# Patient Record
Sex: FEMALE | Race: WHITE | NOT HISPANIC OR LATINO | Employment: UNEMPLOYED | URBAN - METROPOLITAN AREA
[De-identification: names, ages, dates, MRNs, and addresses within clinical notes are randomized per-mention and may not be internally consistent; named-entity substitution may affect disease eponyms.]

---

## 2017-03-10 ENCOUNTER — OFFICE VISIT (OUTPATIENT)
Dept: FAMILY MEDICINE | Facility: CLINIC | Age: 62
End: 2017-03-10
Payer: COMMERCIAL

## 2017-03-10 VITALS
HEART RATE: 100 BPM | BODY MASS INDEX: 20.01 KG/M2 | SYSTOLIC BLOOD PRESSURE: 146 MMHG | HEIGHT: 61 IN | DIASTOLIC BLOOD PRESSURE: 98 MMHG | TEMPERATURE: 98.7 F | WEIGHT: 106 LBS | OXYGEN SATURATION: 97 %

## 2017-03-10 DIAGNOSIS — J01.00 ACUTE NON-RECURRENT MAXILLARY SINUSITIS: Primary | ICD-10-CM

## 2017-03-10 DIAGNOSIS — R06.2 WHEEZING: ICD-10-CM

## 2017-03-10 PROCEDURE — 99213 OFFICE O/P EST LOW 20 MIN: CPT | Performed by: PHYSICIAN ASSISTANT

## 2017-03-10 RX ORDER — ALBUTEROL SULFATE 90 UG/1
2 AEROSOL, METERED RESPIRATORY (INHALATION) EVERY 4 HOURS PRN
Qty: 1 INHALER | Refills: 0 | Status: SHIPPED | OUTPATIENT
Start: 2017-03-10 | End: 2017-05-17

## 2017-03-10 RX ORDER — CEFDINIR 300 MG/1
300 CAPSULE ORAL 2 TIMES DAILY
Qty: 20 CAPSULE | Refills: 0 | Status: SHIPPED | OUTPATIENT
Start: 2017-03-10 | End: 2017-05-17

## 2017-03-10 NOTE — MR AVS SNAPSHOT
After Visit Summary   3/10/2017    Mimi Rivera    MRN: [id number]           Patient Information     Date Of Birth          1955        Visit Information        Provider Department      3/10/2017 2:20 PM Alanis Mederos PA-C [facility]        Today's Diagnoses     Acute non-recurrent maxillary sinusitis    -  1    Wheezing          Care Instructions    Take the antibiotic as prescribed for the sinus infection    Please take the albuterol inhaler every 4 hours as needed for any wheezing or shortness of breath.  If you are needing the rescue inhaler more often or having increase wheezing or shortness of breath please be seen immediately.      Followup if symptoms are not improved as expected with treatment.    Sinusitis (Antibiotic Treatment)    The sinuses are air-filled spaces within the bones of the face. They connect to the inside of the nose. Sinusitis is an inflammation of the tissue lining the sinus cavity. Sinus inflammation can occur during a cold. It can also be due to allergies to pollens and other particles in the air. Sinusitis can cause symptoms of sinus congestion and fullness. A sinus infection causes fever, headache and facial pain. There is often green or yellow drainage from the nose or into the back of the throat (post-nasal drip). You have been given antibiotics to treat this condition.  Home care:    Take the full course of antibiotics as instructed. Do not stop taking them, even if you feel better.    Drink plenty of water, hot tea, and other liquids. This may help thin mucus. It also may promote sinus drainage.    Heat may help soothe painful areas of the face. Use a towel soaked in hot water. Or,  the shower and direct the hot spray onto your face. Using a vaporizer along with a menthol rub at night may also help.     An expectorant containing guaifenesin may help thin the mucus and promote drainage from the  sinuses.    Over-the-counter decongestants may be used unless a similar medicine was prescribed. Nasal sprays work the fastest. Use one that contains phenylephrine or oxymetazoline. First blow the nose gently. Then use the spray. Do not use these medicines more often than directed on the label or symptoms may get worse. You may also use tablets containing pseudoephedrine. Avoid products that combine ingredients, because side effects may be increased. Read labels. You can also ask the pharmacist for help. (NOTE: Persons with high blood pressure should not use decongestants. They can raise blood pressure.)    Over-the-counter antihistamines may help if allergies contributed to your sinusitis.      Do not use nasal rinses or irrigation during an acute sinus infection, unless told to by your health care provider. Rinsing may spread the infection to other sinuses.    Use acetaminophen or ibuprofen to control pain, unless another pain medicine was prescribed. (If you have chronic liver or kidney disease or ever had a stomach ulcer, talk with your doctor before using these medicines. Aspirin should never be used in anyone under 18 years of age who is ill with a fever. It may cause severe liver damage.)    Don't smoke. This can worsen symptoms.  Follow-up care  Follow up with your healthcare provider or our staff if you are not improving within the next week.  When to seek medical advice  Call your healthcare provider if any of these occur:    Facial pain or headache becoming more severe    Stiff neck    Unusual drowsiness or confusion    Swelling of the forehead or eyelids    Vision problems, including blurred or double vision    Fever of 100.4 F (38 C) or higher, or as directed by your healthcare provider    Seizure    Breathing problems    Symptoms not resolving within 10 days    4590-3668 [employer]. [address] [place] [address], [place] [address]. All rights reserved. This information is not intended as a  "substitute for professional medical care. Always follow your healthcare professional's instructions.              Follow-ups after your visit        Who to contact     If you have questions or need follow up information about today's clinic visit or your schedule please contact [facility] directly at [phone].  Normal or non-critical lab and imaging results will be communicated to you by MyChart, letter or phone within 4 business days after the clinic has received the results. If you do not hear from us within 7 days, please contact the clinic through [employer] or phone. If you have a critical or abnormal lab result, we will notify you by phone as soon as possible.  Submit refill requests through [employer] or call your pharmacy and they will forward the refill request to us. Please allow 3 business days for your refill to be completed.          Additional Information About Your Visit        MyChart Information     [employer] gives you secure access to your electronic health record. If you see a primary care provider, you can also send messages to your care team and make appointments. If you have questions, please call your primary care clinic.  If you do not have a primary care provider, please call [phone] and they will assist you.        Care EveryWhere ID     This is your Care EveryWhere ID. This could be used by other organizations to access your [facility] medical records  [id number]        Your Vitals Were     Pulse Temperature Height Last Period Pulse Oximetry Breastfeeding?    100 98.7  F (37.1  C) (Oral) 5' 1\" (1.549 m) 09/22/2010 97% No    BMI (Body Mass Index)                   20.03 kg/m2            Blood Pressure from Last 3 Encounters:   03/10/17 (!) 146/98   10/29/16 132/84   10/24/16 122/82    Weight from Last 3 Encounters:   03/10/17 106 lb (48.1 kg)   10/24/16 103 lb (46.7 kg)   09/16/16 100 lb (45.4 kg)              Today, you had the following     No orders found for display    "      Today's Medication Changes          These changes are accurate as of: 3/10/17  3:18 PM.  If you have any questions, ask your nurse or doctor.               Start taking these medicines.        Dose/Directions    cefdinir 300 MG capsule   Commonly known as:  OMNICEF   Used for:  Acute non-recurrent maxillary sinusitis   Started by:  Alanis Mederos PA-C        Dose:  300 mg   Take 1 capsule (300 mg) by mouth 2 times daily   Quantity:  20 capsule   Refills:  0         These medicines have changed or have updated prescriptions.        Dose/Directions    albuterol 108 (90 BASE) MCG/ACT Inhaler   Commonly known as:  PROAIR HFA/PROVENTIL HFA/VENTOLIN HFA   This may have changed:    - when to take this  - reasons to take this   Used for:  Wheezing   Changed by:  Alanis Mederos PA-C        Dose:  2 puff   Inhale 2 puffs into the lungs every 4 hours as needed for shortness of breath / dyspnea or wheezing   Quantity:  1 Inhaler   Refills:  0            Where to get your medicines      These medications were sent to [id number] - [place], [place] - [address] [address]  [id number] [place] [address]     Phone:  [phone]     albuterol 108 (90 BASE) MCG/ACT Inhaler    cefdinir 300 MG capsule                Primary Care Provider Office Phone # Fax #    Leno Harrison [phone]       [facility] [address]  [facility] [address]        Thank you!     Thank you for choosing [facility]  for your care. Our goal is always to provide you with excellent care. Hearing back from our patients is one way we can continue to improve our services. Please take a few minutes to complete the written survey that you may receive in the mail after your visit with us. Thank you!             Your Updated Medication List - Protect others around you: Learn how to safely use, store and throw away your medicines at www.disposemymeds.org.          This list is  accurate as of: 3/10/17  3:18 PM.  Always use your most recent med list.                   Brand Name Dispense Instructions for use    albuterol 108 (90 BASE) MCG/ACT Inhaler    PROAIR HFA/PROVENTIL HFA/VENTOLIN HFA    1 Inhaler    Inhale 2 puffs into the lungs every 4 hours as needed for shortness of breath / dyspnea or wheezing       amLODIPine 5 MG tablet    NORVASC    90 tablet    Take 1 tablet (5 mg) by mouth daily       cefdinir 300 MG capsule    OMNICEF    20 capsule    Take 1 capsule (300 mg) by mouth 2 times daily       DEVILBISS TRAVELER NEBULIZER Misc     1 Device    1 Device 4 times daily as needed       fluticasone 220 MCG/ACT Inhaler    FLOVENT HFA    1 Inhaler    Inhale 2 puffs into the lungs 2 times daily       ipratropium - albuterol 0.5 mg/2.5 mg/3 mL 0.5-2.5 (3) MG/3ML neb solution    DUONEB    360 mL    Take 1 vial (3 mLs) by nebulization every 4 hours as needed for shortness of breath / dyspnea or wheezing       tiotropium 2.5 MCG/ACT inhalation aerosol    SPIRIVA RESPIMAT    4 g    Inhale 2 puffs into the lungs daily

## 2017-03-10 NOTE — PROGRESS NOTES
"  SUBJECTIVE:                                                    Mimi Rivera is a 61 year old female who presents to clinic today for the following health issues:      Acute Illness   Acute illness concerns: Fever, cough, sinus problem  Onset: x10 days    Fever: YES- felt warm - curr 98.7    Chills/Sweats: YES- sweats at night    Headache (location?): YES- foredhead    Sinus Pressure:YES    Conjunctivitis:  YES- dry    Ear Pain: YES- popping, pressure    Rhinorrhea: YES- green    Congestion: YES- chest, sinus, head    Sore Throat: YES- mild     Cough: YES-productive of green sputum    Wheeze: YES- last night    Decreased Appetite: YES- cannot taste anything    Nausea: no    Vomiting: no    Diarrhea:  no    Dysuria/Freq.: no    Fatigue/Achiness: YES- more tired    Sick/Strep Exposure: YES- daughter - sinus infection, others - same symptoms     Therapies Tried and outcome: advil - minor relief, advil cold and sinus - no relief      Patient reports that symptoms started last week towards the end of the week.  She states that she has sinus congestion and pain in her face and forehead.  She has also had headaches.  She states that the congestion is dripping down the back of her throat, but she is not able to get it to drain out her nose.      Patient reports that she knows this is a sinus infection.      Problem list and histories reviewed & adjusted, as indicated.  Additional history: as documented      ROS:  Constitutional, HEENT, cardiovascular, pulmonary, GI, , musculoskeletal, neuro, skin, endocrine and psych systems are negative, except as otherwise noted.    OBJECTIVE:                                                    BP (!) 146/98 (BP Location: Left arm, Patient Position: Chair, Cuff Size: Adult Regular)  Pulse 100  Temp 98.7  F (37.1  C) (Oral)  Ht 5' 1\" (1.549 m)  Wt 106 lb (48.1 kg)  LMP 09/22/2010  SpO2 97%  Breastfeeding? No  BMI 20.03 kg/m2  Body mass index is 20.03 kg/(m^2).  GENERAL: healthy, alert " and no distress  EYES: Eyes grossly normal to inspection, PERRL and conjunctivae and sclerae normal  HENT: ear canals and TM's normal, nose and mouth without ulcers or lesions  NECK: no adenopathy, no asymmetry, masses, or scars and thyroid normal to palpation  RESP: lungs clear to auscultation - no rales, rhonchi or wheezes  CV: regular rate and rhythm, normal S1 S2, no S3 or S4, no murmur, click or rub, no peripheral edema and peripheral pulses strong  ABDOMEN: soft, nontender, no hepatosplenomegaly, no masses and bowel sounds normal  MS: no gross musculoskeletal defects noted, no edema  NEURO: Normal strength and tone, mentation intact and speech normal  PSYCH: mentation appears normal, affect normal/bright    Diagnostic Test Results:  none      ASSESSMENT/PLAN:                                                      Mimi was seen today for fever, cough and sinus problem.    Diagnoses and all orders for this visit:    Acute non-recurrent maxillary sinusitis  -     cefdinir (OMNICEF) 300 MG capsule; Take 1 capsule (300 mg) by mouth 2 times daily    Wheezing  -     albuterol (PROAIR HFA/PROVENTIL HFA/VENTOLIN HFA) 108 (90 BASE) MCG/ACT Inhaler; Inhale 2 puffs into the lungs every 4 hours as needed for shortness of breath / dyspnea or wheezing    - Reassuring lung sounds.  No wheezing, rales, rhonchi or coarse sounds heard.    - Cefdinir prescribed as patient has tolerated it well in the past with treatment of sinusitis.      See Patient Instructions:  Take the antibiotic as prescribed for the sinus infection    Please take the albuterol inhaler every 4 hours as needed for any wheezing or shortness of breath.  If you are needing the rescue inhaler more often or having increase wheezing or shortness of breath please be seen immediately.      Followup if symptoms are not improved as expected with treatment.          Alanis Mederos PA-C    [facility]

## 2017-03-10 NOTE — NURSING NOTE
"Chief Complaint   Patient presents with     Fever     Cough     Sinus Problem       Initial BP (!) 146/98 (BP Location: Left arm, Patient Position: Chair, Cuff Size: Adult Regular)  Pulse 100  Temp 98.7  F (37.1  C) (Oral)  Ht 5' 1\" (1.549 m)  Wt 106 lb (48.1 kg)  LMP 09/22/2010  SpO2 97%  Breastfeeding? No  BMI 20.03 kg/m2 Estimated body mass index is 20.03 kg/(m^2) as calculated from the following:    Height as of this encounter: 5' 1\" (1.549 m).    Weight as of this encounter: 106 lb (48.1 kg).  Medication Reconciliation: complete   Csaba Mlnarik CMA    "

## 2017-03-10 NOTE — PATIENT INSTRUCTIONS
Take the antibiotic as prescribed for the sinus infection    Please take the albuterol inhaler every 4 hours as needed for any wheezing or shortness of breath.  If you are needing the rescue inhaler more often or having increase wheezing or shortness of breath please be seen immediately.      Followup if symptoms are not improved as expected with treatment.    Sinusitis (Antibiotic Treatment)    The sinuses are air-filled spaces within the bones of the face. They connect to the inside of the nose. Sinusitis is an inflammation of the tissue lining the sinus cavity. Sinus inflammation can occur during a cold. It can also be due to allergies to pollens and other particles in the air. Sinusitis can cause symptoms of sinus congestion and fullness. A sinus infection causes fever, headache and facial pain. There is often green or yellow drainage from the nose or into the back of the throat (post-nasal drip). You have been given antibiotics to treat this condition.  Home care:    Take the full course of antibiotics as instructed. Do not stop taking them, even if you feel better.    Drink plenty of water, hot tea, and other liquids. This may help thin mucus. It also may promote sinus drainage.    Heat may help soothe painful areas of the face. Use a towel soaked in hot water. Or,  the shower and direct the hot spray onto your face. Using a vaporizer along with a menthol rub at night may also help.     An expectorant containing guaifenesin may help thin the mucus and promote drainage from the sinuses.    Over-the-counter decongestants may be used unless a similar medicine was prescribed. Nasal sprays work the fastest. Use one that contains phenylephrine or oxymetazoline. First blow the nose gently. Then use the spray. Do not use these medicines more often than directed on the label or symptoms may get worse. You may also use tablets containing pseudoephedrine. Avoid products that combine ingredients, because side  effects may be increased. Read labels. You can also ask the pharmacist for help. (NOTE: Persons with high blood pressure should not use decongestants. They can raise blood pressure.)    Over-the-counter antihistamines may help if allergies contributed to your sinusitis.      Do not use nasal rinses or irrigation during an acute sinus infection, unless told to by your health care provider. Rinsing may spread the infection to other sinuses.    Use acetaminophen or ibuprofen to control pain, unless another pain medicine was prescribed. (If you have chronic liver or kidney disease or ever had a stomach ulcer, talk with your doctor before using these medicines. Aspirin should never be used in anyone under 18 years of age who is ill with a fever. It may cause severe liver damage.)    Don't smoke. This can worsen symptoms.  Follow-up care  Follow up with your healthcare provider or our staff if you are not improving within the next week.  When to seek medical advice  Call your healthcare provider if any of these occur:    Facial pain or headache becoming more severe    Stiff neck    Unusual drowsiness or confusion    Swelling of the forehead or eyelids    Vision problems, including blurred or double vision    Fever of 100.4 F (38 C) or higher, or as directed by your healthcare provider    Seizure    Breathing problems    Symptoms not resolving within 10 days    4488-1202 [employer]. [address] [place] [address], [place] [address]. All rights reserved. This information is not intended as a substitute for professional medical care. Always follow your healthcare professional's instructions.

## 2017-03-17 ENCOUNTER — OFFICE VISIT (OUTPATIENT)
Dept: FAMILY MEDICINE | Facility: CLINIC | Age: 62
End: 2017-03-17
Payer: COMMERCIAL

## 2017-03-17 VITALS
WEIGHT: 105 LBS | TEMPERATURE: 98.2 F | BODY MASS INDEX: 19.83 KG/M2 | DIASTOLIC BLOOD PRESSURE: 87 MMHG | SYSTOLIC BLOOD PRESSURE: 120 MMHG | OXYGEN SATURATION: 93 % | HEIGHT: 61 IN | HEART RATE: 90 BPM

## 2017-03-17 DIAGNOSIS — R05.9 COUGH: ICD-10-CM

## 2017-03-17 DIAGNOSIS — J44.9 CHRONIC OBSTRUCTIVE PULMONARY DISEASE, UNSPECIFIED COPD TYPE (H): ICD-10-CM

## 2017-03-17 DIAGNOSIS — J01.01 ACUTE RECURRENT MAXILLARY SINUSITIS: Primary | ICD-10-CM

## 2017-03-17 DIAGNOSIS — Z12.31 VISIT FOR SCREENING MAMMOGRAM: ICD-10-CM

## 2017-03-17 DIAGNOSIS — Z12.11 SPECIAL SCREENING FOR MALIGNANT NEOPLASMS, COLON: ICD-10-CM

## 2017-03-17 LAB
BASOPHILS # BLD AUTO: 0 10E9/L (ref 0–0.2)
BASOPHILS NFR BLD AUTO: 0.3 %
DIFFERENTIAL METHOD BLD: ABNORMAL
EOSINOPHIL # BLD AUTO: 0 10E9/L (ref 0–0.7)
EOSINOPHIL NFR BLD AUTO: 0.3 %
ERYTHROCYTE [DISTWIDTH] IN BLOOD BY AUTOMATED COUNT: 11.9 % (ref 10–15)
HCT VFR BLD AUTO: 45 % (ref 35–47)
HGB BLD-MCNC: 15.9 G/DL (ref 11.7–15.7)
LYMPHOCYTES # BLD AUTO: 2.1 10E9/L (ref 0.8–5.3)
LYMPHOCYTES NFR BLD AUTO: 28.7 %
MCH RBC QN AUTO: 32.9 PG (ref 26.5–33)
MCHC RBC AUTO-ENTMCNC: 35.3 G/DL (ref 31.5–36.5)
MCV RBC AUTO: 93 FL (ref 78–100)
MONOCYTES # BLD AUTO: 0.6 10E9/L (ref 0–1.3)
MONOCYTES NFR BLD AUTO: 8.3 %
NEUTROPHILS # BLD AUTO: 4.6 10E9/L (ref 1.6–8.3)
NEUTROPHILS NFR BLD AUTO: 62.4 %
PLATELET # BLD AUTO: 245 10E9/L (ref 150–450)
RBC # BLD AUTO: 4.83 10E12/L (ref 3.8–5.2)
WBC # BLD AUTO: 7.4 10E9/L (ref 4–11)

## 2017-03-17 PROCEDURE — 99213 OFFICE O/P EST LOW 20 MIN: CPT | Performed by: PHYSICIAN ASSISTANT

## 2017-03-17 PROCEDURE — 85025 COMPLETE CBC W/AUTO DIFF WBC: CPT | Performed by: PHYSICIAN ASSISTANT

## 2017-03-17 PROCEDURE — 36415 COLL VENOUS BLD VENIPUNCTURE: CPT | Performed by: PHYSICIAN ASSISTANT

## 2017-03-17 RX ORDER — AZITHROMYCIN 250 MG/1
TABLET, FILM COATED ORAL
Qty: 6 TABLET | Refills: 0 | Status: SHIPPED | OUTPATIENT
Start: 2017-03-17 | End: 2017-05-17

## 2017-03-17 NOTE — LETTER
My Depression Action Plan  Name: Mimi Rivera   Date of Birth 1955  Date: 3/17/2017    My doctor: Leno Harrison   My clinic: [address] [id number] [phone]          GREEN    ZONE   Good Control    What it looks like:     Things are going generally well. You have normal up s and down s. You may even feel depressed from time to time, but bad moods usually last less than a day.   What you need to do:  1. Continue to care for yourself (see self care plan)  2. Check your depression survival kit and update it as needed  3. Follow your physician s recommendations including any medication.  4. Do not stop taking medication unless you consult with your physician first.           YELLOW         ZONE Getting Worse    What it looks like:     Depression is starting to interfere with your life.     It may be hard to get out of bed; you may be starting to isolate yourself from others.    Symptoms of depression are starting to last most all day and this has happened for several days.     You may have suicidal thoughts but they are not constant.   What you need to do:     1. Call your care team, your response to treatment will improve if you keep your care team informed of your progress. Yellow periods are signs an adjustment may need to be made.     2. Continue your self-care, even if you have to fake it!    3. Talk to someone in your support network    4. Open up your depression survival kit           RED    ZONE Medical Alert - Get Help    What it looks like:     Depression is seriously interfering with your life.     You may experience these or other symptoms: You can t get out of bed most days, can t work or engage in other necessary activities, you have trouble taking care of basic hygiene, or basic responsibilities, thoughts of suicide or death that will not go away, self-injurious behavior.     What you need to do:  1. Call  your care team and request a same-day appointment. If they are not available (weekends or after hours) call your local crisis line, emergency room or 911.      Electronically signed by: Imelda Flores, March 17, 2017    Depression Self Care Plan / Survival Kit    Self-Care for Depression  Here s the deal. Your body and mind are really not as separate as most people think.  What you do and think affects how you feel and how you feel influences what you do and think. This means if you do things that people who feel good do, it will help you feel better.  Sometimes this is all it takes.  There is also a place for medication and therapy depending on how severe your depression is, so be sure to consult with your medical provider and/ or Behavioral Health Consultant if your symptoms are worsening or not improving.     In order to better manage my stress, I will:    Exercise  Get some form of exercise, every day. This will help reduce pain and release endorphins, the  feel good  chemicals in your brain. This is almost as good as taking antidepressants!  This is not the same as joining a gym and then never going! (they count on that by the way ) It can be as simple as just going for a walk or doing some gardening, anything that will get you moving.      Hygiene   Maintain good hygiene (Get out of bed in the morning, Make your bed, Brush your teeth, Take a shower, and Get dressed like you were going to work, even if you are unemployed).  If your clothes don't fit try to get ones that do.    Diet  I will strive to eat foods that are good for me, drink plenty of water, and avoid excessive sugar, caffeine, alcohol, and other mood-altering substances.  Some foods that are helpful in depression are: complex carbohydrates, B vitamins, flaxseed, fish or fish oil, fresh fruits and vegetables.    Psychotherapy  I agree to participate in Individual Therapy (if recommended).    Medication  If prescribed medications, I agree to take  them.  Missing doses can result in serious side effects.  I understand that drinking alcohol, or other illicit drug use, may cause potential side effects.  I will not stop my medication abruptly without first discussing it with my provider.    Staying Connected With Others  I will stay in touch with my friends, family members, and my primary care provider/team.    Use your imagination  Be creative.  We all have a creative side; it doesn t matter if it s oil painting, sand castles, or mud pies! This will also kick up the endorphins.    Witness Beauty  (AKA stop and smell the roses) Take a look outside, even in mid-winter. Notice colors, textures. Watch the squirrels and birds.     Service to others  Be of service to others.  There is always someone else in need.  By helping others we can  get out of ourselves  and remember the really important things.  This also provides opportunities for practicing all the other parts of the program.    Humor  Laugh and be silly!  Adjust your TV habits for less news and crime-drama and more comedy.    Control your stress  Try breathing deep, massage therapy, biofeedback, and meditation. Find time to relax each day.     My support system    Clinic Contact:  Phone number:    Contact 1:  Phone number:    Contact 2:  Phone number:    [employer]:  Phone number:    Therapist:  Phone number:    Local crisis center:    Phone number:    Other community support:  Phone number:

## 2017-03-17 NOTE — LETTER
My COPD Action Plan   Name: Mimi Rivera    YOB: 1955   Date: 3/17/2017    My doctor: Alanis Mederos PA-C   My clinic: [address] [id number] [phone]  My Controller Medicine: Tiotropium (Spiriva)    Dose:      My Rescue Medicine: Albuterol (Proair/Ventolin/Proventil) inhaler   Dose:      My Flare Up Medicine: none   Dose:   My COPD Severity: None noted     Use of Oxygen: Oxygen Not Prescribed         GREEN ZONE       Doing well today      Usual level of activity and exercise    Usual amount of cough and mucus    No shortness of breath    Usual level of health (thinking clearly, sleeping well, feel like eating) Actions:      Take daily medicines    Use oxygen as prescribed    Follow regular exercise and diet plan    Avoid cigarette smoke and other irritants that harm the lungs           YELLOW ZONE          Having a bad day or flare up      Short of breath more than usual    A lot more sputum (mucus) than usual    Sputum looks yellow, green, tan, brown or bloody    More coughing or wheezing    Fever or chills    Less energy; trouble completing activities    Trouble thinking or focusing    Using quick relief inhaler or nebulizer more often    Poor sleep; symptoms wake me up    Do not feel like eating Actions:      Get plenty of rest    Take daily medicines    Use quick relief inhaler every 4-6 hours    If you use oxygen, call you doctor to see if you should adjust your oxygen    Do breathing exercises or other things to help you relax    Let a loved one, friend or neighbor know you are feeling worse    Call your care team if you have 2 or more symptoms.  Start taking steroids or antibiotics if directed by your care team           RED ZONE       Need medical care now      Severe shortness of breath (feel you can't breathe)    Fever, chills    Not enough breath to do any activity    Trouble coughing up mucus, walking or  talking    Blood in mucus    Frequent coughing   Rescue medicines are not working    Not able to sleep because of breathing    Feel confused or drowsy    Chest pain    Actions:      Call your health care team.  If you cannot reach your care team, call 911 or go to the emergency room.        Electronically signed by: Imelda Flores, March 17, 2017  Annual Reminders:  Meet with Care Team, Flu Shot every Fall and Pneumonia Shot at least once  Pharmacy: [id number] - [facility] [address] [address]

## 2017-03-17 NOTE — PATIENT INSTRUCTIONS
Followup with ENT and with your pulmonary provider as sinusitis has been more recent in the last year.    Sinusitis (Antibiotic Treatment)    The sinuses are air-filled spaces within the bones of the face. They connect to the inside of the nose. Sinusitis is an inflammation of the tissue lining the sinus cavity. Sinus inflammation can occur during a cold. It can also be due to allergies to pollens and other particles in the air. Sinusitis can cause symptoms of sinus congestion and fullness. A sinus infection causes fever, headache and facial pain. There is often green or yellow drainage from the nose or into the back of the throat (post-nasal drip). You have been given antibiotics to treat this condition.  Home care:    Take the full course of antibiotics as instructed. Do not stop taking them, even if you feel better.    Drink plenty of water, hot tea, and other liquids. This may help thin mucus. It also may promote sinus drainage.    Heat may help soothe painful areas of the face. Use a towel soaked in hot water. Or,  the shower and direct the hot spray onto your face. Using a vaporizer along with a menthol rub at night may also help.     An expectorant containing guaifenesin may help thin the mucus and promote drainage from the sinuses.    Over-the-counter decongestants may be used unless a similar medicine was prescribed. Nasal sprays work the fastest. Use one that contains phenylephrine or oxymetazoline. First blow the nose gently. Then use the spray. Do not use these medicines more often than directed on the label or symptoms may get worse. You may also use tablets containing pseudoephedrine. Avoid products that combine ingredients, because side effects may be increased. Read labels. You can also ask the pharmacist for help. (NOTE: Persons with high blood pressure should not use decongestants. They can raise blood pressure.)    Over-the-counter antihistamines may help if allergies contributed to your  sinusitis.      Do not use nasal rinses or irrigation during an acute sinus infection, unless told to by your health care provider. Rinsing may spread the infection to other sinuses.    Use acetaminophen or ibuprofen to control pain, unless another pain medicine was prescribed. (If you have chronic liver or kidney disease or ever had a stomach ulcer, talk with your doctor before using these medicines. Aspirin should never be used in anyone under 18 years of age who is ill with a fever. It may cause severe liver damage.)    Don't smoke. This can worsen symptoms.  Follow-up care  Follow up with your healthcare provider or our staff if you are not improving within the next week.  When to seek medical advice  Call your healthcare provider if any of these occur:    Facial pain or headache becoming more severe    Stiff neck    Unusual drowsiness or confusion    Swelling of the forehead or eyelids    Vision problems, including blurred or double vision    Fever of 100.4 F (38 C) or higher, or as directed by your healthcare provider    Seizure    Breathing problems    Symptoms not resolving within 10 days    2624-6823 [employer]. [address] [place] [address], [place] [address]. All rights reserved. This information is not intended as a substitute for professional medical care. Always follow your healthcare professional's instructions.

## 2017-03-17 NOTE — PROGRESS NOTES
"  SUBJECTIVE:                                                    Mimi Rivera is a 61 year old female who presents to clinic today for the following health issues:      Recheck cough - LOV 03/10/2107. 3 days left of Omnicef -- feels a little better.   Productive cough - yellow green sputum.   Headache - forehead  Cannot smell anything  Still feeling dizzy - off and on.  Sweating at night -- feels funning low grade fever - curr 98.2-O.  Decreased appetite - drinking fluids.     Advil 2 tablets daily - minor relief  Patient took the ibuprofen at 11am today.      Woke up sweaty last night.  She feels like she is runny a low grade fever.       Patient reports that she has had some improvement from the Cefdinir, but reports that it is very minimal.  She reports that she is still having sinus pain and pressure in her face and head.  She states that she still has yellow nasal drainage and a cough that is occasionally productive.    She reports that this sort of course is common for her with sinusitis.  She states that when this happens she is put on a second (different) antibiotic and symptoms improve.      Problem list and histories reviewed & adjusted, as indicated.  Additional history: as documented      ROS:  Constitutional, HEENT, cardiovascular, pulmonary, GI, , musculoskeletal, neuro, skin, endocrine and psych systems are negative, except as otherwise noted.    OBJECTIVE:                                                    /87 (BP Location: Left arm, Patient Position: Chair, Cuff Size: Adult Regular)  Pulse 90  Temp 98.2  F (36.8  C) (Oral)  Ht 5' 1\" (1.549 m)  Wt 105 lb (47.6 kg)  LMP 09/22/2010  SpO2 93%  Breastfeeding? No  BMI 19.84 kg/m2  Body mass index is 19.84 kg/(m^2).  GENERAL: healthy, alert and no distress  EYES: Eyes grossly normal to inspection, PERRL and conjunctivae and sclerae normal  HENT: ear canals and TM's normal, nose and mouth without ulcers or lesions  NECK: no adenopathy, no " asymmetry, masses, or scars and thyroid normal to palpation  RESP: lungs clear to auscultation - no rales, rhonchi or wheezes  CV: regular rate and rhythm, normal S1 S2, no S3 or S4, no murmur, click or rub, no peripheral edema and peripheral pulses strong  ABDOMEN: soft, nontender, no hepatosplenomegaly, no masses and bowel sounds normal  MS: no gross musculoskeletal defects noted, no edema  NEURO: Normal strength and tone, mentation intact and speech normal  PSYCH: mentation appears normal, affect normal/bright    Diagnostic Test Results:  CBC - Within normal limits.       ASSESSMENT/PLAN:                                                      Mimi was seen today for recheck.    Diagnoses and all orders for this visit:    Acute recurrent maxillary sinusitis  -     OTOLARYNGOLOGY REFERRAL  -     azithromycin (ZITHROMAX) 250 MG tablet; Two tablets first day, then one tablet daily for four days.    Cough  -     CBC with platelets and differential    COPD (chronic obstructive pulmonary disease) (H)  -     COPD ACTION PLAN    Visit for screening mammogram  -     MA SCREENING DIGITAL BILAT - Future  (s+30); Future    Special screening for malignant neoplasms, colon  -     Fecal colorectal cancer screen FIT - Future (S+30); Future    Other orders  -     DEPRESSION ACTION PLAN (DAP) Order [[id number]]      - Lung sounds are reassuring without rales, rhonchi, or coarse breath sounds.   - In patients recent past (last one year) it appears that she has been on 10 rounds of antibiotics and most have been for sinusitis.  Patient also appears to have had a recent sinus CT scan that was within normal limits.  I have strongly encouraged that patient be seen by ENT for further evaluation to help minimize need for frequent antibiotic use.    - Azithromycin ordered today for continued symptoms.   - Patient advised and encouraged to use the albuterol inhaler as needed every 4-6 hours for symptoms.     - Patient was advised to be seen  immediately if symptoms change or worsen in any way.      See Patient Instructions        Alanis Mederos PA-C    [facility] [place]

## 2017-03-17 NOTE — NURSING NOTE
"Chief Complaint   Patient presents with     RECHECK     Cough       Initial /87 (BP Location: Left arm, Patient Position: Chair, Cuff Size: Adult Regular)  Pulse 90  Temp 98.2  F (36.8  C) (Oral)  Ht 5' 1\" (1.549 m)  Wt 105 lb (47.6 kg)  LMP 09/22/2010  SpO2 93%  Breastfeeding? No  BMI 19.84 kg/m2 Estimated body mass index is 19.84 kg/(m^2) as calculated from the following:    Height as of this encounter: 5' 1\" (1.549 m).    Weight as of this encounter: 105 lb (47.6 kg).  Medication Reconciliation: complete   Csaba Mlnarik CMA    "

## 2017-03-17 NOTE — MR AVS SNAPSHOT
After Visit Summary   3/17/2017    Mimi Rivera    MRN: [id number]           Patient Information     Date Of Birth          1955        Visit Information        Provider Department      3/17/2017 3:20 PM Alanis Mederos PA-C [facility]        Today's Diagnoses     Moderate major depression (H)    -  1    Visit for screening mammogram        Screening for malignant neoplasm of cervix        COPD (chronic obstructive pulmonary disease) (H)        Cough        Special screening for malignant neoplasms, colon        Acute recurrent maxillary sinusitis          Care Instructions    Followup with ENT and with your pulmonary provider as sinusitis has been more recent in the last year.    Sinusitis (Antibiotic Treatment)    The sinuses are air-filled spaces within the bones of the face. They connect to the inside of the nose. Sinusitis is an inflammation of the tissue lining the sinus cavity. Sinus inflammation can occur during a cold. It can also be due to allergies to pollens and other particles in the air. Sinusitis can cause symptoms of sinus congestion and fullness. A sinus infection causes fever, headache and facial pain. There is often green or yellow drainage from the nose or into the back of the throat (post-nasal drip). You have been given antibiotics to treat this condition.  Home care:    Take the full course of antibiotics as instructed. Do not stop taking them, even if you feel better.    Drink plenty of water, hot tea, and other liquids. This may help thin mucus. It also may promote sinus drainage.    Heat may help soothe painful areas of the face. Use a towel soaked in hot water. Or,  the shower and direct the hot spray onto your face. Using a vaporizer along with a menthol rub at night may also help.     An expectorant containing guaifenesin may help thin the mucus and promote drainage from the sinuses.    Over-the-counter decongestants may be used unless a  similar medicine was prescribed. Nasal sprays work the fastest. Use one that contains phenylephrine or oxymetazoline. First blow the nose gently. Then use the spray. Do not use these medicines more often than directed on the label or symptoms may get worse. You may also use tablets containing pseudoephedrine. Avoid products that combine ingredients, because side effects may be increased. Read labels. You can also ask the pharmacist for help. (NOTE: Persons with high blood pressure should not use decongestants. They can raise blood pressure.)    Over-the-counter antihistamines may help if allergies contributed to your sinusitis.      Do not use nasal rinses or irrigation during an acute sinus infection, unless told to by your health care provider. Rinsing may spread the infection to other sinuses.    Use acetaminophen or ibuprofen to control pain, unless another pain medicine was prescribed. (If you have chronic liver or kidney disease or ever had a stomach ulcer, talk with your doctor before using these medicines. Aspirin should never be used in anyone under 18 years of age who is ill with a fever. It may cause severe liver damage.)    Don't smoke. This can worsen symptoms.  Follow-up care  Follow up with your healthcare provider or our staff if you are not improving within the next week.  When to seek medical advice  Call your healthcare provider if any of these occur:    Facial pain or headache becoming more severe    Stiff neck    Unusual drowsiness or confusion    Swelling of the forehead or eyelids    Vision problems, including blurred or double vision    Fever of 100.4 F (38 C) or higher, or as directed by your healthcare provider    Seizure    Breathing problems    Symptoms not resolving within 10 days    2785-8860 [employer]. [address] [place] [address], [place] [address]. All rights reserved. This information is not intended as a substitute for professional medical care. Always follow your  healthcare professional's instructions.              Follow-ups after your visit        Additional Services     OTOLARYNGOLOGY REFERRAL       Your provider has referred you to: [facility]: [facility] [phone]   [email or web address]    Please be aware that coverage of these services is subject to the terms and limitations of your health insurance plan.  Call member services at your health plan with any benefit or coverage questions.      Please bring the following with you to your appointment:    (1) Any X-Rays, CTs or MRIs which have been performed.  Contact the facility where they were done to arrange for  prior to your scheduled appointment.   (2) List of current medications  (3) This referral request   (4) Any documents/labs given to you for this referral                  Your next 10 appointments already scheduled     Apr 03, 2017  1:45 PM CDT   MA SCREENING DIGITAL BILATERAL with SVMA[facility]   [facility]    [address] [address] [facility] [id number] [phone]           Do not use any powder, lotion or deodorant under your arms or on your breast. If you do, we will ask you to remove it before your exam.  Wear comfortable, two-piece clothing.  If you have any allergies, tell your care team.  Bring any previous mammograms from other facilities or have them mailed to the breast center.            Apr 27, 2017 10:40 AM CDT   PHYSICAL with Karen Weiler, MD   [facility]    [address] [facility] [id number] [phone]              Future tests that were ordered for you today     Open Future Orders        Priority Expected Expires Ordered    MA SCREENING DIGITAL BILAT - Future  (s+30) Routine  3/17/2018 3/17/2017    Fecal colorectal cancer screen FIT - Future (S+30) Routine 4/7/2017 4/16/2017 3/17/2017            Who to contact     If you have questions  "or need follow up information about today's clinic visit or your schedule please contact [facility] directly at [phone].  Normal or non-critical lab and imaging results will be communicated to you by [employer], letter or phone within 4 business days after the clinic has received the results. If you do not hear from us within 7 days, please contact the clinic through [employer] or phone. If you have a critical or abnormal lab result, we will notify you by phone as soon as possible.  Submit refill requests through [email or web address] or call your pharmacy and they will forward the refill request to us. Please allow 3 business days for your refill to be completed.          Additional Information About Your Visit        [employer] Information     [email or web address] gives you secure access to your electronic health record. If you see a primary care provider, you can also send messages to your care team and make appointments. If you have questions, please call your primary care clinic.  If you do not have a primary care provider, please call [phone] and they will assist you.        Care EveryWhere ID     This is your Care EveryWhere ID. This could be used by other organizations to access your [facility] medical records  [id number]        Your Vitals Were     Pulse Temperature Height Last Period Pulse Oximetry Breastfeeding?    90 98.2  F (36.8  C) (Oral) 5' 1\" (1.549 m) 09/22/2010 93% No    BMI (Body Mass Index)                   19.84 kg/m2            Blood Pressure from Last 3 Encounters:   03/17/17 120/87   03/10/17 (!) 146/98   10/29/16 132/84    Weight from Last 3 Encounters:   03/17/17 105 lb (47.6 kg)   03/10/17 106 lb (48.1 kg)   10/24/16 103 lb (46.7 kg)              We Performed the Following     CBC with platelets and differential     COPD ACTION PLAN     DEPRESSION ACTION PLAN (DAP) Order [[id number]]     OTOLARYNGOLOGY REFERRAL          Today's Medication Changes          These changes are accurate as of: 3/17/17  " 4:24 PM.  If you have any questions, ask your nurse or doctor.               Start taking these medicines.        Dose/Directions    azithromycin 250 MG tablet   Commonly known as:  ZITHROMAX   Used for:  Acute recurrent maxillary sinusitis   Started by:  Alanis Mederos PA-C        Two tablets first day, then one tablet daily for four days.   Quantity:  6 tablet   Refills:  0            Where to get your medicines      These medications were sent to [id number] - [place], [place] - [address] [address]  [id number] [place] [address]     Phone:  [phone]     azithromycin 250 MG tablet                Primary Care Provider Office Phone # Fax #    Leno Mert Harrison [phone]       [facility] [address] [facility] [address]  [facility] [address]        Thank you!     Thank you for choosing [facility]  for your care. Our goal is always to provide you with excellent care. Hearing back from our patients is one way we can continue to improve our services. Please take a few minutes to complete the written survey that you may receive in the mail after your visit with us. Thank you!             Your Updated Medication List - Protect others around you: Learn how to safely use, store and throw away your medicines at www.disposemymeds.org.          This list is accurate as of: 3/17/17  4:24 PM.  Always use your most recent med list.                   Brand Name Dispense Instructions for use    albuterol 108 (90 BASE) MCG/ACT Inhaler    PROAIR HFA/PROVENTIL HFA/VENTOLIN HFA    1 Inhaler    Inhale 2 puffs into the lungs every 4 hours as needed for shortness of breath / dyspnea or wheezing       amLODIPine 5 MG tablet    NORVASC    90 tablet    Take 1 tablet (5 mg) by mouth daily       azithromycin 250 MG tablet    ZITHROMAX    6 tablet    Two tablets first day, then one tablet daily for four days.       cefdinir 300 MG capsule    OMNICEF    20 capsule    Take 1  capsule (300 mg) by mouth 2 times daily       [facility] NEBUL[facility] Misc     1 Device    1 Device 4 times daily as needed       fluticasone 220 MCG/ACT Inhaler    FLOVENT HFA    1 Inhaler    Inhale 2 puffs into the lungs 2 times daily       tiotropium 2.5 MCG/ACT inhalation aerosol    SPIRIVA RESPIMAT    4 g    Inhale 2 puffs into the lungs daily

## 2017-03-17 NOTE — LETTER
My Depression Action Plan  Name: Mimi Rivera   Date of Birth 1955  Date: 3/17/2017    My doctor: Leno Harrison   My clinic: [address] [id number] [phone]          GREEN    ZONE   Good Control    What it looks like:     Things are going generally well. You have normal up s and down s. You may even feel depressed from time to time, but bad moods usually last less than a day.   What you need to do:  1. Continue to care for yourself (see self care plan)  2. Check your depression survival kit and update it as needed  3. Follow your physician s recommendations including any medication.  4. Do not stop taking medication unless you consult with your physician first.           YELLOW         ZONE Getting Worse    What it looks like:     Depression is starting to interfere with your life.     It may be hard to get out of bed; you may be starting to isolate yourself from others.    Symptoms of depression are starting to last most all day and this has happened for several days.     You may have suicidal thoughts but they are not constant.   What you need to do:     1. Call your care team, your response to treatment will improve if you keep your care team informed of your progress. Yellow periods are signs an adjustment may need to be made.     2. Continue your self-care, even if you have to fake it!    3. Talk to someone in your support network    4. Open up your depression survival kit           RED    ZONE Medical Alert - Get Help    What it looks like:     Depression is seriously interfering with your life.     You may experience these or other symptoms: You can t get out of bed most days, can t work or engage in other necessary activities, you have trouble taking care of basic hygiene, or basic responsibilities, thoughts of suicide or death that will not go away, self-injurious behavior.     What you need to do:  1. Call  your care team and request a same-day appointment. If they are not available (weekends or after hours) call your local crisis line, emergency room or 911.      Electronically signed by: Imelda Flores, March 17, 2017    Depression Self Care Plan / Survival Kit    Self-Care for Depression  Here s the deal. Your body and mind are really not as separate as most people think.  What you do and think affects how you feel and how you feel influences what you do and think. This means if you do things that people who feel good do, it will help you feel better.  Sometimes this is all it takes.  There is also a place for medication and therapy depending on how severe your depression is, so be sure to consult with your medical provider and/ or Behavioral Health Consultant if your symptoms are worsening or not improving.     In order to better manage my stress, I will:    Exercise  Get some form of exercise, every day. This will help reduce pain and release endorphins, the  feel good  chemicals in your brain. This is almost as good as taking antidepressants!  This is not the same as joining a gym and then never going! (they count on that by the way ) It can be as simple as just going for a walk or doing some gardening, anything that will get you moving.      Hygiene   Maintain good hygiene (Get out of bed in the morning, Make your bed, Brush your teeth, Take a shower, and Get dressed like you were going to work, even if you are unemployed).  If your clothes don't fit try to get ones that do.    Diet  I will strive to eat foods that are good for me, drink plenty of water, and avoid excessive sugar, caffeine, alcohol, and other mood-altering substances.  Some foods that are helpful in depression are: complex carbohydrates, B vitamins, flaxseed, fish or fish oil, fresh fruits and vegetables.    Psychotherapy  I agree to participate in Individual Therapy (if recommended).    Medication  If prescribed medications, I agree to take  them.  Missing doses can result in serious side effects.  I understand that drinking alcohol, or other illicit drug use, may cause potential side effects.  I will not stop my medication abruptly without first discussing it with my provider.    Staying Connected With Others  I will stay in touch with my friends, family members, and my primary care provider/team.    Use your imagination  Be creative.  We all have a creative side; it doesn t matter if it s oil painting, sand castles, or mud pies! This will also kick up the endorphins.    Witness Beauty  (AKA stop and smell the roses) Take a look outside, even in mid-winter. Notice colors, textures. Watch the squirrels and birds.     Service to others  Be of service to others.  There is always someone else in need.  By helping others we can  get out of ourselves  and remember the really important things.  This also provides opportunities for practicing all the other parts of the program.    Humor  Laugh and be silly!  Adjust your TV habits for less news and crime-drama and more comedy.    Control your stress  Try breathing deep, massage therapy, biofeedback, and meditation. Find time to relax each day.     My support system    Clinic Contact:  Phone number:    Contact 1:  Phone number:    Contact 2:  Phone number:    Buddhism/:  Phone number:    Therapist:  Phone number:    Local crisis center:    Phone number:    Other community support:  Phone number:

## 2017-04-20 ENCOUNTER — RADIANT APPOINTMENT (OUTPATIENT)
Dept: MAMMOGRAPHY | Facility: CLINIC | Age: 62
End: 2017-04-20
Payer: COMMERCIAL

## 2017-04-20 DIAGNOSIS — Z12.31 VISIT FOR SCREENING MAMMOGRAM: ICD-10-CM

## 2017-04-20 PROCEDURE — G0202 SCR MAMMO BI INCL CAD: HCPCS | Mod: TC

## 2017-04-24 ENCOUNTER — TRANSFERRED RECORDS (OUTPATIENT)
Dept: HEALTH INFORMATION MANAGEMENT | Facility: CLINIC | Age: 62
End: 2017-04-24

## 2017-05-17 ENCOUNTER — OFFICE VISIT (OUTPATIENT)
Dept: FAMILY MEDICINE | Facility: CLINIC | Age: 62
End: 2017-05-17
Payer: COMMERCIAL

## 2017-05-17 ENCOUNTER — RADIANT APPOINTMENT (OUTPATIENT)
Dept: GENERAL RADIOLOGY | Facility: CLINIC | Age: 62
End: 2017-05-17
Attending: FAMILY MEDICINE
Payer: COMMERCIAL

## 2017-05-17 VITALS
WEIGHT: 105 LBS | DIASTOLIC BLOOD PRESSURE: 86 MMHG | SYSTOLIC BLOOD PRESSURE: 144 MMHG | OXYGEN SATURATION: 97 % | HEIGHT: 61 IN | BODY MASS INDEX: 19.83 KG/M2 | TEMPERATURE: 98.6 F | HEART RATE: 110 BPM

## 2017-05-17 DIAGNOSIS — I10 HYPERTENSION GOAL BP (BLOOD PRESSURE) < 140/90: ICD-10-CM

## 2017-05-17 DIAGNOSIS — R06.2 WHEEZING: ICD-10-CM

## 2017-05-17 DIAGNOSIS — R05.9 COUGH: ICD-10-CM

## 2017-05-17 DIAGNOSIS — F33.1 MAJOR DEPRESSIVE DISORDER, RECURRENT EPISODE, MODERATE (H): ICD-10-CM

## 2017-05-17 DIAGNOSIS — J42 CHRONIC BRONCHITIS, UNSPECIFIED CHRONIC BRONCHITIS TYPE (H): ICD-10-CM

## 2017-05-17 DIAGNOSIS — R05.9 COUGH: Primary | ICD-10-CM

## 2017-05-17 PROCEDURE — 71020 XR CHEST 2 VW: CPT

## 2017-05-17 PROCEDURE — 99213 OFFICE O/P EST LOW 20 MIN: CPT | Performed by: FAMILY MEDICINE

## 2017-05-17 RX ORDER — LEVOFLOXACIN 500 MG/1
500 TABLET, FILM COATED ORAL DAILY
Qty: 7 TABLET | Refills: 0 | Status: SHIPPED | OUTPATIENT
Start: 2017-05-17 | End: 2017-05-22

## 2017-05-17 RX ORDER — AMLODIPINE BESYLATE 5 MG/1
5 TABLET ORAL DAILY
Qty: 90 TABLET | Refills: 3 | Status: SHIPPED | OUTPATIENT
Start: 2017-05-17 | End: 2018-09-04

## 2017-05-17 RX ORDER — AZITHROMYCIN 250 MG/1
TABLET, FILM COATED ORAL
Qty: 6 TABLET | Refills: 0 | Status: SHIPPED | OUTPATIENT
Start: 2017-05-17 | End: 2017-05-22

## 2017-05-17 RX ORDER — ALBUTEROL SULFATE 90 UG/1
2 AEROSOL, METERED RESPIRATORY (INHALATION) EVERY 4 HOURS PRN
Qty: 1 INHALER | Refills: 0 | Status: SHIPPED | OUTPATIENT
Start: 2017-05-17 | End: 2019-03-04 | Stop reason: ALTCHOICE

## 2017-05-17 RX ORDER — PREDNISONE 20 MG/1
20 TABLET ORAL DAILY
Qty: 5 TABLET | Refills: 0 | Status: SHIPPED | OUTPATIENT
Start: 2017-05-17 | End: 2017-05-22

## 2017-05-17 NOTE — MR AVS SNAPSHOT
After Visit Summary   5/17/2017    Miim Rivera    MRN: [id number]           Patient Information     Date Of Birth          1955        Visit Information        Provider Department      5/17/2017 1:40 PM Weiler, Karen, MD [facility]        Today's Diagnoses     Cough    -  1    Hypertension goal BP (blood pressure) < 140/90        Major depressive disorder, recurrent episode, moderate (H)        Wheezing        Chronic bronchitis, unspecified chronic bronchitis type (H)           Follow-ups after your visit        Who to contact     If you have questions or need follow up information about today's clinic visit or your schedule please contact [facility] directly at [phone].  Normal or non-critical lab and imaging results will be communicated to you by MyChart, letter or phone within 4 business days after the clinic has received the results. If you do not hear from us within 7 days, please contact the clinic through [employer] Signal[employer] or phone. If you have a critical or abnormal lab result, we will notify you by phone as soon as possible.  Submit refill requests through [employer]ang or call your pharmacy and they will forward the refill request to us. Please allow 3 business days for your refill to be completed.          Additional Information About Your Visit        MyChart Information     [employer] gives you secure access to your electronic health record. If you see a primary care provider, you can also send messages to your care team and make appointments. If you have questions, please call your primary care clinic.  If you do not have a primary care provider, please call [phone] and they will assist you.        Care EveryWhere ID     This is your Care EveryWhere ID. This could be used by other organizations to access your [place] medical records  [id number]        Your Vitals Were     Pulse Temperature Height Last Period Pulse Oximetry BMI (Body Mass Index)    110 98.6  F  "(37  C) (Oral) 5' 1\" (1.549 m) 09/22/2010 97% 19.84 kg/m2       Blood Pressure from Last 3 Encounters:   05/22/17 142/90   05/17/17 144/86   03/17/17 120/87    Weight from Last 3 Encounters:   05/22/17 105 lb (47.6 kg)   05/17/17 105 lb (47.6 kg)   03/17/17 105 lb (47.6 kg)                 Today's Medication Changes          These changes are accurate as of: 5/17/17 11:59 PM.  If you have any questions, ask your nurse or doctor.               Start taking these medicines.        Dose/Directions    azithromycin 250 MG tablet   Commonly known as:  ZITHROMAX   Used for:  Chronic bronchitis, unspecified chronic bronchitis type (H)   Started by:  Weiler, Karen, MD        Two tablets first day, then one tablet daily for four days.   Quantity:  6 tablet   Refills:  0       levofloxacin 500 MG tablet   Commonly known as:  LEVAQUIN   Used for:  Chronic bronchitis, unspecified chronic bronchitis type (H)   Started by:  Weiler, Karen, MD        Dose:  500 mg   Take 1 tablet (500 mg) by mouth daily   Quantity:  7 tablet   Refills:  0       predniSONE 20 MG tablet   Commonly known as:  DELTASONE   Used for:  Chronic bronchitis, unspecified chronic bronchitis type (H)   Started by:  Weiler, Karen, MD        Dose:  20 mg   Take 1 tablet (20 mg) by mouth daily   Quantity:  5 tablet   Refills:  0            Where to get your medicines      These medications were sent to [id number] - [facility] [address] [address] [address]     Phone:  [phone]     albuterol 108 (90 BASE) MCG/ACT Inhaler    amLODIPine 5 MG tablet    azithromycin 250 MG tablet    levofloxacin 500 MG tablet    predniSONE 20 MG tablet                Primary Care Provider Office Phone # Fax #    Leno Harrison [phone]       [facility] [address]  [facility] [address]        Thank you!     Thank you for choosing [facility]  for your care. Our goal is always to " provide you with excellent care. Hearing back from our patients is one way we can continue to improve our services. Please take a few minutes to complete the written survey that you may receive in the mail after your visit with us. Thank you!             Your Updated Medication List - Protect others around you: Learn how to safely use, store and throw away your medicines at www.disposemymeds.org.          This list is accurate as of: 5/17/17 11:59 PM.  Always use your most recent med list.                   Brand Name Dispense Instructions for use    albuterol 108 (90 BASE) MCG/ACT Inhaler    PROAIR HFA/PROVENTIL HFA/VENTOLIN HFA    1 Inhaler    Inhale 2 puffs into the lungs every 4 hours as needed for shortness of breath / dyspnea or wheezing       amLODIPine 5 MG tablet    NORVASC    90 tablet    Take 1 tablet (5 mg) by mouth daily       azithromycin 250 MG tablet    ZITHROMAX    6 tablet    Two tablets first day, then one tablet daily for four days.       DEVILBISS TRAVELER NEBULIZER Misc     1 Device    1 Device 4 times daily as needed       fluticasone 220 MCG/ACT Inhaler    FLOVENT HFA    1 Inhaler    Inhale 2 puffs into the lungs 2 times daily       levofloxacin 500 MG tablet    LEVAQUIN    7 tablet    Take 1 tablet (500 mg) by mouth daily       predniSONE 20 MG tablet    DELTASONE    5 tablet    Take 1 tablet (20 mg) by mouth daily       tiotropium 2.5 MCG/ACT inhalation aerosol    SPIRIVA RESPIMAT    4 g    Inhale 2 puffs into the lungs daily

## 2017-05-17 NOTE — NURSING NOTE
"Chief Complaint   Patient presents with     Cough       Initial /86  Pulse 110  Temp 98.6  F (37  C) (Oral)  Ht 5' 1\" (1.549 m)  Wt 105 lb (47.6 kg)  LMP 09/22/2010  SpO2 97%  BMI 19.84 kg/m2 Estimated body mass index is 19.84 kg/(m^2) as calculated from the following:    Height as of this encounter: 5' 1\" (1.549 m).    Weight as of this encounter: 105 lb (47.6 kg).  Medication Reconciliation: complete   Marie Lees Certified Medical Assistant      "

## 2017-05-17 NOTE — PROGRESS NOTES
"  SUBJECTIVE:                                                    Mimi Rivera is a 61 year old female who presents to clinic today for the following health issues:      Acute Illness   Acute illness concerns: Coughing   Onset: x3-4 days     Fever: YES- 100.7 last night     Chills/Sweats: YES- mostly at night     Headache (location?): YES    Sinus Pressure:YES    Conjunctivitis:  YES- red, watery     Ear Pain: YES: right mostly     Rhinorrhea: YES    Congestion: YES    Sore Throat: YES- slight      Cough: YES-productive of green sputum    Wheeze: YES    Decreased Appetite: YES    Nausea: no    Vomiting: no    Diarrhea:  YES- today     Dysuria/Freq.: no    Fatigue/Achiness: YES    Sick/Strep Exposure: YES- Granddaughter - sinus infection and cold      Therapies Tried and outcome: Advil cold and sinus       Problem list and histories reviewed & adjusted, as indicated.  Additional history: as documented    Reviewed and updated as needed this visit by clinical staff       Reviewed and updated as needed this visit by Provider         ROS:  Constitutional, HEENT, cardiovascular, pulmonary, gi and gu systems are negative, except as otherwise noted.    OBJECTIVE:                                                    /86  Pulse 110  Temp 98.6  F (37  C) (Oral)  Ht 5' 1\" (1.549 m)  Wt 105 lb (47.6 kg)  LMP 09/22/2010  SpO2 97%  BMI 19.84 kg/m2  Body mass index is 19.84 kg/(m^2).  GENERAL: healthy, alert and no distress  EYES: Eyes grossly normal to inspection, PERRL and conjunctivae and sclerae normal  HENT: ear canals and TM's normal, nose and mouth without ulcers or lesions  NECK: no adenopathy, no asymmetry, masses, or scars and thyroid normal to palpation  RESP: Diffuse expiratory wheezes. Some crackles heard in bases bilaterally  CV: regular rate and rhythm, normal S1 S2, no S3 or S4, no murmur, click or rub, no peripheral edema and peripheral pulses strong  ABDOMEN: soft, nontender, no hepatosplenomegaly, no " masses and bowel sounds normal  MS: no gross musculoskeletal defects noted, no edema    CXR-negative for infiltrate     ASSESSMENT/PLAN:                                                        ICD-10-CM    1. Cough R05 XR Chest 2 Views   2. Hypertension goal BP (blood pressure) < 140/90 I10 amLODIPine (NORVASC) 5 MG tablet   3. Major depressive disorder, recurrent episode, moderate (H) F33.1    4. Wheezing R06.2 albuterol (PROAIR HFA/PROVENTIL HFA/VENTOLIN HFA) 108 (90 BASE) MCG/ACT Inhaler   5. Chronic bronchitis, unspecified chronic bronchitis type (H) J42      predniSONE (DELTASONE) 20 MG tablet      levofloxacin (LEVAQUIN) 500 MG tablet         Follow up if not improving.     Karen Weiler, MD  [facility]

## 2017-05-18 ASSESSMENT — ANXIETY QUESTIONNAIRES
2. NOT BEING ABLE TO STOP OR CONTROL WORRYING: NOT AT ALL
IF YOU CHECKED OFF ANY PROBLEMS ON THIS QUESTIONNAIRE, HOW DIFFICULT HAVE THESE PROBLEMS MADE IT FOR YOU TO DO YOUR WORK, TAKE CARE OF THINGS AT HOME, OR GET ALONG WITH OTHER PEOPLE: NOT DIFFICULT AT ALL
1. FEELING NERVOUS, ANXIOUS, OR ON EDGE: SEVERAL DAYS
6. BECOMING EASILY ANNOYED OR IRRITABLE: NOT AT ALL
GAD7 TOTAL SCORE: 2
5. BEING SO RESTLESS THAT IT IS HARD TO SIT STILL: NOT AT ALL
7. FEELING AFRAID AS IF SOMETHING AWFUL MIGHT HAPPEN: NOT AT ALL
3. WORRYING TOO MUCH ABOUT DIFFERENT THINGS: NOT AT ALL

## 2017-05-18 ASSESSMENT — PATIENT HEALTH QUESTIONNAIRE - PHQ9: 5. POOR APPETITE OR OVEREATING: SEVERAL DAYS

## 2017-05-19 ASSESSMENT — ANXIETY QUESTIONNAIRES: GAD7 TOTAL SCORE: 2

## 2017-05-19 ASSESSMENT — PATIENT HEALTH QUESTIONNAIRE - PHQ9: SUM OF ALL RESPONSES TO PHQ QUESTIONS 1-9: 0

## 2017-05-22 ENCOUNTER — OFFICE VISIT (OUTPATIENT)
Dept: FAMILY MEDICINE | Facility: CLINIC | Age: 62
End: 2017-05-22
Payer: COMMERCIAL

## 2017-05-22 DIAGNOSIS — J01.01 ACUTE RECURRENT MAXILLARY SINUSITIS: Primary | ICD-10-CM

## 2017-05-22 DIAGNOSIS — J44.1 CHRONIC OBSTRUCTIVE PULMONARY DISEASE WITH ACUTE EXACERBATION (H): ICD-10-CM

## 2017-05-22 PROCEDURE — 99213 OFFICE O/P EST LOW 20 MIN: CPT | Performed by: FAMILY MEDICINE

## 2017-05-22 RX ORDER — AZITHROMYCIN 250 MG/1
TABLET, FILM COATED ORAL
Qty: 6 TABLET | Refills: 0 | Status: SHIPPED | OUTPATIENT
Start: 2017-05-22 | End: 2017-11-14

## 2017-05-22 RX ORDER — CEFDINIR 300 MG/1
600 CAPSULE ORAL DAILY
Qty: 28 CAPSULE | Refills: 0 | Status: SHIPPED | OUTPATIENT
Start: 2017-05-22 | End: 2017-06-01

## 2017-05-22 NOTE — MR AVS SNAPSHOT
"              After Visit Summary   5/22/2017    Mimi Rivera    MRN: [id number]           Patient Information     Date Of Birth          1955        Visit Information        Provider Department      5/22/2017 11:40 AM Weiler, Karen, MD [facility]        Today's Diagnoses     Acute recurrent maxillary sinusitis    -  1    Chronic obstructive pulmonary disease with acute exacerbation (H)           Follow-ups after your visit        Who to contact     If you have questions or need follow up information about today's clinic visit or your schedule please contact [facility] directly at [phone].  Normal or non-critical lab and imaging results will be communicated to you by TinyOwl Technologyhart, letter or phone within 4 business days after the clinic has received the results. If you do not hear from us within 7 days, please contact the clinic through [employer] or phone. If you have a critical or abnormal lab result, we will notify you by phone as soon as possible.  Submit refill requests through BlueStacks or call your pharmacy and they will forward the refill request to us. Please allow 3 business days for your refill to be completed.          Additional Information About Your Visit        MyChart Information     BlueStacks gives you secure access to your electronic health record. If you see a primary care provider, you can also send messages to your care team and make appointments. If you have questions, please call your primary care clinic.  If you do not have a primary care provider, please call [phone] and they will assist you.        Care EveryWhere ID     This is your Care EveryWhere ID. This could be used by other organizations to access your [place] medical records  [id number]        Your Vitals Were     Pulse Temperature Height Last Period Pulse Oximetry BMI (Body Mass Index)    117 97.8  F (36.6  C) (Oral) 5' 1\" (1.549 m) 09/22/2010 93% 19.84 kg/m2       Blood Pressure from Last 3 " Encounters:   05/22/17 138/72   05/17/17 144/86   03/17/17 120/87    Weight from Last 3 Encounters:   05/22/17 105 lb (47.6 kg)   05/17/17 105 lb (47.6 kg)   03/17/17 105 lb (47.6 kg)              Today, you had the following     No orders found for display         Today's Medication Changes          These changes are accurate as of: 5/22/17 11:59 PM.  If you have any questions, ask your nurse or doctor.               Start taking these medicines.        Dose/Directions    cefdinir 300 MG capsule   Commonly known as:  OMNICEF   Used for:  Acute recurrent maxillary sinusitis   Started by:  Weiler, Karen, MD        Dose:  600 mg   Take 2 capsules (600 mg) by mouth daily   Quantity:  28 capsule   Refills:  0       ipratropium 0.02 % neb solution   Commonly known as:  ATROVENT   Used for:  Chronic obstructive pulmonary disease with acute exacerbation (H)   Started by:  Weiler, Karen, MD        Dose:  0.5 mg   Take 2.5 mLs (0.5 mg) by nebulization 4 times daily   Quantity:  225 mL   Refills:  0         Stop taking these medicines if you haven't already. Please contact your care team if you have questions.     levofloxacin 500 MG tablet   Commonly known as:  LEVAQUIN   Stopped by:  Weiler, Karen, MD                Where to get your medicines      These medications were sent to [id number] - [place], [place] - [address] [address]  [address] [address] [address]     Phone:  [phone]     azithromycin 250 MG tablet    cefdinir 300 MG capsule    ipratropium 0.02 % neb solution                Primary Care Provider Office Phone # Fax #    Leno Harrison [phone]       [facility] [address] [facility] [address]  [facility] [address]        Thank you!     Thank you for choosing [facility]  for your care. Our goal is always to provide you with excellent care. Hearing back from our patients is one way we can continue to improve our services. Please take a few minutes to  complete the written survey that you may receive in the mail after your visit with us. Thank you!             Your Updated Medication List - Protect others around you: Learn how to safely use, store and throw away your medicines at www.disposemymeds.org.          This list is accurate as of: 5/22/17 11:59 PM.  Always use your most recent med list.                   Brand Name Dispense Instructions for use    albuterol 108 (90 BASE) MCG/ACT Inhaler    PROAIR HFA/PROVENTIL HFA/VENTOLIN HFA    1 Inhaler    Inhale 2 puffs into the lungs every 4 hours as needed for shortness of breath / dyspnea or wheezing       amLODIPine 5 MG tablet    NORVASC    90 tablet    Take 1 tablet (5 mg) by mouth daily       azithromycin 250 MG tablet    ZITHROMAX    6 tablet    Two tablets first day, then one tablet daily for four days.       cefdinir 300 MG capsule    OMNICEF    28 capsule    Take 2 capsules (600 mg) by mouth daily       DEVILBISS TRAVELER NEBULIZER Misc     1 Device    1 Device 4 times daily as needed       fluticasone 220 MCG/ACT Inhaler    FLOVENT HFA    1 Inhaler    Inhale 2 puffs into the lungs 2 times daily       ipratropium 0.02 % neb solution    ATROVENT    225 mL    Take 2.5 mLs (0.5 mg) by nebulization 4 times daily       tiotropium 2.5 MCG/ACT inhalation aerosol    SPIRIVA RESPIMAT    4 g    Inhale 2 puffs into the lungs daily

## 2017-05-22 NOTE — NURSING NOTE
"Chief Complaint   Patient presents with     Cough       Initial /90  Pulse 117  Temp 97.8  F (36.6  C) (Oral)  Ht 5' 1\" (1.549 m)  Wt 105 lb (47.6 kg)  LMP 09/22/2010  SpO2 93%  BMI 19.84 kg/m2 Estimated body mass index is 19.84 kg/(m^2) as calculated from the following:    Height as of this encounter: 5' 1\" (1.549 m).    Weight as of this encounter: 105 lb (47.6 kg).  Medication Reconciliation: complete   Marie Lees Certified Medical Assistant      "

## 2017-05-22 NOTE — PROGRESS NOTES
"  SUBJECTIVE:                                                    Mimi Rivera is a 61 year old female who presents to clinic today for the following health issues:      Patient was seen in clinic 05/17/17   Taking medications prescribed , states she did not receive her zpak on 05/17/17   Symptoms not going away, feels like she is not getting better   Coughing and congested   Fever 102 and cannot sleep at night.  Still wheezing.        Problem list and histories reviewed & adjusted, as indicated.  Additional history: as documented    Reviewed and updated as needed this visit by clinical staff       Reviewed and updated as needed this visit by Provider         ROS:  Constitutional, HEENT, cardiovascular, pulmonary, gi and gu systems are negative, except as otherwise noted.    OBJECTIVE:                                                    /72  Pulse 117  Temp 97.8  F (36.6  C) (Oral)  Ht 5' 1\" (1.549 m)  Wt 105 lb (47.6 kg)  LMP 09/22/2010  SpO2 93%  BMI 19.84 kg/m2  Body mass index is 19.84 kg/(m^2).  GENERAL: healthy, alert and no distress  EYES: Eyes grossly normal to inspection, PERRL and conjunctivae and sclerae normal  HENT: ear canals and TM's normal, nose and mouth without ulcers or lesions  NECK: no adenopathy, no asymmetry, masses, or scars and thyroid normal to palpation  RESP: lungs clear to auscultation - no rales, rhonchi or wheezes  CV: regular rate and rhythm, normal S1 S2, no S3 or S4, no murmur, click or rub, no peripheral edema and peripheral pulses strong  ABDOMEN: soft, nontender, no hepatosplenomegaly, no masses and bowel sounds normal  MS: no gross musculoskeletal defects noted, no edema    Diagnostic Test Results:  none      ASSESSMENT/PLAN:                                                          ICD-10-CM    1. Acute recurrent maxillary sinusitis J01.01 cefdinir (OMNICEF) 300 MG capsule   2. Chronic obstructive pulmonary disease with acute exacerbation (H) J44.1 azithromycin " (ZITHROMAX) 250 MG tablet     ipratropium (ATROVENT) 0.02 % neb solution   Patient is feeling a bit jittery with the Albuterol. Will try Atrovent. Will also switch antibiotics. Patient to follow up if not improving.       Karen Weiler, MD  [facility]

## 2017-05-29 VITALS
WEIGHT: 105 LBS | SYSTOLIC BLOOD PRESSURE: 138 MMHG | HEIGHT: 61 IN | OXYGEN SATURATION: 93 % | DIASTOLIC BLOOD PRESSURE: 72 MMHG | HEART RATE: 117 BPM | BODY MASS INDEX: 19.83 KG/M2 | TEMPERATURE: 97.8 F

## 2017-05-30 ENCOUNTER — TELEPHONE (OUTPATIENT)
Dept: FAMILY MEDICINE | Facility: CLINIC | Age: 62
End: 2017-05-30

## 2017-05-30 NOTE — TELEPHONE ENCOUNTER
Called # below     Left a non detailed VM     Rylee Puente RN, BSN  [facility]

## 2017-05-30 NOTE — TELEPHONE ENCOUNTER
-Reason for call:  Patient reporting a symptom    Symptom or request: cough, not feeling well     Duration (how long have symptoms been present): 1 week     Have you been treated for this before?  Yes      Additional comments:  Cough is not any better and would like to be seen     Phone Number patient can be reached at:  [phone]    Best Time:  Anytime      Can we leave a detailed message on this number:  Yes     Call taken on 5/30/2017 at 1:17 PM by Malika Moe

## 2017-06-01 ENCOUNTER — OFFICE VISIT (OUTPATIENT)
Dept: FAMILY MEDICINE | Facility: CLINIC | Age: 62
End: 2017-06-01
Payer: COMMERCIAL

## 2017-06-01 ENCOUNTER — RADIANT APPOINTMENT (OUTPATIENT)
Dept: GENERAL RADIOLOGY | Facility: CLINIC | Age: 62
End: 2017-06-01
Attending: PHYSICIAN ASSISTANT
Payer: COMMERCIAL

## 2017-06-01 VITALS
TEMPERATURE: 98.4 F | WEIGHT: 102.7 LBS | DIASTOLIC BLOOD PRESSURE: 90 MMHG | OXYGEN SATURATION: 93 % | HEART RATE: 99 BPM | HEIGHT: 61 IN | BODY MASS INDEX: 19.39 KG/M2 | SYSTOLIC BLOOD PRESSURE: 140 MMHG

## 2017-06-01 DIAGNOSIS — R05.9 COUGH: Primary | ICD-10-CM

## 2017-06-01 DIAGNOSIS — J44.1 CHRONIC OBSTRUCTIVE PULMONARY DISEASE WITH ACUTE EXACERBATION (H): ICD-10-CM

## 2017-06-01 DIAGNOSIS — R05.9 COUGH: ICD-10-CM

## 2017-06-01 DIAGNOSIS — R91.8 PULMONARY NODULES: ICD-10-CM

## 2017-06-01 PROBLEM — Z71.89 ADVANCED DIRECTIVES, COUNSELING/DISCUSSION: Status: ACTIVE | Noted: 2017-06-01

## 2017-06-01 LAB
BASOPHILS # BLD AUTO: 0 10E9/L (ref 0–0.2)
BASOPHILS NFR BLD AUTO: 0.1 %
DIFFERENTIAL METHOD BLD: ABNORMAL
EOSINOPHIL # BLD AUTO: 0 10E9/L (ref 0–0.7)
EOSINOPHIL NFR BLD AUTO: 0.1 %
ERYTHROCYTE [DISTWIDTH] IN BLOOD BY AUTOMATED COUNT: 13.3 % (ref 10–15)
HCT VFR BLD AUTO: 49.5 % (ref 35–47)
HGB BLD-MCNC: 17.1 G/DL (ref 11.7–15.7)
LYMPHOCYTES # BLD AUTO: 1.6 10E9/L (ref 0.8–5.3)
LYMPHOCYTES NFR BLD AUTO: 16.7 %
MCH RBC QN AUTO: 32.8 PG (ref 26.5–33)
MCHC RBC AUTO-ENTMCNC: 34.5 G/DL (ref 31.5–36.5)
MCV RBC AUTO: 95 FL (ref 78–100)
MONOCYTES # BLD AUTO: 0.6 10E9/L (ref 0–1.3)
MONOCYTES NFR BLD AUTO: 6.6 %
NEUTROPHILS # BLD AUTO: 7.3 10E9/L (ref 1.6–8.3)
NEUTROPHILS NFR BLD AUTO: 76.5 %
PLATELET # BLD AUTO: 273 10E9/L (ref 150–450)
RBC # BLD AUTO: 5.21 10E12/L (ref 3.8–5.2)
WBC # BLD AUTO: 9.6 10E9/L (ref 4–11)

## 2017-06-01 PROCEDURE — 99214 OFFICE O/P EST MOD 30 MIN: CPT | Performed by: PHYSICIAN ASSISTANT

## 2017-06-01 PROCEDURE — 85025 COMPLETE CBC W/AUTO DIFF WBC: CPT | Performed by: PHYSICIAN ASSISTANT

## 2017-06-01 PROCEDURE — 36415 COLL VENOUS BLD VENIPUNCTURE: CPT | Performed by: PHYSICIAN ASSISTANT

## 2017-06-01 PROCEDURE — 71020 XR CHEST 2 VW: CPT

## 2017-06-01 ASSESSMENT — PAIN SCALES - GENERAL: PAINLEVEL: NO PAIN (0)

## 2017-06-01 NOTE — TELEPHONE ENCOUNTER
Called # below     Pt made an OV for today     Rylee Puente RN, BSN  [facility]

## 2017-06-01 NOTE — PROGRESS NOTES
"  SUBJECTIVE:                                                    Mimi Rivera is a 62 year old female who presents to clinic today for the following health issues:    Acute Illness   Acute illness concerns: Cough   Onset: x 3 weeks     Fever: YES-  Mild     Chills/Sweats: YES    Headache (location?): YES    Sinus Pressure:YES    Conjunctivitis:  YES: bilateral    Ear Pain: YES: right    Rhinorrhea: no    Congestion: YES    Sore Throat: YES-  Mild      Cough: YES    Wheeze: YES    Decreased Appetite: YES    Nausea: no    Vomiting: no    Diarrhea:  no    Dysuria/Freq.: no    Fatigue/Achiness: YES    Sick/Strep Exposure: no      Therapies Tried and outcome:  None     Patient was evaluated on 5/17/17 and 5/22/17. Has been treated with prednisone, azithromycin, levaquin, inhalers, and most recently Cefdinir. She is still taking the Cefdinir.    Patient is concerned because she has an ongoing cough. States the cough has been present for about 3 weeks at this point.  Does report that she is feeling less short of breath and her wheezing has greatly improved (Though she does still hear some wheezing).    Woke up sweating last night; hasn't checked temp recently.    Cough is productive    In addition to above medications, has also been taking Advil cold and sinus.    Has been doing nebs twice daily  Has been using her spiriva as prescribed.    States that she does not use Flovent consistently. Uses it \"as needed.\"    States she is exhausted but she can't sleep. States that cough is waking her up.    Has ongoing nasal symptoms; can feel the drainage in her throat    Of note, patient had a chest CT in April 2016, with nodular opacities in R lung. 12 month follow-up CT was recommended due to smoking history.    Problem list and histories reviewed & adjusted, as indicated.  Additional history: as documented    Patient Active Problem List   Diagnosis     CARDIOVASCULAR SCREENING; LDL GOAL LESS THAN 160     Hypertension goal BP " (blood pressure) < 140/90     COPD (chronic obstructive pulmonary disease) (H)     Moderate major depression (H)     Former smoker     Advanced directives, counseling/discussion     Past Surgical History:   Procedure Laterality Date      SECTION      x 3       Social History   Substance Use Topics     Smoking status: Former Smoker     Packs/day: 10.00     Types: Cigarettes     Quit date: 3/19/2015     Smokeless tobacco: Never Used     Alcohol use 1.2 - 1.8 oz/week     2 - 3 Standard drinks or equivalent per week      Comment: 2 drinks per wk avg     Family History   Problem Relation Age of Onset     Hypertension Mother      CANCER Mother      liver     C.A.D. Father          Current Outpatient Prescriptions   Medication Sig Dispense Refill     azithromycin (ZITHROMAX) 250 MG tablet Two tablets first day, then one tablet daily for four days. 6 tablet 0     amLODIPine (NORVASC) 5 MG tablet Take 1 tablet (5 mg) by mouth daily 90 tablet 3     tiotropium (SPIRIVA RESPIMAT) 2.5 MCG/ACT inhalation aerosol Inhale 2 puffs into the lungs daily 4 g 8     ipratropium (ATROVENT) 0.02 % neb solution Take 2.5 mLs (0.5 mg) by nebulization 4 times daily 225 mL 0     albuterol (PROAIR HFA/PROVENTIL HFA/VENTOLIN HFA) 108 (90 BASE) MCG/ACT Inhaler Inhale 2 puffs into the lungs every 4 hours as needed for shortness of breath / dyspnea or wheezing 1 Inhaler 0     fluticasone (FLOVENT HFA) 220 MCG/ACT inhaler Inhale 2 puffs into the lungs 2 times daily 1 Inhaler 12     Nebulizers ([email or web address] TRAVELER NEBULIZER) MISC 1 Device 4 times daily as needed 1 Device 0     Allergies   Allergen Reactions     Penicillins Rash       ROS:  Constitutional, HEENT, cardiovascular, pulmonary, gi and gu systems are negative, except as otherwise noted.    OBJECTIVE:                                                    /90 (BP Location: Right arm, Patient Position: Chair, Cuff Size: Adult Regular)  Pulse 99  Temp 98.4  F (36.9  C) (Oral)  Ht 5'  "1\" (1.549 m)  Wt 102 lb 11.2 oz (46.6 kg)  LMP 09/22/2010  SpO2 93%  Breastfeeding? No  BMI 19.4 kg/m2  Body mass index is 19.4 kg/(m^2).  GENERAL: healthy, alert and no distress  EYES: Eyes grossly normal to inspection, PERRL and conjunctivae and sclerae normal  HENT: ear canals and TM's normal, nose and mouth without ulcers or lesions  NECK: no adenopathy, no asymmetry, masses, or scars and thyroid normal to palpation  RESP: Decreased breath sounds throughout. Scattered wheezes. No rales or rhonchi  CV: regular rate and rhythm, normal S1 S2, no S3 or S4, no murmur, click or rub, no peripheral edema and peripheral pulses strong  MS: no gross musculoskeletal defects noted, no edema    Diagnostic Test Results:  CXR - negative and unchanged from previous, per my interpretation     Component      Latest Ref Rng & Units 6/1/2017   WBC      4.0 - 11.0 10e9/L 9.6   RBC Count      3.8 - 5.2 10e12/L 5.21 (H)   Hemoglobin      11.7 - 15.7 g/dL 17.1 (H)   Hematocrit      35.0 - 47.0 % 49.5 (H)   MCV      78 - 100 fl 95   MCH      26.5 - 33.0 pg 32.8   MCHC      31.5 - 36.5 g/dL 34.5   RDW      10.0 - 15.0 % 13.3   Platelet Count      150 - 450 10e9/L 273   Diff Method       Automated Method   % Neutrophils      % 76.5   % Lymphocytes      % 16.7   % Monocytes      % 6.6   % Eosinophils      % 0.1   % Basophils      % 0.1   Absolute Neutrophil      1.6 - 8.3 10e9/L 7.3   Absolute Lymphocytes      0.8 - 5.3 10e9/L 1.6   Absolute Monocytes      0.0 - 1.3 10e9/L 0.6   Absolute Eosinophils      0.0 - 0.7 10e9/L 0.0   Absolute Basophils      0.0 - 0.2 10e9/L 0.0       ASSESSMENT/PLAN:                                                      1. Cough  Labs and CXR reassuring. Ongoing cough, but has noticed improvement in respiratory symptoms. Suspect needs time for resolution in symptoms. Hold on additional antibiotics at this point. Continue with inhalers, including scheduled use of inhaled corticosteroid (BID). Discussed that " this should help with inflammation in airways. Also recommend Mucinex. Follow-up if symptoms worsening, or if no improvement over the next 10-14 days. Discussed potential for prolonged cough with respiratory illnesses.  - XR Chest 2 Views; Future  - CBC with platelets and differential    2. Chronic obstructive pulmonary disease with acute exacerbation (H)  See above. Respiratory symptoms have improved, but cough persists.     3. Pulmonary nodules  Offered CT at this time for further evaluation of symptoms and for follow-up on nodules, but patient declines due to cost. She is aware of the importance of following up on nodules, due to smoking history and malignant potential. She plans to complete her CT scan in the near future.    See Patient Instructions    Chelsey Woodrfuf PA-C  [facility]

## 2017-06-01 NOTE — MR AVS SNAPSHOT
After Visit Summary   6/1/2017    Mimi Rivera    MRN: [id number]           Patient Information     Date Of Birth          1955        Visit Information        Provider Department      6/1/2017 1:40 PM Chelsey Woodruff PA-C [facility]        Today's Diagnoses     Cough    -  1    Screen for colon cancer        Screening for malignant neoplasm of cervix        Need for hepatitis C screening test        Advanced directives, counseling/discussion          Care Instructions    -Complete course of antibiotic  -Restart Flovent twice daily  -Continue with ipratropium and albuterol as needed for symptoms  -Stay hydrated  -Try Mucinex to help thin secretions          Follow-ups after your visit        Who to contact     If you have questions or need follow up information about today's clinic visit or your schedule please contact [facility] directly at [phone].  Normal or non-critical lab and imaging results will be communicated to you by [employer], letter or phone within 4 business days after the clinic has received the results. If you do not hear from us within 7 days, please contact the clinic through [employer] or phone. If you have a critical or abnormal lab result, we will notify you by phone as soon as possible.  Submit refill requests through [employer] or call your pharmacy and they will forward the refill request to us. Please allow 3 business days for your refill to be completed.          Additional Information About Your Visit        [employer] Top-Level Domain Information     [employer] gives you secure access to your electronic health record. If you see a primary care provider, you can also send messages to your care team and make appointments. If you have questions, please call your primary care clinic.  If you do not have a primary care provider, please call [phone] and they will assist you.        Care EveryWhere ID     This is your Care EveryWhere ID. This could be used by other  "organizations to access your [facility] medical records  [id number]        Your Vitals Were     Pulse Temperature Height Last Period Pulse Oximetry Breastfeeding?    99 98.4  F (36.9  C) (Oral) 5' 1\" (1.549 m) 09/22/2010 93% No    BMI (Body Mass Index)                   19.4 kg/m2            Blood Pressure from Last 3 Encounters:   06/01/17 140/90   05/22/17 138/72   05/17/17 144/86    Weight from Last 3 Encounters:   06/01/17 102 lb 11.2 oz (46.6 kg)   05/22/17 105 lb (47.6 kg)   05/17/17 105 lb (47.6 kg)              We Performed the Following     CBC with platelets and differential        Primary Care Provider Office Phone # Fax #    Leno Mert Harrison [phone]       [facility] [address] [facility] [address]  [facility] [place] [address]        Thank you!     Thank you for choosing [facility]  for your care. Our goal is always to provide you with excellent care. Hearing back from our patients is one way we can continue to improve our services. Please take a few minutes to complete the written survey that you may receive in the mail after your visit with us. Thank you!             Your Updated Medication List - Protect others around you: Learn how to safely use, store and throw away your medicines at www.disposemymeds.org.          This list is accurate as of: 6/1/17  3:00 PM.  Always use your most recent med list.                   Brand Name Dispense Instructions for use    albuterol 108 (90 BASE) MCG/ACT Inhaler    PROAIR HFA/PROVENTIL HFA/VENTOLIN HFA    1 Inhaler    Inhale 2 puffs into the lungs every 4 hours as needed for shortness of breath / dyspnea or wheezing       amLODIPine 5 MG tablet    NORVASC    90 tablet    Take 1 tablet (5 mg) by mouth daily       azithromycin 250 MG tablet    ZITHROMAX    6 tablet    Two tablets first day, then one tablet daily for four days.       [employer] TRAVELER NEBULIZER Misc     1 Device    1 Device 4 times daily as needed       " fluticasone 220 MCG/ACT Inhaler    FLOVENT HFA    1 Inhaler    Inhale 2 puffs into the lungs 2 times daily       ipratropium 0.02 % neb solution    ATROVENT    225 mL    Take 2.5 mLs (0.5 mg) by nebulization 4 times daily       tiotropium 2.5 MCG/ACT inhalation aerosol    SPIRIVA RESPIMAT    4 g    Inhale 2 puffs into the lungs daily

## 2017-06-01 NOTE — NURSING NOTE
"Chief Complaint   Patient presents with     Cough       Initial /90 (BP Location: Right arm, Patient Position: Chair, Cuff Size: Adult Regular)  Pulse 99  Temp 98.4  F (36.9  C) (Oral)  Ht 5' 1\" (1.549 m)  Wt 102 lb 11.2 oz (46.6 kg)  LMP 09/22/2010  SpO2 93%  Breastfeeding? No  BMI 19.4 kg/m2 Estimated body mass index is 19.4 kg/(m^2) as calculated from the following:    Height as of this encounter: 5' 1\" (1.549 m).    Weight as of this encounter: 102 lb 11.2 oz (46.6 kg).  Medication Reconciliation: complete     Michelle Swanson MA     "

## 2017-06-01 NOTE — PROGRESS NOTES
Dear Mimi,    The radiologist has reviewed today's chest x-ray, and saw no abnormalities.    Please contact the clinic if you have additional questions.  Thank you.    Sincerely,    Chelsey Woodruff PA-C

## 2017-06-01 NOTE — PATIENT INSTRUCTIONS
-Complete course of antibiotic  -Restart Flovent twice daily  -Continue with ipratropium and albuterol as needed for symptoms  -Stay hydrated  -Try Mucinex to help thin secretions

## 2017-06-01 NOTE — PROGRESS NOTES
Dear Mimi,    Here is your blood panel from today; your white blood cell count was normal.    Please contact the clinic if you have additional questions.  Thank you.    Sincerely,    Chelsey Woodruff PA-C

## 2017-06-03 ENCOUNTER — HEALTH MAINTENANCE LETTER (OUTPATIENT)
Age: 62
End: 2017-06-03

## 2017-06-08 ENCOUNTER — TELEPHONE (OUTPATIENT)
Dept: FAMILY MEDICINE | Facility: CLINIC | Age: 62
End: 2017-06-08

## 2017-06-08 ENCOUNTER — HOSPITAL ENCOUNTER (OUTPATIENT)
Dept: CT IMAGING | Facility: CLINIC | Age: 62
Discharge: HOME | End: 2017-06-08
Attending: PHYSICIAN ASSISTANT | Admitting: PHYSICIAN ASSISTANT
Payer: COMMERCIAL

## 2017-06-08 DIAGNOSIS — R91.8 PULMONARY NODULES: ICD-10-CM

## 2017-06-08 PROCEDURE — 71250 CT THORAX DX C-: CPT

## 2017-06-08 NOTE — LETTER
[address] [address]                                                                                                       [phone]    June 9, 2017    Mimi Rivera  [address] [place] [place] [address]      attn: [facility] Jury Summons    Please excuse the above patient (juror #[id number])  from jury duty due to her chronic health conditions she is unable to perform the duties of being on jury duty. Please contact me with questions or concerns.      Sincerely,        Dr. Karen Weiler

## 2017-06-08 NOTE — TELEPHONE ENCOUNTER
Reason for Call:  Other letter    Detailed comments: Pt calling as she has been really sick per her report and was summoned for jury duty.  She is asking that we fax a letter asking them to excuse her from jury duty to fax [phone] attn: [facility] Jury Summons.  Needs juror number on the letter as well which is [phone].  Please call pt with any questions.      Phone Number Patient can be reached at: Cell number on file:    Telephone Information:   Mobile [phone]       Best Time:     Can we leave a detailed message on this number? YES    Call taken on 6/8/2017 at 10:31 AM by Nava Marin

## 2017-06-08 NOTE — TELEPHONE ENCOUNTER
LOV 6/1/2017    See message below     Okay for letter to be excused from jury duty? Please advise     Thank you     Rylee Puente RN, BSN  [place] [facility]

## 2017-06-16 ENCOUNTER — TELEPHONE (OUTPATIENT)
Dept: FAMILY MEDICINE | Facility: CLINIC | Age: 62
End: 2017-06-16

## 2017-06-16 NOTE — TELEPHONE ENCOUNTER
Mimi got a notice in [employer] that a new result was available for her recent CT but she can not view it. I informed her it needs to first be reviewed by the provider who ordered it. I will send Chelsey Woodruff PA-C this note and once it is reviewed she will be notified! Mimi is just fine with this plan. Phoebe Ernst R.N.

## 2017-06-16 NOTE — TELEPHONE ENCOUNTER
Please notify Mimi that her CT looks good. Some of the nodules they saw before are gone, and the other ones are stable. None of the nodules appear concerning for cancer    Chelsey Woodruff PA-C

## 2017-06-16 NOTE — TELEPHONE ENCOUNTER
Called patient and advised of below. She has no further questions or concerns. Phoebe Ernst R.N.

## 2017-11-14 ENCOUNTER — OFFICE VISIT (OUTPATIENT)
Dept: FAMILY MEDICINE | Facility: CLINIC | Age: 62
End: 2017-11-14
Payer: COMMERCIAL

## 2017-11-14 VITALS
TEMPERATURE: 98.4 F | WEIGHT: 107 LBS | SYSTOLIC BLOOD PRESSURE: 170 MMHG | HEART RATE: 106 BPM | OXYGEN SATURATION: 94 % | BODY MASS INDEX: 20.2 KG/M2 | HEIGHT: 61 IN | DIASTOLIC BLOOD PRESSURE: 104 MMHG

## 2017-11-14 DIAGNOSIS — Z13.220 SCREENING FOR HYPERLIPIDEMIA: Primary | ICD-10-CM

## 2017-11-14 DIAGNOSIS — J01.01 ACUTE RECURRENT MAXILLARY SINUSITIS: ICD-10-CM

## 2017-11-14 DIAGNOSIS — Z23 NEED FOR PROPHYLACTIC VACCINATION AND INOCULATION AGAINST INFLUENZA: ICD-10-CM

## 2017-11-14 DIAGNOSIS — Z13.1 SCREENING FOR DIABETES MELLITUS: ICD-10-CM

## 2017-11-14 DIAGNOSIS — Z11.59 NEED FOR HEPATITIS C SCREENING TEST: ICD-10-CM

## 2017-11-14 LAB
BASOPHILS # BLD AUTO: 0 10E9/L (ref 0–0.2)
BASOPHILS NFR BLD AUTO: 0.1 %
DIFFERENTIAL METHOD BLD: ABNORMAL
EOSINOPHIL # BLD AUTO: 0 10E9/L (ref 0–0.7)
EOSINOPHIL NFR BLD AUTO: 0.1 %
ERYTHROCYTE [DISTWIDTH] IN BLOOD BY AUTOMATED COUNT: 12.3 % (ref 10–15)
HBA1C MFR BLD: 5.2 % (ref 4.3–6)
HCT VFR BLD AUTO: 45.5 % (ref 35–47)
HGB BLD-MCNC: 15.9 G/DL (ref 11.7–15.7)
LYMPHOCYTES # BLD AUTO: 1.8 10E9/L (ref 0.8–5.3)
LYMPHOCYTES NFR BLD AUTO: 20 %
MCH RBC QN AUTO: 32.6 PG (ref 26.5–33)
MCHC RBC AUTO-ENTMCNC: 34.9 G/DL (ref 31.5–36.5)
MCV RBC AUTO: 93 FL (ref 78–100)
MONOCYTES # BLD AUTO: 0.6 10E9/L (ref 0–1.3)
MONOCYTES NFR BLD AUTO: 7 %
NEUTROPHILS # BLD AUTO: 6.4 10E9/L (ref 1.6–8.3)
NEUTROPHILS NFR BLD AUTO: 72.8 %
PLATELET # BLD AUTO: 236 10E9/L (ref 150–450)
RBC # BLD AUTO: 4.87 10E12/L (ref 3.8–5.2)
WBC # BLD AUTO: 8.8 10E9/L (ref 4–11)

## 2017-11-14 PROCEDURE — 90471 IMMUNIZATION ADMIN: CPT | Performed by: FAMILY MEDICINE

## 2017-11-14 PROCEDURE — 36415 COLL VENOUS BLD VENIPUNCTURE: CPT | Performed by: FAMILY MEDICINE

## 2017-11-14 PROCEDURE — 83036 HEMOGLOBIN GLYCOSYLATED A1C: CPT | Performed by: FAMILY MEDICINE

## 2017-11-14 PROCEDURE — 85025 COMPLETE CBC W/AUTO DIFF WBC: CPT | Performed by: FAMILY MEDICINE

## 2017-11-14 PROCEDURE — 90686 IIV4 VACC NO PRSV 0.5 ML IM: CPT | Performed by: FAMILY MEDICINE

## 2017-11-14 PROCEDURE — 99213 OFFICE O/P EST LOW 20 MIN: CPT | Mod: 25 | Performed by: FAMILY MEDICINE

## 2017-11-14 PROCEDURE — 86803 HEPATITIS C AB TEST: CPT | Performed by: FAMILY MEDICINE

## 2017-11-14 PROCEDURE — 80061 LIPID PANEL: CPT | Performed by: FAMILY MEDICINE

## 2017-11-14 RX ORDER — CEFDINIR 300 MG/1
600 CAPSULE ORAL DAILY
Qty: 20 CAPSULE | Refills: 0 | Status: SHIPPED | OUTPATIENT
Start: 2017-11-14 | End: 2019-03-04

## 2017-11-14 NOTE — NURSING NOTE
Mimi Rivera      1.  Has the patient received the information for the influenza vaccine? YES    2.  Does the patient have any of the following contraindications?     Allergy to eggs? No     Allergic reaction to previous influenza vaccines? No     Any other problems to previous influenza vaccines? No     Paralyzed by Guillain-[place] syndrome? No     Currently pregnant? NO     Current moderate or severe illness? No     Allergy to contact lens solution? No    3.  The vaccine has been administered in the usual fashion and the patient was instructed to wait 20 minutes before leaving the building in the event of an allergic reaction: YES    Vaccination given by Kaylen Nathan MA  .  Recorded by Lia Nathan

## 2017-11-14 NOTE — PROGRESS NOTES
SUBJECTIVE:                                                    Mimi Rivera is a 62 year old female who presents to clinic today for the following health issues:        Acute Illness   Acute illness concerns: sinus pressure   Onset: 5 days     Fever: YES -- 99    Chills/Sweats: YES    Headache (location?): YES    Sinus Pressure:YES    Conjunctivitis:  no    Ear Pain: YES: left    Rhinorrhea: No    Congestion: YES- lost sense of smell     Sore Throat: YES - lots of post nasal drip     Cough: YES-productive of green sputum    Wheeze: YES    Decreased Appetite: no    Nausea: no    Vomiting: no    Diarrhea:  no    Dysuria/Freq.: no    Fatigue/Achiness: YES- fatigue    Sick/Strep Exposure: no     Therapies Tried and outcome: otc decongestant -- sudafed somewhat helpful.  neti pot, nebs, inhalers -- no improvement.    She is a previous smoker but quit 2 years ago.      Problem list and histories reviewed & adjusted, as indicated.  Additional history: as documented    Patient Active Problem List   Diagnosis     CARDIOVASCULAR SCREENING; LDL GOAL LESS THAN 160     Hypertension goal BP (blood pressure) < 140/90     COPD (chronic obstructive pulmonary disease) (H)     Moderate major depression (H)     Former smoker     Advanced directives, counseling/discussion     Past Surgical History:   Procedure Laterality Date      SECTION      x 3       Social History   Substance Use Topics     Smoking status: Former Smoker     Packs/day: 10.00     Types: Cigarettes     Quit date: 3/19/2015     Smokeless tobacco: Never Used     Alcohol use 1.2 - 1.8 oz/week     2 - 3 Standard drinks or equivalent per week      Comment: 2 drinks per wk avg     Family History   Problem Relation Age of Onset     Hypertension Mother      CANCER Mother      liver     C.A.D. Father          Current Outpatient Prescriptions   Medication Sig Dispense Refill     cefdinir (OMNICEF) 300 MG capsule Take 2 capsules (600 mg) by mouth daily for 10 days 20  "capsule 0     ipratropium (ATROVENT) 0.02 % neb solution Take 2.5 mLs (0.5 mg) by nebulization 4 times daily 225 mL 0     amLODIPine (NORVASC) 5 MG tablet Take 1 tablet (5 mg) by mouth daily 90 tablet 3     albuterol (PROAIR HFA/PROVENTIL HFA/VENTOLIN HFA) 108 (90 BASE) MCG/ACT Inhaler Inhale 2 puffs into the lungs every 4 hours as needed for shortness of breath / dyspnea or wheezing 1 Inhaler 0     tiotropium (SPIRIVA RESPIMAT) 2.5 MCG/ACT inhalation aerosol Inhale 2 puffs into the lungs daily 4 g 8     fluticasone (FLOVENT HFA) 220 MCG/ACT inhaler Inhale 2 puffs into the lungs 2 times daily 1 Inhaler 12     Nebulizers ([employer] TRAVELER NEBULIZER) MISC 1 Device 4 times daily as needed 1 Device 0       ROS:  Constitutional, HEENT, cardiovascular, pulmonary, gi and gu systems are negative, except as otherwise noted.      OBJECTIVE:   BP (!) 170/104  Pulse 106  Temp 98.4  F (36.9  C) (Oral)  Ht 5' 1\" (1.549 m)  Wt 107 lb (48.5 kg)  LMP 09/22/2010  SpO2 94%  BMI 20.22 kg/m2  Body mass index is 20.22 kg/(m^2).  GENERAL: healthy, alert and no distress  EYES: Eyes grossly normal to inspection, PERRL and EOMI  HENT: normal cephalic/atraumatic, ear canals and TM's normal, nose and mouth without ulcers or lesions, oropharynx clear, oral mucous membranes moist and sinuses: maxillary tenderness on bilateral  NECK: no adenopathy and no asymmetry, masses, or scars  RESP: lungs clear to auscultation - no rales, rhonchi or wheezes  CV: regular rate and rhythm, normal S1 S2, no S3 or S4, no murmur, click or rub, no peripheral edema and peripheral pulses strong  ABDOMEN: soft, nontender, no hepatosplenomegaly, no masses and bowel sounds normal  MS: no gross musculoskeletal defects noted, no edema    Diagnostic Test Results:  none     ASSESSMENT/PLAN:   1. Acute recurrent maxillary sinusitis: Patient has history of sinusitis. Seems to get symptoms twice per year. Previous smoker but history of COPD. Will treat for infection " with Cefdinir.  - cefdinir (OMNICEF) 300 MG capsule; Take 2 capsules (600 mg) by mouth daily for 10 days  Dispense: 20 capsule; Refill: 0  - CBC with platelets differential    2. Need for hepatitis C screening test: Discussed CDC recommendation for one time hepatitis C screening for people born between 1464-9662.  Discussed risk factors for hepatitis C including blood transfusion and IV drug use.  - Hepatitis C Screen Reflex to HCV RNA Quant and Genotype    3. Need for prophylactic vaccination and inoculation against influenza: flu shot given  - HC FLU VAC PRESRV FREE QUAD SPLIT VIR 3+YRS IM  [72744]    4. Screening for hyperlipidemia  - Lipid panel reflex to direct LDL Non-fasting    5. Screening for diabetes mellitus  - Hemoglobin A1c      Xu Simon,   [facility]IR[facility]

## 2017-11-14 NOTE — NURSING NOTE
"Chief Complaint   Patient presents with     Sinus Problem     Initial BP (!) 170/104  Pulse 106  Temp 98.4  F (36.9  C) (Oral)  Ht 5' 1\" (1.549 m)  Wt 107 lb (48.5 kg)  LMP 09/22/2010  SpO2 94%  BMI 20.22 kg/m2 Estimated body mass index is 20.22 kg/(m^2) as calculated from the following:    Height as of this encounter: 5' 1\" (1.549 m).    Weight as of this encounter: 107 lb (48.5 kg).  BP completed using cuff size regular right Arm  Kaylen [facility] CMA    "

## 2017-11-14 NOTE — MR AVS SNAPSHOT
After Visit Summary   11/14/2017    Mimi Rivera    MRN: [id number]           Patient Information     Date Of Birth          1955        Visit Information        Provider Department      11/14/2017 1:20 PM Xu Simon,  [facility]        Today's Diagnoses     Screening for hyperlipidemia    -  1    Need for hepatitis C screening test        Need for prophylactic vaccination and inoculation against influenza        Acute recurrent maxillary sinusitis        Screening for diabetes mellitus           Follow-ups after your visit        Follow-up notes from your care team     Return in about 2 weeks (around 11/28/2017), or if symptoms worsen or fail to improve.      Who to contact     If you have questions or need follow up information about today's clinic visit or your schedule please contact [facility] directly at [phone].  Normal or non-critical lab and imaging results will be communicated to you by MyChart, letter or phone within 4 business days after the clinic has received the results. If you do not hear from us within 7 days, please contact the clinic through GroupVoxhart or phone. If you have a critical or abnormal lab result, we will notify you by phone as soon as possible.  Submit refill requests through Hello Music or call your pharmacy and they will forward the refill request to us. Please allow 3 business days for your refill to be completed.          Additional Information About Your Visit        MyChart Information     Hello Music gives you secure access to your electronic health record. If you see a primary care provider, you can also send messages to your care team and make appointments. If you have questions, please call your primary care clinic.  If you do not have a primary care provider, please call [phone] and they will assist you.        Care EveryWhere ID     This is your Care EveryWhere ID. This could be used by other organizations to access your  "[place] medical records  [id number]        Your Vitals Were     Pulse Temperature Height Last Period Pulse Oximetry BMI (Body Mass Index)    106 98.4  F (36.9  C) (Oral) 5' 1\" (1.549 m) 09/22/2010 94% 20.22 kg/m2       Blood Pressure from Last 3 Encounters:   11/14/17 (!) 170/104   06/01/17 140/90   05/22/17 138/72    Weight from Last 3 Encounters:   11/14/17 107 lb (48.5 kg)   06/01/17 102 lb 11.2 oz (46.6 kg)   05/22/17 105 lb (47.6 kg)              We Performed the Following     CBC with platelets differential     HC FLU VAC PRESRV FREE QUAD SPLIT VIR 3+YRS IM  [[id number]]     Hemoglobin A1c     Hepatitis C Screen Reflex to HCV RNA Quant and Genotype     Lipid panel reflex to direct LDL Non-fasting          Today's Medication Changes          These changes are accurate as of: 11/14/17  1:53 PM.  If you have any questions, ask your nurse or doctor.               Start taking these medicines.        Dose/Directions    cefdinir 300 MG capsule   Commonly known as:  OMNICEF   Used for:  Acute recurrent maxillary sinusitis   Started by:  Xu Simon DO        Dose:  600 mg   Take 2 capsules (600 mg) by mouth daily for 10 days   Quantity:  20 capsule   Refills:  0            Where to get your medicines      These medications were sent to [id number] [address] [address]     Phone:  [phone]     cefdinir 300 MG capsule                Primary Care Provider Office Phone # Fax #    Leno Harrison [phone]       [address]  [facility] [address]        Equal Access to Services     [facility] BARBARA AH: Hadii kamala Tamayo, nemseio luflorence, shilpi kaalmada will, betty penny. So [facility] [phone].    ATENCIÓN: Si habla español, tiene a high disposición servicios gratuitos de asistencia lingüística. Llame al [phone].    We comply with applicable federal civil rights laws and " [place] laws. We do not discriminate on the basis of race, color, national origin, age, disability, sex, sexual orientation, or gender identity.            Thank you!     Thank you for choosing [facility]  for your care. Our goal is always to provide you with excellent care. Hearing back from our patients is one way we can continue to improve our services. Please take a few minutes to complete the written survey that you may receive in the mail after your visit with us. Thank you!             Your Updated Medication List - Protect others around you: Learn how to safely use, store and throw away your medicines at www.disposemymeds.org.          This list is accurate as of: 11/14/17  1:53 PM.  Always use your most recent med list.                   Brand Name Dispense Instructions for use Diagnosis    albuterol 108 (90 BASE) MCG/ACT Inhaler    PROAIR HFA/PROVENTIL HFA/VENTOLIN HFA    1 Inhaler    Inhale 2 puffs into the lungs every 4 hours as needed for shortness of breath / dyspnea or wheezing    Wheezing       amLODIPine 5 MG tablet    NORVASC    90 tablet    Take 1 tablet (5 mg) by mouth daily    Hypertension goal BP (blood pressure) < 140/90       cefdinir 300 MG capsule    OMNICEF    20 capsule    Take 2 capsules (600 mg) by mouth daily for 10 days    Acute recurrent maxillary sinusitis       DEVILBISS TRAVELER NEBULIZER Misc     1 Device    1 Device 4 times daily as needed    COPD (chronic obstructive pulmonary disease) (H), Influenza with respiratory manifestation other than pneumonia       fluticasone 220 MCG/ACT Inhaler    FLOVENT HFA    1 Inhaler    Inhale 2 puffs into the lungs 2 times daily    Chronic bronchitis, unspecified chronic bronchitis type (H), Panlobular emphysema (H)       ipratropium 0.02 % neb solution    ATROVENT    225 mL    Take 2.5 mLs (0.5 mg) by nebulization 4 times daily    Chronic obstructive pulmonary disease with acute exacerbation (H)       tiotropium 2.5 MCG/ACT  inhalation aerosol    SPIRIVA RESPIMAT    4 g    Inhale 2 puffs into the lungs daily    COPD (chronic obstructive pulmonary disease) (H)

## 2017-11-15 LAB
CHOLEST SERPL-MCNC: 172 MG/DL
HCV AB SERPL QL IA: NONREACTIVE
HDLC SERPL-MCNC: 78 MG/DL
LDLC SERPL CALC-MCNC: 86 MG/DL
NONHDLC SERPL-MCNC: 94 MG/DL
TRIGL SERPL-MCNC: 41 MG/DL

## 2017-12-11 ENCOUNTER — OFFICE VISIT (OUTPATIENT)
Dept: FAMILY MEDICINE | Facility: CLINIC | Age: 62
End: 2017-12-11
Payer: COMMERCIAL

## 2017-12-11 ENCOUNTER — TELEPHONE (OUTPATIENT)
Dept: FAMILY MEDICINE | Facility: CLINIC | Age: 62
End: 2017-12-11

## 2017-12-11 VITALS
WEIGHT: 108 LBS | DIASTOLIC BLOOD PRESSURE: 98 MMHG | HEIGHT: 61 IN | BODY MASS INDEX: 20.39 KG/M2 | OXYGEN SATURATION: 96 % | SYSTOLIC BLOOD PRESSURE: 152 MMHG | HEART RATE: 85 BPM | TEMPERATURE: 97.6 F

## 2017-12-11 DIAGNOSIS — J03.90 TONSILLITIS: Primary | ICD-10-CM

## 2017-12-11 DIAGNOSIS — J02.9 SORE THROAT: ICD-10-CM

## 2017-12-11 DIAGNOSIS — J03.90 ACUTE SUPPURATIVE TONSILLITIS: Primary | ICD-10-CM

## 2017-12-11 LAB
DEPRECATED S PYO AG THROAT QL EIA: NORMAL
SPECIMEN SOURCE: NORMAL

## 2017-12-11 PROCEDURE — 87880 STREP A ASSAY W/OPTIC: CPT | Performed by: PHYSICIAN ASSISTANT

## 2017-12-11 PROCEDURE — 99213 OFFICE O/P EST LOW 20 MIN: CPT | Performed by: PHYSICIAN ASSISTANT

## 2017-12-11 RX ORDER — ERYTHROMYCIN 333 MG/1
333 TABLET, DELAYED RELEASE ORAL 3 TIMES DAILY
Qty: 30 TABLET | Refills: 0 | Status: SHIPPED | OUTPATIENT
Start: 2017-12-11 | End: 2017-12-11

## 2017-12-11 RX ORDER — CLARITHROMYCIN 250 MG/1
250 TABLET, FILM COATED ORAL 2 TIMES DAILY
Qty: 20 TABLET | Refills: 0 | COMMUNITY
Start: 2017-12-11 | End: 2018-01-26

## 2017-12-11 ASSESSMENT — PATIENT HEALTH QUESTIONNAIRE - PHQ9: SUM OF ALL RESPONSES TO PHQ QUESTIONS 1-9: 0

## 2017-12-11 NOTE — NURSING NOTE
"Chief Complaint   Patient presents with     Pharyngitis     Initial BP (!) 152/98 (BP Location: Right arm, Patient Position: Chair, Cuff Size: Adult Regular)  Pulse 85  Temp 97.6  F (36.4  C) (Oral)  Ht 5' 1\" (1.549 m)  Wt 108 lb (49 kg)  LMP 09/22/2010  SpO2 96%  BMI 20.41 kg/m2 Estimated body mass index is 20.41 kg/(m^2) as calculated from the following:    Height as of this encounter: 5' 1\" (1.549 m).    Weight as of this encounter: 108 lb (49 kg).  BP completed using cuff size regular right Arm  Kaylen TaylaBristol [facility] Hospital CMA    "

## 2017-12-11 NOTE — TELEPHONE ENCOUNTER
[facility] pharmacy calling to report they do not have the erythromycin (STEFANIE-TAB) 333 MG EC tablet in stock.  They would have to order it in.    Pharmacist said patient was expecting Clarithromycin.  They do have the Clarithromycin 500 mg tablets in stock.  They are asking if it is okay to use the Clarithromycin?    [facility] can be reached at [phone].    Routing to Dr. Simon to review as prescribing provider is out of office.    Michelle Rutledge, FITZ, RN, N  [facility]) [phone]

## 2017-12-11 NOTE — PROGRESS NOTES
"  SUBJECTIVE:                                                    Mimi Rivera is a 62 year old female who presents to clinic today for the following health issues:        Acute Illness   Acute illness concerns: sore throat  Onset: 5 days     Fever: YES- feels feverish but has not taken temp    Chills/Sweats: YES    Headache (location?): YES    Sinus Pressure:YES    Conjunctivitis:  no    Ear Pain: YES- slight     Rhinorrhea: no     Congestion: YES and post nasal drip    Sore Throat: YES- throat very swollen      Cough: no    Wheeze: no    Decreased Appetite: YES - can't smell anything    Nausea: no    Vomiting: no    Diarrhea:  no    Dysuria/Freq.: no    Fatigue/Achiness: YES    Sick/Strep Exposure: no      Therapies Tried and outcome:  Advil., sinus flush, with no improvement        Problem list and histories reviewed & adjusted, as indicated.  Additional history: Patient notes she still gets tonsillitis but not annually.  She is uninsured and notes that her symptoms frequently escalate and she has to return for treatment.      ROS:  Constitutional, HEENT, cardiovascular, pulmonary, GI, , musculoskeletal, neuro, skin, endocrine and psych systems are negative, except as otherwise noted.      OBJECTIVE:   BP (!) 152/98 (BP Location: Right arm, Patient Position: Chair, Cuff Size: Adult Regular)  Pulse 85  Temp 97.6  F (36.4  C) (Oral)  Ht 5' 1\" (1.549 m)  Wt 108 lb (49 kg)  LMP 09/22/2010  SpO2 96%  BMI 20.41 kg/m2  Body mass index is 20.41 kg/(m^2).  GEN: Well developed, well nourished in NAD.  HEENT: Normocephalic. Eyes: + conjunctival flushing noted. EARS: TMs WNL, Canals clear.  Nose: Edematous mucosa without lesion. No rhinorrhea. Mouth/Pharynx: Tonsils + 3 with suppuration and cavitations noted.  Pharyngeal erythema, cobblestoning and telangiectasia. Percussed  Maxillary Sinus tenderness.  NECK: Supple with + anterior cervical lymphadenopathy.  No Thyromegaly  SKIN: No Exanthem noted.      Diagnostic " Test Results:  Strep screen - Negative    ASSESSMENT/PLAN:   1. Acute suppurative tonsillitis  - erythromycin (STEFANIE-TAB) 333 MG EC tablet; Take 1 tablet (333 mg) by mouth 3 times daily  Dispense: 30 tablet; Refill: 0    2. Sore throat  - Strep, Rapid Screen    Use medication as directed.  Continue supportive OTC measures.  Follow up if symptoms should persist, change or worsen.   Patient amenable to this follow up plan.       Sachin Lincoln PA-C  [facility]

## 2017-12-11 NOTE — MR AVS SNAPSHOT
"              After Visit Summary   12/11/2017    Mimi Rivera    MRN: [id number]           Patient Information     Date Of Birth          1955        Visit Information        Provider Department      12/11/2017 4:20 PM Sachin Lincoln PA-C [facility]        Today's Diagnoses     Acute suppurative tonsillitis    -  1    Sore throat           Follow-ups after your visit        Who to contact     If you have questions or need follow up information about today's clinic visit or your schedule please contact [facility] directly at [phone].  Normal or non-critical lab and imaging results will be communicated to you by TraceWorkshart, letter or phone within 4 business days after the clinic has received the results. If you do not hear from us within 7 days, please contact the clinic through TraceWorkshart or phone. If you have a critical or abnormal lab result, we will notify you by phone as soon as possible.  Submit refill requests through Laudville or call your pharmacy and they will forward the refill request to us. Please allow 3 business days for your refill to be completed.          Additional Information About Your Visit        MyChart Information     [employer] gives you secure access to your electronic health record. If you see a primary care provider, you can also send messages to your care team and make appointments. If you have questions, please call your primary care clinic.  If you do not have a primary care provider, please call [phone] and they will assist you.        Care EveryWhere ID     This is your Care EveryWhere ID. This could be used by other organizations to access your [place] medical records  [id number]        Your Vitals Were     Pulse Temperature Height Last Period Pulse Oximetry BMI (Body Mass Index)    85 97.6  F (36.4  C) (Oral) 5' 1\" (1.549 m) 09/22/2010 96% 20.41 kg/m2       Blood Pressure from Last 3 Encounters:   12/11/17 (!) 152/98   11/14/17 (!) 170/104 "   06/01/17 140/90    Weight from Last 3 Encounters:   12/11/17 108 lb (49 kg)   11/14/17 107 lb (48.5 kg)   06/01/17 102 lb 11.2 oz (46.6 kg)              We Performed the Following     Strep, Rapid Screen          Today's Medication Changes          These changes are accurate as of: 12/11/17  4:57 PM.  If you have any questions, ask your nurse or doctor.               Start taking these medicines.        Dose/Directions    erythromycin 333 MG EC tablet   Commonly known as:  STEFANIE-TAB   Used for:  Acute suppurative tonsillitis   Started by:  Sachin Lincoln PA-C        Dose:  333 mg   Take 1 tablet (333 mg) by mouth 3 times daily   Quantity:  30 tablet   Refills:  0            Where to get your medicines      These medications were sent to [facility][id number] - [place], [place] - [address]  [address] [address] [address]     Phone:  [phone]     erythromycin 333 MG EC tablet                Primary Care Provider Office Phone # Fax #    Leno Mert Harrison [phone]       [address] [facility] [address]  [facility] [address]        Equal Access to Services     [facility] AH: Hadii aad ku hadasho Soomaali, waaxda luqadaha, qaybta kaalmada adeegyada, betty fulton haynoamn cy yoon ah. So [facility] [phone].    ATENCIÓN: Si habla español, tiene a high disposición servicios gratuitos de asistencia lingüística. Llame al [phone].    We comply with applicable federal civil rights laws and [place] laws. We do not discriminate on the basis of race, color, national origin, age, disability, sex, sexual orientation, or gender identity.            Thank you!     Thank you for choosing [facility]  for your care. Our goal is always to provide you with excellent care. Hearing back from our patients is one way we can continue to improve our services. Please take a few minutes to complete the written survey that you may receive in the mail after your visit with us. Thank  you!             Your Updated Medication List - Protect others around you: Learn how to safely use, store and throw away your medicines at www.disposemymeds.org.          This list is accurate as of: 12/11/17  4:57 PM.  Always use your most recent med list.                   Brand Name Dispense Instructions for use Diagnosis    albuterol 108 (90 BASE) MCG/ACT Inhaler    PROAIR HFA/PROVENTIL HFA/VENTOLIN HFA    1 Inhaler    Inhale 2 puffs into the lungs every 4 hours as needed for shortness of breath / dyspnea or wheezing    Wheezing       amLODIPine 5 MG tablet    NORVASC    90 tablet    Take 1 tablet (5 mg) by mouth daily    Hypertension goal BP (blood pressure) < 140/90       DEVILBISS TRAVELER NEBULIZER Misc     1 Device    1 Device 4 times daily as needed    COPD (chronic obstructive pulmonary disease) (H), Influenza with respiratory manifestation other than pneumonia       erythromycin 333 MG EC tablet    STEFANIE-TAB    30 tablet    Take 1 tablet (333 mg) by mouth 3 times daily    Acute suppurative tonsillitis       fluticasone 220 MCG/ACT Inhaler    FLOVENT HFA    1 Inhaler    Inhale 2 puffs into the lungs 2 times daily    Chronic bronchitis, unspecified chronic bronchitis type (H), Panlobular emphysema (H)       ipratropium 0.02 % neb solution    ATROVENT    225 mL    Take 2.5 mLs (0.5 mg) by nebulization 4 times daily    Chronic obstructive pulmonary disease with acute exacerbation (H)       tiotropium 2.5 MCG/ACT inhalation aerosol    SPIRIVA RESPIMAT    4 g    Inhale 2 puffs into the lungs daily    COPD (chronic obstructive pulmonary disease) (H)

## 2017-12-12 NOTE — TELEPHONE ENCOUNTER
Called [facility] pharmacy and talked with pharmacist. Okay to use clarithromycin instead of erythromycin for tonsillitis. Will be 500 mg tablets - She can take 1/2 tablet (250 mg) PO BID x 10 days.

## 2017-12-15 ENCOUNTER — OFFICE VISIT (OUTPATIENT)
Dept: FAMILY MEDICINE | Facility: CLINIC | Age: 62
End: 2017-12-15
Payer: COMMERCIAL

## 2017-12-15 VITALS
SYSTOLIC BLOOD PRESSURE: 142 MMHG | BODY MASS INDEX: 20.2 KG/M2 | TEMPERATURE: 97.5 F | HEART RATE: 104 BPM | WEIGHT: 107 LBS | HEIGHT: 61 IN | DIASTOLIC BLOOD PRESSURE: 88 MMHG | OXYGEN SATURATION: 98 %

## 2017-12-15 DIAGNOSIS — I10 HYPERTENSION GOAL BP (BLOOD PRESSURE) < 140/90: ICD-10-CM

## 2017-12-15 DIAGNOSIS — J02.9 ACUTE PHARYNGITIS, UNSPECIFIED ETIOLOGY: ICD-10-CM

## 2017-12-15 DIAGNOSIS — J03.90 TONSILLITIS: Primary | ICD-10-CM

## 2017-12-15 LAB
BASOPHILS # BLD AUTO: 0 10E9/L (ref 0–0.2)
BASOPHILS NFR BLD AUTO: 0.1 %
DIFFERENTIAL METHOD BLD: ABNORMAL
EOSINOPHIL # BLD AUTO: 0 10E9/L (ref 0–0.7)
EOSINOPHIL NFR BLD AUTO: 0.2 %
ERYTHROCYTE [DISTWIDTH] IN BLOOD BY AUTOMATED COUNT: 12.4 % (ref 10–15)
FLUAV+FLUBV AG SPEC QL: NEGATIVE
FLUAV+FLUBV AG SPEC QL: NEGATIVE
HCT VFR BLD AUTO: 45.6 % (ref 35–47)
HETEROPH AB SER QL: NEGATIVE
HGB BLD-MCNC: 16.1 G/DL (ref 11.7–15.7)
LYMPHOCYTES # BLD AUTO: 2.1 10E9/L (ref 0.8–5.3)
LYMPHOCYTES NFR BLD AUTO: 24.5 %
MCH RBC QN AUTO: 32.6 PG (ref 26.5–33)
MCHC RBC AUTO-ENTMCNC: 35.3 G/DL (ref 31.5–36.5)
MCV RBC AUTO: 92 FL (ref 78–100)
MONOCYTES # BLD AUTO: 0.6 10E9/L (ref 0–1.3)
MONOCYTES NFR BLD AUTO: 6.9 %
NEUTROPHILS # BLD AUTO: 5.8 10E9/L (ref 1.6–8.3)
NEUTROPHILS NFR BLD AUTO: 68.3 %
PLATELET # BLD AUTO: 265 10E9/L (ref 150–450)
RBC # BLD AUTO: 4.94 10E12/L (ref 3.8–5.2)
SPECIMEN SOURCE: NORMAL
WBC # BLD AUTO: 8.5 10E9/L (ref 4–11)

## 2017-12-15 PROCEDURE — 99214 OFFICE O/P EST MOD 30 MIN: CPT | Performed by: PHYSICIAN ASSISTANT

## 2017-12-15 PROCEDURE — 36415 COLL VENOUS BLD VENIPUNCTURE: CPT | Performed by: PHYSICIAN ASSISTANT

## 2017-12-15 PROCEDURE — 85025 COMPLETE CBC W/AUTO DIFF WBC: CPT | Performed by: PHYSICIAN ASSISTANT

## 2017-12-15 PROCEDURE — 86308 HETEROPHILE ANTIBODY SCREEN: CPT | Performed by: PHYSICIAN ASSISTANT

## 2017-12-15 PROCEDURE — 87081 CULTURE SCREEN ONLY: CPT | Performed by: PHYSICIAN ASSISTANT

## 2017-12-15 PROCEDURE — 87804 INFLUENZA ASSAY W/OPTIC: CPT | Performed by: PHYSICIAN ASSISTANT

## 2017-12-15 RX ORDER — CEFDINIR 300 MG/1
300 CAPSULE ORAL 2 TIMES DAILY
Qty: 20 CAPSULE | Refills: 0 | Status: SHIPPED | OUTPATIENT
Start: 2017-12-15 | End: 2018-01-26

## 2017-12-15 NOTE — MR AVS SNAPSHOT
After Visit Summary   12/15/2017    Mimi Rivera    MRN: [id number]           Patient Information     Date Of Birth          1955        Visit Information        Provider Department      12/15/2017 1:20 PM Jodee [facility]oredo [facility]        Today's Diagnoses     Acute pharyngitis, unspecified etiology    -  1    Tonsillitis          Care Instructions    ? Bacterial versus viral.  Reassuringly normal WBC today and normal monospot.  Given strep culture was not completed last time did add this today as well.  Ok to switch abx since going into the weekend and no significant improvement after 4 days.  Start omnicef.  Re-check if not improving as expected.  To ED over the weekend with any symptoms of peritonsillar abscess as reviewed.    Electronically Signed By: Jodee Loredo PA-C            Follow-ups after your visit        Who to contact     If you have questions or need follow up information about today's clinic visit or your schedule please contact [facility] directly at [phone].  Normal or non-critical lab and imaging results will be communicated to you by SurfAirhart, letter or phone within 4 business days after the clinic has received the results. If you do not hear from us within 7 days, please contact the clinic through SurfAirhart or phone. If you have a critical or abnormal lab result, we will notify you by phone as soon as possible.  Submit refill requests through Organic Motion or call your pharmacy and they will forward the refill request to us. Please allow 3 business days for your refill to be completed.          Additional Information About Your Visit        SurfAirharsMedio Information     Organic Motion gives you secure access to your electronic health record. If you see a primary care provider, you can also send messages to your care team and make appointments. If you have questions, please call your primary care clinic.  If you do not have a primary care  "provider, please call [phone] and they will assist you.        Care EveryWhere ID     This is your Care EveryWhere ID. This could be used by other organizations to access your [place] medical records  [id number]        Your Vitals Were     Pulse Temperature Height Last Period Pulse Oximetry BMI (Body Mass Index)    104 97.5  F (36.4  C) (Oral) 5' 1\" (1.549 m) 09/22/2010 98% 20.22 kg/m2       Blood Pressure from Last 3 Encounters:   12/15/17 142/88   12/11/17 (!) 152/98   11/14/17 (!) 170/104    Weight from Last 3 Encounters:   12/15/17 107 lb (48.5 kg)   12/11/17 108 lb (49 kg)   11/14/17 107 lb (48.5 kg)              We Performed the Following     Beta strep group A culture     CBC with platelets and differential     Influenza A/B antigen     Mononucleosis screen          Today's Medication Changes          These changes are accurate as of: 12/15/17  2:37 PM.  If you have any questions, ask your nurse or doctor.               Start taking these medicines.        Dose/Directions    cefdinir 300 MG capsule   Commonly known as:  OMNICEF   Used for:  Tonsillitis   Started by:  Jodee Loredo PA-C        Dose:  300 mg   Take 1 capsule (300 mg) by mouth 2 times daily   Quantity:  20 capsule   Refills:  0            Where to get your medicines      These medications were sent to [id number] [address] [address] [address]     Phone:  [phone]     cefdinir 300 MG capsule                Primary Care Provider Office Phone # Fax #    Leno Harrison [phone]       [address] [facility] [address]  [facility] [address]        Equal Access to Services     [facility]IM[facility] AH: Lisandro Tamayo, nemesio hein, shilpi kaalmada will, betty penny. So [facility] [phone].    ATENCIÓN: Si habla español, tiene a high disposición servicios gratuitos de asistencia lingüística. Llame al " [phone].    We comply with applicable federal civil rights laws and [place] laws. We do not discriminate on the basis of race, color, national origin, age, disability, sex, sexual orientation, or gender identity.            Thank you!     Thank you for choosing [facility]  for your care. Our goal is always to provide you with excellent care. Hearing back from our patients is one way we can continue to improve our services. Please take a few minutes to complete the written survey that you may receive in the mail after your visit with us. Thank you!             Your Updated Medication List - Protect others around you: Learn how to safely use, store and throw away your medicines at www.disposemymeds.org.          This list is accurate as of: 12/15/17  2:37 PM.  Always use your most recent med list.                   Brand Name Dispense Instructions for use Diagnosis    albuterol 108 (90 BASE) MCG/ACT Inhaler    PROAIR HFA/PROVENTIL HFA/VENTOLIN HFA    1 Inhaler    Inhale 2 puffs into the lungs every 4 hours as needed for shortness of breath / dyspnea or wheezing    Wheezing       amLODIPine 5 MG tablet    NORVASC    90 tablet    Take 1 tablet (5 mg) by mouth daily    Hypertension goal BP (blood pressure) < 140/90       cefdinir 300 MG capsule    OMNICEF    20 capsule    Take 1 capsule (300 mg) by mouth 2 times daily    Tonsillitis       clarithromycin 250 MG tablet    BIAXIN    20 tablet    Take 1 tablet (250 mg) by mouth 2 times daily    Tonsillitis       DEVILBISS TRAVELER NEBULIZER Misc     1 Device    1 Device 4 times daily as needed    COPD (chronic obstructive pulmonary disease) (H), Influenza with respiratory manifestation other than pneumonia       fluticasone 220 MCG/ACT Inhaler    FLOVENT HFA    1 Inhaler    Inhale 2 puffs into the lungs 2 times daily    Chronic bronchitis, unspecified chronic bronchitis type (H), Panlobular emphysema (H)       ipratropium 0.02 % neb solution    ATROVENT     225 mL    Take 2.5 mLs (0.5 mg) by nebulization 4 times daily    Chronic obstructive pulmonary disease with acute exacerbation (H)       tiotropium 2.5 MCG/ACT inhalation aerosol    SPIRIVA RESPIMAT    4 g    Inhale 2 puffs into the lungs daily    COPD (chronic obstructive pulmonary disease) (H)

## 2017-12-15 NOTE — PROGRESS NOTES
SUBJECTIVE:   Mimi Rivera is a 62 year old female who presents to clinic today for the following health issues:      Acute Illness   Acute illness concerns: fever, sore throat  Evaluated on  with neg strep.  Started on clarithromycin for bacterial tonsillitis.  Pt has taken for past 4 days, but doesn't feel much better.  Still hurts to swallow.  Is able to tolerate liquids and solids. Reports appetite has been decreased though.  EPIC review shows no strep culture was completed for some reason and pt amenable to doing this today.   Then did develop a cough a couple days ago.  Does feel that appearance of throat maybe looks a bit better, but doesn't feel better.  No hx of mono.   No hot potato voice or difficulty breathing.  Feels she would like to consider a change in her abx.    Onset: started two weeks ago    Fever: YES, last night it was 100.5    Chills/Sweats: YES    Headache (location?): no    Sinus Pressure:no    Conjunctivitis:  no    Ear Pain: no    Rhinorrhea: no    Congestion: no    Sore Throat: YES     Cough: YES    Wheeze: no    Decreased Appetite: no    Nausea: no    Vomiting: no    Diarrhea:  no    Dysuria/Freq.: no    Fatigue/Achiness: YES    Sick/Strep Exposure:      Therapies Tried and outcome: she is on Clarithomycin by Sachin Lincoln and is just not feeling better.  Advil taken last at 10:30am this morning.       Problem list and histories reviewed & adjusted, as indicated.  Additional history: as documented    Patient Active Problem List   Diagnosis     CARDIOVASCULAR SCREENING; LDL GOAL LESS THAN 160     Hypertension goal BP (blood pressure) < 140/90     COPD (chronic obstructive pulmonary disease) (H)     Moderate major depression (H)     Former smoker     Advanced directives, counseling/discussion     Past Surgical History:   Procedure Laterality Date      SECTION      x 3       Social History   Substance Use Topics     Smoking status: Former Smoker     Packs/day: 10.00      "Types: Cigarettes     Quit date: 3/19/2015     Smokeless tobacco: Never Used     Alcohol use 1.2 - 1.8 oz/week     2 - 3 Standard drinks or equivalent per week      Comment: 2 drinks per wk avg     Family History   Problem Relation Age of Onset     Hypertension Mother      CANCER Mother      liver     C.A.D. Father          Current Outpatient Prescriptions   Medication Sig Dispense Refill     cefdinir (OMNICEF) 300 MG capsule Take 1 capsule (300 mg) by mouth 2 times daily 20 capsule 0     clarithromycin (BIAXIN) 250 MG tablet Take 1 tablet (250 mg) by mouth 2 times daily 20 tablet 0     ipratropium (ATROVENT) 0.02 % neb solution Take 2.5 mLs (0.5 mg) by nebulization 4 times daily 225 mL 0     amLODIPine (NORVASC) 5 MG tablet Take 1 tablet (5 mg) by mouth daily 90 tablet 3     albuterol (PROAIR HFA/PROVENTIL HFA/VENTOLIN HFA) 108 (90 BASE) MCG/ACT Inhaler Inhale 2 puffs into the lungs every 4 hours as needed for shortness of breath / dyspnea or wheezing 1 Inhaler 0     tiotropium (SPIRIVA RESPIMAT) 2.5 MCG/ACT inhalation aerosol Inhale 2 puffs into the lungs daily 4 g 8     fluticasone (FLOVENT HFA) 220 MCG/ACT inhaler Inhale 2 puffs into the lungs 2 times daily 1 Inhaler 12     Nebulizers (DEVIL[employer] TRAVELER NEBULIZER) MISC 1 Device 4 times daily as needed 1 Device 0     Allergies   Allergen Reactions     Penicillins Rash     Reviewed and updated as needed this visit by clinical staffTobacco  Allergies  Meds  Med Hx  Surg Hx  Fam Hx  Soc Hx      Reviewed and updated as needed this visit by Provider  Tobacco  Allergies  Meds  Med Hx  Surg Hx  Fam Hx  Soc Hx        ROS:  Constitutional, HEENT, cardiovascular, pulmonary, gi and gu systems are negative, except as otherwise noted.      OBJECTIVE:   /88  Pulse 104  Temp 97.5  F (36.4  C) (Oral)  Ht 5' 1\" (1.549 m)  Wt 107 lb (48.5 kg)  LMP 09/22/2010  SpO2 98%  BMI 20.22 kg/m2  Body mass index is 20.22 kg/(m^2).  GENERAL: healthy, alert and no " distress  EYES: Eyes grossly normal to inspection, PERRL and conjunctivae and sclerae normal  HENT: ear canals and TM's normal, nose and mouth without ulcers or lesions. Pharynx with erythema and bilateral tonsillar exudates. No asymmetry. No difficulty breathing or abnormal voice quality.  NECK: ? Mild posterior cervical shotty nodes, no asymmetry, masses.  RESP: lungs clear to auscultation - no rales, rhonchi or wheezes  CV: regular rate and rhythm, normal S1 S2, no S3 or S4, no murmur, click or rub, no peripheral edema and peripheral pulses strong     Diagnostic Test Results:  Results for orders placed or performed in visit on 12/15/17   CBC with platelets and differential   Result Value Ref Range    WBC 8.5 4.0 - 11.0 10e9/L    RBC Count 4.94 3.8 - 5.2 10e12/L    Hemoglobin 16.1 (H) 11.7 - 15.7 g/dL    Hematocrit 45.6 35.0 - 47.0 %    MCV 92 78 - 100 fl    MCH 32.6 26.5 - 33.0 pg    MCHC 35.3 31.5 - 36.5 g/dL    RDW 12.4 10.0 - 15.0 %    Platelet Count 265 150 - 450 10e9/L    Diff Method Automated Method     % Neutrophils 68.3 %    % Lymphocytes 24.5 %    % Monocytes 6.9 %    % Eosinophils 0.2 %    % Basophils 0.1 %    Absolute Neutrophil 5.8 1.6 - 8.3 10e9/L    Absolute Lymphocytes 2.1 0.8 - 5.3 10e9/L    Absolute Monocytes 0.6 0.0 - 1.3 10e9/L    Absolute Eosinophils 0.0 0.0 - 0.7 10e9/L    Absolute Basophils 0.0 0.0 - 0.2 10e9/L   Mononucleosis screen   Result Value Ref Range    Mononucleosis Screen Negative NEG^Negative   Beta strep group A culture   Result Value Ref Range    Specimen Description Throat     Culture Micro No beta hemolytic Streptococcus Group A isolated    Influenza A/B antigen   Result Value Ref Range    Influenza A/B Agn Specimen Nasopharyngeal     Influenza A Negative NEG^Negative    Influenza B Negative NEG^Negative     ASSESSMENT/PLAN:       ICD-10-CM    1. Tonsillitis J03.90 CBC with platelets and differential     Beta strep group A culture     Mononucleosis screen     Influenza A/B  antigen     cefdinir (OMNICEF) 300 MG capsule   2. Acute pharyngitis, unspecified etiology J02.9 CBC with platelets and differential     Beta strep group A culture     Mononucleosis screen     Influenza A/B antigen   3. Hypertension goal BP (blood pressure) < 140/90 I10    See Patient Instructions.  Pt in agreement with plan.  Encouraged to return to have BP re-checked when feeling better as well.    Patient Instructions   ? Bacterial versus viral.  Reassuringly normal WBC today and normal monospot.  Given strep culture was not completed last time did add this today as well.  Ok to switch abx since going into the weekend and no significant improvement after 4 days.  Start omnicef.  Re-check if not improving as expected.  To ED over the weekend with any symptoms of peritonsillar abscess as reviewed.    Electronically Signed By: Jodee Loredo PA-C

## 2017-12-15 NOTE — PATIENT INSTRUCTIONS
? Bacterial versus viral.  Reassuringly normal WBC today and normal monospot.  Given strep culture was not completed last time did add this today as well.  Ok to switch abx since going into the weekend and no significant improvement after 4 days.  Start omnicef.  Re-check if not improving as expected.  To ED over the weekend with any symptoms of peritonsillar abscess as reviewed.    Electronically Signed By: Jodee Loredo PA-C

## 2017-12-15 NOTE — NURSING NOTE
"Chief Complaint   Patient presents with     Pharyngitis     Fever       Initial /88  Pulse 104  Temp 97.5  F (36.4  C) (Oral)  Ht 5' 1\" (1.549 m)  Wt 107 lb (48.5 kg)  LMP 09/22/2010  SpO2 98%  BMI 20.22 kg/m2 Estimated body mass index is 20.22 kg/(m^2) as calculated from the following:    Height as of this encounter: 5' 1\" (1.549 m).    Weight as of this encounter: 107 lb (48.5 kg).  Medication Reconciliation: complete    "

## 2017-12-16 LAB
BACTERIA SPEC CULT: NORMAL
SPECIMEN SOURCE: NORMAL

## 2018-01-18 DIAGNOSIS — J44.9 COPD (CHRONIC OBSTRUCTIVE PULMONARY DISEASE) (H): ICD-10-CM

## 2018-01-19 DIAGNOSIS — J43.1 PANLOBULAR EMPHYSEMA (H): ICD-10-CM

## 2018-01-19 DIAGNOSIS — J42 CHRONIC BRONCHITIS, UNSPECIFIED CHRONIC BRONCHITIS TYPE (H): ICD-10-CM

## 2018-01-19 RX ORDER — FLUTICASONE PROPIONATE 220 UG/1
2 AEROSOL, METERED RESPIRATORY (INHALATION) 2 TIMES DAILY
Qty: 1 INHALER | Refills: 1 | Status: SHIPPED | OUTPATIENT
Start: 2018-01-19 | End: 2018-11-09

## 2018-01-22 ENCOUNTER — TELEPHONE (OUTPATIENT)
Dept: FAMILY MEDICINE | Facility: CLINIC | Age: 63
End: 2018-01-22

## 2018-01-22 NOTE — TELEPHONE ENCOUNTER
Influenza-Like Illness (MARSHA) Protocol    Mimi Rivera      Age: 62 year old     YOB: 1955    Are you currently sick or have you had close contact with someone who is currently sick?   Yes, this patient is currently sick.     Adult Clinical Evaluation    Is this patient experiencing ANY of the following?  Unconsciousness or unresponsiveness No       Difficulty breathing No   Blue or dusky lips, skin, or nail beds No   Chest pain No   Severe confusion or delirium No   Seizure activity: ongoing or stopped No   Severe dehydration or signs of shock No   Patient sounds very sick on the phone No     Is this patient experiencing ANY of the following?  Fever > 104 or shaking chills No   Wheezing with minimal response to usual wheezing medications or new wheezing No   Repeated vomiting or diarrhea with signs of dehydration (no urination within last 12 hours) No   Flu-like symptoms that initially improved but returned with fever and a worse cough No   Stiff or painful neck No   Severe headache No     Does the patient have any of the following?  Measured fever > 100 degrees Yes 100   Chills or feels very warm to the touch Yes, chills and wake up sweating   Cough Yes   Sore throat Yes   Muscle/ body aches No   Headaches No   Fatigue (tiredness) No     Nursing Plan  Scheduled appointment    Provided home care instructions    General home care instruction:      Avoid contact with people in your household who are at increased risk for more severe complications of influenza (such as pregnant women or people who have a chronic health condition, for example diabetes, heart disease, asthma, or emphysema).    Stay home from work, school, childcare or other public places until your fever (37.8 degrees Celsius [100 degrees Fahrenheit]) has been gone for at least 24 hours, except to seek medical care. (Fever should be gone without the use of fever-reducing medications.) Use a surgical mask if available, or cover your mouth  and nose with a tissue if possible if you need to seek medical care. Contact your work place, school, or  as they may have longer exclusion times.    You may continue to shed virus after your fever is gone. Limit your contact with high-risk individuals for 10 days after your symptoms started and be especially careful to cover your coughs/sneezes and wash your hands.    Cover your cough and wash your hands often, and especially after coughing, sneezing, blowing your nose.    Drink plenty of fluids (such as water, broth, sports drinks, electrolyte beverages for children) to prevent dehydration.    Avoid tobacco and second hand smoke.    Get plenty of rest.    Use over-the-counter pain relievers as needed per  instructions.    Do not give aspirin (acetylsalicylic acid) or products that contain aspirin (e.g. bismuth subsalicylate - Pepto Bismol) to children or teenagers 18 years or younger.    A small number of people with influenza do not have fever. If you have respiratory symptoms and are at increased risk for complications of influenza, contact your health care provider to discuss these symptoms.    For parents of infants:    If possible, only family members who are not sick should care for infants.    Wash your hands with soap and water, or an alcohol-based hand rub (if your hands are not visibly soiled) before caring for your infant.    Cover your mouth and nose with a tissue when coughing or sneezing, and clean your hands.    Contact a health care provider to discuss your illness within 1-2 days if you are    Pregnant    Immunocompromised    Call 911 if you experience:    Difficulty breathing or shortness of breath    Pain or pressure in the chest    Confusion or less responsive than normal    Seizure activity: ongoing or stopped    Severe dehydration or signs of shock     Blue or dusky lips, skin, or nail beds    If further questions/concerns or if new symptoms develop, call your PCP or  [facility] as soon as possible.    When to seek medical attention    Contact your health care provider right away if you experience:    A painful sore throat accompanied by fever persists for more than 48 hours    Ear pain, sinus pain, persistent vomiting and/or diarrhea    Oral temperature greater than 104  Fahrenheit (40  Celsius)    Dehydration (e.g., mouth feeling dry, dizzy when sitting/standing, decreased urine output)    Severe or persistent vomiting; unable to keep fluids down    Improvement in flu-like symptoms (fever and cough or sore throat) but then return of fever and worse cough or sore throat    Not drinking enough fluid    Any other concerns not stated above        Additional educational resources include:    http://www.[employer].com    http://www.cdc.gov/flu/  Phoebe Ernst    Patient has put on tonsils coughing, throat is red. Has been going on for over a month. Was on antibiotics and got better at that point, when went off antibiotics they got worse. Fever low grade, sore throat continues. Slight cough. Appointment scheduled with Dr. Simon for later today. Phoebe Ernst R.N.

## 2018-01-26 ENCOUNTER — OFFICE VISIT (OUTPATIENT)
Dept: FAMILY MEDICINE | Facility: CLINIC | Age: 63
End: 2018-01-26
Payer: COMMERCIAL

## 2018-01-26 VITALS
TEMPERATURE: 98.3 F | BODY MASS INDEX: 19.83 KG/M2 | HEART RATE: 107 BPM | DIASTOLIC BLOOD PRESSURE: 94 MMHG | SYSTOLIC BLOOD PRESSURE: 162 MMHG | WEIGHT: 105 LBS | OXYGEN SATURATION: 94 % | HEIGHT: 61 IN

## 2018-01-26 DIAGNOSIS — J03.90 TONSILLITIS: ICD-10-CM

## 2018-01-26 DIAGNOSIS — I10 HYPERTENSION GOAL BP (BLOOD PRESSURE) < 140/90: ICD-10-CM

## 2018-01-26 DIAGNOSIS — J01.90 ACUTE SINUSITIS TREATED WITH ANTIBIOTICS IN THE PAST 60 DAYS: Primary | ICD-10-CM

## 2018-01-26 PROCEDURE — 99213 OFFICE O/P EST LOW 20 MIN: CPT | Performed by: FAMILY MEDICINE

## 2018-01-26 RX ORDER — CEFDINIR 300 MG/1
600 CAPSULE ORAL DAILY
Qty: 28 CAPSULE | Refills: 0 | Status: SHIPPED | OUTPATIENT
Start: 2018-01-26 | End: 2018-03-15

## 2018-01-26 NOTE — NURSING NOTE
"Chief Complaint   Patient presents with     Cough       Initial BP (!) 162/94  Pulse 107  Temp 98.3  F (36.8  C) (Oral)  Ht 5' 1\" (1.549 m)  Wt 105 lb (47.6 kg)  LMP 09/22/2010  SpO2 94%  BMI 19.84 kg/m2 Estimated body mass index is 19.84 kg/(m^2) as calculated from the following:    Height as of this encounter: 5' 1\" (1.549 m).    Weight as of this encounter: 105 lb (47.6 kg).  Medication Reconciliation: complete   Marie Lees Certified Medical Assistant    "

## 2018-01-26 NOTE — MR AVS SNAPSHOT
After Visit Summary   1/26/2018    Mimi Rivera    MRN: [id number]           Patient Information     Date Of Birth          1955        Visit Information        Provider Department      1/26/2018 11:20 AM Xu Simon,  [facility]        Today's Diagnoses     Acute sinusitis treated with antibiotics in the past 60 days    -  1    Tonsillitis          Care Instructions    If symptoms not improving over the course of the next two weeks, would recommend follow-up with ENT. If symptoms worsening, would also recommend returning to ENT.           Follow-ups after your visit        Follow-up notes from your care team     Return if symptoms worsen or fail to improve.      Your next 10 appointments already scheduled     Mar 15, 2018 11:00 AM CDT   PFT VISIT with  PFL B   [facility] Pulmonary Function Testing ([facility])    [address] [place] [address]  3rd Floor  [facility] [id number] [phone]            Mar 15, 2018 11:30 AM CDT   (Arrive by 11:15 AM)   Return Visit with Ishmael Bridges MD   [facility] for [facility] and [facility] ([facility])    [address] [place] [address]  [address] [place] [id number] [phone]              Who to contact     If you have questions or need follow up information about today's clinic visit or your schedule please contact [facility] directly at [phone].  Normal or non-critical lab and imaging results will be communicated to you by MyChart, letter or phone within 4 business days after the clinic has received the results. If you do not hear from us within 7 days, please contact the clinic through MyChart or phone. If you have a critical or abnormal lab result, we will notify you by phone as soon as possible.  Submit refill requests through [employer] or call your pharmacy and they will forward the refill request to us. Please allow 3 business days for your refill to be completed.  "         Additional Information About Your Visit        MyChart Information     [employer] gives you secure access to your electronic health record. If you see a primary care provider, you can also send messages to your care team and make appointments. If you have questions, please call your primary care clinic.  If you do not have a primary care provider, please call [phone] and they will assist you.        Care EveryWhere ID     This is your Care EveryWhere ID. This could be used by other organizations to access your [place] medical records  [id number]        Your Vitals Were     Pulse Temperature Height Last Period Pulse Oximetry BMI (Body Mass Index)    107 98.3  F (36.8  C) (Oral) 5' 1\" (1.549 m) 09/22/2010 94% 19.84 kg/m2       Blood Pressure from Last 3 Encounters:   01/26/18 (!) 162/94   12/15/17 142/88   12/11/17 (!) 152/98    Weight from Last 3 Encounters:   01/26/18 105 lb (47.6 kg)   12/15/17 107 lb (48.5 kg)   12/11/17 108 lb (49 kg)              Today, you had the following     No orders found for display         Today's Medication Changes          These changes are accurate as of 1/26/18 12:08 PM.  If you have any questions, ask your nurse or doctor.               These medicines have changed or have updated prescriptions.        Dose/Directions    cefdinir 300 MG capsule   Commonly known as:  OMNICEF   This may have changed:    - how much to take  - when to take this   Used for:  Tonsillitis, Acute sinusitis treated with antibiotics in the past 60 days   Changed by:  Xu Simon,         Dose:  600 mg   Take 2 capsules (600 mg) by mouth daily   Quantity:  28 capsule   Refills:  0            Where to get your medicines      These medications were sent to [id number] [address] [address]  [id number] [place] [id number]     Phone:  [phone]     cefdinir 300 MG capsule                Primary Care Provider Office Phone # Fax #    Leno Harrison MD " [phone]       [address]  [facility] [address]        Equal Access to Services     JENNY JIMENEZ : Lisandro kamala burks nadeem Tamayo, wacheyda luqbridgette, consuelota karosemarieda will, betty penny. So [facility] [phone].    ATENCIÓN: Si habla español, tiene a high disposición servicios gratuitos de asistencia lingüística. Llame al [phone].    We comply with applicable federal civil rights laws and [place] laws. We do not discriminate on the basis of race, color, national origin, age, disability, sex, sexual orientation, or gender identity.            Thank you!     [facility] you [facility]  [facility]. Our goal is always to provide you with excellent care. Hearing back from our patients is one way we can continue to improve our services. Please take a few minutes to complete the written survey that you may receive in the mail after your visit with us. Thank you!             Your Updated Medication List - Protect others around you: Learn how to safely use, store and throw away your medicines at www.disposemymeds.org.          This list is accurate as of 1/26/18 12:08 PM.  Always use your most recent med list.                   Brand Name Dispense Instructions for use Diagnosis    albuterol 108 (90 BASE) MCG/ACT Inhaler    PROAIR HFA/PROVENTIL HFA/VENTOLIN HFA    1 Inhaler    Inhale 2 puffs into the lungs every 4 hours as needed for shortness of breath / dyspnea or wheezing    Wheezing       amLODIPine 5 MG tablet    NORVASC    90 tablet    Take 1 tablet (5 mg) by mouth daily    Hypertension goal BP (blood pressure) < 140/90       cefdinir 300 MG capsule    OMNICEF    28 capsule    Take 2 capsules (600 mg) by mouth daily    Tonsillitis, Acute sinusitis treated with antibiotics in the past 60 days       [employer] TRAV[employer] NEBULIZER Misc     1 Device    1 Device 4 times daily as needed    COPD (chronic obstructive pulmonary disease) (H),  Influenza with respiratory manifestation other than pneumonia       fluticasone 220 MCG/ACT Inhaler    FLOVENT HFA    1 Inhaler    Inhale 2 puffs into the lungs 2 times daily    Chronic bronchitis, unspecified chronic bronchitis type (H), Panlobular emphysema (H)       ipratropium 0.02 % neb solution    ATROVENT    225 mL    Take 2.5 mLs (0.5 mg) by nebulization 4 times daily    Chronic obstructive pulmonary disease with acute exacerbation (H)       tiotropium 2.5 MCG/ACT inhalation aerosol    SPIRIVA RESPIMAT    4 g    Inhale 2 puffs into the lungs daily    COPD (chronic obstructive pulmonary disease) (H)

## 2018-01-26 NOTE — PROGRESS NOTES
SUBJECTIVE:   Mimi Rivera is a 62 year old female who presents to clinic today for the following health issues:      Acute Illness   Acute illness concerns: Sore Throat   Onset: X2 Weeks     Fever: YES- 100    Chills/Sweats: YES    Headache (location?): YES    Sinus Pressure:YES, frontal pressure    Conjunctivitis:  YES    Ear Pain: YES: right    Rhinorrhea: YES    Congestion: YES    Sore Throat: YES  Increased heart rate , lungs hurt    Cough: YES-productive of yellow sputum    Wheeze: YES    Decreased Appetite: YES    Nausea: no    Vomiting: no    Diarrhea:  no    Dysuria/Freq.: no    Fatigue/Achiness: YES    Sick/Strep Exposure: YES- strep , grand daughter -- prior to getting sick.      Therapies Tried and outcome: aspirin, warm water and salt, decongestant - not helping       Since November, has been on several antibiotics for sinusitis and tonsillitis.Took cefdinir, clarithromycin, cefdinir a second time. She was then seen by ENT on 1718 and prescribed levofloxacin and medrol dosepak. She states symptoms still did not improve. She didn't take the steroids because she felt the antibiotics would be sufficient.    For COPD, taking Flovent, Spiriva, Atrovent neb, albuterol neb      Hypertension -- elevated today,currently taking norvasc 5 mg. Denies any chest pain, shortness of breath, dyspnea.      Problem list and histories reviewed & adjusted, as indicated.  Additional history: as documented    Patient Active Problem List   Diagnosis     CARDIOVASCULAR SCREENING; LDL GOAL LESS THAN 160     Hypertension goal BP (blood pressure) < 140/90     COPD (chronic obstructive pulmonary disease) (H)     Moderate major depression (H)     Former smoker     Advanced directives, counseling/discussion     Past Surgical History:   Procedure Laterality Date      SECTION      x 3       Social History   Substance Use Topics     Smoking status: Former Smoker     Packs/day: 10.00     Types: Cigarettes     Quit date:  "3/19/2015     Smokeless tobacco: Never Used     Alcohol use 1.2 - 1.8 oz/week     2 - 3 Standard drinks or equivalent per week      Comment: 2 drinks per wk avg     Family History   Problem Relation Age of Onset     Hypertension Mother      CANCER Mother      liver     C.A.D. Father            Reviewed and updated as needed this visit by clinical staff  Tobacco  Allergies  Meds  Problems  Med Hx  Surg Hx  Fam Hx  Soc Hx        Reviewed and updated as needed this visit by Provider  Allergies  Meds  Problems         ROS:  Constitutional, HEENT, cardiovascular, pulmonary, gi and gu systems are negative, except as otherwise noted.    OBJECTIVE:     BP (!) 162/94  Pulse 107  Temp 98.3  F (36.8  C) (Oral)  Ht 5' 1\" (1.549 m)  Wt 105 lb (47.6 kg)  LMP 09/22/2010  SpO2 94%  BMI 19.84 kg/m2  Body mass index is 19.84 kg/(m^2).  GENERAL: healthy, alert and no distress  EYES: Eyes grossly normal to inspection, PERRL and conjunctivae and sclerae normal  HENT: normal cephalic/atraumatic, ear canals and TM's normal, nose and mouth without ulcers or lesions, oropharynx clear, oral mucous membranes moist and tonsillar exudate present bilaterally  NECK: no adenopathy, no asymmetry, masses, or scars and thyroid normal to palpation  RESP: lungs clear to auscultation - no rales, rhonchi or wheezes  CV: regular rate and rhythm, normal S1 S2, no S3 or S4, no murmur, click or rub, no peripheral edema and peripheral pulses strong  MS: no gross musculoskeletal defects noted, no edema    Diagnostic Test Results:  none     ASSESSMENT/PLAN:   Will treat for sinusitis/tonsillitis with cefdinir. Encouraged her to take medrol dosepak. She states she quit smoking years ago but smells strongly of smoke on clothes and breath. Would encourage limiting smoke exposure as able. If symptoms not improving over the next couple weeks, would recommend follow up with ENT, possible CT of sinuses.    1. Acute sinusitis treated with antibiotics " in the past 60 days  - cefdinir (OMNICEF) 300 MG capsule; Take 2 capsules (600 mg) by mouth daily  Dispense: 28 capsule; Refill: 0    2. Tonsillitis  - cefdinir (OMNICEF) 300 MG capsule; Take 2 capsules (600 mg) by mouth daily  Dispense: 28 capsule; Refill: 0    3. Hypertension goal BP (blood pressure) < 140/90: BP elevated today. Recommended following up with PCP within the next few weeks once acute illness resolved.      Xu Simon DO  [facility]

## 2018-01-26 NOTE — PATIENT INSTRUCTIONS
If symptoms not improving over the course of the next two weeks, would recommend follow-up with ENT. If symptoms worsening, would also recommend returning to ENT.

## 2018-03-15 ENCOUNTER — OFFICE VISIT (OUTPATIENT)
Dept: PULMONOLOGY | Facility: CLINIC | Age: 63
End: 2018-03-15
Attending: INTERNAL MEDICINE
Payer: COMMERCIAL

## 2018-03-15 VITALS
BODY MASS INDEX: 20.03 KG/M2 | SYSTOLIC BLOOD PRESSURE: 199 MMHG | HEART RATE: 90 BPM | RESPIRATION RATE: 16 BRPM | OXYGEN SATURATION: 95 % | DIASTOLIC BLOOD PRESSURE: 80 MMHG | WEIGHT: 106 LBS

## 2018-03-15 DIAGNOSIS — J44.1 COPD EXACERBATION (H): ICD-10-CM

## 2018-03-15 DIAGNOSIS — J42 CHRONIC BRONCHITIS, UNSPECIFIED CHRONIC BRONCHITIS TYPE (H): Primary | ICD-10-CM

## 2018-03-15 DIAGNOSIS — J44.1 CHRONIC OBSTRUCTIVE PULMONARY DISEASE WITH ACUTE EXACERBATION (H): Primary | ICD-10-CM

## 2018-03-15 PROCEDURE — G0463 HOSPITAL OUTPT CLINIC VISIT: HCPCS | Mod: ZF

## 2018-03-15 RX ORDER — AZITHROMYCIN 250 MG/1
TABLET, FILM COATED ORAL
Qty: 15 TABLET | Refills: 3 | Status: SHIPPED | OUTPATIENT
Start: 2018-03-15 | End: 2018-09-04

## 2018-03-15 ASSESSMENT — PAIN SCALES - GENERAL: PAINLEVEL: NO PAIN (0)

## 2018-03-15 NOTE — PROGRESS NOTES
"Reason for Visit  Mimi Rivera is a 62 year old year old female who is being seen for COPD and anxiety  Pulmonary HPI    The patient was seen and examined by Ishmael Bridges   I had the pleasure of seeing Ms. Mimi Rivera today in [facility] [place], operated by [facility] in followup for her COPD with bronchitis, wheezing, sinus infections and anxiety.  I last saw her on 05/26/2016.  When I saw her she was extremely anxious, feeling \"terrible.\"  She had had sinus infections and COPD exacerbations.  She also was complaining of a lot of epigastric pain and tenderness that she says was making her cough worse.  Her functional capacity walking was actually quite good.  At that time she was still smoking but had stopped taking Wellbutrin.  Given her extent of emotional distress, it was difficult to evaluate her breathing.  She did have a chest CT scan the prior month that did not show abnormalities and I have recommended she have an abdominal CT scan.  Although I offered to schedule it for her, she elected to work through her primary care doctor.  I recommended primary care followup for this.  I continue to recommend she take the Spiriva Respimat as prescribed.      Returning today just shy of 2 years later, she has quit smoking (to my surprise).  She has had recent trouble with tonsillitis and sinusitis, seeing an ENT doctor, being diagnosed with strep B and getting treatment for this and using a Ashley pot.  She tells me she is drenched with sweat at night and needing to change her clothes but denies any fevers or chills and she is significantly past menopause.  She does wake up frequently at night and is unclear how much this is with shortness of breath.  However, on good days she can walk more than 3 miles on flat ground and can climb multiple flights of stairs without difficulty.  She has a chronic cough that is productive of yellow greenish sputum but never with any blood and she " produces sputum 1-2 times per day.  She did have a chest CT scan in followup for her emphysema in 2017 and comparison was done to 2016 study without major findings of significance.  She did have a flu shot this fall.  Current respiratory medications include Spiriva used daily and Flovent which makes her jittery and is only used 2-3 times per week.  She has an albuterol rescue inhaler that she uses less than once a month.  Overall, she does complain about the expense of her medications including the 2 inhalers combined.  Otherwise, detailed pulmonary review of systems is negative.  There is no other change in her medical conditions that she reports.         Current Outpatient Prescriptions   Medication     Fluticasone-Umeclidin-Vilant 100-62.5-25 MCG/INH AEPB     azithromycin (ZITHROMAX) 250 MG tablet     fluticasone (FLOVENT HFA) 220 MCG/ACT Inhaler     tiotropium (SPIRIVA RESPIMAT) 2.5 MCG/ACT inhalation aerosol     amLODIPine (NORVASC) 5 MG tablet     albuterol (PROAIR HFA/PROVENTIL HFA/VENTOLIN HFA) 108 (90 BASE) MCG/ACT Inhaler     Nebulizers (H[employer] TRAVELER NEBULIZER) MISC     ipratropium (ATROVENT) 0.02 % neb solution     No current facility-administered medications for this visit.      Allergies   Allergen Reactions     Penicillins Rash     Past Medical History:   Diagnosis Date     ASCUS on Pap smear     unable to run HPV typing, f/u paps NIL     Chronic rhinosinusitis      COPD (chronic obstructive pulmonary disease) (H)      Depression, anxiety      Herpes     occasional outbreak     Hypertension        Past Surgical History:   Procedure Laterality Date      SECTION      x 3       Social History     Social History     Marital status:      Spouse name: N/A     Number of children: N/A     Years of education: N/A     Occupational History     Not on file.     Social History Main Topics     Smoking status: Former Smoker     Packs/day: 10.00     Types: Cigarettes     Quit date:  3/19/2015     Smokeless tobacco: Never Used     Alcohol use 1.2 - 1.8 oz/week     2 - 3 Standard drinks or equivalent per week      Comment: 2 drinks per wk avg     Drug use: No     Sexual activity: Yes     Partners: Male     Birth control/ protection: Surgical      Comment: tubal     Other Topics Concern     Parent/Sibling W/ Cabg, Mi Or Angioplasty Before 65f 55m? No     Social History Narrative       ROS Pulmonary  A complete ROS was otherwise negative except as noted in the HPI.  /80 (BP Location: Right arm, Patient Position: Chair, Cuff Size: Adult Regular)  Pulse 90  Resp 16  Wt 48.1 kg (106 lb)  LMP 09/22/2010  SpO2 95%  BMI 20.03 kg/m2  Exam:   GENERAL APPEARANCE: Well developed, well nourished, alert, and in no apparent distress.  More calm and less anxious than on previous visits, but still looks close to weeping.  EYES: PERRL, EOMI  HENT: N/C  MOUTH: Oral mucosa is moist, without any lesions, no tonsillar enlargement, no oropharyngeal exudate.  NECK: supple, no masses, no thyromegaly.  LYMPHATICS: No significant axillary, cervical, or supraclavicular nodes.  RESP: normal inspection, palpation & percussion, good air flow throughout.  No crackles. No rhonchi. No wheezes.  CV: RRR Normal S1, S2, regular rhythm, normal rate. No murmur.  No rub. No gallop. No LE edema.   ABDOMEN:  Deferred  MS: extremities normal. No clubbing. No cyanosis.  SKIN: no rash on limited exam  NEURO: Mentation intact, speech normal, normal strength and tone, normal gait and stance  PSYCH: mentation appears normal. and affect normal/bright  Results:  Recent Results (from the past 168 hour(s))   General PFT Lab (Please always keep checked)    Collection Time: 03/15/18 11:04 AM   Result Value Ref Range    FVC-Pred 2.82 L    FVC-Pre 2.13 L    FVC-%Pred-Pre 75 %    FEV1-Pre 1.27 L    FEV1-%Pred-Pre 56 %    FEV1FVC-Pred 78 %    FEV1FVC-Pre 59 %    FEFMax-Pred 5.80 L/sec    FEFMax-Pre 3.56 L/sec    FEFMax-%Pred-Pre 61 %     FEF2575-Pred 2.09 L/sec    FEF2575-Pre 0.54 L/sec    EJZ7385-%Pred-Pre 25 %    FEF2575-Post 0.59 L/sec    CLG1596-%Pred-Post 28 %    ExpTime-Pre 10.36 sec    FIFMax-Pre 2.66 L/sec    FEV1FEV6-Pred 81 %    FEV1FEV6-Pre 62 %       Assessment and plan:   PFTs were done today in clinic and show FEV1/FVC excess FVC 1.3/2.1 (56/75% predicted, respectively) for a ratio of 59%.  After nebulized albuterol, there is no significant change.  The inspiratory limb of the flow volume curve is normal.  Compared to prior PFTs of 05/06/2016, FEV1 has decreased from 1.5 to 1.27 and the FVC is slightly decreased from 2.4 to 2.1.      ASSESSMENT/RECOMMENDATIONS:  Moderately severe COPD:  The good news is that the patient has quit smoking.  I strongly encouraged her to not go back as she has relapsed in the past.  She seems to be in moderate COPD control with occasional flare/exacerbations.  Given the drop in her FEV1, I would like to be sure that she is getting LABA therapy in addition to LAMA (at least).  To try to save her money I gave her a prescription for Trelegy with a first month free coupon.  We will try to determine whether this is super expensive for her after the first month.  I did give her the reduced-rate coupon, but I am not sure if her [employer] does or does not qualify her for this.  Phil Brock did meet with her and give her, her card for contact for followup in terms of medication therapy.  I am not sure what to make of her sweats at night.  There is little to suggest chronic pulmonary infection, but she certainly could have MELONY or other chronic infections.  She also could have chronic sinusitis as the cause of this.  At present I will not pursue imaging or culture of sputum, but if this worsens then we will pursue both categories.  I will plan to see her back in 6 months' time and she was asked to call Phil if any issues arise in the interim.  She asked if it was safe for her to take vacations and I told  her that I strongly supported doing that.  I did give her a prescription for 14 days of azithromycin to have on hand in case she gets a COPD exacerbation while traveling.

## 2018-03-15 NOTE — MR AVS SNAPSHOT
After Visit Summary   3/15/2018    Mimi Rivera    MRN: [id number]           Patient Information     Date Of Birth          1955        Visit Information        Provider Department      3/15/2018 11:30 AM Ishmael Bridges MD [facility]        Today's Diagnoses     Chronic bronchitis, unspecified chronic bronchitis type (H)    -  1    COPD exacerbation (H)           Follow-ups after your visit        Follow-up notes from your care team     Return in about 6 months (around 9/15/2018).      Your next 10 appointments already scheduled     Sep 20, 2018 12:00 PM CDT   (Arrive by 11:45 AM)   Return Visit with Ishmael Bridges MD   [facility] ([facility] [place] [facility])    [address] [place] [address]  [address] [place] [id number] [phone]              Who to contact     If you have questions or need follow up information about today's clinic visit or your schedule please contact [facility] directly at [phone].  Normal or non-critical lab and imaging results will be communicated to you by Manpackshart, letter or phone within 4 business days after the clinic has received the results. If you do not hear from us within 7 days, please contact the clinic through Manpackshart or phone. If you have a critical or abnormal lab result, we will notify you by phone as soon as possible.  Submit refill requests through Stylect or call your pharmacy and they will forward the refill request to us. Please allow 3 business days for your refill to be completed.          Additional Information About Your Visit        MyChart Information     Stylect gives you secure access to your electronic health record. If you see a primary care provider, you can also send messages to your care team and make appointments. If you have questions, please call your primary care clinic.  If you do not have a primary care provider,  please call [phone] and they will assist you.        Care EveryWhere ID     This is your Care EveryWhere ID. This could be used by other organizations to access your [facility] medical records  [id number]        Your Vitals Were     Pulse Respirations Last Period Pulse Oximetry BMI (Body Mass Index)       90 16 09/22/2010 95% 20.03 kg/m2        Blood Pressure from Last 3 Encounters:   03/15/18 199/80   01/26/18 (!) 162/94   12/15/17 142/88    Weight from Last 3 Encounters:   03/15/18 48.1 kg (106 lb)   01/26/18 47.6 kg (105 lb)   12/15/17 48.5 kg (107 lb)              Today, you had the following     No orders found for display         Today's Medication Changes          These changes are accurate as of 3/15/18 11:59 PM.  If you have any questions, ask your nurse or doctor.               Start taking these medicines.        Dose/Directions    azithromycin 250 MG tablet   Commonly known as:  ZITHROMAX   Used for:  Chronic bronchitis, unspecified chronic bronchitis type (H), COPD exacerbation (H)   Started by:  Ishmael Bridges MD        Two tablets first day, then one tablet daily.   Quantity:  15 tablet   Refills:  3       Fluticasone-Umeclidin-Vilant 100-62.5-25 MCG/INH Aepb   Used for:  Chronic bronchitis, unspecified chronic bronchitis type (H)   Started by:  Ishmael Bridges MD        Dose:  1 Inhaler   Inhale 1 Inhaler into the lungs daily   Quantity:  1 each   Refills:  11            Where to get your medicines      These medications were sent to [facility] [address] [place], [place] [id number] [address] [address]     Phone:  [phone]     azithromycin 250 MG tablet    Fluticasone-Umeclidin-Vilant 100-62.5-25 MCG/INH Aepb                Primary Care Provider Office Phone # Fax #    Leno Harrison [phone] [address] [facility] [address]  [facility] [address]        Equal Access to Services     JENNY JIMENEZ AH: nemesio Garces  melvina mathieubenton cruzbetty armendariz ah. So [facility] [phone].    ATENCIÓN: Si julieta rashid, tiene a high disposición servicios gratuitos de asistencia lingüística. Ryley al [phone].    We comply with applicable federal civil rights laws and [place] laws. We do not discriminate on the basis of race, color, national origin, age, disability, sex, sexual orientation, or gender identity.            Thank you!     Thank you for choosing [facility]  for your care. Our goal is always to provide you with excellent care. Hearing back from our patients is one way we can continue to improve our services. Please take a few minutes to complete the written survey that you may receive in the mail after your visit with us. Thank you!             Your Updated Medication List - Protect others around you: Learn how to safely use, store and throw away your medicines at www.disposemymeds.org.          This list is accurate as of 3/15/18 11:59 PM.  Always use your most recent med list.                   Brand Name Dispense Instructions for use Diagnosis    albuterol 108 (90 BASE) MCG/ACT Inhaler    PROAIR HFA/PROVENTIL HFA/VENTOLIN HFA    1 Inhaler    Inhale 2 puffs into the lungs every 4 hours as needed for shortness of breath / dyspnea or wheezing    Wheezing       amLODIPine 5 MG tablet    NORVASC    90 tablet    Take 1 tablet (5 mg) by mouth daily    Hypertension goal BP (blood pressure) < 140/90       azithromycin 250 MG tablet    ZITHROMAX    15 tablet    Two tablets first day, then one tablet daily.    Chronic bronchitis, unspecified chronic bronchitis type (H), COPD exacerbation (H)       DEVIL[employer]ISS TRAVELER NEBULIZER Misc     1 Device    1 Device 4 times daily as needed    COPD (chronic obstructive pulmonary disease) (H), Influenza with respiratory manifestation other than pneumonia       fluticasone 220 MCG/ACT Inhaler    FLOVENT HFA    1 Inhaler     Inhale 2 puffs into the lungs 2 times daily    Chronic bronchitis, unspecified chronic bronchitis type (H), Panlobular emphysema (H)       Fluticasone-Umeclidin-Vilant 100-62.5-25 MCG/INH Aepb     1 each    Inhale 1 Inhaler into the lungs daily    Chronic bronchitis, unspecified chronic bronchitis type (H)       ipratropium 0.02 % neb solution    ATROVENT    225 mL    Take 2.5 mLs (0.5 mg) by nebulization 4 times daily    Chronic obstructive pulmonary disease with acute exacerbation (H)       tiotropium 2.5 MCG/ACT inhalation aerosol    SPIRIVA RESPIMAT    4 g    Inhale 2 puffs into the lungs daily    COPD (chronic obstructive pulmonary disease) (H)

## 2018-03-15 NOTE — LETTER
"3/15/2018       RE: Mimi Rivera  [address] [place] [address]     Dear Colleague,    Thank you for referring your patient, Mimi Rivera, to the [facility] at [facility]. Please see a copy of my visit note below.    Reason for Visit  Mimi Rivera is a 62 year old year old female who is being seen for COPD and anxiety  Pulmonary HPI    The patient was seen and examined by Ishmael Bridges   I had the pleasure of seeing Ms. Mimi Rivera today in [facility] in followup for her COPD with bronchitis, wheezing, sinus infections and anxiety.  I last saw her on 05/26/2016.  When I saw her she was extremely anxious, feeling \"terrible.\"  She had had sinus infections and COPD exacerbations.  She also was complaining of a lot of epigastric pain and tenderness that she says was making her cough worse.  Her functional capacity walking was actually quite good.  At that time she was still smoking but had stopped taking Wellbutrin.  Given her extent of emotional distress, it was difficult to evaluate her breathing.  She did have a chest CT scan the prior month that did not show abnormalities and I have recommended she have an abdominal CT scan.  Although I offered to schedule it for her, she elected to work through her primary care doctor.  I recommended primary care followup for this.  I continue to recommend she take the Spiriva Respimat as prescribed.      Returning today just shy of 2 years later, she has quit smoking (to my surprise).  She has had recent trouble with tonsillitis and sinusitis, seeing an ENT doctor, being diagnosed with strep B and getting treatment for this and using a [place] pot.  She tells me she is drenched with sweat at night and needing to change her clothes but denies any fevers or chills and she is significantly past menopause.  She does wake up frequently at " night and is unclear how much this is with shortness of breath.  However, on good days she can walk more than 3 miles on flat ground and can climb multiple flights of stairs without difficulty.  She has a chronic cough that is productive of yellow greenish sputum but never with any blood and she produces sputum 1-2 times per day.  She did have a chest CT scan in followup for her emphysema in 2017 and comparison was done to 2016 study without major findings of significance.  She did have a flu shot this fall.  Current respiratory medications include Spiriva used daily and Flovent which makes her jittery and is only used 2-3 times per week.  She has an albuterol rescue inhaler that she uses less than once a month.  Overall, she does complain about the expense of her medications including the 2 inhalers combined.  Otherwise, detailed pulmonary review of systems is negative.  There is no other change in her medical conditions that she reports.         Current Outpatient Prescriptions   Medication     Fluticasone-Umeclidin-Vilant 100-62.5-25 MCG/INH AEPB     azithromycin (ZITHROMAX) 250 MG tablet     fluticasone (FLOVENT HFA) 220 MCG/ACT Inhaler     tiotropium (SPIRIVA RESPIMAT) 2.5 MCG/ACT inhalation aerosol     amLODIPine (NORVASC) 5 MG tablet     albuterol (PROAIR HFA/PROVENTIL HFA/VENTOLIN HFA) 108 (90 BASE) MCG/ACT Inhaler     Nebulizers (DEVILBISS TRAVELER NEBULIZER) MISC     ipratropium (ATROVENT) 0.02 % neb solution     No current facility-administered medications for this visit.      Allergies   Allergen Reactions     Penicillins Rash     Past Medical History:   Diagnosis Date     ASCUS on Pap smear     unable to run HPV typing, f/u paps NIL     Chronic rhinosinusitis      COPD (chronic obstructive pulmonary disease) (H)      Depression, anxiety      Herpes     occasional outbreak     Hypertension        Past Surgical History:   Procedure Laterality Date      SECTION      x 3       Social  History     Social History     Marital status:      Spouse name: N/A     Number of children: N/A     Years of education: N/A     Occupational History     Not on file.     Social History Main Topics     Smoking status: Former Smoker     Packs/day: 10.00     Types: Cigarettes     Quit date: 3/19/2015     Smokeless tobacco: Never Used     Alcohol use 1.2 - 1.8 oz/week     2 - 3 Standard drinks or equivalent per week      Comment: 2 drinks per wk avg     Drug use: No     Sexual activity: Yes     Partners: Male     Birth control/ protection: Surgical      Comment: tubal     Other Topics Concern     Parent/Sibling W/ Cabg, Mi Or Angioplasty Before 65f 55m? No     Social History Narrative       ROS Pulmonary  A complete ROS was otherwise negative except as noted in the HPI.  /80 (BP Location: Right arm, Patient Position: Chair, Cuff Size: Adult Regular)  Pulse 90  Resp 16  Wt 48.1 kg (106 lb)  LMP 09/22/2010  SpO2 95%  BMI 20.03 kg/m2  Exam:   GENERAL APPEARANCE: Well developed, well nourished, alert, and in no apparent distress.  More calm and less anxious than on previous visits, but still looks close to weeping.  EYES: PERRL, EOMI  HENT: N/C  MOUTH: Oral mucosa is moist, without any lesions, no tonsillar enlargement, no oropharyngeal exudate.  NECK: supple, no masses, no thyromegaly.  LYMPHATICS: No significant axillary, cervical, or supraclavicular nodes.  RESP: normal inspection, palpation & percussion, good air flow throughout.  No crackles. No rhonchi. No wheezes.  CV: RRR Normal S1, S2, regular rhythm, normal rate. No murmur.  No rub. No gallop. No LE edema.   ABDOMEN:  Deferred  MS: extremities normal. No clubbing. No cyanosis.  SKIN: no rash on limited exam  NEURO: Mentation intact, speech normal, normal strength and tone, normal gait and stance  PSYCH: mentation appears normal. and affect normal/bright  Results:  Recent Results (from the past 168 hour(s))   General PFT Lab (Please always keep  checked)    Collection Time: 03/15/18 11:04 AM   Result Value Ref Range    FVC-Pred 2.82 L    FVC-Pre 2.13 L    FVC-%Pred-Pre 75 %    FEV1-Pre 1.27 L    FEV1-%Pred-Pre 56 %    FEV1FVC-Pred 78 %    FEV1FVC-Pre 59 %    FEFMax-Pred 5.80 L/sec    FEFMax-Pre 3.56 L/sec    FEFMax-%Pred-Pre 61 %    FEF2575-Pred 2.09 L/sec    FEF2575-Pre 0.54 L/sec    SRF3672-%Pred-Pre 25 %    FEF2575-Post 0.59 L/sec    KNB3180-%Pred-Post 28 %    ExpTime-Pre 10.36 sec    FIFMax-Pre 2.66 L/sec    FEV1FEV6-Pred 81 %    FEV1FEV6-Pre 62 %       Assessment and plan:   PFTs were done today in clinic and show FEV1/FVC excess FVC 1.3/2.1 (56/75% predicted, respectively) for a ratio of 59%.  After nebulized albuterol, there is no significant change.  The inspiratory limb of the flow volume curve is normal.  Compared to prior PFTs of 05/06/2016, FEV1 has decreased from 1.5 to 1.27 and the FVC is slightly decreased from 2.4 to 2.1.      ASSESSMENT/RECOMMENDATIONS:  Moderately severe COPD:  The good news is that the patient has quit smoking.  I strongly encouraged her to not go back as she has relapsed in the past.  She seems to be in moderate COPD control with occasional flare/exacerbations.  Given the drop in her FEV1, I would like to be sure that she is getting LABA therapy in addition to LAMA (at least).  To try to save her money I gave her a prescription for Trelegy with a first month free coupon.  We will try to determine whether this is super expensive for her after the first month.  I did give her the reduced-rate coupon, but I am not sure if her [employer] does or does not qualify her for this.  Phil Brock did meet with her and give her, her card for contact for followup in terms of medication therapy.  I am not sure what to make of her sweats at night.  There is little to suggest chronic pulmonary infection, but she certainly could have MELONY or other chronic infections.  She also could have chronic sinusitis as the cause of this.  At  present I will not pursue imaging or culture of sputum, but if this worsens then we will pursue both categories.  I will plan to see her back in 6 months' time and she was asked to call Phil if any issues arise in the interim.  She asked if it was safe for her to take vacations and I told her that I strongly supported doing that.  I did give her a prescription for 14 days of azithromycin to have on hand in case she gets a COPD exacerbation while traveling.     Again, thank you for allowing me to participate in the care of your patient.      Sincerely,    Ishmael Bridges MD

## 2018-03-15 NOTE — NURSING NOTE
Chief Complaint   Patient presents with     Breathing Problem     Patient is being seen for follow up of breathing issues      Charisse Rouse CMA at 11:44 AM on 3/15/2018

## 2018-03-21 LAB
EXPTIME-PRE: 10.36 SEC
FEF2575-%PRED-POST: 28 %
FEF2575-%PRED-PRE: 25 %
FEF2575-POST: 0.59 L/SEC
FEF2575-PRE: 0.54 L/SEC
FEF2575-PRED: 2.09 L/SEC
FEFMAX-%PRED-PRE: 61 %
FEFMAX-PRE: 3.56 L/SEC
FEFMAX-PRED: 5.8 L/SEC
FEV1-%PRED-PRE: 56 %
FEV1-PRE: 1.27 L
FEV1FEV6-PRE: 62 %
FEV1FEV6-PRED: 81 %
FEV1FVC-PRE: 59 %
FEV1FVC-PRED: 78 %
FIFMAX-PRE: 2.66 L/SEC
FVC-%PRED-PRE: 75 %
FVC-PRE: 2.13 L
FVC-PRED: 2.82 L

## 2018-04-20 ENCOUNTER — TELEPHONE (OUTPATIENT)
Dept: PULMONOLOGY | Facility: CLINIC | Age: 63
End: 2018-04-20

## 2018-04-20 NOTE — TELEPHONE ENCOUNTER
Left voicemail with patient to inquire how Trelegy inhaler is working and if clinic should begin PA to have longterm.    Mimi returned call and asked that clinic try to PA Trelegy inhaler. Called [facility] and she has not met deductible so the inhaler co pay is $530.47 per one inhaler. There are no options to obtain inhaler through [facility] for less cost. Called [facility] and they do not have that inhaler available. Left message with patient that maybe clinic can obtain assistance with a  of an inhaler due to low income. Requested return call to see if she would be interested in income qualifying drug assistance if she does indeed qualify.

## 2018-04-25 DIAGNOSIS — J42 CHRONIC BRONCHITIS, UNSPECIFIED CHRONIC BRONCHITIS TYPE (H): ICD-10-CM

## 2018-08-10 ENCOUNTER — CARE COORDINATION (OUTPATIENT)
Dept: PULMONOLOGY | Facility: CLINIC | Age: 63
End: 2018-08-10

## 2018-08-10 NOTE — PROGRESS NOTES
Left message for pt to return call. Want to discuss if she was able to get any assistance for medication  (trelegy Ellipta) as she had not met deductible and no other options except contacting drug company. Requested pt call clinic back.   Alanis Chilel RN BSN

## 2018-09-04 ENCOUNTER — OFFICE VISIT (OUTPATIENT)
Dept: FAMILY MEDICINE | Facility: CLINIC | Age: 63
End: 2018-09-04
Payer: COMMERCIAL

## 2018-09-04 VITALS
BODY MASS INDEX: 20.2 KG/M2 | HEIGHT: 61 IN | TEMPERATURE: 98.3 F | OXYGEN SATURATION: 95 % | SYSTOLIC BLOOD PRESSURE: 144 MMHG | WEIGHT: 107 LBS | HEART RATE: 92 BPM | DIASTOLIC BLOOD PRESSURE: 94 MMHG

## 2018-09-04 DIAGNOSIS — J06.9 VIRAL URI: Primary | ICD-10-CM

## 2018-09-04 DIAGNOSIS — J02.9 SORE THROAT: ICD-10-CM

## 2018-09-04 DIAGNOSIS — I10 HYPERTENSION GOAL BP (BLOOD PRESSURE) < 140/90: ICD-10-CM

## 2018-09-04 LAB
DEPRECATED S PYO AG THROAT QL EIA: NORMAL
SPECIMEN SOURCE: NORMAL

## 2018-09-04 PROCEDURE — 99214 OFFICE O/P EST MOD 30 MIN: CPT | Performed by: PHYSICIAN ASSISTANT

## 2018-09-04 PROCEDURE — 87880 STREP A ASSAY W/OPTIC: CPT | Performed by: PHYSICIAN ASSISTANT

## 2018-09-04 PROCEDURE — 87081 CULTURE SCREEN ONLY: CPT | Performed by: PHYSICIAN ASSISTANT

## 2018-09-04 RX ORDER — AMLODIPINE BESYLATE 5 MG/1
5 TABLET ORAL DAILY
Qty: 90 TABLET | Refills: 0 | Status: SHIPPED | OUTPATIENT
Start: 2018-09-04 | End: 2018-12-07

## 2018-09-04 ASSESSMENT — ENCOUNTER SYMPTOMS
SHORTNESS OF BREATH: 0
SORE THROAT: 1
NAUSEA: 0
VOMITING: 0
ABDOMINAL PAIN: 0
DIARRHEA: 0
COUGH: 1
FOCAL WEAKNESS: 0
FEVER: 1
HEADACHES: 1
CHILLS: 0

## 2018-09-04 NOTE — MR AVS SNAPSHOT
"              After Visit Summary   9/4/2018    Mimi Rivera    MRN: [id number]           Patient Information     Date Of Birth          1955        Visit Information        Provider Department      9/4/2018 3:40 PM Elva Dumont PA-C [facility]        Today's Diagnoses     Viral URI    -  1    Sore throat        Hypertension goal BP (blood pressure) < 140/90           Follow-ups after your visit        Follow-up notes from your care team     Return if symptoms worsen or fail to improve.      Who to contact     If you have questions or need follow up information about today's clinic visit or your schedule please contact [facility] at [phone].  Normal or non-critical lab and imaging results will be communicated to you by [employer], letter or phone within 4 business days after the clinic has received the results. If you do not hear from us within 7 days, please contact the clinic through [employer] or phone. If you have a critical or abnormal lab result, we will notify you by phone as soon as possible.  Submit refill requests through [employer] or call your pharmacy and they will forward the refill request to us. Please allow 3 business days for your refill to be completed.          Additional Information About Your Visit        MyChart Information     [employer] gives you secure access to your electronic health record. If you see a primary care provider, you can also send messages to your care team and make appointments. If you have questions, please call your primary care clinic.  If you do not have a primary care provider, please call [phone] and they will assist you.        Care EveryWhere ID     This is your Care EveryWhere ID. This could be used by other organizations to access your [place] medical records  [id number]        Your Vitals Were     Pulse Temperature Height Last Period Pulse Oximetry BMI (Body Mass Index)    92 98.3  F (36.8  C) (Oral) 5' 1\" " (1.549 m) 09/22/2010 95% 20.22 kg/m2       Blood Pressure from Last 3 Encounters:   09/04/18 (!) 144/94   03/15/18 199/80   01/26/18 (!) 162/94    Weight from Last 3 Encounters:   09/04/18 107 lb (48.5 kg)   03/15/18 106 lb (48.1 kg)   01/26/18 105 lb (47.6 kg)              We Performed the Following     Beta strep group A culture     Strep, Rapid Screen          Today's Medication Changes          These changes are accurate as of 9/4/18  7:47 PM.  If you have any questions, ask your nurse or doctor.               Stop taking these medicines if you haven't already. Please contact your care team if you have questions.     azithromycin 250 MG tablet   Commonly known as:  ZITHROMAX                Where to get your medicines      These medications were sent to [id number] - [facility] [address]  [id number] [place] [id number]     Phone:  [phone]     amLODIPine 5 MG tablet                Primary Care Provider Office Phone # Fax #    Leno Mert [place] [phone]       [address] [facility] [address]  [facility] [place] [facility] [address]        Equal Access to Services     JENNY JIMENEZ : Hadii aad ku hadasho Soshavonneali, waaxda luqadaha, qaybta kaalmada adeegyada, betty penny. So [facility] [phone].    ATENCIÓN: Si habla español, tiene a high disposición servicios gratuitos de asistencia lingüística. Llame al [phone].    We comply with applicable federal civil rights laws and [place] laws. We do not discriminate on the basis of race, color, national origin, age, disability, sex, sexual orientation, or gender identity.            Thank you!     Thank you for choosing [facility]  for your care. Our goal is always to provide you with excellent care. Hearing back from our patients is one way we can continue to improve our services. Please take a few minutes to complete the written survey that you may receive in the mail after your visit with us.  Thank you!             Your Updated Medication List - Protect others around you: Learn how to safely use, store and throw away your medicines at www.disposemymeds.org.          This list is accurate as of 9/4/18  7:47 PM.  Always use your most recent med list.                   Brand Name Dispense Instructions for use Diagnosis    albuterol 108 (90 Base) MCG/ACT inhaler    PROAIR HFA/PROVENTIL HFA/VENTOLIN HFA    1 Inhaler    Inhale 2 puffs into the lungs every 4 hours as needed for shortness of breath / dyspnea or wheezing    Wheezing       amLODIPine 5 MG tablet    NORVASC    90 tablet    Take 1 tablet (5 mg) by mouth daily    Hypertension goal BP (blood pressure) < 140/90       DEVILBISS TRAVELER NEBULIZER Misc     1 Device    1 Device 4 times daily as needed    COPD (chronic obstructive pulmonary disease) (H), Influenza with respiratory manifestation other than pneumonia       fluticasone 220 MCG/ACT Inhaler    FLOVENT HFA    1 Inhaler    Inhale 2 puffs into the lungs 2 times daily    Chronic bronchitis, unspecified chronic bronchitis type (H), Panlobular emphysema (H)       Fluticasone-Umeclidin-Vilanterol 100-62.5-25 MCG/INH oral inhaler    TRELEGY ELLIPTA    1 each    Inhale 1 Inhaler into the lungs daily    Chronic bronchitis, unspecified chronic bronchitis type (H)       ipratropium 0.02 % neb solution    ATROVENT    225 mL    Take 2.5 mLs (0.5 mg) by nebulization 4 times daily    Chronic obstructive pulmonary disease with acute exacerbation (H)       tiotropium 2.5 MCG/ACT inhalation aerosol    SPIRIVA RESPIMAT    4 g    Inhale 2 puffs into the lungs daily    COPD (chronic obstructive pulmonary disease) (H)

## 2018-09-04 NOTE — PROGRESS NOTES
SUBJECTIVE:                                                    Mimi Rivera is a 63 year old female who presents to clinic today for the following health issues:        Acute Illness   Acute illness concerns: sore throat  Onset: started friday    Fever: YES - felt feverish in the middle of the night     Chills/Sweats: no    Headache (location?): YES    Sinus Pressure:YES    Conjunctivitis:  no    Ear Pain: no    Rhinorrhea: no    Congestion: no    Sore Throat: YES     Cough: YES    Wheeze: no    Decreased Appetite: YES    Nausea: no    Vomiting: no    Diarrhea:  no    Dysuria/Freq.: no    Fatigue/Achiness: YES    Sick/Strep Exposure: no     Therapies Tried and outcome:           Problem list and histories reviewed & adjusted, as indicated.  Additional history: as documented    {HIST REVIEW/ LINKS 2:527792}    {PROVIDER CHARTING PREFERENCE:757886}  -+

## 2018-09-04 NOTE — PROGRESS NOTES
HPI    SUBJECTIVE:                                                    Mimi Rivera is a 63 year old female who presents to clinic today for the following health issues:      Acute Illness   Acute illness concerns: sore throat  Onset: 4 days ago    Fever: YES - subjective ( unknown Tmax)    Chills/Sweats: no    Headache (location?): YES    Sinus Pressure:YES    Conjunctivitis:  no    Ear Pain: no    Rhinorrhea: no    Congestion: no    Sore Throat: YES     Cough: YES    Wheeze: no    Decreased Appetite: YES    Nausea: no    Vomiting: no    Diarrhea:  no    Dysuria/Freq.: no    Fatigue/Achiness: YES    Sick/Strep Exposure: no     Therapies Tried and outcome: Mucinex- no relief    Additional complaints:  Requests refill of amlodipine.      Chart Review:  PHQ-9 SCORE 2017   Total Score - - -   Total Score 2 0 0     DAMIEN-7 SCORE 3/31/2016 2017   Total Score 1 2       Patient Active Problem List   Diagnosis     CARDIOVASCULAR SCREENING; LDL GOAL LESS THAN 160     Hypertension goal BP (blood pressure) < 140/90     COPD (chronic obstructive pulmonary disease) (H)     Moderate major depression (H)     Former smoker     Advanced directives, counseling/discussion     Past Surgical History:   Procedure Laterality Date      SECTION      x 3     Family History   Problem Relation Age of Onset     Hypertension Mother      Cancer Mother      liver     C.A.D. Father       Social History   Substance Use Topics     Smoking status: Former Smoker     Packs/day: 10.00     Types: Cigarettes     Quit date: 3/19/2015     Smokeless tobacco: Never Used     Alcohol use 1.2 - 1.8 oz/week     2 - 3 Standard drinks or equivalent per week      Comment: 2 drinks per wk avg        Problem list, Medication list, Allergies, Medical/Social/Surg hx reviewed in [facility], updated as appropriate.      Review of Systems   Constitutional: Positive for fever. Negative for chills.   HENT: Positive for sore throat.   "  Respiratory: Positive for cough. Negative for shortness of breath.    Cardiovascular: Negative for chest pain.   Gastrointestinal: Negative for abdominal pain, diarrhea, nausea and vomiting.   Skin: Negative for rash.   Neurological: Positive for headaches. Negative for focal weakness.   All other systems reviewed and are negative.        Physical Exam   Constitutional: She is oriented to person, place, and time and well-developed, well-nourished, and in no distress.   HENT:   Head: Normocephalic and atraumatic.   Right Ear: Tympanic membrane, external ear and ear canal normal.   Left Ear: Tympanic membrane, external ear and ear canal normal.   Nose: Nose normal.   Mouth/Throat: Uvula is midline and mucous membranes are normal. Posterior oropharyngeal erythema present. No oropharyngeal exudate, posterior oropharyngeal edema or tonsillar abscesses.   Cardiovascular: Normal rate, regular rhythm and normal heart sounds.    Pulmonary/Chest: Effort normal and breath sounds normal.   Musculoskeletal: Normal range of motion.   Neurological: She is alert and oriented to person, place, and time. Gait normal.   Skin: Skin is warm and dry.   Nursing note and vitals reviewed.    Vital Signs  BP (!) 144/94 (BP Location: Right arm, Cuff Size: Adult Regular)  Pulse 92  Temp 98.3  F (36.8  C) (Oral)  Ht 5' 1\" (1.549 m)  Wt 107 lb (48.5 kg)  LMP 09/22/2010  SpO2 95%  BMI 20.22 kg/m2   Body mass index is 20.22 kg/(m^2).    Diagnostic Test Results:  Results for orders placed or performed in visit on 09/04/18 (from the past 24 hour(s))   Strep, Rapid Screen   Result Value Ref Range    Specimen Description Throat     Rapid Strep A Screen       NEGATIVE: No Group A streptococcal antigen detected by immunoassay, await culture report.       ASSESSMENT/PLAN:                                                        ICD-10-CM    1. Viral URI J06.9     B97.89    2. Sore throat J02.9 Strep, Rapid Screen     Beta strep group A culture "   3. Hypertension goal BP (blood pressure) < 140/90 I10 amLODIPine (NORVASC) 5 MG tablet   Strep negative. Suspect viral etiology, supportive treatments discussed.    Amlodipine refill given. F/u with PCP.    I have discussed any lab or imaging results, the patient's diagnosis, and my plan of treatment with the patient and/or family. Patient is aware to come back in if with worsening symptoms or if no relief despite treatment plan.  Patient voiced understanding and had no further questions.       Follow Up: Return if symptoms worsen or fail to improve.    BENTON Burkett, PA-C  [facility]3]

## 2018-09-05 LAB
BACTERIA SPEC CULT: NORMAL
SPECIMEN SOURCE: NORMAL

## 2018-09-24 ENCOUNTER — TELEPHONE (OUTPATIENT)
Dept: FAMILY MEDICINE | Facility: CLINIC | Age: 63
End: 2018-09-24

## 2018-09-24 NOTE — TELEPHONE ENCOUNTER
Needs of attention regarding:  -Colon Cancer Screening  -Cervical Cancer Screening    Health Maintenance Topics with due status: Overdue       Topic Date Due    FIT Q1 YR 05/29/1967    HIV SCREEN (SYSTEM ASSIGNED) 05/29/1975    COLONOSCOPY Q10 YR 05/29/1977    PAP Q3 YR 02/25/2016    COPD ACTION PLAN Q1 YR 03/17/2018    DEPRESSION ACTION PLAN Q1 YR 03/17/2018    PHQ-9 Q6 MONTHS 06/11/2018    INFLUENZA VACCINE 09/01/2018       Communication:  See Letter

## 2018-09-24 NOTE — LETTER
[facility]          [address] [place] [address]                                                                                                       [phone]  September 24, 2018    Mimi Rivera  [address] [place] [address]      Dear Mimi,    A review of your record shows that you are due for the following health maintenance exam(s):    -Colon Cancer Screening- Recommended every 5-10 years, depending on your history, in order to prevent and detect colon cancer at its earliest stages.  Colon cancer is now the second leading cause of death in the [place] for both men and women and there are over 130,000 new cases and 50,000 deaths per year from colon cancer.  Colonoscopies can prevent 90-95% of these deaths.  Problem lesions can be removed before they ever become cancer.  This test is not only looking for cancer, but also getting rid of precancerious lesions.  You are usually given some sedation which makes the test very comfortable for most people.      If you do not wish to do a colonoscopy or cannot afford to do one, at this time, there is another option. It is called a FIT test or Fecal Immunochemical Occult Blood Test (take home stool sample kit).  It does not replace the colonoscopy for colorectal cancer screening, but it can detect hidden bleeding in the lower colon.  It does need to be repeated every year and if a positive result is obtained, you would be referred for a colonoscopy. The FIT test is really easy to do and does not require any  diet or medication restrictions and involves only one collection sample.      If you have completed either one of these tests or had a flexible sigmoidoscopy in the past five years at another facility, please have the records sent to our clinic so that we can best coordinate your care.  Please call us ([phone]) if you have questions or would like arrange either to do a colonoscopy or obtain the  necessary test kit for the Fecal Occult Test.                  -Pap Smear- a screening test done during a pelvic exam to check for abnormal changes in the cells of the cervix. The cervix is the lower part of the uterus that opens into the vagina. Abnormal cells can develop into cancer if not detected and treated. There are no signs or symptoms related to early cervical cancer so a pelvic exam of the female sex organs and a Pap test are needed. Cervical cancer is preventable and curable if abnormal cells are detected and treated early. Pap tests have reduced deaths from cancer of the cervix in the [place] by 70% over the past 50 years.  Please call to arrange this for you or you can also schedule this through [employer] (online medical record access).  Please disregard this reminder if you have already scheduled or have had this exam elsewhere within the last year.  It would be helpful for us to have a copy of your pap smear report in our file so that we can best coordinate your care.        We now have OB/GYN providers on site! You may schedule with either Augustina HARMON CNM or Dr. Radha Davidson at [phone].                                                                                   In addition, if we see you for your annual health care maintenance exam (complete physical), and it has been over a year since your last exam, then please schedule that as well.    Thank you very much for choosing [facility].   We appreciate the opportunity to serve you and look forward to supporting your healthcare needs in the future.

## 2018-11-07 ENCOUNTER — PATIENT OUTREACH (OUTPATIENT)
Dept: CARE COORDINATION | Facility: CLINIC | Age: 63
End: 2018-11-07

## 2018-11-09 ENCOUNTER — TELEPHONE (OUTPATIENT)
Dept: PULMONOLOGY | Facility: CLINIC | Age: 63
End: 2018-11-09

## 2018-11-09 ENCOUNTER — OFFICE VISIT (OUTPATIENT)
Dept: PULMONOLOGY | Facility: CLINIC | Age: 63
End: 2018-11-09
Attending: INTERNAL MEDICINE
Payer: COMMERCIAL

## 2018-11-09 VITALS
OXYGEN SATURATION: 98 % | BODY MASS INDEX: 19.83 KG/M2 | WEIGHT: 105 LBS | DIASTOLIC BLOOD PRESSURE: 107 MMHG | HEIGHT: 61 IN | RESPIRATION RATE: 17 BRPM | SYSTOLIC BLOOD PRESSURE: 171 MMHG | HEART RATE: 81 BPM

## 2018-11-09 DIAGNOSIS — F17.200 NICOTINE DEPENDENCE, UNCOMPLICATED, UNSPECIFIED NICOTINE PRODUCT TYPE: ICD-10-CM

## 2018-11-09 DIAGNOSIS — J42 ACUTE EXACERBATION OF CHRONIC BRONCHITIS (H): Primary | ICD-10-CM

## 2018-11-09 DIAGNOSIS — J20.9 ACUTE EXACERBATION OF CHRONIC BRONCHITIS (H): Primary | ICD-10-CM

## 2018-11-09 DIAGNOSIS — J44.9 CHRONIC OBSTRUCTIVE PULMONARY DISEASE, UNSPECIFIED COPD TYPE (H): Primary | ICD-10-CM

## 2018-11-09 DIAGNOSIS — J42 CHRONIC BRONCHITIS, UNSPECIFIED CHRONIC BRONCHITIS TYPE (H): ICD-10-CM

## 2018-11-09 PROCEDURE — G0008 ADMIN INFLUENZA VIRUS VAC: HCPCS | Mod: ZF

## 2018-11-09 PROCEDURE — 25000128 H RX IP 250 OP 636: Mod: ZF | Performed by: INTERNAL MEDICINE

## 2018-11-09 PROCEDURE — 90732 PPSV23 VACC 2 YRS+ SUBQ/IM: CPT | Mod: ZF | Performed by: INTERNAL MEDICINE

## 2018-11-09 PROCEDURE — 90686 IIV4 VACC NO PRSV 0.5 ML IM: CPT | Mod: ZF | Performed by: INTERNAL MEDICINE

## 2018-11-09 PROCEDURE — G0009 ADMIN PNEUMOCOCCAL VACCINE: HCPCS | Mod: ZF

## 2018-11-09 PROCEDURE — G0463 HOSPITAL OUTPT CLINIC VISIT: HCPCS | Mod: 25

## 2018-11-09 RX ORDER — METHYLPREDNISOLONE 4 MG
TABLET, DOSE PACK ORAL
Qty: 21 TABLET | Refills: 3 | Status: SHIPPED | OUTPATIENT
Start: 2018-11-09 | End: 2019-03-04

## 2018-11-09 RX ORDER — BUPROPION HYDROCHLORIDE 150 MG/1
150 TABLET, FILM COATED, EXTENDED RELEASE ORAL 2 TIMES DAILY
Qty: 60 TABLET | Refills: 11 | Status: SHIPPED | OUTPATIENT
Start: 2018-11-09 | End: 2020-09-28

## 2018-11-09 RX ORDER — AZITHROMYCIN 500 MG/1
500 TABLET, FILM COATED ORAL DAILY
Qty: 14 TABLET | Refills: 3 | Status: SHIPPED | OUTPATIENT
Start: 2018-11-09 | End: 2019-03-04

## 2018-11-09 RX ORDER — LEVALBUTEROL INHALATION SOLUTION 1.25 MG/3ML
1 SOLUTION RESPIRATORY (INHALATION) EVERY 4 HOURS PRN
Qty: 270 ML | Refills: 11 | Status: SHIPPED | OUTPATIENT
Start: 2018-11-09 | End: 2020-01-13

## 2018-11-09 RX ORDER — LEVALBUTEROL TARTRATE 45 UG/1
2 AEROSOL, METERED ORAL EVERY 4 HOURS PRN
Qty: 1 INHALER | Refills: 11 | Status: SHIPPED | OUTPATIENT
Start: 2018-11-09 | End: 2020-01-13

## 2018-11-09 RX ADMIN — PNEUMOCOCCAL VACCINE POLYVALENT 0.5 ML
25; 25; 25; 25; 25; 25; 25; 25; 25; 25; 25; 25; 25; 25; 25; 25; 25; 25; 25; 25; 25; 25; 25 INJECTION, SOLUTION INTRAMUSCULAR; SUBCUTANEOUS at 12:44

## 2018-11-09 RX ADMIN — INFLUENZA A VIRUS A/MICHIGAN/45/2015 X-275 (H1N1) ANTIGEN (FORMALDEHYDE INACTIVATED), INFLUENZA A VIRUS A/SINGAPORE/INFIMH-16-0019/2016 IVR-186 (H3N2) ANTIGEN (FORMALDEHYDE INACTIVATED), INFLUENZA B VIRUS B/PHUKET/3073/2013 ANTIGEN (FORMALDEHYDE INACTIVATED), AND INFLUENZA B VIRUS B/MARYLAND/15/2016 BX-69A ANTIGEN (FORMALDEHYDE INACTIVATED) 0.5 ML: 15; 15; 15; 15 INJECTION, SUSPENSION INTRAMUSCULAR at 12:43

## 2018-11-09 ASSESSMENT — PAIN SCALES - GENERAL: PAINLEVEL: MILD PAIN (3)

## 2018-11-09 NOTE — LETTER
11/9/2018       RE: Mimi Rivera  [address] [place] [address]     Dear Colleague,    Thank you for referring your patient, Mimi Rivera, to the [facility] at [facility]. Please see a copy of my visit note below.    Reason for Visit  Mimi Rivera is a 63 year old year old female who is being seen for RECHECK (COPD f/u)    Pulmonary HPI    The patient was seen and examined by Ishmael Bridges     I had the pleasure of seeing Ms. Mimi Rivera today in the [facility] in followup for her moderately severe COPD.  I last saw her on 03/15/2018.  At that time she had surprised me by stopping smoking.  She was overall doing somewhat better in terms of her exercise capacity, although she did have some chronic cough.  At that time she was on Spiriva and Flovent but complained the Flovent made her jittery.  She had an albuterol rescue inhaler that she used only rarely.  At that visit, we tried to switch her to a triple inhaler therapy with Trilogy.  The good news is that this seems to have worked.  I was somewhat concerned that she could have some component of chronic pulmonary infection, such as MELONY, but we have no evidence for that at this point in time.  I did not pursue imaging or sputum culture at the last visit.  I did give her a prescription for 14 days of azithromycin since she would be traveling to [place] and has had frequent exacerbations in the past.      Ms. Rivera returns to clinic today.  Generally, she has been doing well.  She was able to get the Trilogy at reasonable cost and likes using this.  She has Proventil inhaler but does not use it very often and that does make her jittery.  Unfortunately, approximately a month ago she began to have a subacute respiratory flare with increasing cough and productive sputum, particularly in the morning.  She was seen  initially by a generalist who told her she probably had viral infections and did nothing.  She subsequently went to Urgent Care and was treated with a Z-EBONIE but did not have a significant improvement.  She is feeling very stressed by this and consequently just over this last week She has smoked approximately 10 cigarettes, the first time she has done this since quitting.  She previously had used Wellbutrin to help her quit with some benefit.  She tells me that she is waking up at night with shortness of breath and cough.  She is having bilateral sinus issues and drainage for which she is taking an over-the-counter sinus medication of uncertain specificity.      Otherwise, her detailed pulmonary review of systems and general health are not changed.         Current Outpatient Prescriptions   Medication     albuterol (PROAIR HFA/PROVENTIL HFA/VENTOLIN HFA) 108 (90 BASE) MCG/ACT Inhaler     amLODIPine (NORVASC) 5 MG tablet     azithromycin (ZITHROMAX) 500 MG tablet     buPROPion (ZYBAN) 150 MG 12 hr tablet     Fluticasone-Umeclidin-Vilant 100-62.5-25 MCG/INH AEPB     levalbuterol (XOPENEX HFA) 45 MCG/ACT Inhaler     levalbuterol (XOPENEX) 1.25 MG/3ML neb solution     methylPREDNISolone (MEDROL DOSEPAK) 4 MG tablet     tiotropium (SPIRIVA RESPIMAT) 2.5 MCG/ACT inhalation aerosol     Current Facility-Administered Medications   Medication     [START ON 11/10/2018] influenza quadrivalent (PF) vacc (FLUZONE or Flulaval or FLUARIX) injection 0.5 mL     pneumococcal vaccine (PNEUMOVAX 23-matheus) injection 0.5 mL     Allergies   Allergen Reactions     Penicillins Rash     Past Medical History:   Diagnosis Date     ASCUS on Pap smear     unable to run HPV typing, f/u paps NIL     Chronic rhinosinusitis      COPD (chronic obstructive pulmonary disease) (H)      Depression, anxiety      Herpes     occasional outbreak     Hypertension        Past Surgical History:   Procedure Laterality Date      SECTION      x 3  "      Social History     Social History     Marital status:      Spouse name: N/A     Number of children: N/A     Years of education: N/A     Occupational History     Not on file.     Social History Main Topics     Smoking status: Former Smoker     Packs/day: 10.00     Types: Cigarettes     Quit date: 3/19/2015     Smokeless tobacco: Never Used     Alcohol use 1.2 - 1.8 oz/week     2 - 3 Standard drinks or equivalent per week      Comment: 2 drinks per wk avg     Drug use: No     Sexual activity: Yes     Partners: Male     Birth control/ protection: Surgical      Comment: tubal     Other Topics Concern     Parent/Sibling W/ Cabg, Mi Or Angioplasty Before 65f 55m? No     Social History Narrative       ROS Pulmonary  A complete ROS was otherwise negative except as noted in the HPI.  BP (!) 171/107  Pulse 81  Resp 17  Ht 1.549 m (5' 1\")  Wt 47.6 kg (105 lb)  LMP 09/22/2010  SpO2 98%  BMI 19.84 kg/m2  Exam:   GENERAL APPEARANCE: Well developed, well nourished, alert, and in no apparent distress.  EYES: PERRL, EOMI  HENT: Normocephalic. No significant sinus tenderness  MOUTH: Oral mucosa is moist, without any lesions, no tonsillar enlargement, no oropharyngeal exudate.  NECK: supple, no masses, no thyromegaly.  LYMPHATICS: No significant axillary, cervical, or supraclavicular nodes.  RESP: normal inspection, palpation & percussion, good air flow throughout.  No crackles. No rhonchi. No wheezes or rub  CV: Normal S1, S2, regular rhythm, normal rate. No murmur.  No rub. No gallop. No LE edema.   ABDOMEN:  Deferred  MS: extremities normal. No clubbing. No cyanosis.  SKIN: no rash on limited exam  NEURO: Mentation intact, speech normal, normal strength and tone, normal gait and stance (less emotional than at many other past visits)  PSYCH: mentation appears normal. and affect normal/bright  Results:  Recent Results (from the past 168 hour(s))   General PFT Lab (Please always keep checked)    Collection Time: " 11/09/18 10:53 AM   Result Value Ref Range    FVC-Pred 2.80 L    FVC-Pre 2.24 L    FVC-%Pred-Pre 80 %    FEV1-Pre 1.32 L    FEV1-%Pred-Pre 59 %    FEV1FVC-Pred 78 %    FEV1FVC-Pre 59 %    FEFMax-Pred 5.76 L/sec    FEFMax-Pre 3.81 L/sec    FEFMax-%Pred-Pre 66 %    FEF2575-Pred 2.07 L/sec    FEF2575-Pre 0.56 L/sec    PGJ4535-%Pred-Pre 26 %    ExpTime-Pre 9.56 sec    FIFMax-Pre 2.89 L/sec    FEV1FEV6-Pred 81 %    FEV1FEV6-Pre 62 %       Assessment and plan:   PFTs done in clinic today were reviewed by me.  They show FEV1/FVC of 1.3/2.2 (59/80% predicted, respectively) for a ratio of 59% with scooping of the expiratory flow volume limb.  The inspiratory limb is normal.  Compared to prior PFTs of 03/15/2018, there is no significant change.  There is a very mild decrement compared to PFTs of 9077-4588.      ASSESSMENT:  COPD with intermittent acute exacerbations:  Ms. Rivera's PFTs are stable compared to her last visit but she feels that she is in a subacute/chronic flare and is requesting antibiotics.  She is requesting initiation of antibiotic therapy at this time, but is somewhat reluctant to start steroids given their impact on her behavior and psyche.  I have given her an azithromycin course of 14 days with 3 refills and also Medrol Dosepak.  I instructed her to start a Medrol Dosepak if she is not improved within 5 days' time.  Given the nocturnal symptoms I also recommended she use a direct antihistamine such as Claritin or Zyrtec and with or without the D congestant variety.  I also prescribed for her Xopenex nebulizer solutions since she gets very jittery with albuterol/Proventil.  I also gave her a Xopenex MDI for routine use, although she rarely uses her hand-held MDIs.  I will plan to see her back in 6 months' time, assuming she is not having difficulty.  She has Phil Brock RN, contact information and knows to contact us if she is having frequent problems.  She also has not had a flu shot and there is  no record of her having received the Pneumovax PPSV23, so she will get both of these today in clinic.  She has had the Prevnar 13.  Given her return to smoking as a response to stress, I did prescribe Wellbutrin for her 150 b.i.d., but starting with 1 pill per day for the first 3 days.  I also strongly encouraged her to just stop smoking completely and suggested she get a punching bag or find an alternate way of stress reduction.         Again, thank you for allowing me to participate in the care of your patient.      Sincerely,    Ishmael Bridges MD

## 2018-11-09 NOTE — TELEPHONE ENCOUNTER
Prior Authorization Retail Medication Request    Medication/Dose: levabuterol hcl 1.25 mg/3 ml  ICD code (if different than what is on RX):    Previously Tried and Failed:    Rationale:     Insurance Name:    Insurance ID:        Pharmacy Information (if different than what is on RX)  Name:    Phone:

## 2018-11-09 NOTE — PROGRESS NOTES
Reason for Visit  Mimi Rivera is a 63 year old year old female who is being seen for RECHECK (COPD f/u)    Pulmonary HPI    The patient was seen and examined by Ishmael Bridges     I had the pleasure of seeing Ms. Mimi Rivera today in the [facility]monary Clinic in followup for her moderately severe COPD.  I last saw her on 03/15/2018.  At that time she had surprised me by stopping smoking.  She was overall doing somewhat better in terms of her exercise capacity, although she did have some chronic cough.  At that time she was on Spiriva and Flovent but complained the Flovent made her jittery.  She had an albuterol rescue inhaler that she used only rarely.  At that visit, we tried to switch her to a triple inhaler therapy with Trilogy.  The good news is that this seems to have worked.  I was somewhat concerned that she could have some component of chronic pulmonary infection, such as MELONY, but we have no evidence for that at this point in time.  I did not pursue imaging or sputum culture at the last visit.  I did give her a prescription for 14 days of azithromycin since she would be traveling to [place] and has had frequent exacerbations in the past.      Ms. Rivera returns to clinic today.  Generally, she has been doing well.  She was able to get the Trilogy at reasonable cost and likes using this.  She has Proventil inhaler but does not use it very often and that does make her jittery.  Unfortunately, approximately a month ago she began to have a subacute respiratory flare with increasing cough and productive sputum, particularly in the morning.  She was seen initially by a generalist who told her she probably had viral infections and did nothing.  She subsequently went to Urgent Care and was treated with a Z-EBONIE but did not have a significant improvement.  She is feeling very stressed by this and consequently just over this last week She has smoked  approximately 10 cigarettes, the first time she has done this since quitting.  She previously had used Wellbutrin to help her quit with some benefit.  She tells me that she is waking up at night with shortness of breath and cough.  She is having bilateral sinus issues and drainage for which she is taking an over-the-counter sinus medication of uncertain specificity.      Otherwise, her detailed pulmonary review of systems and general health are not changed.         Current Outpatient Prescriptions   Medication     albuterol (PROAIR HFA/PROVENTIL HFA/VENTOLIN HFA) 108 (90 BASE) MCG/ACT Inhaler     amLODIPine (NORVASC) 5 MG tablet     azithromycin (ZITHROMAX) 500 MG tablet     buPROPion (ZYBAN) 150 MG 12 hr tablet     Fluticasone-Umeclidin-Vilant 100-62.5-25 MCG/INH AEPB     levalbuterol (XOPENEX HFA) 45 MCG/ACT Inhaler     levalbuterol (XOPENEX) 1.25 MG/3ML neb solution     methylPREDNISolone (MEDROL DOSEPAK) 4 MG tablet     tiotropium (SPIRIVA RESPIMAT) 2.5 MCG/ACT inhalation aerosol     Current Facility-Administered Medications   Medication     [START ON 11/10/2018] influenza quadrivalent (PF) vacc (FLUZONE or Flulaval or FLUARIX) injection 0.5 mL     pneumococcal vaccine (PNEUMOVAX 23-matheus) injection 0.5 mL     Allergies   Allergen Reactions     Penicillins Rash     Past Medical History:   Diagnosis Date     ASCUS on Pap smear     unable to run HPV typing, f/u paps NIL     Chronic rhinosinusitis      COPD (chronic obstructive pulmonary disease) (H)      Depression, anxiety      Herpes     occasional outbreak     Hypertension        Past Surgical History:   Procedure Laterality Date      SECTION      x 3       Social History     Social History     Marital status:      Spouse name: N/A     Number of children: N/A     Years of education: N/A     Occupational History     Not on file.     Social History Main Topics     Smoking status: Former Smoker     Packs/day: 10.00     Types: Cigarettes     Quit  "date: 3/19/2015     Smokeless tobacco: Never Used     Alcohol use 1.2 - 1.8 oz/week     2 - 3 Standard drinks or equivalent per week      Comment: 2 drinks per wk avg     Drug use: No     Sexual activity: Yes     Partners: Male     Birth control/ protection: Surgical      Comment: tubal     Other Topics Concern     Parent/Sibling W/ Cabg, Mi Or Angioplasty Before 65f 55m? No     Social History Narrative       ROS Pulmonary  A complete ROS was otherwise negative except as noted in the HPI.  BP (!) 171/107  Pulse 81  Resp 17  Ht 1.549 m (5' 1\")  Wt 47.6 kg (105 lb)  LMP 09/22/2010  SpO2 98%  BMI 19.84 kg/m2  Exam:   GENERAL APPEARANCE: Well developed, well nourished, alert, and in no apparent distress.  EYES: PERRL, EOMI  HENT: Normocephalic. No significant sinus tenderness  MOUTH: Oral mucosa is moist, without any lesions, no tonsillar enlargement, no oropharyngeal exudate.  NECK: supple, no masses, no thyromegaly.  LYMPHATICS: No significant axillary, cervical, or supraclavicular nodes.  RESP: normal inspection, palpation & percussion, good air flow throughout.  No crackles. No rhonchi. No wheezes or rub  CV: Normal S1, S2, regular rhythm, normal rate. No murmur.  No rub. No gallop. No LE edema.   ABDOMEN:  Deferred  MS: extremities normal. No clubbing. No cyanosis.  SKIN: no rash on limited exam  NEURO: Mentation intact, speech normal, normal strength and tone, normal gait and stance (less emotional than at many other past visits)  PSYCH: mentation appears normal. and affect normal/bright  Results:  Recent Results (from the past 168 hour(s))   General PFT Lab (Please always keep checked)    Collection Time: 11/09/18 10:53 AM   Result Value Ref Range    FVC-Pred 2.80 L    FVC-Pre 2.24 L    FVC-%Pred-Pre 80 %    FEV1-Pre 1.32 L    FEV1-%Pred-Pre 59 %    FEV1FVC-Pred 78 %    FEV1FVC-Pre 59 %    FEFMax-Pred 5.76 L/sec    FEFMax-Pre 3.81 L/sec    FEFMax-%Pred-Pre 66 %    FEF2575-Pred 2.07 L/sec    FEF2575-Pre " 0.56 L/sec    [id number]-%Pred-Pre 26 %    ExpTime-Pre 9.56 sec    FIFMax-Pre 2.89 L/sec    FEV1FEV6-Pred 81 %    FEV1FEV6-Pre 62 %       Assessment and plan:   PFTs done in clinic today were reviewed by me.  They show FEV1/FVC of 1.3/2.2 (59/80% predicted, respectively) for a ratio of 59% with scooping of the expiratory flow volume limb.  The inspiratory limb is normal.  Compared to prior PFTs of 03/15/2018, there is no significant change.  There is a very mild decrement compared to PFTs of 4546-0341.      ASSESSMENT:  COPD with intermittent acute exacerbations:  Ms. Rivera's PFTs are stable compared to her last visit but she feels that she is in a subacute/chronic flare and is requesting antibiotics.  She is requesting initiation of antibiotic therapy at this time, but is somewhat reluctant to start steroids given their impact on her behavior and psyche.  I have given her an azithromycin course of 14 days with 3 refills and also Medrol Dosepak.  I instructed her to start a Medrol Dosepak if she is not improved within 5 days' time.  Given the nocturnal symptoms I also recommended she use a direct antihistamine such as Claritin or Zyrtec and with or without the D congestant variety.  I also prescribed for her Xopenex nebulizer solutions since she gets very jittery with albuterol/Proventil.  I also gave her a Xopenex MDI for routine use, although she rarely uses her hand-held MDIs.  I will plan to see her back in 6 months' time, assuming she is not having difficulty.  She has Phil Brock RN, contact information and knows to contact us if she is having frequent problems.  She also has not had a flu shot and there is no record of her having received the Pneumovax PPSV23, so she will get both of these today in clinic.  She has had the Prevnar 13.  Given her return to smoking as a response to stress, I did prescribe Wellbutrin for her 150 b.i.d., but starting with 1 pill per day for the first 3 days.  I also strongly  encouraged her to just stop smoking completely and suggested she get a punching bag or find an alternate way of stress reduction.

## 2018-11-09 NOTE — PATIENT INSTRUCTIONS
Use azithromycin for current flareup for 7 days  If not better by Tuesday, then add Medrol dose edwige    Use xopenex nebulizer treatment 1 hour before going to sleep to help with nighttime shortness of breath.    Prescribed xopenex handheld inhaler to use as rescue inhaler in place of albuterol or proventil - should have less jitteriness associated.    Prescribed welbutrin to stop smoking - take 1 pill each day for first 3 days, then increase to twice daily.    Call Phil Brock RN if you are not improving or you have other problems.

## 2018-11-09 NOTE — MR AVS SNAPSHOT
After Visit Summary   11/9/2018    Mimi Rivera    MRN: [id number]           Patient Information     Date Of Birth          1955        Visit Information        Provider Department      11/9/2018 11:30 AM Ishmael Bridges MD [facility]        Today's Diagnoses     Acute exacerbation of chronic bronchitis (H)    -  1    Nicotine dependence, uncomplicated, unspecified nicotine product type        Chronic bronchitis, unspecified chronic bronchitis type (H)          Care Instructions    Use azithromycin for current flareup for 7 days  If not better by Tuesday, then add Medrol dose edwige    Use xopenex nebulizer treatment 1 hour before going to sleep to help with nighttime shortness of breath.    Prescribed xopenex handheld inhaler to use as rescue inhaler in place of albuterol or proventil - should have less jitteriness associated.    Prescribed welbutrin to stop smoking - take 1 pill each day for first 3 days, then increase to twice daily.    Call Phil Brock RN if you are not improving or you have other problems.          Follow-ups after your visit        Follow-up notes from your care team     Return in about 6 months (around 5/9/2019).      Who to contact     If you have questions or need follow up information about today's clinic visit or your schedule please contact [facility] directly at [phone].  Normal or non-critical lab and imaging results will be communicated to you by MyChart, letter or phone within 4 business days after the clinic has received the results. If you do not hear from us within 7 days, please contact the clinic through MyChart or phone. If you have a critical or abnormal lab result, we will notify you by phone as soon as possible.  Submit refill requests through [employer] or call your pharmacy and they will forward the refill request to us. Please allow 3 business days for your refill to be completed.     "      Additional Information About Your Visit        C[employer] Information     Infer gives you secure access to your electronic health record. If you see a primary care provider, you can also send messages to your care team and make appointments. If you have questions, please call your primary care clinic.  If you do not have a primary care provider, please call [phone] and they will assist you.        Care EveryWhere ID     This is your Care EveryWhere ID. This could be used by other organizations to access your [facility] medical records  [id number]        Your Vitals Were     Pulse Respirations Height Last Period Pulse Oximetry BMI (Body Mass Index)    81 17 1.549 m (5' 1\") 09/22/2010 98% 19.84 kg/m2       Blood Pressure from Last 3 Encounters:   11/09/18 (!) 171/107   09/04/18 (!) 144/94   03/15/18 199/80    Weight from Last 3 Encounters:   11/09/18 47.6 kg (105 lb)   09/04/18 48.5 kg (107 lb)   03/15/18 48.1 kg (106 lb)              We Performed the Following     FLU Vaccine, 3 YRS +, Quadrivalent          Today's Medication Changes          These changes are accurate as of 11/9/18 11:55 AM.  If you have any questions, ask your nurse or doctor.               Start taking these medicines.        Dose/Directions    azithromycin 500 MG tablet   Commonly known as:  ZITHROMAX   Used for:  Acute exacerbation of chronic bronchitis (H)   Started by:  Ishmael Bridges MD        Dose:  500 mg   Take 1 tablet (500 mg) by mouth daily for 14 days   Quantity:  14 tablet   Refills:  3       buPROPion 150 MG 12 hr tablet   Commonly known as:  ZYBAN   Used for:  Nicotine dependence, uncomplicated, unspecified nicotine product type   Started by:  Ishmael Bridges MD        Dose:  150 mg   Take 1 tablet (150 mg) by mouth 2 times daily   Quantity:  60 tablet   Refills:  11       levalbuterol 1.25 MG/3ML neb solution   Commonly known as:  XOPENEX   Used for:  Acute exacerbation of chronic bronchitis (H)   Started by:  Cyrus" Ishmael RAO MD        Dose:  1 ampule   Take 3 mLs (1.25 mg) by nebulization every 4 hours as needed for shortness of breath / dyspnea or wheezing   Quantity:  270 mL   Refills:  11       levalbuterol 45 MCG/ACT Inhaler   Commonly known as:  XOPENEX HFA   Used for:  Chronic bronchitis, unspecified chronic bronchitis type (H)   Started by:  Ishmael Bridges MD        Dose:  2 puff   Inhale 2 puffs into the lungs every 4 hours as needed for shortness of breath / dyspnea or wheezing   Quantity:  1 Inhaler   Refills:  11       methylPREDNISolone 4 MG tablet   Commonly known as:  MEDROL DOSEPAK   Used for:  Acute exacerbation of chronic bronchitis (H)   Started by:  Ishmael Bridges MD        Follow package instructions   Quantity:  21 tablet   Refills:  3         Stop taking these medicines if you haven't already. Please contact your care team if you have questions.     [employer] TRAVELER NEBULIZER Misc   Stopped by:  Ishmael Bridges MD           fluticasone 220 MCG/ACT Inhaler   Commonly known as:  FLOVENT HFA   Stopped by:  Ishmael Bridges MD           ipratropium 0.02 % neb solution   Commonly known as:  ATROVENT   Stopped by:  Ishmael Bridges MD                Where to get your medicines      These medications were sent to [id number] [address] [address] [address]     Phone:  [phone]     azithromycin 500 MG tablet    buPROPion 150 MG 12 hr tablet    levalbuterol 1.25 MG/3ML neb solution    levalbuterol 45 MCG/ACT Inhaler    methylPREDNISolone 4 MG tablet                Primary Care Provider Office Phone # Fax #    Leno Harrison [phone]       [address]  [facility] [address]        Equal Access to Services     [facility]: Lisandro Tamayo, nemesio hein, betty mendez [facility] [phone].    ATENCIÓN: Si habla español, tiene a high  disposición servicios gratuitos de asistencia lingüística. Ryley garcia [phone].    We comply with applicable federal civil rights laws and [place] laws. We do not discriminate on the basis of race, color, national origin, age, disability, sex, sexual orientation, or gender identity.            Thank you!     Thank you for choosing [facility]  for your care. Our goal is always to provide you with excellent care. Hearing back from our patients is one way we can continue to improve our services. Please take a few minutes to complete the written survey that you may receive in the mail after your visit with us. Thank you!             Your Updated Medication List - Protect others around you: Learn how to safely use, store and throw away your medicines at www.disposemymeds.org.          This list is accurate as of 11/9/18 11:55 AM.  Always use your most recent med list.                   Brand Name Dispense Instructions for use Diagnosis    albuterol 108 (90 Base) MCG/ACT inhaler    PROAIR HFA/PROVENTIL HFA/VENTOLIN HFA    1 Inhaler    Inhale 2 puffs into the lungs every 4 hours as needed for shortness of breath / dyspnea or wheezing    Wheezing       amLODIPine 5 MG tablet    NORVASC    90 tablet    Take 1 tablet (5 mg) by mouth daily    Hypertension goal BP (blood pressure) < 140/90       azithromycin 500 MG tablet    ZITHROMAX    14 tablet    Take 1 tablet (500 mg) by mouth daily for 14 days    Acute exacerbation of chronic bronchitis (H)       buPROPion 150 MG 12 hr tablet    ZYBAN    60 tablet    Take 1 tablet (150 mg) by mouth 2 times daily    Nicotine dependence, uncomplicated, unspecified nicotine product type       Fluticasone-Umeclidin-Vilanterol 100-62.5-25 MCG/INH oral inhaler    TRELEGY ELLIPTA    1 each    Inhale 1 Inhaler into the lungs daily    Chronic bronchitis, unspecified chronic bronchitis type (H)       levalbuterol 1.25 MG/3ML neb solution    XOPENEX    270 mL     Take 3 mLs (1.25 mg) by nebulization every 4 hours as needed for shortness of breath / dyspnea or wheezing    Acute exacerbation of chronic bronchitis (H)       levalbuterol 45 MCG/ACT Inhaler    XOPENEX HFA    1 Inhaler    Inhale 2 puffs into the lungs every 4 hours as needed for shortness of breath / dyspnea or wheezing    Chronic bronchitis, unspecified chronic bronchitis type (H)       methylPREDNISolone 4 MG tablet    MEDROL DOSEPAK    21 tablet    Follow package instructions    Acute exacerbation of chronic bronchitis (H)       tiotropium 2.5 MCG/ACT inhalation aerosol    SPIRIVA RESPIMAT    4 g    Inhale 2 puffs into the lungs daily    COPD (chronic obstructive pulmonary disease) (H)

## 2018-11-09 NOTE — TELEPHONE ENCOUNTER
Central Prior Authorization Team   [phone]    PA Initiation    Medication: levabuterol hcl 1.25mg/3 ml neb  Insurance Company: [employer] - Phone [phone] Fax [phone]  Pharmacy Filling the Rx: [id number] - [place], [place] - [address] [address]  Filling Pharmacy Phone: [phone]  Filling Pharmacy Fax:    Start Date: 11/9/2018

## 2018-11-09 NOTE — TELEPHONE ENCOUNTER
Prior Authorization Retail Medication Request    Medication/Dose: levalbuterol tartrate 45mcg act  ICD code (if different than what is on RX):     Previously Tried and Failed:     Rationale:      Insurance Name:    Insurance ID:         Pharmacy Information (if different than what is on RX)  Name:     Phone:

## 2018-11-11 LAB
EXPTIME-PRE: 9.56 SEC
FEF2575-%PRED-PRE: 26 %
FEF2575-PRE: 0.56 L/SEC
FEF2575-PRED: 2.07 L/SEC
FEFMAX-%PRED-PRE: 66 %
FEFMAX-PRE: 3.81 L/SEC
FEFMAX-PRED: 5.76 L/SEC
FEV1-%PRED-PRE: 59 %
FEV1-PRE: 1.32 L
FEV1FEV6-PRE: 62 %
FEV1FEV6-PRED: 81 %
FEV1FVC-PRE: 59 %
FEV1FVC-PRED: 78 %
FIFMAX-PRE: 2.89 L/SEC
FVC-%PRED-PRE: 80 %
FVC-PRE: 2.24 L
FVC-PRED: 2.8 L

## 2018-11-12 NOTE — TELEPHONE ENCOUNTER
Central Prior Authorization Team   Phone: [phone]      PA Initiation    Medication: levallbuterol tartrate 45mcg/act  Insurance Company: [employer] - Phone [phone] Fax [phone]  Pharmacy Filling the Rx: [id number] [place] - [address] [address]  Filling Pharmacy Phone:    Filling Pharmacy Fax:    Start Date: 11/12/2018

## 2018-11-13 NOTE — TELEPHONE ENCOUNTER
Prior Authorization Approval    Authorization Effective Date: 10/10/2018  Authorization Expiration Date: 11/10/2019  Medication: levabuterol hcl 1.25mg/3 ml neb-APPROVED   Approved Dose/Quantity:  Reference #: CASE ID # [id number]   Insurance Company: [employer] - Phone [phone] Fax [phone]  Expected CoPay:       CoPay Card Available:      Foundation Assistance Needed:    Which Pharmacy is filling the prescription (Not needed for infusion/clinic administered): [id number] - [place], [place] - [address] [address]  Pharmacy Notified: Yes  Patient Notified: Yes

## 2018-11-21 NOTE — TELEPHONE ENCOUNTER
Prior Authorization Approval    Authorization Effective Date: 10/13/2018  Authorization Expiration Date: 11/12/2019  Medication: levallbuterol tartrate 45mcg/act - approved   Approved Dose/Quantity:   Reference #: [id number]   Insurance Company: [employer] - Phone [phone] Fax [phone]  Expected CoPay:       CoPay Card Available:      Foundation Assistance Needed:    Which Pharmacy is filling the prescription (Not needed for infusion/clinic administered): [id number] - [place], [place] - [address] [address]  Pharmacy Notified: No  Patient Notified: No

## 2018-11-26 ENCOUNTER — TELEPHONE (OUTPATIENT)
Dept: FAMILY MEDICINE | Facility: CLINIC | Age: 63
End: 2018-11-26

## 2018-11-26 NOTE — TELEPHONE ENCOUNTER
Needs of attention regarding:  -Depression  -High Blood Pressure  -Colon Cancer Screening  -Cervical Cancer Screening    Health Maintenance Topics with due status: Overdue       Topic Date Due    FIT Q1 YR 05/29/1967    HIV SCREEN (SYSTEM ASSIGNED) 05/29/1975    COLONOSCOPY Q10 YR 05/29/1977    PAP Q3 YR 02/25/2016    COPD ACTION PLAN Q1 YR 03/17/2018    DEPRESSION ACTION PLAN Q1 YR 03/17/2018    PHQ-9 Q6 MONTHS 06/11/2018       Communication:  See Letter

## 2018-11-26 NOTE — LETTER
[facility]  [address]  [facility] [id number] [phone]  November 26, 2018    Mimi Rivera  [address]  [facility] [place] [address]    Dear Mimi,    I care about your health and have reviewed your health plan. I have reviewed your medical conditions, medication list, and lab results and am making recommendations based on this review, to better manage your health.    You are in particular need of attention regarding:  -High Blood Pressure    I am recommending that you:  -schedule an OFFICE APPOINTMENT .         Here is a list of Health Maintenance topics that are due now or due soon:  Health Maintenance Due   Topic Date Due     FIT Q1 YR  05/29/1967     HIV SCREEN (SYSTEM ASSIGNED)  05/29/1975     COLONOSCOPY Q10 YR  05/29/1977     PAP Q3 YR  02/25/2016     COPD ACTION PLAN Q1 YR  03/17/2018     DEPRESSION ACTION PLAN Q1 YR 03/17/2018     PHQ-9 Q6 MONTHS  06/11/2018       Please call us at [phone] (or use [employer]) to address the above recommendations.     Thank you for trusting [facility] and we appreciate the opportunity to serve you.  We look forward to supporting your healthcare needs in the future.    Healthy Regards,  Xu Simon, DO

## 2018-12-07 DIAGNOSIS — I10 HYPERTENSION GOAL BP (BLOOD PRESSURE) < 140/90: ICD-10-CM

## 2018-12-07 NOTE — TELEPHONE ENCOUNTER
"Requested Prescriptions   Pending Prescriptions Disp Refills     amLODIPine (NORVASC) 5 MG tablet  Last Written Prescription Date:  9/4/2018  Last Fill Quantity: 90 tablet,  # refills: 0   Last office visit: 1/26/2018 with prescribing provider:  Alfred   Future Office Visit:       90 tablet 0     Sig: Take 1 tablet (5 mg) by mouth daily    Calcium Channel Blockers Protocol  Failed    12/7/2018  8:25 AM       Failed - Blood pressure under 140/90 in past 12 months    BP Readings from Last 3 Encounters:   11/09/18 (!) 171/107   09/04/18 (!) 144/94   03/15/18 199/80            Failed - Normal serum creatinine on file in past 12 months    Recent Labs   Lab Test  03/23/15   1645   CR  0.51*            Passed - Recent (12 mo) or future (30 days) visit within the authorizing provider's specialty    Patient had office visit in the last 12 months or has a visit in the next 30 days with authorizing provider or within the authorizing provider's specialty.  See \"Patient Info\" tab in inbasket, or \"Choose Columns\" in Meds & Orders section of the refill encounter.             Passed - Patient is age 18 or older       Passed - No active pregnancy on record       Passed - No positive pregnancy test in past 12 months          "

## 2018-12-10 NOTE — TELEPHONE ENCOUNTER
Routing refill request to provider for review/approval because:  Labs out of range:  Blood pressure and Creatinine   Labs not current:  Creatinine     Irma Pollock RN, BSN  [facility]

## 2018-12-11 RX ORDER — AMLODIPINE BESYLATE 5 MG/1
5 TABLET ORAL DAILY
Qty: 30 TABLET | Refills: 0 | Status: SHIPPED | OUTPATIENT
Start: 2018-12-11 | End: 2019-04-05

## 2018-12-12 NOTE — TELEPHONE ENCOUNTER
30 day refill signed. Blood pressure has consistently been above goal range. Recommend follow up in the next month to recheck blood pressure. She also has multiple health maintenance items to address. Please assist her in scheduling appointment.    Xu Simon,   12/11/2018 11:28 PM

## 2018-12-17 NOTE — TELEPHONE ENCOUNTER
Called pt at [phone] and spoke with pt.  She will give us a call back to make an appt.  Nava Marin

## 2019-03-04 ENCOUNTER — OFFICE VISIT (OUTPATIENT)
Dept: FAMILY MEDICINE | Facility: CLINIC | Age: 64
End: 2019-03-04
Payer: COMMERCIAL

## 2019-03-04 ENCOUNTER — ANCILLARY PROCEDURE (OUTPATIENT)
Dept: GENERAL RADIOLOGY | Facility: CLINIC | Age: 64
End: 2019-03-04
Attending: PHYSICIAN ASSISTANT
Payer: COMMERCIAL

## 2019-03-04 VITALS
BODY MASS INDEX: 20.96 KG/M2 | SYSTOLIC BLOOD PRESSURE: 120 MMHG | WEIGHT: 111 LBS | HEIGHT: 61 IN | DIASTOLIC BLOOD PRESSURE: 64 MMHG | OXYGEN SATURATION: 92 % | HEART RATE: 114 BPM | TEMPERATURE: 99.5 F

## 2019-03-04 DIAGNOSIS — J44.1 COPD EXACERBATION (H): Primary | ICD-10-CM

## 2019-03-04 DIAGNOSIS — R05.9 COUGH: ICD-10-CM

## 2019-03-04 PROCEDURE — 99214 OFFICE O/P EST MOD 30 MIN: CPT | Mod: 25 | Performed by: PHYSICIAN ASSISTANT

## 2019-03-04 PROCEDURE — 71046 X-RAY EXAM CHEST 2 VIEWS: CPT | Mod: FY

## 2019-03-04 PROCEDURE — 94640 AIRWAY INHALATION TREATMENT: CPT | Performed by: PHYSICIAN ASSISTANT

## 2019-03-04 RX ORDER — METHYLPREDNISOLONE 4 MG
TABLET, DOSE PACK ORAL
Qty: 21 TABLET | Refills: 0 | Status: SHIPPED | OUTPATIENT
Start: 2019-03-04 | End: 2019-04-05

## 2019-03-04 RX ORDER — CEFDINIR 300 MG/1
600 CAPSULE ORAL DAILY
Qty: 20 CAPSULE | Refills: 0 | Status: SHIPPED | OUTPATIENT
Start: 2019-03-04 | End: 2019-04-05

## 2019-03-04 ASSESSMENT — MIFFLIN-ST. JEOR: SCORE: 1005.87

## 2019-03-04 NOTE — PROGRESS NOTES
"  SUBJECTIVE:   Mimi Rivera is a 63 year old female who presents to clinic today for the following health issues:      Acute Illness   Acute illness concerns: cough  Onset: started last Monday, then coughing on Tues. Believes had 101 fever at onset.  Believes last fever was over the weekend and stopped checking since \"I can just tell I have a fever.\"  PMHX significant for former smoker - quit 2017  Has known COPD- on triple bronchodilator therapy with Trelegy and reports good compliance.  Alanna any baseline chronic cough from her COPD - only if she gets sick. Coughing up a lot of phlegm and more dyspnea. No hemoptysis.  Sees pulmonology every 6 months - last appt was Nov   Used xopenex last night.  Supposed to be leaving to travel out of town next week so wants to be sure she gets better.  Reports azithromycin \"does not work for me\" and is adamant she receive a different antibiotic.        Fever: YES    Chills/Sweats: YES    Headache (location?): YES    Sinus Pressure:YES    Conjunctivitis:  no    Ear Pain: no    Rhinorrhea: YES    Congestion: YES    Sore Throat: no     Cough: YES, productive cough    Wheeze: YES    Decreased Appetite: YES    Nausea: YES    Vomiting: no    Diarrhea:  no    Dysuria/Freq.: no    Fatigue/Achiness: YES    Sick/Strep Exposure: no     Therapies Tried and outcome: advil and it doesn't seem to be helping      Problem list and histories reviewed & adjusted, as indicated.  Additional history: as documented    Patient Active Problem List   Diagnosis     CARDIOVASCULAR SCREENING; LDL GOAL LESS THAN 160     Hypertension goal BP (blood pressure) < 140/90     COPD (chronic obstructive pulmonary disease) (H)     Moderate major depression (H)     Former smoker     Advanced directives, counseling/discussion     Past Surgical History:   Procedure Laterality Date      SECTION      x 3       Social History     Tobacco Use     Smoking status: Former Smoker     Packs/day: 10.00     Types: " "Cigarettes     Last attempt to quit: 3/19/2015     Years since quitting: 3.9     Smokeless tobacco: Never Used   Substance Use Topics     Alcohol use: Yes     Alcohol/week: 1.2 - 1.8 oz     Types: 2 - 3 Standard drinks or equivalent per week     Comment: 2 drinks per wk avg     Family History   Problem Relation Age of Onset     Hypertension Mother      Cancer Mother         liver     C.A.D. Father          Current Outpatient Medications   Medication Sig Dispense Refill     amLODIPine (NORVASC) 5 MG tablet Take 1 tablet (5 mg) by mouth daily 30 tablet 0     buPROPion (ZYBAN) 150 MG 12 hr tablet Take 1 tablet (150 mg) by mouth 2 times daily 60 tablet 11     Fluticasone-Umeclidin-Vilant 100-62.5-25 MCG/INH AEPB Inhale 1 Inhaler into the lungs daily 1 each 11     levalbuterol (XOPENEX HFA) 45 MCG/ACT Inhaler Inhale 2 puffs into the lungs every 4 hours as needed for shortness of breath / dyspnea or wheezing 1 Inhaler 11     levalbuterol (XOPENEX) 1.25 MG/3ML neb solution Take 3 mLs (1.25 mg) by nebulization every 4 hours as needed for shortness of breath / dyspnea or wheezing 270 mL 11     methylPREDNISolone (MEDROL DOSEPAK) 4 MG tablet Follow package instructions 21 tablet 3     Allergies   Allergen Reactions     Penicillins Rash       Reviewed and updated as needed this visit by clinical staff  Tobacco  Allergies  Meds  Med Hx  Surg Hx  Fam Hx  Soc Hx      Reviewed and updated as needed this visit by Provider  Tobacco  Allergies  Meds  Med Hx  Surg Hx  Fam Hx  Soc Hx        ROS:  Constitutional, HEENT, cardiovascular, pulmonary, gi and gu systems are negative, except as otherwise noted.    OBJECTIVE:     /64 (BP Location: Right arm, Cuff Size: Adult Regular)   Pulse 114   Temp 99.5  F (37.5  C) (Tympanic)   Ht 1.549 m (5' 1\")   Wt 50.3 kg (111 lb)   LMP 09/22/2010   SpO2 92%   BMI 20.97 kg/m    Body mass index is 20.97 kg/m .  GENERAL: Pleasant, frail appearing female. Mild distress.  Tried to " re-assess O2, but suspect this is may be lower than true level since pt has gel nails on which can affect our ability to get a good reading.   EYES: Eyes grossly normal to inspection, PERRL and conjunctivae and sclerae normal  HENT: normal cephalic/atraumatic, ear canals and TM's normal, nose and mouth without ulcers or lesions, oropharynx clear and oral mucous membranes moist  NECK: no adenopathy and no asymmetry, masses, or scars  RESP: pt appears dyspneic and has audible wheezing. Auscultation reveals diffuse wheezing throughout, but no rales or rhonchi.  Given albuterol neb in clinic - unfortunately, pt reports not much improvement with this and continues to have diffuse expiratory wheezing. Still no rales are heard though.  CV: regular rates and rhythm and no murmur, click or rub    Diagnostic Test Results:  CXR personal reading shows no acute infiltrate, pleural effusion or obvious cardiomegaly. Will await final reading by radiology.      ASSESSMENT/PLAN:       ICD-10-CM    1. COPD exacerbation (H) J44.1 cefdinir (OMNICEF) 300 MG capsule     methylPREDNISolone (MEDROL DOSEPAK) 4 MG tablet therapy pack   2. Cough R05 XR Chest 2 Views     INHALATION/NEBULIZER TREATMENT, INITIAL   Pt originally had requested levaquin, but reviewed with her that given risks for tendonitis/tendon rupture that we try to avoid this unless indicated. Showed her COPD treatment algorithm on up-to-date and we reviewed that recommendation was to try cephalosporin, doxycycline or bactrim as alternatives to azithromycin as she is adamant this does not work for her.  She is amenable to pursuing treatment with omnicef and medrol dose-edwige. Encouraged use of nebulizer scheduled at home as well.   If worsening, advised to be seen in ER and pt in agreement with plan.   If improving, but not yet back to her baseline I also advised she follow-up with pulmonology prior to routine visit in May  See Patient Instructions  Pt in agreement.     Patient  Instructions   Per COPD exacerbation treatment guidelines will proceed with omnicef for treatment.  Extensive wheezing so also advised to start medrol dose-edwige.  Continue pulmonary medications as planned.  To ER if persistent fever or worsening.   Will notify of final xray reading by radiology.     Jodee Paz PA-C  [facility]

## 2019-03-08 ENCOUNTER — TELEPHONE (OUTPATIENT)
Dept: PULMONOLOGY | Facility: CLINIC | Age: 64
End: 2019-03-08

## 2019-03-08 NOTE — TELEPHONE ENCOUNTER
"Contacted by patient to state that she has been sick for 2 weeks with cough and face and forehead pressure with green/yellow sputum and nasal drainage. Has fevers of 99.5-100.5. Has noted night sweats, SOB and chilling. She took on hand Azithromycin and Medrol dose edwige with out improvement. Patient to PA at local [facility] and on 3/4/19 and had negative imaging for pneumonia. Was given Cefdinir and Medrol edwige. Patient has not taken treatment as yet because she states she is \"afraid it will not work\". Symptoms continue. Instructed patient to take the prescribed medication. Requested that she take food before and drink plenty of fluids. Repeated the instruction 3 times. Patient stated she would begin treatment. Also told her to go to ED if not responding to treatment. Explained that she needed to take all the medication as prescribed and return call to clinic early next week. Patient confirmed understanding.  "

## 2019-03-08 NOTE — RESULT ENCOUNTER NOTE
Dear Mimi,      Your recent test results are noted below:    -Final xray reading by radiology was consistent with our reading in clinic. No pneumonia, fluid in your lungs or other acute abnormality. I hope you're feeling better.    For additional lab test information, labtestsonline.org is an excellent reference. Please contact the clinic at [phone] with any further questions or concerns.    Sincerely,      Jodee Paz PA-C  [facility]

## 2019-03-18 ENCOUNTER — HOSPITAL ENCOUNTER (EMERGENCY)
Facility: CLINIC | Age: 64
Discharge: HOME | End: 2019-03-18
Attending: PHYSICIAN ASSISTANT | Admitting: PHYSICIAN ASSISTANT
Payer: COMMERCIAL

## 2019-03-18 ENCOUNTER — APPOINTMENT (OUTPATIENT)
Dept: CT IMAGING | Facility: CLINIC | Age: 64
End: 2019-03-18
Attending: PHYSICIAN ASSISTANT
Payer: COMMERCIAL

## 2019-03-18 ENCOUNTER — TELEPHONE (OUTPATIENT)
Dept: PULMONOLOGY | Facility: CLINIC | Age: 64
End: 2019-03-18

## 2019-03-18 VITALS
WEIGHT: 105 LBS | SYSTOLIC BLOOD PRESSURE: 151 MMHG | HEART RATE: 115 BPM | OXYGEN SATURATION: 91 % | BODY MASS INDEX: 19.84 KG/M2 | TEMPERATURE: 98.8 F | DIASTOLIC BLOOD PRESSURE: 94 MMHG | RESPIRATION RATE: 16 BRPM

## 2019-03-18 DIAGNOSIS — J44.1 COPD EXACERBATION (H): ICD-10-CM

## 2019-03-18 LAB
ANION GAP SERPL CALCULATED.3IONS-SCNC: 7 MMOL/L (ref 3–14)
BASOPHILS # BLD AUTO: 0 10E9/L (ref 0–0.2)
BASOPHILS NFR BLD AUTO: 0.2 %
BUN SERPL-MCNC: 9 MG/DL (ref 7–30)
CALCIUM SERPL-MCNC: 9.8 MG/DL (ref 8.5–10.1)
CHLORIDE SERPL-SCNC: 102 MMOL/L (ref 94–109)
CO2 SERPL-SCNC: 27 MMOL/L (ref 20–32)
CREAT SERPL-MCNC: 0.61 MG/DL (ref 0.52–1.04)
DIFFERENTIAL METHOD BLD: ABNORMAL
EOSINOPHIL # BLD AUTO: 0 10E9/L (ref 0–0.7)
EOSINOPHIL NFR BLD AUTO: 0 %
ERYTHROCYTE [DISTWIDTH] IN BLOOD BY AUTOMATED COUNT: 13.1 % (ref 10–15)
GFR SERPL CREATININE-BSD FRML MDRD: >90 ML/MIN/{1.73_M2}
GLUCOSE SERPL-MCNC: 94 MG/DL (ref 70–99)
HCT VFR BLD AUTO: 46.9 % (ref 35–47)
HGB BLD-MCNC: 15.8 G/DL (ref 11.7–15.7)
IMM GRANULOCYTES # BLD: 0.1 10E9/L (ref 0–0.4)
IMM GRANULOCYTES NFR BLD: 0.5 %
LYMPHOCYTES # BLD AUTO: 1.5 10E9/L (ref 0.8–5.3)
LYMPHOCYTES NFR BLD AUTO: 12.8 %
MCH RBC QN AUTO: 32.5 PG (ref 26.5–33)
MCHC RBC AUTO-ENTMCNC: 33.7 G/DL (ref 31.5–36.5)
MCV RBC AUTO: 97 FL (ref 78–100)
MONOCYTES # BLD AUTO: 0.8 10E9/L (ref 0–1.3)
MONOCYTES NFR BLD AUTO: 7 %
NEUTROPHILS # BLD AUTO: 9.4 10E9/L (ref 1.6–8.3)
NEUTROPHILS NFR BLD AUTO: 79.5 %
NRBC # BLD AUTO: 0 10*3/UL
NRBC BLD AUTO-RTO: 0 /100
NT-PROBNP SERPL-MCNC: 183 PG/ML (ref 0–900)
PLATELET # BLD AUTO: 304 10E9/L (ref 150–450)
POTASSIUM SERPL-SCNC: 4.2 MMOL/L (ref 3.4–5.3)
RBC # BLD AUTO: 4.86 10E12/L (ref 3.8–5.2)
SODIUM SERPL-SCNC: 136 MMOL/L (ref 133–144)
TROPONIN I SERPL-MCNC: <0.015 UG/L (ref 0–0.04)
WBC # BLD AUTO: 11.8 10E9/L (ref 4–11)

## 2019-03-18 PROCEDURE — 25000128 H RX IP 250 OP 636: Performed by: PHYSICIAN ASSISTANT

## 2019-03-18 PROCEDURE — 83880 ASSAY OF NATRIURETIC PEPTIDE: CPT | Performed by: PHYSICIAN ASSISTANT

## 2019-03-18 PROCEDURE — 36415 COLL VENOUS BLD VENIPUNCTURE: CPT | Performed by: PHYSICIAN ASSISTANT

## 2019-03-18 PROCEDURE — 25000125 ZZHC RX 250: Performed by: PHYSICIAN ASSISTANT

## 2019-03-18 PROCEDURE — 99285 EMERGENCY DEPT VISIT HI MDM: CPT | Mod: 25

## 2019-03-18 PROCEDURE — 80048 BASIC METABOLIC PNL TOTAL CA: CPT | Performed by: PHYSICIAN ASSISTANT

## 2019-03-18 PROCEDURE — 84484 ASSAY OF TROPONIN QUANT: CPT | Performed by: PHYSICIAN ASSISTANT

## 2019-03-18 PROCEDURE — 93005 ELECTROCARDIOGRAM TRACING: CPT

## 2019-03-18 PROCEDURE — 71260 CT THORAX DX C+: CPT

## 2019-03-18 PROCEDURE — 85025 COMPLETE CBC W/AUTO DIFF WBC: CPT | Performed by: PHYSICIAN ASSISTANT

## 2019-03-18 PROCEDURE — 94640 AIRWAY INHALATION TREATMENT: CPT

## 2019-03-18 RX ORDER — LEVOFLOXACIN 750 MG/1
750 TABLET, FILM COATED ORAL DAILY
Qty: 5 TABLET | Refills: 0 | Status: SHIPPED | OUTPATIENT
Start: 2019-03-18 | End: 2019-04-05

## 2019-03-18 RX ORDER — PREDNISONE 20 MG/1
TABLET ORAL
Qty: 10 TABLET | Refills: 0 | Status: SHIPPED | OUTPATIENT
Start: 2019-03-18 | End: 2019-04-05

## 2019-03-18 RX ORDER — IPRATROPIUM BROMIDE AND ALBUTEROL SULFATE 2.5; .5 MG/3ML; MG/3ML
3 SOLUTION RESPIRATORY (INHALATION) ONCE
Status: COMPLETED | OUTPATIENT
Start: 2019-03-18 | End: 2019-03-18

## 2019-03-18 RX ORDER — IOPAMIDOL 755 MG/ML
80 INJECTION, SOLUTION INTRAVASCULAR ONCE
Status: COMPLETED | OUTPATIENT
Start: 2019-03-18 | End: 2019-03-18

## 2019-03-18 RX ADMIN — IOPAMIDOL 56 ML: 755 INJECTION, SOLUTION INTRAVENOUS at 16:32

## 2019-03-18 RX ADMIN — IPRATROPIUM BROMIDE AND ALBUTEROL SULFATE 3 ML: .5; 3 SOLUTION RESPIRATORY (INHALATION) at 15:37

## 2019-03-18 RX ADMIN — SODIUM CHLORIDE 80 ML: 9 INJECTION, SOLUTION INTRAVENOUS at 16:32

## 2019-03-18 ASSESSMENT — ENCOUNTER SYMPTOMS
NAUSEA: 0
RHINORRHEA: 0
DIARRHEA: 1
SORE THROAT: 0
SHORTNESS OF BREATH: 1
ABDOMINAL PAIN: 0
COUGH: 1
FEVER: 0
VOMITING: 0

## 2019-03-18 NOTE — ED PROVIDER NOTES
History     Chief Complaint:  Cough and Shortness of Breath     HPI   Mimi Rivera is a 63 year old female with a history of COPD and hypertension who presents for evaluation of a cough and shortness of breath. Approximately three weeks ago, the patient started to develop a somewhat productive cough with green sputum and associated shortness of breath. This cough is generally worse in the morning and evening, and her shortness of breath is worse with exertion. She has been seen by her pulmonologist regarding this and has completed a course of Azithromycin, steroids, and a course of Ceftin without resolution of her symptoms. She had a chest X-ray on 3/10 that was unremarkable. She has been using her home inhalers and nebulizers without significant improvement of her symptoms. Due to her persistent symptoms today, the patient came into the ED for reevaluation. Otherwise the patient reports that she has had some diarrhea recently, but she denies any fever, congestion, rhinorrhea, sore throat, chest pain, abdominal pain, nausea, or vomiting.     Allergies:  Penicillins      Medications:    amLODIPine (NORVASC) 5 MG tablet  buPROPion (ZYBAN) 150 MG 12 hr tablet  cefdinir (OMNICEF) 300 MG capsule  Fluticasone-Umeclidin-Vilant 100-62.5-25 MCG/INH AEPB  levalbuterol (XOPENEX HFA) 45 MCG/ACT Inhaler  levalbuterol (XOPENEX) 1.25 MG/3ML neb solution  methylPREDNISolone (MEDROL DOSEPAK) 4 MG tablet therapy pack     Past Medical History:    COPD   Chronic rhinosinusitis   Depression  Anxiety  Herpes  Hypertension     Past Surgical History:     section     Family History:    Hypertension - Mother  Cancer, liver - Mother  CAD - Father     Social History:  Tobacco use:    Former smoker, quit    Alcohol use:    Positive   Marital status:       Accompanied to ED by:  Alone      Review of Systems   Constitutional: Negative for fever.   HENT: Negative for congestion, rhinorrhea and sore throat.    Respiratory:  Positive for cough and shortness of breath.    Cardiovascular: Negative for chest pain.   Gastrointestinal: Positive for diarrhea. Negative for abdominal pain, nausea and vomiting.   All other systems reviewed and are negative.       Physical Exam   First Vitals:  BP: (!) 151/94  Pulse: 115  Heart Rate: 115  Temp: 98.8  F (37.1  C)  Resp: 20  Weight: 47.6 kg (105 lb)  SpO2: 96 %      Physical Exam  Constitutional: non-toxic appearing, no acute distress.   Head: No external signs of trauma noted to head or face.   Eyes: Pupils are equal, round, and reactive to light. Conjunctiva normal. No scleral icterus.   ENT: MMM. Oropharynx Clear and moist. Normal voice.   Neck: Normal ROM.   Cardiovascular: Normal rate, regular rhythm, and intact distal pulses.    Respiratory: Effort normal. No respiratory distress. Diminished breath sounds throughout, but no wheezing.    GI: Soft. There is no tenderness   Musculoskeletal: No deformities appreciated. Normal ROM. No edema noted.  Neurological: Alert and Oriented x 3. Speech normal. Moves all extremities equally.  Psychiatric: Appropriate mood, affect, and behavior.   Skin: Skin is warm and dry.       Emergency Department Course   ECG (15:12:51):  Indication: Screening for cardiovascular disease.   Rate 97 bpm. TN interval 132 ms. QRS duration 74 ms. QT/QTc 356/452 ms. P-R-T axes 74 64 44.   Interpretation: Normal sinus rhythm, Possible left atrial enlargement, Nonspecific ST abnormality, Abnormal ECG   Agree with computer interpretation. Yes   Interpreted at 1516 by Dr. Pillai.      Imaging:  Radiographic findings were communicated with the patient who voiced understanding of the findings.    CT Chest Pulmonary Embolism w Contrast:  IMPRESSION: No acute consolidation or definite PE identified.  Preliminary report per radiology.     Laboratory:  CBC: WBC 11.8 high, HGB 15.8 high, o/w WNL ()   BMP: WNL (Creatinine 0.61)  Nt probnp inpatient: 183   Troponin I 1542: <0.015      Interventions:  1537 Duoneb 3 mL Nebulization     Emergency Department Course:  Nursing notes and vitals reviewed.  1505: I performed an exam of the patient as documented above.     1726: I updated and reassessed the patient.     I personally reviewed the laboratory results with the patient and answered all related questions prior to discharge.     Findings and plan explained to the Patient. Patient discharged home with instructions regarding supportive care, medications, and reasons to return. The importance of close follow-up was reviewed. The patient was prescribed Levaquin and Prednisone.      Impression & Plan      Medical Decision Makin year old female with history of COPD presenting with cough and SOB. Upon arrival, she is not hypoxic and does not have any significant wheezing. She is afebrile as well. EKG is non-ischemic and troponin is negative. I have low suspicion that her symptoms are cardiac in etiology. The remainder of her labs are unrevealing aside from a mild leukocytosis of 11.8. CT Chest with contrast showed no evidence of PE or infiltrate/consolidation. I suspect her symptoms are related to COPD at this time. Fortunately, she has not been hypoxic and therefore I think she is appropriate for outpatient management. Will discharge with course of Levaquin and prednisone. She was instructed to follow-up with her PCP in 2-3 days. Instructed to return to the ED for any new or worsening symptoms, including worsening cough or SOB, fever, or other concerning symptoms.     Diagnosis:    ICD-10-CM   1. COPD exacerbation (H) J44.1       Disposition:  Discharged to home with Levaquin and Prednisone.     Discharge Medications:  levofloxacin 750 MG tablet  Commonly known as:  LEVAQUIN  750 mg, Oral, DAILY     predniSONE 20 MG tablet  Commonly known as:  DELTASONE  Take two tablets (= 40mg) each day for 5 (five) days            Srinivasa CAREY, am serving as a scribe at 3:05 PM on 3/18/2019 to  document services personally performed by Aletha Felton PA-C, based on my observations and the provider's statements to me.    [facility] EMERGENCY DEPARTMENT       Aletha Felton PA-C  03/18/19 [id number]

## 2019-03-18 NOTE — TELEPHONE ENCOUNTER
Contacted by patient to state she remains sick after 2 rounds of antibiotic and Prednisone. Patient not able to speak well due to illness. Explained because she has had 2 courses of antibiotics and Pred that she needs to go to ED as instructed last week. Asked patient to be taken to ED today. Inquired if she has ride there and she explained that she did. She will go today.

## 2019-03-18 NOTE — ED TRIAGE NOTES
Patient reports she has been sick for 3 weeks. She has had a cough for 3 weeks and coughing up green sputum. She started a z-pack that her pulmonologist had given her. She went in afterward and had a chest x-ray, then started ceftin and took all of that and a steroid. Still not better.

## 2019-03-18 NOTE — ED AVS SNAPSHOT
[facility] Emergency Department  [address]  [facility] [address]  Phone:  [phone]  Fax:  [phone]                                    Mimi Rivera   MRN: [id number]    Department:  [facility] Emergency Department   Date of Visit:  3/18/2019           After Visit Summary Signature Page    I have received my discharge instructions, and my questions have been answered. I have discussed any challenges I see with this plan with the nurse or doctor.    ..........................................................................................................................................  Patient/Patient Representative Signature      ..........................................................................................................................................  Patient Representative Print Name and Relationship to Patient    ..................................................               ................................................  Date                                   Time    ..........................................................................................................................................  Reviewed by Signature/Title    ...................................................              ..............................................  Date                                               Time          22EPIC Rev 08/18       
No

## 2019-03-19 DIAGNOSIS — B37.0 ORAL THRUSH: Primary | ICD-10-CM

## 2019-03-19 DIAGNOSIS — B37.0 THRUSH, ORAL: Primary | ICD-10-CM

## 2019-03-19 LAB — INTERPRETATION ECG - MUSE: NORMAL

## 2019-03-19 RX ORDER — CLOTRIMAZOLE 10 MG/1
10 LOZENGE ORAL
Qty: 70 TROCHE | Refills: 1 | Status: SHIPPED | OUTPATIENT
Start: 2019-03-19 | End: 2019-04-05

## 2019-03-19 RX ORDER — NYSTATIN 100000/ML
500000 SUSPENSION, ORAL (FINAL DOSE FORM) ORAL 4 TIMES DAILY
Qty: 280 ML | Refills: 1 | Status: SHIPPED | OUTPATIENT
Start: 2019-03-19 | End: 2019-04-05

## 2019-03-27 ENCOUNTER — TELEPHONE (OUTPATIENT)
Dept: PULMONOLOGY | Facility: CLINIC | Age: 64
End: 2019-03-27

## 2019-03-27 NOTE — TELEPHONE ENCOUNTER
"Mimi called very distressed, states her \"whole insides feel like they're burning\".  She notes sore throat, chest pain, stomach pain and white coating totally covering mouth and tongue.  Has received multiple abx to treat COPD flare over last month.  Has been using Mycelex Troches since 3/19, however, pt states they have not helped and symptoms continue to worsen.  Voice is very hoarse and weak.  Advised pt to make urgent appointment with PCP/urgent care for evaluation.   Pt agrees with plan and has no further questions at this time.  "

## 2019-03-28 NOTE — TELEPHONE ENCOUNTER
Late entry for 3/27.  Contacted pt to follow up on urgent care visit.  Pt states she was unable to be seen by PCP.  I urged her to go to Urgent Care for eval., she agreed she would go today.

## 2019-04-05 ENCOUNTER — OFFICE VISIT (OUTPATIENT)
Dept: FAMILY MEDICINE | Facility: CLINIC | Age: 64
End: 2019-04-05
Payer: COMMERCIAL

## 2019-04-05 VITALS
DIASTOLIC BLOOD PRESSURE: 82 MMHG | OXYGEN SATURATION: 97 % | HEART RATE: 100 BPM | SYSTOLIC BLOOD PRESSURE: 144 MMHG | BODY MASS INDEX: 20.41 KG/M2 | WEIGHT: 108 LBS | TEMPERATURE: 97.5 F

## 2019-04-05 DIAGNOSIS — I10 HYPERTENSION GOAL BP (BLOOD PRESSURE) < 140/90: ICD-10-CM

## 2019-04-05 DIAGNOSIS — J22 ACUTE RESPIRATORY INFECTION: Primary | ICD-10-CM

## 2019-04-05 DIAGNOSIS — B37.0 THRUSH: ICD-10-CM

## 2019-04-05 DIAGNOSIS — D72.829 LEUKOCYTOSIS, UNSPECIFIED TYPE: ICD-10-CM

## 2019-04-05 LAB
BASOPHILS # BLD AUTO: 0 10E9/L (ref 0–0.2)
BASOPHILS NFR BLD AUTO: 0.1 %
DIFFERENTIAL METHOD BLD: NORMAL
EOSINOPHIL # BLD AUTO: 0 10E9/L (ref 0–0.7)
EOSINOPHIL NFR BLD AUTO: 0.3 %
ERYTHROCYTE [DISTWIDTH] IN BLOOD BY AUTOMATED COUNT: 13.4 % (ref 10–15)
HCT VFR BLD AUTO: 46.5 % (ref 35–47)
HGB BLD-MCNC: 15.5 G/DL (ref 11.7–15.7)
LYMPHOCYTES # BLD AUTO: 1.6 10E9/L (ref 0.8–5.3)
LYMPHOCYTES NFR BLD AUTO: 20.2 %
MCH RBC QN AUTO: 32.3 PG (ref 26.5–33)
MCHC RBC AUTO-ENTMCNC: 33.3 G/DL (ref 31.5–36.5)
MCV RBC AUTO: 97 FL (ref 78–100)
MONOCYTES # BLD AUTO: 0.6 10E9/L (ref 0–1.3)
MONOCYTES NFR BLD AUTO: 7.5 %
NEUTROPHILS # BLD AUTO: 5.5 10E9/L (ref 1.6–8.3)
NEUTROPHILS NFR BLD AUTO: 71.9 %
PLATELET # BLD AUTO: 236 10E9/L (ref 150–450)
RBC # BLD AUTO: 4.8 10E12/L (ref 3.8–5.2)
WBC # BLD AUTO: 7.7 10E9/L (ref 4–11)

## 2019-04-05 PROCEDURE — 85025 COMPLETE CBC W/AUTO DIFF WBC: CPT | Performed by: FAMILY MEDICINE

## 2019-04-05 PROCEDURE — 99214 OFFICE O/P EST MOD 30 MIN: CPT | Performed by: FAMILY MEDICINE

## 2019-04-05 PROCEDURE — 36415 COLL VENOUS BLD VENIPUNCTURE: CPT | Performed by: FAMILY MEDICINE

## 2019-04-05 RX ORDER — LEVOFLOXACIN 500 MG/1
500 TABLET, FILM COATED ORAL DAILY
Qty: 7 TABLET | Refills: 0 | Status: SHIPPED | OUTPATIENT
Start: 2019-04-05 | End: 2019-04-12

## 2019-04-05 RX ORDER — AMLODIPINE BESYLATE 5 MG/1
5 TABLET ORAL DAILY
Qty: 90 TABLET | Refills: 1 | Status: SHIPPED | OUTPATIENT
Start: 2019-04-05 | End: 2020-03-12

## 2019-04-05 RX ORDER — CLOTRIMAZOLE 10 MG/1
10 LOZENGE ORAL
Qty: 50 TROCHE | Refills: 0 | Status: SHIPPED | OUTPATIENT
Start: 2019-04-05 | End: 2019-04-15

## 2019-04-05 NOTE — PROGRESS NOTES
SUBJECTIVE:                                                    Mimi Rivera is a 63 year old female who presents to clinic today for the following health issues:        Acute Illness   Acute illness concerns: fever  Onset: 1 month    Fever: YES    Chills/Sweats: YES    Headache (location?): YES    Sinus Pressure:YES    Conjunctivitis:  no    Ear Pain: no    Rhinorrhea: no    Congestion: YES, has tried using Afrin. Also has Flonase.     Sore Throat: YES     Cough: YES-productive of yellow sputum    Wheeze: YES    Decreased Appetite: YES    Nausea: YES    Vomiting: no    Diarrhea:  no    Dysuria/Freq.: no    Fatigue/Achiness: YES- fatigue    Sick/Strep Exposure: no     Therapies Tried and outcome: prednisone, omnicef, nebulizers    Using Trelegy Ellipta, Levalbuterol inh/neb.  When seen in the ED, was prescribed prednisone which she took. Also given Levaquin but was concerned about 750 mg dosing so never started it.    Problem list and histories reviewed & adjusted, as indicated.  Additional history: as documented    Patient Active Problem List   Diagnosis     CARDIOVASCULAR SCREENING; LDL GOAL LESS THAN 160     Hypertension goal BP (blood pressure) < 140/90     COPD (chronic obstructive pulmonary disease) (H)     Moderate major depression (H)     Former smoker     Advanced directives, counseling/discussion     Past Surgical History:   Procedure Laterality Date      SECTION      x 3       Social History     Tobacco Use     Smoking status: Former Smoker     Packs/day: 10.00     Types: Cigarettes     Last attempt to quit: 3/19/2015     Years since quittin.0     Smokeless tobacco: Never Used   Substance Use Topics     Alcohol use: Yes     Alcohol/week: 1.2 - 1.8 oz     Types: 2 - 3 Standard drinks or equivalent per week     Comment: 2 drinks per wk avg     Family History   Problem Relation Age of Onset     Hypertension Mother      Cancer Mother         liver     C.A.D. Father             ROS:  Constitutional, HEENT, cardiovascular, pulmonary, gi and gu systems are negative, except as otherwise noted.    OBJECTIVE:     /82   Pulse 100   Temp 97.5  F (36.4  C) (Tympanic)   Wt 49 kg (108 lb)   LMP 09/22/2010   SpO2 97%   BMI 20.41 kg/m    Body mass index is 20.41 kg/m .  GENERAL: healthy, alert and no distress  EYES: Eyes grossly normal to inspection, PERRL and conjunctivae and sclerae normal  HENT: ear canals and TM's normal, nose and mouth with erythematous tongue with white plaque  NECK: no adenopathy and no asymmetry, masses, or scars  RESP: lungs clear to auscultation - no rales, rhonchi or wheezes  CV: regular rate and rhythm, normal S1 S2, no S3 or S4, no murmur, click or rub, no peripheral edema and peripheral pulses strong  MS: no gross musculoskeletal defects noted, no edema  SKIN: no suspicious lesions or rashes  PSYCH: mentation appears normal, affect normal/bright    Diagnostic Test Results:  none     ASSESSMENT/PLAN:   1. Acute respiratory infection: symptoms still not improving from 3/18 ED visit. Never started Levaquin dose. Advised to start Levaquin. Continue inhalers/nebs. Follow up in one week if still not improving.  - levofloxacin (LEVAQUIN) 500 MG tablet; Take 1 tablet (500 mg) by mouth daily for 7 days  Dispense: 7 tablet; Refill: 0    2. Hypertension goal BP (blood pressure) < 140/90; BP slightly above goal. Discussed monitoring salt in diet, increase exercise once respiratory symptoms improve. Refilled Norvasc today.    - amLODIPine (NORVASC) 5 MG tablet; Take 1 tablet (5 mg) by mouth daily  Dispense: 90 tablet; Refill: 1    3. Leukocytosis, unspecified type: previously elevated WBC count has normalized.  - CBC with platelets and differential    4. Thrush: exam consistent with thrush. Treat with clotrimazole. Follow up if not improving.  - clotrimazole 10 MG nora; Take 1 Nora (10 mg) by mouth 5 times daily for 10 days  Dispense: 50 Nora; Refill:  0    Xu Simon, DO  [facility]

## 2019-05-03 ENCOUNTER — OFFICE VISIT (OUTPATIENT)
Dept: PULMONOLOGY | Facility: CLINIC | Age: 64
End: 2019-05-03
Attending: INTERNAL MEDICINE
Payer: COMMERCIAL

## 2019-05-03 VITALS
HEIGHT: 61 IN | SYSTOLIC BLOOD PRESSURE: 167 MMHG | DIASTOLIC BLOOD PRESSURE: 93 MMHG | OXYGEN SATURATION: 97 % | RESPIRATION RATE: 17 BRPM | HEART RATE: 82 BPM | BODY MASS INDEX: 19.63 KG/M2 | WEIGHT: 104 LBS

## 2019-05-03 DIAGNOSIS — J43.1 PANLOBULAR EMPHYSEMA (H): ICD-10-CM

## 2019-05-03 DIAGNOSIS — J44.1 COPD EXACERBATION (H): ICD-10-CM

## 2019-05-03 DIAGNOSIS — J42 CHRONIC BRONCHITIS, UNSPECIFIED CHRONIC BRONCHITIS TYPE (H): ICD-10-CM

## 2019-05-03 DIAGNOSIS — J44.9 COPD (CHRONIC OBSTRUCTIVE PULMONARY DISEASE) (H): Primary | ICD-10-CM

## 2019-05-03 DIAGNOSIS — J44.9 CHRONIC OBSTRUCTIVE PULMONARY DISEASE, UNSPECIFIED COPD TYPE (H): ICD-10-CM

## 2019-05-03 DIAGNOSIS — J42 CHRONIC BRONCHITIS, UNSPECIFIED CHRONIC BRONCHITIS TYPE (H): Primary | ICD-10-CM

## 2019-05-03 LAB
EXPTIME-PRE: 7.46 SEC
FEF2575-%PRED-PRE: 26 %
FEF2575-PRE: 0.55 L/SEC
FEF2575-PRED: 2.08 L/SEC
FEFMAX-%PRED-PRE: 61 %
FEFMAX-PRE: 3.56 L/SEC
FEFMAX-PRED: 5.79 L/SEC
FEV1-%PRED-PRE: 53 %
FEV1-PRE: 1.2 L
FEV1FEV6-PRE: 60 %
FEV1FEV6-PRED: 81 %
FEV1FVC-PRE: 59 %
FEV1FVC-PRED: 80 %
FIFMAX-PRE: 3.48 L/SEC
FVC-%PRED-PRE: 72 %
FVC-PRE: 2.05 L
FVC-PRED: 2.81 L

## 2019-05-03 PROCEDURE — G0463 HOSPITAL OUTPT CLINIC VISIT: HCPCS | Mod: ZF

## 2019-05-03 RX ORDER — MOXIFLOXACIN HYDROCHLORIDE 400 MG/1
400 TABLET ORAL DAILY
Qty: 10 TABLET | Refills: 3 | Status: SHIPPED | OUTPATIENT
Start: 2019-05-03 | End: 2019-05-03

## 2019-05-03 RX ORDER — LEVOFLOXACIN 500 MG/1
500 TABLET, FILM COATED ORAL DAILY
Qty: 14 TABLET | Refills: 3 | Status: SHIPPED | OUTPATIENT
Start: 2019-05-03 | End: 2020-04-03

## 2019-05-03 ASSESSMENT — PAIN SCALES - GENERAL: PAINLEVEL: NO PAIN (0)

## 2019-05-03 ASSESSMENT — MIFFLIN-ST. JEOR: SCORE: 974.12

## 2019-05-03 NOTE — PROGRESS NOTES
"Reason for Visit  Mimi Rivera is a 63 year old year old female who is being seen for RECHECK (COPD 6 month follow up )    Pulmonary HPI    The patient was seen and examined by Ishmael Bridges     I had the pleasure of seeing Ms. Mimi Rivera today in the [facility] in followup for her moderately severe COPD.  I last saw her on 11/09/2018.  At that time, she had generally been doing well.  She had stopped smoking (much to her credit and surprisingly!) and was having some difficulty with jitteriness with her a Proventil rescue inhaler and nebulizer treatments.  She had intermittent acute exacerbations, but overall, based on PFTs and examination, her pulmonary function was on a plateau.  She was having a flare and I gave her a course of azithromycin and told her to start a Medrol Dosepak if not improved in 5 days.  I gave her a Xopenex nebulizer handheld inhaler to place the Proventil.  She also got the flu vaccine and a PPSV23 at the time of that visit.  I renewed her Wellbutrin because she returned to smoking with stress.      She returns to clinic today and she has quit smoking.  She said she was doing much better on Trelegy but did not tolerate the Xopenex handheld inhaler and nebulizer as it made her feel \"weird.\"  She has continued to have night sweats with possible fevers.  She got a flare of her COPD in early April that partially improved with a course of steroids and antibiotics, but she feels she is not yet back to baseline.  She was coughing up thick yellow and white phlegm at that time and had a CT scan done with PE protocol that did not show pulmonary emboli or other new lung abnormalities.  She is better than at that time significantly, but continues to feel like she is not as good as she was and is frustrated by this.  She continues to have sinus drainage and some sinus tenderness and plans to see her ENT doctor.  She was upset that when " seen at [facility] March, she says that no sputum culture was done in spite of her asking for one.  Otherwise, her general health is unchanged.  I did review her 2019 chest CT PA gram.           Current Outpatient Medications   Medication     amLODIPine (NORVASC) 5 MG tablet     buPROPion (ZYBAN) 150 MG 12 hr tablet     Fluticasone-Umeclidin-Vilant 100-62.5-25 MCG/INH AEPB     levalbuterol (XOPENEX HFA) 45 MCG/ACT Inhaler     levalbuterol (XOPENEX) 1.25 MG/3ML neb solution     No current facility-administered medications for this visit.      Allergies   Allergen Reactions     Penicillins Rash     Past Medical History:   Diagnosis Date     ASCUS on Pap smear     unable to run HPV typing, f/u paps NIL     Chronic rhinosinusitis      COPD (chronic obstructive pulmonary disease) (H)      Depression, anxiety      Herpes     occasional outbreak     Hypertension        Past Surgical History:   Procedure Laterality Date      SECTION      x 3       Social History     Socioeconomic History     Marital status:      Spouse name: Not on file     Number of children: Not on file     Years of education: Not on file     Highest education level: Not on file   Occupational History     Not on file   Social Needs     Financial resource strain: Not on file     Food insecurity:     Worry: Not on file     Inability: Not on file     Transportation needs:     Medical: Not on file     Non-medical: Not on file   Tobacco Use     Smoking status: Former Smoker     Packs/day: 10.00     Types: Cigarettes     Last attempt to quit: 3/19/2015     Years since quittin.1     Smokeless tobacco: Never Used   Substance and Sexual Activity     Alcohol use: Yes     Alcohol/week: 1.2 - 1.8 oz     Types: 2 - 3 Standard drinks or equivalent per week     Comment: 2 drinks per wk avg     Drug use: No     Sexual activity: Yes     Partners: Male     Birth control/protection: Surgical     Comment: tubal   Lifestyle  "    Physical activity:     Days per week: Not on file     Minutes per session: Not on file     Stress: Not on file   Relationships     Social connections:     Talks on phone: Not on file     Gets together: Not on file     Attends Taoist service: Not on file     Active member of club or organization: Not on file     Attends meetings of clubs or organizations: Not on file     Relationship status: Not on file     Intimate partner violence:     Fear of current or ex partner: Not on file     Emotionally abused: Not on file     Physically abused: Not on file     Forced sexual activity: Not on file   Other Topics Concern     Parent/sibling w/ CABG, MI or angioplasty before 65F 55M? No   Social History Narrative     Not on file       ROS Pulmonary  A complete ROS was otherwise negative except as noted in the HPI.  BP (!) 167/93   Pulse 82   Resp 17   Ht 1.549 m (5' 1\")   Wt 47.2 kg (104 lb)   LMP 09/22/2010   SpO2 97%   BMI 19.65 kg/m    Exam:   GENERAL APPEARANCE: Well developed, well nourished, alert, and in no apparent distress. No tobacco smell. No cough, tachypnea  EYES: PERRL, EOMI  HENT: Nasal mucosa with no edema and no hyperemia. No nasal polyps. Mildly tender over frontal and maxillary sinuses bilaterally  MOUTH: Oral mucosa is moist, without any lesions, no tonsillar enlargement, no oropharyngeal exudate.  NECK: supple, no masses, no thyromegaly.  LYMPHATICS: No significant axillary, cervical, or supraclavicular nodes.  RESP: normal inspection, palpation & percussion, good air flow throughout.  No crackles. No rhonchi. No wheezes.  CV: RRR Normal S1, S2, regular rhythm, normal rate. No murmur.  No rub. No gallop. No LE edema.   ABDOMEN:  Deferred  MS: extremities normal. No clubbing. No cyanosis.  SKIN: no rash on limited exam  NEURO: Mentation intact, speech normal, normal strength and tone, normal gait and stance  PSYCH: mentation appears normal. and affect normal/bright  Results:  Recent Results " (from the past 168 hour(s))   General PFT Lab (Please always keep checked)    Collection Time: 05/03/19 11:07 AM   Result Value Ref Range    FVC-Pred 2.81 L    FVC-Pre 2.05 L    FVC-%Pred-Pre 72 %    FEV1-Pre 1.20 L    FEV1-%Pred-Pre 53 %    FEV1FVC-Pred 80 %    FEV1FVC-Pre 59 %    FEFMax-Pred 5.79 L/sec    FEFMax-Pre 3.56 L/sec    FEFMax-%Pred-Pre 61 %    FEF2575-Pred 2.08 L/sec    FEF2575-Pre 0.55 L/sec    EOY1148-%Pred-Pre 26 %    ExpTime-Pre 7.46 sec    FIFMax-Pre 3.48 L/sec    FEV1FEV6-Pred 81 %    FEV1FEV6-Pre 60 %       Assessment and plan:   PFTs done today in clinic were reviewed by me.  They show moderately severe airway obstruction with FEV1/FVC=1.2/2.1 (153/72% predicted, respectively) for a ratio of 59% with scooping of the expiratory limb.  Compared to most recent prior PFTs of 11/09/2018 there are slight and probably not statistically significant decreases in FEV1 and FVC.  These are similar to her values of 03/15/2018.      ASSESSMENT:   1.  Tobacco dependence:  She has quit smoking and I strongly encouraged to continue this.    2.  Moderately severe COPD:  Given the persistence of her not being back at baseline, I discouraged her from discontinuing her Trelegy.  I will give her a course of antibiotics and Avelox was prescribed with the ability to use Levaquin as a substitute if the copay was too high.  Given the difficulties with tolerating either Proventil or Xopenex, I gave her Atrovent handheld inhaler and nebulizer solution for rescue to try.   3.  Chronic rhinosinusitis:  I asked her to re-see her ENT doctor for her persistent symptoms and potential role of postnasal drip and keeping her COPD/asthma exacerbated.   4.  Sweats/fever/question chronic infection:  The patient was given sputum cups to take home and asked to bring specimens of sputum to [facility] for bacterial gram stain and culture, fungal culture and also atypical mycobacterial culture.  She was asked to call Phil Brock  RN, in 10-14 days with an update and I will schedule her back for 6 months, but if she is not improving I will see her sooner.

## 2019-05-03 NOTE — NURSING NOTE
Chief Complaint   Patient presents with     RECHECK     COPD 6 month follow up     Medications reviewed and vital signs taken.   Rao Lamb CMA

## 2019-05-03 NOTE — LETTER
"5/3/2019       RE: Mimi Rivera  [address] [place] [address]     Dear Colleague,    Thank you for referring your patient, Mimi Rivera, to the [facility] at [facility]. Please see a copy of my visit note below.    Reason for Visit  Mimi Rivera is a 63 year old year old female who is being seen for RECHECK (COPD 6 month follow up )    Pulmonary HPI    The patient was seen and examined by Ishmael Bridges     I had the pleasure of seeing Ms. Mimi Rivera today in the [facility] in followup for her moderately severe COPD.  I last saw her on 11/09/2018.  At that time, she had generally been doing well.  She had stopped smoking (much to her credit and surprisingly!) and was having some difficulty with jitteriness with her a Proventil rescue inhaler and nebulizer treatments.  She had intermittent acute exacerbations, but overall, based on PFTs and examination, her pulmonary function was on a plateau.  She was having a flare and I gave her a course of azithromycin and told her to start a Medrol Dosepak if not improved in 5 days.  I gave her a Xopenex nebulizer handheld inhaler to place the Proventil.  She also got the flu vaccine and a PPSV23 at the time of that visit.  I renewed her Wellbutrin because she returned to smoking with stress.      She returns to clinic today and she has quit smoking.  She said she was doing much better on Trelegy but did not tolerate the Xopenex handheld inhaler and nebulizer as it made her feel \"weird.\"  She has continued to have night sweats with possible fevers.  She got a flare of her COPD in early April that partially improved with a course of steroids and antibiotics, but she feels she is not yet back to baseline.  She was coughing up thick yellow and white phlegm at that time and had a CT scan done with PE protocol that did not show " pulmonary emboli or other new lung abnormalities.  She is better than at that time significantly, but continues to feel like she is not as good as she was and is frustrated by this.  She continues to have sinus drainage and some sinus tenderness and plans to see her ENT doctor.  She was upset that when seen at [facility] March, she says that no sputum culture was done in spite of her asking for one.  Otherwise, her general health is unchanged.  I did review her 2019 chest CT PA gram.           Current Outpatient Medications   Medication     amLODIPine (NORVASC) 5 MG tablet     buPROPion (ZYBAN) 150 MG 12 hr tablet     Fluticasone-Umeclidin-Vilant 100-62.5-25 MCG/INH AEPB     levalbuterol (XOPENEX HFA) 45 MCG/ACT Inhaler     levalbuterol (XOPENEX) 1.25 MG/3ML neb solution     No current facility-administered medications for this visit.      Allergies   Allergen Reactions     Penicillins Rash     Past Medical History:   Diagnosis Date     ASCUS on Pap smear     unable to run HPV typing, f/u paps NIL     Chronic rhinosinusitis      COPD (chronic obstructive pulmonary disease) (H)      Depression, anxiety      Herpes     occasional outbreak     Hypertension        Past Surgical History:   Procedure Laterality Date      SECTION      x 3       Social History     Socioeconomic History     Marital status:      Spouse name: Not on file     Number of children: Not on file     Years of education: Not on file     Highest education level: Not on file   Occupational History     Not on file   Social Needs     Financial resource strain: Not on file     Food insecurity:     Worry: Not on file     Inability: Not on file     Transportation needs:     Medical: Not on file     Non-medical: Not on file   Tobacco Use     Smoking status: Former Smoker     Packs/day: 10.00     Types: Cigarettes     Last attempt to quit: 3/19/2015     Years since quittin.1     Smokeless tobacco: Never Used  "  Substance and Sexual Activity     Alcohol use: Yes     Alcohol/week: 1.2 - 1.8 oz     Types: 2 - 3 Standard drinks or equivalent per week     Comment: 2 drinks per wk avg     Drug use: No     Sexual activity: Yes     Partners: Male     Birth control/protection: Surgical     Comment: tubal   Lifestyle     Physical activity:     Days per week: Not on file     Minutes per session: Not on file     Stress: Not on file   Relationships     Social connections:     Talks on phone: Not on file     Gets together: Not on file     Attends Tenriism service: Not on file     Active member of club or organization: Not on file     Attends meetings of clubs or organizations: Not on file     Relationship status: Not on file     Intimate partner violence:     Fear of current or ex partner: Not on file     Emotionally abused: Not on file     Physically abused: Not on file     Forced sexual activity: Not on file   Other Topics Concern     Parent/sibling w/ CABG, MI or angioplasty before 65F 55M? No   Social History Narrative     Not on file       ROS Pulmonary  A complete ROS was otherwise negative except as noted in the HPI.  BP (!) 167/93   Pulse 82   Resp 17   Ht 1.549 m (5' 1\")   Wt 47.2 kg (104 lb)   LMP 09/22/2010   SpO2 97%   BMI 19.65 kg/m     Exam:   GENERAL APPEARANCE: Well developed, well nourished, alert, and in no apparent distress. No tobacco smell. No cough, tachypnea  EYES: PERRL, EOMI  HENT: Nasal mucosa with no edema and no hyperemia. No nasal polyps. Mildly tender over frontal and maxillary sinuses bilaterally  MOUTH: Oral mucosa is moist, without any lesions, no tonsillar enlargement, no oropharyngeal exudate.  NECK: supple, no masses, no thyromegaly.  LYMPHATICS: No significant axillary, cervical, or supraclavicular nodes.  RESP: normal inspection, palpation & percussion, good air flow throughout.  No crackles. No rhonchi. No wheezes.  CV: RRR Normal S1, S2, regular rhythm, normal rate. No murmur.  No rub. " No gallop. No LE edema.   ABDOMEN:  Deferred  MS: extremities normal. No clubbing. No cyanosis.  SKIN: no rash on limited exam  NEURO: Mentation intact, speech normal, normal strength and tone, normal gait and stance  PSYCH: mentation appears normal. and affect normal/bright  Results:  Recent Results (from the past 168 hour(s))   General PFT Lab (Please always keep checked)    Collection Time: 05/03/19 11:07 AM   Result Value Ref Range    FVC-Pred 2.81 L    FVC-Pre 2.05 L    FVC-%Pred-Pre 72 %    FEV1-Pre 1.20 L    FEV1-%Pred-Pre 53 %    FEV1FVC-Pred 80 %    FEV1FVC-Pre 59 %    FEFMax-Pred 5.79 L/sec    FEFMax-Pre 3.56 L/sec    FEFMax-%Pred-Pre 61 %    FEF2575-Pred 2.08 L/sec    FEF2575-Pre 0.55 L/sec    ZPC6397-%Pred-Pre 26 %    ExpTime-Pre 7.46 sec    FIFMax-Pre 3.48 L/sec    FEV1FEV6-Pred 81 %    FEV1FEV6-Pre 60 %       Assessment and plan:   PFTs done today in clinic were reviewed by me.  They show moderately severe airway obstruction with FEV1/FVC=1.2/2.1 (153/72% predicted, respectively) for a ratio of 59% with scooping of the expiratory limb.  Compared to most recent prior PFTs of 11/09/2018 there are slight and probably not statistically significant decreases in FEV1 and FVC.  These are similar to her values of 03/15/2018.      ASSESSMENT:   1.  Tobacco dependence:  She has quit smoking and I strongly encouraged to continue this.    2.  Moderately severe COPD:  Given the persistence of her not being back at baseline, I discouraged her from discontinuing her Trelegy.  I will give her a course of antibiotics and Avelox was prescribed with the ability to use Levaquin as a substitute if the copay was too high.  Given the difficulties with tolerating either Proventil or Xopenex, I gave her Atrovent handheld inhaler and nebulizer solution for rescue to try.   3.  Chronic rhinosinusitis:  I asked her to re-see her ENT doctor for her persistent symptoms and potential role of postnasal drip and keeping her  COPD/asthma exacerbated.   4.  Sweats/fever/question chronic infection:  The patient was given sputum cups to take home and asked to bring specimens of sputum to [facility] for bacterial gram stain and culture, fungal culture and also atypical mycobacterial culture.  She was asked to call Phil Brock RN, in 10-14 days with an update and I will schedule her back for 6 months, but if she is not improving I will see her sooner.    Again, thank you for allowing me to participate in the care of your patient.      Sincerely,    Ishmael Bridges MD

## 2019-05-03 NOTE — PATIENT INSTRUCTIONS
Congratulations on quitting smoking!!!    Course of antibiotics - avelox (or levaquin)  Continue Trelegy  See ENT MD for persistent sinus symptoms    Try using Atrovent handheld inhaler and nebulizer for 'rescue' instead of Xopenex (given symptoms with xopenex)    Sputum cups - bring specimen for stains and culture for bacteria, fungus and mycobacteria    Call Phil in 10-14 days with update

## 2019-05-14 DIAGNOSIS — J42 CHRONIC BRONCHITIS, UNSPECIFIED CHRONIC BRONCHITIS TYPE (H): ICD-10-CM

## 2019-06-17 DIAGNOSIS — J42 CHRONIC BRONCHITIS, UNSPECIFIED CHRONIC BRONCHITIS TYPE (H): ICD-10-CM

## 2019-07-29 ENCOUNTER — TELEPHONE (OUTPATIENT)
Dept: FAMILY MEDICINE | Facility: CLINIC | Age: 64
End: 2019-07-29

## 2019-07-29 NOTE — TELEPHONE ENCOUNTER
Needs of attention regarding:  -Breast Cancer Screening  -Cervical Cancer Screening  -Wellness (Physical) Visit     Health Maintenance Topics with due status: Overdue       Topic Date Due    FIT 05/29/1967    HIV SCREENING 05/29/1972    ZOSTER IMMUNIZATION 05/29/2007    PREVENTIVE CARE VISIT 02/25/2014    PAP 02/25/2016    PHQ-9 06/11/2018    MAMMO SCREENING 04/20/2019       Communication:  See Letter

## 2019-07-29 NOTE — LETTER
[facility]  [address]  [place] [address] [phone]  July 29, 2019    Mimi Rivera  [address] [facility] [address]    Dear Mimi,    I care about your health and have reviewed your health plan. I have reviewed your medical conditions, medication list, and lab results and am making recommendations based on this review, to better manage your health.    You are in particular need of attention regarding:  -Breast Cancer Screening  -Cervical Cancer Screening  -Wellness (Physical) Visit     I am recommending that you:  -schedule a WELLNESS (Physical) APPOINTMENT with me.   I will check fasting labs the same day - nothing to eat except water and meds for 8-10 hours prior.    -schedule a MAMMOGRAM which is due.    1 in 8 women will develop invasive breast cancer during her lifetime and it is the most common non-skin cancer in American women.  EARLY detection, new treatments, and a better understanding of the disease have increased survival rates - the 5 year survival rate in the 1960s was 63% and today it is close to 90%.    If you are under/uninsured, we recommend you contact the [facility]. They offer mammograms at no charge or on a sliding fee charge. You can schedule with them at [phone]. Please have them send us the results.      Please disregard this reminder if you have had this exam elsewhere within the last year.  It would be helpful for us to have a copy of your mammogram report in your file so that we can best coordinate your care - please contact us with when your test was done so we can update your record.             -schedule a PAP SMEAR EXAM which is due.  Please disregard this reminder if you have had this exam elsewhere within the last year.  It would be helpful for us to have a copy of your recent pap smear report in our file so that we can best coordinate your care.    If you are under/uninsured, we recommend you contact the [facility].  They offer pap smears at no charge or on a sliding fee charge. You can schedule with them at [phone]. Please have them send us the results.      Here is a list of Health Maintenance topics that are due now or due soon:  Health Maintenance Due   Topic Date Due     FIT  05/29/1967     HIV SCREENING  05/29/1972     ZOSTER IMMUNIZATION (1 of 2) 05/29/2007     PREVENTIVE CARE VISIT  02/25/2014     PAP  02/25/2016     PHQ-9  06/11/2018     MAMMO SCREENING  04/20/2019       Please call us at [phone] (or use [employer]) to address the above recommendations.     Thank you for trusting [facility] and we appreciate the opportunity to serve you.  We look forward to supporting your healthcare needs in the future.    Healthy Regards,    Jodee Paz PA-C

## 2019-09-29 ENCOUNTER — HEALTH MAINTENANCE LETTER (OUTPATIENT)
Age: 64
End: 2019-09-29

## 2019-10-28 ENCOUNTER — HEALTH MAINTENANCE LETTER (OUTPATIENT)
Age: 64
End: 2019-10-28

## 2019-11-29 ENCOUNTER — TELEPHONE (OUTPATIENT)
Dept: PULMONOLOGY | Facility: CLINIC | Age: 64
End: 2019-11-29

## 2019-11-29 DIAGNOSIS — J44.9 COPD (CHRONIC OBSTRUCTIVE PULMONARY DISEASE) (H): ICD-10-CM

## 2019-11-29 DIAGNOSIS — J44.1 OBSTRUCTIVE CHRONIC BRONCHITIS WITH EXACERBATION (H): Primary | ICD-10-CM

## 2019-11-29 RX ORDER — AZITHROMYCIN 500 MG/1
500 TABLET, FILM COATED ORAL DAILY
Qty: 14 TABLET | Refills: 0 | Status: SHIPPED | OUTPATIENT
Start: 2019-11-29 | End: 2020-04-03

## 2019-11-29 NOTE — TELEPHONE ENCOUNTER
Pharmacy requesting to refill Azithromycin 500MG.  This medication is not on the patient's list.    Pharmacy: [facility]  Phone: [phone]  Fax: [phone]

## 2019-11-29 NOTE — TELEPHONE ENCOUNTER
Patient returned call to clinic regarding request for azithromycin. Pt reports she has had a productive cough with thick green sputum for about a week. Endorses low grade fevers. Denies chest pain, chills, or night sweats.     Mimi reports she had an old prescription of azithromycin with two tablets remaining at home. She started taking the medication two days ago and when trying to fill today at her pharmacy she was told the prescription  earlier this month. Will issue a 12 day course of azithromycin to patient so she can complete her 14 day course. Asked Mimi to follow up with our office late next week and let us know how she is doing.

## 2019-11-29 NOTE — TELEPHONE ENCOUNTER
Voicemail left for patient. Requested pt to return call to discuss need for azithromycin. Per chart review pt should have available refills of levaquin at her pharmacy.

## 2020-01-06 DIAGNOSIS — J42 ACUTE EXACERBATION OF CHRONIC BRONCHITIS (H): ICD-10-CM

## 2020-01-06 DIAGNOSIS — J20.9 ACUTE EXACERBATION OF CHRONIC BRONCHITIS (H): ICD-10-CM

## 2020-01-06 DIAGNOSIS — J42 CHRONIC BRONCHITIS, UNSPECIFIED CHRONIC BRONCHITIS TYPE (H): ICD-10-CM

## 2020-01-06 NOTE — TELEPHONE ENCOUNTER
Voicemail left for patient, need clarification on refill request. Per chart review, at last office visit in May pt was not tolerating levalbuterol so atrovent (hfa and nebs) were prescribed as an alternative. Asked pt to return call to clinic to discuss.

## 2020-01-13 ENCOUNTER — TELEPHONE (OUTPATIENT)
Dept: PULMONOLOGY | Facility: CLINIC | Age: 65
End: 2020-01-13

## 2020-01-13 DIAGNOSIS — J44.1 COPD EXACERBATION (H): ICD-10-CM

## 2020-01-13 DIAGNOSIS — J44.9 CHRONIC OBSTRUCTIVE PULMONARY DISEASE, UNSPECIFIED COPD TYPE (H): ICD-10-CM

## 2020-01-13 DIAGNOSIS — J43.1 PANLOBULAR EMPHYSEMA (H): ICD-10-CM

## 2020-01-13 DIAGNOSIS — J42 CHRONIC BRONCHITIS, UNSPECIFIED CHRONIC BRONCHITIS TYPE (H): ICD-10-CM

## 2020-01-13 RX ORDER — LEVALBUTEROL INHALATION SOLUTION 1.25 MG/3ML
1 SOLUTION RESPIRATORY (INHALATION) EVERY 4 HOURS PRN
Qty: 270 ML | Refills: 4 | Status: SHIPPED | OUTPATIENT
Start: 2020-01-13 | End: 2021-02-19

## 2020-01-13 RX ORDER — LEVALBUTEROL TARTRATE 45 UG/1
2 AEROSOL, METERED ORAL EVERY 4 HOURS PRN
Qty: 1 INHALER | Refills: 4 | Status: SHIPPED | OUTPATIENT
Start: 2020-01-13 | End: 2020-09-28

## 2020-01-13 NOTE — TELEPHONE ENCOUNTER
Patient requesting Xopenex instead of Atrovent. Switched her back to Xopenex inhaler and Xopenex nebs and canceled Atrovent inhaler and Atrovent neb per patient request.

## 2020-01-15 ENCOUNTER — TELEPHONE (OUTPATIENT)
Dept: PULMONOLOGY | Facility: CLINIC | Age: 65
End: 2020-01-15

## 2020-01-15 NOTE — TELEPHONE ENCOUNTER
Prior Authorization Retail Medication Request    Medication/Dose: levalbuterol (XOPENEX HFA) 45 MCG/ACT inhaler  ICD code (if different than what is on RX):  Previously Tried and Failed: Albuterol and Atrovent  Rationale: pt states Xopenex is better than both Atrovent and Albuterol    Insurance Name:  Insurance ID:      Pharmacy Information (if different than what is on RX)  Name:   Phone:

## 2020-01-16 NOTE — TELEPHONE ENCOUNTER
Central Prior Authorization Team   [phone]    PA Initiation    Medication: levalbuterol (XOPENEX HFA) 45 MCG/ACT inhaler  Insurance Company: [employer] - Phone [phone] Fax [phone]  Pharmacy Filling the Rx: [id number] - [place], [place] - [address] [address]  Filling Pharmacy Phone: [phone]  Filling Pharmacy Fax: [phone]  Start Date: 1/16/2020

## 2020-01-23 NOTE — TELEPHONE ENCOUNTER
Prior Authorization Approval    Authorization Effective Date: 12/17/2019  Authorization Expiration Date: 1/15/2021  Medication: levalbuterol (XOPENEX HFA) 45 MCG/ACT inhaler-PA APPROVED   Approved Dose/Quantity:   Reference #: CASE ID # [id number]   Insurance Company: [employer] - Phone [phone] Fax [phone]  Expected CoPay:       CoPay Card Available:      Foundation Assistance Needed:    Which Pharmacy is filling the prescription (Not needed for infusion/clinic administered): [id number] - [place], [place] - [address] [address]  Pharmacy Notified: Yes- **Instructed pharmacy to notify patient when script is ready to /ship.**   Patient Notified: Yes

## 2020-03-10 DIAGNOSIS — I10 HYPERTENSION GOAL BP (BLOOD PRESSURE) < 140/90: ICD-10-CM

## 2020-03-11 NOTE — TELEPHONE ENCOUNTER
"Requested Prescriptions   Pending Prescriptions Disp Refills     amLODIPine (NORVASC) 5 MG tablet [Pharmacy Med Name: amLODIPine Besylate Oral Tablet 5 MG]    Last Written Prescription Date:  4/5/19  Last Fill Quantity: 90 tablet,  # refills: 1   Last office visit: 4/5/2019 with prescribing provider:  Alfred     Future Office Visit:     90 tablet 0     Sig: Take 1 tablet (5 mg) by mouth daily       Calcium Channel Blockers Protocol  Failed - 3/10/2020  2:51 PM        Failed - Blood pressure under 140/90 in past 12 months     BP Readings from Last 3 Encounters:   05/03/19 (!) 167/93   04/05/19 144/82   03/18/19 (!) 151/94                 Passed - Recent (12 mo) or future (30 days) visit within the authorizing provider's specialty     Patient has had an office visit with the authorizing provider or a provider within the authorizing providers department within the previous 12 mos or has a future within next 30 days. See \"Patient Info\" tab in inbasket, or \"Choose Columns\" in Meds & Orders section of the refill encounter.              Passed - Medication is active on med list        Passed - Patient is age 18 or older        Passed - No active pregnancy on record        Passed - Normal serum creatinine on file in past 12 months     Recent Labs   Lab Test 03/18/19  1542   CR 0.61             Passed - No positive pregnancy test in past 12 months           "

## 2020-03-12 RX ORDER — AMLODIPINE BESYLATE 5 MG/1
5 TABLET ORAL DAILY
Qty: 30 TABLET | Refills: 0 | Status: SHIPPED | OUTPATIENT
Start: 2020-03-12 | End: 2020-04-03

## 2020-03-13 NOTE — TELEPHONE ENCOUNTER
30 day refill signed. Patient due for OV. Please help schedule appointment.    Xu Simon DO  3/12/2020 10:06 PM

## 2020-04-03 ENCOUNTER — TELEPHONE (OUTPATIENT)
Dept: FAMILY MEDICINE | Facility: CLINIC | Age: 65
End: 2020-04-03

## 2020-04-03 ENCOUNTER — OFFICE VISIT (OUTPATIENT)
Dept: FAMILY MEDICINE | Facility: CLINIC | Age: 65
End: 2020-04-03
Payer: COMMERCIAL

## 2020-04-03 VITALS
DIASTOLIC BLOOD PRESSURE: 86 MMHG | TEMPERATURE: 98 F | SYSTOLIC BLOOD PRESSURE: 158 MMHG | WEIGHT: 112 LBS | OXYGEN SATURATION: 96 % | HEIGHT: 61 IN | BODY MASS INDEX: 21.14 KG/M2 | HEART RATE: 94 BPM

## 2020-04-03 DIAGNOSIS — R12 HEART BURN: ICD-10-CM

## 2020-04-03 DIAGNOSIS — I10 HYPERTENSION GOAL BP (BLOOD PRESSURE) < 140/90: Primary | ICD-10-CM

## 2020-04-03 PROCEDURE — 99214 OFFICE O/P EST MOD 30 MIN: CPT | Performed by: FAMILY MEDICINE

## 2020-04-03 RX ORDER — AMLODIPINE BESYLATE 10 MG/1
10 TABLET ORAL DAILY
Qty: 90 TABLET | Refills: 0 | Status: SHIPPED | OUTPATIENT
Start: 2020-04-03 | End: 2020-07-29

## 2020-04-03 ASSESSMENT — MIFFLIN-ST. JEOR: SCORE: 1005.41

## 2020-04-03 NOTE — TELEPHONE ENCOUNTER
Reason for Call:  Other appointment    Detailed comments: Patient calling, states she has an appointment today but is worried about coming in to the clinic. Patient would like to know if she needs to come in for the BP check or what her other options are.    Phone Number Patient can be reached at: Cell number on file:    Telephone Information:   Mobile [phone]       Best Time: anytime    Can we leave a detailed message on this number? NO    Call taken on 4/3/2020 at 11:56 AM by Jorge ANAYA

## 2020-04-03 NOTE — PROGRESS NOTES
Subjective     Mimi Rivera is a 64 year old female who presents to clinic today for the following health issues:    HPI   Hypertension Follow-up      Do you check your blood pressure regularly outside of the clinic? No     Are you following a low salt diet? Yes    Are your blood pressures ever more than 140 on the top number (systolic) OR more   than 90 on the bottom number (diastolic), for example 140/90? No - does not check      How many servings of fruits and vegetables do you eat daily?  2-3    On average, how many sweetened beverages do you drink each day (Examples: soda, juice, sweet tea, etc.  Do NOT count diet or artificially sweetened beverages)?   1    How many days per week do you exercise enough to make your heart beat faster? 4    How many minutes a day do you exercise enough to make your heart beat faster? 20 - 29    How many days per week do you miss taking your medication? 0    Denies any headaches, lightheadedness, dizziness, vision changes, chest pain, palpitations, shortness of breath, dyspnea, numbness/tingling.      Heart burn: She said that lately she has been waking up with heartburn.  Having more issues with burning sensation in her stomach and reflux.  This is new issue for her.  No nausea or vomiting.  Denies any black or bloody stools.  She has not noticed any association with certain foods or time of day.      Patient Active Problem List   Diagnosis     CARDIOVASCULAR SCREENING; LDL GOAL LESS THAN 160     Hypertension goal BP (blood pressure) < 140/90     COPD (chronic obstructive pulmonary disease) (H)     Moderate major depression (H)     Former smoker     Advanced directives, counseling/discussion     Past Surgical History:   Procedure Laterality Date      SECTION      x 3       Social History     Tobacco Use     Smoking status: Former Smoker     Packs/day: 10.00     Types: Cigarettes     Last attempt to quit: 3/19/2015     Years since quittin.0     Smokeless tobacco:  "Never Used   Substance Use Topics     Alcohol use: Yes     Alcohol/week: 2.0 - 3.0 standard drinks     Types: 2 - 3 Standard drinks or equivalent per week     Comment: 2 drinks per wk avg     Family History   Problem Relation Age of Onset     Hypertension Mother      Cancer Mother         liver     C.A.D. Father            Reviewed and updated as needed this visit by Provider         Review of Systems   ROS COMP: Constitutional, HEENT, cardiovascular, pulmonary, gi and gu systems are negative, except as otherwise noted.      Objective    BP (!) 158/86   Pulse 94   Temp 98  F (36.7  C) (Oral)   Ht 1.549 m (5' 1\")   Wt 50.8 kg (112 lb)   LMP 09/22/2010   SpO2 96%   BMI 21.16 kg/m    Body mass index is 21.16 kg/m .  Physical Exam   GENERAL: healthy, alert and no distress  RESP: lungs clear to auscultation - no rales, rhonchi or wheezes  CV: regular rate and rhythm, normal S1 S2, no S3 or S4, no murmur, click or rub, no peripheral edema and peripheral pulses strong  MS: no gross musculoskeletal defects noted, no edema  PSYCH: mentation appears normal, affect normal/bright    Diagnostic Test Results:  Labs reviewed in Epic        Assessment & Plan     1. Hypertension goal BP (blood pressure) < 140/90: BP not at goal.  She is asymptomatic.  Advised increasing amlodipine to 10 mg daily.  Try getting blood pressure cuff to check blood pressure at home.  If blood pressure still above goal in 2 to 3 weeks, will need to add second medication.  - amLODIPine (NORVASC) 10 MG tablet; Take 1 tablet (10 mg) by mouth daily  Dispense: 90 tablet; Refill: 0    2. Heart burn: Her symptoms do sound consistent with GERD.  Will start omeprazole daily for the next 4 to 6 weeks.  If symptoms not improving, could consider increasing dose or referral for endoscopy.  Discussed potential triggers including caffeine, spicy foods, citrus.  Can try elevating head of bed at night.  Follow-up in 1 to 2 months.  She will also be due for multiple " preventative cares at that time.  - omeprazole (PRILOSEC) 20 MG DR capsule; Take 1 capsule (20 mg) by mouth daily  Dispense: 60 capsule; Refill: 0       Xu Simon DO  [facility]

## 2020-04-29 DIAGNOSIS — J42 CHRONIC BRONCHITIS, UNSPECIFIED CHRONIC BRONCHITIS TYPE (H): ICD-10-CM

## 2020-07-04 DIAGNOSIS — J42 CHRONIC BRONCHITIS, UNSPECIFIED CHRONIC BRONCHITIS TYPE (H): ICD-10-CM

## 2020-07-17 ENCOUNTER — VIRTUAL VISIT (OUTPATIENT)
Dept: PULMONOLOGY | Facility: CLINIC | Age: 65
End: 2020-07-17
Attending: INTERNAL MEDICINE
Payer: COMMERCIAL

## 2020-07-17 DIAGNOSIS — J44.9 CHRONIC OBSTRUCTIVE PULMONARY DISEASE, UNSPECIFIED COPD TYPE (H): ICD-10-CM

## 2020-07-17 DIAGNOSIS — J43.1 PANLOBULAR EMPHYSEMA (H): ICD-10-CM

## 2020-07-17 DIAGNOSIS — J42 ACUTE EXACERBATION OF CHRONIC BRONCHITIS (H): ICD-10-CM

## 2020-07-17 DIAGNOSIS — J44.1 OBSTRUCTIVE CHRONIC BRONCHITIS WITH EXACERBATION (H): Primary | ICD-10-CM

## 2020-07-17 DIAGNOSIS — J20.9 ACUTE EXACERBATION OF CHRONIC BRONCHITIS (H): ICD-10-CM

## 2020-07-17 RX ORDER — LEVOFLOXACIN 500 MG/1
500 TABLET, FILM COATED ORAL DAILY
Qty: 10 TABLET | Refills: 3 | Status: SHIPPED | OUTPATIENT
Start: 2020-07-17 | End: 2020-09-28

## 2020-07-17 RX ORDER — METHYLPREDNISOLONE 4 MG
TABLET, DOSE PACK ORAL
Qty: 21 TABLET | Refills: 3 | Status: SHIPPED | OUTPATIENT
Start: 2020-07-17 | End: 2020-09-28

## 2020-07-17 NOTE — PATIENT INSTRUCTIONS
Moderately - Severe COPD with Recent Flare-Up    The trelegy seems to control your COPD pretty well with rescue albuterol inhaler available.    For the recent flare-up, medications prescribed:  Medrol DosePak and 10 days of Levofloxacin    Refills available.    Get a flu shot this fall.  Continue COVID-19 precautions (mask / physical distancing)    Return to clinic in ~ 1 year.  For problems in the interim, please call our clinic staff (Phil Brock RN or colleagues [phone]).

## 2020-07-17 NOTE — PROGRESS NOTES
"Mimi Rivera is a 65 year old female who is being evaluated via a billable telephone visit.      The patient has been notified of following:     \"This telephone visit will be conducted via a call between you and your physician/provider. We have found that certain health care needs can be provided without the need for a physical exam.  This service lets us provide the care you need with a short phone conversation.  If a prescription is necessary we can send it directly to your pharmacy.  If lab work is needed we can place an order for that and you can then stop by our lab to have the test done at a later time.    Telephone visits are billed at different rates depending on your insurance coverage. During this emergency period, for some insurers they may be billed the same as an in-person visit.  Please reach out to your insurance provider with any questions.    If during the course of the call the physician/provider feels a telephone visit is not appropriate, you will not be charged for this service.\"    Patient has given verbal consent for Telephone visit?  Yes    What phone number would you like to be contacted at? [phone]    How would you like to obtain your AVS? Mail a copy     Reason for Visit  Mimi Rivera is a 65 year old year old female who is being seen for moderately severe COPD    Pulmonary HPI    The patient was seen and examined by Ishmael Bridges MD     Last seen 5/3/2019 for her COPD doing well on trelegy.  Xopenex made her feel weird so that was stopped..  Had quit smoking (and this has continued).      Saw ENT in interim and dx'd with chronic sinusitus and was given a long course of Abx and steroids with clearing up.    She is staying busy gardening and taking care of her grandchildren.  Her breathing   It's been a bit worse in past 1-2 weeks with more cough and getting up more 'thicker stuff'.  Using albuterol only the last few days at least twice a day.    Current meds are: Trelegy, " Amlodopine, and albuterol    Allergic to penicillins.  Has taken cephalosporin without difficulty.    Current Outpatient Medications   Medication     amLODIPine (NORVASC) 10 MG tablet     buPROPion (ZYBAN) 150 MG 12 hr tablet     levalbuterol (XOPENEX HFA) 45 MCG/ACT inhaler     levalbuterol (XOPENEX) 1.25 MG/3ML neb solution     omeprazole (PRILOSEC) 20 MG DR capsule     TRELEGY ELLIPTA 100-62.5-25 MCG/INH oral inhaler     No current facility-administered medications for this visit.      Allergies   Allergen Reactions     Penicillins Rash     Past Medical History:   Diagnosis Date     ASCUS on Pap smear     unable to run HPV typing, f/u paps NIL     Chronic rhinosinusitis      COPD (chronic obstructive pulmonary disease) (H)      Depression, anxiety      Herpes     occasional outbreak     Hypertension        Past Surgical History:   Procedure Laterality Date      SECTION      x 3       Social History     Socioeconomic History     Marital status:      Spouse name: Not on file     Number of children: Not on file     Years of education: Not on file     Highest education level: Not on file   Occupational History     Not on file   Social Needs     Financial resource strain: Not on file     Food insecurity     Worry: Not on file     Inability: Not on file     Transportation needs     Medical: Not on file     Non-medical: Not on file   Tobacco Use     Smoking status: Former Smoker     Packs/day: 10.00     Types: Cigarettes     Last attempt to quit: 3/19/2015     Years since quittin.3     Smokeless tobacco: Never Used   Substance and Sexual Activity     Alcohol use: Yes     Alcohol/week: 2.0 - 3.0 standard drinks     Types: 2 - 3 Standard drinks or equivalent per week     Comment: 2 drinks per wk avg     Drug use: No     Sexual activity: Yes     Partners: Male     Birth control/protection: Surgical     Comment: tubal   Lifestyle     Physical activity     Days per week: Not on file     Minutes per  session: Not on file     Stress: Not on file   Relationships     Social connections     Talks on phone: Not on file     Gets together: Not on file     Attends Pentecostal service: Not on file     Active member of club or organization: Not on file     Attends meetings of clubs or organizations: Not on file     Relationship status: Not on file     Intimate partner violence     Fear of current or ex partner: Not on file     Emotionally abused: Not on file     Physically abused: Not on file     Forced sexual activity: Not on file   Other Topics Concern     Parent/sibling w/ CABG, MI or angioplasty before 65F 55M? No   Social History Narrative     Not on file       ROS Pulmonary  A complete ROS was otherwise negative except as noted in the HPI.  LMP 09/22/2010   Exam:   GENERAL APPEARANCE: Well developed, well nourished, alert, and in no apparent distress.  NEURO: Mentation intact, speech normal, normal strength and tone, normal gait and stance  PSYCH: mentation appears normal. and affect normal/bright  Results:  No results found for this or any previous visit (from the past 168 hour(s)).    Assessment and plan:   COPD with recent moderate exacerbation    Her baseline Moderately Severe COPD is reasonably controlled with Trelegy.    Will refill meds for the exacerbation - Medrol DosePak and Levoflox 500 mgs/day x 10 days    Flu shot this fall    Continue not to smoke.    RTC 1 year and call sooner if problems in interim.    Phone call duration: 28 minutes    Ishmael Bridges MD

## 2020-07-17 NOTE — LETTER
7/17/2020     RE: Mimi Rivera  [address] [place] [address]    Dear Colleague,    Thank you for referring your patient, Mimi Rivera, to the [facility]. Please see a copy of my visit note below.    Mimi Rivera is a 65 year old female who is being evaluated via a billable telephone visit.      Reason for Visit  Mimi Rivera is a 65 year old year old female who is being seen for moderately severe COPD    Pulmonary HPI    The patient was seen and examined by Ishmael Bridges MD     Last seen 5/3/2019 for her COPD doing well on trelegy.  Xopenex made her feel weird so that was stopped..  Had quit smoking (and this has continued).      Saw ENT in interim and dx'd with chronic sinusitus and was given a long course of Abx and steroids with clearing up.    She is staying busy gardening and taking care of her grandchildren.  Her breathing   It's been a bit worse in past 1-2 weeks with more cough and getting up more 'thicker stuff'.  Using albuterol only the last few days at least twice a day.    Current meds are: Trelegy, Amlodopine, and albuterol    Allergic to penicillins.  Has taken cephalosporin without difficulty.    Current Outpatient Medications   Medication     amLODIPine (NORVASC) 10 MG tablet     buPROPion (ZYBAN) 150 MG 12 hr tablet     levalbuterol (XOPENEX HFA) 45 MCG/ACT inhaler     levalbuterol (XOPENEX) 1.25 MG/3ML neb solution     omeprazole (PRILOSEC) 20 MG DR capsule     TRELEGY ELLIPTA 100-62.5-25 MCG/INH oral inhaler     No current facility-administered medications for this visit.      Allergies   Allergen Reactions     Penicillins Rash     Past Medical History:   Diagnosis Date     ASCUS on Pap smear 2006    unable to run HPV typing, f/u paps NIL     Chronic rhinosinusitis      COPD (chronic obstructive pulmonary disease) (H)      Depression, anxiety      Herpes     occasional outbreak     Hypertension        Past Surgical History:   Procedure Laterality  Date      SECTION      x 3       Social History     Socioeconomic History     Marital status:      Spouse name: Not on file     Number of children: Not on file     Years of education: Not on file     Highest education level: Not on file   Occupational History     Not on file   Social Needs     Financial resource strain: Not on file     Food insecurity     Worry: Not on file     Inability: Not on file     Transportation needs     Medical: Not on file     Non-medical: Not on file   Tobacco Use     Smoking status: Former Smoker     Packs/day: 10.00     Types: Cigarettes     Last attempt to quit: 3/19/2015     Years since quittin.3     Smokeless tobacco: Never Used   Substance and Sexual Activity     Alcohol use: Yes     Alcohol/week: 2.0 - 3.0 standard drinks     Types: 2 - 3 Standard drinks or equivalent per week     Comment: 2 drinks per wk avg     Drug use: No     Sexual activity: Yes     Partners: Male     Birth control/protection: Surgical     Comment: tubal   Lifestyle     Physical activity     Days per week: Not on file     Minutes per session: Not on file     Stress: Not on file   Relationships     Social connections     Talks on phone: Not on file     Gets together: Not on file     Attends Gnosticism service: Not on file     Active member of club or organization: Not on file     Attends meetings of clubs or organizations: Not on file     Relationship status: Not on file     Intimate partner violence     Fear of current or ex partner: Not on file     Emotionally abused: Not on file     Physically abused: Not on file     Forced sexual activity: Not on file   Other Topics Concern     Parent/sibling w/ CABG, MI or angioplasty before 65F 55M? No   Social History Narrative     Not on file       ROS Pulmonary  A complete ROS was otherwise negative except as noted in the HPI.  LMP 2010   Exam:   GENERAL APPEARANCE: Well developed, well nourished, alert, and in no apparent distress.  NEURO:  Mentation intact, speech normal, normal strength and tone, normal gait and stance  PSYCH: mentation appears normal. and affect normal/bright  Results:  No results found for this or any previous visit (from the past 168 hour(s)).    Assessment and plan:   COPD with recent moderate exacerbation    Her baseline Moderately Severe COPD is reasonably controlled with Trelegy.    Will refill meds for the exacerbation - Medrol DosePak and Levoflox 500 mgs/day x 10 days    Flu shot this fall    Continue not to smoke.    RTC 1 year and call sooner if problems in interim.    Phone call duration: 28 minutes    Ishmael Bridges MD

## 2020-07-28 DIAGNOSIS — I10 HYPERTENSION GOAL BP (BLOOD PRESSURE) < 140/90: ICD-10-CM

## 2020-07-28 NOTE — LETTER
[facility]  [address]  [facility] [address] [phone]  July 29, 2020    Mimi Rivera  [address]  [facility] [place] [address]    Dear Mimi,    We received a refill request from your pharmacy for your  medication.  At this time the nurses were able to give you a cayden refill, but you are due to be seen for an office visit before the next refill.  This appointment can be scheduled by calling [phone] or can be scheduled via [employer] as well.  Here is a list of Health Maintenance topics that are due now or due soon:  Health Maintenance Due   Topic Date Due     ANNUAL REVIEW OF HM ORDERS  1955     COLORECTAL CANCER SCREENING  05/29/1967     HIV SCREENING  05/29/1972     ZOSTER IMMUNIZATION (1 of 2) 05/29/2007     PREVENTIVE CARE VISIT  02/25/2014     PAP  02/25/2016     PHQ-9  06/11/2018     MAMMO SCREENING  04/20/2019     DTAP/TDAP/TD IMMUNIZATION (2 - Td) 09/03/2019       Please call us at [phone] (or use [employer]) to address the above recommendations.     Thank you for trusting [facility] and we appreciate the opportunity to serve you.  We look forward to supporting your healthcare needs in the future.    Healthy Regards,    Xu Simon, DO

## 2020-07-29 RX ORDER — AMLODIPINE BESYLATE 10 MG/1
10 TABLET ORAL DAILY
Qty: 90 TABLET | Refills: 0 | Status: SHIPPED | OUTPATIENT
Start: 2020-07-29 | End: 2020-11-06

## 2020-07-29 NOTE — TELEPHONE ENCOUNTER
90 day refill signed. Patient is due for preventive visit and fasting labs. Please schedule OV.    Xu Simon DO  7/29/2020 9:01 AM

## 2020-07-29 NOTE — TELEPHONE ENCOUNTER
Routing refill request to provider for review/approval because:  Labs out of range:  BP  Labs not current: Cr    Calcium Channel Blockers Protocol  Failed        Blood pressure under 140/90 in past 12 months         BP Readings from Last 3 Encounters:   04/03/20 (!) 158/86   05/03/19 (!) 167/93   04/05/19 144/82             Normal serum creatinine on file in past 12 months         Recent Labs   Lab Test 03/18/19  1542   CR 0.61          Phoebe Ernst R.N.

## 2020-09-01 ENCOUNTER — TELEPHONE (OUTPATIENT)
Dept: FAMILY MEDICINE | Facility: CLINIC | Age: 65
End: 2020-09-01

## 2020-09-01 NOTE — TELEPHONE ENCOUNTER
Needs of attention regarding:  -Depression  -Breast Cancer Screening  -Cervical Cancer Screening  -Wellness (Physical) Visit     Health Maintenance Topics with due status: Overdue       Topic Date Due    ANNUAL REVIEW OF HM ORDERS 1955    COLORECTAL CANCER SCREENING 05/29/1967    HIV SCREENING 05/29/1972    ZOSTER IMMUNIZATION 05/29/2007    PREVENTIVE CARE VISIT 02/25/2014    PAP 02/25/2016    PHQ-9 06/11/2018    MAMMO SCREENING 04/20/2019    DTAP/TDAP/TD IMMUNIZATION 09/03/2019    INFLUENZA VACCINE 09/01/2020       Communication:  See Letter

## 2020-09-01 NOTE — LETTER
[facility]  [address]  [place] [address] [phone]  September 1, 2020    Mimi Rivera  [address] [place] [address]    Dear Mimi,    I care about your health and have reviewed your health plan. I have reviewed your medical conditions, medication list, and lab results and am making recommendations based on this review, to better manage your health.    You are in particular need of attention regarding:  -Breast Cancer Screening  -Colon Cancer Screening  -Cervical Cancer Screening  -Wellness (Physical) Visit     I am recommending that you:  -schedule a WELLNESS (Physical) APPOINTMENT with me.   I will check fasting labs the same day - nothing to eat except water and meds for 8-10 hours prior.    -schedule a MAMMOGRAM which is due.    1 in 8 women will develop invasive breast cancer during her lifetime and it is the most common non-skin cancer in [place] women.  EARLY detection, new treatments, and a better understanding of the disease have increased survival rates - the 5 year survival rate in the 1960s was 63% and today it is close to 90%.    If you are under/uninsured, we recommend you contact the [facility]. They offer mammograms at no charge or on a sliding fee charge. You can schedule with them at [phone]. Please have them send us the results.      Please disregard this reminder if you have had this exam elsewhere within the last year.  It would be helpful for us to have a copy of your mammogram report in your file so that we can best coordinate your care - please contact us with when your test was done so we can update your record.             -schedule a COLONOSCOPY to look for colon cancer (due every 10 years or 5 years in higher risk situations.)        Colon cancer is now the second leading cause of cancer-related deaths in the [place] for both men and women and there are over 130,000 new cases and 50,000 deaths per year from colon  cancer.  Colonoscopies can prevent 90-95% of these deaths.  Problem lesions can be removed before they ever become cancer.  This test is not only looking for cancer, but also getting rid of precancerious lesions.    If you are under/uninsured, we recommend you contact the [facility] program. [facility] is a free colorectal cancer screening program that provides colonoscopies for eligible under/uninsured [place] men and women. If you are interested in receiving a free colonoscopy, please call [facility] at [phone] (mention code [employer]) to see if you re eligible.      If you do not wish to do a colonoscopy or cannot afford to do one, at this time, there is another option. It is called a FIT test or Fecal Immunochemical Occult Blood Test (take home stool sample kit).  It does not replace the colonoscopy for colorectal cancer screening, but it can detect hidden bleeding in the lower colon.  It does need to be repeated every year and if a positive result is obtained, you would be referred for a colonoscopy.          If you have completed either one of these tests at another facility, please call with the details of when and where the tests were done and if they were normal or not. Or have the records sent to our clinic so that we can best coordinate your care.    -schedule a PAP SMEAR EXAM which is due.  Please disregard this reminder if you have had this exam elsewhere within the last year.  It would be helpful for us to have a copy of your recent pap smear report in our file so that we can best coordinate your care.    If you are under/uninsured, we recommend you contact the [facility]. They offer pap smears at no charge or on a sliding fee charge. You can schedule with them at [phone]. Please have them send us the results.      Here is a list of Health Maintenance topics that are due now or due soon:  Health Maintenance Due   Topic Date Due     ANNUAL REVIEW OF HM ORDERS  1955      COLORECTAL CANCER SCREENING  05/29/1967     HIV SCREENING  05/29/1972     ZOSTER IMMUNIZATION (1 of 2) 05/29/2007     PREVENTIVE CARE VISIT  02/25/2014     PAP  02/25/2016     PHQ-9  06/11/2018     MAMMO SCREENING  04/20/2019     DTAP/TDAP/TD IMMUNIZATION (2 - Td) 09/03/2019     INFLUENZA VACCINE (1) 09/01/2020       Please call us at [phone] (or use [employer]) to address the above recommendations.     Thank you for trusting [facility] and we appreciate the opportunity to serve you.  We look forward to supporting your healthcare needs in the future.    Faiza Regards,    [facility] Care Team

## 2020-09-28 ENCOUNTER — OFFICE VISIT (OUTPATIENT)
Dept: FAMILY MEDICINE | Facility: CLINIC | Age: 65
End: 2020-09-28
Payer: COMMERCIAL

## 2020-09-28 VITALS
WEIGHT: 108.6 LBS | TEMPERATURE: 98.5 F | RESPIRATION RATE: 16 BRPM | HEART RATE: 100 BPM | BODY MASS INDEX: 20.52 KG/M2 | DIASTOLIC BLOOD PRESSURE: 75 MMHG | OXYGEN SATURATION: 100 % | SYSTOLIC BLOOD PRESSURE: 140 MMHG

## 2020-09-28 DIAGNOSIS — F41.9 ANXIETY: ICD-10-CM

## 2020-09-28 DIAGNOSIS — Z13.9 SCREENING FOR CONDITION: ICD-10-CM

## 2020-09-28 DIAGNOSIS — K21.9 GASTROESOPHAGEAL REFLUX DISEASE WITHOUT ESOPHAGITIS: Primary | ICD-10-CM

## 2020-09-28 DIAGNOSIS — J44.9 CHRONIC OBSTRUCTIVE PULMONARY DISEASE, UNSPECIFIED COPD TYPE (H): ICD-10-CM

## 2020-09-28 DIAGNOSIS — R12 HEART BURN: ICD-10-CM

## 2020-09-28 DIAGNOSIS — Z12.11 SCREEN FOR COLON CANCER: ICD-10-CM

## 2020-09-28 DIAGNOSIS — I10 HYPERTENSION, UNSPECIFIED TYPE: ICD-10-CM

## 2020-09-28 PROCEDURE — 99214 OFFICE O/P EST MOD 30 MIN: CPT | Performed by: FAMILY MEDICINE

## 2020-09-28 RX ORDER — BUPROPION HYDROCHLORIDE 150 MG/1
150 TABLET, FILM COATED, EXTENDED RELEASE ORAL 2 TIMES DAILY
Qty: 180 TABLET | Refills: 1 | Status: SHIPPED | OUTPATIENT
Start: 2020-09-28 | End: 2020-09-28

## 2020-09-28 RX ORDER — BUPROPION HYDROCHLORIDE 150 MG/1
150 TABLET, FILM COATED, EXTENDED RELEASE ORAL 2 TIMES DAILY
Qty: 180 TABLET | Refills: 1 | Status: SHIPPED | OUTPATIENT
Start: 2020-09-28 | End: 2021-08-30

## 2020-09-28 RX ORDER — LEVALBUTEROL TARTRATE 45 UG/1
2 AEROSOL, METERED ORAL EVERY 4 HOURS PRN
Qty: 1 INHALER | Refills: 0
Start: 2020-09-28 | End: 2020-11-06

## 2020-09-28 ASSESSMENT — PATIENT HEALTH QUESTIONNAIRE - PHQ9
10. IF YOU CHECKED OFF ANY PROBLEMS, HOW DIFFICULT HAVE THESE PROBLEMS MADE IT FOR YOU TO DO YOUR WORK, TAKE CARE OF THINGS AT HOME, OR GET ALONG WITH OTHER PEOPLE: SOMEWHAT DIFFICULT
SUM OF ALL RESPONSES TO PHQ QUESTIONS 1-9: 5
SUM OF ALL RESPONSES TO PHQ QUESTIONS 1-9: 5

## 2020-09-28 ASSESSMENT — ENCOUNTER SYMPTOMS
EYE DISCHARGE: 0
APPETITE CHANGE: 0
NERVOUS/ANXIOUS: 1
DYSURIA: 0
ABDOMINAL PAIN: 0
ABDOMINAL DISTENTION: 0
CONFUSION: 0
HEADACHES: 0
LIGHT-HEADEDNESS: 0
ACTIVITY CHANGE: 0
COUGH: 0

## 2020-09-28 ASSESSMENT — ANXIETY QUESTIONNAIRES
2. NOT BEING ABLE TO STOP OR CONTROL WORRYING: SEVERAL DAYS
5. BEING SO RESTLESS THAT IT IS HARD TO SIT STILL: SEVERAL DAYS
1. FEELING NERVOUS, ANXIOUS, OR ON EDGE: SEVERAL DAYS
7. FEELING AFRAID AS IF SOMETHING AWFUL MIGHT HAPPEN: SEVERAL DAYS
GAD7 TOTAL SCORE: 7
3. WORRYING TOO MUCH ABOUT DIFFERENT THINGS: SEVERAL DAYS
6. BECOMING EASILY ANNOYED OR IRRITABLE: SEVERAL DAYS
4. TROUBLE RELAXING: SEVERAL DAYS
GAD7 TOTAL SCORE: 7
GAD7 TOTAL SCORE: 7
7. FEELING AFRAID AS IF SOMETHING AWFUL MIGHT HAPPEN: SEVERAL DAYS

## 2020-09-28 NOTE — PROGRESS NOTES
Subjective     Mimi Rivera is a 65 year old female who presents to clinic today for the following health issues:     Reflux issues  insomnia    HPI       Hypertension Follow-up      Do you check your blood pressure regularly outside of the clinic? Yes     Are you following a low salt diet? Yes    Are your blood pressures ever more than 140 on the top number (systolic) OR more   than 90 on the bottom number (diastolic), for example 140/90? No      BP Readings from Last 6 Encounters:   09/28/20 (!) 140/75   04/03/20 (!) 158/86   05/03/19 (!) 167/93   04/05/19 144/82   03/18/19 (!) 151/94   03/04/19 120/64     - blood pressure much better controlled since medication dose is increased .  - denies any side effect with amlodipine .    Insomnia    Onset: approx a few week(s) ago     Description:  How long does it take to fall asleep (sleep latency): falls asleep fine, wakes up multiple times during the nigh  Early morning/middle of night awakening: YES                 Excessive snoring/apnea: no  Daytime sleepiness/napping: no    Precipitating  factors:    Any new stressful situation: no  Chronic pain: no  Frequent urination: no  Caffeine intake: no  OTC decongestants: no  Any new medications: no        Leg discomfort: no        Changes in weight: no    History:    History of Anxiety  Alcohol use:YES                 Self medicating with Alcohol/drugs:  no    Therapies tried and outcome:  none     Complaining of gastritis and GERD .    Anxiety - symptoms uncontrolled .  Would like to start bupropion again .  Has noticed sleep disturbed due to anxiety .          Review of Systems   Constitutional: Negative for activity change and appetite change.   HENT: Negative for congestion.    Eyes: Negative for discharge.   Respiratory: Negative for cough.    Cardiovascular: Negative for chest pain and peripheral edema.   Gastrointestinal: Negative for abdominal distention and abdominal pain.   Endocrine: Negative for cold  intolerance and heat intolerance.   Genitourinary: Negative for dysuria.   Neurological: Negative for light-headedness and headaches.   Psychiatric/Behavioral: Positive for mood changes. Negative for confusion. The patient is nervous/anxious.          Objective    BP (!) 140/75 (BP Location: Right arm, Patient Position: Sitting, Cuff Size: Adult Regular)   Pulse 100   Temp 98.5  F (36.9  C) (Oral)   Resp 16   Wt 49.3 kg (108 lb 9.6 oz)   LMP 09/22/2010   SpO2 100%   BMI 20.52 kg/m    Body mass index is 20.52 kg/m .  Physical Exam  Vitals signs and nursing note reviewed.   Constitutional:       Appearance: Normal appearance.   Neck:      Musculoskeletal: Normal range of motion.   Cardiovascular:      Rate and Rhythm: Normal rate and regular rhythm.      Pulses: Normal pulses.   Pulmonary:      Effort: Pulmonary effort is normal.      Breath sounds: Normal breath sounds.   Abdominal:      General: Abdomen is flat.   Musculoskeletal: Normal range of motion.   Skin:     General: Skin is warm.   Neurological:      General: No focal deficit present.      Mental Status: She is alert.   Psychiatric:         Mood and Affect: Mood normal.             Assessment & Plan     Hypertension, unspecified type  - blood pressure mildly elevated .  - Patient want to continue with amlodipine and continue with low salt diet   - recommend to continue to monitor.  - Basic metabolic panel  (Ca, Cl, CO2, Creat, Gluc, K, Na, BUN); Future  - Lipid panel reflex to direct LDL Fasting; Future    Chronic obstructive pulmonary disease, unspecified COPD type (H)  - new nebulizer machine prescription provided .  - levalbuterol (XOPENEX HFA) 45 MCG/ACT inhaler; Inhale 2 puffs into the lungs every 4 hours as needed for shortness of breath / dyspnea or wheezing  - Nebulizer and Supplies Order for DME - ONLY FOR DME    Gastroesophageal reflux disease without esophagitis  - recommend to start omeprazole .  - Discussed dietary modification ,  -  recommend to contact if symptoms fail to improve .  Heart burn  - omeprazole (PRILOSEC) 20 MG DR capsule; Take 1 capsule (20 mg) by mouth daily    Anxiety  - symptoms uncontrolled   - buPROPion (ZYBAN) 150 MG 12 hr tablet; Take 1 tablet (150 mg) by mouth 2 times daily Start with 1 tab daily in am , increase the dose to 1 tablet bid after 1 week .    Screen for colon cancer  - Fecal colorectal cancer screen FIT; Future    Screening for condition  - *MA Screening Digital Bilateral; Future       Return in about 3 months (around 12/28/2020) for with me.    Aye Morejon MD  [facility] [place]    Answers for HPI/ROS submitted by the patient on 9/28/2020   Chronic problems general questions HPI Form  If you checked off any problems, how difficult have these problems made it for you to do your work, take care of things at home, or get along with other people?: Somewhat difficult  PHQ9 TOTAL SCORE: 5  DAMIEN 7 TOTAL SCORE: 7

## 2020-09-29 ASSESSMENT — PATIENT HEALTH QUESTIONNAIRE - PHQ9: SUM OF ALL RESPONSES TO PHQ QUESTIONS 1-9: 5

## 2020-09-29 ASSESSMENT — ANXIETY QUESTIONNAIRES: GAD7 TOTAL SCORE: 7

## 2020-10-05 DIAGNOSIS — J42 CHRONIC BRONCHITIS, UNSPECIFIED CHRONIC BRONCHITIS TYPE (H): ICD-10-CM

## 2020-11-06 ENCOUNTER — OFFICE VISIT (OUTPATIENT)
Dept: FAMILY MEDICINE | Facility: CLINIC | Age: 65
End: 2020-11-06
Payer: COMMERCIAL

## 2020-11-06 VITALS
SYSTOLIC BLOOD PRESSURE: 126 MMHG | HEIGHT: 61 IN | HEART RATE: 91 BPM | OXYGEN SATURATION: 93 % | BODY MASS INDEX: 20.39 KG/M2 | DIASTOLIC BLOOD PRESSURE: 84 MMHG | WEIGHT: 108 LBS | TEMPERATURE: 98.5 F

## 2020-11-06 DIAGNOSIS — J44.9 CHRONIC OBSTRUCTIVE PULMONARY DISEASE, UNSPECIFIED COPD TYPE (H): ICD-10-CM

## 2020-11-06 DIAGNOSIS — R05.9 COUGH: Primary | ICD-10-CM

## 2020-11-06 DIAGNOSIS — I10 HYPERTENSION GOAL BP (BLOOD PRESSURE) < 140/90: ICD-10-CM

## 2020-11-06 PROCEDURE — U0003 INFECTIOUS AGENT DETECTION BY NUCLEIC ACID (DNA OR RNA); SEVERE ACUTE RESPIRATORY SYNDROME CORONAVIRUS 2 (SARS-COV-2) (CORONAVIRUS DISEASE [COVID-19]), AMPLIFIED PROBE TECHNIQUE, MAKING USE OF HIGH THROUGHPUT TECHNOLOGIES AS DESCRIBED BY CMS-2020-01-R: HCPCS | Performed by: FAMILY MEDICINE

## 2020-11-06 PROCEDURE — 99214 OFFICE O/P EST MOD 30 MIN: CPT | Performed by: FAMILY MEDICINE

## 2020-11-06 RX ORDER — LEVALBUTEROL TARTRATE 45 UG/1
2 AEROSOL, METERED ORAL EVERY 4 HOURS PRN
Qty: 1 INHALER | Refills: 1 | Status: SHIPPED | OUTPATIENT
Start: 2020-11-06

## 2020-11-06 RX ORDER — AZITHROMYCIN 250 MG/1
TABLET, FILM COATED ORAL
Qty: 6 TABLET | Refills: 0 | Status: SHIPPED | OUTPATIENT
Start: 2020-11-06 | End: 2020-11-11

## 2020-11-06 RX ORDER — AMLODIPINE BESYLATE 10 MG/1
10 TABLET ORAL DAILY
Qty: 90 TABLET | Refills: 1 | Status: SHIPPED | OUTPATIENT
Start: 2020-11-06 | End: 2021-08-30

## 2020-11-06 RX ORDER — PREDNISONE 20 MG/1
40 TABLET ORAL DAILY
Qty: 10 TABLET | Refills: 0 | Status: SHIPPED | OUTPATIENT
Start: 2020-11-06 | End: 2020-11-11

## 2020-11-06 ASSESSMENT — ENCOUNTER SYMPTOMS
SHORTNESS OF BREATH: 1
WHEEZING: 1
STRIDOR: 0
DYSURIA: 0
FEVER: 0
HEADACHES: 1
FATIGUE: 0
COUGH: 1

## 2020-11-06 ASSESSMENT — MIFFLIN-ST. JEOR: SCORE: 982.26

## 2020-11-06 NOTE — LETTER
November 9, 2020      Mimi Padmini Rivera  [address] [place] [address]        Dear ,    We are writing to inform you of your test results.    covid test is negative     Resulted Orders   Symptomatic COVID-19 Virus (Coronavirus) by PCR   Result Value Ref Range    COVID-19 Virus PCR to [facility] - Source Nasopharyngeal     COVID-19 Virus PCR to [facility] - Result Not Detected       Comment:      Collection of multiple specimens from the same patient may be necessary to   detect the virus. The possibility of a false negative should be considered if   the patient's recent exposure or clinical presentation suggests 2019 nCOV   infection and diagnostic tests for other causes of illness are negative.   Repeat testing may be considered in this setting.  Patient sample was heat inactivated and amplified using the HDPCR SARS-CoV-2   assay ([employer] The HDPCRTM SARS-CoV-2 assay is a reverse   transcription real-time polymerase chain reaction (qRT-PCR) test intended for   the qualitative detection of nucleic acid  from SARS-CoV-2 in human nasopharyngeal swabs, oropharyngeal swabs, anterior   nasal swabs, mid-turbinate nasal swabs as well as nasal aspirate, nasal wash,   and bronchoalveolar lavage (BAL) specimens from individuals who are suspected   of COVID-19 by their healthcare provider.  A negative result does not rule out the presence of real-time PCR inhibitors   in the sp ecimen or COVID-19 RNA in concentrations below the limit of detection   of the assay. The possibility of a false negative should be considered if the   patients recent exposure or clinical presentation suggests COVID-19.   Additional testing or repeat testing requires consultation with the   laboratory.  Nasopharyngeal specimen is the preferred choice for swab-based SARS CoV2   testing. When collection of a nasopharyngeal swab is not possible the   following are acceptable alternatives:  an oropharyngeal (OP) specimen collected by  a healthcare professional, or a   nasal mid-turbinate (NMT) swab collected by a healthcare professional or by   onsite self-collection (using a flocked tapered swab), or an anterior nares   specimen collected by a healthcare professional or by onsite self-collection   (using a round foam swab). (Centers for Disease Control)  Testing performed by [facility] ([facility]) [address] [facility] [address]  The test performance characteristics were determined by [facility]. It has not been   cleared or approved by the FDA.  The laboratory is regulated under the Clinical Laboratory Improvement   Amendments of 1988 (CLIA-88) as qualified to perform high-complexity testing.   This test is used for clinical purposes. It should not be regarded as   investigational or for research.         If you have any questions or concerns, please call the clinic at the number listed above.       Sincerely,        Aye Morejon MD

## 2020-11-06 NOTE — PROGRESS NOTES
Subjective     Mimi Rivera is a 65 year old female who presents to clinic today for the following health issues:    History of Present Illness       COPD:  She presents for follow up of COPD.  Overall, COPD symptoms are slightly worse since last visit. She has same as usual fatigue or shortness of breath with exertion and more than usual shortness of breath at rest.  She sometimes coughs and does have change in sputum. No recent fever. She can walk 1-2 miles without stopping to rest. She can walk 3 or more flights of stairs without resting.The patient has had no ED, urgent care, or hospital admissions because of COPD since the last visit. She consumes 1 sweetened beverage(s) daily.She exercises with enough effort to increase her heart rate 20 to 29 minutes per day.  She exercises with enough effort to increase her heart rate 4 days per week.   She is taking medications regularly.     Patient describes she is coughing and is also experiencing sinus pressure ,  She is also complaining of headache denies any fever .    Acute Illness  Acute illness concerns: Sinus   Onset/Duration: x 1 week ago  Symptoms:  Fever: no  Chills/Sweats: YES- sweats at night   Headache (location?): YES  Sinus Pressure: YES  Conjunctivitis:  YES  Ear Pain: YES: right  Rhinorrhea: no  Congestion: YES  Sore Throat: no  Cough: YES  Wheeze: YES  Decreased Appetite: YES  Nausea: no  Vomiting: no  Diarrhea: no  Dysuria/Freq.: no  Dysuria or Hematuria: no  Fatigue/Achiness: YES  Sick/Strep Exposure: no  Therapies tried and outcome: Sudafed      Review of Systems   Constitutional: Negative for fatigue and fever.   HENT: Negative for congestion.    Respiratory: Positive for cough, shortness of breath and wheezing. Negative for stridor.    Genitourinary: Negative for dysuria.   Neurological: Positive for headaches.   Psychiatric/Behavioral: Negative for behavioral problems.            Objective    /84   Pulse 91   Temp 98.5  F (36.9  C)    "Ht 1.549 m (5' 1\")   Wt 49 kg (108 lb)   LMP 09/22/2010   SpO2 93%   BMI 20.41 kg/m    Body mass index is 20.41 kg/m .  Physical Exam  Vitals signs and nursing note reviewed.   HENT:      Head: Normocephalic.      Right Ear: Tympanic membrane normal.      Left Ear: Tympanic membrane normal.      Nose: Nose normal.      Comments: Purulent nasal discharge , post nasal drip .     Mouth/Throat:      Mouth: Mucous membranes are moist.   Cardiovascular:      Rate and Rhythm: Normal rate and regular rhythm.   Pulmonary:      Effort: Pulmonary effort is normal.      Breath sounds: Wheezing present.   Abdominal:      General: Abdomen is flat.      Palpations: Abdomen is soft.   Skin:     General: Skin is warm.   Neurological:      General: No focal deficit present.      Mental Status: She is alert.   Psychiatric:         Mood and Affect: Mood normal.         Behavior: Behavior normal.                Assessment & Plan     Hypertension goal BP (blood pressure) < 140/90  - blood pressure at goal .  - recommend to continue   - amLODIPine (NORVASC) 10 MG tablet; Take 1 tablet (10 mg) by mouth daily    Chronic obstructive pulmonary disease, unspecified COPD type (H)  -   Some wheezing on exam , feeling tightness on chest .    - predniSONE (DELTASONE) 20 MG tablet; Take 2 tablets (40 mg) by mouth daily for 5 days  - azithromycin (ZITHROMAX) 250 MG tablet; Take 2 tablets (500 mg) by mouth daily for 1 day, THEN 1 tablet (250 mg) daily for 4 days.  - levalbuterol (XOPENEX HFA) 45 MCG/ACT inhaler; Inhale 2 puffs into the lungs every 4 hours as needed for shortness of breath / dyspnea or wheezing    Cough    - with symptoms of cough  , headache will proceed with testing .  - Symptomatic COVID-19 Virus (Coronavirus) by PCR    - recommend to self isolate .        No follow-ups on file.    Aye Morejon MD  [facility]    "

## 2020-11-07 LAB
SARS-COV-2 RNA SPEC QL NAA+PROBE: NOT DETECTED
SPECIMEN SOURCE: NORMAL

## 2020-11-13 ENCOUNTER — OFFICE VISIT (OUTPATIENT)
Dept: FAMILY MEDICINE | Facility: CLINIC | Age: 65
End: 2020-11-13
Payer: COMMERCIAL

## 2020-11-13 ENCOUNTER — ANCILLARY PROCEDURE (OUTPATIENT)
Dept: GENERAL RADIOLOGY | Facility: CLINIC | Age: 65
End: 2020-11-13
Attending: FAMILY MEDICINE
Payer: COMMERCIAL

## 2020-11-13 VITALS
HEART RATE: 94 BPM | BODY MASS INDEX: 20.78 KG/M2 | SYSTOLIC BLOOD PRESSURE: 130 MMHG | OXYGEN SATURATION: 94 % | DIASTOLIC BLOOD PRESSURE: 82 MMHG | TEMPERATURE: 98.2 F | WEIGHT: 110 LBS

## 2020-11-13 DIAGNOSIS — J01.90 ACUTE SINUSITIS, RECURRENCE NOT SPECIFIED, UNSPECIFIED LOCATION: ICD-10-CM

## 2020-11-13 DIAGNOSIS — J40 BRONCHITIS: ICD-10-CM

## 2020-11-13 DIAGNOSIS — R05.9 COUGH: Primary | ICD-10-CM

## 2020-11-13 PROCEDURE — 99213 OFFICE O/P EST LOW 20 MIN: CPT | Performed by: FAMILY MEDICINE

## 2020-11-13 PROCEDURE — 71046 X-RAY EXAM CHEST 2 VIEWS: CPT | Mod: FY | Performed by: RADIOLOGY

## 2020-11-13 NOTE — PROGRESS NOTES
Subjective     Mimi Rivera is a 65 year old female who presents to clinic today for the following health issues:    HPI         Cough f/u - coughing up green/yellow sputum. No new symptoms    Patient has a history of COPD currently using inhaler .  Continue to have cough with sputum .  Z edwige makes it better but fails to resolve completely   Cough and more sputum at night .  Congestion , post nasal drip .      Review of Systems   Constitutional: Negative for chills and fever.   HENT: Positive for congestion, postnasal drip and sinus pressure.    Eyes: Negative for visual disturbance.   Respiratory: Positive for cough. Negative for shortness of breath and wheezing.    Endocrine: Negative for cold intolerance and heat intolerance.   Genitourinary: Negative for dysuria.   Neurological: Negative for headaches.            Objective    /82   Pulse 94   Temp 98.2  F (36.8  C) (Tympanic)   Wt 49.9 kg (110 lb)   LMP 09/22/2010   SpO2 94%   BMI 20.78 kg/m    Body mass index is 20.78 kg/m .  Physical Exam  Vitals signs and nursing note reviewed.   HENT:      Right Ear: Tympanic membrane normal.      Left Ear: Tympanic membrane normal.      Nose: Congestion and rhinorrhea present.      Comments: Post nasal drip   Neck:      Musculoskeletal: Normal range of motion.   Cardiovascular:      Rate and Rhythm: Normal rate and regular rhythm.   Pulmonary:      Effort: Pulmonary effort is normal.   Abdominal:      General: Abdomen is flat.   Musculoskeletal: Normal range of motion.   Skin:     General: Skin is warm.   Neurological:      General: No focal deficit present.            Assessment & Plan     Cough  Acute sinusitis, recurrence not specified, unspecified location  - discussed nasal rinses ,    - amoxicillin-clavulanate (AUGMENTIN) 875-125 MG tablet; Take 1 tablet by mouth 2 times daily    Bronchitis  - recommend to continue to use inhaler .  -recommend to contact if symptoms fail to improve .              No  follow-ups on file.    Aye Morejon MD  [facility]

## 2020-11-15 ASSESSMENT — ENCOUNTER SYMPTOMS
FEVER: 0
CHILLS: 0
HEADACHES: 0
DYSURIA: 0
COUGH: 1
WHEEZING: 0
SINUS PRESSURE: 1
SHORTNESS OF BREATH: 0

## 2020-11-16 ENCOUNTER — TELEPHONE (OUTPATIENT)
Dept: FAMILY MEDICINE | Facility: CLINIC | Age: 65
End: 2020-11-16

## 2020-11-16 DIAGNOSIS — J44.1 COPD EXACERBATION (H): ICD-10-CM

## 2020-11-16 DIAGNOSIS — J44.1 COPD EXACERBATION (H): Primary | ICD-10-CM

## 2020-11-16 RX ORDER — PREDNISONE 20 MG/1
TABLET ORAL
Qty: 11 TABLET | Refills: 0 | Status: SHIPPED | OUTPATIENT
Start: 2020-11-16 | End: 2020-12-28

## 2020-11-16 RX ORDER — PREDNISONE 20 MG/1
TABLET ORAL
Qty: 15 TABLET | Refills: 0 | Status: SHIPPED | OUTPATIENT
Start: 2020-11-16 | End: 2020-11-16

## 2020-11-16 NOTE — TELEPHONE ENCOUNTER
Lizzy calling from [facility] for clarification on the prednisone that was ordered.  Per pharmacy the quanity dispensed does not match the sig line.  Per the sig line the total number of tablets taken is 10.5 tablets but 15 tablets are being dispensed.    Routing to provider for review and advise as appropriate.  Please send new Rx to [facility].      FITZ TsangN, RN  [employer] [facility]

## 2020-11-16 NOTE — PROGRESS NOTES
Spoke to patient , experiencing symptoms of cough and increased sputum production at night .  History of copd , will prescribe steroid .  Recommend to contact if symptoms fail to improve .    Aye Morejon

## 2020-12-02 ENCOUNTER — ANCILLARY PROCEDURE (OUTPATIENT)
Dept: MAMMOGRAPHY | Facility: CLINIC | Age: 65
End: 2020-12-02
Attending: FAMILY MEDICINE
Payer: COMMERCIAL

## 2020-12-02 PROCEDURE — 77067 SCR MAMMO BI INCL CAD: CPT | Performed by: RADIOLOGY

## 2020-12-10 ENCOUNTER — TELEPHONE (OUTPATIENT)
Dept: FAMILY MEDICINE | Facility: CLINIC | Age: 65
End: 2020-12-10

## 2020-12-10 ENCOUNTER — VIRTUAL VISIT (OUTPATIENT)
Dept: FAMILY MEDICINE | Facility: CLINIC | Age: 65
End: 2020-12-10
Payer: COMMERCIAL

## 2020-12-10 DIAGNOSIS — J01.01 ACUTE RECURRENT MAXILLARY SINUSITIS: Primary | ICD-10-CM

## 2020-12-10 PROCEDURE — 99213 OFFICE O/P EST LOW 20 MIN: CPT | Mod: 95 | Performed by: FAMILY MEDICINE

## 2020-12-10 ASSESSMENT — ENCOUNTER SYMPTOMS
FEVER: 0
SORE THROAT: 0
SINUS PRESSURE: 1
RHINORRHEA: 1

## 2020-12-10 NOTE — TELEPHONE ENCOUNTER
[facility] calling with med question.  States she was given RX for Augmentin.  Also got same RX 11/13/20.  Amoxicillin is listed as allergy as rash.  They are wondering if OK to fill?  Is Amoxicillin really a allergy?  Please clarify.  Call [facility] back at [phone].  Zenaida Torrez RN

## 2020-12-10 NOTE — PROGRESS NOTES
"Mimi Rivera is a 65 year old female who is being evaluated via a billable telephone visit.      The patient has been notified of following:     \"This telephone visit will be conducted via a call between you and your physician/provider. We have found that certain health care needs can be provided without the need for a physical exam.  This service lets us provide the care you need with a short phone conversation.  If a prescription is necessary we can send it directly to your pharmacy.  If lab work is needed we can place an order for that and you can then stop by our lab to have the test done at a later time.    Telephone visits are billed at different rates depending on your insurance coverage. During this emergency period, for some insurers they may be billed the same as an in-person visit.  Please reach out to your insurance provider with any questions.    If during the course of the call the physician/provider feels a telephone visit is not appropriate, you will not be charged for this service.\"    Patient has given verbal consent for Telephone visit?  Yes    What phone number would you like to be contacted at? [phone]    How would you like to obtain your AVS? Leatha Cristina     Mimi Rivera is a 65 year old female who presents via phone visit today for the following health issues:    HPI      Patient describes she was on antibiotics and her symptoms of sinusitis was improving .  As she finished antibiotics her symptoms of post nasal drip , increased sputum production return back .    Acute Illness  Acute illness concerns: couple weeks  Onset/Duration:   Symptoms:  Fever: YES low grade  Chills/Sweats: YES  Headache (location?): little  Sinus Pressure: YES  Conjunctivitis:  YES  Ear Pain: YES: right  Rhinorrhea: no  Congestion: YES  Sore Throat: no  Cough: YES-productive of yellow sputum  Wheeze: YES at night  Decreased Appetite: YES  Nausea: no  Vomiting: no  Diarrhea: no  Dysuria/Freq.: " no  Dysuria or Hematuria: no  Fatigue/Achiness: YES tired  Sick/Strep Exposure: no  Therapies tried and outcome: has done prednisone, and amoxicillan      Patient was tested negative for COVID .  Denies any  chills .  History of recurrent sinusitis .      Review of Systems   Constitutional: Negative for fever.   HENT: Positive for congestion, postnasal drip, rhinorrhea and sinus pressure. Negative for sore throat.            Objective      Future Appointments   Date Time Provider Department Center   12/28/2020  3:00 PM [facility]     Appointment Notes for this encounter:   [phone] After appt made pt informed me of fever, switched to phone- f/u- pt stated that she has a cough but wanted to be seen in person    Health Maintenance Due   Topic Date Due     ANNUAL REVIEW OF HM ORDERS  1955     COLORECTAL CANCER SCREENING  05/29/1967     HIV SCREENING  05/29/1972     ZOSTER IMMUNIZATION (1 of 2) 05/29/2007     PREVENTIVE CARE VISIT  02/25/2014     PAP  02/25/2016     DTAP/TDAP/TD IMMUNIZATION (2 - Td) 09/03/2019     INFLUENZA VACCINE (1) 09/01/2020     Health Maintenance addressed:  Flu Vaccine      MyChart Status:  Active and UsingVitals:  No vitals were obtained today due to virtual visit.    healthy, alert and no distress  PSYCH: Alert and oriented times 3; coherent speech, normal   rate and volume, able to articulate logical thoughts, able   to abstract reason, no tangential thoughts, no hallucinations   or delusions  Her affect is normal  RESP: No cough, no audible wheezing, able to talk in full sentences  Remainder of exam unable to be completed due to telephone visits      Assessment/Plan:    Assessment & Plan     Acute recurrent maxillary sinusitis    - amoxicillin-clavulanate (AUGMENTIN) 875-125 MG tablet; Take 1 tablet by mouth 2 times daily for 7 days      - Explain patient if symptoms fail to improve I would recommend ct sinus and ENT consultation .    - She will contact if she  develops any new symptoms   - we discussed about covid testing with any new symptoms of fever , chills or rigor .    No follow-ups on file.    Aye Morejon MD  [facility]    Phone call duration:  15 minutes

## 2020-12-24 NOTE — PROGRESS NOTES
"Pre-Visit Planning   Next 5 appointments (look out 90 days)    Dec 28, 2020  Arrive by 2:40 PM  Office Visit with Aye Morejon MD  [facility] ([facility] [address] [address] [phone]        Appointment Notes for this encounter:   follow up     Questionnaires Reviewed/Assigned  No additional questionnaires are needed        Patient preferred phone number: [phone]      Spoke to patient via phone. Patient does not have additional questions or concerns.        Visit is not preventive.    Patient is established.    Patient reminded of date and time.  Chief complaint confirmed.    Health Maintenance Due   Topic Date Due     ANNUAL REVIEW OF HM ORDERS  1955     COLORECTAL CANCER SCREENING  05/29/1967     HIV SCREENING  05/29/1972     ZOSTER IMMUNIZATION (1 of 2) 05/29/2007     PREVENTIVE CARE VISIT  02/25/2014     PAP  02/25/2016     DTAP/TDAP/TD IMMUNIZATION (2 - Td) 09/03/2019     INFLUENZA VACCINE (1) 09/01/2020     [employer]  Patient is active on [employer].    Questionnaire Review   Offered information on completing questionnaires via [employer].    Call Summary  \"Thank you for your time today.  If anything comes up before your appointment, please feel free to contact us at [phone].\"    "

## 2020-12-28 ENCOUNTER — OFFICE VISIT (OUTPATIENT)
Dept: FAMILY MEDICINE | Facility: CLINIC | Age: 65
End: 2020-12-28
Payer: COMMERCIAL

## 2020-12-28 VITALS
HEIGHT: 61 IN | DIASTOLIC BLOOD PRESSURE: 80 MMHG | HEART RATE: 90 BPM | RESPIRATION RATE: 16 BRPM | WEIGHT: 106 LBS | BODY MASS INDEX: 20.01 KG/M2 | OXYGEN SATURATION: 96 % | TEMPERATURE: 98.2 F | SYSTOLIC BLOOD PRESSURE: 126 MMHG

## 2020-12-28 DIAGNOSIS — Z13.9 SCREENING FOR CONDITION: ICD-10-CM

## 2020-12-28 DIAGNOSIS — Z12.4 SCREENING FOR MALIGNANT NEOPLASM OF CERVIX: ICD-10-CM

## 2020-12-28 DIAGNOSIS — J32.1 CHRONIC FRONTAL SINUSITIS: ICD-10-CM

## 2020-12-28 DIAGNOSIS — J43.9 PULMONARY EMPHYSEMA, UNSPECIFIED EMPHYSEMA TYPE (H): ICD-10-CM

## 2020-12-28 DIAGNOSIS — Z11.4 SCREENING FOR HIV (HUMAN IMMUNODEFICIENCY VIRUS): ICD-10-CM

## 2020-12-28 DIAGNOSIS — Z23 NEED FOR PROPHYLACTIC VACCINATION AND INOCULATION AGAINST INFLUENZA: Primary | ICD-10-CM

## 2020-12-28 PROCEDURE — G0145 SCR C/V CYTO,THINLAYER,RESCR: HCPCS | Performed by: FAMILY MEDICINE

## 2020-12-28 PROCEDURE — 90471 IMMUNIZATION ADMIN: CPT | Performed by: FAMILY MEDICINE

## 2020-12-28 PROCEDURE — 90682 RIV4 VACC RECOMBINANT DNA IM: CPT | Performed by: FAMILY MEDICINE

## 2020-12-28 PROCEDURE — 99214 OFFICE O/P EST MOD 30 MIN: CPT | Mod: 25 | Performed by: FAMILY MEDICINE

## 2020-12-28 PROCEDURE — 90472 IMMUNIZATION ADMIN EACH ADD: CPT | Performed by: FAMILY MEDICINE

## 2020-12-28 PROCEDURE — 90715 TDAP VACCINE 7 YRS/> IM: CPT | Performed by: FAMILY MEDICINE

## 2020-12-28 PROCEDURE — 87624 HPV HI-RISK TYP POOLED RSLT: CPT | Performed by: FAMILY MEDICINE

## 2020-12-28 RX ORDER — CEFDINIR 300 MG/1
300 CAPSULE ORAL 2 TIMES DAILY
Qty: 14 CAPSULE | Refills: 0 | Status: SHIPPED | OUTPATIENT
Start: 2020-12-28 | End: 2021-01-27

## 2020-12-28 ASSESSMENT — MIFFLIN-ST. JEOR: SCORE: 973.19

## 2020-12-28 NOTE — PROGRESS NOTES
Subjective       Future Appointments   Date Time Provider Department Center   12/28/2020  3:00 PM Aye Morejon MD [facility]      Appointment Notes for this encounter:   follow up   Patient is in the clinic for follow-up, patient describes  she continues to have sinus congestion, postnasal drip.    Patient was recently seen in urgent care diagnosed with sinusitis, patient white cell count mildly elevated.  Patient denies any symptoms of fever chills or rigors.  Patient describes she is experiencing recurrent episode of sinusitis she has made follow-up appointment with ENT physician.     Patient describes she is currently stable with her tr elegy , describes in need of a new nebulizer machine.    Blood pressure at goal .    Complaining of increased sinus congestion .  Post nasal drip and increased mucus production .  Health Maintenance Due   Topic Date Due     COLORECTAL CANCER SCREENING  05/29/1967     HIV SCREENING  05/29/1972     ZOSTER IMMUNIZATION (1 of 2) 05/29/2007     PREVENTIVE CARE VISIT  02/25/2014     Health Maintenance addressed:  NONE     Pap Smear Possibly complete today - per provider review    MyChart Status:  Active and Using  Mimi Padmini Rivera is a 65 year old female who presents to clinic today for the following health issues:    History of Present Illness       She eats 2-3 servings of fruits and vegetables daily.She consumes 1 sweetened beverage(s) daily.She exercises with enough effort to increase her heart rate 30 to 60 minutes per day.  She exercises with enough effort to increase her heart rate 3 or less days per week.   She is taking medications regularly.         F/u visit        Review of Systems   Constitutional: Negative for fatigue and fever.   HENT: Positive for congestion, postnasal drip, rhinorrhea, sinus pressure and sinus pain.    Respiratory: Negative for apnea.    Cardiovascular: Negative for chest pain.   Musculoskeletal: Negative for back pain.   Neurological: Negative for  "headaches.            Objective    /80 (BP Location: Right arm, Patient Position: Chair, Cuff Size: Adult Regular)   Pulse 90   Temp 98.2  F (36.8  C) (Oral)   Resp 16   Ht 1.549 m (5' 1\")   Wt 48.1 kg (106 lb)   LMP 09/22/2010   SpO2 96%   BMI 20.03 kg/m    Body mass index is 20.03 kg/m .  Physical Exam  Vitals signs and nursing note reviewed.   Constitutional:       Appearance: Normal appearance.   HENT:      Head: Normocephalic.      Right Ear: Tympanic membrane normal.      Nose: Congestion and rhinorrhea present.      Comments: Post nasal drip .     Mouth/Throat:      Mouth: Mucous membranes are moist.   Eyes:      Pupils: Pupils are equal, round, and reactive to light.   Neck:      Musculoskeletal: Normal range of motion.   Cardiovascular:      Rate and Rhythm: Normal rate and regular rhythm.      Pulses: Normal pulses.      Heart sounds: Normal heart sounds.   Pulmonary:      Effort: Pulmonary effort is normal.      Breath sounds: Normal breath sounds.   Abdominal:      General: Abdomen is flat.      Palpations: Abdomen is soft.   Musculoskeletal: Normal range of motion.   Skin:     General: Skin is warm and dry.   Neurological:      General: No focal deficit present.      Mental Status: She is alert.   Psychiatric:         Mood and Affect: Mood normal.         Behavior: Behavior normal.          Assessment & Plan     Chronic frontal sinusitis  - I did recommend following with ENT provider .  - we did discuss CT scan , patient want to wait till she follow with specialist .  - mildly increase white cell count , will recommend rechecking in 6-8 weeks .  - cefdinir (OMNICEF) 300 MG capsule; Take 1 capsule (300 mg) by mouth 2 times daily    Pulmonary emphysema, unspecified emphysema type (H)  - currently on trelegy ellipta   - last ct chest 2019 bilateral emphysematous changes small right base nodule .  - Nebulizer and Supplies Order for DME - ONLY FOR DME      - recurrent sinus infection with " increase in white cell count .  We did discuss follow up CT scan to rule out bronchitis and follow up CT for lung nodule .  She will follow with her pulmonologist .      Screening for malignant neoplasm of cervix  - pap smear obtained and sent .  - Pap imaged thin layer screen with HPV - recommended age 30 - 65 years (select HPV order below)  - HPV High Risk Types DNA Cervical    Need for prophylactic vaccination and inoculation against influenza- ADMIN 1st VACCINE  - INFLUENZA QUAD, RECOMBINANT, P-FREE (RIV4) (FLUBLOCK) [35510]  - ADMIN 1st VACCINE    Screening for condition    - Lipid panel reflex to direct LDL Fasting; Future  - Glucose; Future  - Fecal colorectal cancer screen (FIT); Future            Return in about 3 months (around 3/28/2021) for Follow up.    Aye Morejon MD  [facility]

## 2020-12-28 NOTE — PROGRESS NOTES
Prior to immunization administration, verified patients identity using patient s name and date of birth. Please see Immunization Activity for additional information.     Screening Questionnaire for Adult Immunization    Are you sick today?   No   Do you have allergies to medications, food, a vaccine component or latex?   No   Have you ever had a serious reaction after receiving a vaccination?   No   Do you have a long-term health problem with heart, lung, kidney, or metabolic disease (e.g., diabetes), asthma, a blood disorder, no spleen, complement component deficiency, a cochlear implant, or a spinal fluid leak?  Are you on long-term aspirin therapy?   Yes   Do you have cancer, leukemia, HIV/AIDS, or any other immune system problem?   No   Do you have a parent, brother, or sister with an immune system problem?   No   In the past 3 months, have you taken medications that affect  your immune system, such as prednisone, other steroids, or anticancer drugs; drugs for the treatment of rheumatoid arthritis, Crohn s disease, or psoriasis; or have you had radiation treatments?   Yes   Have you had a seizure, or a brain or other nervous system problem?   No   During the past year, have you received a transfusion of blood or blood    products, or been given immune (gamma) globulin or antiviral drug?   No   For women: Are you pregnant or is there a chance you could become       pregnant during the next month?   No   Have you received any vaccinations in the past 4 weeks?   No     Immunization questionnaire was positive for at least one answer.  Notified Dr garcia.        Per orders of Dr. Garcia, injection of Flu-Tdap given by Christin Mckeon CMA. Patient instructed to remain in clinic for 15 minutes afterwards, and to report any adverse reaction to me immediately.       Screening performed by Christin Mckeon CMA on 12/28/2020 at 4:05 PM.

## 2020-12-29 ASSESSMENT — ENCOUNTER SYMPTOMS
SINUS PAIN: 1
SINUS PRESSURE: 1
HEADACHES: 0
FEVER: 0
APNEA: 0
FATIGUE: 0
BACK PAIN: 0
RHINORRHEA: 1

## 2020-12-31 LAB
COPATH REPORT: NORMAL
PAP: NORMAL

## 2021-01-04 LAB
FINAL DIAGNOSIS: NORMAL
HPV HR 12 DNA CVX QL NAA+PROBE: NEGATIVE
HPV16 DNA SPEC QL NAA+PROBE: NEGATIVE
HPV18 DNA SPEC QL NAA+PROBE: NEGATIVE
SPECIMEN DESCRIPTION: NORMAL
SPECIMEN SOURCE CVX/VAG CYTO: NORMAL

## 2021-01-06 DIAGNOSIS — I10 HYPERTENSION, UNSPECIFIED TYPE: ICD-10-CM

## 2021-01-06 DIAGNOSIS — Z13.9 SCREENING FOR CONDITION: ICD-10-CM

## 2021-01-06 PROCEDURE — 36415 COLL VENOUS BLD VENIPUNCTURE: CPT | Performed by: FAMILY MEDICINE

## 2021-01-06 PROCEDURE — 82947 ASSAY GLUCOSE BLOOD QUANT: CPT | Performed by: FAMILY MEDICINE

## 2021-01-06 PROCEDURE — 80048 BASIC METABOLIC PNL TOTAL CA: CPT | Performed by: FAMILY MEDICINE

## 2021-01-06 PROCEDURE — 80061 LIPID PANEL: CPT | Performed by: FAMILY MEDICINE

## 2021-01-07 LAB
ANION GAP SERPL CALCULATED.3IONS-SCNC: 2 MMOL/L (ref 3–14)
BUN SERPL-MCNC: 8 MG/DL (ref 7–30)
CALCIUM SERPL-MCNC: 9.7 MG/DL (ref 8.5–10.1)
CHLORIDE SERPL-SCNC: 106 MMOL/L (ref 94–109)
CHOLEST SERPL-MCNC: 210 MG/DL
CO2 SERPL-SCNC: 28 MMOL/L (ref 20–32)
CREAT SERPL-MCNC: 0.64 MG/DL (ref 0.52–1.04)
GFR SERPL CREATININE-BSD FRML MDRD: >90 ML/MIN/{1.73_M2}
GLUCOSE SERPL-MCNC: 116 MG/DL (ref 70–99)
GLUCOSE SERPL-MCNC: 116 MG/DL (ref 70–99)
HDLC SERPL-MCNC: 55 MG/DL
LDLC SERPL CALC-MCNC: 145 MG/DL
NONHDLC SERPL-MCNC: 155 MG/DL
POTASSIUM SERPL-SCNC: 4.9 MMOL/L (ref 3.4–5.3)
SODIUM SERPL-SCNC: 136 MMOL/L (ref 133–144)
TRIGL SERPL-MCNC: 50 MG/DL

## 2021-01-14 ENCOUNTER — HEALTH MAINTENANCE LETTER (OUTPATIENT)
Age: 66
End: 2021-01-14

## 2021-01-25 ENCOUNTER — TELEPHONE (OUTPATIENT)
Dept: PULMONOLOGY | Facility: CLINIC | Age: 66
End: 2021-01-25

## 2021-01-25 NOTE — TELEPHONE ENCOUNTER
"Spoke with patient regarding her upcoming appt with Dr. Bridges on 2/5. Discussed that due to changes in clinic protocol secondary to COVID 19, virtual appts are primarily being conducted. Patient was hesitant about this as she states she has been having an ongoing cough for the last month and has been evaluated on multiple occasions, but not through pulmonary. She states she was on a short course of antibiotics a few days ago, but still doesn't feel well. She states she also has been having a productive cough that is \"pus-colored\" and with a bad taste. She notes associated shortness of breath, worse at night, that has made it difficult to sleep. Mimi reports having a refill left on her Levaquin, but is hesitant on taking it as she does not like the side effects. I informed that I would pass the info to the nurses, but for the time being, due to protocol changes, Dr. Bridges's appt would need to be virtual (video preferred) and she was agreeable.   "

## 2021-01-25 NOTE — TELEPHONE ENCOUNTER
"Called patient and left message to call back. Patient c/o productive cough with \"pus colored sputum and a rancid taste\" in her mouth, SOB with exertion which is worse at night and trouble sleeping due to symptoms. Patient had a short course of Omnicef and has a refill of Levaquin, however, she prefers not to take because of side effects. Called patient to gather more information, will await return call.  "

## 2021-01-26 NOTE — TELEPHONE ENCOUNTER
Patient called back and gave information of her symptoms. Patient has had a consistent low grade temperature with the highest reading of 100.3. Her sputum is yellow and thick. She c/o a deep cough with both inspiratory and expiratory wheezing. Patient has been taking prescribed respirator medications, such as Trelagy and inhalers, as prescribed. She has been to her PCP and urgent care multiple times with her last visit prescribing Omincef for 7 days. She completed that course of antibiotics and is still experiencing symptoms. Patient has a refill of Levaquin, however she has declined to use the medication due to side effects. RN will pass information on to provider.

## 2021-01-26 NOTE — TELEPHONE ENCOUNTER
Per Dr. Bridges, patient should be seen by her PCP with a CXR, current WBC count and a COVID test. RN called patient to inform with no answer. Left VM and instruction for patient to call back if any further questions.

## 2021-01-27 ENCOUNTER — OFFICE VISIT (OUTPATIENT)
Dept: FAMILY MEDICINE | Facility: CLINIC | Age: 66
End: 2021-01-27
Payer: COMMERCIAL

## 2021-01-27 ENCOUNTER — ANCILLARY PROCEDURE (OUTPATIENT)
Dept: GENERAL RADIOLOGY | Facility: CLINIC | Age: 66
End: 2021-01-27
Attending: FAMILY MEDICINE
Payer: COMMERCIAL

## 2021-01-27 VITALS
HEART RATE: 91 BPM | OXYGEN SATURATION: 95 % | SYSTOLIC BLOOD PRESSURE: 118 MMHG | DIASTOLIC BLOOD PRESSURE: 72 MMHG | TEMPERATURE: 97.9 F

## 2021-01-27 DIAGNOSIS — R05.9 COUGH: Primary | ICD-10-CM

## 2021-01-27 DIAGNOSIS — R05.9 COUGH: ICD-10-CM

## 2021-01-27 LAB
BASOPHILS # BLD AUTO: 0 10E9/L (ref 0–0.2)
BASOPHILS NFR BLD AUTO: 0.1 %
DIFFERENTIAL METHOD BLD: ABNORMAL
EOSINOPHIL # BLD AUTO: 0 10E9/L (ref 0–0.7)
EOSINOPHIL NFR BLD AUTO: 0.1 %
ERYTHROCYTE [DISTWIDTH] IN BLOOD BY AUTOMATED COUNT: 12.6 % (ref 10–15)
HCT VFR BLD AUTO: 47.1 % (ref 35–47)
HGB BLD-MCNC: 15.9 G/DL (ref 11.7–15.7)
LABORATORY COMMENT REPORT: NORMAL
LYMPHOCYTES # BLD AUTO: 1.9 10E9/L (ref 0.8–5.3)
LYMPHOCYTES NFR BLD AUTO: 21 %
MCH RBC QN AUTO: 31.2 PG (ref 26.5–33)
MCHC RBC AUTO-ENTMCNC: 33.8 G/DL (ref 31.5–36.5)
MCV RBC AUTO: 93 FL (ref 78–100)
MONOCYTES # BLD AUTO: 0.4 10E9/L (ref 0–1.3)
MONOCYTES NFR BLD AUTO: 4.1 %
NEUTROPHILS # BLD AUTO: 6.7 10E9/L (ref 1.6–8.3)
NEUTROPHILS NFR BLD AUTO: 74.7 %
PLATELET # BLD AUTO: 252 10E9/L (ref 150–450)
RBC # BLD AUTO: 5.09 10E12/L (ref 3.8–5.2)
SARS-COV-2 RNA RESP QL NAA+PROBE: NEGATIVE
SARS-COV-2 RNA RESP QL NAA+PROBE: NORMAL
SPECIMEN SOURCE: NORMAL
SPECIMEN SOURCE: NORMAL
WBC # BLD AUTO: 9 10E9/L (ref 4–11)

## 2021-01-27 PROCEDURE — 36415 COLL VENOUS BLD VENIPUNCTURE: CPT | Performed by: FAMILY MEDICINE

## 2021-01-27 PROCEDURE — 85025 COMPLETE CBC W/AUTO DIFF WBC: CPT | Performed by: FAMILY MEDICINE

## 2021-01-27 PROCEDURE — 87635 SARS-COV-2 COVID-19 AMP PRB: CPT | Performed by: FAMILY MEDICINE

## 2021-01-27 PROCEDURE — 99213 OFFICE O/P EST LOW 20 MIN: CPT | Performed by: FAMILY MEDICINE

## 2021-01-27 PROCEDURE — 71046 X-RAY EXAM CHEST 2 VIEWS: CPT | Mod: FY | Performed by: RADIOLOGY

## 2021-01-27 RX ORDER — AZITHROMYCIN 250 MG/1
TABLET, FILM COATED ORAL
Qty: 6 TABLET | Refills: 0 | Status: SHIPPED | OUTPATIENT
Start: 2021-01-27 | End: 2021-02-19

## 2021-01-27 RX ORDER — PREDNISONE 20 MG/1
40 TABLET ORAL DAILY
Qty: 10 TABLET | Status: CANCELLED | OUTPATIENT
Start: 2021-01-27 | End: 2021-02-01

## 2021-01-27 NOTE — PROGRESS NOTES
Assessment & Plan     Cough: start treatment for CAP with Augmentin and Azithromycin. Follow up in one week if symptoms not improving.  - XR Chest 2 Views  - Symptomatic COVID-19 Virus (Coronavirus) by PCR  - CBC with platelets differential  - azithromycin (ZITHROMAX) 250 MG tablet  Dispense: 6 tablet; Refill: 0  - amoxicillin-clavulanate (AUGMENTIN) 875-125 MG tablet  Dispense: 20 tablet; Refill: 0  - Symptomatic COVID-19 Virus (Coronavirus) by PCR  - SARS-CoV-2 COVID-19 Virus (Coronavirus) by PCR       No follow-ups on file.    Xu Simon [facility] PRIOR COURTNEY Le is a 65 year old who presents to clinic today for the following health issues     HPI         Acute Illness  Acute illness concerns: cough, waking up at night.  Onset/Duration: 1 month  Symptoms:  Fever: YES- highest reading 100.3.  Chills/Sweats: YES  Headache (location?): YES  Sinus Pressure: YES, frontal sinus pressure  Conjunctivitis:  no  Ear Pain: no  Rhinorrhea: post nasal drip is present  Congestion: YES  Sore Throat: no  Cough: YES-productive of yellow sputum  Wheeze: sometimes at night  Decreased Appetite: YES  Nausea: YES  Vomiting: no  Diarrhea: no  Dysuria/Freq.: no  Dysuria or Hematuria: no  Fatigue/Achiness: not achy but feels tired - sleep has been poor  Sick/Strep Exposure:  was sick about 4 weeks ago -- ?pneumonia  Therapies tried and outcome: doxycycline, cefdinir. Using Trelegy Ellipta daily, levalbuterol (but not much help) for COPD.     Review of Systems   Constitutional, HEENT, cardiovascular, pulmonary, gi and gu systems are negative, except as otherwise noted.      Objective    LMP 09/22/2010   There is no height or weight on file to calculate BMI.  Physical Exam   GENERAL: healthy, alert and no distress  HENT: ear canals and TM's normal, nose and mouth without ulcers or lesions  NECK: no adenopathy, no asymmetry, masses, or scars and thyroid normal to palpation  RESP: lungs  clear to auscultation - no rales, rhonchi or wheezes  CV: regular rate and rhythm, normal S1 S2, no S3 or S4, no murmur, click or rub, no peripheral edema and peripheral pulses strong  MS: no gross musculoskeletal defects noted, no edema    Results for orders placed or performed in visit on 01/27/21   XR Chest 2 Views     Status: None    Narrative    XR CHEST 2 VW 1/27/2021 11:29 AM    HISTORY: productive cough, history of COPD, eval for pneumonia; Cough    COMPARISON: 11/13/2020      Impression    IMPRESSION: New patchy opacities in the right upper lobe suspicious  for developing pneumonia. Recommend a follow-up radiograph or CT after  treatment to ensure resolution. No pleural effusion or pneumothorax.  Emphysema. The cardiac and mediastinal silhouettes are normal.  Atherosclerotic aortic calcifications.    MARLENE BOURGEOIS MD   Results for orders placed or performed in visit on 01/27/21   CBC with platelets differential     Status: Abnormal   Result Value Ref Range    WBC 9.0 4.0 - 11.0 10e9/L    RBC Count 5.09 3.8 - 5.2 10e12/L    Hemoglobin 15.9 (H) 11.7 - 15.7 g/dL    Hematocrit 47.1 (H) 35.0 - 47.0 %    MCV 93 78 - 100 fl    MCH 31.2 26.5 - 33.0 pg    MCHC 33.8 31.5 - 36.5 g/dL    RDW 12.6 10.0 - 15.0 %    Platelet Count 252 150 - 450 10e9/L    % Neutrophils 74.7 %    % Lymphocytes 21.0 %    % Monocytes 4.1 %    % Eosinophils 0.1 %    % Basophils 0.1 %    Absolute Neutrophil 6.7 1.6 - 8.3 10e9/L    Absolute Lymphocytes 1.9 0.8 - 5.3 10e9/L    Absolute Monocytes 0.4 0.0 - 1.3 10e9/L    Absolute Eosinophils 0.0 0.0 - 0.7 10e9/L    Absolute Basophils 0.0 0.0 - 0.2 10e9/L    Diff Method Automated Method    Symptomatic COVID-19 Virus (Coronavirus) by PCR     Status: None    Specimen: Nasopharyngeal   Result Value Ref Range    COVID-19 Virus PCR to [facility] - Source Nasopharyngeal     COVID-19 Virus PCR to [facility] - Result       Test received-See reflex to [facility] test SARS CoV2 (COVID-19) Virus RT-PCR   SARS-CoV-2  COVID-19 Virus (Coronavirus) by PCR     Status: None    Specimen: Nasopharyngeal   Result Value Ref Range    SARS-CoV-2 Virus Specimen Source Nasopharyngeal     SARS-CoV-2 PCR Result NEGATIVE     SARS-CoV-2 PCR Comment (Note)

## 2021-01-28 ENCOUNTER — MYC MEDICAL ADVICE (OUTPATIENT)
Dept: FAMILY MEDICINE | Facility: CLINIC | Age: 66
End: 2021-01-28

## 2021-01-29 NOTE — TELEPHONE ENCOUNTER
Called # below     Advised pt on the information on X-ray     Patient stated an understanding and agreed with plan.    Rylee Puente RN, BSN  [facility]

## 2021-02-05 ENCOUNTER — VIRTUAL VISIT (OUTPATIENT)
Dept: PULMONOLOGY | Facility: CLINIC | Age: 66
End: 2021-02-05
Attending: INTERNAL MEDICINE
Payer: COMMERCIAL

## 2021-02-05 ENCOUNTER — TELEPHONE (OUTPATIENT)
Dept: PULMONOLOGY | Facility: CLINIC | Age: 66
End: 2021-02-05

## 2021-02-05 DIAGNOSIS — J43.1 PANLOBULAR EMPHYSEMA (H): ICD-10-CM

## 2021-02-05 DIAGNOSIS — J20.9 ACUTE EXACERBATION OF CHRONIC BRONCHITIS (H): ICD-10-CM

## 2021-02-05 DIAGNOSIS — J42 CHRONIC BRONCHITIS, UNSPECIFIED CHRONIC BRONCHITIS TYPE (H): Primary | ICD-10-CM

## 2021-02-05 DIAGNOSIS — J44.1 COPD EXACERBATION (H): ICD-10-CM

## 2021-02-05 DIAGNOSIS — J42 ACUTE EXACERBATION OF CHRONIC BRONCHITIS (H): ICD-10-CM

## 2021-02-05 PROCEDURE — 99214 OFFICE O/P EST MOD 30 MIN: CPT | Performed by: INTERNAL MEDICINE

## 2021-02-05 RX ORDER — METHYLPREDNISOLONE 4 MG
TABLET, DOSE PACK ORAL
Qty: 21 TABLET | Refills: 3 | Status: SHIPPED | OUTPATIENT
Start: 2021-02-05 | End: 2021-02-19

## 2021-02-05 RX ORDER — LEVALBUTEROL INHALATION SOLUTION 1.25 MG/3ML
1 SOLUTION RESPIRATORY (INHALATION) EVERY 4 HOURS PRN
Qty: 390 ML | Refills: 11 | Status: SHIPPED | OUTPATIENT
Start: 2021-02-05 | End: 2022-04-15

## 2021-02-05 NOTE — LETTER
2/5/2021         RE: Mimi Rivera  [address] [place] [address]        Dear Colleague,    Thank you for referring your patient, Mimi Rivera, to the [facility] [place]. Please see a copy of my visit note below.    Reason for Visit  Mimi Rivera is a 65 year old year old female who is being seen for No chief complaint on file.    Pulmonary HPI    The patient was seen and examined by Ishmael Bridges MD     Last visit was by telephone on 7/17/2020 for Severe COPD with recent flares ups.  She had successfully quit smoking  At that time she was on Trelegy and rescue albuterol - which she still is on now.    Developed fever and cough with productive sputum plus night sweats. Was seen by primary care and had CXR on 1/27/2021 that showed new patchy densiities R upper lung zone.  She now is at day 8 of Augmentin.  She feels a little bit better but 'hasn't turned the corner' but it hasn't gone.  She feels that the rescue levalbuterol isn't very helpful    This is the only flare up since 7/2020 phone visit.    Is able to climb full flight of stairs without stopping.  Has old nebulizer machine but not functional.    Current Outpatient Medications   Medication     amLODIPine (NORVASC) 10 MG tablet     amoxicillin-clavulanate (AUGMENTIN) 875-125 MG tablet     azithromycin (ZITHROMAX) 250 MG tablet     buPROPion (ZYBAN) 150 MG 12 hr tablet     levalbuterol (XOPENEX HFA) 45 MCG/ACT inhaler     levalbuterol (XOPENEX) 1.25 MG/3ML neb solution     omeprazole (PRILOSEC) 20 MG DR capsule     TRELEGY ELLIPTA 100-62.5-25 MCG/INH oral inhaler     No current facility-administered medications for this visit.      No Known Allergies  Past Medical History:   Diagnosis Date     ASCUS on Pap smear 2006    unable to run HPV typing, f/u paps NIL     Chronic rhinosinusitis      COPD (chronic obstructive pulmonary disease) (H)      Depression, anxiety      Herpes      occasional outbreak     Hypertension        Past Surgical History:   Procedure Laterality Date      SECTION      x 3       Social History     Socioeconomic History     Marital status:      Spouse name: Not on file     Number of children: Not on file     Years of education: Not on file     Highest education level: Not on file   Occupational History     Not on file   Social Needs     Financial resource strain: Not on file     Food insecurity     Worry: Not on file     Inability: Not on file     Transportation needs     Medical: Not on file     Non-medical: Not on file   Tobacco Use     Smoking status: Former Smoker     Packs/day: 10.00     Types: Cigarettes     Quit date: 3/19/2015     Years since quittin.8     Smokeless tobacco: Former User     Quit date: 2017   Substance and Sexual Activity     Alcohol use: Yes     Alcohol/week: 2.0 - 3.0 standard drinks     Types: 2 - 3 Standard drinks or equivalent per week     Comment: 2 drinks per wk avg     Drug use: No     Sexual activity: Yes     Partners: Male     Birth control/protection: Surgical     Comment: tubal   Lifestyle     Physical activity     Days per week: Not on file     Minutes per session: Not on file     Stress: Not on file   Relationships     Social connections     Talks on phone: Not on file     Gets together: Not on file     Attends Evangelical service: Not on file     Active member of club or organization: Not on file     Attends meetings of clubs or organizations: Not on file     Relationship status: Not on file     Intimate partner violence     Fear of current or ex partner: Not on file     Emotionally abused: Not on file     Physically abused: Not on file     Forced sexual activity: Not on file   Other Topics Concern     Parent/sibling w/ CABG, MI or angioplasty before 65F 55M? No   Social History Narrative     Not on file       ROS Pulmonary  A complete ROS was otherwise negative except as noted in the HPI.  LMP 2010    Exam:   GENERAL APPEARANCE: Well developed, well nourished, alert, and in no apparent distress.  No tachypnea, cough, wheeze or stridor  NEURO: Mentation intact, speech normal,   PSYCH: mentation appears normal. and affect normal/bright  Results:  No results found for this or any previous visit (from the past 168 hour(s)).    I personally reviewed the 1/27 CXR report and images (and past PFTs)    Assessment and plan:   COPD / Emphysema    She is worried, but doesn't seem tachypneic or in resp distress and no coughing during our phone conversation    A:  COPD Exacerbation - not yet responding to Augmentin    Continue Trelegy  I prescribed a new nebulizer and fresh levalbuterol nebulizer solution  Use the levalbuterol up to every 4 hours as needed for increased shortness of breath  Finish out current antibiotic  Start the Medrol dosepak  IF not improved with completing the current antibiotic and with the Medrol started, then please Phil Brock RN or colleagues [phone] to switch antibiotics   Also contact Phil if things worsen.  RTC 6 months  Encourage COVID vaccination    30 minutes time devoted to her care    Ishmael Bridges MD

## 2021-02-05 NOTE — PROGRESS NOTES
Reason for Visit  Mimi Rivera is a 65 year old year old female who is being seen for No chief complaint on file.    Pulmonary HPI    The patient was seen and examined by Ishmael Bridges MD     Last visit was by telephone on 2020 for Severe COPD with recent flares ups.  She had successfully quit smoking  At that time she was on Trelegy and rescue albuterol - which she still is on now.    Developed fever and cough with productive sputum plus night sweats. Was seen by primary care and had CXR on 2021 that showed new patchy densiities R upper lung zone.  She now is at day 8 of Augmentin.  She feels a little bit better but 'hasn't turned the corner' but it hasn't gone.  She feels that the rescue levalbuterol isn't very helpful    This is the only flare up since 2020 phone visit.    Is able to climb full flight of stairs without stopping.  Has old nebulizer machine but not functional.    Current Outpatient Medications   Medication     amLODIPine (NORVASC) 10 MG tablet     amoxicillin-clavulanate (AUGMENTIN) 875-125 MG tablet     azithromycin (ZITHROMAX) 250 MG tablet     buPROPion (ZYBAN) 150 MG 12 hr tablet     levalbuterol (XOPENEX HFA) 45 MCG/ACT inhaler     levalbuterol (XOPENEX) 1.25 MG/3ML neb solution     omeprazole (PRILOSEC) 20 MG DR capsule     TRELEGY ELLIPTA 100-62.5-25 MCG/INH oral inhaler     No current facility-administered medications for this visit.      No Known Allergies  Past Medical History:   Diagnosis Date     ASCUS on Pap smear     unable to run HPV typing, f/u paps NIL     Chronic rhinosinusitis      COPD (chronic obstructive pulmonary disease) (H)      Depression, anxiety      Herpes     occasional outbreak     Hypertension        Past Surgical History:   Procedure Laterality Date      SECTION      x 3       Social History     Socioeconomic History     Marital status:      Spouse name: Not on file     Number of children: Not on file     Years of education:  Not on file     Highest education level: Not on file   Occupational History     Not on file   Social Needs     Financial resource strain: Not on file     Food insecurity     Worry: Not on file     Inability: Not on file     Transportation needs     Medical: Not on file     Non-medical: Not on file   Tobacco Use     Smoking status: Former Smoker     Packs/day: 10.00     Types: Cigarettes     Quit date: 3/19/2015     Years since quittin.8     Smokeless tobacco: Former User     Quit date: 2017   Substance and Sexual Activity     Alcohol use: Yes     Alcohol/week: 2.0 - 3.0 standard drinks     Types: 2 - 3 Standard drinks or equivalent per week     Comment: 2 drinks per wk avg     Drug use: No     Sexual activity: Yes     Partners: Male     Birth control/protection: Surgical     Comment: tubal   Lifestyle     Physical activity     Days per week: Not on file     Minutes per session: Not on file     Stress: Not on file   Relationships     Social connections     Talks on phone: Not on file     Gets together: Not on file     Attends Adventist service: Not on file     Active member of club or organization: Not on file     Attends meetings of clubs or organizations: Not on file     Relationship status: Not on file     Intimate partner violence     Fear of current or ex partner: Not on file     Emotionally abused: Not on file     Physically abused: Not on file     Forced sexual activity: Not on file   Other Topics Concern     Parent/sibling w/ CABG, MI or angioplasty before 65F 55M? No   Social History Narrative     Not on file       ROS Pulmonary  A complete ROS was otherwise negative except as noted in the HPI.  LMP 2010   Exam:   GENERAL APPEARANCE: Well developed, well nourished, alert, and in no apparent distress.  No tachypnea, cough, wheeze or stridor  NEURO: Mentation intact, speech normal,   PSYCH: mentation appears normal. and affect normal/bright  Results:  No results found for this or any previous  visit (from the past 168 hour(s)).    I personally reviewed the 1/27 CXR report and images (and past PFTs)    Assessment and plan:   COPD / Emphysema    She is worried, but doesn't seem tachypneic or in resp distress and no coughing during our phone conversation    A:  COPD Exacerbation - not yet responding to Augmentin    Continue Trelegy  I prescribed a new nebulizer and fresh levalbuterol nebulizer solution  Use the levalbuterol up to every 4 hours as needed for increased shortness of breath  Finish out current antibiotic  Start the Medrol dosepak  IF not improved with completing the current antibiotic and with the Medrol started, then please Phil Brock RN or colleagues [phone] to switch antibiotics   Also contact Phil if things worsen.  RTC 6 months  Encourage COVID vaccination    30 minutes time devoted to her care    Ishmael Bridges MD

## 2021-02-05 NOTE — PATIENT INSTRUCTIONS
COPD / Emphysema    Continue Trelegy  I prescribed a new nebulizer and fresh levalbuterol nebulizer solution  Use the levalbuterol up to every 4 hours as needed for increased shortness of breath  Finish out your current antibiotic  Start the Medrol dosepak  IF you are not improved with completing the current antibiotic and with the Medrol started, then please Phil Brock RN or colleagues [phone] to switch antibiotics   Also contact Phil if things worsen.  Recommend COVID vaccination as soon as possible

## 2021-02-05 NOTE — TELEPHONE ENCOUNTER
Spoke to [facility] at  [facility] and she reviewed nebulizer order and provider notes and stated they would get nebulizer out to patient.

## 2021-02-19 ENCOUNTER — OFFICE VISIT (OUTPATIENT)
Dept: FAMILY MEDICINE | Facility: CLINIC | Age: 66
End: 2021-02-19
Payer: COMMERCIAL

## 2021-02-19 ENCOUNTER — ANCILLARY PROCEDURE (OUTPATIENT)
Dept: GENERAL RADIOLOGY | Facility: CLINIC | Age: 66
End: 2021-02-19
Attending: FAMILY MEDICINE
Payer: COMMERCIAL

## 2021-02-19 DIAGNOSIS — J18.9 PNEUMONIA OF RIGHT LUNG DUE TO INFECTIOUS ORGANISM, UNSPECIFIED PART OF LUNG: Primary | ICD-10-CM

## 2021-02-19 DIAGNOSIS — J18.9 PNEUMONIA OF RIGHT LUNG DUE TO INFECTIOUS ORGANISM, UNSPECIFIED PART OF LUNG: ICD-10-CM

## 2021-02-19 PROCEDURE — 99213 OFFICE O/P EST LOW 20 MIN: CPT | Performed by: FAMILY MEDICINE

## 2021-02-19 PROCEDURE — 71046 X-RAY EXAM CHEST 2 VIEWS: CPT | Mod: FY | Performed by: RADIOLOGY

## 2021-02-19 ASSESSMENT — MIFFLIN-ST. JEOR: SCORE: 1029.88

## 2021-02-19 NOTE — PROGRESS NOTES
"    Assessment & Plan     Pneumonia of right lung due to infectious organism, unspecified part of lung: CXR does look improved from previous. That along with improvement in symptoms will continue monitoring for ongoing resolution. Continue inhalers as prescribed by pulmonology. Will follow up on final read from radiology. Could always consider chest CT in 3-4 weeks to follow for resolution.  - XR Chest 2 Views; Future      No follow-ups on file.    Xu Simon DO  [facility] PRIOR COURTNEY Le is a 65 year old who presents for the following health issues     HPI       Concern - Follow up pneumonia  Onset: 01/27/2021  Description: Pneumonia  Intensity: moderate  Progression of Symptoms:  Improving,   Accompanying Signs & Symptoms: Cough - productive of yellow phlegm.   Previous history of similar problem: none  Precipitating factors:        Worsened by: Tired  Alleviating factors:        Improved by: Abx  Therapies tried and outcome: Prednisone , Inhaler ANTIBIOTICS.     Talked with pulmonologist on 2/5 and was prescribed methylprednisolone which was somewhat helpful. She is still waking up at night short of breath and coughing. She has also been noticing wheezing at night. She is using Trelegy daily and levalbuterol 3-4x per day but she doesn't like because it makes her jittery.       Review of Systems   Constitutional, HEENT, cardiovascular, pulmonary, gi and gu systems are negative, except as otherwise noted.      Objective    Pulse 92   Temp 98  F (36.7  C) (Tympanic)   Ht 1.626 m (5' 4\")   Wt 49 kg (108 lb)   LMP 09/22/2010   SpO2 95%   Breastfeeding No   BMI 18.54 kg/m    Body mass index is 18.54 kg/m .  Physical Exam   GENERAL: healthy, alert and no distress  RESP: expiratory wheezes on the right. Otherwise good airflow and clear  CV: regular rate and rhythm, normal S1 S2, no S3 or S4, no murmur, click or rub, no peripheral edema and peripheral pulses strong    CXR - " Reviewed and interpreted by me resolving right lobe pneumonia. Final radiologist read pending.

## 2021-02-22 VITALS
WEIGHT: 108 LBS | HEIGHT: 64 IN | HEART RATE: 92 BPM | OXYGEN SATURATION: 95 % | TEMPERATURE: 98 F | DIASTOLIC BLOOD PRESSURE: 82 MMHG | BODY MASS INDEX: 18.44 KG/M2 | SYSTOLIC BLOOD PRESSURE: 140 MMHG

## 2021-02-24 DIAGNOSIS — J44.9 CHRONIC OBSTRUCTIVE PULMONARY DISEASE, UNSPECIFIED COPD TYPE (H): ICD-10-CM

## 2021-02-24 DIAGNOSIS — J18.9 PNEUMONIA OF RIGHT LUNG DUE TO INFECTIOUS ORGANISM, UNSPECIFIED PART OF LUNG: Primary | ICD-10-CM

## 2021-02-25 ENCOUNTER — TELEPHONE (OUTPATIENT)
Dept: PULMONOLOGY | Facility: CLINIC | Age: 66
End: 2021-02-25

## 2021-02-25 NOTE — TELEPHONE ENCOUNTER
"Patient left message that she is \"not breathing right\". Returned call to her and had to leave message to call clinic.  "

## 2021-02-26 ENCOUNTER — TELEPHONE (OUTPATIENT)
Dept: PULMONOLOGY | Facility: CLINIC | Age: 66
End: 2021-02-26

## 2021-02-26 DIAGNOSIS — Z20.822 SUSPECTED COVID-19 VIRUS INFECTION: Primary | ICD-10-CM

## 2021-02-26 NOTE — TELEPHONE ENCOUNTER
Spoke with patient as she had left message yesterday. Called patient yesterday and left message. Returned call now to patient and she is reporting symptoms of fever 100 with night sweats, has cough with yellow thick sputum, and no smell of taste for 2 weeks. She had been placed on Augmentin by PCP for pneumonia and finished 1 1/2 weeks ago and then was instructed by Dr Bridges to take on hand steroid as well on 2/5/21. Now calling as feeling worse again. Spoke with Dr Bridges and he is ordering COVID test. 1/27/21 COVID test was negative. Placed COVID test and called patient and she is on phone with COVID . Ended call so she could finish scheduling.    Spoke with patient and she is scheduled for COVID test tomorrow at 4 pm. Explained to isolate and wear mask until results of test determined. She will go to ED if symptoms worsen. Will call patient Mon.

## 2021-02-27 DIAGNOSIS — Z20.822 SUSPECTED COVID-19 VIRUS INFECTION: ICD-10-CM

## 2021-02-27 PROCEDURE — U0005 INFEC AGEN DETEC AMPLI PROBE: HCPCS | Performed by: INTERNAL MEDICINE

## 2021-02-27 PROCEDURE — U0003 INFECTIOUS AGENT DETECTION BY NUCLEIC ACID (DNA OR RNA); SEVERE ACUTE RESPIRATORY SYNDROME CORONAVIRUS 2 (SARS-COV-2) (CORONAVIRUS DISEASE [COVID-19]), AMPLIFIED PROBE TECHNIQUE, MAKING USE OF HIGH THROUGHPUT TECHNOLOGIES AS DESCRIBED BY CMS-2020-01-R: HCPCS | Performed by: INTERNAL MEDICINE

## 2021-02-28 LAB
LABORATORY COMMENT REPORT: NORMAL
SARS-COV-2 RNA RESP QL NAA+PROBE: NEGATIVE
SARS-COV-2 RNA RESP QL NAA+PROBE: NORMAL
SPECIMEN SOURCE: NORMAL
SPECIMEN SOURCE: NORMAL

## 2021-03-04 DIAGNOSIS — J32.9 SINUS INFECTION: Primary | ICD-10-CM

## 2021-03-04 RX ORDER — LEVOFLOXACIN 500 MG/1
500 TABLET, FILM COATED ORAL DAILY
Qty: 14 TABLET | Refills: 0 | Status: SHIPPED | OUTPATIENT
Start: 2021-03-04 | End: 2021-04-14

## 2021-03-04 NOTE — PROGRESS NOTES
Spoke with patient and she is having extreme face and forehead discomfort due to sinus infection. 99.9 fever. Having large amounts of nasal drainage(yellow) and expectorating yellow thick sputum as well. No appetite and not able to smell. COVID test from 2/27/21 negative. Dr Bridges authorized Levaquin 500mg daily for 14 days. Placed order to pharmacy. Called patient and gave her treatment plan. She has heart burn as well and instructed her to obtain OTC reflux medication(Pepcid). She will begin treatment plan tonight.

## 2021-03-12 ENCOUNTER — HOSPITAL ENCOUNTER (OUTPATIENT)
Dept: CT IMAGING | Facility: CLINIC | Age: 66
Discharge: HOME | End: 2021-03-12
Attending: FAMILY MEDICINE | Admitting: FAMILY MEDICINE
Payer: COMMERCIAL

## 2021-03-12 ENCOUNTER — HOSPITAL ENCOUNTER (OUTPATIENT)
Dept: GENERAL RADIOLOGY | Facility: CLINIC | Age: 66
Discharge: HOME | End: 2021-03-12
Attending: FAMILY MEDICINE | Admitting: FAMILY MEDICINE
Payer: COMMERCIAL

## 2021-03-12 DIAGNOSIS — J18.9 PNEUMONIA OF RIGHT LUNG DUE TO INFECTIOUS ORGANISM, UNSPECIFIED PART OF LUNG: ICD-10-CM

## 2021-03-12 DIAGNOSIS — J44.9 CHRONIC OBSTRUCTIVE PULMONARY DISEASE, UNSPECIFIED COPD TYPE (H): ICD-10-CM

## 2021-03-12 PROCEDURE — 71250 CT THORAX DX C-: CPT

## 2021-03-12 PROCEDURE — 71046 X-RAY EXAM CHEST 2 VIEWS: CPT

## 2021-03-23 ENCOUNTER — IMMUNIZATION (OUTPATIENT)
Dept: NURSING | Facility: CLINIC | Age: 66
End: 2021-03-23
Payer: COMMERCIAL

## 2021-03-23 PROCEDURE — 0001A PR COVID VAC PFIZER DIL RECON 30 MCG/0.3 ML IM: CPT

## 2021-03-23 PROCEDURE — 91300 PR COVID VAC PFIZER DIL RECON 30 MCG/0.3 ML IM: CPT

## 2021-04-01 DIAGNOSIS — J42 CHRONIC BRONCHITIS, UNSPECIFIED CHRONIC BRONCHITIS TYPE (H): ICD-10-CM

## 2021-04-13 ENCOUNTER — IMMUNIZATION (OUTPATIENT)
Dept: NURSING | Facility: CLINIC | Age: 66
End: 2021-04-13
Attending: INTERNAL MEDICINE
Payer: COMMERCIAL

## 2021-04-13 PROCEDURE — 0002A PR COVID VAC PFIZER DIL RECON 30 MCG/0.3 ML IM: CPT

## 2021-04-13 PROCEDURE — 91300 PR COVID VAC PFIZER DIL RECON 30 MCG/0.3 ML IM: CPT

## 2021-04-14 ENCOUNTER — OFFICE VISIT (OUTPATIENT)
Dept: FAMILY MEDICINE | Facility: CLINIC | Age: 66
End: 2021-04-14
Payer: COMMERCIAL

## 2021-04-14 ENCOUNTER — ANCILLARY PROCEDURE (OUTPATIENT)
Dept: GENERAL RADIOLOGY | Facility: CLINIC | Age: 66
End: 2021-04-14
Attending: NURSE PRACTITIONER
Payer: COMMERCIAL

## 2021-04-14 VITALS
WEIGHT: 109 LBS | HEART RATE: 94 BPM | BODY MASS INDEX: 20.58 KG/M2 | OXYGEN SATURATION: 95 % | HEIGHT: 61 IN | SYSTOLIC BLOOD PRESSURE: 114 MMHG | TEMPERATURE: 99.6 F | DIASTOLIC BLOOD PRESSURE: 72 MMHG

## 2021-04-14 DIAGNOSIS — M54.41 ACUTE RIGHT-SIDED LOW BACK PAIN WITH RIGHT-SIDED SCIATICA: Primary | ICD-10-CM

## 2021-04-14 DIAGNOSIS — I70.0 AORTIC CALCIFICATION (H): ICD-10-CM

## 2021-04-14 DIAGNOSIS — Z87.891 FORMER SMOKER: ICD-10-CM

## 2021-04-14 DIAGNOSIS — M54.6 ACUTE RIGHT-SIDED THORACIC BACK PAIN: ICD-10-CM

## 2021-04-14 DIAGNOSIS — R53.81 MALAISE: ICD-10-CM

## 2021-04-14 DIAGNOSIS — R93.7 ABNORMAL X-RAY OF LUMBAR SPINE: ICD-10-CM

## 2021-04-14 LAB
ALBUMIN UR-MCNC: NEGATIVE MG/DL
APPEARANCE UR: CLEAR
BACTERIA #/AREA URNS HPF: ABNORMAL /HPF
BILIRUB UR QL STRIP: NEGATIVE
COLOR UR AUTO: YELLOW
ERYTHROCYTE [DISTWIDTH] IN BLOOD BY AUTOMATED COUNT: 12.2 % (ref 10–15)
ERYTHROCYTE [SEDIMENTATION RATE] IN BLOOD BY WESTERGREN METHOD: 4 MM/H (ref 0–30)
GLUCOSE UR STRIP-MCNC: NEGATIVE MG/DL
HCT VFR BLD AUTO: 44.7 % (ref 35–47)
HGB BLD-MCNC: 15.5 G/DL (ref 11.7–15.7)
HGB UR QL STRIP: ABNORMAL
KETONES UR STRIP-MCNC: NEGATIVE MG/DL
LEUKOCYTE ESTERASE UR QL STRIP: NEGATIVE
MCH RBC QN AUTO: 32.2 PG (ref 26.5–33)
MCHC RBC AUTO-ENTMCNC: 34.7 G/DL (ref 31.5–36.5)
MCV RBC AUTO: 93 FL (ref 78–100)
NITRATE UR QL: NEGATIVE
NON-SQ EPI CELLS #/AREA URNS LPF: ABNORMAL /LPF
PH UR STRIP: 6 PH (ref 5–7)
PLATELET # BLD AUTO: 249 10E9/L (ref 150–450)
RBC # BLD AUTO: 4.81 10E12/L (ref 3.8–5.2)
RBC #/AREA URNS AUTO: ABNORMAL /HPF
SOURCE: ABNORMAL
SP GR UR STRIP: >1.03 (ref 1–1.03)
UROBILINOGEN UR STRIP-ACNC: 0.2 EU/DL (ref 0.2–1)
WBC # BLD AUTO: 7.9 10E9/L (ref 4–11)
WBC #/AREA URNS AUTO: ABNORMAL /HPF

## 2021-04-14 PROCEDURE — 72080 X-RAY EXAM THORACOLMB 2/> VW: CPT | Mod: FY | Performed by: RADIOLOGY

## 2021-04-14 PROCEDURE — 81001 URINALYSIS AUTO W/SCOPE: CPT | Performed by: NURSE PRACTITIONER

## 2021-04-14 PROCEDURE — 80053 COMPREHEN METABOLIC PANEL: CPT | Performed by: NURSE PRACTITIONER

## 2021-04-14 PROCEDURE — 36415 COLL VENOUS BLD VENIPUNCTURE: CPT | Performed by: NURSE PRACTITIONER

## 2021-04-14 PROCEDURE — 85652 RBC SED RATE AUTOMATED: CPT | Performed by: NURSE PRACTITIONER

## 2021-04-14 PROCEDURE — 85027 COMPLETE CBC AUTOMATED: CPT | Performed by: NURSE PRACTITIONER

## 2021-04-14 PROCEDURE — 86140 C-REACTIVE PROTEIN: CPT | Performed by: NURSE PRACTITIONER

## 2021-04-14 PROCEDURE — 99214 OFFICE O/P EST MOD 30 MIN: CPT | Performed by: NURSE PRACTITIONER

## 2021-04-14 RX ORDER — NAPROXEN 500 MG/1
500 TABLET ORAL 2 TIMES DAILY WITH MEALS
Qty: 14 TABLET | Refills: 0 | Status: SHIPPED | OUTPATIENT
Start: 2021-04-14 | End: 2021-04-21

## 2021-04-14 RX ORDER — METHOCARBAMOL 500 MG/1
500 TABLET, FILM COATED ORAL 2 TIMES DAILY PRN
Qty: 10 TABLET | Refills: 0 | Status: SHIPPED | OUTPATIENT
Start: 2021-04-14 | End: 2021-04-20 | Stop reason: ALTCHOICE

## 2021-04-14 ASSESSMENT — MIFFLIN-ST. JEOR: SCORE: 986.8

## 2021-04-14 NOTE — PATIENT INSTRUCTIONS
Patient Education     Possible Causes of Low Back or Leg Pain    BIG: The symptoms in your back or leg may be due to pressure on a nerve. This pressure may be caused by a damaged disk or by abnormal bone growth. Either way, you may feel pain, burning, tingling, or numbness. If you have pressure on a nerve that connects to the sciatic nerve, pain may shoot down your leg.    Pressure from the disk  Constant wear and tear can weaken a disk over time and cause back pain. The disk can then be damaged by a sudden movement or injury. If its soft center starts to bulge, the disk may press on a nerve. Or the outside of the disk may tear, and the soft center may squeeze through and pinch a nerve.    Pressure from bone  As a disk wears out, the vertebrae right above and below the disk start to touch. This can put pressure on a nerve. Often, abnormal bone (called bone spurs) grows where the vertebrae rub against each other. This can cause the foramen or the spinal canal to narrow (called stenosis) and press against a nerve.  [employer] last reviewed this educational content on 3/1/2018    2674-9441 [employer]. All rights reserved. This information is not intended as a substitute for professional medical care. Always follow your healthcare professional's instructions.           Patient Education     Relieving Back Pain  Back pain is a common problem. You can strain back muscles by lifting too much weight or just by moving the wrong way. Back strain can be uncomfortable, even painful. And it can take weeks or months to improve. To help yourself feel better and prevent future back strains, try these tips.  Important: Don't give aspirin to children or teens without first discussing it with your child's healthcare provider.  Ice    Ice reduces muscle pain and swelling. It helps most during the first 24 to 48 hours after an injury.    Wrap an ice pack or a bag of frozen peas in a thin towel. Never put ice directly on  your skin.    Place the ice where your back hurts the most.    Don t ice for more than 20 minutes at a time.    You can use ice several times a day.  Medicines  Over-the-counter pain relievers include acetaminophen and anti-inflammatory medicines, which includes aspirin, naproxen, or ibuprofen. They can help ease discomfort. Some also reduce swelling.    Tell your healthcare provider about any medicines you are already taking.    Take medicines only as directed.  Manipulation and massage  Having manipulation by an osteopathic doctor or chiropractor may be helpful. Getting a massage also may help.   Heat  After the first 48 hours, heat can relax sore muscles and improve blood flow.    Try a warm bath or shower. Or use a heating pad set on low. To prevent a burn, keep a cloth between you and the heating pad.    Don t use a heating pad for more than 15 minutes at a time. Never sleep on a heating pad.  Jesenia last reviewed this educational content on 6/1/2018 2000-2021 [employer]. All rights reserved. This information is not intended as a substitute for professional medical care. Always follow your healthcare professional's instructions.

## 2021-04-14 NOTE — PROGRESS NOTES
Assessment & Plan     Acute right-sided low back pain with right-sided sciatica  Acute right-sided thoracic back pain  Reassuring exam today with exception of curvature of spine noted; no red flag symptoms no higher level of care needed.   Labs and imaging today.   Discussed steroids and muscle relaxers or NSAIDS. She would like to start with NSAIDS and muscle relaxer declines steroids.   Will make plan of care based on labs and imaging results.   Red flag symptoms discussed and if these occur present to the emergency room or call 911.  Mimi verbalizes understanding of plan of care and is in agreement.      - *UA reflex to Microscopic and Culture ([place] [facility] (except [place] and [place])  - CBC with platelets  - CRP, inflammation  - ESR: Erythrocyte sedimentation rate  - Comprehensive metabolic panel (BMP + Alb, Alk Phos, ALT, AST, Total. Bili, TP)  - XR Thoracic Lumbar Spine 2 Views  - Urine Microscopic  - naproxen (NAPROSYN) 500 MG tablet  Dispense: 14 tablet; Refill: 0  Robaxin 500mg BID.  - Orthopedic & Spine  Referral      Malaise    Former smoker  Aortic calcification (H)    - US Abdominal Aorta Imaging    Abnormal x-ray of lumbar spine    - Orthopedic & Spine  Referral  - US Abdominal Aorta Imaging      Return in about 2 weeks (around 4/28/2021) for Recheck.    Anushka Woodruff, ROBBY-[facility]   Mimi is a 65 year old who presents for the following health issues     HPI     Back Pain  Onset/Duration: worse last 2 weeks  Description:   Location of pain: middle of back right  Character of pain: pain - constant  Pain radiation: down back to leg  New numbness or weakness in legs, not attributed to pain: no   Intensity: Currently 7/10, At its worst 9/10-last night  Progression of Symptoms: worsening and constant  History:   Specific cause: none  Pain interferes with job: not applicable  History of back problems: no prior back  "problems  Any previous MRI or X-rays: None  Sees a specialist for back pain: No  Alleviating factors:   Improved by: ibuprofen - 400mg BID - temporary relief    Precipitating factors:  Worsened by: Standing and Sitting  Therapies tried and outcome: icy hot - no relief    Accompanying Signs & Symptoms:  Risk of Fracture: None  Risk of Cauda Equina: None  Risk of Infection: None  Risk of Cancer: None  Risk of Ankylosing Spondylitis: Onset at age <35, male, AND morning back stiffness  no     Feels some malaise. Afebrile. NO bowel or bladder concerns.     Review of Systems   Constitutional, HEENT, cardiovascular, pulmonary, GI, , musculoskeletal, neuro, skin, endocrine and psych systems are negative, except as otherwise noted in the HPI.      Objective    /72 (BP Location: Left arm, Patient Position: Chair, Cuff Size: Adult Regular)   Pulse 94   Temp 99.6  F (37.6  C) (Tympanic)   Ht 1.549 m (5' 1\")   Wt 49.4 kg (109 lb)   LMP 09/22/2010   SpO2 95%   Breastfeeding No   BMI 20.60 kg/m    Body mass index is 20.6 kg/m .  Physical Exam   GENERAL: healthy, alert and no distress  RESP: lungs clear to auscultation - no rales, rhonchi or wheezes  CV: regular rate and rhythm, normal S1 S2, no S3 or S4, no murmur, click or rub, no peripheral edema and peripheral pulses strong  ABDOMEN: soft, nontender, no hepatosplenomegaly, no masses and bowel sounds normal  MS: no gross musculoskeletal defects noted, no edema; Normal ROM of spine,non tender to palpation,  notable scoliosis mid spine noted.   SKIN: no suspicious lesions or rashes  NEURO: Normal strength and tone, mentation intact and speech normal  PSYCH: mentation appears normal, affect normal/bright    Results for orders placed or performed in visit on 04/14/21   XR Thoracic Lumbar Spine 2 Views     Status: None    Narrative    THORACIC LUMBAR SPINE TWO VIEW  4/14/2021 3:06 PM     HISTORY: Acute right-sided low back pain with right-sided sciatica;  Acute " right-sided thoracic back pain.    COMPARISON: None.      Impression    IMPRESSION: There appear to be 5 nonrib-bearing lumbar vertebral  bodies. Moderate dextroconvex curvature centered at L3. Mild stepwise  left lateral listhesis of L1 on L2 and L2 on L3, and mild right  lateral listhesis of L3 on L4 and mild to moderate right lateral  listhesis of L4 on L5 measuring up to approximately 9-10 mm. This  multilevel listhesis in the coronal plane appears to be on the  degenerative basis. Straightening of the normal lumbar lordosis in the  sagittal plane. No gross vertebral body height loss identified.  Moderate/moderate to severe multilevel degenerative disc space  narrowing with marginal endplate osteophytes essentially involving all  levels from L1-L2 through L5-S1. Multilevel moderate to advanced  degenerative facet arthropathy. Scattered atherosclerotic  calcifications of the aorta.    BRIJESH BURCIAGA MD   *UA reflex to Microscopic and Culture ([place] [facility] (except [place] and [place])     Status: Abnormal    Specimen: Midstream Urine   Result Value Ref Range    Color Urine Yellow     Appearance Urine Clear     Glucose Urine Negative NEG^Negative mg/dL    Bilirubin Urine Negative NEG^Negative    Ketones Urine Negative NEG^Negative mg/dL    Specific Gravity Urine >1.030 1.003 - 1.035    Blood Urine Trace (A) NEG^Negative    pH Urine 6.0 5.0 - 7.0 pH    Protein Albumin Urine Negative NEG^Negative mg/dL    Urobilinogen Urine 0.2 0.2 - 1.0 EU/dL    Nitrite Urine Negative NEG^Negative    Leukocyte Esterase Urine Negative NEG^Negative    Source Midstream Urine    CBC with platelets     Status: None   Result Value Ref Range    WBC 7.9 4.0 - 11.0 10e9/L    RBC Count 4.81 3.8 - 5.2 10e12/L    Hemoglobin 15.5 11.7 - 15.7 g/dL    Hematocrit 44.7 35.0 - 47.0 %    MCV 93 78 - 100 fl    MCH 32.2 26.5 - 33.0 pg    MCHC 34.7 31.5 - 36.5 g/dL    RDW 12.2 10.0 - 15.0 %    Platelet Count 249 150 - 450 10e9/L   CRP,  inflammation     Status: None   Result Value Ref Range    CRP Inflammation 5.6 0.0 - 8.0 mg/L   ESR: Erythrocyte sedimentation rate     Status: None   Result Value Ref Range    Sed Rate 4 0 - 30 mm/h   Comprehensive metabolic panel (BMP + Alb, Alk Phos, ALT, AST, Total. Bili, TP)     Status: None   Result Value Ref Range    Sodium 135 133 - 144 mmol/L    Potassium 4.0 3.4 - 5.3 mmol/L    Chloride 102 94 - 109 mmol/L    Carbon Dioxide 30 20 - 32 mmol/L    Anion Gap 3 3 - 14 mmol/L    Glucose 79 70 - 99 mg/dL    Urea Nitrogen 7 7 - 30 mg/dL    Creatinine 0.60 0.52 - 1.04 mg/dL    GFR Estimate >90 >60 mL/min/[1.73_m2]    GFR Estimate If Black >90 >60 mL/min/[1.73_m2]    Calcium 9.4 8.5 - 10.1 mg/dL    Bilirubin Total 0.5 0.2 - 1.3 mg/dL    Albumin 4.1 3.4 - 5.0 g/dL    Protein Total 7.3 6.8 - 8.8 g/dL    Alkaline Phosphatase 83 40 - 150 U/L    ALT 21 0 - 50 U/L    AST 19 0 - 45 U/L   Urine Microscopic     Status: Abnormal   Result Value Ref Range    WBC Urine 0 - 5 OTO5^0 - 5 /HPF    RBC Urine O - 2 OTO2^O - 2 /HPF    Squamous Epithelial /LPF Urine Few FEW^Few /LPF    Bacteria Urine Few (A) NEG^Negative /HPF

## 2021-04-15 ENCOUNTER — TELEPHONE (OUTPATIENT)
Dept: FAMILY MEDICINE | Facility: CLINIC | Age: 66
End: 2021-04-15

## 2021-04-15 LAB
ALBUMIN SERPL-MCNC: 4.1 G/DL (ref 3.4–5)
ALP SERPL-CCNC: 83 U/L (ref 40–150)
ALT SERPL W P-5'-P-CCNC: 21 U/L (ref 0–50)
ANION GAP SERPL CALCULATED.3IONS-SCNC: 3 MMOL/L (ref 3–14)
AST SERPL W P-5'-P-CCNC: 19 U/L (ref 0–45)
BILIRUB SERPL-MCNC: 0.5 MG/DL (ref 0.2–1.3)
BUN SERPL-MCNC: 7 MG/DL (ref 7–30)
CALCIUM SERPL-MCNC: 9.4 MG/DL (ref 8.5–10.1)
CHLORIDE SERPL-SCNC: 102 MMOL/L (ref 94–109)
CO2 SERPL-SCNC: 30 MMOL/L (ref 20–32)
CREAT SERPL-MCNC: 0.6 MG/DL (ref 0.52–1.04)
CRP SERPL-MCNC: 5.6 MG/L (ref 0–8)
GFR SERPL CREATININE-BSD FRML MDRD: >90 ML/MIN/{1.73_M2}
GLUCOSE SERPL-MCNC: 79 MG/DL (ref 70–99)
POTASSIUM SERPL-SCNC: 4 MMOL/L (ref 3.4–5.3)
PROT SERPL-MCNC: 7.3 G/DL (ref 6.8–8.8)
SODIUM SERPL-SCNC: 135 MMOL/L (ref 133–144)

## 2021-04-15 NOTE — TELEPHONE ENCOUNTER
Reason for Call:  Request for results:    Name of test or procedure: X Ray  THORACIC LUMBAR SPINE TWO VIEW    Date of test of procedure:  4/14      Location of the test or procedure: [place]    OK to leave the result message on voice mail or with a family member? NO    Phone number Patient can be reached at:  Cell number on file:    Telephone Information:   Mobile [phone]       Additional comments: pt would like the results    Call taken on 4/15/2021 at 10:24 AM by Carolina Sheppard

## 2021-04-15 NOTE — RESULT ENCOUNTER NOTE
Note to Staff: please call the patient to explain results and to check on current symptoms.     All of her labs look good and are normal.     Her back has multiple degenerative changes. Lets have her see the spine specialist as soon as possible. I have sent this referral they should be calling her. They can proceed with MRI or other imaging they feel necessary.    Incidental finding of aorta calcifications. I have ordered a Aorta US to assess this further. They will call her to set this up.      Anushka Woodruff, ROBBY-[facility]     For additional lab test information, labtestsonline.org is an excellent reference.

## 2021-04-16 NOTE — TELEPHONE ENCOUNTER
Patient given results of Xray of Thoracic/Lumbar Spine:    Note to Staff: please call the patient to explain results and to check on current symptoms.      All of her labs look good and are normal.      Her back has multiple degenerative changes. Lets have her see the spine specialist as soon as possible. I have sent this referral they should be calling her. They can proceed with MRI or other imaging they feel necessary.     Incidental finding of aorta calcifications. I have ordered a Aorta US to assess this further. They will call her to set this up.    Aortic US already scheduled.    Floresita Paredes RN  [facility]

## 2021-04-20 ENCOUNTER — OFFICE VISIT (OUTPATIENT)
Dept: PALLIATIVE MEDICINE | Facility: CLINIC | Age: 66
End: 2021-04-20
Attending: NURSE PRACTITIONER
Payer: COMMERCIAL

## 2021-04-20 VITALS — HEART RATE: 96 BPM | DIASTOLIC BLOOD PRESSURE: 88 MMHG | SYSTOLIC BLOOD PRESSURE: 143 MMHG | OXYGEN SATURATION: 96 %

## 2021-04-20 DIAGNOSIS — M79.18 MYOFASCIAL PAIN: ICD-10-CM

## 2021-04-20 DIAGNOSIS — M54.6 ACUTE RIGHT-SIDED THORACIC BACK PAIN: ICD-10-CM

## 2021-04-20 DIAGNOSIS — M41.9 SCOLIOSIS OF LUMBAR SPINE, UNSPECIFIED SCOLIOSIS TYPE: Primary | ICD-10-CM

## 2021-04-20 DIAGNOSIS — M54.41 ACUTE RIGHT-SIDED LOW BACK PAIN WITH RIGHT-SIDED SCIATICA: ICD-10-CM

## 2021-04-20 PROCEDURE — 99203 OFFICE O/P NEW LOW 30 MIN: CPT | Performed by: PHYSICAL MEDICINE & REHABILITATION

## 2021-04-20 RX ORDER — CYCLOBENZAPRINE HCL 10 MG
5-10 TABLET ORAL 2 TIMES DAILY PRN
Qty: 30 TABLET | Refills: 1 | Status: SHIPPED | OUTPATIENT
Start: 2021-04-20 | End: 2021-08-30

## 2021-04-20 ASSESSMENT — PAIN SCALES - GENERAL: PAINLEVEL: MODERATE PAIN (5)

## 2021-04-20 NOTE — PROGRESS NOTES
[facility] Medical Spine Consultation    Date of visit: 4/20/2021    Assessment:  Mimi Rivera is a 65 year old female with a past medical history significant for COPD, depression, HTN who presents with complaints of:    1. Back pain: x-ray shows multi-level degenerative changes and moderate curvature centered around L3. Etiology of pain likely secondary to lumbar spondylosis with overlying myofascial pain.       Plan:  The following recommendations were given to the patient. Diagnosis, treatment options, risks, benefits, and alternatives were discussed, and all questions were answered. The patient expressed understanding of the plan for management.     1. Therapies:  Order placed to start PT  2. Self Care Recommendations: discussed purchasing a TENS unit to see if it provides benefit.   3. Diagnostic Studies: None  4. Medication Management:    1. Start cyclobenzaprine 5-10 mg BID prn. Stop methocarbamol.   2. Continue utilizing topical medications    5. Procedures recommended: None  6. Follow up: 6 weeks or sooner if needed    Reason for consultation:    Primary Care Provider is [facility], [facility].    Mimi Rivera is a 65 year old female with a history of COPD, depression, HTN  who I was asked to see in consultation by Anushka Woodruff  for evaluation of back pain    Consultation and Evaluation for: back pain    Review of Electronic Chart: Today I have also reviewed available medical information in the patient's medical record at [facility] ([facility]), including relevant provider notes, laboratory work, and imaging.     Chief Complaint:    Chief Complaint   Patient presents with     Pain     History:  Mimi Rivera is a 65 year old female who presents for initial evaluation of chief pain complaint of back pain.     Pain started a couple weeks ago. Gradual increase in pain. Insidious in onset. Pain is located in the right low back. Sometimes feels like it radiates up to the neck and at  "other times feels like it radiates down the posterior aspect of the leg to the mid thigh. Since onset pain has been about the same. Pain is constant. She describes it as sharp, tingling, numb. Leg pain is sharp and when it radiates up to the neck it is dull in quality. Back feels stiff.   Severity/Intensity: 8/10 at worst, 2/10 at best, 2/10 on average  Aggravating factors include: prolonged standing  Relieving factors include: walking  Red Flags: The patient denies bowel or bladder incontinence, parasthesias, weakness, saddle anesthesia, unintentional weight loss, or fever/chills/sweats. She endorses having some night sweats. No hx of cancer.     Medications:       Current pain medications:  Methocarbamol 500 mg BID - [facility], SE \"doesn't like the way it makes her feel\"  Naproxen 500 mg BID - using once daily - [facility]  Icy/hot - [facility]  Salon pas - [facility]             Previous pain medications:  None    Past Pain Treatments:  PT: None  TENs Unit: None  Injections: None  Self-care:   Yes - Ice and heat - [facility]  Surgeries related to pain: None  Alternative Therapies:    Chiropractic: None   Acupuncture: None  Other: None    Diagnostic tests:  XR Thoracic Lumbar Spine completed on 4/14/2021 and showed:  IMPRESSION: There appear to be 5 nonrib-bearing lumbar vertebral  bodies. Moderate dextroconvex curvature centered at L3. Mild stepwise  left lateral listhesis of L1 on L2 and L2 on L3, and mild right  lateral listhesis of L3 on L4 and mild to moderate right lateral  listhesis of L4 on L5 measuring up to approximately 9-10 mm. This  multilevel listhesis in the coronal plane appears to be on the  degenerative basis. Straightening of the normal lumbar lordosis in the  sagittal plane. No gross vertebral body height loss identified.  Moderate/moderate to severe multilevel degenerative disc space  narrowing with marginal endplate osteophytes essentially involving all  levels from L1-L2 through L5-S1. Multilevel moderate to " advanced  degenerative facet arthropathy. Scattered atherosclerotic  calcifications of the aorta.    EMG/Testing:  NA    Labs:   Creatinine 0.6    Past Medical History:  Past Medical History:   Diagnosis Date     ASCUS on Pap smear 2006    unable to run HPV typing, f/u paps NIL     Chronic rhinosinusitis      COPD (chronic obstructive pulmonary disease) (H)      Depression, anxiety      Herpes     occasional outbreak     Hypertension        Past Surgical History:  Past Surgical History:   Procedure Laterality Date      SECTION      x 3       Medications:  Current Outpatient Medications   Medication Sig Dispense Refill     amLODIPine (NORVASC) 10 MG tablet Take 1 tablet (10 mg) by mouth daily 90 tablet 1     buPROPion (ZYBAN) 150 MG 12 hr tablet Take 1 tablet (150 mg) by mouth 2 times daily Start with 1 tab daily in am , increase the dose to 1 tablet bid after 1 week . 180 tablet 1     levalbuterol (XOPENEX HFA) 45 MCG/ACT inhaler Inhale 2 puffs into the lungs every 4 hours as needed for shortness of breath / dyspnea or wheezing 1 Inhaler 1     levalbuterol (XOPENEX) 1.25 MG/3ML neb solution Take 3 mLs (1.25 mg) by nebulization every 4 hours as needed for shortness of breath / dyspnea or wheezing 390 mL 11     naproxen (NAPROSYN) 500 MG tablet Take 1 tablet (500 mg) by mouth 2 times daily (with meals) for 7 days 14 tablet 0     TRELEGY ELLIPTA 100-62.5-25 MCG/INH oral inhaler Inhale 1 puff into the lungs, daily. 60 each 10       Allergies:   No Known Allergies    Family history:  Family History   Problem Relation Age of Onset     Hypertension Mother      Cancer Mother         liver     C.A.D. Father        Social History:  Home situation: Lives in [place], [place] with her .  Occupation/Schooling: Retired  Tobacco use: Former use  Drug use: None  Alcohol use: 2 drinks per week.     Physical Exam:  Vitals:    21 1430   BP: (!) 143/88   Pulse: 96   SpO2: 96%     Exam:  Constitutional: Well developed,  well nourished, appears stated age.  HEENT: Head atraumatic, normocephalic. Eyes without conjunctival injection or jaundice. Neck supple. No obvious neck masses.  Respiratory:breathing unlabored  Psychiatric/mental status: Alert, without lethargy or stupor. Speech fluent. Appropriate affect. Mood normal. Able to follow commands without difficulty.     Musculoskeletal exam:  Gait/Station/Posture:   Normal stance, arm swing, and stride  Normal bulk and tone. Unremarkable spinal curvature.      Lumbar spine:  Range of motion within normal limits    Rotation/ext to right: no change   Rotation/ext to left: no change  Myofascial tenderness:  Tender to palpation along paraspinals    Hip exam:   normal internal and external range of motion bilaterally.    Neurologic exam:  CN:  Cranial nerves 2-12 are grossly intact  Motor Strength:  5/5 symmetric LE strength    Reflexes:     Patella L4:  R:  2/4 L: 2/4   Achilles S1:  R:  2/4 L: 2/4    Sensory: (lower extremities):   Light touch: normal    Allodynia: absent    Hyperalgesia: absent     Lizabeth Barker MD  [facility]       BILLING TIME DOCUMENTATION:   The total TIME spent on this patient on the date of the encounter/appointment was 40 minutes.      TOTAL TIME includes:   Time spent preparing to see the patient (reviewing records and tests)  Time spent face to face (or over the phone) with the patient   Time spent ordering tests, medications, procedures and referrals   Time spent documenting clinical information in Epic

## 2021-04-20 NOTE — PATIENT INSTRUCTIONS
1. Order placed for PT.   2. Start flexeril 5-10 mg twice daily as needed. Stop methocarbamol. Can be sedating.  Do not drive until you know how the medication affects you.   3. You can try purchasing a TENS unit online.   4. Continue utilizing topical medications.   5. Follow up with me in 6 weeks or sooner if symptoms are worsening.     Lizabeth Barker MD  [facility]     ----------------------------------------------------------------  Clinic Number:  [phone]     Call with any questions about your care and for scheduling assistance.     Calls are returned Monday through Friday between 8 AM and 4:30 PM. We usually get back to you within 2 business days depending on the issue/request.    If we are prescribing your medications:    For opioid medication refills, call the clinic or send a [employer] message 7 days in advance.  Please include:    Name of requested medication    Name of the pharmacy.    For non-opioid medications, call your pharmacy directly to request a refill. Please allow 3-4 days to be processed.     Per [place] State Law:    All controlled substance prescriptions must be filled within 30 days of being written.      For those controlled substances allowing refills, pickup must occur within 30 days of last fill.      We believe regular attendance is key to your success in our program!      Any time you are unable to keep your appointment we ask that you call us at least 24 hours in advance to cancel.This will allow us to offer the appointment time to another patient.     Multiple missed appointments may lead to dismissal from the clinic.

## 2021-04-22 ENCOUNTER — MYC MEDICAL ADVICE (OUTPATIENT)
Dept: FAMILY MEDICINE | Facility: CLINIC | Age: 66
End: 2021-04-22

## 2021-04-22 ASSESSMENT — ANXIETY QUESTIONNAIRES
6. BECOMING EASILY ANNOYED OR IRRITABLE: NOT AT ALL
7. FEELING AFRAID AS IF SOMETHING AWFUL MIGHT HAPPEN: NOT AT ALL
5. BEING SO RESTLESS THAT IT IS HARD TO SIT STILL: NOT AT ALL
1. FEELING NERVOUS, ANXIOUS, OR ON EDGE: SEVERAL DAYS
GAD7 TOTAL SCORE: 4
GAD7 TOTAL SCORE: 4
3. WORRYING TOO MUCH ABOUT DIFFERENT THINGS: SEVERAL DAYS
7. FEELING AFRAID AS IF SOMETHING AWFUL MIGHT HAPPEN: NOT AT ALL
GAD7 TOTAL SCORE: 4
4. TROUBLE RELAXING: SEVERAL DAYS
2. NOT BEING ABLE TO STOP OR CONTROL WORRYING: SEVERAL DAYS

## 2021-04-22 ASSESSMENT — PATIENT HEALTH QUESTIONNAIRE - PHQ9
SUM OF ALL RESPONSES TO PHQ QUESTIONS 1-9: 2
10. IF YOU CHECKED OFF ANY PROBLEMS, HOW DIFFICULT HAVE THESE PROBLEMS MADE IT FOR YOU TO DO YOUR WORK, TAKE CARE OF THINGS AT HOME, OR GET ALONG WITH OTHER PEOPLE: NOT DIFFICULT AT ALL
SUM OF ALL RESPONSES TO PHQ QUESTIONS 1-9: 2

## 2021-04-22 NOTE — PROGRESS NOTES
Pre-Visit Planning   Next 5 appointments (look out 90 days)    Apr 23, 2021  3:05 PM  (Arrive by 2:45 PM)  Pre-Operative Physical with Aye Morejon MD  [facility] ([facility] ) [address] [facility] [address] [phone]        Appointment Notes for this encounter:   DOS 04/29 ballon sinuplasty [facility]    Questionnaires Reviewed/Assigned  Additional questionnaires assigned     [employer] message sent to assist in completion of PVP.

## 2021-04-23 ENCOUNTER — OFFICE VISIT (OUTPATIENT)
Dept: FAMILY MEDICINE | Facility: CLINIC | Age: 66
End: 2021-04-23
Payer: COMMERCIAL

## 2021-04-23 VITALS
SYSTOLIC BLOOD PRESSURE: 120 MMHG | HEIGHT: 61 IN | RESPIRATION RATE: 14 BRPM | TEMPERATURE: 97.7 F | WEIGHT: 104 LBS | DIASTOLIC BLOOD PRESSURE: 70 MMHG | HEART RATE: 80 BPM | OXYGEN SATURATION: 96 % | BODY MASS INDEX: 19.63 KG/M2

## 2021-04-23 DIAGNOSIS — Z01.818 PREOP GENERAL PHYSICAL EXAM: ICD-10-CM

## 2021-04-23 DIAGNOSIS — M43.9 SPINE CURVATURE, ACQUIRED: ICD-10-CM

## 2021-04-23 DIAGNOSIS — Z78.0 POST-MENOPAUSAL: Primary | ICD-10-CM

## 2021-04-23 PROCEDURE — U0005 INFEC AGEN DETEC AMPLI PROBE: HCPCS | Performed by: FAMILY MEDICINE

## 2021-04-23 PROCEDURE — 99214 OFFICE O/P EST MOD 30 MIN: CPT | Performed by: FAMILY MEDICINE

## 2021-04-23 PROCEDURE — 93000 ELECTROCARDIOGRAM COMPLETE: CPT | Performed by: FAMILY MEDICINE

## 2021-04-23 PROCEDURE — U0003 INFECTIOUS AGENT DETECTION BY NUCLEIC ACID (DNA OR RNA); SEVERE ACUTE RESPIRATORY SYNDROME CORONAVIRUS 2 (SARS-COV-2) (CORONAVIRUS DISEASE [COVID-19]), AMPLIFIED PROBE TECHNIQUE, MAKING USE OF HIGH THROUGHPUT TECHNOLOGIES AS DESCRIBED BY CMS-2020-01-R: HCPCS | Performed by: FAMILY MEDICINE

## 2021-04-23 ASSESSMENT — PATIENT HEALTH QUESTIONNAIRE - PHQ9: SUM OF ALL RESPONSES TO PHQ QUESTIONS 1-9: 2

## 2021-04-23 ASSESSMENT — MIFFLIN-ST. JEOR: SCORE: 964.12

## 2021-04-23 ASSESSMENT — ANXIETY QUESTIONNAIRES: GAD7 TOTAL SCORE: 4

## 2021-04-23 NOTE — PROGRESS NOTES
[facility]  [address] [facility] [place] [address]  Phone: [phone]  Primary Provider: [facility]        PREOPERATIVE EVALUATION:  Today's date: 4/23/2021    Mimi Rivera is a 65 year old female who presents for a preoperative evaluation.    Surgical Information:  Surgery/Procedure: sinusplasty  Surgery Location: [facility] [place]  Surgeon: Dr Alonso  Surgery Date: 4/29/21  Time of Surgery:   Where patient plans to recover: At home with family  Fax number for surgical facility: Fax: [phone]    Type of Anesthesia Anticipated: Local with MAC    Patient is optimized for sinus plasty procedure .    Patient will hold aspirin Multivitamin and ibuprofen 7 days prior to procedure .    Copd well controlled lung exam normal .    Subjective     HPI related to upcoming procedure:   Patient is undergoing low risk sinus plasty procedure   .Physical activity greater than 4 mets .  Low perioperative cardiac risk .  Denies any chest pain or shortness of breath .  EKG no acute changes .      Preop Questions 4/23/2021   1. Have you ever had a heart attack or stroke? No   2. Have you ever had surgery on your heart or blood vessels, such as a stent placement, a coronary artery bypass, or surgery on an artery in your head, neck, heart, or legs? No   3. Do you have chest pain with activity? No   4. Do you have a history of  heart failure? No   5. Do you currently have a cold, bronchitis or symptoms of other infection? No   6. Do you have a cough, shortness of breath, or wheezing? No   7. Do you or anyone in your family have previous history of blood clots? No   8. Do you or does anyone in your family have a serious bleeding problem such as prolonged bleeding following surgeries or cuts? No   9. Have you ever had problems with anemia or been told to take iron pills? No   10. Have you had any abnormal blood loss such as black, tarry or bloody stools, or abnormal vaginal bleeding?  No   11. Have you ever had a blood transfusion? No   12. Are you willing to have a blood transfusion if it is medically needed before, during, or after your surgery? Yes   13. Have you or any of your relatives ever had problems with anesthesia? No   14. Do you have sleep apnea, excessive snoring or daytime drowsiness? No   15. Do you have any artifical heart valves or other implanted medical devices like a pacemaker, defibrillator, or continuous glucose monitor? No   16. Do you have artificial joints? No   17. Are you allergic to latex? No       Health Care Directive:  Patient does not have a Health Care Directive or Living Will:         Status of Chronic Conditions:  COPD - Patient has a longstanding history of moderate-severe COPD . Patient has been doing well overall noting NO SYMPTOMS and continues on medication regimen consisting of Tr elegy Ellipta  without adverse reactions or side effects.      Review of Systems  CONSTITUTIONAL: NEGATIVE for fever, chills, change in weight  INTEGUMENTARY/SKIN: NEGATIVE for worrisome rashes, moles or lesions  EYES: NEGATIVE for vision changes or irritation  ENT/MOUTH: NEGATIVE for ear, mouth and throat problems  RESP: NEGATIVE for significant cough or SOB  BREAST: NEGATIVE for masses, tenderness or discharge  CV: NEGATIVE for chest pain, palpitations or peripheral edema  GI: NEGATIVE for nausea, abdominal pain, heartburn, or change in bowel habits  : NEGATIVE for frequency, dysuria, or hematuria  MUSCULOSKELETAL: NEGATIVE for significant arthralgias , concerned for lumbar back pain .    NEURO: NEGATIVE for weakness, dizziness or paresthesias  ENDOCRINE: NEGATIVE for temperature intolerance, skin/hair changes  HEME: NEGATIVE for bleeding problems  PSYCHIATRIC: NEGATIVE for changes in mood or affect    Patient Active Problem List    Diagnosis Date Noted     Advanced directives, counseling/discussion 06/01/2017     Priority: Medium     Advance Care Planning 6/1/2017: ACP  Review of Chart / Resources Provided:  Reviewed chart for advance care plan.  Mimi Rivera has an advance care plan which needs to be updated. Patient states presence of new/updated ACP document. Copy requested  Added by Michelle Swanson             Former smoker 10/24/2016     Priority: Medium     Moderate major depression (H) 2014     Priority: Medium     COPD (chronic obstructive pulmonary disease) (H) 2014     Priority: Medium     Hypertension goal BP (blood pressure) < 140/90 2012     Priority: Medium     CARDIOVASCULAR SCREENING; LDL GOAL LESS THAN 160 10/31/2010     Priority: Medium      Past Medical History:   Diagnosis Date     ASCUS on Pap smear     unable to run HPV typing, f/u paps NIL     Chronic rhinosinusitis      COPD (chronic obstructive pulmonary disease) (H)      Depression, anxiety      Herpes     occasional outbreak     Hypertension      Past Surgical History:   Procedure Laterality Date      SECTION      x 3     Current Outpatient Medications   Medication Sig Dispense Refill     amLODIPine (NORVASC) 10 MG tablet Take 1 tablet (10 mg) by mouth daily 90 tablet 1     buPROPion (ZYBAN) 150 MG 12 hr tablet Take 1 tablet (150 mg) by mouth 2 times daily Start with 1 tab daily in am , increase the dose to 1 tablet bid after 1 week . 180 tablet 1     cyclobenzaprine (FLEXERIL) 10 MG tablet Take 0.5-1 tablets (5-10 mg) by mouth 2 times daily as needed for muscle spasms 30 tablet 1     levalbuterol (XOPENEX HFA) 45 MCG/ACT inhaler Inhale 2 puffs into the lungs every 4 hours as needed for shortness of breath / dyspnea or wheezing 1 Inhaler 1     levalbuterol (XOPENEX) 1.25 MG/3ML neb solution Take 3 mLs (1.25 mg) by nebulization every 4 hours as needed for shortness of breath / dyspnea or wheezing 390 mL 11     TRELEGY ELLIPTA 100-62.5-25 MCG/INH oral inhaler Inhale 1 puff into the lungs, daily. 60 each 10       No Known Allergies     Social History     Tobacco Use     Smoking  "status: Former Smoker     Packs/day: 10.00     Types: Cigarettes     Quit date: 3/19/2015     Years since quittin.1     Smokeless tobacco: Former User     Quit date: 2017   Substance Use Topics     Alcohol use: Yes     Alcohol/week: 2.0 - 3.0 standard drinks     Types: 2 - 3 Standard drinks or equivalent per week     Comment: 2 drinks per wk avg     Family History   Problem Relation Age of Onset     Hypertension Mother      Cancer Mother         liver     C.A.D. Father      History   Drug Use No         Objective     /70 (Cuff Size: Adult Regular)   Pulse 80   Temp 97.7  F (36.5  C) (Oral)   Resp 14   Ht 1.549 m (5' 1\")   Wt 47.2 kg (104 lb)   LMP 2010   SpO2 96%   BMI 19.65 kg/m      Physical Exam    GENERAL APPEARANCE: healthy, alert and no distress     EYES: EOMI, PERRL     HENT: ear canals and TM's normal and nose and mouth without ulcers or lesions     NECK: no adenopathy, no asymmetry, masses, or scars and thyroid normal to palpation     RESP: lungs clear to auscultation - no rales, rhonchi or wheezes     CV: regular rates and rhythm, normal S1 S2, no S3 or S4 and no murmur, click or rub     ABDOMEN:  soft, nontender, no HSM or masses and bowel sounds normal     MS: extremities normal- no gross deformities noted, no evidence of inflammation in joints, FROM in all extremities.     SKIN: no suspicious lesions or rashes     NEURO: Normal strength and tone, sensory exam grossly normal, mentation intact and speech normal     PSYCH: mentation appears normal. and affect normal/bright     LYMPHATICS: No cervical adenopathy    Recent Labs   Lab Test 21  1501 21  1042 21  1107   HGB 15.5 15.9*  --     252  --      --  136   POTASSIUM 4.0  --  4.9   CR 0.60  --  0.64        Diagnostics:  Recent Results (from the past 720 hour(s))   *UA reflex to Microscopic and Culture ([place] and [facility] (except [place] and [place])    Collection Time: 21  " 2:30 PM    Specimen: Midstream Urine   Result Value Ref Range    Color Urine Yellow     Appearance Urine Clear     Glucose Urine Negative NEG^Negative mg/dL    Bilirubin Urine Negative NEG^Negative    Ketones Urine Negative NEG^Negative mg/dL    Specific Gravity Urine >1.030 1.003 - 1.035    Blood Urine Trace (A) NEG^Negative    pH Urine 6.0 5.0 - 7.0 pH    Protein Albumin Urine Negative NEG^Negative mg/dL    Urobilinogen Urine 0.2 0.2 - 1.0 EU/dL    Nitrite Urine Negative NEG^Negative    Leukocyte Esterase Urine Negative NEG^Negative    Source Midstream Urine    Urine Microscopic    Collection Time: 04/14/21  2:30 PM   Result Value Ref Range    WBC Urine 0 - 5 OTO5^0 - 5 /HPF    RBC Urine O - 2 OTO2^O - 2 /HPF    Squamous Epithelial /LPF Urine Few FEW^Few /LPF    Bacteria Urine Few (A) NEG^Negative /HPF   CBC with platelets    Collection Time: 04/14/21  3:01 PM   Result Value Ref Range    WBC 7.9 4.0 - 11.0 10e9/L    RBC Count 4.81 3.8 - 5.2 10e12/L    Hemoglobin 15.5 11.7 - 15.7 g/dL    Hematocrit 44.7 35.0 - 47.0 %    MCV 93 78 - 100 fl    MCH 32.2 26.5 - 33.0 pg    MCHC 34.7 31.5 - 36.5 g/dL    RDW 12.2 10.0 - 15.0 %    Platelet Count 249 150 - 450 10e9/L   CRP, inflammation    Collection Time: 04/14/21  3:01 PM   Result Value Ref Range    CRP Inflammation 5.6 0.0 - 8.0 mg/L   ESR: Erythrocyte sedimentation rate    Collection Time: 04/14/21  3:01 PM   Result Value Ref Range    Sed Rate 4 0 - 30 mm/h   Comprehensive metabolic panel (BMP + Alb, Alk Phos, ALT, AST, Total. Bili, TP)    Collection Time: 04/14/21  3:01 PM   Result Value Ref Range    Sodium 135 133 - 144 mmol/L    Potassium 4.0 3.4 - 5.3 mmol/L    Chloride 102 94 - 109 mmol/L    Carbon Dioxide 30 20 - 32 mmol/L    Anion Gap 3 3 - 14 mmol/L    Glucose 79 70 - 99 mg/dL    Urea Nitrogen 7 7 - 30 mg/dL    Creatinine 0.60 0.52 - 1.04 mg/dL    GFR Estimate >90 >60 mL/min/[1.73_m2]    GFR Estimate If Black >90 >60 mL/min/[1.73_m2]    Calcium 9.4 8.5 - 10.1  mg/dL    Bilirubin Total 0.5 0.2 - 1.3 mg/dL    Albumin 4.1 3.4 - 5.0 g/dL    Protein Total 7.3 6.8 - 8.8 g/dL    Alkaline Phosphatase 83 40 - 150 U/L    ALT 21 0 - 50 U/L    AST 19 0 - 45 U/L   Asymptomatic COVID-19 Virus (Coronavirus) by PCR    Collection Time: 04/23/21  3:20 PM    Specimen: Nasopharyngeal   Result Value Ref Range    COVID-19 Virus PCR to [facility] - Source Nasopharyngeal     COVID-19 Virus PCR to [facility] - Result       Test received-See reflex to IDDL test SARS CoV2 (COVID-19) Virus RT-PCR   SARS-CoV-2 COVID-19 Virus (Coronavirus) by PCR    Collection Time: 04/23/21  3:20 PM    Specimen: Nasopharyngeal   Result Value Ref Range    SARS-CoV-2 Virus Specimen Source Nasopharyngeal     SARS-CoV-2 PCR Result NEGATIVE     SARS-CoV-2 PCR Comment       Testing was performed using the [employer]plex[employer] COVID-19 Direct Assay on the Spreedly MDX   instrument. Additional information about this Emergency Use Authorization (EUA) assay can   be found via the Lab Guide.        EKG - No acute changes , compared with previous ekg ..    Revised Cardiac Risk Index (RCRI):  The patient has the following serious cardiovascular risks for perioperative complications:   - No serious cardiac risks = 0 points     RCRI Interpretation: 0 points: Class I (very low risk - 0.4% complication rate)          Spine curvature acquired   - patient is quite concerned about her acquired spinal curvature   - recommend patient to continue to follow with spinal sugeon   - recommend to start PT as recommended by spinal surgeon .    - will consider bone density scan for post menopausal status .      Signed Electronically by: Aye Morejon MD  Copy of this evaluation report is provided to requesting physician.

## 2021-04-23 NOTE — PATIENT INSTRUCTIONS

## 2021-04-24 LAB
SARS-COV-2 RNA RESP QL NAA+PROBE: NORMAL
SPECIMEN SOURCE: NORMAL

## 2021-04-25 LAB
LABORATORY COMMENT REPORT: NORMAL
SARS-COV-2 RNA RESP QL NAA+PROBE: NEGATIVE
SPECIMEN SOURCE: NORMAL

## 2021-04-28 ENCOUNTER — ANCILLARY PROCEDURE (OUTPATIENT)
Dept: BONE DENSITY | Facility: CLINIC | Age: 66
End: 2021-04-28
Attending: FAMILY MEDICINE
Payer: COMMERCIAL

## 2021-04-28 DIAGNOSIS — Z78.0 POST-MENOPAUSAL: ICD-10-CM

## 2021-04-28 DIAGNOSIS — M43.9 SPINE CURVATURE, ACQUIRED: ICD-10-CM

## 2021-04-28 PROCEDURE — 77080 DXA BONE DENSITY AXIAL: CPT | Performed by: INTERNAL MEDICINE

## 2021-07-01 ENCOUNTER — NURSE TRIAGE (OUTPATIENT)
Dept: FAMILY MEDICINE | Facility: CLINIC | Age: 66
End: 2021-07-01

## 2021-07-01 DIAGNOSIS — J44.1 COPD EXACERBATION (H): Primary | ICD-10-CM

## 2021-07-01 RX ORDER — AZITHROMYCIN 250 MG/1
TABLET, FILM COATED ORAL
Qty: 6 TABLET | Refills: 0 | Status: SHIPPED | OUTPATIENT
Start: 2021-07-01 | End: 2021-07-06

## 2021-07-01 RX ORDER — PREDNISONE 20 MG/1
20 TABLET ORAL DAILY
Qty: 5 TABLET | Refills: 0 | Status: SHIPPED | OUTPATIENT
Start: 2021-07-01 | End: 2021-07-06

## 2021-07-01 NOTE — TELEPHONE ENCOUNTER
"Spoke with Mimi and informed of below treatment information and have given her holiday and weekend urgent care hours. Discussed also with Dr. Morejon about eye, will have patient monitor. She does report it is red and \"goupy\".  She will  try some OTC drops first to see if it clears and advised to call back or be seen in urgent care if eye drainage does not clear. Mimi agree's with plan.     SB3 RN PAL welcome also discussed and letter and my contact information given.     Phoebe Ernst R.N.     "

## 2021-07-01 NOTE — TELEPHONE ENCOUNTER
Mimi is calling as she has developed a cough. She does not feel it is covid or any concerns of exposure and she is immunized. Cough past 2 days, colored phlegm, low grade fever, possible pink eye also. See below for further details. Wants to see Dr. Morejon but no available appointment.     Patient does have COPD and said she let her Rx's from pulmonologist .     Should be seen in next day or two per triage. No appointment for any provider in , a few providers do have virtual but with the issues she is having including low grade fever will route to Dr. Morejon to determine if patient should be seen in urgent care or virtual appointment with colleague  provider.     Dr. Morejon please advise.     Phoebe Ernst R.N.      Reason for Disposition    Patient wants to be seen    Known COPD or other severe lung disease (i.e., bronchiectasis, cystic fibrosis, lung surgery) and worsening symptoms (i.e., increased sputum purulence or amount, increased breathing difficulty)    Additional Information    Negative: Bluish (or gray) lips or face    Negative: Severe difficulty breathing (e.g., struggling for each breath, speaks in single words)    Negative: Rapid onset of cough and has hives    Negative: Coughing started suddenly after medicine, an allergic food or bee sting    Negative: Difficulty breathing after exposure to flames, smoke, or fumes    Negative: Sounds like a life-threatening emergency to the triager    Negative: Chest pain present when not coughing    Negative: Difficulty breathing    Negative: Passed out (i.e., fainted, collapsed and was not responding)    Negative: Patient sounds very sick or weak to the triager    Negative: Coughed up > 1 tablespoon (15 ml) blood (Exception: blood-tinged sputum)    Negative: Fever > 103 F (39.4 C)    Negative: Fever > 101 F (38.3 C) and over 60 years of age    Negative: Fever > 100.0 F (37.8 C) and has diabetes mellitus or a weak immune system (e.g., HIV positive, cancer  "chemotherapy, organ transplant, splenectomy, chronic steroids)    Negative: Fever > 100.0 F (37.8 C) and bedridden (e.g., nursing home patient, stroke, chronic illness, recovering from surgery)    Negative: Increasing ankle swelling    Negative: Wheezing is present    Answer Assessment - Initial Assessment Questions  1. ONSET: \"When did the cough begin?\"       2 days ago  2. SEVERITY: \"How bad is the cough today?\"       Mostly at night, coughing up colored sputum now.   3. RESPIRATORY DISTRESS: \"Describe your breathing.\"       Feels tighter than normal.   4. FEVER: \"Do you have a fever?\" If so, ask: \"What is your temperature, how was it measured, and when did it start?\"      Low grade  5. HEMOPTYSIS: \"Are you coughing up any blood?\" If so ask: \"How much?\" (flecks, streaks, tablespoons, etc.)      No  6. TREATMENT: \"What have you done so far to treat the cough?\" (e.g., meds, fluids, humidifier)      Humidifier, advil  7. CARDIAC HISTORY: \"Do you have any history of heart disease?\" (e.g., heart attack, congestive heart failure)       No  8. LUNG HISTORY: \"Do you have any history of lung disease?\"  (e.g., pulmonary embolus, asthma, emphysema)      COPD  9. PE RISK FACTORS: \"Do you have a history of blood clots?\" (or: recent major surgery, recent prolonged travel, bedridden)      No  10. OTHER SYMPTOMS: \"Do you have any other symptoms? (e.g., runny nose, wheezing, chest pain)        No--maybe has pink eye  11. PREGNANCY: \"Is there any chance you are pregnant?\" \"When was your last menstrual period?\"        NA  12. TRAVEL: \"Have you traveled out of the country in the last month?\" (e.g., travel history, exposures)        No    Protocols used: COUGH-A-OH      "

## 2021-07-01 NOTE — TELEPHONE ENCOUNTER
Patient also advised to use good handwashing and can try warm compress on affected eye. Informed if pink eye can be very contagious. Patient had good understanding. Phoebe Ernst R.N.

## 2021-07-01 NOTE — TELEPHONE ENCOUNTER
Send prescription of prednisone 20 mg for 5 days .  Z EBONIE  FOR 5 DAYS .    Recommend need urgent care evaluation if symptoms get worse over the weekend.  Recommend next week clinic visit if symptoms still persistent.    Thanks   Aye Morejon M.D

## 2021-07-23 ENCOUNTER — OFFICE VISIT (OUTPATIENT)
Dept: PULMONOLOGY | Facility: CLINIC | Age: 66
End: 2021-07-23
Attending: INTERNAL MEDICINE
Payer: COMMERCIAL

## 2021-07-23 VITALS
HEART RATE: 88 BPM | RESPIRATION RATE: 16 BRPM | WEIGHT: 105 LBS | SYSTOLIC BLOOD PRESSURE: 155 MMHG | DIASTOLIC BLOOD PRESSURE: 94 MMHG | OXYGEN SATURATION: 96 % | BODY MASS INDEX: 19.83 KG/M2 | HEIGHT: 61 IN

## 2021-07-23 DIAGNOSIS — J20.9 ACUTE EXACERBATION OF CHRONIC BRONCHITIS (H): ICD-10-CM

## 2021-07-23 DIAGNOSIS — J43.1 PANLOBULAR EMPHYSEMA (H): ICD-10-CM

## 2021-07-23 DIAGNOSIS — Z87.01 H/O: PNEUMONIA: ICD-10-CM

## 2021-07-23 DIAGNOSIS — F32.1 MODERATE MAJOR DEPRESSION (H): ICD-10-CM

## 2021-07-23 DIAGNOSIS — J42 CHRONIC BRONCHITIS, UNSPECIFIED CHRONIC BRONCHITIS TYPE (H): Primary | ICD-10-CM

## 2021-07-23 DIAGNOSIS — J42 ACUTE EXACERBATION OF CHRONIC BRONCHITIS (H): ICD-10-CM

## 2021-07-23 PROCEDURE — 99215 OFFICE O/P EST HI 40 MIN: CPT | Mod: 25 | Performed by: INTERNAL MEDICINE

## 2021-07-23 PROCEDURE — G0463 HOSPITAL OUTPT CLINIC VISIT: HCPCS

## 2021-07-23 RX ORDER — METHYLPREDNISOLONE 4 MG
TABLET, DOSE PACK ORAL
Qty: 21 TABLET | Refills: 0 | Status: SHIPPED | OUTPATIENT
Start: 2021-07-23 | End: 2021-08-30

## 2021-07-23 RX ORDER — AZITHROMYCIN 250 MG/1
250 TABLET, FILM COATED ORAL DAILY
Qty: 30 TABLET | Refills: 11 | Status: SHIPPED | OUTPATIENT
Start: 2021-07-23 | End: 2022-08-17

## 2021-07-23 ASSESSMENT — MIFFLIN-ST. JEOR: SCORE: 963.66

## 2021-07-23 ASSESSMENT — PAIN SCALES - GENERAL: PAINLEVEL: NO PAIN (0)

## 2021-07-23 NOTE — PROGRESS NOTES
"Reason for Visit  Mimi Rivera is a 66 year old year old female who is being seen for RECHECK (Follow up on Mimi)    Pulmonary HPI    The patient was seen and examined by Ishmael Bridges MD     HISTORY OF PRESENT ILLNESS:  I had the pleasure of seeing Ms. Mimi Rivera today at the [facility] for her severe COPD with multiple exacerbations/flareups.  I last saw her on 02/05/2021.  At that time, she had had a fever, cough, productive sputum and a new x-ray density on 01/27/2021 in the right upper lobe and was being treated with Augmentin for pneumonia.  She was beginning to feel better but did not \"turn the corner\" at the time I saw her.  That was the only flare since her 07/2020 telephone visit with me.  She told me that her old nebulizer was not functional at that point in time.  I had her continue her Trelegy and use her levalbuterol up to every 4 hours as needed for increased shortness of breath.  I also prescribed a new nebulizer with fresh levalbuterol nebulizer solution and recommended she finish out her current antibiotics and also start and complete a Medrol Dosepak.  She was asked to contact us if she did not improve.    Returning to clinic today, she tells me she got better from that episode.  She continues on Trelegy q.a.m. and Xopenex used as 1 nebulizer treatment daily and then a total of 1-2 times by nebulizer or MDI most days.  She was feeling much better but over the last week, she has developed increased cough with phlegm production and she feels like she is more short of breath.  She also complains of waking up at night.  She is not sure if this is due to her breathing or worrying about breathing.  Her primary care doctor, for reasons that are unclear to me, stopped her daily azithromycin which was intended to reduce flareups.  This decision was not in line with my treatment plan.  She tells me that she is off antidepressants, " although her chart says she is on bupropion.  She also is on Prilosec 20 mg DR and amlodipine.  She says that she has been swimming with her grandchildren at her sister's home and [place]  She believes she can walk at least a mile and can climb 2 flights of stairs without significant difficulty.  She has not had any hemoptysis or pleuritic pain.    Otherwise, detailed pulmonary ROS and general ROS are unchanged.  She did have a sinuplasty done by Dr. Alonso, [facility] [place], on 2021 with local anesthesia.            Current Outpatient Medications   Medication     amLODIPine (NORVASC) 10 MG tablet     levalbuterol (XOPENEX HFA) 45 MCG/ACT inhaler     levalbuterol (XOPENEX) 1.25 MG/3ML neb solution     TRELEGY ELLIPTA 100-62.5-25 MCG/INH oral inhaler     buPROPion (ZYBAN) 150 MG 12 hr tablet     cyclobenzaprine (FLEXERIL) 10 MG tablet     No current facility-administered medications for this visit.     No Known Allergies  Past Medical History:   Diagnosis Date     ASCUS on Pap smear     unable to run HPV typing, f/u paps NIL     Chronic rhinosinusitis      COPD (chronic obstructive pulmonary disease) (H)      Depression, anxiety      Herpes     occasional outbreak     Hypertension        Past Surgical History:   Procedure Laterality Date      SECTION      x 3       Social History     Socioeconomic History     Marital status:      Spouse name: Not on file     Number of children: Not on file     Years of education: Not on file     Highest education level: Not on file   Occupational History     Not on file   Tobacco Use     Smoking status: Former Smoker     Packs/day: 10.00     Types: Cigarettes     Quit date: 3/19/2015     Years since quittin.3     Smokeless tobacco: Former User     Quit date: 2017   Substance and Sexual Activity     Alcohol use: Yes     Alcohol/week: 2.0 - 3.0 standard drinks     Types: 2 - 3 Standard drinks or equivalent per week     Comment: 2  "drinks per wk avg     Drug use: No     Sexual activity: Yes     Partners: Male     Birth control/protection: Surgical     Comment: tubal   Other Topics Concern     Parent/sibling w/ CABG, MI or angioplasty before 65F 55M? No   Social History Narrative     Not on file     Social Determinants of Health     Financial Resource Strain:      Difficulty of Paying Living Expenses:    Food Insecurity:      Worried About Running Out of Food in the Last Year:      Ran Out of Food in the Last Year:    Transportation Needs:      Lack of Transportation (Medical):      Lack of Transportation (Non-Medical):    Physical Activity:      Days of Exercise per Week:      Minutes of Exercise per Session:    Stress:      Feeling of Stress :    Social Connections:      Frequency of Communication with Friends and Family:      Frequency of Social Gatherings with Friends and Family:      Attends Alevism Services:      Active Member of Clubs or Organizations:      Attends Club or Organization Meetings:      Marital Status:    Intimate Partner Violence:      Fear of Current or Ex-Partner:      Emotionally Abused:      Physically Abused:      Sexually Abused:        ROS Pulmonary  A complete ROS was otherwise negative except as noted in the HPI.  BP (!) 155/94   Pulse 88   Resp 16   Ht 1.549 m (5' 1\")   Wt 47.6 kg (105 lb)   LMP 09/22/2010   SpO2 96%   BMI 19.84 kg/m    Exam:   GENERAL APPEARANCE: Well developed, well nourished, alert, and in no apparent distress.  EYES: PERRL, EOMI  HENT: Nasal mucosa with no edema and no hyperemia. No nasal polyps.  MOUTH: Oral mucosa is moist, without any lesions, no tonsillar enlargement, no oropharyngeal exudate.  NECK: supple, no masses, no thyromegaly.  LYMPHATICS: No significant axillary, cervical, or supraclavicular nodes.  RESP: normal inspection, palpation & percussion, good air flow throughout.  No crackles. No rhonchi. No wheezes.  CV: RRR Normal S1, S2, regular rhythm, normal rate. No " murmur.  No rub. No gallop. No LE edema.   ABDOMEN:  Deferred  MS: extremities normal. No clubbing. No cyanosis.  SKIN: no rash on limited exam  NEURO: Mentation intact, speech normal, normal strength and tone, normal gait and stance  PSYCH: mentation appears normal. and affect normal/bright  Results:  No results found for this or any previous visit (from the past 168 hour(s)).    Assessment and plan:  Her most recent PFTs are from 05/03/2019 which showed FEV1/FVC of 1.2/2.05 (53/72% predicted, respectively) for ratio of 59%.  This represents moderately severe obstruction consistent with the severity of her COPD and her high risk for flareups.    Moderately severe COPD:    Ms. Rivera continues to have intermittent difficulty with flareups since her right upper lobe pneumonia in 01/2021.  It seems that she has a subacute flare occurring now.  Unfortunately, the azithromycin that I had prescribed to try to reduce lung inflammation and prevent flares got stopped.  I am reinstituting this today after she completes a new course of Medrol Dosepak plus antibiotic (Augmentin per her request rather than levofloxacin).  She is still able to be moderately active and I encouraged her to continue doing this.  I think she should continue on her current medication regimen, the Trelegy plus p.r.n. Xopenex.  She does have refills on her flare medications if she has another flare down the road.  Apparently, there was discussion that she has chronic sinusitis with her ENT doctor but she is reluctant to take antibiotics or other medications for prolonged periods as they advised.  I recommended she consider need for more aggressive treatment of chronic sinus infections.  I will plan to see her back in approximately 6 months' time in clinic.  Her vaccinations are up to date, except it is not clear if she has had a repeat pertussis vaccination in the last few years.  She says she did get a recent tetanus vaccination but is not clear  whether pertussis was concluded.  I asked her to discuss this with Dr. Morejon, her primary care internist.  She was given contact information for our clinic nurses if problems arise in the interim before her next appointment.    I spent total of 40 minutes dedicated to Ms. Rivera's care thus far today including review of her past PFTs, past imaging and past medical record.    Ishmael Bridges MD

## 2021-07-23 NOTE — PATIENT INSTRUCTIONS
Moderately Severe COPD with Frequent Flare-Ups    For short term improvement of your current mild-moderate flare:  Medrol dose edwige and 10 day course of Augmentin    After Augmentin:  Start azithromycin 250 mgs daily - this is to reduce lung inflammation with COPD and to reduce the likelihood of future 'flare-ups'    Get flu shot this fall. Other vaccinations are up to date except you may need Pertussis (whooping cough) if you did not get this with your recent tetanus vaccination    Consider need for more aggressive treatment when you have sinus infections    Return to clinic in 6 months  For problems or issues in the interim, please calll Phil Brock RN or colleagues ([phone])

## 2021-07-23 NOTE — LETTER
"    7/23/2021         RE: Mimi Rivera  [address] [place] [address]        Dear Colleague,    Thank you for referring your patient, Mimi Rivera, to the [facility] [place] [place] [facility]. Please see a copy of my visit note below.    Reason for Visit  Mimi Rivera is a 66 year old year old female who is being seen for RECHECK (Follow up on Mimi)    Pulmonary HPI    The patient was seen and examined by Ishmael Bridges MD     HISTORY OF PRESENT ILLNESS:  I had the pleasure of seeing Ms. Mimi Rivera today at the [facility] for her severe COPD with multiple exacerbations/flareups.  I last saw her on 02/05/2021.  At that time, she had had a fever, cough, productive sputum and a new x-ray density on 01/27/2021 in the right upper lobe and was being treated with Augmentin for pneumonia.  She was beginning to feel better but did not \"turn the corner\" at the time I saw her.  That was the only flare since her 07/2020 telephone visit with me.  She told me that her old nebulizer was not functional at that point in time.  I had her continue her Trelegy and use her levalbuterol up to every 4 hours as needed for increased shortness of breath.  I also prescribed a new nebulizer with fresh levalbuterol nebulizer solution and recommended she finish out her current antibiotics and also start and complete a Medrol Dosepak.  She was asked to contact us if she did not improve.    Returning to clinic today, she tells me she got better from that episode.  She continues on Trelegy q.a.m. and Xopenex used as 1 nebulizer treatment daily and then a total of 1-2 times by nebulizer or MDI most days.  She was feeling much better but over the last week, she has developed increased cough with phlegm production and she feels like she is more short of breath.  She also complains of waking up at night.  She is not sure if " this is due to her breathing or worrying about breathing.  Her primary care doctor, for reasons that are unclear to me, stopped her daily azithromycin which was intended to reduce flareups.  This decision was not in line with my treatment plan.  She tells me that she is off antidepressants, although her chart says she is on bupropion.  She also is on Prilosec 20 mg DR and amlodipine.  She says that she has been swimming with her grandchildren at her sister's home and [place]  She believes she can walk at least a mile and can climb 2 flights of stairs without significant difficulty.  She has not had any hemoptysis or pleuritic pain.    Otherwise, detailed pulmonary ROS and general ROS are unchanged.  She did have a sinuplasty done by Dr. Alonso, [facility] [place], on 2021 with local anesthesia.            Current Outpatient Medications   Medication     amLODIPine (NORVASC) 10 MG tablet     levalbuterol (XOPENEX HFA) 45 MCG/ACT inhaler     levalbuterol (XOPENEX) 1.25 MG/3ML neb solution     TRELEGY ELLIPTA 100-62.5-25 MCG/INH oral inhaler     buPROPion (ZYBAN) 150 MG 12 hr tablet     cyclobenzaprine (FLEXERIL) 10 MG tablet     No current facility-administered medications for this visit.     No Known Allergies  Past Medical History:   Diagnosis Date     ASCUS on Pap smear     unable to run HPV typing, f/u paps NIL     Chronic rhinosinusitis      COPD (chronic obstructive pulmonary disease) (H)      Depression, anxiety      Herpes     occasional outbreak     Hypertension        Past Surgical History:   Procedure Laterality Date      SECTION      x 3       Social History     Socioeconomic History     Marital status:      Spouse name: Not on file     Number of children: Not on file     Years of education: Not on file     Highest education level: Not on file   Occupational History     Not on file   Tobacco Use     Smoking status: Former Smoker     Packs/day: 10.00     Types:  "Cigarettes     Quit date: 3/19/2015     Years since quittin.3     Smokeless tobacco: Former User     Quit date: 2017   Substance and Sexual Activity     Alcohol use: Yes     Alcohol/week: 2.0 - 3.0 standard drinks     Types: 2 - 3 Standard drinks or equivalent per week     Comment: 2 drinks per wk avg     Drug use: No     Sexual activity: Yes     Partners: Male     Birth control/protection: Surgical     Comment: tubal   Other Topics Concern     Parent/sibling w/ CABG, MI or angioplasty before 65F 55M? No   Social History Narrative     Not on file     Social Determinants of Health     Financial Resource Strain:      Difficulty of Paying Living Expenses:    Food Insecurity:      Worried About Running Out of Food in the Last Year:      Ran Out of Food in the Last Year:    Transportation Needs:      Lack of Transportation (Medical):      Lack of Transportation (Non-Medical):    Physical Activity:      Days of Exercise per Week:      Minutes of Exercise per Session:    Stress:      Feeling of Stress :    Social Connections:      Frequency of Communication with Friends and Family:      Frequency of Social Gatherings with Friends and Family:      Attends Buddhism Services:      Active Member of Clubs or Organizations:      Attends Club or Organization Meetings:      Marital Status:    Intimate Partner Violence:      Fear of Current or Ex-Partner:      Emotionally Abused:      Physically Abused:      Sexually Abused:        ROS Pulmonary  A complete ROS was otherwise negative except as noted in the HPI.  BP (!) 155/94   Pulse 88   Resp 16   Ht 1.549 m (5' 1\")   Wt 47.6 kg (105 lb)   LMP 2010   SpO2 96%   BMI 19.84 kg/m    Exam:   GENERAL APPEARANCE: Well developed, well nourished, alert, and in no apparent distress.  EYES: PERRL, EOMI  HENT: Nasal mucosa with no edema and no hyperemia. No nasal polyps.  MOUTH: Oral mucosa is moist, without any lesions, no tonsillar enlargement, no oropharyngeal " exudate.  NECK: supple, no masses, no thyromegaly.  LYMPHATICS: No significant axillary, cervical, or supraclavicular nodes.  RESP: normal inspection, palpation & percussion, good air flow throughout.  No crackles. No rhonchi. No wheezes.  CV: RRR Normal S1, S2, regular rhythm, normal rate. No murmur.  No rub. No gallop. No LE edema.   ABDOMEN:  Deferred  MS: extremities normal. No clubbing. No cyanosis.  SKIN: no rash on limited exam  NEURO: Mentation intact, speech normal, normal strength and tone, normal gait and stance  PSYCH: mentation appears normal. and affect normal/bright  Results:  No results found for this or any previous visit (from the past 168 hour(s)).    Assessment and plan:  Her most recent PFTs are from 05/03/2019 which showed FEV1/FVC of 1.2/2.05 (53/72% predicted, respectively) for ratio of 59%.  This represents moderately severe obstruction consistent with the severity of her COPD and her high risk for flareups.    Moderately severe COPD:    Ms. Rivera continues to have intermittent difficulty with flareups since her right upper lobe pneumonia in 01/2021.  It seems that she has a subacute flare occurring now.  Unfortunately, the azithromycin that I had prescribed to try to reduce lung inflammation and prevent flares got stopped.  I am reinstituting this today after she completes a new course of Medrol Dosepak plus antibiotic (Augmentin per her request rather than levofloxacin).  She is still able to be moderately active and I encouraged her to continue doing this.  I think she should continue on her current medication regimen, the Trelegy plus p.r.n. Xopenex.  She does have refills on her flare medications if she has another flare down the road.  Apparently, there was discussion that she has chronic sinusitis with her ENT doctor but she is reluctant to take antibiotics or other medications for prolonged periods as they advised.  I recommended she consider need for more aggressive treatment of  chronic sinus infections.  I will plan to see her back in approximately 6 months' time in clinic.  Her vaccinations are up to date, except it is not clear if she has had a repeat pertussis vaccination in the last few years.  She says she did get a recent tetanus vaccination but is not clear whether pertussis was concluded.  I asked her to discuss this with Dr. Morejon, her primary care internist.  She was given contact information for our clinic nurses if problems arise in the interim before her next appointment.    I spent total of 40 minutes dedicated to Ms. Rivera's care thus far today including review of her past PFTs, past imaging and past medical record.    Ishmael Bridges MD                    Again, thank you for allowing me to participate in the care of your patient.        Sincerely,        Ishmael Bridges MD

## 2021-07-23 NOTE — NURSING NOTE
Chief Complaint   Patient presents with     RECHECK     Follow up on Mimi Quick, CMA CMA at 11:00 AM on 7/23/2021

## 2021-08-18 ENCOUNTER — MYC MEDICAL ADVICE (OUTPATIENT)
Dept: FAMILY MEDICINE | Facility: CLINIC | Age: 66
End: 2021-08-18

## 2021-08-27 NOTE — PROGRESS NOTES
Pre-Visit Planning   Next 5 appointments (look out 90 days)    Aug 30, 2021  4:20 PM  (Arrive by 4:00 PM)  Office Visit with Aye Morejon MD  [facility] ([facility] ) [address] [facility] [address] [phone]        Appointment Notes for this encounter:   F/up Blood pressure medication     Questionnaires Reviewed/Assigned  No additional questionnaires are needed      Patient preferred phone number: [phone]      Spoke to patient via phone. Are there any additional questions or concerns you'd like to review with your provider during your visit? No    Patient does not have additional questions or concerns.        Visit is not preventive.    Meds  Is there anything on your medication list that needs to be updated? No    Current Outpatient Medications   Medication     amLODIPine (NORVASC) 10 MG tablet     azithromycin (ZITHROMAX) 250 MG tablet     buPROPion (ZYBAN) 150 MG 12 hr tablet     cyclobenzaprine (FLEXERIL) 10 MG tablet     levalbuterol (XOPENEX HFA) 45 MCG/ACT inhaler     levalbuterol (XOPENEX) 1.25 MG/3ML neb solution     methylPREDNISolone (MEDROL DOSEPAK) 4 MG tablet therapy pack     TRELEGY ELLIPTA 100-62.5-25 MCG/INH oral inhaler     No current facility-administered medications for this visit.     Which pharmacy do you prefer to use for medications during this visit if needed? [id number] [address] [address]    Do you need refills on any of your medications? Yes: (Amlodipine 10mg)     Health Maintenance Due   Topic Date Due     COLORECTAL CANCER SCREENING  Never done     HIV SCREENING  Never done     ZOSTER IMMUNIZATION (1 of 2) Never done     PREVENTIVE CARE VISIT  02/25/2014     INFLUENZA VACCINE (1) 09/01/2021     Patient is due for:  None at the moment   No appointment needed.    MyChart  Patient is active on [employer].    Questionnaire Review   no questionnaire needed     Call Summary  Thank you for your time  today.  If anything comes up before your appointment, please feel free to contact   Wendi MORIN RN   [facility]  Ph# [phone]

## 2021-08-30 ENCOUNTER — OFFICE VISIT (OUTPATIENT)
Dept: FAMILY MEDICINE | Facility: CLINIC | Age: 66
End: 2021-08-30
Payer: COMMERCIAL

## 2021-08-30 VITALS
WEIGHT: 106 LBS | TEMPERATURE: 98 F | OXYGEN SATURATION: 98 % | HEART RATE: 74 BPM | HEIGHT: 61 IN | RESPIRATION RATE: 14 BRPM | SYSTOLIC BLOOD PRESSURE: 138 MMHG | DIASTOLIC BLOOD PRESSURE: 84 MMHG | BODY MASS INDEX: 20.01 KG/M2

## 2021-08-30 DIAGNOSIS — Z12.11 COLON CANCER SCREENING: ICD-10-CM

## 2021-08-30 DIAGNOSIS — J44.9 CHRONIC OBSTRUCTIVE PULMONARY DISEASE, UNSPECIFIED COPD TYPE (H): ICD-10-CM

## 2021-08-30 DIAGNOSIS — I10 HYPERTENSION GOAL BP (BLOOD PRESSURE) < 140/90: Primary | ICD-10-CM

## 2021-08-30 DIAGNOSIS — F41.9 ANXIETY: ICD-10-CM

## 2021-08-30 PROCEDURE — 99214 OFFICE O/P EST MOD 30 MIN: CPT | Performed by: FAMILY MEDICINE

## 2021-08-30 RX ORDER — ALBUTEROL SULFATE 90 UG/1
2 AEROSOL, METERED RESPIRATORY (INHALATION) EVERY 6 HOURS
Qty: 18 G | Refills: 0 | Status: SHIPPED | OUTPATIENT
Start: 2021-08-30 | End: 2022-04-15

## 2021-08-30 RX ORDER — AMLODIPINE BESYLATE 10 MG/1
10 TABLET ORAL DAILY
Qty: 90 TABLET | Refills: 1 | Status: SHIPPED | OUTPATIENT
Start: 2021-08-30 | End: 2022-06-06

## 2021-08-30 RX ORDER — BUPROPION HYDROCHLORIDE 150 MG/1
150 TABLET, FILM COATED, EXTENDED RELEASE ORAL 2 TIMES DAILY
Qty: 180 TABLET | Refills: 1 | Status: SHIPPED | OUTPATIENT
Start: 2021-08-30 | End: 2021-12-07

## 2021-08-30 ASSESSMENT — PATIENT HEALTH QUESTIONNAIRE - PHQ9
SUM OF ALL RESPONSES TO PHQ QUESTIONS 1-9: 0
10. IF YOU CHECKED OFF ANY PROBLEMS, HOW DIFFICULT HAVE THESE PROBLEMS MADE IT FOR YOU TO DO YOUR WORK, TAKE CARE OF THINGS AT HOME, OR GET ALONG WITH OTHER PEOPLE: NOT DIFFICULT AT ALL
SUM OF ALL RESPONSES TO PHQ QUESTIONS 1-9: 0

## 2021-08-30 ASSESSMENT — MIFFLIN-ST. JEOR: SCORE: 968.19

## 2021-08-30 NOTE — PROGRESS NOTES
Assessment & Plan     Hypertension goal BP (blood pressure) < 140/90  - blood pressure at goal   - Recommend to continue to monitor blood pressure .  -prescription refilled .  - amLODIPine (NORVASC) 10 MG tablet; Take 1 tablet (10 mg) by mouth daily    Anxiety    - buPROPion (ZYBAN) 150 MG 12 hr tablet; Take 1 tablet (150 mg) by mouth 2 times daily     Chronic obstructive pulmonary disease, unspecified COPD type (H)  - albuterol (PROAIR HFA/PROVENTIL HFA/VENTOLIN HFA) 108 (90 Base) MCG/ACT inhaler; Inhale 2 puffs into the lungs every 6 hours    Colon cancer screening  - COLOGUARD([employer]); Future    Chronic rhino sinusitis  - recommend to continue to follow with ENT .       Return in about 6 months (around 2/28/2022) for Routine preventive, patient will call.    Aye Morejon MD  [facility] 66 [facility] who presents for the following health issues     History of Present Illness       Hypertension: She presents for follow up of hypertension.  She does check blood pressure  regularly outside of the clinic. Outpatient blood pressures have not been over 140/90. She follows a low salt diet.     She eats 2-3 servings of fruits and vegetables daily.She consumes 1 sweetened beverage(s) daily.She exercises with enough effort to increase her heart rate 20 to 29 minutes per day.  She exercises with enough effort to increase her heart rate 4 days per week.   She is taking medications regularly.           Review of Systems   Constitutional: Negative for activity change and appetite change.   Eyes: Negative for visual disturbance.   Respiratory: Negative for cough.    Cardiovascular: Negative for chest pain.   Endocrine: Negative for cold intolerance and heat intolerance.   Genitourinary: Negative for dysuria.   Neurological: Negative for headaches.   Psychiatric/Behavioral: Negative for confusion and decreased concentration.            Objective    /84 (Cuff Size:  "Adult Regular)   Pulse 74   Temp 98  F (36.7  C) (Oral)   Resp 14   Ht 1.549 m (5' 1\")   Wt 48.1 kg (106 lb)   LMP 09/22/2010   SpO2 98%   BMI 20.03 kg/m    Body mass index is 20.03 kg/m .  Physical Exam  Vitals and nursing note reviewed.   Constitutional:       Appearance: Normal appearance.   HENT:      Head: Normocephalic.      Nose: Congestion present.      Mouth/Throat:      Mouth: Mucous membranes are moist.   Cardiovascular:      Rate and Rhythm: Normal rate.   Pulmonary:      Effort: Pulmonary effort is normal.   Abdominal:      General: Abdomen is flat.   Skin:     General: Skin is warm.   Neurological:      General: No focal deficit present.      Mental Status: She is alert.   Psychiatric:         Mood and Affect: Mood normal.                Answers for HPI/ROS submitted by the patient on 8/30/2021  If you checked off any problems, how difficult have these problems made it for you to do your work, take care of things at home, or get along with other people?: Not difficult at all  PHQ9 TOTAL SCORE: 0      "

## 2021-08-31 ASSESSMENT — ENCOUNTER SYMPTOMS
CONFUSION: 0
DYSURIA: 0
COUGH: 0
APPETITE CHANGE: 0
DECREASED CONCENTRATION: 0
HEADACHES: 0
ACTIVITY CHANGE: 0

## 2021-08-31 ASSESSMENT — PATIENT HEALTH QUESTIONNAIRE - PHQ9: SUM OF ALL RESPONSES TO PHQ QUESTIONS 1-9: 0

## 2021-09-06 NOTE — PROGRESS NOTES
This encounter was created solely for the purpose of releasing the future order that was placed for Cologuard.  This is a necessary step in order for the results to be abstracted once they are available.  Teresa Braun

## 2021-10-24 ENCOUNTER — HEALTH MAINTENANCE LETTER (OUTPATIENT)
Age: 66
End: 2021-10-24

## 2021-11-23 ENCOUNTER — NURSE TRIAGE (OUTPATIENT)
Dept: FAMILY MEDICINE | Facility: CLINIC | Age: 66
End: 2021-11-23
Payer: COMMERCIAL

## 2021-11-23 NOTE — TELEPHONE ENCOUNTER
I sent Mimi [employer] message for [employer] and she returned call to me as she is having symptoms. She has history of COPD and has not been feeling well for 2 weeks. Having respiratory symptoms with some SOB, dizziness, headache. She is able to speak to me in complete sentences. Triage protocols also used.     Says dizzy in morning and is worse upon waking. Says she may be dehydrated she is not sure but does not think she is drinking 6-8 eight oz glasses of water per day, but does say she tries to drink enough. She feels dizziness is extreme, worse when she lays down so feels she needs to sit up. Says she is okay walking and is able to ambulate safely. When she lays down she feels at times room will spin and sometimes will feel like she may need to sit or change position to relieve symptoms.     I advised Mimi she needs to be seen today for further evaluation with her multiple symptoms and since she has been feeling ill for some time. Advised her I don't feel she can wait until upcoming appointment and advised she needs to be seen today and asked to proceed asap. She says she will come right away. . She does not want to go to ED due to the many covid patients and over crowding. Per my assessment I am in agreement with this, and that it is safe for her to proceed to urgent care but that she needs evaluation today to rule out pneumonia or other lung issue, infection process, etiology of fever and dizziness. Informed her she will need clinical exam as she asked if can just get xray or lab work. Informed her practitioner needs to listen to lungs, may or may not need x-ray follow up. May need labs but provider will need to assess patient to determine what plan of action will need to be done based on that physical assessment. Mimi understands this after our discussion and as above proceeding to . I told Mimi I will call her tomorrow and we will determine if still needs to be seen next week and to see how she is doing.  "Phoebe Ernst R.N.      Reason for Disposition    Difficulty breathing    Difficulty breathing and only present when coughing    Continuous (nonstop) coughing interferes with work or school and no improvement using cough treatment per Care Advice    Additional Information    Negative: Difficult to awaken or acting confused (e.g., disoriented, slurred speech)    Negative: Pale cold skin and very weak (can't stand)    Negative: Difficulty breathing and bluish (or gray) lips or face    Negative: New onset rash with purple (or blood-colored) spots or dots    Negative: Sounds like a life-threatening emergency to the triager    Negative: Fever onset within 24 hours of receiving vaccine    Negative: Fever within 14 days of COVID-19 Exposure    Negative: Pregnant    Negative: Postpartum (from 0 to 6 weeks after delivery)    Negative: Breathing stopped and hasn't returned    Negative: Choking on something    Negative: SEVERE difficulty breathing (e.g., struggling for each breath, speaks in single words, pulse > 120)    Negative: Bluish (or gray) lips or face    Negative: Difficult to awaken or acting confused (e.g., disoriented, slurred speech)    Negative: Passed out (i.e., fainted, collapsed and was not responding)    Negative: Wheezing started suddenly after medicine, an allergic food, or bee sting    Negative: Stridor    Negative: Slow, shallow and weak breathing    Negative: Sounds like a life-threatening emergency to the triager    Answer Assessment - Initial Assessment Questions  1. TEMPERATURE: \"What is the most recent temperature?\"  \"How was it measured?\"       Feel like has fever, has not taken.   2. ONSET: \"When did the fever start?\"       2 weeks ago.   3. SYMPTOMS: \"Do you have any other symptoms besides the fever?\"  (e.g., colds, headache, sore throat, earache, cough, rash, diarrhea, vomiting, abdominal pain)      Cough, SOB, headache, nausea with no vomiting.   4. CAUSE: If there are no symptoms, ask: " "\"What do you think is causing the fever?\" Sinus surgery in April to correct deviated septum but now feels sinus are full/congested. Feels like always has a cold.       Unsure, feel like   5. CONTACTS: \"Does anyone else in the family have an infection?\"      No  6. TREATMENT: \"What have you done so far to treat this fever?\" (e.g., medications)      Tylenol, breathing treatments that normally uses. Waking her up in middle of night. S  7. IMMUNOCOMPROMISE: \"Do you have of the following: diabetes, HIV positive, splenectomy, cancer chemotherapy, chronic steroid treatment, transplant patient, etc.\"      No   8. PREGNANCY: \"Is there any chance you are pregnant?\" \"When was your last menstrual period?\"      NA  9. TRAVEL: \"Have you traveled out of the country in the last month?\" (e.g., travel history, exposures)      No    Answer Assessment - Initial Assessment Questions  1. RESPIRATORY STATUS: \"Describe your breathing?\" (e.g., wheezing, shortness of breath, unable to speak, severe coughing)       Coughing, wheezing at night, feel short of breath at times, random.   2. ONSET: \"When did this breathing problem begin?\"       2 weeks ago.   3. PATTERN \"Does the difficult breathing come and go, or has it been constant since it started?\"       Constant.   4. SEVERITY: \"How bad is your breathing?\" (e.g., mild, moderate, severe)     - MILD: No SOB at rest, mild SOB with walking, speaks normally in sentences, can lay down, no retractions, pulse < 100.     - MODERATE: SOB at rest, SOB with minimal exertion and prefers to sit, cannot lie down flat, speaks in phrases, mild retractions, audible wheezing, pulse 100-120.     - SEVERE: Very SOB at rest, speaks in single words, struggling to breathe, sitting hunched forward, retractions, pulse > 120       Moderate   5. RECURRENT SYMPTOM: \"Have you had difficulty breathing before?\" If so, ask: \"When was the last time?\" and \"What happened that time?\"       Has COPD, so this happens when gets " "sick.   6. CARDIAC HISTORY: \"Do you have any history of heart disease?\" (e.g., heart attack, angina, bypass surgery, angioplasty)       No  7. LUNG HISTORY: \"Do you have any history of lung disease?\"  (e.g., pulmonary embolus, asthma, emphysema)      COPD  8. CAUSE: \"What do you think is causing the breathing problem?\"       Unsure  9. OTHER SYMPTOMS: \"Do you have any other symptoms? (e.g., dizziness, runny nose, cough, chest pain, fever)      Dizziness, fever, no pain. Runny nose at times.   10. PREGNANCY: \"Is there any chance you are pregnant?\" \"When was your last menstrual period?\"        NA  11. TRAVEL: \"Have you traveled out of the country in the last month?\" (e.g., travel history, exposures)        No    Protocols used: FEVER-A-OH, BREATHING DIFFICULTY-A-OH, COUGH-A-OH      "

## 2021-11-24 ENCOUNTER — OFFICE VISIT (OUTPATIENT)
Dept: URGENT CARE | Facility: URGENT CARE | Age: 66
End: 2021-11-24
Payer: COMMERCIAL

## 2021-11-24 VITALS
DIASTOLIC BLOOD PRESSURE: 88 MMHG | HEART RATE: 88 BPM | WEIGHT: 105 LBS | TEMPERATURE: 97.9 F | OXYGEN SATURATION: 93 % | SYSTOLIC BLOOD PRESSURE: 156 MMHG | BODY MASS INDEX: 19.84 KG/M2 | RESPIRATION RATE: 14 BRPM

## 2021-11-24 DIAGNOSIS — J44.1 COPD EXACERBATION (H): Primary | ICD-10-CM

## 2021-11-24 PROCEDURE — 99214 OFFICE O/P EST MOD 30 MIN: CPT | Performed by: PHYSICIAN ASSISTANT

## 2021-11-24 PROCEDURE — U0005 INFEC AGEN DETEC AMPLI PROBE: HCPCS | Performed by: PHYSICIAN ASSISTANT

## 2021-11-24 PROCEDURE — U0003 INFECTIOUS AGENT DETECTION BY NUCLEIC ACID (DNA OR RNA); SEVERE ACUTE RESPIRATORY SYNDROME CORONAVIRUS 2 (SARS-COV-2) (CORONAVIRUS DISEASE [COVID-19]), AMPLIFIED PROBE TECHNIQUE, MAKING USE OF HIGH THROUGHPUT TECHNOLOGIES AS DESCRIBED BY CMS-2020-01-R: HCPCS | Performed by: PHYSICIAN ASSISTANT

## 2021-11-24 RX ORDER — PREDNISONE 20 MG/1
40 TABLET ORAL DAILY
Qty: 10 TABLET | Refills: 0 | Status: SHIPPED | OUTPATIENT
Start: 2021-11-24 | End: 2021-11-29

## 2021-11-24 NOTE — PATIENT INSTRUCTIONS
Patient Education     COPD Flare-Up    You have had a flare-up of your COPD.  COPD (chronic obstructive pulmonary disease) is a common lung disease. It causes your airways to get irritated and narrower. This makes it harder for you to breathe. Emphysema and chronic bronchitis are both types of COPD. This is a long-term (chronic) condition. This means you always have it. Sometimes it gets worse. When this happens, it's called a flare-up.   Symptoms of COPD  People with COPD may have symptoms most of the time. In a flare-up, your symptoms get worse. These symptoms may mean you are having a flare-up:     Shortness of breath, shallow or rapid breathing, or wheezing that gets worse    Lung infection    Cough that gets worse    More mucus (or sputum), thicker mucus, or mucus of a different color    Tiredness, less energy, or trouble doing your normal activities    Fever    Chest tightness    Your symptoms don t get better even when you use your normal medicines, inhalers, and nebulizer    Trouble talking    You feel confused  Causes of flare-ups  Unfortunately, a flare-up can happen even if you did everything right. And even if you followed your healthcare provider s instructions. Some causes of flare-ups are:     Cold weather    Smoking or secondhand smoke    Use of e-cigarettes or vaping products    Colds, the flu, or respiratory infections    Air pollution    Sudden change in the weather    Dust, vapors, gases, irritating chemicals, or strong fumes    Not taking your medicines as prescribed    Indoor pollution such as burning wood, smoke from home cooking, or heating fuels  Home care  Here are some things you can do at home to treat a flare-up:    Keep calm and try not to panic. This makes it harder to breathe, and keeps you from doing the right things.    Don t smoke or be around others who are smoking. If you smoke, quit. Smoking is the main cause of COPD. Quitting will help you be able to better manage your COPD.  Don't use e-cigarettes or vaping products either. Ask your healthcare provider about ways to help you quit smoking.    Try to drink more fluids than normal during a flare-up, unless your healthcare provider has told you not to because of heart and kidney problems. More fluids can help loosen the mucus.    Eat a healthy, balanced diet. This is important to staying as healthy as possible. So is trying to stay at your ideal weight. Being overweight or underweight can affect your health. Make sure you have a lot of fruits and vegetables every day. And also eat balanced portions of whole grains, lean meats and fish, and low-fat dairy products.    Use your inhalers and nebulizer, if you have one, as you have been told to. When using a metered dose inhaler or nebulizer, it's very important to use the proper techniques. If you have any questions about how to use your device, contact your healthcare provider or refer to the user manual.    If you were given antibiotics, take them until they are used up or your provider tells you to stop. It s important to finish the antibiotics, even though you feel better. This will make sure the infection has cleared.    If you were given a steroid, finish it even if you feel better.    Learn the names of your medicines, as well as how and when to use them. Talk with your provider about other conditions you have and their treatment and how it may affect your COPD.    Oxygen may be prescribed if tests show that your blood contains too little oxygen. Ask your provider about long-term oxygen therapy.    Coping tips for shortness of breath include:  ? Exercise. Try to be as active as possible. This will improve energy levels and strengthen your muscles, so you can do more.  ? Breathing methods. Ask your healthcare provider or nurse show you how to do pursed-lip breathing.  ? Balance rest and activity. Each day, try to balance rest periods with activity. For example, you might start the day  with getting dressed and eating breakfast. Then you can relax and read the paper. After that, take a brief walk. And then sit with your feet up for a while.  ? Pulmonary rehab (rehabilitation).  Community-based and home-based programs work as well as hospital-based programs as long as they are as often and as intense. Standard home-based pulmonary rehab programs help shortness of breath in people with COPD. Supervised, traditional pulmonary rehab remains the best option for people with COPD. These programs help with managing your disease, and also help with breathing methods, exercise, support, and counseling. To find one, ask your provider or call your local hospital. Also talk with your healthcare provider about which rehab or self-management program is best for you.  Preventing a flare-up  Flare-ups happen. But the best way to treat one is to prevent it before it starts. Here are some pointers:     Don t smoke or be around others who are smoking. Avoid using e-cigarettes due to their harmful side effects.    Take your medicines as discussed with your healthcare provider.    Talk with your provider about getting a flu shot every year. Also find out if you need a pneumonia shot.    If there is a weather advisory warning to stay indoors, try to stay inside when possible.    Try to eat healthy, exercise, and get plenty of sleep.    Try to stay away from things that normally set you off. These include dust, chemical fumes, hairsprays, or strong perfumes.  Follow-up care  Follow up with your healthcare provider, or as advised.   If a culture was done, you will be told if your treatment needs to be changed. You can call as directed for the results.   If X-rays were done, you will be told of any new findings that may affect your care.   During each appointment, talk with your healthcare provider about your ability to:     Devon in your normal environment    Correctly use inhaler (or your medicine delivery systems)    Devon  with other conditions you have and their treatments and how they may affect your COPD  Call 911  Call 911 if any of these occur:     Wheezing or shortness or breath does not get better with treatment    Chest pain or chest tightness    Feeling lightheaded or dizzy    You have trouble breathing    You feel confused or it s hard to wake you up    You faint or lose consciousness    You have a rapid heart rate    You have new pain in your chest, arm, shoulder, neck, or upper back  When to seek medical advice  Call your healthcare provider right away if any of these occur:    Fever of 100.4 F (38 C) or higher, or as directed by your healthcare provider    Coughing up lots of dark-colored or bloody mucus (sputum)    You don't start to get better within 24 hours    Swelling of your ankles gets worse    Weakness  Jesenia last reviewed this educational content on 4/1/2019 2000-2021 [employer]. All rights reserved. This information is not intended as a substitute for professional medical care. Always follow your healthcare professional's instructions.

## 2021-11-24 NOTE — PROGRESS NOTES
Assessment & Plan     COPD exacerbation (H)  Symptoms and exam suggestive of COPD exacerbation today.  On exam she is in no respiratory distress.  Oxygen saturation is 93% on room air.  Augmentin is prescribed.  Prednisone is also prescribed.  Continue using her inhalers as prescribed.  Covid test is pending.  Keep monitoring symptoms.  Follow-up if any worsening symptoms.  Patient agrees with the plan.  - amoxicillin-clavulanate (AUGMENTIN) 875-125 MG tablet  Dispense: 20 tablet; Refill: 0  - predniSONE (DELTASONE) 20 MG tablet  Dispense: 10 tablet; Refill: 0  - Symptomatic COVID-19 Virus (Coronavirus) by PCR Nose     30 minutes spent on the date of the encounter doing chart review, history and exam, documentation, patient education and further activities per the note        Return in about 10 days (around 12/4/2021) for Symptoms failing to improve.    Yokasta Ott PA-C  [facility] TIFFANI Le is a 66 year old female who presents to clinic today for the following health issues:  Chief Complaint   Patient presents with     URI     Congestion, cough, sweats at night, 99 oral temp taken at night, about a week.      HPI      URI Adult    Onset of symptoms was 1.5 weeks ago.  Course of illness is worsening.    Severity moderate  Current and Associated symptoms: cough - productive, low grade fever, sob with activities.  Denies CP.  No obvious exposure to anyone with Covid.  Treatment measures tried include Inhaler  Predisposing factors include HX of COPD.  She is vaccinated for Covid.      Review of Systems  Constitutional, HEENT, cardiovascular, pulmonary, GI, , musculoskeletal, neuro, skin, endocrine and psych systems are negative, except as otherwise noted.      Objective    BP (!) 156/88 (BP Location: Right arm, Patient Position: Sitting, Cuff Size: Adult Small)   Pulse 88   Temp 97.9  F (36.6  C) (Oral)   Resp 14   Wt 47.6 kg (105 lb)   LMP 09/22/2010   SpO2 93%    BMI 19.84 kg/m    Physical Exam   GENERAL: healthy, alert and no distress  HENT: ear canals and TM's normal,  mouth without ulcers or lesions  RESP: lungs with rhonchi, mild expiratory wheezing over the lung fields.  CV: regular rate and rhythm, normal S1 S2  MS: no gross musculoskeletal defects noted, no edema  SKIN: no suspicious lesions or rashes

## 2021-11-24 NOTE — TELEPHONE ENCOUNTER
Follow up call to Mimi, no answer. Appears she did not proceed to urgent care as advised unless she was seen outside system by chance. I had also reached out to her to complete pre-visit planning and asked she return call to me of return [employer] message sent. Phoebe Ernst R.N.

## 2021-11-25 LAB — SARS-COV-2 RNA RESP QL NAA+PROBE: NEGATIVE

## 2021-12-01 NOTE — PROGRESS NOTES
Pre-Visit Planning       Next 5 appointments (look out 90 days)    Nov 29, 2021  8:00 AM  (Arrive by 7:40 AM)  Office Visit with Aye Morejon MD  [facility] ([facility] ) [address] [facility] [address] [phone]          Appointment Notes for this encounter:   Low fever not sleeping well, vaccines     Questionnaires Reviewed/Assigned  No additional questionnaires are needed PHQ 9 will be due Feb--due then for preventative. May want to do this visit if not scheduled for Feb.      Patient preferred phone number: [phone]        Contacted patient via phone/[email or web address]. Are there any additional questions or concerns you'd like to review with your provider during your visit?   Had fever for a couple of weeks. Difficulty breathing waking up because she finds it hard to breathe. Feeling dizzy, was seen I in urgent care.Is still not feeling back to baseline      Visit is not preventive. Due preventative Feb 2022    Meds  Home Meds reviewed and updated Review Bupropion has not taken recently.     Entered patient-preferred pharmacy. [id number] - [facility] [place] [address] [address]       Current Outpatient Medications   Medication     albuterol (PROAIR HFA/PROVENTIL HFA/VENTOLIN HFA) 108 (90 Base) MCG/ACT inhaler     amLODIPine (NORVASC) 10 MG tablet     amoxicillin-clavulanate (AUGMENTIN) 875-125 MG tablet     azithromycin (ZITHROMAX) 250 MG tablet     buPROPion (ZYBAN) 150 MG 12 hr tablet     levalbuterol (XOPENEX HFA) 45 MCG/ACT inhaler     levalbuterol (XOPENEX) 1.25 MG/3ML neb solution     TRELEGY ELLIPTA 100-62.5-25 MCG/INH oral inhaler     No current facility-administered medications for this visit.       Health Maintenance   Health Maintenance Due   Topic Date Due     COLORECTAL CANCER SCREENING  Never done     HIV SCREENING  Never done     ZOSTER IMMUNIZATION (1 of 2) Never done     PREVENTIVE CARE VISIT  02/25/2014     INFLUENZA VACCINE (1)  09/01/2021     COVID-19 Vaccine (3 - Booster for Pfizer series) 10/13/2021     ANNUAL REVIEW OF HM ORDERS  12/28/2021       Health Maintenance reviewed and Health Maintenance orders pended    [employer]lt  Patient is active on [employer].    Call Summary  PVP completed.

## 2021-12-07 ENCOUNTER — OFFICE VISIT (OUTPATIENT)
Dept: FAMILY MEDICINE | Facility: CLINIC | Age: 66
End: 2021-12-07
Payer: COMMERCIAL

## 2021-12-07 VITALS
DIASTOLIC BLOOD PRESSURE: 82 MMHG | SYSTOLIC BLOOD PRESSURE: 134 MMHG | OXYGEN SATURATION: 96 % | HEART RATE: 80 BPM | RESPIRATION RATE: 22 BRPM | WEIGHT: 110.6 LBS | TEMPERATURE: 98.1 F | HEIGHT: 61 IN | BODY MASS INDEX: 20.88 KG/M2

## 2021-12-07 DIAGNOSIS — J44.9 CHRONIC OBSTRUCTIVE PULMONARY DISEASE, UNSPECIFIED COPD TYPE (H): Primary | ICD-10-CM

## 2021-12-07 DIAGNOSIS — F41.9 ANXIETY: ICD-10-CM

## 2021-12-07 DIAGNOSIS — J32.0 CHRONIC MAXILLARY SINUSITIS: ICD-10-CM

## 2021-12-07 PROCEDURE — 99214 OFFICE O/P EST MOD 30 MIN: CPT | Performed by: FAMILY MEDICINE

## 2021-12-07 RX ORDER — BUPROPION HYDROCHLORIDE 150 MG/1
150 TABLET, FILM COATED, EXTENDED RELEASE ORAL 2 TIMES DAILY
Qty: 180 TABLET | Refills: 1 | Status: SHIPPED | OUTPATIENT
Start: 2021-12-07 | End: 2022-11-14

## 2021-12-07 ASSESSMENT — MIFFLIN-ST. JEOR: SCORE: 989.06

## 2021-12-07 NOTE — PROGRESS NOTES
Assessment & Plan     Chronic obstructive pulmonary disease, unspecified COPD type (H)  - moderately severe copd   Reviewed PFT results   Recommend to continue with current inhaler .  We will continue to monitor.    Chronic maxillary sinusitis  -Patient is following up with ENT  Patient describes since surgery she feels well sensation of smell and taste has not been affected, does recommend follow-up with ENT for consideration of laryngoscopy.    Anxiety  - buPROPion (ZYBAN) 150 MG 12 hr tablet; Take 1 tablet (150 mg) by mouth 2 times daily Start with 1 tab daily in am , increase the dose to 1 tablet bid after 1 week .      Return in about 6 months (around 6/7/2022) for Follow up, patient will call.    Aye Morejon MD  [facility] 66 [facility] who presents for the following health issues     History of Present Illness       COPD:  She presents for follow up of COPD.  Overall, COPD symptoms are better since last visit. She has more than usual fatigue or shortness of breath with exertion and more than usual shortness of breath at rest.  She sometimes coughs and does have change in sputum. Patient has had recent fever. She can walk 2-5 blocks without stopping to rest. She can walk 1 flights of stairs without resting.The patient has had an ED, urgent care, or hospital admission because of COPD since the last visit. She states she has had 1 visit(s) to an ED, Urgent Care, or Hospital due to her COPD.    Heart Failure:  She presents for follow up of heart failure. She is experiencing shortness of breath at night or when lying flat, which is slightly worse. She is not experiencing any lower extremity edema.   She denies orthopenea and coughs at night. Patient is not checking weight daily. She has recently had a None. She has side effects from medications including dizziness. She has had no other medical visits for heart failure since the last visit.    She eats 2-3  "servings of fruits and vegetables daily.She consumes 1 sweetened beverage(s) daily.She exercises with enough effort to increase her heart rate 20 to 29 minutes per day.  She exercises with enough effort to increase her heart rate 4 days per week.   She is taking medications regularly.       ED/UC Followup:    Facility:  [facility]  Date of visit: 11/24/21  Reason for visit: COPD exacerbation  Current Status: states she is doing a bit better - still having symptoms - SOB       Medication Followup of bupropion    Taking Medication as prescribed: NO-never started    Side Effects:  n/a    Medication Helping Symptoms:  not applicable         Review of Systems   Constitutional: Negative for fever and unexpected weight change.   HENT: Positive for congestion, postnasal drip and rhinorrhea.    Gastrointestinal: Negative for abdominal pain.   Psychiatric/Behavioral: Positive for agitation and behavioral problems. Negative for decreased concentration and dysphoric mood.            Objective    /82 (BP Location: Right arm, Cuff Size: Adult Regular)   Pulse 80   Temp 98.1  F (36.7  C) (Tympanic)   Resp 22   Ht 1.549 m (5' 1\")   Wt 50.2 kg (110 lb 9.6 oz)   LMP 09/22/2010   SpO2 96%   BMI 20.90 kg/m    Body mass index is 20.9 kg/m .  Physical Exam  HENT:      Mouth/Throat:      Pharynx: Posterior oropharyngeal erythema present.   Pulmonary:      Effort: Pulmonary effort is normal.      Breath sounds: Normal breath sounds.   Abdominal:      General: Abdomen is flat.   Skin:     General: Skin is warm.   Neurological:      General: No focal deficit present.      Mental Status: She is alert.   Psychiatric:      Comments: Low mood                 "

## 2021-12-08 ASSESSMENT — ENCOUNTER SYMPTOMS
AGITATION: 1
FEVER: 0
RHINORRHEA: 1
ABDOMINAL PAIN: 0
UNEXPECTED WEIGHT CHANGE: 0
DECREASED CONCENTRATION: 0
DYSPHORIC MOOD: 0

## 2021-12-27 ENCOUNTER — MYC MEDICAL ADVICE (OUTPATIENT)
Dept: FAMILY MEDICINE | Facility: CLINIC | Age: 66
End: 2021-12-27
Payer: COMMERCIAL

## 2022-01-28 ENCOUNTER — OFFICE VISIT (OUTPATIENT)
Dept: PULMONOLOGY | Facility: CLINIC | Age: 67
End: 2022-01-28
Attending: INTERNAL MEDICINE
Payer: COMMERCIAL

## 2022-01-28 VITALS
SYSTOLIC BLOOD PRESSURE: 153 MMHG | HEIGHT: 62 IN | HEART RATE: 87 BPM | DIASTOLIC BLOOD PRESSURE: 87 MMHG | OXYGEN SATURATION: 92 % | WEIGHT: 107 LBS | BODY MASS INDEX: 19.69 KG/M2

## 2022-01-28 DIAGNOSIS — F32.1 MODERATE MAJOR DEPRESSION (H): ICD-10-CM

## 2022-01-28 DIAGNOSIS — J44.9 CHRONIC OBSTRUCTIVE PULMONARY DISEASE, UNSPECIFIED COPD TYPE (H): ICD-10-CM

## 2022-01-28 DIAGNOSIS — J01.90 ACUTE SINUSITIS WITH SYMPTOMS > 10 DAYS: Primary | ICD-10-CM

## 2022-01-28 DIAGNOSIS — I70.0 AORTIC CALCIFICATION (H): ICD-10-CM

## 2022-01-28 PROCEDURE — 99214 OFFICE O/P EST MOD 30 MIN: CPT | Mod: GC | Performed by: INTERNAL MEDICINE

## 2022-01-28 PROCEDURE — G0463 HOSPITAL OUTPT CLINIC VISIT: HCPCS

## 2022-01-28 RX ORDER — LEVOFLOXACIN 500 MG/1
500 TABLET, FILM COATED ORAL DAILY
Qty: 14 TABLET | Refills: 3 | Status: SHIPPED | OUTPATIENT
Start: 2022-01-28 | End: 2022-03-29

## 2022-01-28 ASSESSMENT — MIFFLIN-ST. JEOR: SCORE: 980.66

## 2022-01-28 NOTE — LETTER
1/28/2022     RE: Mimi Rivera  [address] [place] [address]    Dear Colleague,    Thank you for referring your patient, Mimi Rivera, to the [facility] FOR LUNG SCIENCE AND [facility]. Please see a copy of my visit note below.    Pulmonary Clinic Follow-Up Visit Note    Assessment and Plan:  Mimi Rivera is a 66 year old female with severe COPD, presenting to clinic today for follow up and persistent sinus drainage. Her COPD is overall well controlled, but she has continued sinus drainage concerning for acute sinusitis.    Acute sinusitis  Severe COPD  3-4 weeks of sinus congestion, yellow-mucoid discharge, morning cough and wheezing, recent night sweats. Duration and severity of sx are concerning for bacterial sinusitis. She is feeling more SOB with activity as her sinuses lead to significant more drainage. Completed 10 day course of augmentin and prednisone for presumed steroid flare in December 2021 with minimal/no benefit. Will prescribe 14 course of Levaquin to clear presumed bacterial sinusitis. Continue with sinus rinse and cares. Her respiratory sx are correlating with more dependent positioning and in the morning, with relief of SOB and cough/wheeze during the day. No indication for steroids at this time - this is likely upper URI leading to SOB and wheezing as sx clear away when not in dependent position. Lower suspicion for COPD flare.   - 7-10 days of Levaquin abx. If no improvement by day 10, please complete 14 day course.  - continue with BID sinus rinses  - please start zyrtec  - no changes to COPD medications, low suspicion for COPD flare  - please contact clinic with no improvement.    Pt seen, examined and discussed with Dr. Bridges.    Avinash Lanier MD  Internal Medicine PGY-3    Pulmonary Attending Attestation  I saw and examined the patient with Dr. Colvin, confirming bonilla aspects of the history and exam.  I personally reviewed the  recent Xrays and other labs.  The resident s above note reflects our detailed discussion of the findings, assessment and plan.  Ms. Rivera is having worsened symptoms - primarily sinus and upper airway, more than lung per se.  It is likely that increased post-nasal drip is worsening her airway function and some increased ANSARI in setting of underlying COPD  Our plan is to treat her for 14 days with levaquin (given very transient prior response to Azithromycin) and to have her add daily antihistamine while continuing use of twice daily sinus rinses and also returning to see her (outside) [place] [facility] specialist.  She will continue her COPD medication.  I spent 30 minutes dedicated to the care of Ms Rivera today, 1/28/2021, including review of her chart, past PFTs and chest imaging (reports & images), time spent with her, discussion of the plan with Dr. Lanier and my documentation.  Ishmael Bridges MD    CC: Aye Morejon  ------------------------------------------    HPI:     Last seen 2/5/2021, at that time no changes to COPD management. Her COPD has been well controlled on daily trelegy and azithromycin. No daily cough, wheeze or SOB up until 4 weeks ago. At that time she noted significant sinus congestion productive of yellow mucus. She contacted the clinic for medrol dose pack and augmentin. After completing course, she reports 50% improvement, only to return to worsening sx within days. Over the last 3 weeks her yellow mucoid productive cough and sinus drainage has worsened. Associated morning cough, wheeze every night and early morning until she clears the yellow drainage. More SOB as her activity triggers more drainage from her sinuses. She otherwise denies SOB, wheezing, coughing during the day without post nasal drainage. No fevers although notes that last night she has night sweats where she soaked through her bed sheets. These sinus sx have significantly impaired her quality of life, impairing functioning  and exercise capacity.     PMH:  Past Medical History:   Diagnosis Date     ASCUS on Pap smear     unable to run HPV typing, f/u paps NIL     Chronic rhinosinusitis      COPD (chronic obstructive pulmonary disease) (H)      Depression, anxiety      Herpes     occasional outbreak     Hypertension      PSH:  Past Surgical History:   Procedure Laterality Date      SECTION      x 3     Allergies:No Known Allergies  Social History:  Social History     Socioeconomic History     Marital status:      Spouse name: Not on file     Number of children: Not on file     Years of education: Not on file     Highest education level: Not on file   Occupational History     Not on file   Tobacco Use     Smoking status: Former Smoker     Packs/day: 10.00     Types: Cigarettes     Quit date: 3/19/2015     Years since quittin.8     Smokeless tobacco: Former User     Quit date: 2017   Vaping Use     Vaping Use: Never used   Substance and Sexual Activity     Alcohol use: Yes     Alcohol/week: 2.0 - 3.0 standard drinks     Types: 2 - 3 Standard drinks or equivalent per week     Comment: 2 drinks per wk avg     Drug use: No     Sexual activity: Yes     Partners: Male     Birth control/protection: Surgical     Comment: tubal   Other Topics Concern     Parent/sibling w/ CABG, MI or angioplasty before 65F 55M? No   Social History Narrative     Not on file     Social Determinants of Health     Financial Resource Strain: Not on file   Food Insecurity: Not on file   Transportation Needs: Not on file   Physical Activity: Not on file   Stress: Not on file   Social Connections: Not on file   Intimate Partner Violence: Not on file   Housing Stability: Not on file     Medications:  Current Outpatient Medications   Medication Sig Dispense Refill     albuterol (PROAIR HFA/PROVENTIL HFA/VENTOLIN HFA) 108 (90 Base) MCG/ACT inhaler Inhale 2 puffs into the lungs every 6 hours 18 g 0     amLODIPine (NORVASC) 10 MG tablet Take 1  "tablet (10 mg) by mouth daily 90 tablet 1     azithromycin (ZITHROMAX) 250 MG tablet Take 1 tablet (250 mg) by mouth daily Used to reduce lung inflammation and flare-ups 30 tablet 11     buPROPion (ZYBAN) 150 MG 12 hr tablet Take 1 tablet (150 mg) by mouth 2 times daily Start with 1 tab daily in am , increase the dose to 1 tablet bid after 1 week . 180 tablet 1     levalbuterol (XOPENEX HFA) 45 MCG/ACT inhaler Inhale 2 puffs into the lungs every 4 hours as needed for shortness of breath / dyspnea or wheezing 1 Inhaler 1     levalbuterol (XOPENEX) 1.25 MG/3ML neb solution Take 3 mLs (1.25 mg) by nebulization every 4 hours as needed for shortness of breath / dyspnea or wheezing 390 mL 11     levofloxacin (LEVAQUIN) 500 MG tablet Take 1 tablet (500 mg) by mouth daily 14 tablet 3     TRELEGY ELLIPTA 100-62.5-25 MCG/INH oral inhaler Inhale 1 puff into the lungs, daily. 60 each 10     Family History:  Family History   Problem Relation Age of Onset     Hypertension Mother      Cancer Mother         liver     C.A.D. Father        Physical Exam:  BP (!) 153/87 (BP Location: Right arm, Patient Position: Chair, Cuff Size: Adult Regular)   Pulse 87   Ht 1.562 m (5' 1.5\")   Wt 48.5 kg (107 lb)   LMP 09/22/2010   SpO2 92%   BMI 19.89 kg/m      General: Sitting in the chair in NAD, fatigued but nontoxic.  HEENT: anicteric, moist mucosa, cobblestoning in posterior oropharynx, hyperemeic nasal turbinates. Normal bilateral TMs. Maxillary and frontal sinus tenderness.  Neck: palpable anterior bilateral lymphadenopathy, mildly tender, no JVD noted  Chest: CTAB, no wheezing or crackles. Mild prolonged expiratory phase.  Cardiac: RRR no murmurs  Extremities: No LE Edema  Neuro: A&Ox3, no focal defecits  Skin: no rash noted    Labs and Radiology:  None new    PFT's:  None since 5/2019.    Again, thank you for allowing me to participate in the care of your patient.      Sincerely,    Ishmael Bridges MD  "

## 2022-01-28 NOTE — NURSING NOTE
Chief Complaint   Patient presents with     Follow Up     6 month      Vitals were taken and medications were reconciled.     Sondra Manning RMA  11:25 AM

## 2022-01-29 NOTE — PROGRESS NOTES
Pulmonary Clinic Follow-Up Visit Note    Assessment and Plan:  Mimi Rivera is a 66 year old female with severe COPD, presenting to clinic today for follow up and persistent sinus drainage. Her COPD is overall well controlled, but she has continued sinus drainage concerning for acute sinusitis.    Acute sinusitis  Severe COPD  3-4 weeks of sinus congestion, yellow-mucoid discharge, morning cough and wheezing, recent night sweats. Duration and severity of sx are concerning for bacterial sinusitis. She is feeling more SOB with activity as her sinuses lead to significant more drainage. Completed 10 day course of augmentin and prednisone for presumed steroid flare in December 2021 with minimal/no benefit. Will prescribe 14 course of Levaquin to clear presumed bacterial sinusitis. Continue with sinus rinse and cares. Her respiratory sx are correlating with more dependent positioning and in the morning, with relief of SOB and cough/wheeze during the day. No indication for steroids at this time - this is likely upper URI leading to SOB and wheezing as sx clear away when not in dependent position. Lower suspicion for COPD flare.   - 7-10 days of Levaquin abx. If no improvement by day 10, please complete 14 day course.  - continue with BID sinus rinses  - please start zyrtec  - no changes to COPD medications, low suspicion for COPD flare  - please contact clinic with no improvement.    Pt seen, examined and discussed with Dr. Bridges.    Avinash Lanier MD  Internal Medicine PGY-3    Pulmonary Attending Attestation  I saw and examined the patient with Dr. Colvin, confirming bonilla aspects of the history and exam.  I personally reviewed the recent Xrays and other labs.  The resident s above note reflects our detailed discussion of the findings, assessment and plan.  Ms. Rivera is having worsened symptoms - primarily sinus and upper airway, more than lung per se.  It is likely that increased post-nasal drip is  worsening her airway function and some increased ANSARI in setting of underlying COPD  Our plan is to treat her for 14 days with levaquin (given very transient prior response to Azithromycin) and to have her add daily antihistamine while continuing use of twice daily sinus rinses and also returning to see her (outside) [place] [facility] specialist.  She will continue her COPD medication.  I spent 30 minutes dedicated to the care of Ms Rivera today, 1/28/2021, including review of her chart, past PFTs and chest imaging (reports & images), time spent with her, discussion of the plan with Dr. Lanier and my documentation.  Ishmael Bridges MD    CC: Aye Morejon  ------------------------------------------    HPI:     Last seen 2/5/2021, at that time no changes to COPD management. Her COPD has been well controlled on daily trelegy and azithromycin. No daily cough, wheeze or SOB up until 4 weeks ago. At that time she noted significant sinus congestion productive of yellow mucus. She contacted the clinic for medrol dose pack and augmentin. After completing course, she reports 50% improvement, only to return to worsening sx within days. Over the last 3 weeks her yellow mucoid productive cough and sinus drainage has worsened. Associated morning cough, wheeze every night and early morning until she clears the yellow drainage. More SOB as her activity triggers more drainage from her sinuses. She otherwise denies SOB, wheezing, coughing during the day without post nasal drainage. No fevers although notes that last night she has night sweats where she soaked through her bed sheets. These sinus sx have significantly impaired her quality of life, impairing functioning and exercise capacity.     PMH:  Past Medical History:   Diagnosis Date     ASCUS on Pap smear 2006    unable to run HPV typing, f/u paps NIL     Chronic rhinosinusitis      COPD (chronic obstructive pulmonary disease) (H)      Depression, anxiety      Herpes     occasional  outbreak     Hypertension      PSH:  Past Surgical History:   Procedure Laterality Date      SECTION      x 3     Allergies:No Known Allergies  Social History:  Social History     Socioeconomic History     Marital status:      Spouse name: Not on file     Number of children: Not on file     Years of education: Not on file     Highest education level: Not on file   Occupational History     Not on file   Tobacco Use     Smoking status: Former Smoker     Packs/day: 10.00     Types: Cigarettes     Quit date: 3/19/2015     Years since quittin.8     Smokeless tobacco: Former User     Quit date: 2017   Vaping Use     Vaping Use: Never used   Substance and Sexual Activity     Alcohol use: Yes     Alcohol/week: 2.0 - 3.0 standard drinks     Types: 2 - 3 Standard drinks or equivalent per week     Comment: 2 drinks per wk avg     Drug use: No     Sexual activity: Yes     Partners: Male     Birth control/protection: Surgical     Comment: tubal   Other Topics Concern     Parent/sibling w/ CABG, MI or angioplasty before 65F 55M? No   Social History Narrative     Not on file     Social Determinants of Health     Financial Resource Strain: Not on file   Food Insecurity: Not on file   Transportation Needs: Not on file   Physical Activity: Not on file   Stress: Not on file   Social Connections: Not on file   Intimate Partner Violence: Not on file   Housing Stability: Not on file     Medications:  Current Outpatient Medications   Medication Sig Dispense Refill     albuterol (PROAIR HFA/PROVENTIL HFA/VENTOLIN HFA) 108 (90 Base) MCG/ACT inhaler Inhale 2 puffs into the lungs every 6 hours 18 g 0     amLODIPine (NORVASC) 10 MG tablet Take 1 tablet (10 mg) by mouth daily 90 tablet 1     azithromycin (ZITHROMAX) 250 MG tablet Take 1 tablet (250 mg) by mouth daily Used to reduce lung inflammation and flare-ups 30 tablet 11     buPROPion (ZYBAN) 150 MG 12 hr tablet Take 1 tablet (150 mg) by mouth 2 times daily Start  "with 1 tab daily in am , increase the dose to 1 tablet bid after 1 week . 180 tablet 1     levalbuterol (XOPENEX HFA) 45 MCG/ACT inhaler Inhale 2 puffs into the lungs every 4 hours as needed for shortness of breath / dyspnea or wheezing 1 Inhaler 1     levalbuterol (XOPENEX) 1.25 MG/3ML neb solution Take 3 mLs (1.25 mg) by nebulization every 4 hours as needed for shortness of breath / dyspnea or wheezing 390 mL 11     levofloxacin (LEVAQUIN) 500 MG tablet Take 1 tablet (500 mg) by mouth daily 14 tablet 3     TRELEGY ELLIPTA 100-62.5-25 MCG/INH oral inhaler Inhale 1 puff into the lungs, daily. 60 each 10     Family History:  Family History   Problem Relation Age of Onset     Hypertension Mother      Cancer Mother         liver     C.A.D. Father        Physical Exam:  BP (!) 153/87 (BP Location: Right arm, Patient Position: Chair, Cuff Size: Adult Regular)   Pulse 87   Ht 1.562 m (5' 1.5\")   Wt 48.5 kg (107 lb)   LMP 09/22/2010   SpO2 92%   BMI 19.89 kg/m      General: Sitting in the chair in NAD, fatigued but nontoxic.  HEENT: anicteric, moist mucosa, cobblestoning in posterior oropharynx, hyperemeic nasal turbinates. Normal bilateral TMs. Maxillary and frontal sinus tenderness.  Neck: palpable anterior bilateral lymphadenopathy, mildly tender, no JVD noted  Chest: CTAB, no wheezing or crackles. Mild prolonged expiratory phase.  Cardiac: RRR no murmurs  Extremities: No LE Edema  Neuro: A&Ox3, no focal defecits  Skin: no rash noted    Labs and Radiology:  None new    PFT's:  None since 5/2019.    "

## 2022-01-31 NOTE — TELEPHONE ENCOUNTER
FUTURE VISIT INFORMATION      FUTURE VISIT INFORMATION:    Date: 3/18/22    Time: 1:30PM    [place] [facility] [place]FORMATION:    Referring provider:  Ishmael Bridges MD    Referring providers clinic:  [facility]     Reason for visit/diagnosis  Per Pt, dx Acute sinusitis with symptoms > 10 days [J01.90] referral from Ishmael Bridges MD    RECORDS REQUESTED FROM:       Clinic name Comments Records Status Imaging Status   [facility]  1/28/22 note and referral from Ishmael Bridges MD [facility]    Imaging 4/14/16 CT Maxillofacial  [facility] PACS   [facility] 12/10/2020 note from Aye Morejon MD [facility] 4/24/2017 note from Dr Dejuan Brunner Scanned in [facility]    [facility]  4/16/2019 CT Sinus Care everywhere  req 1/31/22 - PACS   [facility] ENT  Evangelista Murillo MD   Need LIBAN       1/31/22 9:47AM sent a fax to [facility] for images - Amay   2/15/22 11:45AM images received in PACS, called patient and lvm for LIBAN - Amay

## 2022-02-10 DIAGNOSIS — J42 CHRONIC BRONCHITIS, UNSPECIFIED CHRONIC BRONCHITIS TYPE (H): ICD-10-CM

## 2022-02-13 ENCOUNTER — HEALTH MAINTENANCE LETTER (OUTPATIENT)
Age: 67
End: 2022-02-13

## 2022-02-15 NOTE — TELEPHONE ENCOUNTER
Called and left patient a message to see if she has seen an ENT Dr recently or in the past couple of years. Her medical records indicate that she might have been seen at [facility] ENT by Dr Murillo. Left my direct number for call back [phone] (ok to [facility]) to see if she can verify seeing an ENT and how I can get a Release form to her (email vs. Mail).     [facility]  Clinic

## 2022-03-10 NOTE — TELEPHONE ENCOUNTER
Patient called back stating she does not want to let her outside ENT provider know she is getting a 2nd opinion. Notified patient that I will let team know, as she might need to sign an LIBAN in clinic if needed. Confirmed appointment with patient.     Amay

## 2022-03-18 ENCOUNTER — OFFICE VISIT (OUTPATIENT)
Dept: OTOLARYNGOLOGY | Facility: CLINIC | Age: 67
End: 2022-03-18
Attending: INTERNAL MEDICINE
Payer: COMMERCIAL

## 2022-03-18 ENCOUNTER — PRE VISIT (OUTPATIENT)
Dept: OTOLARYNGOLOGY | Facility: CLINIC | Age: 67
End: 2022-03-18

## 2022-03-18 VITALS
OXYGEN SATURATION: 94 % | WEIGHT: 105 LBS | HEIGHT: 62 IN | HEART RATE: 96 BPM | BODY MASS INDEX: 19.32 KG/M2 | SYSTOLIC BLOOD PRESSURE: 148 MMHG | TEMPERATURE: 98.6 F | DIASTOLIC BLOOD PRESSURE: 100 MMHG

## 2022-03-18 DIAGNOSIS — R09.81 NASAL CONGESTION: ICD-10-CM

## 2022-03-18 DIAGNOSIS — J01.90 ACUTE SINUSITIS WITH SYMPTOMS > 10 DAYS: Primary | ICD-10-CM

## 2022-03-18 DIAGNOSIS — R09.82 POST-NASAL DRIP: ICD-10-CM

## 2022-03-18 DIAGNOSIS — G50.1 ATYPICAL FACIAL PAIN: ICD-10-CM

## 2022-03-18 PROCEDURE — 31231 NASAL ENDOSCOPY DX: CPT | Performed by: OTOLARYNGOLOGY

## 2022-03-18 PROCEDURE — 99203 OFFICE O/P NEW LOW 30 MIN: CPT | Mod: 25 | Performed by: OTOLARYNGOLOGY

## 2022-03-18 ASSESSMENT — PAIN SCALES - GENERAL: PAINLEVEL: NO PAIN (0)

## 2022-03-18 NOTE — LETTER
3/18/2022       RE: Mimi Rivera  [address] [place] [address]     Dear Colleague,    Thank you for referring your patient, Mimi Rivera, to the [facility] EAR NO[facility] AND [facility]ROAT CLINIC [place] at [facility]. Please see a copy of my visit note below.                       [facility]                   New Patient Visit      Encounter date: 2022    Referring Provider:   Ishmael Bridges MD  [address] [facility] [address]  [place] [address]    Chief Complaint: chronic sinusitis    History of Present Illness: Mimi Rivera she is a pleasant 66-year-old woman who comes to see me for chronic sinusitis.  She complains of ongoing symptoms of frontal pressure, postnasal drainage, altered nasal resonance, and functional smell.  She underwent an office sinus surgery, septoplasty approximately 1 year ago at an outside ENT clinic.  Since then, she complains of ongoing symptoms as listed above.  Is improving persistent despite use of usual topical irrigations and nasal steroids.  An UPSIT is performed today and she scored in the normosmia range.    UPSIT - 36/40    Review of systems: A 14-point review of systems has been conducted and was negative for any notable symptoms, except as dictated in the history of present illness.     Past Medical History:   Diagnosis Date     ASCUS on Pap smear     unable to run HPV typing, f/u paps NIL     Chronic rhinosinusitis      COPD (chronic obstructive pulmonary disease) (H)      Depression, anxiety      Herpes     occasional outbreak     Hypertension         Past Surgical History:   Procedure Laterality Date      SECTION      x 3        Family History   Problem Relation Age of Onset     Hypertension Mother      Cancer Mother         liver     C.A.D. Father         Social History     Socioeconomic History     Marital status:      Spouse name: Not on file     Number of children:  "Not on file     Years of education: Not on file     Highest education level: Not on file   Occupational History     Not on file   Tobacco Use     Smoking status: Former Smoker     Packs/day: 10.00     Types: Cigarettes     Quit date: 3/19/2015     Years since quittin.0     Smokeless tobacco: Former User     Quit date: 2017   Vaping Use     Vaping Use: Never used   Substance and Sexual Activity     Alcohol use: Yes     Alcohol/week: 2.0 - 3.0 standard drinks     Types: 2 - 3 Standard drinks or equivalent per week     Comment: 2 drinks per wk avg     Drug use: No     Sexual activity: Yes     Partners: Male     Birth control/protection: Surgical     Comment: tubal   Other Topics Concern     Parent/sibling w/ CABG, MI or angioplasty before 65F 55M? No   Social History Narrative     Not on file     Social Determinants of Health     Financial Resource Strain: Not on file   Food Insecurity: Not on file   Transportation Needs: Not on file   Physical Activity: Not on file   Stress: Not on file   Social Connections: Not on file   Intimate Partner Violence: Not on file   Housing Stability: Not on file        Physical Exam:  Vital signs: BP (!) 148/100 (BP Location: Left arm, Patient Position: Sitting, Cuff Size: Adult Regular)   Pulse 96   Temp 98.6  F (37  C) (Temporal)   Ht 1.562 m (5' 1.5\")   Wt 47.6 kg (105 lb)   LMP 2010   SpO2 94%   BMI 19.52 kg/m     General Appearance: No acute distress, appropriate demeanor, conversant  Eyes: moist conjunctivae; EOMI; pupils symmetric; visual acuity grossly intact; no proptosis  Head: normocephalic; overall symmetric appearance without deformity  Face: overall symmetric without deformity; HB I/VI  Ears: Normal appearance of external ear; external meatus normal in appearance; TMs intact without perforation bilaterally;   Nose: No external deformity; septum midline; inferior turbinates without significant hypertrophy  Oral Cavity/oropharynx: Normal appearance of " mucosa; tongue midline; no mass or lesions; oropharynx without obvious mucosal abnormality  Neck: no palpable lymphadenopathy; thyroid without palpable nodules  Lungs: symmetric chest rise; no wheezing  CV: Good distal perfusion; normal heart rate  Extremities: No deformity  Neurologic Exam: Cranial nerves II-XII are grossly intact; no focal deficit      Procedure Note  Procedure performed: Rigid nasal endoscopy  Indication: To evaluate for sinonasal pathology not visualized on routine anterior rhinoscopy  Anesthesia: 4% topical lidocaine with 0.05% oxymetazoline  Description of procedure: A 30 degree, 3 mm rigid endoscope was inserted into bilateral nasal cavities and the nasal valve, nasal cavity, middle meatus, sphenoethmoid recess, and nasopharynx were thoroughly evaluated for evidence of obstruction, edema, purulence, polyps and/or mass/lesion.     Scarlett-Stu Endoscopic Scoring System  Endoscopic observation Right Left   Polyps in middle meatus (0 = absent, 1 = restricted to middle meatus, 2 = Beyond middle meatus) 0 0   Discharge (0 = absent, 1 = thin and clear, 2 = thick, purulent) 0 0   Edema (0 = absent, 1 = mild-moderate, 2 = moderate-severe) 0 0   Crusting (0 = absent, 1 = mild-moderate, 2 = moderate-severe) 0 0   Scarring (0= absent, 1 = mild-moderate, 2 = moderate-severe) 0 0   Total 0 0     Findings  RT: MM and SER clear  LT: MM and SER clear    Nasopharynx clear    The patient tolerated the procedure well without complication.     Laboratory Review:  n/a    Imaging Review:  No available outside images for review    Pathology Review:  n/a    Assessment/Medical Decision Making/Plan:  Chronic sinusitis  Atypical facial pain  Postnasal drip  Smell dysfunction    Undergo nasal endoscopy today which reveals a well-healed sinonasal cavity without evidence of ongoing inflammation.  He has some ongoing symptoms which he attributes to recent sinus surgery.  I see no evidence of surgical complication, in fact  she has healed quite well and has no evidence of any sinus disease.  I did try to provide her some reassurance regarding my findings.  She has many ongoing symptoms, including cough postnasal drip, facial pain,.  We discuss possible treatment, including different trials of topical nasal medications versus a trial of PPI.  We also discussed possible referral to facial pain specialist versus a trial of gabapentin for facial pain.  Overall, she did not desire any additional treatment or referrals, she just seem to wish to make sure that there is no evidence of surgical misadventure, which I did reassure her that there was no evidence of any of this.  Her endoscopic exam is reassuring.  Her sense of smell is also normal.    She is to reach out to me with any additional questions.  At this time, she may follow-up with me as needed.      Of note, no outside imaging or reports were available for review, as the patient did not want to sign a LIBAN.        Moncho Rodríguez MD    [facility]  [facility] for Sk[facility] Base and Pituitary Surgery  [facility]  Department of Otolaryngology - Head & Neck Surgery    The above documentation was completed with the aid of voice recognition dictation software, and as a result, unexpected dictation errors may occur. Please don't hesitate to contact me for any clarification needed regarding this note.         Again, thank you for allowing me to participate in the care of your patient.      Sincerely,    Moncho Rodríguez MD

## 2022-03-18 NOTE — NURSING NOTE
"Chief Complaint   Patient presents with     Consult     acute sinusitis with symptoms for > 10 days    Blood pressure (!) 148/100, pulse 96, temperature 98.6  F (37  C), temperature source Temporal, height 1.562 m (5' 1.5\"), weight 47.6 kg (105 lb), last menstrual period 09/22/2010, SpO2 94 %, not currently breastfeeding. Sofiya Camacho, EMT  "

## 2022-03-18 NOTE — PATIENT INSTRUCTIONS
"1. You were seen in the clinic today by Dr. Rodríguez. If you have any questions or concerns after your appointment, please call the clinic at [phone]. Press \"1\" for scheduling, press \"3\" for nurse advice.    2.   The following has been recommended for you based upon your appointment today:   -Your smell identification test score was 36/40 indicating that you have normosmia.    3.   Please contact us should you decide you would like to trial a proton pump inhibitor or a referral to out facial pain clinic.    4.   If symptoms worsen or fail to improve, please return to the ENT clinic.         Chelsey Elise RNCC  [facility]  Department of Otolaryngology  [phone]    "

## 2022-03-21 NOTE — PROGRESS NOTES
[facility]                   New Patient Visit      Encounter date: 2022    Referring Provider:   Ishmael Bridges MD  [address] [facility] [address]  [place] [address]    Chief Complaint: chronic sinusitis    History of Present Illness: Mimi Rivera she is a pleasant 66-year-old woman who comes to see me for chronic sinusitis.  She complains of ongoing symptoms of frontal pressure, postnasal drainage, altered nasal resonance, and functional smell.  She underwent an office sinus surgery, septoplasty approximately 1 year ago at an outside ENT clinic.  Since then, she complains of ongoing symptoms as listed above.  Is improving persistent despite use of usual topical irrigations and nasal steroids.  An UPSIT is performed today and she scored in the normosmia range.    UPSIT - 36/40    Review of systems: A 14-point review of systems has been conducted and was negative for any notable symptoms, except as dictated in the history of present illness.     Past Medical History:   Diagnosis Date     ASCUS on Pap smear     unable to run HPV typing, f/u paps NIL     Chronic rhinosinusitis      COPD (chronic obstructive pulmonary disease) (H)      Depression, anxiety      Herpes     occasional outbreak     Hypertension         Past Surgical History:   Procedure Laterality Date      SECTION      x 3        Family History   Problem Relation Age of Onset     Hypertension Mother      Cancer Mother         liver     C.A.D. Father         Social History     Socioeconomic History     Marital status:      Spouse name: Not on file     Number of children: Not on file     Years of education: Not on file     Highest education level: Not on file   Occupational History     Not on file   Tobacco Use     Smoking status: Former Smoker     Packs/day: 10.00     Types: Cigarettes     Quit date: 3/19/2015     Years since quittin.0     Smokeless tobacco: Former User     Quit date:  "12/11/2017   Vaping Use     Vaping Use: Never used   Substance and Sexual Activity     Alcohol use: Yes     Alcohol/week: 2.0 - 3.0 standard drinks     Types: 2 - 3 Standard drinks or equivalent per week     Comment: 2 drinks per wk avg     Drug use: No     Sexual activity: Yes     Partners: Male     Birth control/protection: Surgical     Comment: tubal   Other Topics Concern     Parent/sibling w/ CABG, MI or angioplasty before 65F 55M? No   Social History Narrative     Not on file     Social Determinants of Health     Financial Resource Strain: Not on file   Food Insecurity: Not on file   Transportation Needs: Not on file   Physical Activity: Not on file   Stress: Not on file   Social Connections: Not on file   Intimate Partner Violence: Not on file   Housing Stability: Not on file        Physical Exam:  Vital signs: BP (!) 148/100 (BP Location: Left arm, Patient Position: Sitting, Cuff Size: Adult Regular)   Pulse 96   Temp 98.6  F (37  C) (Temporal)   Ht 1.562 m (5' 1.5\")   Wt 47.6 kg (105 lb)   LMP 09/22/2010   SpO2 94%   BMI 19.52 kg/m     General Appearance: No acute distress, appropriate demeanor, conversant  Eyes: moist conjunctivae; EOMI; pupils symmetric; visual acuity grossly intact; no proptosis  Head: normocephalic; overall symmetric appearance without deformity  Face: overall symmetric without deformity; HB I/VI  Ears: Normal appearance of external ear; external meatus normal in appearance; TMs intact without perforation bilaterally;   Nose: No external deformity; septum midline; inferior turbinates without significant hypertrophy  Oral Cavity/oropharynx: Normal appearance of mucosa; tongue midline; no mass or lesions; oropharynx without obvious mucosal abnormality  Neck: no palpable lymphadenopathy; thyroid without palpable nodules  Lungs: symmetric chest rise; no wheezing  CV: Good distal perfusion; normal heart rate  Extremities: No deformity  Neurologic Exam: Cranial nerves II-XII are " grossly intact; no focal deficit      Procedure Note  Procedure performed: Rigid nasal endoscopy  Indication: To evaluate for sinonasal pathology not visualized on routine anterior rhinoscopy  Anesthesia: 4% topical lidocaine with 0.05% oxymetazoline  Description of procedure: A 30 degree, 3 mm rigid endoscope was inserted into bilateral nasal cavities and the nasal valve, nasal cavity, middle meatus, sphenoethmoid recess, and nasopharynx were thoroughly evaluated for evidence of obstruction, edema, purulence, polyps and/or mass/lesion.     Scarlett-Stu Endoscopic Scoring System  Endoscopic observation Right Left   Polyps in middle meatus (0 = absent, 1 = restricted to middle meatus, 2 = Beyond middle meatus) 0 0   Discharge (0 = absent, 1 = thin and clear, 2 = thick, purulent) 0 0   Edema (0 = absent, 1 = mild-moderate, 2 = moderate-severe) 0 0   Crusting (0 = absent, 1 = mild-moderate, 2 = moderate-severe) 0 0   Scarring (0= absent, 1 = mild-moderate, 2 = moderate-severe) 0 0   Total 0 0     Findings  RT: MM and SER clear  LT: MM and SER clear    Nasopharynx clear    The patient tolerated the procedure well without complication.     Laboratory Review:  n/a    Imaging Review:  No available outside images for review    Pathology Review:  n/a    Assessment/Medical Decision Making/Plan:  Chronic sinusitis  Atypical facial pain  Postnasal drip  Smell dysfunction    Undergo nasal endoscopy today which reveals a well-healed sinonasal cavity without evidence of ongoing inflammation.  He has some ongoing symptoms which he attributes to recent sinus surgery.  I see no evidence of surgical complication, in fact she has healed quite well and has no evidence of any sinus disease.  I did try to provide her some reassurance regarding my findings.  She has many ongoing symptoms, including cough postnasal drip, facial pain,.  We discuss possible treatment, including different trials of topical nasal medications versus a trial of  PPI.  We also discussed possible referral to facial pain specialist versus a trial of gabapentin for facial pain.  Overall, she did not desire any additional treatment or referrals, she just seem to wish to make sure that there is no evidence of surgical misadventure, which I did reassure her that there was no evidence of any of this.  Her endoscopic exam is reassuring.  Her sense of smell is also normal.    She is to reach out to me with any additional questions.  At this time, she may follow-up with me as needed.      Of note, no outside imaging or reports were available for review, as the patient did not want to sign a LIBAN.          Moncho Rodríguez MD    [facility]  Center for Skull Base and Pituitary Surgery  [facility]  Department of Otolaryngology - Head & Neck Surgery    The above documentation was completed with the aid of voice recognition dictation software, and as a result, unexpected dictation errors may occur. Please don't hesitate to contact me for any clarification needed regarding this note.

## 2022-03-29 ENCOUNTER — OFFICE VISIT (OUTPATIENT)
Dept: FAMILY MEDICINE | Facility: CLINIC | Age: 67
End: 2022-03-29
Payer: COMMERCIAL

## 2022-03-29 ENCOUNTER — ANCILLARY PROCEDURE (OUTPATIENT)
Dept: GENERAL RADIOLOGY | Facility: CLINIC | Age: 67
End: 2022-03-29
Attending: NURSE PRACTITIONER
Payer: COMMERCIAL

## 2022-03-29 VITALS
DIASTOLIC BLOOD PRESSURE: 90 MMHG | HEIGHT: 62 IN | BODY MASS INDEX: 20.24 KG/M2 | OXYGEN SATURATION: 97 % | RESPIRATION RATE: 18 BRPM | TEMPERATURE: 97.1 F | SYSTOLIC BLOOD PRESSURE: 140 MMHG | WEIGHT: 110 LBS | HEART RATE: 91 BPM

## 2022-03-29 DIAGNOSIS — R05.9 COUGH: ICD-10-CM

## 2022-03-29 DIAGNOSIS — J44.1 COPD EXACERBATION (H): Primary | ICD-10-CM

## 2022-03-29 LAB
ERYTHROCYTE [DISTWIDTH] IN BLOOD BY AUTOMATED COUNT: 11.9 % (ref 10–15)
HCT VFR BLD AUTO: 45.6 % (ref 35–47)
HGB BLD-MCNC: 15.6 G/DL (ref 11.7–15.7)
MCH RBC QN AUTO: 32 PG (ref 26.5–33)
MCHC RBC AUTO-ENTMCNC: 34.2 G/DL (ref 31.5–36.5)
MCV RBC AUTO: 94 FL (ref 78–100)
PLATELET # BLD AUTO: 267 10E3/UL (ref 150–450)
RBC # BLD AUTO: 4.87 10E6/UL (ref 3.8–5.2)
WBC # BLD AUTO: 7.1 10E3/UL (ref 4–11)

## 2022-03-29 PROCEDURE — 71046 X-RAY EXAM CHEST 2 VIEWS: CPT | Mod: FY | Performed by: RADIOLOGY

## 2022-03-29 PROCEDURE — 99214 OFFICE O/P EST MOD 30 MIN: CPT | Performed by: NURSE PRACTITIONER

## 2022-03-29 PROCEDURE — 85027 COMPLETE CBC AUTOMATED: CPT | Performed by: NURSE PRACTITIONER

## 2022-03-29 PROCEDURE — 36415 COLL VENOUS BLD VENIPUNCTURE: CPT | Performed by: NURSE PRACTITIONER

## 2022-03-29 RX ORDER — PREDNISONE 20 MG/1
40 TABLET ORAL DAILY
Qty: 10 TABLET | Refills: 0 | Status: SHIPPED | OUTPATIENT
Start: 2022-03-29 | End: 2022-04-03

## 2022-03-29 ASSESSMENT — ANXIETY QUESTIONNAIRES
5. BEING SO RESTLESS THAT IT IS HARD TO SIT STILL: SEVERAL DAYS
3. WORRYING TOO MUCH ABOUT DIFFERENT THINGS: NOT AT ALL
2. NOT BEING ABLE TO STOP OR CONTROL WORRYING: NOT AT ALL
6. BECOMING EASILY ANNOYED OR IRRITABLE: SEVERAL DAYS
7. FEELING AFRAID AS IF SOMETHING AWFUL MIGHT HAPPEN: NOT AT ALL
GAD7 TOTAL SCORE: 2
GAD7 TOTAL SCORE: 2
4. TROUBLE RELAXING: NOT AT ALL
GAD7 TOTAL SCORE: 2
1. FEELING NERVOUS, ANXIOUS, OR ON EDGE: NOT AT ALL
7. FEELING AFRAID AS IF SOMETHING AWFUL MIGHT HAPPEN: NOT AT ALL

## 2022-03-29 NOTE — PROGRESS NOTES
Assessment & Plan   Problem List Items Addressed This Visit    None     Visit Diagnoses     COPD exacerbation (H)    -  Primary    Cough        Relevant Orders    XR Chest 2 Views (Completed)    CBC with platelets (Completed)           Review of the result(s) of each unique test - lab  Independent interpretation of a test performed by another physician/other qualified health care professional (not separately reported) - xray  Ordering of each unique test      No follow-ups on file.    Anushka Lau CNP  [facility] COURTNEY Le is a 66 year old who presents for the following health issues      History of Present Illness       COPD:  She presents for follow up of COPD.  Overall, COPD symptoms are slightly worse since last visit. She has more than usual fatigue or shortness of breath with exertion and more than usual shortness of breath at rest.  She sometimes coughs and does have change in sputum. Patient has had recent fever. She can walk less than 1 block without stopping to rest. She can walk 2 flights of stairs without resting.The patient has had no ED, urgent care, or hospital admissions because of COPD since the last visit.       Today's PHQ-9         PHQ-9 Total Score: 2  PHQ-9 Q9 Thoughts of better off dead/self-harm past 2 weeks :   Not at all    How difficult have these problems made it for you to do your work, take care of things at home, or get along with other people: Not difficult at all    Today's DAMIEN-7 Score: 2    She eats 2-3 servings of fruits and vegetables daily.She consumes 1 sweetened beverage(s) daily.She exercises with enough effort to increase her heart rate 20 to 29 minutes per day.  She exercises with enough effort to increase her heart rate 3 or less days per week. She is missing 1 dose(s) of medications per week.      Acute Illness  Acute illness concerns: Cough  Onset/Duration: weeks  Symptoms:  Fever: no but has had 99 temps recently   Chills/Sweats:  YES  Headache (location?): YES  Sinus Pressure: YES  Conjunctivitis:  no  Ear Pain: no  Rhinorrhea: YES  Congestion: YES  Sore Throat: no  Cough: YES-productive of yellow sputum  Wheeze: YES- at night   Decreased Appetite: YES  Nausea: no  Vomiting: no  Diarrhea: no  Dysuria/Freq.: no  Dysuria or Hematuria: no  Fatigue/Achiness: YES  Sick/Strep Exposure: no  Therapies tried and outcome: Afrin nasal spray       Feels chilled and achy. Wheezing more at night. Fatigue is increased and sputum is increased from usual.     Hypertension Follow-up      Do you check your blood pressure regularly outside of the clinic? No     Are you following a low salt diet? Yes    Are your blood pressures ever more than 140 on the top number (systolic) OR more   than 90 on the bottom number (diastolic), for example 140/90? No   refill needed no side effects        Review of Systems   Constitutional, HEENT, cardiovascular, pulmonary, GI, , musculoskeletal, neuro, skin, endocrine and psych systems are negative, except as otherwise noted.      Objective    LMP 09/22/2010   There is no height or weight on file to calculate BMI.  Physical Exam   GENERAL: healthy, alert and no distress  NECK: bilateral anterior cervical adenopathy, no asymmetry, masses, or scars and thyroid normal to palpation  RESP: decreased breath sounds bibasilar  CV: regular rate and rhythm, normal S1 S2, no S3 or S4, no murmur, click or rub, no peripheral edema and peripheral pulses strong  ABDOMEN: soft, nontender, no hepatosplenomegaly, no masses and bowel sounds normal  MS: no gross musculoskeletal defects noted, no edema    CXR - Reviewed and interpreted by me Normal- no infiltrates, effusions, pneumothoraces, cardiomegaly or masses          BEBETO Anderson     [address] [address]  [email or web address]   Office: [phone]

## 2022-03-30 ASSESSMENT — ANXIETY QUESTIONNAIRES: GAD7 TOTAL SCORE: 2

## 2022-04-14 DIAGNOSIS — J20.9 ACUTE EXACERBATION OF CHRONIC BRONCHITIS (H): ICD-10-CM

## 2022-04-14 DIAGNOSIS — J44.1 COPD EXACERBATION (H): ICD-10-CM

## 2022-04-14 DIAGNOSIS — J42 CHRONIC BRONCHITIS, UNSPECIFIED CHRONIC BRONCHITIS TYPE (H): ICD-10-CM

## 2022-04-14 DIAGNOSIS — J42 ACUTE EXACERBATION OF CHRONIC BRONCHITIS (H): ICD-10-CM

## 2022-04-14 DIAGNOSIS — J44.9 CHRONIC OBSTRUCTIVE PULMONARY DISEASE, UNSPECIFIED COPD TYPE (H): ICD-10-CM

## 2022-04-14 DIAGNOSIS — J43.1 PANLOBULAR EMPHYSEMA (H): ICD-10-CM

## 2022-04-15 RX ORDER — LEVALBUTEROL INHALATION SOLUTION 1.25 MG/3ML
1 SOLUTION RESPIRATORY (INHALATION) EVERY 4 HOURS PRN
Qty: 390 ML | Refills: 1 | Status: SHIPPED | OUTPATIENT
Start: 2022-04-15 | End: 2023-11-15

## 2022-04-15 RX ORDER — ALBUTEROL SULFATE 90 UG/1
AEROSOL, METERED RESPIRATORY (INHALATION)
Qty: 18 G | Refills: 1 | Status: SHIPPED | OUTPATIENT
Start: 2022-04-15 | End: 2023-06-08

## 2022-06-04 DIAGNOSIS — I10 HYPERTENSION GOAL BP (BLOOD PRESSURE) < 140/90: ICD-10-CM

## 2022-06-06 RX ORDER — AMLODIPINE BESYLATE 10 MG/1
10 TABLET ORAL DAILY
Qty: 90 TABLET | Refills: 0 | Status: SHIPPED | OUTPATIENT
Start: 2022-06-06 | End: 2022-09-13

## 2022-06-06 NOTE — TELEPHONE ENCOUNTER
Routing refill request to provider for review/approval because:  Labs out of range:  BP  Labs not current:  Cr    BP Readings from Last 3 Encounters:   03/29/22 (!) 140/90   03/18/22 (!) 148/100   01/28/22 (!) 153/87     Creatinine   Date Value Ref Range Status   04/14/2021 0.60 0.52 - 1.04 mg/dL Final         Will also route to MA-TC to assist with scheduling pt lab only appt.     Eliseo WICK RN

## 2022-06-24 ENCOUNTER — OFFICE VISIT (OUTPATIENT)
Dept: PULMONOLOGY | Facility: CLINIC | Age: 67
End: 2022-06-24
Attending: INTERNAL MEDICINE
Payer: COMMERCIAL

## 2022-06-24 VITALS
BODY MASS INDEX: 20.24 KG/M2 | WEIGHT: 110 LBS | HEIGHT: 62 IN | RESPIRATION RATE: 17 BRPM | HEART RATE: 84 BPM | SYSTOLIC BLOOD PRESSURE: 152 MMHG | DIASTOLIC BLOOD PRESSURE: 89 MMHG | OXYGEN SATURATION: 96 %

## 2022-06-24 DIAGNOSIS — J20.9 ACUTE EXACERBATION OF CHRONIC BRONCHITIS (H): ICD-10-CM

## 2022-06-24 DIAGNOSIS — K21.9 GASTROESOPHAGEAL REFLUX DISEASE, UNSPECIFIED WHETHER ESOPHAGITIS PRESENT: ICD-10-CM

## 2022-06-24 DIAGNOSIS — J42 ACUTE EXACERBATION OF CHRONIC BRONCHITIS (H): ICD-10-CM

## 2022-06-24 DIAGNOSIS — J44.1 COPD EXACERBATION (H): Primary | ICD-10-CM

## 2022-06-24 DIAGNOSIS — J32.9 CHRONIC SINUSITIS, UNSPECIFIED LOCATION: ICD-10-CM

## 2022-06-24 DIAGNOSIS — J43.1 PANLOBULAR EMPHYSEMA (H): ICD-10-CM

## 2022-06-24 DIAGNOSIS — I70.0 AORTIC CALCIFICATION (H): ICD-10-CM

## 2022-06-24 DIAGNOSIS — F32.1 MODERATE MAJOR DEPRESSION (H): ICD-10-CM

## 2022-06-24 PROCEDURE — G0463 HOSPITAL OUTPT CLINIC VISIT: HCPCS

## 2022-06-24 PROCEDURE — 99215 OFFICE O/P EST HI 40 MIN: CPT | Performed by: INTERNAL MEDICINE

## 2022-06-24 RX ORDER — METHYLPREDNISOLONE 4 MG
TABLET, DOSE PACK ORAL
Qty: 21 TABLET | Refills: 1 | Status: SHIPPED | OUTPATIENT
Start: 2022-06-24 | End: 2022-11-11

## 2022-06-24 ASSESSMENT — PAIN SCALES - GENERAL: PAINLEVEL: NO PAIN (0)

## 2022-06-24 NOTE — NURSING NOTE
Chief Complaint   Patient presents with     RECHECK     Return 5 month follow up     Medications reviewed and vital signs taken.   Rao Lamb CMA

## 2022-06-24 NOTE — PROGRESS NOTES
Reason for Visit  Mimi Rivera is a 67 year old year old female who is being seen for RECHECK (Return 5 month follow up )    Pulmonary HPI    The patient was seen and examined by Ishmael Bridges MD     I had the pleasure of seeing Ms. Mimi Rivera today at the [facility] in followup for her severe COPD.    I last saw Mimi on 01/28/2022 at which time she had generally been well controlled since the 2/21 visit.  She had had 1 flare-up of her COPD during that interval, treated with antibiotics and steroids.  At that visit, she was having somewhat more shortness of breath but particularly with morning cough, wheezing at night and in the early morning and more need to clear yellow drainage in the morning.  She otherwise was generally doing well.  She was having occasional night sweats along with her sinus symptoms.  Her sinus symptoms were significantly impairing her quality of life, she felt.  Her symptoms were more from her sinuses than from her lungs and I felt that she had increased postnasal drip worsening her airway function and worsening her ANSARI.  This had been going on for approximately 3-4 weeks with increased sinus congestion, drainage, morning cough and wheezing plus some night sweats.  Her lung exam remained generally unremarkable without any wheezing or crackles but some prolonged expiratory phase.  I prescribed 14 days of Levaquin and to continue her sinus rinse and other treatments.  We also recommended starting Zyrtec.  Today, she told me that she developed some calf/Achilles symptoms and stopped the Levaquin after only several days.    She saw her primary provider, Anushka Lau CNP, 03/29/2022 at which time she said her COPD symptoms were somewhat worse with more-than-usual fatigue, some cough and some recent fever.  She was able to walk less than 1 block without stopping but was able to walk up 2 flights of stairs.  A chest x-ray was  "done that was normal and her lung exam was notable for decreased breath sounds bibasilarly but no other interventions apparently were done at that time.    On returning to clinic today, Ms. Rivera says she has continued to have sinus symptoms since she was last seen and told me that she had not completed the antibiotics.  She continues on Trelegy once daily.  She has albuterol rescue inhaler which she occasionally uses but says it does not help.  She also says that nebulized levalbuterol does not help her.  She says she is able to walk a half mile and climb 1-2 flights of stairs, less than previously.  Her main complaint is that she is waking up at night for somewhat unclear reasons but then feels a swelling sensation in her epigastrium and occasionally some tightness around laterally in the mid chest.  She gets somewhat panicked by this and is short of breath with the panic but it is not clear that she is waking up due to shortness of breath.  She also has noted an increased cough that yields thick sputum \"vanilla pudding.\" On one occasion only, there has been slight blood streak but this has not recurred.  She is tired during the day due to decreased sleeping but has had only a mild increase in her daytime dyspnea on exertion.    Her last PFTs were done in 05/2019 at which point the FEV1 and FVC = 1.2 and 2.05 (53% and 72% predicted) for a ratio of 59%.    Otherwise, her detailed pulmonary ROS and general medical conditions are essentially unchanged.      Current Outpatient Medications   Medication     amLODIPine (NORVASC) 10 MG tablet     amoxicillin-clavulanate (AUGMENTIN) 875-125 MG tablet     azithromycin (ZITHROMAX) 250 MG tablet     buPROPion (ZYBAN) 150 MG 12 hr tablet     esomeprazole (NEXIUM) 20 MG DR capsule     Fluticasone-Umeclidin-Vilanterol (TRELEGY ELLIPTA) 100-62.5-25 MCG/INH oral inhaler     Glycopyrrolate 25 MCG/ML SOLN     levalbuterol (XOPENEX HFA) 45 MCG/ACT inhaler     levalbuterol (XOPENEX) " 1.25 MG/3ML neb solution     methylPREDNISolone (MEDROL DOSEPAK) 4 MG tablet therapy pack     VENTOLIN  (90 Base) MCG/ACT inhaler     No current facility-administered medications for this visit.     No Known Allergies  Past Medical History:   Diagnosis Date     ASCUS on Pap smear     unable to run HPV typing, f/u paps NIL     Chronic rhinosinusitis      COPD (chronic obstructive pulmonary disease) (H)      Depression, anxiety      Herpes     occasional outbreak     Hypertension        Past Surgical History:   Procedure Laterality Date      SECTION      x 3       Social History     Socioeconomic History     Marital status:      Spouse name: Not on file     Number of children: Not on file     Years of education: Not on file     Highest education level: Not on file   Occupational History     Not on file   Tobacco Use     Smoking status: Former Smoker     Packs/day: 10.00     Types: Cigarettes     Quit date: 3/19/2015     Years since quittin.2     Smokeless tobacco: Former User     Quit date: 2017   Vaping Use     Vaping Use: Never used   Substance and Sexual Activity     Alcohol use: Yes     Alcohol/week: 2.0 - 3.0 standard drinks     Types: 2 - 3 Standard drinks or equivalent per week     Comment: 2 drinks per wk avg     Drug use: No     Sexual activity: Yes     Partners: Male     Birth control/protection: Surgical     Comment: tubal   Other Topics Concern     Parent/sibling w/ CABG, MI or angioplasty before 65F 55M? No   Social History Narrative     Not on file     Social Determinants of Health     Financial Resource Strain: Not on file   Food Insecurity: Not on file   Transportation Needs: Not on file   Physical Activity: Not on file   Stress: Not on file   Social Connections: Not on file   Intimate Partner Violence: Not on file   Housing Stability: Not on file       ROS Pulmonary  A complete ROS was otherwise negative except as noted in the HPI.  BP (!) 152/89   Pulse 84   Resp  "17    1.562 m (5' 1.5\")   Wt 49.9 kg (110 lb)   LMP 09/22/2010   SpO2 96%   BMI 20.45 kg/m    Exam:   GENERAL APPEARANCE: Well developed, well nourished, alert, and in no apparent distress.  EYES: PERRL, EOMI  HENT: Nasal mucosa with no edema and no hyperemia. No nasal polyps. Mild bilateral tenderness on maxillary sinus pressure and frontal sinus percussion  MOUTH: Oral mucosa is moist, without any lesions, no tonsillar enlargement, no oropharyngeal exudate.  NECK: supple, no masses, no thyromegaly.  LYMPHATICS: No significant axillary, cervical, or supraclavicular nodes.  RESP: normal inpsection, palpation, & percussion, good air flow throughout.  No crackles. No rhonchi. No wheezes.  CV: RRR Normal S1, S2, regular rhythm, normal rate. No murmur.  No rub. No gallop. No LE edema.   ABDOMEN:  deferred  MS: extremities normal. No clubbing. No cyanosis.  SKIN: no rash on limited exam  NEURO: Mentation intact, speech normal, normal strength and tone, normal gait and stance  PSYCH: mentation appears normal. and affect normal/bright  Results:  No results found for this or any previous visit (from the past 168 hour(s)).    Assessment and plan:   1.  Severe COPD.  2.  Chronic or acute recurrent sinusitis.  3.  Depression.      Ms. Rivera continues on her chronic triple inhaler therapy for COPD and says the beta agonist additional rescue therapy does not help her.  She also is having a flareup over the last several weeks that is of a subacute nature.  She is concerned about too frequent use of azithromycin.  I will treat her flareup with Augmentin for 10 days and a Medrol Dosepak.  We discussed alternating antibiotics for exacerbations.  She requested that I also give her a prescription for azithromycin should another flare up occur within the next several months and I did so.  In terms of trying to better treat her increased shortness of breath, I would like to add an anticholinergic nebulizer treatment.  The " options are relatively limited and I added glycopyrrolate (Lonhala Magnair) b.i.d., hoping that the insurance copay is not too high.      I had previously recommended she see her [place] ENT physician but she did not do this.  She also did not complete her course of antibiotics last time.  I recommended that she take 3 days of Afrin, but not more, to increase drainage and that she continue her other treatments but definitely see the ENT physician.    Depression:  This seems to be under somewhat better control on her current medications.  She has contact information for our nursing staff and I will plan to see her back in clinic in late September with PFTs at that time.      I spent a total of 50 minutes dedicated to her care today, 6/24/2022, including time reviewing her chart, reviewing her 2019 PFTs with the patient personally and in documentation.    Ishmael Bridges MD

## 2022-06-24 NOTE — LETTER
6/24/2022         RE: Mimi Rivera  [address] [place] [address]        Dear Colleague,    Thank you for referring your patient, Mimi Rivera, to the [facility] [place]. Please see a copy of my visit note below.    Reason for Visit  Mimi Rivera is a 67 year old year old female who is being seen for RECHECK (Return 5 month follow up )    Pulmonary HPI    The patient was seen and examined by Ishmael Bridges MD     I had the pleasure of seeing Ms. Mimi Rivera today at the [facility] in followup for her severe COPD.    I last saw Mimi on 01/28/2022 at which time she had generally been well controlled since the 2/21 visit.  She had had 1 flare-up of her COPD during that interval, treated with antibiotics and steroids.  At that visit, she was having somewhat more shortness of breath but particularly with morning cough, wheezing at night and in the early morning and more need to clear yellow drainage in the morning.  She otherwise was generally doing well.  She was having occasional night sweats along with her sinus symptoms.  Her sinus symptoms were significantly impairing her quality of life, she felt.  Her symptoms were more from her sinuses than from her lungs and I felt that she had increased postnasal drip worsening her airway function and worsening her ANSARI.  This had been going on for approximately 3-4 weeks with increased sinus congestion, drainage, morning cough and wheezing plus some night sweats.  Her lung exam remained generally unremarkable without any wheezing or crackles but some prolonged expiratory phase.  I prescribed 14 days of Levaquin and to continue her sinus rinse and other treatments.  We also recommended starting Zyrtec.  Today, she told me that she developed some calf/Achilles symptoms and stopped the Levaquin after only several days.    She saw  "her primary provider, Anushka Lau CNP, 03/29/2022 at which time she said her COPD symptoms were somewhat worse with more-than-usual fatigue, some cough and some recent fever.  She was able to walk less than 1 block without stopping but was able to walk up 2 flights of stairs.  A chest x-ray was done that was normal and her lung exam was notable for decreased breath sounds bibasilarly but no other interventions apparently were done at that time.    On returning to clinic today, Ms. Rivera says she has continued to have sinus symptoms since she was last seen and told me that she had not completed the antibiotics.  She continues on Trelegy once daily.  She has albuterol rescue inhaler which she occasionally uses but says it does not help.  She also says that nebulized levalbuterol does not help her.  She says she is able to walk a half mile and climb 1-2 flights of stairs, less than previously.  Her main complaint is that she is waking up at night for somewhat unclear reasons but then feels a swelling sensation in her epigastrium and occasionally some tightness around laterally in the mid chest.  She gets somewhat panicked by this and is short of breath with the panic but it is not clear that she is waking up due to shortness of breath.  She also has noted an increased cough that yields thick sputum \"vanilla pudding.\" On one occasion only, there has been slight blood streak but this has not recurred.  She is tired during the day due to decreased sleeping but has had only a mild increase in her daytime dyspnea on exertion.    Her last PFTs were done in 05/2019 at which point the FEV1 and FVC = 1.2 and 2.05 (53% and 72% predicted) for a ratio of 59%.    Otherwise, her detailed pulmonary ROS and general medical conditions are essentially unchanged.      Current Outpatient Medications   Medication     amLODIPine (NORVASC) 10 MG tablet     amoxicillin-clavulanate (AUGMENTIN) 875-125 MG tablet     azithromycin (ZITHROMAX) 250 " MG tablet     buPROPion (ZYBAN) 150 MG 12 hr tablet     esomeprazole (NEXIUM) 20 MG DR capsule     Fluticasone-Umeclidin-Vilanterol (TRELEGY ELLIPTA) 100-62.5-25 MCG/INH oral inhaler     Glycopyrrolate 25 MCG/ML SOLN     levalbuterol (XOPENEX HFA) 45 MCG/ACT inhaler     levalbuterol (XOPENEX) 1.25 MG/3ML neb solution     methylPREDNISolone (MEDROL DOSEPAK) 4 MG tablet therapy pack     VENTOLIN  (90 Base) MCG/ACT inhaler     No current facility-administered medications for this visit.     No Known Allergies  Past Medical History:   Diagnosis Date     ASCUS on Pap smear     unable to run HPV typing, f/u paps NIL     Chronic rhinosinusitis      COPD (chronic obstructive pulmonary disease) (H)      Depression, anxiety      Herpes     occasional outbreak     Hypertension        Past Surgical History:   Procedure Laterality Date      SECTION      x 3       Social History     Socioeconomic History     Marital status:      Spouse name: Not on file     Number of children: Not on file     Years of education: Not on file     Highest education level: Not on file   Occupational History     Not on file   Tobacco Use     Smoking status: Former Smoker     Packs/day: 10.00     Types: Cigarettes     Quit date: 3/19/2015     Years since quittin.2     Smokeless tobacco: Former User     Quit date: 2017   Vaping Use     Vaping Use: Never used   Substance and Sexual Activity     Alcohol use: Yes     Alcohol/week: 2.0 - 3.0 standard drinks     Types: 2 - 3 Standard drinks or equivalent per week     Comment: 2 drinks per wk avg     Drug use: No     Sexual activity: Yes     Partners: Male     Birth control/protection: Surgical     Comment: tubal   Other Topics Concern     Parent/sibling w/ CABG, MI or angioplasty before 65F 55M? No   Social History Narrative     Not on file     Social Determinants of Health     Financial Resource Strain: Not on file   Food Insecurity: Not on file   Transportation Needs:  "Not on file   Physical Activity: Not on file   Stress: Not on file   Social Connections: Not on file   Intimate Partner Violence: Not on file   Housing Stability: Not on file       ROS Pulmonary  A complete ROS was otherwise negative except as noted in the HPI.  BP (!) 152/89   Pulse 84   Resp 17   Ht 1.562 m (5' 1.5\")   Wt 49.9 kg (110 lb)   LMP 09/22/2010   SpO2 96%   BMI 20.45 kg/m    Exam:   GENERAL APPEARANCE: Well developed, well nourished, alert, and in no apparent distress.  EYES: PERRL, EOMI  HENT: Nasal mucosa with no edema and no hyperemia. No nasal polyps. Mild bilateral tenderness on maxillary sinus pressure and frontal sinus percussion  MOUTH: Oral mucosa is moist, without any lesions, no tonsillar enlargement, no oropharyngeal exudate.  NECK: supple, no masses, no thyromegaly.  LYMPHATICS: No significant axillary, cervical, or supraclavicular nodes.  RESP: normal inpsection, palpation, & percussion, good air flow throughout.  No crackles. No rhonchi. No wheezes.  CV: RRR Normal S1, S2, regular rhythm, normal rate. No murmur.  No rub. No gallop. No LE edema.   ABDOMEN:  deferred  MS: extremities normal. No clubbing. No cyanosis.  SKIN: no rash on limited exam  NEURO: Mentation intact, speech normal, normal strength and tone, normal gait and stance  PSYCH: mentation appears normal. and affect normal/bright  Results:  No results found for this or any previous visit (from the past 168 hour(s)).    Assessment and plan:   1.  Severe COPD.  2.  Chronic or acute recurrent sinusitis.  3.  Depression.      Ms. Rivera continues on her chronic triple inhaler therapy for COPD and says the beta agonist additional rescue therapy does not help her.  She also is having a flareup over the last several weeks that is of a subacute nature.  She is concerned about too frequent use of azithromycin.  I will treat her flareup with Augmentin for 10 days and a Medrol Dosepak.  We discussed alternating antibiotics for " exacerbations.  She requested that I also give her a prescription for azithromycin should another flare up occur within the next several months and I did so.  In terms of trying to better treat her increased shortness of breath, I would like to add an anticholinergic nebulizer treatment.  The options are relatively limited and I added glycopyrrolate (Lonhala Magnair) b.i.d., hoping that the insurance copay is not too high.      I had previously recommended she see her [place] [facility] physician but she did not do this.  She also did not complete her course of antibiotics last time.  I recommended that she take 3 days of Afrin, but not more, to increase drainage and that she continue her other treatments but definitely see the ENT physician.    Depression:  This seems to be under somewhat better control on her current medications.  She has contact information for our nursing staff and I will plan to see her back in clinic in late September with PFTs at that time.      I spent a total of 50 minutes dedicated to her care today, 6/24/2022, including time reviewing her chart, reviewing her 2019 PFTs with the patient personally and in documentation.    Ishmael Bridges MD

## 2022-06-24 NOTE — PATIENT INSTRUCTIONS
"Severe COPD  Chronic Sinusitis    Recent worsening of cough - tammy at night - and shortness of breath.  Not helped by rescue inhaler or nebulized levalbuterol    Plan:  1)  COPD 'Flare-Up\" -  Augmentin x 10 days  Medrol Dose Jesus    2)  Chronic Sinusitis  See ENT  3 days of Afrin (but not more) - to increase draining  Continue sinus rinsing, nasal steroid and antihistamine    Return to clinic late Sept with PFTs  "

## 2022-07-31 ENCOUNTER — HEALTH MAINTENANCE LETTER (OUTPATIENT)
Age: 67
End: 2022-07-31

## 2022-08-01 ENCOUNTER — TELEPHONE (OUTPATIENT)
Dept: FAMILY MEDICINE | Facility: CLINIC | Age: 67
End: 2022-08-01

## 2022-08-01 NOTE — TELEPHONE ENCOUNTER
Patient Quality Outreach    Patient is due for the following:   Colon Cancer Screening -  Cologuard    NEXT STEPS:   No follow up needed at this time.    Type of outreach:    Chart review performed, no outreach needed.      Questions for provider review:    None     Shefali Murray MA

## 2022-08-16 DIAGNOSIS — J42 CHRONIC BRONCHITIS, UNSPECIFIED CHRONIC BRONCHITIS TYPE (H): ICD-10-CM

## 2022-08-17 ENCOUNTER — TELEPHONE (OUTPATIENT)
Dept: PULMONOLOGY | Facility: CLINIC | Age: 67
End: 2022-08-17

## 2022-08-17 RX ORDER — AZITHROMYCIN 250 MG/1
TABLET, FILM COATED ORAL
Qty: 30 TABLET | Refills: 0 | Status: SHIPPED | OUTPATIENT
Start: 2022-08-17 | End: 2022-11-14

## 2022-08-17 NOTE — TELEPHONE ENCOUNTER
Left message with patient as she has requested Azithromycin through her pharmacy for flare. Requested that she contact clinic to discuss any symptoms.

## 2022-09-13 ENCOUNTER — OFFICE VISIT (OUTPATIENT)
Dept: FAMILY MEDICINE | Facility: CLINIC | Age: 67
End: 2022-09-13
Payer: COMMERCIAL

## 2022-09-13 VITALS
HEART RATE: 77 BPM | TEMPERATURE: 97.7 F | WEIGHT: 108 LBS | OXYGEN SATURATION: 96 % | HEIGHT: 62 IN | DIASTOLIC BLOOD PRESSURE: 84 MMHG | RESPIRATION RATE: 12 BRPM | SYSTOLIC BLOOD PRESSURE: 162 MMHG | BODY MASS INDEX: 19.88 KG/M2

## 2022-09-13 DIAGNOSIS — T63.441A BEE STING REACTION, ACCIDENTAL OR UNINTENTIONAL, INITIAL ENCOUNTER: Primary | ICD-10-CM

## 2022-09-13 DIAGNOSIS — I10 HYPERTENSION GOAL BP (BLOOD PRESSURE) < 140/90: ICD-10-CM

## 2022-09-13 LAB
BASOPHILS # BLD AUTO: 0 10E3/UL (ref 0–0.2)
BASOPHILS NFR BLD AUTO: 0 %
EOSINOPHIL # BLD AUTO: 0.3 10E3/UL (ref 0–0.7)
EOSINOPHIL NFR BLD AUTO: 3 %
ERYTHROCYTE [DISTWIDTH] IN BLOOD BY AUTOMATED COUNT: 12.5 % (ref 10–15)
HCT VFR BLD AUTO: 46.4 % (ref 35–47)
HGB BLD-MCNC: 16.5 G/DL (ref 11.7–15.7)
LYMPHOCYTES # BLD AUTO: 1.8 10E3/UL (ref 0.8–5.3)
LYMPHOCYTES NFR BLD AUTO: 19 %
MCH RBC QN AUTO: 33.1 PG (ref 26.5–33)
MCHC RBC AUTO-ENTMCNC: 35.6 G/DL (ref 31.5–36.5)
MCV RBC AUTO: 93 FL (ref 78–100)
MONOCYTES # BLD AUTO: 0.8 10E3/UL (ref 0–1.3)
MONOCYTES NFR BLD AUTO: 8 %
NEUTROPHILS # BLD AUTO: 6.7 10E3/UL (ref 1.6–8.3)
NEUTROPHILS NFR BLD AUTO: 70 %
PLATELET # BLD AUTO: 281 10E3/UL (ref 150–450)
RBC # BLD AUTO: 4.98 10E6/UL (ref 3.8–5.2)
WBC # BLD AUTO: 9.6 10E3/UL (ref 4–11)

## 2022-09-13 PROCEDURE — 36415 COLL VENOUS BLD VENIPUNCTURE: CPT | Performed by: FAMILY MEDICINE

## 2022-09-13 PROCEDURE — 99214 OFFICE O/P EST MOD 30 MIN: CPT | Mod: 25 | Performed by: FAMILY MEDICINE

## 2022-09-13 PROCEDURE — 85025 COMPLETE CBC W/AUTO DIFF WBC: CPT | Performed by: FAMILY MEDICINE

## 2022-09-13 PROCEDURE — 80048 BASIC METABOLIC PNL TOTAL CA: CPT | Performed by: FAMILY MEDICINE

## 2022-09-13 PROCEDURE — 96372 THER/PROPH/DIAG INJ SC/IM: CPT | Performed by: FAMILY MEDICINE

## 2022-09-13 RX ORDER — DOXYCYCLINE 100 MG/1
100 CAPSULE ORAL 2 TIMES DAILY
Qty: 12 CAPSULE | Refills: 0 | Status: SHIPPED | OUTPATIENT
Start: 2022-09-14 | End: 2022-09-20

## 2022-09-13 RX ORDER — EPINEPHRINE 0.3 MG/.3ML
0.3 INJECTION SUBCUTANEOUS PRN
Qty: 2 EACH | Refills: 0 | Status: SHIPPED | OUTPATIENT
Start: 2022-09-13

## 2022-09-13 RX ORDER — CEFTRIAXONE SODIUM 1 G
1 VIAL (EA) INJECTION ONCE
Status: COMPLETED | OUTPATIENT
Start: 2022-09-13 | End: 2022-09-13

## 2022-09-13 RX ORDER — AMLODIPINE BESYLATE 10 MG/1
10 TABLET ORAL DAILY
Qty: 90 TABLET | Refills: 3 | Status: SHIPPED | OUTPATIENT
Start: 2022-09-13 | End: 2023-11-17

## 2022-09-13 RX ORDER — CETIRIZINE HYDROCHLORIDE 10 MG/1
10 TABLET ORAL DAILY
Qty: 7 TABLET | Refills: 0 | Status: SHIPPED | OUTPATIENT
Start: 2022-09-13 | End: 2024-03-29

## 2022-09-13 RX ADMIN — Medication 1 G: at 15:56

## 2022-09-13 ASSESSMENT — PATIENT HEALTH QUESTIONNAIRE - PHQ9
SUM OF ALL RESPONSES TO PHQ QUESTIONS 1-9: 0
SUM OF ALL RESPONSES TO PHQ QUESTIONS 1-9: 0
10. IF YOU CHECKED OFF ANY PROBLEMS, HOW DIFFICULT HAVE THESE PROBLEMS MADE IT FOR YOU TO DO YOUR WORK, TAKE CARE OF THINGS AT HOME, OR GET ALONG WITH OTHER PEOPLE: NOT DIFFICULT AT ALL

## 2022-09-13 NOTE — PROGRESS NOTES
Assessment & Plan     Bee sting reaction, accidental or unintentional, initial encounter  Patient has quite a pronounced reaction with suspected cellulitis +/- allergic reaction, she does have a longstanding history of antibiotic use for COPD flares.  Rocephin was given in clinic, she is to start oral doxycycline tomorrow.  Also start Zyrtec as she is suspected to have a delayed reaction from her bee sting, EpiPen given for safety and advised to be given if she is noticing any lip/tongue enlargement or difficulty breathing.  She currently does not have features of angioedema or anaphylaxis.  - CBC with platelets and differential  - Basic metabolic panel  (Ca, Cl, CO2, Creat, Gluc, K, Na, BUN)  - cefTRIAXone (ROCEPHIN) injection 1 g  - doxycycline hyclate (VIBRAMYCIN) 100 MG capsule  Dispense: 12 capsule; Refill: 0  - cetirizine (ZYRTEC) 10 MG tablet  Dispense: 7 tablet; Refill: 0  - EPINEPHrine (ANY BX GENERIC EQUIV) 0.3 MG/0.3ML injection 2-pack  Dispense: 2 each; Refill: 0  - CBC with platelets and differential  - Basic metabolic panel  (Ca, Cl, CO2, Creat, Gluc, K, Na, BUN)    Hypertension goal BP (blood pressure) < 140/90  Uncontrolled, recommend follow-up with PCP.  - amLODIPine (NORVASC) 10 MG tablet  Dispense: 90 tablet; Refill: 3          Return in about 3 days (around 9/16/2022) for If symptoms do not improve or gets worse..    Félxi Cortes MD  [facility]    Jonnie Le is a 67 year old, presenting for the following health issues:  Insect Bites (Bee sting right hand. Red and warm.)      History of Present Illness       Reason for visit:  Bee sting  Symptom onset:  1-3 days ago  Symptom intensity:  Moderate  Symptom progression:  Worsening  Had these symptoms before:  Yes  Has tried/received treatment for these symptoms:  No  What makes it worse:  No  What makes it better:  Not really    She eats 2-3 servings of fruits and vegetables daily.She consumes 1 sweetened  "beverage(s) daily.She exercises with enough effort to increase her heart rate 20 to 29 minutes per day.  She exercises with enough effort to increase her heart rate 4 days per week.   She is taking medications regularly.    Today's PHQ-9         PHQ-9 Total Score: 0    PHQ-9 Q9 Thoughts of better off dead/self-harm past 2 weeks :   Not at all    How difficult have these problems made it for you to do your work, take care of things at home, or get along with other people: Not difficult at all           Review of Systems         Objective    BP (!) 162/84 (Cuff Size: Adult Regular)   Pulse 77   Temp 97.7  F (36.5  C) (Oral)   Resp 12   Ht 1.562 m (5' 1.5\")   Wt 49 kg (108 lb)   LMP 09/22/2010   SpO2 96%   BMI 20.08 kg/m    Body mass index is 20.08 kg/m .  Physical Exam  Vitals reviewed.   Constitutional:       General: She is not in acute distress.  Cardiovascular:      Rate and Rhythm: Normal rate.   Pulmonary:      Effort: No respiratory distress.   Musculoskeletal:      Comments: No reported pain with range of motion of the right wrist and hand   Skin:     Capillary Refill: Symmetric radial pulses with brisk capillary refill     Comments: Right hand and forearm, erythematous edematous, increased warmth, tender to palpation.  Blanchable erythema.                                    "

## 2022-09-13 NOTE — NURSING NOTE
Clinic Administered Medication Documentation    Administrations This Visit     cefTRIAXone (ROCEPHIN) injection 1 g     Admin Date  09/13/2022 Action  Given Dose  1 g Route  Intramuscular Site  Right Gluteus Surjit Administered By  María Connolly CMA    Ordering Provider: Félix Cortes MD    Patient Supplied?: No

## 2022-09-14 LAB
ANION GAP SERPL CALCULATED.3IONS-SCNC: 4 MMOL/L (ref 3–14)
BUN SERPL-MCNC: 12 MG/DL (ref 7–30)
CALCIUM SERPL-MCNC: 9.8 MG/DL (ref 8.5–10.1)
CHLORIDE BLD-SCNC: 108 MMOL/L (ref 94–109)
CO2 SERPL-SCNC: 26 MMOL/L (ref 20–32)
CREAT SERPL-MCNC: 0.45 MG/DL (ref 0.52–1.04)
GFR SERPL CREATININE-BSD FRML MDRD: >90 ML/MIN/1.73M2
GLUCOSE BLD-MCNC: 90 MG/DL (ref 70–99)
POTASSIUM BLD-SCNC: 4.2 MMOL/L (ref 3.4–5.3)
SODIUM SERPL-SCNC: 138 MMOL/L (ref 133–144)

## 2022-09-16 ENCOUNTER — VIRTUAL VISIT (OUTPATIENT)
Dept: PULMONOLOGY | Facility: CLINIC | Age: 67
End: 2022-09-16
Attending: INTERNAL MEDICINE
Payer: COMMERCIAL

## 2022-09-16 DIAGNOSIS — L03.818 CELLULITIS OF OTHER SPECIFIED SITE: Primary | ICD-10-CM

## 2022-09-16 PROCEDURE — 99214 OFFICE O/P EST MOD 30 MIN: CPT | Mod: 95 | Performed by: INTERNAL MEDICINE

## 2022-09-16 PROCEDURE — G0463 HOSPITAL OUTPT CLINIC VISIT: HCPCS | Mod: PN,RTG | Performed by: INTERNAL MEDICINE

## 2022-09-16 RX ORDER — CEPHALEXIN 500 MG/1
500 CAPSULE ORAL 4 TIMES DAILY
Qty: 28 CAPSULE | Refills: 0 | Status: SHIPPED | OUTPATIENT
Start: 2022-09-16 | End: 2022-09-23

## 2022-09-16 NOTE — PROGRESS NOTES
Mimi is a 67 year old who is being evaluated via a billable video visit.        How would you like to obtain your AVS? MyChart  If the video visit is dropped, the invitation should be resent by: Text to cell phone: [phone]  Will anyone else be joining your video visit? No        Video-Visit Details    Video Start Time: 11:29 AM    Type of service:  Video Visit    Video End Time:11:47 AM    Originating Location (pt. Location): Home    Distant Location (provider location):  [facility] FOR L[facility]G SCIENCE [facility]     Platform used for Video Visit: [employer]     Reason for Visit  Mimi Rivera is a 67 year old year old female who is being seen for Video Visit (Follow up )    Pulmonary HPI    The patient was seen and examined by Ishmael Bridges MD     68 yo woman with severe COPD.    She has recent cellulitis post bee sting and was given doxycycline (2nd day) - has made her feel dizzy and vomit last night.    Last seen on 6/24/222 when she had sinus symp;toms.  Using Trelegy once daily and occasional albuterol rescue inhaler.  She was having increased cough with 'vanilla pudding'-like sputum.  She was treated for AECB with Augmentin and Medrol dose edwige.  Also placed on azithromycin 250 mgs per day to reduce future AECB    Now is on Trelegy with rare need for albuterol unless she is having a flare up (such as with sinus infection). Cough got better but is still present.    She has ENT appointment on Monday 9/19/2022.      Current Outpatient Medications   Medication     amLODIPine (NORVASC) 10 MG tablet     azithromycin (ZITHROMAX) 250 MG tablet     buPROPion (ZYBAN) 150 MG 12 hr tablet     cetirizine (ZYRTEC) 10 MG tablet     doxycycline hyclate (VIBRAMYCIN) 100 MG capsule     EPINEPHrine (ANY BX GENERIC EQUIV) 0.3 MG/0.3ML injection 2-pack     esomeprazole (NEXIUM) 20 MG DR capsule     Fluticasone-Umeclidin-Vilanterol (TRELEGY ELLIPTA) 100-62.5-25 MCG/INH oral inhaler      Glycopyrrolate 25 MCG/ML SOLN     levalbuterol (XOPENEX HFA) 45 MCG/ACT inhaler     levalbuterol (XOPENEX) 1.25 MG/3ML neb solution     methylPREDNISolone (MEDROL DOSEPAK) 4 MG tablet therapy pack     VENTOLIN  (90 Base) MCG/ACT inhaler     No current facility-administered medications for this visit.     No Known Allergies  Past Medical History:   Diagnosis Date     ASCUS on Pap smear     unable to run HPV typing, f/u paps NIL     Chronic rhinosinusitis      COPD (chronic obstructive pulmonary disease) (H)      Depression, anxiety      Herpes     occasional outbreak     Hypertension        Past Surgical History:   Procedure Laterality Date      SECTION      x 3       Social History     Socioeconomic History     Marital status:      Spouse name: Not on file     Number of children: Not on file     Years of education: Not on file     Highest education level: Not on file   Occupational History     Not on file   Tobacco Use     Smoking status: Former Smoker     Packs/day: 10.00     Types: Cigarettes     Quit date: 3/19/2015     Years since quittin.5     Smokeless tobacco: Former User     Quit date: 2017   Vaping Use     Vaping Use: Never used   Substance and Sexual Activity     Alcohol use: Yes     Alcohol/week: 2.0 - 3.0 standard drinks     Types: 2 - 3 Standard drinks or equivalent per week     Comment: 2 drinks per wk avg     Drug use: No     Sexual activity: Yes     Partners: Male     Birth control/protection: Surgical     Comment: tubal   Other Topics Concern     Parent/sibling w/ CABG, MI or angioplasty before 65F 55M? No   Social History Narrative     Not on file     Social Determinants of Health     Financial Resource Strain: Not on file   Food Insecurity: Not on file   Transportation Needs: Not on file   Physical Activity: Not on file   Stress: Not on file   Social Connections: Not on file   Intimate Partner Violence: Not on file   Housing Stability: Not on file       ROS  Pulmonary    A complete ROS was otherwise negative except as noted in the HPI.  LMP 09/22/2010   Exam:   GENERAL APPEARANCE: Well developed, well nourished, alert, and in no apparent distress. No cough, tachypnea, wheeze, stridor  EYES: PERRL, EOMI  SKIN: no rash on limited exam except cellulitis R forearm & elbow area with erythem  NEURO: Mentation intact, speech normal,  PSYCH: mentation appears normal. and affect normal/bright  Results:  Recent Results (from the past 168 hour(s))   Basic metabolic panel  (Ca, Cl, CO2, Creat, Gluc, K, Na, BUN)    Collection Time: 09/13/22  3:55 PM   Result Value Ref Range    Sodium 138 133 - 144 mmol/L    Potassium 4.2 3.4 - 5.3 mmol/L    Chloride 108 94 - 109 mmol/L    Carbon Dioxide (CO2) 26 20 - 32 mmol/L    Anion Gap 4 3 - 14 mmol/L    Urea Nitrogen 12 7 - 30 mg/dL    Creatinine 0.45 (L) 0.52 - 1.04 mg/dL    Calcium 9.8 8.5 - 10.1 mg/dL    Glucose 90 70 - 99 mg/dL    GFR Estimate >90 >60 mL/min/1.73m2   CBC with platelets and differential    Collection Time: 09/13/22  3:55 PM   Result Value Ref Range    WBC Count 9.6 4.0 - 11.0 10e3/uL    RBC Count 4.98 3.80 - 5.20 10e6/uL    Hemoglobin 16.5 (H) 11.7 - 15.7 g/dL    Hematocrit 46.4 35.0 - 47.0 %    MCV 93 78 - 100 fL    MCH 33.1 (H) 26.5 - 33.0 pg    MCHC 35.6 31.5 - 36.5 g/dL    RDW 12.5 10.0 - 15.0 %    Platelet Count 281 150 - 450 10e3/uL    % Neutrophils 70 %    % Lymphocytes 19 %    % Monocytes 8 %    % Eosinophils 3 %    % Basophils 0 %    Absolute Neutrophils 6.7 1.6 - 8.3 10e3/uL    Absolute Lymphocytes 1.8 0.8 - 5.3 10e3/uL    Absolute Monocytes 0.8 0.0 - 1.3 10e3/uL    Absolute Eosinophils 0.3 0.0 - 0.7 10e3/uL    Absolute Basophils 0.0 0.0 - 0.2 10e3/uL       Assessment and plan:  Severe COPD  On Trelegy and 250 Azithro every day to decrease AECB  No major change in exercise capacity, SOB/ANSARI.  No AECB since last visit  Will continue triple inhaler Rx  Flares sometimes are triggered by rhinosinusitis - will see ENT  (finally!) next week  Cellulitis - post bee sting  Not tolerating Doxycyclline - switched to cephalexin 500 QID (prescribed by me)   Health Maintenance  Pneumococcal vaccines up to date  Flu shot and new COVID booster (with Omicron coverage) - will get both in next month  RTC 6 months - call sooner for difficulties, questions or problems.  I spent a total of 30 minutes devoted to Ms Rivera's care thus far today, 9/16/2022, including time in chart review, video visit and documentation.  Ishmael Bridges MD

## 2022-09-16 NOTE — LETTER
9/16/2022         RE: Mimi Rivera  [id number] [place] [address]        Dear Colleague,    Thank you for referring your patient, Mimi Rivera, to the [facility]. Please see a copy of my visit note below.    Mimi is a 67 year old who is being evaluated via a billable video visit.        How would you like to obtain your AVS? MyChart  If the video visit is dropped, the invitation should be resent by: Text to cell phone: [phone]  Will anyone else be joining your video visit? No        Video-Visit Details    Video Start Time: 11:29 AM    Type of service:  Video Visit    Video End Time:11:47 AM    Originating Location (pt. Location): Home    Distant Location (provider location):  [facility]     Platform used for Video Visit: [employer]     Reason for Visit  Mimi Rivera is a 67 year old year old female who is being seen for Video Visit (Follow up )    Pulmonary HPI    The patient was seen and examined by Ishmael Bridges MD     66 yo woman with severe COPD.    She has recent cellulitis post bee sting and was given doxycycline (2nd day) - has made her feel dizzy and vomit last night.    Last seen on 6/24/222 when she had sinus symp;toms.  Using Trelegy once daily and occasional albuterol rescue inhaler.  She was having increased cough with 'vanilla pudding'-like sputum.  She was treated for AECB with Augmentin and Medrol dose edwige.  Also placed on azithromycin 250 mgs per day to reduce future AECB    Now is on Trelegy with rare need for albuterol unless she is having a flare up (such as with sinus infection). Cough got better but is still present.    She has ENT appointment on Monday 9/19/2022.      Current Outpatient Medications   Medication     amLODIPine (NORVASC) 10 MG tablet     azithromycin (ZITHROMAX) 250 MG tablet     buPROPion (ZYBAN) 150 MG 12 hr tablet      cetirizine (ZYRTEC) 10 MG tablet     doxycycline hyclate (VIBRAMYCIN) 100 MG capsule     EPINEPHrine (ANY BX GENERIC EQUIV) 0.3 MG/0.3ML injection 2-pack     esomeprazole (NEXIUM) 20 MG DR capsule     Fluticasone-Umeclidin-Vilanterol (TRELEGY ELLIPTA) 100-62.5-25 MCG/INH oral inhaler     Glycopyrrolate 25 MCG/ML SOLN     levalbuterol (XOPENEX HFA) 45 MCG/ACT inhaler     levalbuterol (XOPENEX) 1.25 MG/3ML neb solution     methylPREDNISolone (MEDROL DOSEPAK) 4 MG tablet therapy pack     VENTOLIN  (90 Base) MCG/ACT inhaler     No current facility-administered medications for this visit.     No Known Allergies  Past Medical History:   Diagnosis Date     ASCUS on Pap smear     unable to run HPV typing, f/u paps NIL     Chronic rhinosinusitis      COPD (chronic obstructive pulmonary disease) (H)      Depression, anxiety      Herpes     occasional outbreak     Hypertension        Past Surgical History:   Procedure Laterality Date      SECTION      x 3       Social History     Socioeconomic History     Marital status:      Spouse name: Not on file     Number of children: Not on file     Years of education: Not on file     Highest education level: Not on file   Occupational History     Not on file   Tobacco Use     Smoking status: Former Smoker     Packs/day: 10.00     Types: Cigarettes     Quit date: 3/19/2015     Years since quittin.5     Smokeless tobacco: Former User     Quit date: 2017   Vaping Use     Vaping Use: Never used   Substance and Sexual Activity     Alcohol use: Yes     Alcohol/week: 2.0 - 3.0 standard drinks     Types: 2 - 3 Standard drinks or equivalent per week     Comment: 2 drinks per wk avg     Drug use: No     Sexual activity: Yes     Partners: Male     Birth control/protection: Surgical     Comment: tubal   Other Topics Concern     Parent/sibling w/ CABG, MI or angioplasty before 65F 55M? No   Social History Narrative     Not on file     Social Determinants of  Health     Financial Resource Strain: Not on file   Food Insecurity: Not on file   Transportation Needs: Not on file   Physical Activity: Not on file   Stress: Not on file   Social Connections: Not on file   Intimate Partner Violence: Not on file   Housing Stability: Not on file       ROS Pulmonary    A complete ROS was otherwise negative except as noted in the HPI.  LMP 09/22/2010   Exam:   GENERAL APPEARANCE: Well developed, well nourished, alert, and in no apparent distress. No cough, tachypnea, wheeze, stridor  EYES: PERRL, EOMI  SKIN: no rash on limited exam except cellulitis R forearm & elbow area with erythem  NEURO: Mentation intact, speech normal,  PSYCH: mentation appears normal. and affect normal/bright  Results:  Recent Results (from the past 168 hour(s))   Basic metabolic panel  (Ca, Cl, CO2, Creat, Gluc, K, Na, BUN)    Collection Time: 09/13/22  3:55 PM   Result Value Ref Range    Sodium 138 133 - 144 mmol/L    Potassium 4.2 3.4 - 5.3 mmol/L    Chloride 108 94 - 109 mmol/L    Carbon Dioxide (CO2) 26 20 - 32 mmol/L    Anion Gap 4 3 - 14 mmol/L    Urea Nitrogen 12 7 - 30 mg/dL    Creatinine 0.45 (L) 0.52 - 1.04 mg/dL    Calcium 9.8 8.5 - 10.1 mg/dL    Glucose 90 70 - 99 mg/dL    GFR Estimate >90 >60 mL/min/1.73m2   CBC with platelets and differential    Collection Time: 09/13/22  3:55 PM   Result Value Ref Range    WBC Count 9.6 4.0 - 11.0 10e3/uL    RBC Count 4.98 3.80 - 5.20 10e6/uL    Hemoglobin 16.5 (H) 11.7 - 15.7 g/dL    Hematocrit 46.4 35.0 - 47.0 %    MCV 93 78 - 100 fL    MCH 33.1 (H) 26.5 - 33.0 pg    MCHC 35.6 31.5 - 36.5 g/dL    RDW 12.5 10.0 - 15.0 %    Platelet Count 281 150 - 450 10e3/uL    % Neutrophils 70 %    % Lymphocytes 19 %    % Monocytes 8 %    % Eosinophils 3 %    % Basophils 0 %    Absolute Neutrophils 6.7 1.6 - 8.3 10e3/uL    Absolute Lymphocytes 1.8 0.8 - 5.3 10e3/uL    Absolute Monocytes 0.8 0.0 - 1.3 10e3/uL    Absolute Eosinophils 0.3 0.0 - 0.7 10e3/uL    Absolute Basophils  0.0 0.0 - 0.2 10e3/uL       Assessment and plan:  Severe COPD  On Trelegy and 250 Azithro every day to decrease AECB  No major change in exercise capacity, SOB/ANSARI.  No AECB since last visit  Will continue triple inhaler Rx  Flares sometimes are triggered by rhinosinusitis - will see ENT (finally!) next week  Cellulitis - post bee sting  Not tolerating Doxycyclline - switched to cephalexin 500 QID (prescribed by me)   Health Maintenance  Pneumococcal vaccines up to date  Flu shot and new COVID booster (with Omicron coverage) - will get both in next month  RTC 6 months - call sooner for difficulties, questions or problems.  I spent a total of 30 minutes devoted to Ms Rivera's care thus far today, 9/16/2022, including time in chart review, video visit and documentation.  Ishmael Bridges MD

## 2022-09-20 ENCOUNTER — TELEPHONE (OUTPATIENT)
Dept: PULMONOLOGY | Facility: CLINIC | Age: 67
End: 2022-09-20

## 2022-10-15 ENCOUNTER — HEALTH MAINTENANCE LETTER (OUTPATIENT)
Age: 67
End: 2022-10-15

## 2022-11-11 ENCOUNTER — VIRTUAL VISIT (OUTPATIENT)
Dept: FAMILY MEDICINE | Facility: CLINIC | Age: 67
End: 2022-11-11
Payer: COMMERCIAL

## 2022-11-11 DIAGNOSIS — Z12.11 SCREEN FOR COLON CANCER: ICD-10-CM

## 2022-11-11 DIAGNOSIS — J44.1 COPD WITH ACUTE EXACERBATION (H): ICD-10-CM

## 2022-11-11 DIAGNOSIS — J06.9 UPPER RESPIRATORY TRACT INFECTION, UNSPECIFIED TYPE: Primary | ICD-10-CM

## 2022-11-11 PROCEDURE — 99213 OFFICE O/P EST LOW 20 MIN: CPT | Mod: 95 | Performed by: NURSE PRACTITIONER

## 2022-11-11 RX ORDER — PREDNISONE 20 MG/1
40 TABLET ORAL DAILY
Qty: 10 TABLET | Refills: 0 | Status: SHIPPED | OUTPATIENT
Start: 2022-11-11 | End: 2022-11-16

## 2022-11-11 NOTE — PROGRESS NOTES
Mimi is a 67 year old who is being evaluated via a billable video visit.      How would you like to obtain your AVS? MyChart  If the video visit is dropped, the invitation should be resent by: Text to cell phone: [phone]  Will anyone else be joining your video visit? No        Assessment & Plan     Viral URI vs. Viral sinusitis  Mild COPD exacerbation  Offered COVID-19 PCR and Influenza testing - patient declined  - increase home albuterol use to every 4 -6 hours  - prednisone burst 40 mg PO daily for 5 days  - OTC guaifenesin during the day   -Continue ibuprofen or tylenol for discomfort or fever  Plan follow-up in clinic early next week for in person visit, further assessment of lungs/possible chest x-ray.    TO ED/urgent care with worsening shortness of breath over the weekend.      Screen for colon cancer  -referral placed      Return in about 4 days (around 11/15/2022), or if symptoms worsen or fail to improve, for Follow up.    Zak Moyer RN, FNP Student     I was present with the student who participated in the service and in the documentation of the note, which I have reviewed and verified. The history, reviews of systems, objective data, and assessment/plan were completed by myself.    Rachael Rosales, EDEN [facility]   Mimi is a 67 year old, presenting for the following health issues:  Cough    1 week hx, productive cough, + head pressure, congestion, malaise and postnasal drip. This has been accompanied by worsening SOB and wheezing. Does not use albuterol at baseline, has been using twice per day with some relief.     Fevers at home approx 100, waking at night with cough and fever.     HPI     Acute Illness  Acute illness concerns: cough and mild fever x 3 days. Home test for covid was negative.   Onset/Duration: 2 to 3 days   Symptoms:  Fever: YES- low grade 100  Chills/Sweats: YES  Headache (location?): YES  Sinus Pressure: YES  Conjunctivitis:   No  Ear Pain: no  Rhinorrhea: YES  Congestion: YES  Sore Throat: No  Cough: YES-productive of yellow sputum, with shortness of breath  Wheeze: YES  Decreased Appetite: YES  Nausea: YES  Vomiting: No  Diarrhea: YES  Dysuria/Freq.: No  Dysuria or Hematuria: No  Fatigue/Achiness: YES  Sick/Strep Exposure: No  Therapies tried and outcome: ibuprofen and otc decongestant       Review of Systems   Constitutional, HEENT, cardiovascular, pulmonary, gi and gu systems are negative, except as otherwise noted.      Objective           Vitals:  No vitals were obtained today due to virtual visit.    Physical Exam   GENERAL: Healthy, alert and no distress  RESP: No audible wheeze, cough, or visible cyanosis.  NEURO: Mentation and speech appropriate for age.  PSYCH: Mentation appears normal, affect normal/bright, judgement and insight intact, normal speech           Video-Visit Details: Pt unable to connect to video platform Conducted visit via phone, 17 min.

## 2022-11-14 ENCOUNTER — OFFICE VISIT (OUTPATIENT)
Dept: FAMILY MEDICINE | Facility: CLINIC | Age: 67
End: 2022-11-14
Payer: COMMERCIAL

## 2022-11-14 ENCOUNTER — ANCILLARY PROCEDURE (OUTPATIENT)
Dept: GENERAL RADIOLOGY | Facility: CLINIC | Age: 67
End: 2022-11-14
Attending: NURSE PRACTITIONER
Payer: COMMERCIAL

## 2022-11-14 VITALS
DIASTOLIC BLOOD PRESSURE: 82 MMHG | HEIGHT: 62 IN | SYSTOLIC BLOOD PRESSURE: 131 MMHG | TEMPERATURE: 97.4 F | OXYGEN SATURATION: 97 % | HEART RATE: 95 BPM | BODY MASS INDEX: 20.06 KG/M2 | WEIGHT: 109 LBS

## 2022-11-14 DIAGNOSIS — R91.8 LUNG FIELD ABNORMAL FINDING ON EXAMINATION: ICD-10-CM

## 2022-11-14 DIAGNOSIS — R05.9 COUGH, UNSPECIFIED TYPE: Primary | ICD-10-CM

## 2022-11-14 DIAGNOSIS — Z87.891 FORMER SMOKER: ICD-10-CM

## 2022-11-14 DIAGNOSIS — Z12.31 VISIT FOR SCREENING MAMMOGRAM: ICD-10-CM

## 2022-11-14 DIAGNOSIS — R05.9 COUGH, UNSPECIFIED TYPE: ICD-10-CM

## 2022-11-14 DIAGNOSIS — J44.1 COPD WITH ACUTE EXACERBATION (H): ICD-10-CM

## 2022-11-14 PROCEDURE — U0005 INFEC AGEN DETEC AMPLI PROBE: HCPCS | Performed by: NURSE PRACTITIONER

## 2022-11-14 PROCEDURE — U0003 INFECTIOUS AGENT DETECTION BY NUCLEIC ACID (DNA OR RNA); SEVERE ACUTE RESPIRATORY SYNDROME CORONAVIRUS 2 (SARS-COV-2) (CORONAVIRUS DISEASE [COVID-19]), AMPLIFIED PROBE TECHNIQUE, MAKING USE OF HIGH THROUGHPUT TECHNOLOGIES AS DESCRIBED BY CMS-2020-01-R: HCPCS | Performed by: NURSE PRACTITIONER

## 2022-11-14 PROCEDURE — 99214 OFFICE O/P EST MOD 30 MIN: CPT | Mod: CS | Performed by: NURSE PRACTITIONER

## 2022-11-14 PROCEDURE — 71046 X-RAY EXAM CHEST 2 VIEWS: CPT | Mod: TC | Performed by: RADIOLOGY

## 2022-11-14 RX ORDER — AZITHROMYCIN 250 MG/1
TABLET, FILM COATED ORAL
Qty: 6 TABLET | Refills: 0 | Status: SHIPPED | OUTPATIENT
Start: 2022-11-14 | End: 2022-11-19

## 2022-11-14 NOTE — PROGRESS NOTES
"  Assessment & Plan     Cough, unspecified type  At home cares.   Be seen if worsening.   - Symptomatic; Unknown COVID-19 Virus (Coronavirus) by PCR Nose  - XR Chest 2 Views    COPD with acute exacerbation (H)  Lung field abnormal finding on examination  Former smoker  Likely COPD exacerbation.   Add Z-pack antibiotics. Another antibiotic is pneumonia noted.   - azithromycin (ZITHROMAX) 250 MG tablet  Dispense: 6 tablet; Refill: 0    Visit for screening mammogram    - MA Screen Bilateral w/Axel      Return in about 2 weeks (around 11/28/2022) for follow up as needed.      Anushka Woodruff, EDEN CNP  M [facility] PRIOR COURTNEY Le is a 67 year old, presenting for the following health issues:  RECHECK (Cough)      HPI     Check cough - had Virtual visit on 11/11/2022 - went to Urgent Care on 11/12/2022, left without being seen due to 3 hour wait.  Coughing up sputum - yellow tastes bad and looks like some pus.  More tired - lungs hurt, head hurts.   Covid test Friday - Negative  Sats \"Quite low\" asked if  below 90 she states no  Oxygen at home.       Acute Illness  Acute illness concerns: Cough  Onset/Duration: x2 weeks  Symptoms:  Fever: YES- feels worse in evening  Chills/Sweats: YES- both  Headache (location?): YES  Sinus Pressure: YES  Conjunctivitis:  YES- pressure  Ear Pain: YES- right - pressure  Rhinorrhea: No  Congestion: YES- chest, head, nasal  Sore Throat: No  Cough: YES-productive of yellow sputum, worsening over time  Wheeze: YES - using neb and inhalers - moslty at night - wakes coughing  Decreased Appetite: YES  Nausea: YES- little  Vomiting: No  Diarrhea: No  Dysuria/Freq.: No  Dysuria or Hematuria: No  Fatigue/Achiness: YES- more tired  Sick/Strep Exposure: YES- grand daughter - unsure of what he had-symptoms showed the following day  Therapies tried and outcome: Ibuprofen - temporary    Review of Systems   Constitutional, HEENT, cardiovascular, pulmonary, GI, , " "musculoskeletal, neuro, skin, endocrine and psych systems are negative, except as otherwise noted in the HPI.      Objective    /82 (BP Location: Left arm, Patient Position: Chair, Cuff Size: Adult Regular)   Pulse 95   Temp 97.4  F (36.3  C) (Tympanic)   Ht 1.562 m (5' 1.5\")   Wt 49.4 kg (109 lb)   LMP 09/22/2010   SpO2 97%   BMI 20.26 kg/m    Body mass index is 20.26 kg/m .  Physical Exam   GENERAL: healthy, alert and no distress but appears ill  EYES: Eyes grossly normal to inspection, PERRL and conjunctivae and sclerae normal  HENT: ear canals and TM's normal, nose and mouth without ulcers or lesions  NECK: no adenopathy, no asymmetry, masses, or scars and thyroid normal to palpation  RESP: lungs clear to auscultation - no rales, rhonchi; expiratory wheezes throughout; decreased bases  CV: regular rate and rhythm, normal S1 S2, no S3 or S4, no murmur, click or rub, no peripheral edema and peripheral pulses strong  ABDOMEN: soft, nontender, no hepatosplenomegaly, no masses and bowel sounds normal  MS: no gross musculoskeletal defects noted, no edema  SKIN: no suspicious lesions or rashes  NEURO: Normal strength and tone, mentation intact and speech normal  PSYCH: mentation appears normal, affect normal/bright    Results for orders placed or performed in visit on 11/14/22   XR Chest 2 Views     Status: None    Narrative    XR CHEST 2 VIEWS   11/14/2022 3:58 PM     HISTORY: Cough, unspecified type    COMPARISON: 3/29/2022.      Impression    IMPRESSION: Stable cardiac silhouette at the upper limits of normal in  size. No focal airspace disease, pleural effusion or pneumothorax. No  acute bony abnormality.    TANYA CALLOWAY MD         SYSTEM ID:  [id number]   Results for orders placed or performed in visit on 11/14/22   Symptomatic; Unknown COVID-19 Virus (Coronavirus) by PCR Nose     Status: Normal    Specimen: Nose; Swab   Result Value Ref Range    SARS CoV2 PCR Negative Negative    Narrative    " Testing was performed using the Aptima SARS-CoV-2 Assay on the  [employer] Instrument System. Additional information about this  Emergency Use Authorization (EUA) assay can be found via the Lab  Guide. This test should be ordered for the detection of SARS-CoV-2 in  individuals who meet SARS-CoV-2 clinical and/or epidemiological  criteria. Test performance is unknown in asymptomatic patients. This  test is for in vitro diagnostic use under the FDA EUA for  laboratories certified under CLIA to perform high complexity testing.  This test has not been FDA cleared or approved. A negative result  does not rule out the presence of PCR inhibitors in the specimen or  target RNA in concentration below the limit of detection for the  assay. The possibility of a false negative should be considered if  the patient's recent exposure or clinical presentation suggests  COVID-19. This test was validated by the [facility]  D[facility]. This laboratory is certified under  the Clinical Laboratory Improvement Amendments of 1988 (CLIA-88) as  qualified to perform high complexity laboratory testing.

## 2022-11-14 NOTE — RESULT ENCOUNTER NOTE
Dear Mimi,    Here is a summary of your recent test results:    Great news chest xray does not show pneumonia.     For additional lab test information, labtestsonline.org is an excellent reference.    In addition, here is a list of due or overdue Health Maintenance reminders:    Colorectal Cancer Screening Never done  HIV Screening Never done  Zoster (Shingles) Vaccine(1 of 2) Never done  COVID-19 Vaccine(4 - Booster for Pfizer series) due on 03/02/2022  LUNG CANCER SCREENING due on 03/12/2022  Annual Wellness Visit due on 05/29/2022  Discuss Advance Care Planning due on 06/01/2022  Mammogram due on 12/03/2022  ANNUAL REVIEW OF HM ORDERS due on 12/07/2022    Please call us at [phone] (or use [employer]) to address the above recommendations if needed.    Thank you for choosing [facility].  It was an honor and a privilege to participate in your care.     Healthy regards,    Anushka Woodruff, ROBBY  [facility]

## 2022-11-15 LAB — SARS-COV-2 RNA RESP QL NAA+PROBE: NEGATIVE

## 2022-11-16 NOTE — RESULT ENCOUNTER NOTE
Dear Mimi,    Here is a summary of your recent test results:    Negative COVID. I hope you are feeling better.     For additional lab test information, labtestsonline.org is an excellent reference.    In addition, here is a list of due or overdue Health Maintenance reminders:    Colorectal Cancer Screening Never done  HIV Screening Never done  Zoster (Shingles) Vaccine(1 of 2) Never done  COVID-19 Vaccine(4 - Booster for Pfizer series) due on 03/02/2022  LUNG CANCER SCREENING due on 03/12/2022  Annual Wellness Visit due on 05/29/2022  Discuss Advance Care Planning due on 06/01/2022  Mammogram due on 12/03/2022  ANNUAL REVIEW OF HM ORDERS due on 12/07/2022    Please call us at [phone] (or use [employer]) to address the above recommendations if needed.    Thank you for choosing [facility].  It was an honor and a privilege to participate in your care.       Healthy regards,    Anushka Woodruff, DELTAP  [facility]

## 2022-11-23 ENCOUNTER — TELEPHONE (OUTPATIENT)
Dept: PULMONOLOGY | Facility: CLINIC | Age: 67
End: 2022-11-23

## 2022-11-23 DIAGNOSIS — J32.9 SINUSITIS, CHRONIC: Primary | ICD-10-CM

## 2022-11-23 RX ORDER — AZITHROMYCIN 500 MG/1
500 TABLET, FILM COATED ORAL DAILY
Qty: 14 TABLET | Refills: 0 | Status: SHIPPED | OUTPATIENT
Start: 2022-11-23 | End: 2023-09-18

## 2022-11-23 NOTE — TELEPHONE ENCOUNTER
Contacted by patient to state she has been ill with cough with yellow/green sputum resulting and thick green nasal drainage for 2 weeks. Has 99.9-100 fever for last 2 days. Negative COVID test on 11/21/22. Has sinus pressure and HA over forehead and pain in cheeks. She is taking Proair,   Budesonide and Trelegy daily. She attempted to obtain refill from [facility] for her Augmentin and prescription had been discontinued. There had been refill on prescription. Will message provider. She does not take the Medrol dose edwige with Augmentin but this writer wanted to check with Dr Bridges.    Dr Bridges authorized Augmentin 875 mg BID for 14 days. Did not order medrol dose edwige as patient not wheezing. Will call patient with treatment plan and advise her to seek urgent evaluation if worsening symptoms.

## 2022-11-23 NOTE — TELEPHONE ENCOUNTER
[facility] Call Center    Phone Message    May a detailed message be left on voicemail: yes     Reason for Call: Medication Question or concern regarding medication   Prescription Clarification  Name of Medication: azithromycin (ZITHROMAX) 500 MG tablet  Prescribing Provider: Ishmael Bridges MD   Pharmacy: [id number] - [place], [place] - [address]   What on the order needs clarification? They are concerned about the dosage and length of time for this medication, and they would like to confirm that this is accurate. Please call back as soon as possible to discuss.          Action Taken: Message routed to:  [facility] ([facility]): Pulm    Travel Screening: Not Applicable

## 2023-01-13 DIAGNOSIS — J42 CHRONIC BRONCHITIS, UNSPECIFIED CHRONIC BRONCHITIS TYPE (H): ICD-10-CM

## 2023-01-13 RX ORDER — FLUTICASONE FUROATE, UMECLIDINIUM BROMIDE AND VILANTEROL TRIFENATATE 100; 62.5; 25 UG/1; UG/1; UG/1
POWDER RESPIRATORY (INHALATION)
Qty: 60 EACH | Refills: 8 | Status: SHIPPED | OUTPATIENT
Start: 2023-01-13 | End: 2023-10-09

## 2023-01-27 DIAGNOSIS — J42 CHRONIC BRONCHITIS, UNSPECIFIED CHRONIC BRONCHITIS TYPE (H): ICD-10-CM

## 2023-01-27 RX ORDER — AZITHROMYCIN 250 MG/1
TABLET, FILM COATED ORAL
Qty: 30 TABLET | Refills: 0 | OUTPATIENT
Start: 2023-01-27

## 2023-01-30 DIAGNOSIS — J42 BRONCHITIS, CHRONIC (H): ICD-10-CM

## 2023-01-30 DIAGNOSIS — J44.9 COPD (CHRONIC OBSTRUCTIVE PULMONARY DISEASE) (H): Primary | ICD-10-CM

## 2023-01-30 RX ORDER — AZITHROMYCIN 250 MG/1
250 TABLET, FILM COATED ORAL DAILY
Qty: 30 TABLET | Refills: 5 | Status: SHIPPED | OUTPATIENT
Start: 2023-01-30 | End: 2023-11-15

## 2023-02-14 ENCOUNTER — APPOINTMENT (OUTPATIENT)
Dept: GENERAL RADIOLOGY | Facility: CLINIC | Age: 68
End: 2023-02-14
Attending: EMERGENCY MEDICINE
Payer: COMMERCIAL

## 2023-02-14 ENCOUNTER — HOSPITAL ENCOUNTER (EMERGENCY)
Facility: CLINIC | Age: 68
Discharge: LEFT BEFORE DISCHARGE | End: 2023-02-14
Admitting: EMERGENCY MEDICINE
Payer: COMMERCIAL

## 2023-02-14 LAB
ANION GAP SERPL CALCULATED.3IONS-SCNC: 11 MMOL/L (ref 7–15)
BASOPHILS # BLD AUTO: 0 10E3/UL (ref 0–0.2)
BASOPHILS NFR BLD AUTO: 0 %
BUN SERPL-MCNC: 9.8 MG/DL (ref 8–23)
CALCIUM SERPL-MCNC: 9.7 MG/DL (ref 8.8–10.2)
CHLORIDE SERPL-SCNC: 102 MMOL/L (ref 98–107)
CREAT SERPL-MCNC: 0.56 MG/DL (ref 0.51–0.95)
DEPRECATED HCO3 PLAS-SCNC: 27 MMOL/L (ref 22–29)
DEPRECATED S PYO AG THROAT QL EIA: NEGATIVE
EOSINOPHIL # BLD AUTO: 0 10E3/UL (ref 0–0.7)
EOSINOPHIL NFR BLD AUTO: 0 %
ERYTHROCYTE [DISTWIDTH] IN BLOOD BY AUTOMATED COUNT: 12.3 % (ref 10–15)
FLUAV RNA SPEC QL NAA+PROBE: NEGATIVE
FLUBV RNA RESP QL NAA+PROBE: NEGATIVE
GFR SERPL CREATININE-BSD FRML MDRD: >90 ML/MIN/1.73M2
GLUCOSE SERPL-MCNC: 100 MG/DL (ref 70–99)
GROUP A STREP BY PCR: ABNORMAL
HCT VFR BLD AUTO: 46.2 % (ref 35–47)
HGB BLD-MCNC: 15.7 G/DL (ref 11.7–15.7)
HOLD SPECIMEN: NORMAL
HOLD SPECIMEN: NORMAL
IMM GRANULOCYTES # BLD: 0 10E3/UL
IMM GRANULOCYTES NFR BLD: 0 %
LYMPHOCYTES # BLD AUTO: 1.7 10E3/UL (ref 0.8–5.3)
LYMPHOCYTES NFR BLD AUTO: 20 %
MCH RBC QN AUTO: 32.5 PG (ref 26.5–33)
MCHC RBC AUTO-ENTMCNC: 34 G/DL (ref 31.5–36.5)
MCV RBC AUTO: 96 FL (ref 78–100)
MONOCYTES # BLD AUTO: 0.6 10E3/UL (ref 0–1.3)
MONOCYTES NFR BLD AUTO: 7 %
NEUTROPHILS # BLD AUTO: 6.2 10E3/UL (ref 1.6–8.3)
NEUTROPHILS NFR BLD AUTO: 73 %
NRBC # BLD AUTO: 0 10E3/UL
NRBC BLD AUTO-RTO: 0 /100
PLATELET # BLD AUTO: 308 10E3/UL (ref 150–450)
POTASSIUM SERPL-SCNC: 4.4 MMOL/L (ref 3.4–5.3)
RBC # BLD AUTO: 4.83 10E6/UL (ref 3.8–5.2)
RSV RNA SPEC NAA+PROBE: NEGATIVE
SARS-COV-2 RNA RESP QL NAA+PROBE: NEGATIVE
SODIUM SERPL-SCNC: 140 MMOL/L (ref 136–145)
WBC # BLD AUTO: 8.6 10E3/UL (ref 4–11)

## 2023-02-14 PROCEDURE — C9803 HOPD COVID-19 SPEC COLLECT: HCPCS

## 2023-02-14 PROCEDURE — 85025 COMPLETE CBC W/AUTO DIFF WBC: CPT | Performed by: EMERGENCY MEDICINE

## 2023-02-14 PROCEDURE — 82310 ASSAY OF CALCIUM: CPT | Performed by: EMERGENCY MEDICINE

## 2023-02-14 PROCEDURE — 87637 SARSCOV2&INF A&B&RSV AMP PRB: CPT | Performed by: EMERGENCY MEDICINE

## 2023-02-14 PROCEDURE — 36415 COLL VENOUS BLD VENIPUNCTURE: CPT | Performed by: EMERGENCY MEDICINE

## 2023-02-14 PROCEDURE — 71046 X-RAY EXAM CHEST 2 VIEWS: CPT

## 2023-02-14 PROCEDURE — 87651 STREP A DNA AMP PROBE: CPT | Performed by: EMERGENCY MEDICINE

## 2023-02-14 PROCEDURE — 999N000104 HC STATISTIC NO CHARGE

## 2023-02-14 ASSESSMENT — ACTIVITIES OF DAILY LIVING (ADL)
ADLS_ACUITY_SCORE: 33
ADLS_ACUITY_SCORE: 33

## 2023-02-14 NOTE — ED TRIAGE NOTES
"Pt arrives ambulatory for throat pain, \"pus on my tonsils,\" cough with green phlegm production and sob. Has COPD and feels as though a sinus infection is affecting her COPD. Has increased SOB overnight and reports waking up sweating.       "

## 2023-02-15 LAB
ATRIAL RATE - MUSE: 76 BPM
DIASTOLIC BLOOD PRESSURE - MUSE: NORMAL MMHG
INTERPRETATION ECG - MUSE: NORMAL
P AXIS - MUSE: 79 DEGREES
PR INTERVAL - MUSE: 152 MS
QRS DURATION - MUSE: 76 MS
QT - MUSE: 396 MS
QTC - MUSE: 445 MS
R AXIS - MUSE: 79 DEGREES
SYSTOLIC BLOOD PRESSURE - MUSE: NORMAL MMHG
T AXIS - MUSE: 69 DEGREES
VENTRICULAR RATE- MUSE: 76 BPM

## 2023-03-09 ASSESSMENT — ENCOUNTER SYMPTOMS
SPUTUM PRODUCTION: 1
SINUS PAIN: 1
DIFFICULTY URINATING: 0
COUGH: 1
HEMOPTYSIS: 0
SORE THROAT: 1
ORTHOPNEA: 0
DECREASED APPETITE: 0
NECK MASS: 0
ALTERED TEMPERATURE REGULATION: 0
PALPITATIONS: 1
HALLUCINATIONS: 0
SNORES LOUDLY: 1
POLYDIPSIA: 0
SYNCOPE: 0
FATIGUE: 1
HEMATURIA: 1
LEG PAIN: 0
SHORTNESS OF BREATH: 1
FLANK PAIN: 1
CHILLS: 1
LIGHT-HEADEDNESS: 1
FEVER: 0
INCREASED ENERGY: 1
HOARSE VOICE: 0
TASTE DISTURBANCE: 1
EXERCISE INTOLERANCE: 1
NIGHT SWEATS: 1
WHEEZING: 1
POLYPHAGIA: 0
SMELL DISTURBANCE: 1
HYPERTENSION: 1
COUGH DISTURBING SLEEP: 1
POSTURAL DYSPNEA: 1
WEIGHT GAIN: 0
DYSURIA: 1
SLEEP DISTURBANCES DUE TO BREATHING: 1
WEIGHT LOSS: 0
DYSPNEA ON EXERTION: 1
TROUBLE SWALLOWING: 0
HYPOTENSION: 0
SINUS CONGESTION: 1

## 2023-03-10 ENCOUNTER — OFFICE VISIT (OUTPATIENT)
Dept: PULMONOLOGY | Facility: CLINIC | Age: 68
End: 2023-03-10
Attending: INTERNAL MEDICINE
Payer: COMMERCIAL

## 2023-03-10 VITALS
HEART RATE: 90 BPM | SYSTOLIC BLOOD PRESSURE: 146 MMHG | WEIGHT: 107 LBS | BODY MASS INDEX: 19.69 KG/M2 | DIASTOLIC BLOOD PRESSURE: 83 MMHG | RESPIRATION RATE: 18 BRPM | OXYGEN SATURATION: 96 % | HEIGHT: 62 IN

## 2023-03-10 DIAGNOSIS — I70.0 AORTIC CALCIFICATION (H): ICD-10-CM

## 2023-03-10 DIAGNOSIS — J42 CHRONIC BRONCHITIS, UNSPECIFIED CHRONIC BRONCHITIS TYPE (H): ICD-10-CM

## 2023-03-10 DIAGNOSIS — J43.1 PANLOBULAR EMPHYSEMA (H): Primary | ICD-10-CM

## 2023-03-10 DIAGNOSIS — J20.9 ACUTE EXACERBATION OF CHRONIC BRONCHITIS (H): ICD-10-CM

## 2023-03-10 DIAGNOSIS — J42 ACUTE EXACERBATION OF CHRONIC BRONCHITIS (H): ICD-10-CM

## 2023-03-10 PROCEDURE — G0463 HOSPITAL OUTPT CLINIC VISIT: HCPCS | Performed by: INTERNAL MEDICINE

## 2023-03-10 PROCEDURE — 99215 OFFICE O/P EST HI 40 MIN: CPT | Performed by: INTERNAL MEDICINE

## 2023-03-10 RX ORDER — METHYLPREDNISOLONE 4 MG
TABLET, DOSE PACK ORAL
Qty: 21 TABLET | Refills: 3 | Status: SHIPPED | OUTPATIENT
Start: 2023-03-10 | End: 2023-12-05

## 2023-03-10 ASSESSMENT — PAIN SCALES - GENERAL: PAINLEVEL: NO PAIN (0)

## 2023-03-10 NOTE — PATIENT INSTRUCTIONS
COPD / Emphysema  Chronic / Recurrent Sinus infections  Recent UTI    Plan:  Continue COPD Treatment: (1) Trelegy once daily (rinse mouth after use);  (2) Azithromycin 250 mgs/day to reduce   flare-ups and (3) rescue albuterol by inhaler or nebulizer - OK to use up to every 4-6 hours  2.  For persistent sinus infection, Augmentin treatment for 14 days - hold azithromycin while on Augmentin  3.  Given lung component, will also prescribe Medrol DosePak  4.  Return to clinic in 6 months with Pulmonary Function testing  5.  Discuss calcification of aorta with Primary Care MD  6.  Call our nurses sooner for issues or questions [phone] (Phil / Izzy / Herve)

## 2023-03-10 NOTE — PROGRESS NOTES
"Reason for Visit  Mimi Rivera is a 67 year old year old female who is being seen for RECHECK (Follow up visit with Mimi)    Pulmonary HPI    The patient was seen and examined by Ishmael Bridges MD   I had the pleasure of seeing Ms. Mimi Rivera in followup today for her severe COPD and emphysema at the [facility].  I last saw her by in-person visit 06/24/2022 and by virtual visit on 09/16/2022.    At the 06/20/2022 visit, she was having ongoing sinus symptoms but had not yet completed a course of antibiotics.  She was on Trelegy once daily and azithromycin low-dose (250 mg per day) to reduce exacerbations.  She also was using occasional albuterol rescue inhaler or nebulized albuterol, but neither helped her significantly.  She was able to walk a half mile and climb 1-2 flights of stairs -- diminished from in the past.  She was waking up at night for unclear reasons including a swelling in her epigastrium and some tightness in the mid chest that panic her and result in shortness of breath.  She was having augmented cough from her baseline yielding a thick \"vanilla pudding\" sputum.  On her exam she did not have any crackles, wheezing or rhonchi.  I thought she was having a flareup of a subacute nature and treated her with Augmentin for 10 days and a Medrol Dosepak.  We also discussed potentially using rotating antibiotics for flareups in the future.  I would like to add an anticholinergic nebulizer treatment trial, but the options were limited and the copay for glycopyrrolate (Lonhala Magnair) were very high.  She had not yet seen an ENT physician, although she had said she would make an appointment.    On return in 09/2022, she had had a recent cellulitis post-bee sting that was being treated with doxycycline with resulting dizziness and vomiting the prior night.  Overall, her sinus and pulmonary symptoms were somewhat better.  She was on " Trelegy and daily low-dose azithromycin with rare need for albuterol except for during flares.  Most flares were triggered by sinus infections.  She had no change in either direction of her exercise capacity or shortness of breath.  She was finally going to see ENT the next week.  She was switched from doxycycline to cephalexin by me.  I recommended that she get both a flu shot and the new COVID booster.    Subsequently, Mimi had a respiratory illness for which she had a negative COVID PCR testing and clear lungs at EDEN Hernandez CNP's exam.  She tells me that she had developed strep throat a few weeks ago and eventually saw the emergency room, where she had a negative chest x-ray apart from calcifications of the aortic arch on 02/14/2023.  She was seen by ENT on 02/18/2023 and treated with cefdinir for 8 days.  This resulted in her having a UTI and unfortunately it did not really clear her sinus symptoms.  She subsequently developed a UTI for which she is on day #3 or #4 of 5 days of treatment.    Current pulmonary symptoms include some increase in cough and sputum production since her sinus infection worsened in the last 2-3 weeks.  She continues on the Trelegy and low-dose azithromycin, plus occasional use of albuterol by either MDI or nebulizer (approximately 1 time per day on average).  She feels her sinuses still have not improved since her ENT visit on 02/18/2023.  She has had no hemoptysis and is not having chronic fevers, chills or sweats at night.  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  I did during my time with her look back at prior CT scans, and although there was no comment on the aortic arch calcification on the 2021 or 2019 chest CTs, there was mention of this on the 2017 chest CT.  So at least much of it is old.  I reviewed the images and report from her 03/12/2021 CT scan that showed central lobular  emphysema and some ground-glass opacity and architectural distortion along with an area of focal fibrosis in the posterolateral right upper lobe.  There is some scarring in the anterior medial right middle lobe and a 0.4 cm nodule in the right lower lobe without other abnormalities noted.    Otherwise, detailed pulmonary ROS and general ROS are noncontributory.            Current Outpatient Medications   Medication     amLODIPine (NORVASC) 10 MG tablet     amoxicillin-clavulanate (AUGMENTIN) 875-125 MG tablet     azithromycin (ZITHROMAX) 250 MG tablet     azithromycin (ZITHROMAX) 500 MG tablet     cetirizine (ZYRTEC) 10 MG tablet     EPINEPHrine (ANY BX GENERIC EQUIV) 0.3 MG/0.3ML injection 2-pack     levalbuterol (XOPENEX HFA) 45 MCG/ACT inhaler     levalbuterol (XOPENEX) 1.25 MG/3ML neb solution     TRELEGY ELLIPTA 100-62.5-25 MCG/ACT oral inhaler     VENTOLIN  (90 Base) MCG/ACT inhaler     No current facility-administered medications for this visit.     No Known Allergies  Past Medical History:   Diagnosis Date     ASCUS on Pap smear     unable to run HPV typing, f/u paps NIL     Chronic rhinosinusitis      COPD (chronic obstructive pulmonary disease) (H)      Depression, anxiety      Herpes     occasional outbreak     Hypertension        Past Surgical History:   Procedure Laterality Date      SECTION      x 3       Social History     Socioeconomic History     Marital status:      Spouse name: Not on file     Number of children: Not on file     Years of education: Not on file     Highest education level: Not on file   Occupational History     Not on file   Tobacco Use     Smoking status: Former     Packs/day: 10.00     Types: Cigarettes     Quit date: 3/19/2015     Years since quittin.9     Smokeless tobacco: Former     Quit date: 2017   Vaping Use     Vaping Use: Never used   Substance and Sexual Activity     Alcohol use: Yes     Alcohol/week: 2.0 - 3.0 standard drinks      "Types: 2 - 3 Standard drinks or equivalent per week     Comment: 2 drinks per wk avg     Drug use: No     Sexual activity: Yes     Partners: Male     Birth control/protection: Surgical     Comment: tubal   Other Topics Concern     Parent/sibling w/ CABG, MI or angioplasty before 65F 55M? No   Social History Narrative     Not on file     Social Determinants of Health     Financial Resource Strain: Not on file   Food Insecurity: Not on file   Transportation Needs: Not on file   Physical Activity: Not on file   Stress: Not on file   Social Connections: Not on file   Intimate Partner Violence: Not on file   Housing Stability: Not on file       ROS Pulmonary  A complete ROS was otherwise negative except as noted in the HPI.  BP (!) 146/83   Pulse 90   Resp 18   Ht 1.562 m (5' 1.5\")   Wt 48.5 kg (107 lb)   LMP 09/22/2010   SpO2 96%   BMI 19.89 kg/m    Exam:   GENERAL APPEARANCE: Well developed, well nourished, alert, and in no apparent distress. Frequent juicy/congested cough. With occ audible wheeze.  No stridor or tachypnea  EYES: PERRL, EOMI  HENT: Nasal mucosa with no edema and no hyperemia. No nasal polyps.  Mild bilateral maxillary tenderness  EARS: Canals clear, TMs normal - checked by me personally  MOUTH: Oral mucosa is moist, without any lesions, no tonsillar enlargement, no oropharyngeal exudate.  NECK: supple, no masses, no thyromegaly.  LYMPHATICS: No significant axillary, cervical, or supraclavicular nodes.  RESP: normal inspection, palpation, or percussion, good air flow throughout.  No crackles. No rhonchi. No wheezes or rub  CV: RRR Normal S1, S2, regular rhythm, normal rate. No murmur.  No rub. No gallop. No LE edema.   ABDOMEN:  deferred  MS: extremities normal. No clubbing. No cyanosis.  SKIN: no rash on limited exam  NEURO: Mentation intact, speech normal, normal strength and tone, normal gait and stance  PSYCH: mentation appears normal. and affect normal/bright  Results:  No results found for " "this or any previous visit (from the past 168 hour(s)).    Assessment and plan:   1.  Severe COPD with emphysema with major emphysema component.  2.  Chronic/recurrent sinus infections.  3.  Recent UTI.    Ms. Rivera has severe emphysema and COPD with frequent exacerbations.  She has generally done well on Trelegy and with daily low-dose azithromycin therapy (250 mg per day).  She has not had marked symptomatic relief from beta-agonist inhalers or nebulizer treatments, in general, in the past.  She is having a subacute flare of her COPD and likely triggered by ongoing/recurrent sinus infection that was partially treated.  I do not have access to the note from her ENT visit that should have occurred in late February and cannot find this in Care Everywhere.  Given her 3-4 weeks of increased symptoms including pulmonary symptoms, I will treat her with a Medrol Dosepak and also give her 14 days of Augmentin.  Hopefully, she will improve with this from both pulmonary and sinus perspectives.  She should see her ENT doctor if that component is not resolving.  It seems that the sinus infections are the primary  for a lot of her respiratory exacerbations.    She was quite concerned about the finding on chest x-ray of \"heavy calcification in the aorta.\" We discussed the finding of this on prior CT scans back to 2017.  This was somewhat reassuring.  We discussed whether or not it is appropriate for her to be on a statin drug which she was not enthusiastic about.  I recommended she discuss this directly with her primary care provider, Aye Morejon MD.  I will plan to see her back in 6 months' time with pulmonary function tests done.  She has the phone number for our nursing staff to discuss any issues that arise in the interim.    I spent a total of 50 minutes today, 03/10/2023, devoted to her care including time in chart review including review of past notes, review of her images and interpretations and prior PFTs, time " with the patient and time in documentation.    Ishmael Bridges MD      Answers for HPI/ROS submitted by the patient on 3/9/2023  General Symptoms: Yes  Skin Symptoms: No  HENT Symptoms: Yes  EYE SYMPTOMS: Yes  HEART SYMPTOMS: Yes  LUNG SYMPTOMS: Yes  INTESTINAL SYMPTOMS: No  URINARY SYMPTOMS: Yes  GYNECOLOGIC SYMPTOMS: No  BREAST SYMPTOMS: No  SKELETAL SYMPTOMS: No  BLOOD SYMPTOMS: No  NERVOUS SYSTEM SYMPTOMS: No  MENTAL HEALTH SYMPTOMS: No  Ear pain: No  Ear discharge: No  Hearing loss: No  Tinnitus: Yes  Nosebleeds: No  Congestion: Yes  Sinus pain: Yes  Trouble swallowing: No   Voice hoarseness: No  Mouth sores: No  Sore throat: Yes  Tooth pain: No  Gum tenderness: No  Bleeding gums: No  Change in taste: Yes  Change in sense of smell: Yes  Dry mouth: No  Hearing aid used: No  Neck lump: No  Fever: No  Loss of appetite: No  Weight loss: No  Weight gain: No  Fatigue: Yes  Night sweats: Yes  Chills: Yes  Increased stress: Yes  Excessive hunger: No  Excessive thirst: No  Feeling hot or cold when others believe the temperature is normal: No  Loss of height: No  Post-operative complications: No  Surgical site pain: No  Hallucinations: No  Change in or Loss of Energy: Yes  Hyperactivity: No  Chest pain or pressure: No  Fast or irregular heartbeat: Yes  Pain in legs with walking: No  Trouble breathing while lying down: No  Fingers or toes appear blue: No  High blood pressure: Yes  Low blood pressure: No  Fainting: No  Murmurs: No  Pacemaker: No  Varicose veins: Yes  Edema or swelling: No  Wake up at night with shortness of breath: Yes  Light-headedness: Yes  Exercise intolerance: Yes  Cough: Yes  Sputum or phlegm: Yes  Coughing up blood: No  Difficulty breating or shortness of breath: Yes  Snoring: Yes  Wheezing: Yes  Difficulty breathing on exertion: Yes  Nighttime Cough: Yes  Difficulty breathing when lying flat: Yes  Trouble holding urine or incontinence: Yes  Pain or burning: Yes  Trouble starting or stopping:  No  Increased frequency of urination: Yes  Blood in urine: Yes  Frequent nighttime urination: Yes  Flank pain: Yes  Difficulty emptying bladder: No

## 2023-03-10 NOTE — NURSING NOTE
Chief Complaint   Patient presents with     RECHECK     Follow up visit with Mimi Quick, CMA CMA at 11:24 AM on 3/10/2023

## 2023-03-10 NOTE — LETTER
"    3/10/2023         RE: Mimi Rivera  [address] [place] [address]        Dear Colleague,    Thank you for referring your patient, Mimi Rivera, to the [facility] [place]. Please see a copy of my visit note below.    Reason for Visit  Mimi Rivera is a 67 year old year old female who is being seen for RECHECK (Follow up visit with Mimi)    Pulmonary HPI    The patient was seen and examined by Ishmael Bridges MD   I had the pleasure of seeing Ms. Mimi Rivera in followup today for her severe COPD and emphysema at the [facility].  I last saw her by in-person visit 06/24/2022 and by virtual visit on 09/16/2022.    At the 06/20/2022 visit, she was having ongoing sinus symptoms but had not yet completed a course of antibiotics.  She was on Trelegy once daily and azithromycin low-dose (250 mg per day) to reduce exacerbations.  She also was using occasional albuterol rescue inhaler or nebulized albuterol, but neither helped her significantly.  She was able to walk a half mile and climb 1-2 flights of stairs -- diminished from in the past.  She was waking up at night for unclear reasons including a swelling in her epigastrium and some tightness in the mid chest that panic her and result in shortness of breath.  She was having augmented cough from her baseline yielding a thick \"vanilla pudding\" sputum.  On her exam she did not have any crackles, wheezing or rhonchi.  I thought she was having a flareup of a subacute nature and treated her with Augmentin for 10 days and a Medrol Dosepak.  We also discussed potentially using rotating antibiotics for flareups in the future.  I would like to add an anticholinergic nebulizer treatment trial, but the options were limited and the copay for glycopyrrolate (Lonhala Magnair) were very high.  She had not yet seen an ENT physician, although " she had said she would make an appointment.    On return in 09/2022, she had had a recent cellulitis post-bee sting that was being treated with doxycycline with resulting dizziness and vomiting the prior night.  Overall, her sinus and pulmonary symptoms were somewhat better.  She was on Trelegy and daily low-dose azithromycin with rare need for albuterol except for during flares.  Most flares were triggered by sinus infections.  She had no change in either direction of her exercise capacity or shortness of breath.  She was finally going to see ENT the next week.  She was switched from doxycycline to cephalexin by me.  I recommended that she get both a flu shot and the new COVID booster.    Subsequently, Mimi had a respiratory illness for which she had a negative COVID PCR testing and clear lungs at EDEN Hernandez CNP's exam.  She tells me that she had developed strep throat a few weeks ago and eventually saw the emergency room, where she had a negative chest x-ray apart from calcifications of the aortic arch on 02/14/2023.  She was seen by ENT on 02/18/2023 and treated with cefdinir for 8 days.  This resulted in her having a UTI and unfortunately it did not really clear her sinus symptoms.  She subsequently developed a UTI for which she is on day #3 or #4 of 5 days of treatment.    Current pulmonary symptoms include some increase in cough and sputum production since her sinus infection worsened in the last 2-3 weeks.  She continues on the Trelegy and low-dose azithromycin, plus occasional use of albuterol by either MDI or nebulizer (approximately 1 time per day on average).  She feels her sinuses still have not improved since her ENT visit on 02/18/2023.  She has had no hemoptysis and is not having chronic fevers, chills or sweats at night.  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  I did during my time with her look back at  prior CT scans, and although there was no comment on the aortic arch calcification on the  or  chest CTs, there was mention of this on the 2017 chest CT.  So at least much of it is old.  I reviewed the images and report from her 2021 CT scan that showed central lobular emphysema and some ground-glass opacity and architectural distortion along with an area of focal fibrosis in the posterolateral right upper lobe.  There is some scarring in the anterior medial right middle lobe and a 0.4 cm nodule in the right lower lobe without other abnormalities noted.    Otherwise, detailed pulmonary ROS and general ROS are noncontributory.            Current Outpatient Medications   Medication     amLODIPine (NORVASC) 10 MG tablet     amoxicillin-clavulanate (AUGMENTIN) 875-125 MG tablet     azithromycin (ZITHROMAX) 250 MG tablet     azithromycin (ZITHROMAX) 500 MG tablet     cetirizine (ZYRTEC) 10 MG tablet     EPINEPHrine (ANY BX GENERIC EQUIV) 0.3 MG/0.3ML injection 2-pack     levalbuterol (XOPENEX HFA) 45 MCG/ACT inhaler     levalbuterol (XOPENEX) 1.25 MG/3ML neb solution     TRELEGY ELLIPTA 100-62.5-25 MCG/ACT oral inhaler     VENTOLIN  (90 Base) MCG/ACT inhaler     No current facility-administered medications for this visit.     No Known Allergies  Past Medical History:   Diagnosis Date     ASCUS on Pap smear     unable to run HPV typing, f/u paps NIL     Chronic rhinosinusitis      COPD (chronic obstructive pulmonary disease) (H)      Depression, anxiety      Herpes     occasional outbreak     Hypertension        Past Surgical History:   Procedure Laterality Date      SECTION      x 3       Social History     Socioeconomic History     Marital status:      Spouse name: Not on file     Number of children: Not on file     Years of education: Not on file     Highest education level: Not on file   Occupational History     Not on file   Tobacco Use     Smoking status: Former     Packs/day:  "10.00     Types: Cigarettes     Quit date: 3/19/2015     Years since quittin.9     Smokeless tobacco: Former     Quit date: 2017   Vaping Use     Vaping Use: Never used   Substance and Sexual Activity     Alcohol use: Yes     Alcohol/week: 2.0 - 3.0 standard drinks     Types: 2 - 3 Standard drinks or equivalent per week     Comment: 2 drinks per wk avg     Drug use: No     Sexual activity: Yes     Partners: Male     Birth control/protection: Surgical     Comment: tubal   Other Topics Concern     Parent/sibling w/ CABG, MI or angioplasty before 65F 55M? No   Social History Narrative     Not on file     Social Determinants of Health     Financial Resource Strain: Not on file   Food Insecurity: Not on file   Transportation Needs: Not on file   Physical Activity: Not on file   Stress: Not on file   Social Connections: Not on file   Intimate Partner Violence: Not on file   Housing Stability: Not on file       ROS Pulmonary  A complete ROS was otherwise negative except as noted in the HPI.  BP (!) 146/83   Pulse 90   Resp 18   Ht 1.562 m (5' 1.5\")   Wt 48.5 kg (107 lb)   LMP 2010   SpO2 96%   BMI 19.89 kg/m    Exam:   GENERAL APPEARANCE: Well developed, well nourished, alert, and in no apparent distress. Frequent juicy/congested cough. With occ audible wheeze.  No stridor or tachypnea  EYES: PERRL, EOMI  HENT: Nasal mucosa with no edema and no hyperemia. No nasal polyps.  Mild bilateral maxillary tenderness  EARS: Canals clear, TMs normal - checked by me personally  MOUTH: Oral mucosa is moist, without any lesions, no tonsillar enlargement, no oropharyngeal exudate.  NECK: supple, no masses, no thyromegaly.  LYMPHATICS: No significant axillary, cervical, or supraclavicular nodes.  RESP: normal inspection, palpation, or percussion, good air flow throughout.  No crackles. No rhonchi. No wheezes or rub  CV: RRR Normal S1, S2, regular rhythm, normal rate. No murmur.  No rub. No gallop. No LE edema. " "  ABDOMEN:  deferred  MS: extremities normal. No clubbing. No cyanosis.  SKIN: no rash on limited exam  NEURO: Mentation intact, speech normal, normal strength and tone, normal gait and stance  PSYCH: mentation appears normal. and affect normal/bright  Results:  No results found for this or any previous visit (from the past 168 hour(s)).    Assessment and plan:   1.  Severe COPD with emphysema with major emphysema component.  2.  Chronic/recurrent sinus infections.  3.  Recent UTI.    Ms. Rivera has severe emphysema and COPD with frequent exacerbations.  She has generally done well on Trelegy and with daily low-dose azithromycin therapy (250 mg per day).  She has not had marked symptomatic relief from beta-agonist inhalers or nebulizer treatments, in general, in the past.  She is having a subacute flare of her COPD and likely triggered by ongoing/recurrent sinus infection that was partially treated.  I do not have access to the note from her ENT visit that should have occurred in late February and cannot find this in Care Everywhere.  Given her 3-4 weeks of increased symptoms including pulmonary symptoms, I will treat her with a Medrol Dosepak and also give her 14 days of Augmentin.  Hopefully, she will improve with this from both pulmonary and sinus perspectives.  She should see her ENT doctor if that component is not resolving.  It seems that the sinus infections are the primary  for a lot of her respiratory exacerbations.    She was quite concerned about the finding on chest x-ray of \"heavy calcification in the aorta.\" We discussed the finding of this on prior CT scans back to 2017.  This was somewhat reassuring.  We discussed whether or not it is appropriate for her to be on a statin drug which she was not enthusiastic about.  I recommended she discuss this directly with her primary care provider, Aye Morejon MD.  I will plan to see her back in 6 months' time with pulmonary function tests done.  She has " the phone number for our nursing staff to discuss any issues that arise in the interim.    I spent a total of 50 minutes today, 03/10/2023, devoted to her care including time in chart review including review of past notes, review of her images and interpretations and prior PFTs, time with the patient and time in documentation.    Ishmael Bridges MD      Answers for HPI/ROS submitted by the patient on 3/9/2023  General Symptoms: Yes  Skin Symptoms: No  HENT Symptoms: Yes  EYE SYMPTOMS: Yes  HEART SYMPTOMS: Yes  LUNG SYMPTOMS: Yes  INTESTINAL SYMPTOMS: No  URINARY SYMPTOMS: Yes  GYNECOLOGIC SYMPTOMS: No  BREAST SYMPTOMS: No  SKELETAL SYMPTOMS: No  BLOOD SYMPTOMS: No  NERVOUS SYSTEM SYMPTOMS: No  MENTAL HEALTH SYMPTOMS: No  Ear pain: No  Ear discharge: No  Hearing loss: No  Tinnitus: Yes  Nosebleeds: No  Congestion: Yes  Sinus pain: Yes  Trouble swallowing: No   Voice hoarseness: No  Mouth sores: No  Sore throat: Yes  Tooth pain: No  Gum tenderness: No  Bleeding gums: No  Change in taste: Yes  Change in sense of smell: Yes  Dry mouth: No  Hearing aid used: No  Neck lump: No  Fever: No  Loss of appetite: No  Weight loss: No  Weight gain: No  Fatigue: Yes  Night sweats: Yes  Chills: Yes  Increased stress: Yes  Excessive hunger: No  Excessive thirst: No  Feeling hot or cold when others believe the temperature is normal: No  Loss of height: No  Post-operative complications: No  Surgical site pain: No  Hallucinations: No  Change in or Loss of Energy: Yes  Hyperactivity: No  Chest pain or pressure: No  Fast or irregular heartbeat: Yes  Pain in legs with walking: No  Trouble breathing while lying down: No  Fingers or toes appear blue: No  High blood pressure: Yes  Low blood pressure: No  Fainting: No  Murmurs: No  Pacemaker: No  Varicose veins: Yes  Edema or swelling: No  Wake up at night with shortness of breath: Yes  Light-headedness: Yes  Exercise intolerance: Yes  Cough: Yes  Sputum or phlegm: Yes  Coughing up blood:  No  Difficulty breating or shortness of breath: Yes  Snoring: Yes  Wheezing: Yes  Difficulty breathing on exertion: Yes  Nighttime Cough: Yes  Difficulty breathing when lying flat: Yes  Trouble holding urine or incontinence: Yes  Pain or burning: Yes  Trouble starting or stopping: No  Increased frequency of urination: Yes  Blood in urine: Yes  Frequent nighttime urination: Yes  Flank pain: Yes  Difficulty emptying bladder: No

## 2023-03-26 ENCOUNTER — HEALTH MAINTENANCE LETTER (OUTPATIENT)
Age: 68
End: 2023-03-26

## 2023-06-08 DIAGNOSIS — J44.9 CHRONIC OBSTRUCTIVE PULMONARY DISEASE, UNSPECIFIED COPD TYPE (H): ICD-10-CM

## 2023-06-08 RX ORDER — ALBUTEROL SULFATE 90 UG/1
AEROSOL, METERED RESPIRATORY (INHALATION)
Qty: 18 G | Refills: 0 | Status: SHIPPED | OUTPATIENT
Start: 2023-06-08 | End: 2023-09-25

## 2023-08-20 ENCOUNTER — HEALTH MAINTENANCE LETTER (OUTPATIENT)
Age: 68
End: 2023-08-20

## 2023-09-18 ENCOUNTER — OFFICE VISIT (OUTPATIENT)
Dept: FAMILY MEDICINE | Facility: CLINIC | Age: 68
End: 2023-09-18
Payer: COMMERCIAL

## 2023-09-18 VITALS
DIASTOLIC BLOOD PRESSURE: 86 MMHG | HEIGHT: 62 IN | OXYGEN SATURATION: 93 % | HEART RATE: 90 BPM | SYSTOLIC BLOOD PRESSURE: 130 MMHG | RESPIRATION RATE: 20 BRPM | TEMPERATURE: 97.2 F | WEIGHT: 108 LBS | BODY MASS INDEX: 19.88 KG/M2

## 2023-09-18 DIAGNOSIS — Z12.11 SCREEN FOR COLON CANCER: ICD-10-CM

## 2023-09-18 DIAGNOSIS — N39.3 FEMALE STRESS INCONTINENCE: ICD-10-CM

## 2023-09-18 DIAGNOSIS — J44.1 COPD WITH ACUTE EXACERBATION (H): Primary | ICD-10-CM

## 2023-09-18 PROCEDURE — 99213 OFFICE O/P EST LOW 20 MIN: CPT | Performed by: FAMILY MEDICINE

## 2023-09-18 ASSESSMENT — ANXIETY QUESTIONNAIRES
4. TROUBLE RELAXING: SEVERAL DAYS
3. WORRYING TOO MUCH ABOUT DIFFERENT THINGS: SEVERAL DAYS
8. IF YOU CHECKED OFF ANY PROBLEMS, HOW DIFFICULT HAVE THESE MADE IT FOR YOU TO DO YOUR WORK, TAKE CARE OF THINGS AT HOME, OR GET ALONG WITH OTHER PEOPLE?: SOMEWHAT DIFFICULT
7. FEELING AFRAID AS IF SOMETHING AWFUL MIGHT HAPPEN: NOT AT ALL
GAD7 TOTAL SCORE: 5
IF YOU CHECKED OFF ANY PROBLEMS ON THIS QUESTIONNAIRE, HOW DIFFICULT HAVE THESE PROBLEMS MADE IT FOR YOU TO DO YOUR WORK, TAKE CARE OF THINGS AT HOME, OR GET ALONG WITH OTHER PEOPLE: SOMEWHAT DIFFICULT
GAD7 TOTAL SCORE: 5
GAD7 TOTAL SCORE: 5
1. FEELING NERVOUS, ANXIOUS, OR ON EDGE: SEVERAL DAYS
2. NOT BEING ABLE TO STOP OR CONTROL WORRYING: SEVERAL DAYS
7. FEELING AFRAID AS IF SOMETHING AWFUL MIGHT HAPPEN: NOT AT ALL
6. BECOMING EASILY ANNOYED OR IRRITABLE: SEVERAL DAYS
5. BEING SO RESTLESS THAT IT IS HARD TO SIT STILL: NOT AT ALL

## 2023-09-18 ASSESSMENT — PATIENT HEALTH QUESTIONNAIRE - PHQ9
10. IF YOU CHECKED OFF ANY PROBLEMS, HOW DIFFICULT HAVE THESE PROBLEMS MADE IT FOR YOU TO DO YOUR WORK, TAKE CARE OF THINGS AT HOME, OR GET ALONG WITH OTHER PEOPLE: SOMEWHAT DIFFICULT
SUM OF ALL RESPONSES TO PHQ QUESTIONS 1-9: 3
SUM OF ALL RESPONSES TO PHQ QUESTIONS 1-9: 3

## 2023-09-18 ASSESSMENT — ENCOUNTER SYMPTOMS: COUGH: 1

## 2023-09-18 NOTE — PROGRESS NOTES
Assessment & Plan     COPD with acute exacerbation (H)  Treat exacerbation with Augmentin. Continue inhalers. Follow up with pulmonoogy  - amoxicillin-clavulanate (AUGMENTIN) 875-125 MG tablet; Take 1 tablet by mouth 2 times daily for 10 days    Screen for colon cancer  - Colonoscopy Screening  Referral; Future    Female stress incontinence  - Physical Therapy Referral; Future        DO RIAZ Naranjo [facility] PRIOR COURTNEY Le is a 68 year old, presenting for the following health issues:  Recheck Medication and Cough        9/18/2023     4:42 PM   Additional Questions   Roomed by Kailyn GARCIA CMA       History of Present Illness       COPD:  She presents for follow up of COPD.   Overall, COPD symptoms are slightly worse since last visit. She has more than usual fatigue or shortness of breath with exertion and more than usual shortness of breath at rest.  She sometimes coughs and does have change in sputum. Patient has had recent fever. She can walk 2-5 blocks without stopping to rest. She can walk 2 flights of stairs without resting. The patient has had no ED, urgent care, or hospital admissions because of COPD since the last visit.     Hypertension: She presents for follow up of hypertension.  She does not check blood pressure  regularly outside of the clinic. Outside blood pressures have been over 140/90. She follows a low salt diet.     She eats 2-3 servings of fruits and vegetables daily.She consumes 1 sweetened beverage(s) daily.She exercises with enough effort to increase her heart rate 20 to 29 minutes per day.  She exercises with enough effort to increase her heart rate 3 or less days per week. She is missing 1 dose(s) of medications per week.  She is not taking prescribed medications regularly due to remembering to take.       Concern - Cough  Onset: 2 weeks ago  Description: comes and goes   Intensity: moderate  Progression of Symptoms:  worsening  Accompanying  "Signs & Symptoms: night sweats  Previous history of similar problem: none  Precipitating factors:        Worsened by: none  Alleviating factors:        Improved by: none  Therapies tried and outcome: None      She feels like she has a cold all the time. Feels congestion in her head, lungs, postnasal  drip. Inhalers help a little bit. Cough is productive - yellow in color. No hemoptysis. Wheezing is present.     Review of Systems   Respiratory:  Positive for cough.           Objective    /86   Pulse 90   Temp 97.2  F (36.2  C) (Tympanic)   Resp 20   Ht 1.562 m (5' 1.5\")   Wt 49 kg (108 lb)   LMP 09/22/2010   SpO2 93%   BMI 20.08 kg/m    Body mass index is 20.08 kg/m .  Physical Exam   GENERAL: healthy, alert and no distress  HENT: ear canals and TM's normal, nose and mouth without ulcers or lesions  NECK: no adenopathy and no asymmetry, masses, or scars  RESP: wheezing present throughout all lung fields  CV: regular rates and rhythm, normal S1 S2, no S3 or S4, and no murmur, click or rub  PSYCH: mentation appears normal, affect normal/bright                  "

## 2023-09-22 ENCOUNTER — OFFICE VISIT (OUTPATIENT)
Dept: PULMONOLOGY | Facility: CLINIC | Age: 68
End: 2023-09-22
Attending: INTERNAL MEDICINE
Payer: COMMERCIAL

## 2023-09-22 VITALS — OXYGEN SATURATION: 94 % | DIASTOLIC BLOOD PRESSURE: 91 MMHG | HEART RATE: 84 BPM | SYSTOLIC BLOOD PRESSURE: 160 MMHG

## 2023-09-22 DIAGNOSIS — J42 ACUTE EXACERBATION OF CHRONIC BRONCHITIS (H): ICD-10-CM

## 2023-09-22 DIAGNOSIS — K21.9 GASTROESOPHAGEAL REFLUX DISEASE WITHOUT ESOPHAGITIS: ICD-10-CM

## 2023-09-22 DIAGNOSIS — F32.1 MODERATE MAJOR DEPRESSION (H): ICD-10-CM

## 2023-09-22 DIAGNOSIS — J43.1 PANLOBULAR EMPHYSEMA (H): ICD-10-CM

## 2023-09-22 DIAGNOSIS — J20.9 ACUTE EXACERBATION OF CHRONIC BRONCHITIS (H): ICD-10-CM

## 2023-09-22 DIAGNOSIS — J32.9 CHRONIC SINUSITIS, UNSPECIFIED LOCATION: Primary | ICD-10-CM

## 2023-09-22 PROCEDURE — G0463 HOSPITAL OUTPT CLINIC VISIT: HCPCS | Performed by: INTERNAL MEDICINE

## 2023-09-22 PROCEDURE — 99215 OFFICE O/P EST HI 40 MIN: CPT | Performed by: INTERNAL MEDICINE

## 2023-09-22 RX ORDER — METHYLPREDNISOLONE 4 MG
TABLET, DOSE PACK ORAL
Qty: 21 TABLET | Refills: 3 | Status: SHIPPED | OUTPATIENT
Start: 2023-09-22 | End: 2023-12-05

## 2023-09-22 ASSESSMENT — PAIN SCALES - GENERAL: PAINLEVEL: NO PAIN (0)

## 2023-09-22 NOTE — NURSING NOTE
Chief Complaint   Patient presents with    Follow Up     6 month follow up      Vitals were taken and medications were reconciled.     Sondra Manning RMA  11:36 AM

## 2023-09-22 NOTE — PROGRESS NOTES
"Reason for Visit    Mimi Rivera is a 68 year old year old female who is being seen for severe emphysema / COPD    Pulmonary HPI:    The patient was seen and examined by Ishmael Bridges MD     I had the pleasure of seeing Ms. Rivera today at the [facility] in follow-up for her severe emphysema / COPD and chronic rhinosinusitis.      I last saw her in clinic on 3/10/2023.  At that time she had some increased cough and sputum production with a worsening sinus infection for 2 to 3 weeks.  She was on Trelegy and low-dose daily azithromycin plus occasional albuterol or leave albuterol by MDI or nebulizer.  She felt her sinuses were not improved.  She denied hemoptysis, fevers, chills.  She also was concerned about the chest x-ray interpretation of \"heavy atherosclerotic calcification of the aortic arch.\".  Her exam was not remarkable.  I felt that she was having a subacute flare of COPD likely triggered by ongoing/recurrent sinus infection that is only partially been treated.  I did not have access to outside ENT note.  Based on her 3 to 4 weeks of symptoms I treated her with a Medrol Dosepak and 14 days of Augmentin.  I also explained to her that the aortic calcification dated back 2017 recommended she discussed appropriateness of statin therapy with her primary care physician.  I also reviewed her visit with her primary care Dr. Xu MAR on 9/18/2023 when he felt that her COPD symptoms were slightly worse in the past with more fatigue shortness of breath and apparently recent fever.  She told him she could walk 2-5 blocks without stopping to rest was able to climb 2 flights of stairs without stopping.  He began her according (according to the patient) Augmentin.    She returns to clinic today the Trelegy low strength and daily azithromycin 250 mg plus occasional use of MDI or nebulizer.  She complains of \"a full head and chest with junk in " "there.\"  She had sinus surgery approximately year ago.  After seeing me in clinic she also did a follow-up visit with Dr. Moncho Rodríguez of the [facility] ENT group.  He felt that the surgery had healed well his note says that she was not particularly interested in trying therapy as he had to offer-although that is not her memory of the situation.  She tells me she can walk 2-3 blocks but that in the spring time she was able to walk a couple of miles without stopping.  She has a cough that is productive of thick yellow to white phlegm that sometimes tastes bad after coughing hard.  She denies hemoptysis.  She hears recent wheezing that is very difficult to clear.  It is easy for her to cough up phlegm.  She tells me she wakes up 1-3 times per night partially due to her bleeding breathing.  She recently started herself on omeprazole due to GERD symptoms that started with the Augmentin.  Her cough is worse at night.  She occasionally uses leave albuterol nebulizer treatments.  Otherwise there is no change in her detailed pulmonary ROS.    I personally reviewed the images ports from her last PFTs in May 2019 with FEV1/FVC = 1.2/2.1 (53/72% predicted, respectively) for ratio of 15 9%.  Her most recent chest x-ray is from 2/14/2023 that showed the heavy aortic arch calcification but no other abnormalities.  Most recent chest CT is from 3/12/2021 showed centrilobular emphysema with some groundglass opacities and distortion in the posterior lateral right upper lobe and some scarring in the anterior medial right middle lobe.  A right lower lobe lung nodule measured 0.4 cm..    Current Outpatient Medications   Medication    albuterol (PROAIR HFA/PROVENTIL HFA/VENTOLIN HFA) 108 (90 Base) MCG/ACT inhaler    amLODIPine (NORVASC) 10 MG tablet    amoxicillin-clavulanate (AUGMENTIN) 875-125 MG tablet    azithromycin (ZITHROMAX) 250 MG tablet    cetirizine (ZYRTEC) 10 MG tablet    EPINEPHrine (ANY BX GENERIC EQUIV) 0.3 MG/0.3ML injection " 2-pack    levalbuterol (XOPENEX HFA) 45 MCG/ACT inhaler    levalbuterol (XOPENEX) 1.25 MG/3ML neb solution    TRELEGY ELLIPTA 100-62.5-25 MCG/ACT oral inhaler    methylPREDNISolone (MEDROL DOSEPAK) 4 MG tablet therapy pack     No current facility-administered medications for this visit.     No Known Allergies  Past Medical History:   Diagnosis Date    ASCUS on Pap smear     unable to run HPV typing, f/u paps NIL    Chronic rhinosinusitis     COPD (chronic obstructive pulmonary disease) (H)     Depression, anxiety     Herpes     occasional outbreak    Hypertension      Past Surgical History:   Procedure Laterality Date     SECTION      x 3     Social History     Socioeconomic History    Marital status:      Spouse name: Not on file    Number of children: Not on file    Years of education: Not on file    Highest education level: Not on file   Occupational History    Not on file   Tobacco Use    Smoking status: Former     Packs/day: 10.00     Types: Cigarettes     Quit date: 3/19/2015     Years since quittin.5    Smokeless tobacco: Former     Quit date: 2017   Vaping Use    Vaping Use: Never used   Substance and Sexual Activity    Alcohol use: Yes     Alcohol/week: 2.0 - 3.0 standard drinks of alcohol     Types: 2 - 3 Standard drinks or equivalent per week     Comment: 2 drinks per wk avg    Drug use: No    Sexual activity: Yes     Partners: Male     Birth control/protection: Surgical     Comment: tubal   Other Topics Concern    Parent/sibling w/ CABG, MI or angioplasty before 65F 55M? No   Social History Narrative    Not on file     Social Determinants of Health     Financial Resource Strain: Not on file   Food Insecurity: Not on file   Transportation Needs: Not on file   Physical Activity: Not on file   Stress: Not on file   Social Connections: Not on file   Interpersonal Safety: Not on file   Housing Stability: Not on file       ROS Pulmonary:  A complete ROS was otherwise negative  except as noted in the HPI.    Exam:   BP (!) 160/91 (BP Location: Right arm, Patient Position: Sitting, Cuff Size: Adult Small)   Pulse 84   LMP 09/22/2010   SpO2 94%   GENERAL APPEARANCE: Well developed, well nourished, alert, and in no apparent distress. No tachypnea, stridor, cough, or audible wheeze.  Somewhat depressed affect.  EYES: PERRL, EOMI  HENT: Nasal mucosa with no edema and no hyperemia. No nasal polyps. No sinus tenderness.  EARS: Canals clear, TMs normal  MOUTH: Oral mucosa is moist, without any lesions, no tonsillar enlargement, no oropharyngeal exudate.  NECK: supple, no masses, no thyromegaly.  LYMPHATICS: No significant axillary, cervical, or supraclavicular nodes.  RESP: normal inspection, palpation, percussion, with good air flow throughout.  No crackles. No rhonchi. Diffuse loud, relatively monophonic wheeze throughout chest..   CV: RRR Normal S1, S2, regular rhythm, normal rate. No murmur.  No rub. No gallop. No LE edema.   ABDOMEN:  Deferred.   MS: extremities normal. No clubbing. No cyanosis.  SKIN: no rash on limited exam  NEURO: Mentation intact, speech normal, normal strength and tone, normal gait and stance  PSYCH: mentation appears normal. and affect normal/bright    Results:  No results found for this or any previous visit (from the past 168 hour(s)).    Assessment and plan:  Encounter Diagnoses   Name Primary?    Panlobular emphysema (H)     Acute exacerbation of chronic bronchitis (H)       Ms. Hilario was recently started on Augmentin for her acute exacerbation of chronic bronchitis by Dr. Oren Peacock.  I will add Medrol Dosepak to this since she is not yet responding and complains of a lot of secretions in his wheezing.  She will continue her other COPD meds including lower strength Trelegy and daily azithromycin.  I suggest she consider seeing an allergist to assess allergic component of her chronic sinusitis symptoms.  It seems there is a miscommunication her meeting with   Marcos as she continues to have chronic sinus complaints.  Its been a long time since she is on a sinus CT scan.  She also is having more active GERD symptoms.  I ordered a CT scan without contrast of the chest and sinuses to look for ongoing structural abnormalities.  I also ordered Nexium 20 mg twice daily to do with her acid reflux.  She was instructed to call our lung nurses if she is not improving I will plan to see her back in 6 months time.  I will call her with results of the CT scans when available.  If they cannot be done today here at [facility] they will be done in [place].    It was recommended that she get the flu shot and updated COVID vaccine - best after completing medrol dose edwige    I spent a total of 50 minutes dedicated to her care today, 09/22/23, including review of her past medical records, review of past images, reports and PFTs with the patient today and in documentation.     Ishmael Bridges MD  Professor of Medicine  Past President, American Thoracic SocietyAnswers submitted by the patient for this visit:  Symptoms you have experienced in the last 30 days (Submitted on 9/22/2023)  General Symptoms: Yes  Skin Symptoms: No  LUNG SYMPTOMS: Yes  GYNECOLOGIC SYMPTOMS: No  BREAST SYMPTOMS: No

## 2023-09-22 NOTE — PATIENT INSTRUCTIONS
Severe Emphysema  Chronic Sinus symptoms - post surgery  Reflux symptoms    Plan:   Medrol Dose Jesus in addition to course of augmentin  Consider seeing allergist to assess allergic component of chronic rhinosinusitis symptoms  Continue COPD meds - Trelegy and daily azithromycin  CT of chest and sinuses to look for ongong structural abnormalities - I will call you with results  Add nexium 20 mgs twice daily for acid reflux  Next clinic appointment in 6 months  Get flu shot and updated COVID vaccination - after completing medrol dose jesus  For problems or issues please call our lung nurses [phone]

## 2023-09-22 NOTE — LETTER
"    9/22/2023         RE: Mimi Rivera  [address] [place] [address]        Dear Colleague,    Thank you for referring your patient, Mimi Rivera, to the [facility] [place]. Please see a copy of my visit note below.    Reason for Visit    Mimi Rivera is a 68 year old year old female who is being seen for severe emphysema / COPD    Pulmonary HPI:    The patient was seen and examined by Ishmael Bridges MD     I had the pleasure of seeing Ms. Rivera today at the [facility] in follow-up for her severe emphysema / COPD and chronic rhinosinusitis.      I last saw her in clinic on 3/10/2023.  At that time she had some increased cough and sputum production with a worsening sinus infection for 2 to 3 weeks.  She was on Trelegy and low-dose daily azithromycin plus occasional albuterol or leave albuterol by MDI or nebulizer.  She felt her sinuses were not improved.  She denied hemoptysis, fevers, chills.  She also was concerned about the chest x-ray interpretation of \"heavy atherosclerotic calcification of the aortic arch.\".  Her exam was not remarkable.  I felt that she was having a subacute flare of COPD likely triggered by ongoing/recurrent sinus infection that is only partially been treated.  I did not have access to outside ENT note.  Based on her 3 to 4 weeks of symptoms I treated her with a Medrol Dosepak and 14 days of Augmentin.  I also explained to her that the aortic calcification dated back 2017 recommended she discussed appropriateness of statin therapy with her primary care physician.  I also reviewed her visit with her primary care Dr. Xu MAR on 9/18/2023 when he felt that her COPD symptoms were slightly worse in the past with more fatigue shortness of breath and apparently recent fever.  She told him she could walk 2-5 blocks without stopping to rest was able " "to climb 2 flights of stairs without stopping.  He began her according (according to the patient) Augmentin.    She returns to clinic today the Trelegy low strength and daily azithromycin 250 mg plus occasional use of MDI or nebulizer.  She complains of \"a full head and chest with junk in there.\"  She had sinus surgery approximately year ago.  After seeing me in clinic she also did a follow-up visit with Dr. Moncho Rodríguez of the [email or web address] group.  He felt that the surgery had healed well his note says that she was not particularly interested in trying therapy as he had to offer-although that is not her memory of the situation.  She tells me she can walk 2-3 blocks but that in the spring time she was able to walk a couple of miles without stopping.  She has a cough that is productive of thick yellow to white phlegm that sometimes tastes bad after coughing hard.  She denies hemoptysis.  She hears recent wheezing that is very difficult to clear.  It is easy for her to cough up phlegm.  She tells me she wakes up 1-3 times per night partially due to her bleeding breathing.  She recently started herself on omeprazole due to GERD symptoms that started with the Augmentin.  Her cough is worse at night.  She occasionally uses leave albuterol nebulizer treatments.  Otherwise there is no change in her detailed pulmonary ROS.    I personally reviewed the images ports from her last PFTs in May 2019 with FEV1/FVC = 1.2/2.1 (53/72% predicted, respectively) for ratio of 15 9%.  Her most recent chest x-ray is from 2/14/2023 that showed the heavy aortic arch calcification but no other abnormalities.  Most recent chest CT is from 3/12/2021 showed centrilobular emphysema with some groundglass opacities and distortion in the posterior lateral right upper lobe and some scarring in the anterior medial right middle lobe.  A right lower lobe lung nodule measured 0.4 cm..    Current Outpatient Medications   Medication     albuterol (PROAIR " HFA/PROVENTIL HFA/VENTOLIN HFA) 108 (90 Base) MCG/ACT inhaler     amLODIPine (NORVASC) 10 MG tablet     amoxicillin-clavulanate (AUGMENTIN) 875-125 MG tablet     azithromycin (ZITHROMAX) 250 MG tablet     cetirizine (ZYRTEC) 10 MG tablet     EPINEPHrine (ANY BX GENERIC EQUIV) 0.3 MG/0.3ML injection 2-pack     levalbuterol (XOPENEX HFA) 45 MCG/ACT inhaler     levalbuterol (XOPENEX) 1.25 MG/3ML neb solution     TRELEGY ELLIPTA 100-62.5-25 MCG/ACT oral inhaler     methylPREDNISolone (MEDROL DOSEPAK) 4 MG tablet therapy pack     No current facility-administered medications for this visit.     No Known Allergies  Past Medical History:   Diagnosis Date     ASCUS on Pap smear     unable to run HPV typing, f/u paps NIL     Chronic rhinosinusitis      COPD (chronic obstructive pulmonary disease) (H)      Depression, anxiety      Herpes     occasional outbreak     Hypertension      Past Surgical History:   Procedure Laterality Date      SECTION      x 3     Social History     Socioeconomic History     Marital status:      Spouse name: Not on file     Number of children: Not on file     Years of education: Not on file     Highest education level: Not on file   Occupational History     Not on file   Tobacco Use     Smoking status: Former     Packs/day: 10.00     Types: Cigarettes     Quit date: 3/19/2015     Years since quittin.5     Smokeless tobacco: Former     Quit date: 2017   Vaping Use     Vaping Use: Never used   Substance and Sexual Activity     Alcohol use: Yes     Alcohol/week: 2.0 - 3.0 standard drinks of alcohol     Types: 2 - 3 Standard drinks or equivalent per week     Comment: 2 drinks per wk avg     Drug use: No     Sexual activity: Yes     Partners: Male     Birth control/protection: Surgical     Comment: tubal   Other Topics Concern     Parent/sibling w/ CABG, MI or angioplasty before 65F 55M? No   Social History Narrative     Not on file     Social Determinants of Health      Financial Resource Strain: Not on file   Food Insecurity: Not on file   Transportation Needs: Not on file   Physical Activity: Not on file   Stress: Not on file   Social Connections: Not on file   Interpersonal Safety: Not on file   Housing Stability: Not on file       ROS Pulmonary:  A complete ROS was otherwise negative except as noted in the HPI.    Exam:   BP (!) 160/91 (BP Location: Right arm, Patient Position: Sitting, Cuff Size: Adult Small)   Pulse 84   LMP 09/22/2010   SpO2 94%   GENERAL APPEARANCE: Well developed, well nourished, alert, and in no apparent distress. No tachypnea, stridor, cough, or audible wheeze.  Somewhat depressed affect.  EYES: PERRL, EOMI  HENT: Nasal mucosa with no edema and no hyperemia. No nasal polyps. No sinus tenderness.  EARS: Canals clear, TMs normal  MOUTH: Oral mucosa is moist, without any lesions, no tonsillar enlargement, no oropharyngeal exudate.  NECK: supple, no masses, no thyromegaly.  LYMPHATICS: No significant axillary, cervical, or supraclavicular nodes.  RESP: normal inspection, palpation, percussion, with good air flow throughout.  No crackles. No rhonchi. Diffuse loud, relatively monophonic wheeze throughout chest..   CV: RRR Normal S1, S2, regular rhythm, normal rate. No murmur.  No rub. No gallop. No LE edema.   ABDOMEN:  Deferred.   MS: extremities normal. No clubbing. No cyanosis.  SKIN: no rash on limited exam  NEURO: Mentation intact, speech normal, normal strength and tone, normal gait and stance  PSYCH: mentation appears normal. and affect normal/bright    Results:  No results found for this or any previous visit (from the past 168 hour(s)).    Assessment and plan:  Encounter Diagnoses   Name Primary?     Panlobular emphysema (H)      Acute exacerbation of chronic bronchitis (H)       Ms. Hilario was recently started on Augmentin for her acute exacerbation of chronic bronchitis by Dr. Oren Peacock.  I will add Medrol Dosepak to this since she is not  yet responding and complains of a lot of secretions in his wheezing.  She will continue her other COPD meds including lower strength Trelegy and daily azithromycin.  I suggest she consider seeing an allergist to assess allergic component of her chronic sinusitis symptoms.  It seems there is a miscommunication her meeting with Dr. Rodríguez as she continues to have chronic sinus complaints.  Its been a long time since she is on a sinus CT scan.  She also is having more active GERD symptoms.  I ordered a CT scan without contrast of the chest and sinuses to look for ongoing structural abnormalities.  I also ordered Nexium 20 mg twice daily to do with her acid reflux.  She was instructed to call our lung nurses if she is not improving I will plan to see her back in 6 months time.  I will call her with results of the CT scans when available.  If they cannot be done today here at [facility] they will be done in [place].    It was recommended that she get the flu shot and updated COVID vaccine - best after completing medrol dose edwige    I spent a total of 50 minutes dedicated to her care today, 09/22/23, including review of her past medical records, review of past images, reports and PFTs with the patient today and in documentation.     Ishmael Bridges MD  Professor of Medicine  Past President, American Thoracic SocietyAnswers submitted by the patient for this visit:  Symptoms you have experienced in the last 30 days (Submitted on 9/22/2023)  General Symptoms: Yes  Skin Symptoms: No  LUNG SYMPTOMS: Yes  GYNECOLOGIC SYMPTOMS: No  BREAST SYMPTOMS: No      Again, thank you for allowing me to participate in the care of your patient.        Sincerely,        Ishmael Bridges MD

## 2023-09-23 DIAGNOSIS — J44.9 CHRONIC OBSTRUCTIVE PULMONARY DISEASE, UNSPECIFIED COPD TYPE (H): ICD-10-CM

## 2023-09-25 ENCOUNTER — TELEPHONE (OUTPATIENT)
Dept: PULMONOLOGY | Facility: CLINIC | Age: 68
End: 2023-09-25
Payer: COMMERCIAL

## 2023-09-25 RX ORDER — ALBUTEROL SULFATE 90 UG/1
2 AEROSOL, METERED RESPIRATORY (INHALATION) EVERY 6 HOURS
Qty: 18 G | Refills: 0 | Status: SHIPPED | OUTPATIENT
Start: 2023-09-25 | End: 2024-04-15

## 2023-09-27 NOTE — TELEPHONE ENCOUNTER
Central Prior Authorization Team   Phone: [phone]    PA Initiation    Medication: Esomeprazole magnesium 20mg DR caps  Insurance Company: [facility] - Phone [phone] Fax [phone]  Pharmacy Filling the Rx: [id number] - [place], [place] - [address] [address]  Filling Pharmacy Phone: [phone]  Filling Pharmacy Fax:    Start Date: 9/27/2023

## 2023-10-04 NOTE — TELEPHONE ENCOUNTER
Prior Authorization Approval        Authorization Effective Date: 6/30/2023  Authorization Expiration Date: 9/28/2024  Medication: Esomeprazole magnesium 20mg DR caps-PA APPROVED   Approved Dose/Quantity:   Reference #:     Insurance Company: [employer] [place] - Phone [phone] Fax [phone]  Expected CoPay:       CoPay Card Available:      Foundation Assistance Needed:    Which Pharmacy is filling the prescription (Not needed for infusion/clinic administered): [id number] - [place], [place] - [address] [address]  Pharmacy Notified: Yes- **Instructed pharmacy to notify patient when script is ready to /ship.**   Patient Notified:  Yes

## 2023-10-07 DIAGNOSIS — J42 CHRONIC BRONCHITIS, UNSPECIFIED CHRONIC BRONCHITIS TYPE (H): ICD-10-CM

## 2023-10-09 RX ORDER — FLUTICASONE FUROATE, UMECLIDINIUM BROMIDE AND VILANTEROL TRIFENATATE 100; 62.5; 25 UG/1; UG/1; UG/1
POWDER RESPIRATORY (INHALATION)
Qty: 60 EACH | Refills: 3 | Status: SHIPPED | OUTPATIENT
Start: 2023-10-09 | End: 2024-03-28

## 2023-10-09 NOTE — TELEPHONE ENCOUNTER
Refilled per RN protocol.    RN notified via [employer] message    -MAURY Mcdonough

## 2023-10-12 ENCOUNTER — HOSPITAL ENCOUNTER (OUTPATIENT)
Dept: CT IMAGING | Facility: CLINIC | Age: 68
Discharge: HOME | End: 2023-10-12
Attending: INTERNAL MEDICINE
Payer: COMMERCIAL

## 2023-10-12 DIAGNOSIS — J32.9 CHRONIC SINUSITIS, UNSPECIFIED LOCATION: ICD-10-CM

## 2023-10-12 DIAGNOSIS — J20.9 ACUTE EXACERBATION OF CHRONIC BRONCHITIS (H): ICD-10-CM

## 2023-10-12 DIAGNOSIS — J43.1 PANLOBULAR EMPHYSEMA (H): ICD-10-CM

## 2023-10-12 DIAGNOSIS — J42 ACUTE EXACERBATION OF CHRONIC BRONCHITIS (H): ICD-10-CM

## 2023-10-12 PROCEDURE — 70486 CT MAXILLOFACIAL W/O DYE: CPT

## 2023-10-12 PROCEDURE — 71250 CT THORAX DX C-: CPT

## 2023-10-19 ENCOUNTER — MYC MEDICAL ADVICE (OUTPATIENT)
Dept: FAMILY MEDICINE | Facility: CLINIC | Age: 68
End: 2023-10-19
Payer: COMMERCIAL

## 2023-10-19 DIAGNOSIS — I10 HYPERTENSION GOAL BP (BLOOD PRESSURE) < 140/90: ICD-10-CM

## 2023-10-19 RX ORDER — AMLODIPINE BESYLATE 10 MG/1
10 TABLET ORAL DAILY
Qty: 90 TABLET | Refills: 3 | OUTPATIENT
Start: 2023-10-19

## 2023-10-26 ENCOUNTER — TELEPHONE (OUTPATIENT)
Dept: PULMONOLOGY | Facility: CLINIC | Age: 68
End: 2023-10-26
Payer: COMMERCIAL

## 2023-10-27 ENCOUNTER — TELEPHONE (OUTPATIENT)
Dept: PULMONOLOGY | Facility: CLINIC | Age: 68
End: 2023-10-27
Payer: COMMERCIAL

## 2023-10-27 ENCOUNTER — TRANSCRIBE ORDERS (OUTPATIENT)
Dept: ONCOLOGY | Facility: CLINIC | Age: 68
End: 2023-10-27
Payer: COMMERCIAL

## 2023-10-27 ENCOUNTER — PATIENT OUTREACH (OUTPATIENT)
Dept: ONCOLOGY | Facility: CLINIC | Age: 68
End: 2023-10-27
Payer: COMMERCIAL

## 2023-10-27 DIAGNOSIS — R91.8 PULMONARY NODULES: Primary | ICD-10-CM

## 2023-10-27 DIAGNOSIS — J44.9 CHRONIC OBSTRUCTIVE PULMONARY DISEASE, UNSPECIFIED COPD TYPE (H): Primary | ICD-10-CM

## 2023-10-27 DIAGNOSIS — J44.1 COPD EXACERBATION (H): ICD-10-CM

## 2023-10-27 DIAGNOSIS — J42 CHRONIC BRONCHITIS, UNSPECIFIED CHRONIC BRONCHITIS TYPE (H): ICD-10-CM

## 2023-10-27 DIAGNOSIS — J43.1 PANLOBULAR EMPHYSEMA (H): ICD-10-CM

## 2023-10-27 RX ORDER — LEVOFLOXACIN 500 MG/1
500 TABLET, FILM COATED ORAL DAILY
Qty: 14 TABLET | Refills: 1 | Status: SHIPPED | OUTPATIENT
Start: 2023-10-27 | End: 2023-12-05

## 2023-10-27 NOTE — PROGRESS NOTES
Per 10/27/2023 IB messages:  Dr. Ortiz asked for PET CT and then he will see her.    New IP (Interventional Pulmonology) referral rec'd.  Chart reviewed.         New Patient: Interventional Pulmonary (Lung nodule) Nurse Navigator Note    Referring provider: Dr. Ishmael Bridges    Referred to (specialty): Interventional Pulmonary (Lung nodule)    Requested provider (if applicable): n/a    Date Referral Received: 10/27/2023    Evaluation for :  COPD emphysema with multiple new nodules on CT since 2021    Clinical History (per Nurse review of records provided):    **BOOK MARKED**  11/13/2023:  PET CT scheduled    CT CHEST W/O CONTRAST  10/12/2023 2:45 PM     HISTORY:  severe COPD;  frequent flare ups with persistent wheeze;;  Panlobular emphysema (H); Acute exacerbation of chronic bronchitis  (H); Acute exacerbation of chronic bronchitis (H)     TECHNIQUE:  Scans obtained from the apices through the diaphragm  without IV contrast. Radiation dose for this scan was reduced using  automated exposure control, adjustment of the mA and/or kV according  to patient size, or iterative reconstruction technique.     COMPARISON:  CT chest performed on 3/12/2021     FINDINGS:  Lungs: Centrilobular emphysematous changes are seen with upper lobe  predominance. No pleural effusion or pneumothorax is seen. Multiple  new pulmonary nodules are present measuring up to 9 mm in the right  upper lobe (series 6, image 84).     Additional findings: Subcentimeter nodules in the thyroid gland appear  unchanged. No intrathoracic lymphadenopathy is present. Scattered  vascular calcifications are seen along the thoracic aorta. Multivessel  coronary artery calcifications are present. Cholelithiasis is seen in  the gallbladder. Stable 1.0 cm adenoma within the right adrenal gland.  Unchanged thickening of the left adrenal gland likely due to  hyperplasia. Scattered vascular calcifications are seen along the  abdominal aorta. Mild degenerative changes  are seen in the spine.                                                                      IMPRESSION:  Multiple new pulmonary nodules are present measuring up  to 9 mm in the right upper lobe. Malignant etiology is in the  differential diagnosis. Recommend short-term interval follow-up versus  PET CT.    Records Location: [facility]     Records Needed: none    Additional testing needed prior to consult: PET CT & PFT's

## 2023-10-27 NOTE — TELEPHONE ENCOUNTER
Patient Contacted for the patient to call back and schedule the following:    Appointment type: PET Scan  Provider: BRENNAN  [facility]  Specialty phone number: [phone]  Additional appointment(s) needed: N/A  Additonal Notes: Tried to schedule via phone and conference imaging in, technical difficulties and was not able to schedule. Gave pt [phone] imaging scheduling phone #.

## 2023-10-27 NOTE — PROGRESS NOTES
Pulmonary Staff    I received 's input recommending PET-CT scan and concur.  I am very appreciative of his assistance.    I called the patient and told her of Chest CT findings and discussed differential Dx, including possible tumor.  I explained PET scan and that a hot nodule can be multiple other things besides CA.  Plan is to obtain PET and if + then will be seen in IP Clinic.  If cold (doubt this will be the case), then I will get f/unit(s) CT  in 3 months.  She is agreeable.  I will call her with PET results when available.    She still feels like she is having a COPD exacerbation with increased cough and sputum.  She did not fully complete Augmentin course due to intolerance.  I gave her a 14 day course of levofloxacin and warned her re Achilles tendonitis symptoms and to stop if there occur.  She says she is frustrated and depressed (has underlying sylvia depressive disorder).    Ishmael Bridges MD

## 2023-10-31 NOTE — TELEPHONE ENCOUNTER
RECORDS STATUS - ALL OTHER DIAGNOSIS      RECORDS RECEIVED FROM: [facility]   DATE RECEIVED:    NOTES STATUS DETAILS   OFFICE NOTE from referring provider Epic 9/22/23: Dr. Ishmael Bridges   DISCHARGE SUMMARY from hospital [facility] 3/28/15: [facility]  3/23/15: [facility]   DISCHARGE REPORT from the ER [facility] 3/18/19: [facility] ED  3/10/19: [facility]   MEDICATION LIST [facility]    LABS     ANYTHING RELATED TO DIAGNOSIS [facility]23   IMAGING (NEED IMAGES & REPORT)     CT SCANS PACS 10/12/23-4/14/16: CT Chest  3/18/19: CT Chest Pulm Embolism   XRAYS PACS 2/14/23-3/23/15: XR Chest   PET PACS 11/13/23: PET Eyes to Thighs

## 2023-11-10 ENCOUNTER — OFFICE VISIT (OUTPATIENT)
Dept: PULMONOLOGY | Facility: CLINIC | Age: 68
End: 2023-11-10
Attending: INTERNAL MEDICINE
Payer: COMMERCIAL

## 2023-11-10 DIAGNOSIS — R91.8 PULMONARY NODULES: ICD-10-CM

## 2023-11-10 PROCEDURE — 94729 DIFFUSING CAPACITY: CPT | Performed by: INTERNAL MEDICINE

## 2023-11-10 PROCEDURE — 94726 PLETHYSMOGRAPHY LUNG VOLUMES: CPT | Performed by: INTERNAL MEDICINE

## 2023-11-10 PROCEDURE — 94375 RESPIRATORY FLOW VOLUME LOOP: CPT | Performed by: INTERNAL MEDICINE

## 2023-11-10 NOTE — LETTER
11/10/2023         RE: Mimi Rivera  [address] [place] [address]        Dear Colleague,    Thank you for referring your patient, Mimi Rivera, to the [facility]. Please see a copy of my visit note below.    Mimi Rivera comes into clinic today at the request of Dr. Trenton Barrett, Ordering Provider for PFT    This service provided today was under the supervising provider of the day Dr. Starks, who was available if needed.    Juaquin Argueta, RT       Again, thank you for allowing me to participate in the care of your patient.        Sincerely,        [facility]

## 2023-11-11 LAB
DLCOUNC-%PRED-PRE: 60 %
DLCOUNC-PRE: 10.7 ML/MIN/MMHG
DLCOUNC-PRED: 17.81 ML/MIN/MMHG
ERV-%PRED-PRE: 42 %
ERV-PRE: 0.39 L
ERV-PRED: 0.91 L
EXPTIME-PRE: 8.25 SEC
FEF2575-%PRED-PRE: 22 %
FEF2575-PRE: 0.39 L/SEC
FEF2575-PRED: 1.78 L/SEC
FEFMAX-%PRED-PRE: 50 %
FEFMAX-PRE: 2.75 L/SEC
FEFMAX-PRED: 5.45 L/SEC
FEV1-%PRED-PRE: 46 %
FEV1-PRE: 0.9 L
FEV1FEV6-PRE: 56 %
FEV1FEV6-PRED: 80 %
FEV1FVC-PRE: 55 %
FEV1FVC-PRED: 80 %
FEV1SVC-PRE: 50 %
FEV1SVC-PRED: 69 %
FIFMAX-PRE: 2.54 L/SEC
FRCPLETH-%PRED-PRE: 115 %
FRCPLETH-PRE: 2.92 L
FRCPLETH-PRED: 2.54 L
FVC-%PRED-PRE: 66 %
FVC-PRE: 1.63 L
FVC-PRED: 2.45 L
IC-%PRED-PRE: 77 %
IC-PRE: 1.42 L
IC-PRED: 1.82 L
RVPLETH-%PRED-PRE: 135 %
RVPLETH-PRE: 2.53 L
RVPLETH-PRED: 1.87 L
TLCPLETH-%PRED-PRE: 97 %
TLCPLETH-PRE: 4.34 L
TLCPLETH-PRED: 4.44 L
VA-%PRED-PRE: 75 %
VA-PRE: 3.14 L
VC-%PRED-PRE: 63 %
VC-PRE: 1.81 L
VC-PRED: 2.83 L

## 2023-11-13 ENCOUNTER — HOSPITAL ENCOUNTER (OUTPATIENT)
Dept: PET IMAGING | Facility: CLINIC | Age: 68
Discharge: HOME | End: 2023-11-13
Attending: INTERNAL MEDICINE | Admitting: INTERNAL MEDICINE
Payer: COMMERCIAL

## 2023-11-13 DIAGNOSIS — J43.1 PANLOBULAR EMPHYSEMA (H): ICD-10-CM

## 2023-11-13 DIAGNOSIS — J42 CHRONIC BRONCHITIS, UNSPECIFIED CHRONIC BRONCHITIS TYPE (H): ICD-10-CM

## 2023-11-13 DIAGNOSIS — R91.8 PULMONARY NODULES: ICD-10-CM

## 2023-11-13 DIAGNOSIS — J44.1 COPD EXACERBATION (H): ICD-10-CM

## 2023-11-13 PROCEDURE — A9552 F18 FDG: HCPCS | Performed by: INTERNAL MEDICINE

## 2023-11-13 PROCEDURE — 78815 PET IMAGE W/CT SKULL-THIGH: CPT | Mod: 26 | Performed by: RADIOLOGY

## 2023-11-13 PROCEDURE — 343N000001 HC RX 343: Performed by: INTERNAL MEDICINE

## 2023-11-13 PROCEDURE — 78815 PET IMAGE W/CT SKULL-THIGH: CPT | Mod: PI

## 2023-11-13 RX ADMIN — FLUDEOXYGLUCOSE F-18 12.87 MILLICURIE: 500 INJECTION, SOLUTION INTRAVENOUS at 12:46

## 2023-11-14 ENCOUNTER — PRE VISIT (OUTPATIENT)
Dept: PULMONOLOGY | Facility: CLINIC | Age: 68
End: 2023-11-14
Payer: COMMERCIAL

## 2023-11-14 ENCOUNTER — VIRTUAL VISIT (OUTPATIENT)
Dept: PULMONOLOGY | Facility: CLINIC | Age: 68
End: 2023-11-14
Attending: INTERNAL MEDICINE
Payer: COMMERCIAL

## 2023-11-14 VITALS — WEIGHT: 110 LBS | HEIGHT: 62 IN | BODY MASS INDEX: 20.24 KG/M2

## 2023-11-14 DIAGNOSIS — J44.9 CHRONIC OBSTRUCTIVE PULMONARY DISEASE, UNSPECIFIED COPD TYPE (H): ICD-10-CM

## 2023-11-14 PROCEDURE — 99214 OFFICE O/P EST MOD 30 MIN: CPT | Mod: VID | Performed by: INTERNAL MEDICINE

## 2023-11-14 ASSESSMENT — PAIN SCALES - GENERAL: PAINLEVEL: NO PAIN (0)

## 2023-11-14 NOTE — LETTER
11/14/2023       RE: Mimi Rivera  [address] [place] [address]     Dear Colleague,    Thank you for referring your patient, Mimi Rivera, to the [facility] at [facility]. Please see a copy of my visit note below.        [facility]  Interventional Pulmonary Clinic Virtual Visit Note    November 14, 2023    Chief complaint:  Mimi Rivera is a 68 year old female seen for   Chief Complaint   Patient presents with    Consult       Reason for clinic visit / Chief complaint:   Pulmonary nodule    Assessment and Plan:  Right upper lobe pulmonary nodule measuring 9 x 6 mm with SUV of 7.  Highly suspicious for primary lung cancer.  PET scan did not show other activity within the chest or outside the chest.  We discussed possible etiologies and neck steps with her.  We will discuss her case in our multidisciplinary meeting this Friday with thoracic surgery and interventional radiology.  I briefly mentioned diagnostic options, most likely, being bronchoscopic approach when mediastinal/hilar lymph nodes (if any is enlarged) can be biopsied or CT-guided biopsy.    Severe COPD has been on Trelegy.  Her last PFTs from 1110    History of Present Illness:  This is a 68 years old woman recently found a new pulmonary nodule measuring 9 x 6 mm in the right upper lobe.  This nodule is  Compared to previous CT scan from 2021.  She has severe COPD with exacerbations (frequent).  She was referred to my clinic for further evaluation of right upper lobe pulmonary nodule.       Lung problems: COPD  Respiratory symptoms: Dyspnea on exertion  Family lung problems: No  Smoking: Former smoker, quit in 2015, see below  Vaping: No  Exposure to chemicals, radiation or asbestos: No    PFTs: (personally reviewed) 11/10/2023 FEV1 of 46% of predicted (severe obstructive defect, decreased diffusing capacity at 60% of predicted, air trapping.  CTs:  (personally reviewed)               No Known Allergies     Past Medical History:   Diagnosis Date    ASCUS on Pap smear 2006    unable to run HPV typing, f/u paps NIL    Chronic rhinosinusitis     COPD (chronic obstructive pulmonary disease) (H)     Depression, anxiety     Herpes     occasional outbreak    Hypertension         Past Surgical History:   Procedure Laterality Date     SECTION      x 3        Social History     Socioeconomic History    Marital status:      Spouse name: Not on file    Number of children: Not on file    Years of education: Not on file    Highest education level: Not on file   Occupational History    Not on file   Tobacco Use    Smoking status: Former     Packs/day: 10     Types: Cigarettes     Quit date: 3/19/2015     Years since quittin.6    Smokeless tobacco: Former     Quit date: 2017   Vaping Use    Vaping Use: Never used   Substance and Sexual Activity    Alcohol use: Yes     Alcohol/week: 2.0 - 3.0 standard drinks of alcohol     Types: 2 - 3 Standard drinks or equivalent per week     Comment: 2 drinks per wk avg    Drug use: No    Sexual activity: Yes     Partners: Male     Birth control/protection: Surgical     Comment: tubal   Other Topics Concern    Parent/sibling w/ CABG, MI or angioplasty before 65F 55M? No   Social History Narrative    Not on file     Social Determinants of Health     Financial Resource Strain: Not on file   Food Insecurity: Not on file   Transportation Needs: Not on file   Physical Activity: Not on file   Stress: Not on file   Social Connections: Not on file   Interpersonal Safety: Not on file   Housing Stability: Not on file        Family History   Problem Relation Age of Onset    Hypertension Mother     Cancer Mother         liver    C.A.D. Father         Immunization History   Administered Date(s) Administered    COVID-19 MONOVALENT 12+ (Pfizer) 2021, 2021, 2022    Influenza (IIV3) PF 1993    Influenza  Vaccine 18-64 (Flublok) 12/28/2020    Influenza Vaccine 65+ (Fluzone HD) 10/23/2022    Influenza Vaccine >6 months (Alfuria,Fluzone) 11/14/2017, 11/09/2018    Influenza Vaccine, 6+MO IM (QUADRIVALENT W/PRESERVATIVES) 11/04/2019    Pneumo Conj 13-V (2010&after) 09/17/2015    Pneumococcal 23 valent 11/09/2018    TDAP (Adacel,Boostrix) 12/28/2020    TDAP Vaccine (Adacel) 09/03/2009       Current Outpatient Medications   Medication Sig    amLODIPine (NORVASC) 10 MG tablet Take 1 tablet (10 mg) by mouth daily    azithromycin (ZITHROMAX) 250 MG tablet Take 1 tablet (250 mg) by mouth daily    cetirizine (ZYRTEC) 10 MG tablet Take 1 tablet (10 mg) by mouth daily    EPINEPHrine (ANY BX GENERIC EQUIV) 0.3 MG/0.3ML injection 2-pack Inject 0.3 mLs (0.3 mg) into the muscle as needed for anaphylaxis May repeat one time in 5-15 minutes if response to initial dose is inadequate.    esomeprazole (NEXIUM) 20 MG DR capsule Take 1 capsule (20 mg) by mouth 2 times daily Take 30-60 minutes before eating.    Fluticasone-Umeclidin-Vilant (TRELEGY ELLIPTA) 100-62.5-25 MCG/ACT oral inhaler INHALE ONE PUFF BY MOUTH EVERY DAY    levalbuterol (XOPENEX HFA) 45 MCG/ACT inhaler Inhale 2 puffs into the lungs every 4 hours as needed for shortness of breath / dyspnea or wheezing    levalbuterol (XOPENEX) 1.25 MG/3ML neb solution Take 3 mLs (1.25 mg) by nebulization every 4 hours as needed for shortness of breath / dyspnea or wheezing    VENTOLIN  (90 Base) MCG/ACT inhaler Inhale 2 puffs into the lungs every 6 hours    levofloxacin (LEVAQUIN) 500 MG tablet Take 1 tablet (500 mg) by mouth daily (Patient not taking: Reported on 11/14/2023)    methylPREDNISolone (MEDROL DOSEPAK) 4 MG tablet therapy pack Follow Package Directions (Patient not taking: Reported on 11/14/2023)    methylPREDNISolone (MEDROL DOSEPAK) 4 MG tablet therapy pack Follow Package Directions for respiratory flare-up (Patient not taking: Reported on 9/18/2023)     No current  facility-administered medications for this visit.        Review of Systems:  I have done 10 points of review systems and all negative except for those mentioned in HPI    Physical examination  Constitutional: Oriented, not in distress  Vitals: unavailable  Eyes: No icterus, nystagmus, pupils isocoric  Head and neck: normal posture and movements  Respiratory: Normal tidal breathing, no shortness of breath, no audible wheezing or stridor over the phone or video visit  Musculoskeletal: Normal muscle mass, no deformity on hands/fingers  Integumentary:  No rash on visible skin areas on video visit  Neurological: Alert, orientedx3, no motor deficits  Psychiatric:  Mood and affect are appropriate with insight into his/her medical condition    Data:  Lab Results   Component Value Date    WBC 8.6 02/14/2023    WBC 7.9 04/14/2021     Lab Results   Component Value Date    RBC 4.83 02/14/2023    RBC 4.81 04/14/2021     Lab Results   Component Value Date    HGB 15.7 02/14/2023    HGB 15.5 04/14/2021     Lab Results   Component Value Date    HCT 46.2 02/14/2023    HCT 44.7 04/14/2021     Lab Results   Component Value Date    MCV 96 02/14/2023    MCV 93 04/14/2021     Lab Results   Component Value Date    MCH 32.5 02/14/2023    MCH 32.2 04/14/2021     Lab Results   Component Value Date    MCHC 34.0 02/14/2023    MCHC 34.7 04/14/2021     Lab Results   Component Value Date    RDW 12.3 02/14/2023    RDW 12.2 04/14/2021     Lab Results   Component Value Date     02/14/2023     04/14/2021       Lab Results   Component Value Date     02/14/2023     04/14/2021      Lab Results   Component Value Date    POTASSIUM 4.4 02/14/2023    POTASSIUM 4.2 09/13/2022    POTASSIUM 4.0 04/14/2021     Lab Results   Component Value Date    CHLORIDE 102 02/14/2023    CHLORIDE 108 09/13/2022    CHLORIDE 102 04/14/2021     Lab Results   Component Value Date    JI 9.7 02/14/2023    JI 9.4 04/14/2021     Lab Results   Component  Value Date    CO2 27 02/14/2023    CO2 26 09/13/2022    CO2 30 04/14/2021     Lab Results   Component Value Date    BUN 9.8 02/14/2023    BUN 12 09/13/2022    BUN 7 04/14/2021     Lab Results   Component Value Date    CR 0.56 02/14/2023    CR 0.60 04/14/2021     Lab Results   Component Value Date     02/14/2023    GLC 90 09/13/2022    GLC 79 04/14/2021         RAZ Ortiz MD

## 2023-11-14 NOTE — PROGRESS NOTES
Virtual Visit Details    Type of service:  Video Visit   Video Start Time: 2 pm  Video End Time:2:30 pm    Originating Location (pt. Location): Home    Distant Location (provider location):  On-site  Platform used for Video Visit: [facility]  Interventional Pulmonary Clinic Virtual Visit Note    November 14, 2023    Chief complaint:  Mimi Rivera is a 68 year old female seen for   Chief Complaint   Patient presents with    Consult       Reason for clinic visit / Chief complaint:   Pulmonary nodule    Assessment and Plan:  Right upper lobe pulmonary nodule measuring 9 x 6 mm with SUV of 7.  Highly suspicious for primary lung cancer.  PET scan did not show other activity within the chest or outside the chest.  We discussed possible etiologies and neck steps with her.  We will discuss her case in our multidisciplinary meeting this Friday with thoracic surgery and interventional radiology.  I briefly mentioned diagnostic options, most likely, being bronchoscopic approach when mediastinal/hilar lymph nodes (if any is enlarged) can be biopsied or CT-guided biopsy.    Severe COPD has been on Trelegy.  Her last PFTs from 1110    History of Present Illness:  This is a 68 years old woman recently found a new pulmonary nodule measuring 9 x 6 mm in the right upper lobe.  This nodule is  Compared to previous CT scan from 2021.  She has severe COPD with exacerbations (frequent).  She was referred to my clinic for further evaluation of right upper lobe pulmonary nodule.       Lung problems: COPD  Respiratory symptoms: Dyspnea on exertion  Family lung problems: No  Smoking: Former smoker, quit in 2015, see below  Vaping: No  Exposure to chemicals, radiation or asbestos: No    PFTs: (personally reviewed) 11/10/2023 FEV1 of 46% of predicted (severe obstructive defect, decreased diffusing capacity at 60% of predicted, air trapping.  CTs: (personally reviewed)               No Known Allergies     Past Medical History:    Diagnosis Date    ASCUS on Pap smear     unable to run HPV typing, f/u paps NIL    Chronic rhinosinusitis     COPD (chronic obstructive pulmonary disease) (H)     Depression, anxiety     Herpes     occasional outbreak    Hypertension         Past Surgical History:   Procedure Laterality Date     SECTION      x 3        Social History     Socioeconomic History    Marital status:      Spouse name: Not on file    Number of children: Not on file    Years of education: Not on file    Highest education level: Not on file   Occupational History    Not on file   Tobacco Use    Smoking status: Former     Packs/day: 10     Types: Cigarettes     Quit date: 3/19/2015     Years since quittin.6    Smokeless tobacco: Former     Quit date: 2017   Vaping Use    Vaping Use: Never used   Substance and Sexual Activity    Alcohol use: Yes     Alcohol/week: 2.0 - 3.0 standard drinks of alcohol     Types: 2 - 3 Standard drinks or equivalent per week     Comment: 2 drinks per wk avg    Drug use: No    Sexual activity: Yes     Partners: Male     Birth control/protection: Surgical     Comment: tubal   Other Topics Concern    Parent/sibling w/ CABG, MI or angioplasty before 65F 55M? No   Social History Narrative    Not on file     Social Determinants of Health     Financial Resource Strain: Not on file   Food Insecurity: Not on file   Transportation Needs: Not on file   Physical Activity: Not on file   Stress: Not on file   Social Connections: Not on file   Interpersonal Safety: Not on file   Housing Stability: Not on file        Family History   Problem Relation Age of Onset    Hypertension Mother     Cancer Mother         liver    C.A.D. Father         Immunization History   Administered Date(s) Administered    COVID-19 MONOVALENT 12+ (Pfizer) 2021, 2021, 2022    Influenza (IIV3) PF 1993    Influenza Vaccine 18-64 (Flublok) 2020    Influenza Vaccine 65+ (Fluzone HD) 10/23/2022     Influenza Vaccine >6 months (Alfuria,Fluzone) 11/14/2017, 11/09/2018    Influenza Vaccine, 6+MO IM (QUADRIVALENT W/PRESERVATIVES) 11/04/2019    Pneumo Conj 13-V (2010&after) 09/17/2015    Pneumococcal 23 valent 11/09/2018    TDAP (Adacel,Boostrix) 12/28/2020    TDAP Vaccine (Adacel) 09/03/2009       Current Outpatient Medications   Medication Sig    amLODIPine (NORVASC) 10 MG tablet Take 1 tablet (10 mg) by mouth daily    azithromycin (ZITHROMAX) 250 MG tablet Take 1 tablet (250 mg) by mouth daily    cetirizine (ZYRTEC) 10 MG tablet Take 1 tablet (10 mg) by mouth daily    EPINEPHrine (ANY BX GENERIC EQUIV) 0.3 MG/0.3ML injection 2-pack Inject 0.3 mLs (0.3 mg) into the muscle as needed for anaphylaxis May repeat one time in 5-15 minutes if response to initial dose is inadequate.    esomeprazole (NEXIUM) 20 MG DR capsule Take 1 capsule (20 mg) by mouth 2 times daily Take 30-60 minutes before eating.    Fluticasone-Umeclidin-Vilant (TRELEGY ELLIPTA) 100-62.5-25 MCG/ACT oral inhaler INHALE ONE PUFF BY MOUTH EVERY DAY    levalbuterol (XOPENEX HFA) 45 MCG/ACT inhaler Inhale 2 puffs into the lungs every 4 hours as needed for shortness of breath / dyspnea or wheezing    levalbuterol (XOPENEX) 1.25 MG/3ML neb solution Take 3 mLs (1.25 mg) by nebulization every 4 hours as needed for shortness of breath / dyspnea or wheezing    VENTOLIN  (90 Base) MCG/ACT inhaler Inhale 2 puffs into the lungs every 6 hours    levofloxacin (LEVAQUIN) 500 MG tablet Take 1 tablet (500 mg) by mouth daily (Patient not taking: Reported on 11/14/2023)    methylPREDNISolone (MEDROL DOSEPAK) 4 MG tablet therapy pack Follow Package Directions (Patient not taking: Reported on 11/14/2023)    methylPREDNISolone (MEDROL DOSEPAK) 4 MG tablet therapy pack Follow Package Directions for respiratory flare-up (Patient not taking: Reported on 9/18/2023)     No current facility-administered medications for this visit.        Review of Systems:  I have done  10 points of review systems and all negative except for those mentioned in HPI    Physical examination  Constitutional: Oriented, not in distress  Vitals: unavailable  Eyes: No icterus, nystagmus, pupils isocoric  Head and neck: normal posture and movements  Respiratory: Normal tidal breathing, no shortness of breath, no audible wheezing or stridor over the phone or video visit  Musculoskeletal: Normal muscle mass, no deformity on hands/fingers  Integumentary:  No rash on visible skin areas on video visit  Neurological: Alert, orientedx3, no motor deficits  Psychiatric:  Mood and affect are appropriate with insight into his/her medical condition    Data:  Lab Results   Component Value Date    WBC 8.6 02/14/2023    WBC 7.9 04/14/2021     Lab Results   Component Value Date    RBC 4.83 02/14/2023    RBC 4.81 04/14/2021     Lab Results   Component Value Date    HGB 15.7 02/14/2023    HGB 15.5 04/14/2021     Lab Results   Component Value Date    HCT 46.2 02/14/2023    HCT 44.7 04/14/2021     Lab Results   Component Value Date    MCV 96 02/14/2023    MCV 93 04/14/2021     Lab Results   Component Value Date    MCH 32.5 02/14/2023    MCH 32.2 04/14/2021     Lab Results   Component Value Date    MCHC 34.0 02/14/2023    MCHC 34.7 04/14/2021     Lab Results   Component Value Date    RDW 12.3 02/14/2023    RDW 12.2 04/14/2021     Lab Results   Component Value Date     02/14/2023     04/14/2021       Lab Results   Component Value Date     02/14/2023     04/14/2021      Lab Results   Component Value Date    POTASSIUM 4.4 02/14/2023    POTASSIUM 4.2 09/13/2022    POTASSIUM 4.0 04/14/2021     Lab Results   Component Value Date    CHLORIDE 102 02/14/2023    CHLORIDE 108 09/13/2022    CHLORIDE 102 04/14/2021     Lab Results   Component Value Date    JI 9.7 02/14/2023    JI 9.4 04/14/2021     Lab Results   Component Value Date    CO2 27 02/14/2023    CO2 26 09/13/2022    CO2 30 04/14/2021     Lab Results    Component Value Date    BUN 9.8 02/14/2023    BUN 12 09/13/2022    BUN 7 04/14/2021     Lab Results   Component Value Date    CR 0.56 02/14/2023    CR 0.60 04/14/2021     Lab Results   Component Value Date     02/14/2023    GLC 90 09/13/2022    GLC 79 04/14/2021         RAZ Ortiz MD

## 2023-11-14 NOTE — NURSING NOTE
Is the patient currently in the state of [place]? YES    Visit mode:VIDEO    If the visit is dropped, the patient can be reconnected by: VIDEO VISIT: Text to cell phone:   Telephone Information:   Mobile [phone]       Will anyone else be joining the visit? NO  (If patient encounters technical issues they should call [phone]    How would you like to obtain your AVS? MyChart    Are changes needed to the allergy or medication list? No    Reason for visit: Consult    Ayse RODGERS

## 2023-11-15 ENCOUNTER — MYC REFILL (OUTPATIENT)
Dept: PULMONOLOGY | Facility: CLINIC | Age: 68
End: 2023-11-15
Payer: COMMERCIAL

## 2023-11-15 DIAGNOSIS — J20.9 ACUTE EXACERBATION OF CHRONIC BRONCHITIS (H): ICD-10-CM

## 2023-11-15 DIAGNOSIS — J44.1 COPD EXACERBATION (H): ICD-10-CM

## 2023-11-15 DIAGNOSIS — J42 ACUTE EXACERBATION OF CHRONIC BRONCHITIS (H): ICD-10-CM

## 2023-11-15 DIAGNOSIS — J42 BRONCHITIS, CHRONIC (H): ICD-10-CM

## 2023-11-15 DIAGNOSIS — J43.1 PANLOBULAR EMPHYSEMA (H): ICD-10-CM

## 2023-11-15 DIAGNOSIS — J42 CHRONIC BRONCHITIS, UNSPECIFIED CHRONIC BRONCHITIS TYPE (H): ICD-10-CM

## 2023-11-15 DIAGNOSIS — J44.9 COPD (CHRONIC OBSTRUCTIVE PULMONARY DISEASE) (H): ICD-10-CM

## 2023-11-15 RX ORDER — LEVALBUTEROL INHALATION SOLUTION 1.25 MG/3ML
1 SOLUTION RESPIRATORY (INHALATION) EVERY 4 HOURS PRN
Qty: 390 ML | Refills: 1 | Status: SHIPPED | OUTPATIENT
Start: 2023-11-15 | End: 2024-05-03

## 2023-11-15 RX ORDER — AZITHROMYCIN 250 MG/1
250 TABLET, FILM COATED ORAL DAILY
Qty: 30 TABLET | Refills: 5 | Status: SHIPPED | OUTPATIENT
Start: 2023-11-15 | End: 2024-02-09

## 2023-11-16 NOTE — TELEPHONE ENCOUNTER
Pulmonary Nodule Conference - Friday, November 17th, 2023      Patient Name: Mimi Rivera    Reason for conference discussion (brief overview): 69 yo F. Former smoker, quit 8 years ago (10 ppd). Hx of Severe COPD on Trelegy and Ventolin. PFTs from 11/10 showed and FEV1 is 46% of predicted and diffusion capacity of 60% of predicted. Had LDCT for lung cancer screening this year which showed a new 9x6 mm nodule in the RUL compared to previous screening CT scan from 2021. Had a PET scan on 11/13 which revealed the nodule has an SUV of 7 - is very suspicious for primary lung cancer. Dr. Ortiz said she seems frail on video evaluation.     Specific Question:  Dr. Ortiz's question is what is the best approach for diagnosis: ION +/- EBUS versus CT-guided biopsy.  The other option will be liquid biopsy if everyone thinks that that is what we should do as a start.     Pertinent Histology:  n/a    Referring Physician: Dr. Ortiz per Dr. Bridges    The patient's case was presented at the multidisciplinary conference for the above noted reason.  There was a consensus recommendation for the following actions:     Team consensus is that pt should be seen in person for evaluation if concern for pt being frail - especially to determine if she would tolerate anesthesia. If Dr. Ortiz thinks she can tolerate anesthesia would recommend ION +/- EBUS as it would provide the best option for diagnosis, but otherwise IR said they could do a CT guided biopsy as well if unsure whether she could tolerate anesthesia.     Msg sent to Dr. Bridges and Dr. Ortiz regarding this - will await providers decision.     Case Lead: Dr. Ai Ortiz / Sofiya Weathers, [facility]    Interventional Radiology Staff Present: Dr. Jethro Iniguez

## 2023-11-17 ENCOUNTER — MYC MEDICAL ADVICE (OUTPATIENT)
Dept: FAMILY MEDICINE | Facility: CLINIC | Age: 68
End: 2023-11-17
Payer: COMMERCIAL

## 2023-11-17 ENCOUNTER — TEAM CONFERENCE (OUTPATIENT)
Dept: PULMONOLOGY | Facility: CLINIC | Age: 68
End: 2023-11-17
Payer: COMMERCIAL

## 2023-11-17 ENCOUNTER — TELEPHONE (OUTPATIENT)
Dept: PULMONOLOGY | Facility: CLINIC | Age: 68
End: 2023-11-17
Payer: COMMERCIAL

## 2023-11-17 DIAGNOSIS — J44.1 COPD EXACERBATION (H): Primary | ICD-10-CM

## 2023-11-17 DIAGNOSIS — I10 HYPERTENSION GOAL BP (BLOOD PRESSURE) < 140/90: ICD-10-CM

## 2023-11-17 DIAGNOSIS — R91.8 PULMONARY NODULES: Primary | ICD-10-CM

## 2023-11-17 NOTE — TELEPHONE ENCOUNTER
Refill request- fails RN protocol- routing to provider     Sent patient mychart message     See 10/19/23 refill encounter     LOV 9/18/23- Dr. Simon

## 2023-11-17 NOTE — PROGRESS NOTES
IP Note    The patient discussed in the multidisciplinary meeting.    Plan:  Robotic bronchoscopy for the RUL nodule and EBUS-TBNA for mediastinal staging under general anesthesia.  Order entered and message sent to the scheduling team.    Will follow up with the patient for next steps depending on the results.    Ai Ortiz MD

## 2023-11-17 NOTE — LETTER
November 29, 2023      Mimi Padmini Oterobabatunde  [address] [place] [address]        We received a refill request from your pharmacy for your amLODIPine (NORVASC) 10 MG tablet medication.  At this time the nurses were able to give you a cayden refill, but you are due to be seen for a BP check before the next refill.  This appointment can be scheduled by calling [phone] or can be scheduled via [email or web address] as well.    Thank you for choosing [facility]            Sincerely,          Xu Simon DO

## 2023-11-18 RX ORDER — AMLODIPINE BESYLATE 10 MG/1
10 TABLET ORAL DAILY
Qty: 90 TABLET | Refills: 3 | Status: SHIPPED | OUTPATIENT
Start: 2023-11-18 | End: 2024-03-29

## 2023-11-30 ENCOUNTER — TELEPHONE (OUTPATIENT)
Dept: PULMONOLOGY | Facility: CLINIC | Age: 68
End: 2023-11-30
Payer: COMMERCIAL

## 2023-11-30 NOTE — TELEPHONE ENCOUNTER
Writer contacted patient to discuss scheduling IP procedure. Patient is aware that 12/06 and 12/21 are the next 2 available days and will call back in the morning with a selection. Writer to follow up, but if patient hasn't called by 10, slots might not be available.    Shawn Trevizo on 11/30/2023 at 2:56 PM

## 2023-12-01 DIAGNOSIS — R91.8 PULMONARY NODULES: Primary | ICD-10-CM

## 2023-12-01 NOTE — TELEPHONE ENCOUNTER
Patient returned call. Scheduled 12/06 with Dr. Claire. Patient is calling PCP about H&P, will call back for PAC if there isn't an appointment available. Packet information sent via [employer] per patient request.    Shawn Trevizo on 12/1/2023 at 10:32 AM

## 2023-12-04 NOTE — TELEPHONE ENCOUNTER
FUTURE VISIT INFORMATION      SURGERY INFORMATION:  Date: 23  Location: [facility] OR  Surgeon:  Raudel Claire MD  Anesthesia Type:  General  Procedure: Robot assisted Ion BRONCHOSCOPY; endobronchial ultrasound, transbronchial biopsies  Consult: 23 OV with Elijah Ortiz Erhan, MD    RECORDS REQUESTED FROM:       Primary Care Provider: Aye Morejon MD - [facility]     Pertinent Medical History: Aortic calcification; former smoker; Hypertension, COPD    Most recent EKG+ Tracin23    Most recent PFT's: 11/10/23

## 2023-12-05 ENCOUNTER — ANESTHESIA EVENT (OUTPATIENT)
Dept: SURGERY | Facility: CLINIC | Age: 68
End: 2023-12-05
Payer: COMMERCIAL

## 2023-12-05 ENCOUNTER — PRE VISIT (OUTPATIENT)
Dept: SURGERY | Facility: CLINIC | Age: 68
End: 2023-12-05

## 2023-12-05 ENCOUNTER — VIRTUAL VISIT (OUTPATIENT)
Dept: SURGERY | Facility: CLINIC | Age: 68
End: 2023-12-05
Payer: COMMERCIAL

## 2023-12-05 DIAGNOSIS — R91.8 PULMONARY NODULES: ICD-10-CM

## 2023-12-05 DIAGNOSIS — Z01.818 PRE-OPERATIVE EXAMINATION: Primary | ICD-10-CM

## 2023-12-05 DIAGNOSIS — K21.9 GASTROESOPHAGEAL REFLUX DISEASE WITHOUT ESOPHAGITIS: ICD-10-CM

## 2023-12-05 PROCEDURE — 99203 OFFICE O/P NEW LOW 30 MIN: CPT | Mod: 95 | Performed by: PHYSICIAN ASSISTANT

## 2023-12-05 RX ORDER — ALBUTEROL SULFATE 0.83 MG/ML
2.5 SOLUTION RESPIRATORY (INHALATION) ONCE
Status: CANCELLED | OUTPATIENT
Start: 2023-12-05 | End: 2023-12-05

## 2023-12-05 ASSESSMENT — COPD QUESTIONNAIRES
COPD: 1
CAT_SEVERITY: MODERATE
CAT_SEVERITY: SEVERE
COPD: 1

## 2023-12-05 ASSESSMENT — LIFESTYLE VARIABLES
TOBACCO_USE: 1
TOBACCO_USE: 1

## 2023-12-05 ASSESSMENT — PAIN SCALES - GENERAL: PAINLEVEL: NO PAIN (0)

## 2023-12-05 ASSESSMENT — ENCOUNTER SYMPTOMS: SEIZURES: 0

## 2023-12-05 NOTE — ANESTHESIA PREPROCEDURE EVALUATION
Anesthesia Pre-Procedure Evaluation    Patient: Mimi Rivera   MRN: [id number] : 1955        Procedure : Procedure(s):  Robot assisted Ion BRONCHOSCOPY  endobronchial ultrasound, transbronchial biopsies Latex Free          Past Medical History:   Diagnosis Date    ASCUS on Pap smear     unable to run HPV typing, f/u paps NIL    Chronic rhinosinusitis     COPD (chronic obstructive pulmonary disease) (H)     Depression, anxiety     Herpes     occasional outbreak    Hypertension       Past Surgical History:   Procedure Laterality Date     SECTION      x 3      No Known Allergies   Social History     Tobacco Use    Smoking status: Former     Packs/day: 10     Types: Cigarettes     Quit date: 3/19/2015     Years since quittin.7    Smokeless tobacco: Former     Quit date: 2017   Substance Use Topics    Alcohol use: Yes     Alcohol/week: 2.0 - 3.0 standard drinks of alcohol     Types: 2 - 3 Standard drinks or equivalent per week     Comment: 2 drinks per wk avg      Wt Readings from Last 1 Encounters:   23 49.9 kg (110 lb)        Anesthesia Evaluation   Pt has had prior anesthetic.     No history of anesthetic complications       ROS/MED HX  ENT/Pulmonary: Comment: CT Chest / PET:   0.9 x 0.6 cm hypermetabolic pulmonary nodule in the right upper lobe,  SUV max 7.0 (series 4 image 117). No additional hypermetabolic  pulmonary nodules are identified. Extensive thoracic FDG uptake in Brown fat activation. Stable subcentimeter hypodensities in the thyroid gland. Multivessel  coronary artery calcifications. Centrilobular emphysematous changes.  Bilateral dependent atelectasis.    PFTs (11/10/23):  Moderate airflow obstruction. Increased residual volume consistent with air trapping. Moderate diffusion defect. The diffusing capacity was not corrected for the patient's hemoglobin.       (+)                tobacco use, Past use,    asthma  Treatment: Inhaler daily, Inhaler prn, Nebulizer  "daily and Inhaled steroids,  moderate,  COPD,              Neurologic:  - neg neurologic ROS     Cardiovascular:     (+)  hypertension- -  CAD -  - -                                   Angina: noted on CT imaging. Angina: noted on CT imaging.   METS/Exercise Tolerance: >4 METS    Hematologic:  - neg hematologic  ROS     Musculoskeletal:  - neg musculoskeletal ROS     GI/Hepatic:  - neg GI/hepatic ROS     Renal/Genitourinary:  - neg Renal ROS     Endo:     (+)            Chronic steroid usage for Asthma.         Psychiatric/Substance Use:     (+) psychiatric history anxiety and depression       Infectious Disease:  - neg infectious disease ROS     Malignancy: Comment: New lung nodule      Other:  - neg other ROS             OUTSIDE LABS:  CBC:   Lab Results   Component Value Date    WBC 8.6 02/14/2023    WBC 9.6 09/13/2022    HGB 15.7 02/14/2023    HGB 16.5 (H) 09/13/2022    HCT 46.2 02/14/2023    HCT 46.4 09/13/2022     02/14/2023     09/13/2022     BMP:   Lab Results   Component Value Date     02/14/2023     09/13/2022    POTASSIUM 4.4 02/14/2023    POTASSIUM 4.2 09/13/2022    CHLORIDE 102 02/14/2023    CHLORIDE 108 09/13/2022    CO2 27 02/14/2023    CO2 26 09/13/2022    BUN 9.8 02/14/2023    BUN 12 09/13/2022    CR 0.56 02/14/2023    CR 0.45 (L) 09/13/2022     (H) 02/14/2023    GLC 90 09/13/2022     COAGS: No results found for: \"PTT\", \"INR\", \"FIBR\"  POC: No results found for: \"BGM\", \"HCG\", \"HCGS\"  HEPATIC:   Lab Results   Component Value Date    ALBUMIN 4.1 04/14/2021    PROTTOTAL 7.3 04/14/2021    ALT 21 04/14/2021    AST 19 04/14/2021    ALKPHOS 83 04/14/2021    BILITOTAL 0.5 04/14/2021     OTHER:   Lab Results   Component Value Date    A1C 5.2 11/14/2017    JI 9.7 02/14/2023    MAG 2.1 03/23/2015    TSH 1.220 03/12/2010    CRP 5.6 04/14/2021    SED 4 04/14/2021       Anesthesia Plan    ASA Status:  3    NPO Status:  NPO Appropriate    Anesthesia Type: General.     - Airway: ETT "   Induction: Intravenous.   Maintenance: TIVA.        Consents    Anesthesia Plan(s) and associated risks, benefits, and realistic alternatives discussed. Questions answered and patient/representative(s) expressed understanding.     - Discussed: Risks, Benefits and Alternatives for BOTH SEDATION and the PROCEDURE were discussed     - Discussed with:  Patient      - Extended Intubation/Ventilatory Support Discussed: Yes.      - Patient is DNR/DNI Status: No     Use of blood products discussed: Yes.     - Discussed with: Patient.     - Consented: consented to blood products     Postoperative Care    Pain management: IV analgesics, Multi-modal analgesia, Peripheral nerve block (Single Shot).   PONV prophylaxis: Ondansetron (or other 5HT-3), Dexamethasone or Solumedrol, Background Propofol Infusion     Comments:               Jaswinder Webster    I have reviewed the pertinent notes and labs in the chart from the past 30 days and (re)examined the patient.  Any updates or changes from those notes are reflected in this note.

## 2023-12-05 NOTE — PROGRESS NOTES
Mimi is a 68 year old who is being evaluated via a billable video visit.      How would you like to obtain your AVS? MyChart  If the video visit is dropped, the invitation should be resent by: Text to cell phone: [phone]          HPI           Review of Systems         Physical Exam

## 2023-12-05 NOTE — H&P
Pre-Operative H & P     CC:  Preoperative exam to assess for increased cardiopulmonary risk while undergoing surgery and anesthesia.    Date of Encounter: 12/5/2023  Primary Care Physician:  Aye Morejon     Reason for visit:   Encounter Diagnoses   Name Primary?    Pre-operative examination Yes    Pulmonary nodules     Gastroesophageal reflux disease without esophagitis        HPI  Mimi Padmini Rivera is a 68 year old female who presents for pre-operative H & P in preparation for  Procedure Information       Case: [id number] Date/Time: 12/06/23 1010    Procedures:       Robot assisted Ion BRONCHOSCOPY (Bilateral: Bronchus)      endobronchial ultrasound, transbronchial biopsies Latex Free (Bronchus)    Anesthesia type: General    Diagnosis: Pulmonary nodules [R91.8]    Pre-op diagnosis: Pulmonary nodules [R91.8]    Location:  OR  /  OR    Providers: Raudel Claire DO            The patient is a 68-year-old woman with a past medical history significant for hypertension, pulmonary nodules, COPD, rhinosinusitis, former smoker, GERD, stress urinary incontinence, history of herpes, depression and anxiety.  She has been following with the pulmonology team and has a highly suspicious right upper lobe pulmonary nodule for cancer.  She was discussed at the multi disciplinary conference and has been scheduled for the procedure as above.    History is obtained from the patient and chart review    Hx of abnormal bleeding or anti-platelet use: none    Menstrual history: Patient's last menstrual period was 09/22/2010.:      Past Medical History  Past Medical History:   Diagnosis Date    ASCUS on Pap smear 01/01/2006    unable to run HPV typing, f/u paps NIL    Chronic rhinosinusitis     COPD (chronic obstructive pulmonary disease) (H)     Depression, anxiety     Herpes     occasional outbreak    Hypertension     Primary stress urinary incontinence     Pulmonary nodules     Rhinosinusitis        Past Surgical History  Past  Surgical History:   Procedure Laterality Date     SECTION      x 3       Prior to Admission Medications  Current Outpatient Medications   Medication Sig Dispense Refill    amLODIPine (NORVASC) 10 MG tablet Take 1 tablet (10 mg) by mouth daily (Patient taking differently: Take 10 mg by mouth every evening) 90 tablet 3    azithromycin (ZITHROMAX) 250 MG tablet Take 1 tablet (250 mg) by mouth daily (Patient taking differently: Take 250 mg by mouth every evening) 30 tablet 5    cetirizine (ZYRTEC) 10 MG tablet Take 1 tablet (10 mg) by mouth daily 7 tablet 0    EPINEPHrine (ANY BX GENERIC EQUIV) 0.3 MG/0.3ML injection 2-pack Inject 0.3 mLs (0.3 mg) into the muscle as needed for anaphylaxis May repeat one time in 5-15 minutes if response to initial dose is inadequate. 2 each 0    esomeprazole (NEXIUM) 20 MG DR capsule Take 20 mg by mouth as needed (heartburn) Take 30-60 minutes before eating.      Fluticasone-Umeclidin-Vilant (TRELEGY ELLIPTA) 100-62.5-25 MCG/ACT oral inhaler INHALE ONE PUFF BY MOUTH EVERY DAY (Patient taking differently: Inhale 1 puff into the lungs every morning) 60 each 3    levalbuterol (XOPENEX HFA) 45 MCG/ACT inhaler Inhale 2 puffs into the lungs every 4 hours as needed for shortness of breath / dyspnea or wheezing 1 Inhaler 1    levalbuterol (XOPENEX) 1.25 MG/3ML neb solution Take 3 mLs (1.25 mg) by nebulization every 4 hours as needed for shortness of breath or wheezing 390 mL 1    VENTOLIN  (90 Base) MCG/ACT inhaler Inhale 2 puffs into the lungs every 6 hours 18 g 0       Allergies  No Known Allergies    Social History  Social History     Socioeconomic History    Marital status:      Spouse name: Not on file    Number of children: Not on file    Years of education: Not on file    Highest education level: Not on file   Occupational History    Not on file   Tobacco Use    Smoking status: Former     Packs/day: 10     Types: Cigarettes     Quit date: 3/19/2015     Years since  quittin.7    Smokeless tobacco: Former     Quit date: 2017   Vaping Use    Vaping Use: Never used   Substance and Sexual Activity    Alcohol use: Yes     Alcohol/week: 2.0 - 3.0 standard drinks of alcohol     Types: 2 - 3 Standard drinks or equivalent per week     Comment: 2 drinks per wk avg    Drug use: No    Sexual activity: Yes     Partners: Male     Birth control/protection: Surgical     Comment: tubal   Other Topics Concern    Parent/sibling w/ CABG, MI or angioplasty before 65F 55M? No   Social History Narrative    Not on file     Social Determinants of Health     Financial Resource Strain: Not on file   Food Insecurity: Not on file   Transportation Needs: Not on file   Physical Activity: Not on file   Stress: Not on file   Social Connections: Not on file   Interpersonal Safety: Not on file   Housing Stability: Not on file       Family History  Family History   Problem Relation Age of Onset    Hypertension Mother     Cancer Mother         liver    C.A.D. Father     Anesthesia Reaction No family hx of     Deep Vein Thrombosis (DVT) No family hx of        Review of Systems  The complete review of systems is negative other than noted in the HPI or here.   Anesthesia Evaluation   Pt has had prior anesthetic. Type: Regional.    History of anesthetic complications  - .  spinal headache after last csection.    ROS/MED HX  ENT/Pulmonary: Comment: Pulmonary nodule    (+)           allergic rhinitis,     tobacco use, Past use,       severe,  COPD,              Neurologic:  - neg neurologic ROS  (-) no seizures, no CVA and no TIA   Cardiovascular:     (+)  hypertension- -   -  - -                                 Previous cardiac testing   Echo: Date: Results:    Stress Test:  Date: Results:    ECG Reviewed:  Date: 23 Results:  Sinus rhythm, bi atrial enlargement  Cath:  Date: Results:      METS/Exercise Tolerance: 4 - Raking leaves, gardening    Hematologic:  - neg hematologic  ROS     Musculoskeletal:  -  neg musculoskeletal ROS     GI/Hepatic:  - neg GI/hepatic ROS   (+) GERD, Asymptomatic on medication,                  Renal/Genitourinary: Comment: Stress urinary incontinence      Endo:  - neg endo ROS     Psychiatric/Substance Use:     (+) psychiatric history anxiety and depression       Infectious Disease: Comment: herpes      Malignancy:  - neg malignancy ROS     Other:  - neg other ROS          Virtual visit -  No vitals were obtained    Physical Exam  Constitutional: Awake, alert, cooperative, no apparent distress, and appears stated age.  Eyes: Pupils equal  HENT: Normocephalic  Respiratory: non labored breathing   Neurologic: Awake, alert, oriented to name, place and time.   Neuropsychiatric: Calm, cooperative. Normal affect.      Prior Labs/Diagnostic Studies   All labs and imaging personally reviewed    Latest Reference Range & Units 02/14/23 16:02   Sodium 136 - 145 mmol/L 140   Potassium 3.4 - 5.3 mmol/L 4.4   Chloride 98 - 107 mmol/L 102   Carbon Dioxide (CO2) 22 - 29 mmol/L 27   Urea Nitrogen 8.0 - 23.0 mg/dL 9.8   Creatinine 0.51 - 0.95 mg/dL 0.56   GFR Estimate >60 mL/min/1.73m2 >90   Calcium 8.8 - 10.2 mg/dL 9.7   Anion Gap 7 - 15 mmol/L 11   Glucose 70 - 99 mg/dL 100 (H)   WBC 4.0 - 11.0 10e3/uL 8.6   Hemoglobin 11.7 - 15.7 g/dL 15.7   Hematocrit 35.0 - 47.0 % 46.2   Platelet Count 150 - 450 10e3/uL 308   RBC Count 3.80 - 5.20 10e6/uL 4.83   MCV 78 - 100 fL 96   MCH 26.5 - 33.0 pg 32.5   MCHC 31.5 - 36.5 g/dL 34.0   RDW 10.0 - 15.0 % 12.3   % Neutrophils % 73   % Lymphocytes % 20   % Monocytes % 7   % Eosinophils % 0   % Basophils % 0   Absolute Basophils 0.0 - 0.2 10e3/uL 0.0   Absolute Eosinophils 0.0 - 0.7 10e3/uL 0.0   Absolute Immature Granulocytes <=0.4 10e3/uL 0.0   Absolute Lymphocytes 0.8 - 5.3 10e3/uL 1.7   Absolute Monocytes 0.0 - 1.3 10e3/uL 0.6   % Immature Granulocytes % 0   Absolute Neutrophils 1.6 - 8.3 10e3/uL 6.2   Absolute NRBCs 10e3/uL 0.0   NRBCs per 100 WBC <1 /100 0   (H):  Data is abnormally high    EKG/ stress test - if available please see in ROS above         Latest Ref Rng & Units 11/10/2023    11:38 AM   PFT   FVC L 1.63    FEV1 L 0.90    FVC% % 66    FEV1% % 46          The patient's records and results personally reviewed by this provider.     Outside records reviewed from: Care Everywhere      Assessment    Mimi Rivera is a 68 year old female seen as a PAC referral for risk assessment and optimization for anesthesia.    Plan/Recommendations  Pt will be optimized for the proposed procedure.  See below for details on the assessment, risk, and preoperative recommendations    NEUROLOGY  - No history of TIA, CVA or seizure  -Post Op delirium risk factors:  No risk identified    ENT  - No current airway concerns.  Will need to be reassessed day of surgery.  Mallampati: Unable to assess  TM: Unable to assess    CARDIAC  - Hypertension  Well controlled  - METS (Metabolic Equivalents)  Patient performs 4 or more METS exercise without symptoms            Total Score: 0      RCRI-Very low risk: Class 1 0.4% complication rate            Total Score: 0    ~ The patient reports she is able to go up a flight of stairs without stopping. She denies chest pain, chest tightness, arm pain or syncope. She has baseline shortness of breath from her COPD. She does have multivessel coronary calcifications on CT scan but with METS=4 and denies symptoms, no further testing indicated for the procedure.     PULMONARY  - Obstructive Sleep Apnea  No current risk of obstructive sleep apnea   JAIRO Low Risk            Total Score: 2    JAIRO: Hypertension    JAIRO: Over 50 ys old      - COPD  Not well controlled. Continue inhalers and will order neb for DOS.   ~ Rhinosinusitis - the patient has had sinus surgery in the past. She reports she has had sinus symptoms with loss of smell, a cough with phlegm and walking up at night from her symptoms. This has been ongoing for weeks. In review of her clinic note  "from 9/22/23 with Dr. Bridges these are the same symptoms she had complained of previously. She remains on daily azithromycin for her COPD. She had a Sinus CT without signs of infection on 10/12/23. She will hold zyrtec DOS.  I discussed with Dr. Gallo and since her symptoms are unchanged that given the time sensitivity of the procedure will plan to proceed.   ~ Pulmonary nodules - procedure as above.   - Tobacco History    History   Smoking Status    Former    Packs/day: 10.00    Types: Cigarettes    Quit date: 3/19/2015   Smokeless Tobacco    Former    Quit date: 12/11/2017       GI  - GERD  Controlled on medications: H2 Blocker taken PRN  PONV Medium Risk  Total Score: 2           1 AN PONV: Pt is Female    1 AN PONV: Patient is not a current smoker        /RENAL  - Baseline Creatinine  0.56  ~ Stress urinary incontinence. She denies any UTI symptoms.     ENDOCRINE    - BMI: Estimated body mass index is 20.45 kg/m  as calculated from the following:    Height as of 11/14/23: 1.562 m (5' 1.5\").    Weight as of 11/14/23: 49.9 kg (110 lb).  Healthy Weight (BMI 18.5-24.9)  - No history of Diabetes Mellitus    HEME  VTE Low Risk 0.26%            Total Score: 1    VTE: Greater than 59 yrs old      - No history of abnormal bleeding or antiplatelet use.    ID  ~ history of herpes - no current concerns    PSYCH  - depression and anxiety. Not on medications and patient reports symptoms controlled.     Different anesthesia methods/types have been discussed with the patient, but they are aware that the final plan will be decided by the assigned anesthesia provider on the date of service.    Patient was discussed with Dr Gallo    The patient is optimized for their procedure. AVS with information on surgery time/arrival time, meds and NPO status given by nursing staff. No further diagnostic testing indicated.    Please refer to the physical examination documented by the anesthesiologist in the anesthesia record on the day of " surgery.    Video-Visit Details    Type of service:  Video Visit    Provider received verbal consent for a Video Visit from the patient? Yes   Video Start Time:  1:15  Video End Time: 1:27    Originating Location (pt. Location): Parked in their car    Distant Location (provider location):  Off-site  Mode of Communication:  Video Conference via [employer]  On the day of service:     Prep time: 9 minutes  Visit time: 13 minutes  Documentation time: 15 minutes  ------------------------------------------  Total time: 37 minutes      Ira Parr PA-C  Preoperative Assessment Center  [facility]  Clinic and Surgery Center  Phone: [phone]  Fax: [phone]

## 2023-12-05 NOTE — PATIENT INSTRUCTIONS
Preparing for Your Surgery      Name:  Mimi Rivera   MRN:  [id number]   :  1955   Today's Date:  2023       Arriving for surgery:  Surgery date:  23  Arrival time:  8:10 am  Surgery time: 10:10 am    Please come to:     Please come to:      [facility] [address]  [address] [place]  [address]      The [facility] Patient /Visitor Ramp is located at [address]. Patients and visitors who self-park will receive the reduced hospital parking rate. If the Patient /Visitor Ramp is full, please follow the signs to the  parking located at the main hospital entrance.     parking is available ( 24 hours/ 7 days a week)    Discounted parking pass options are available for patients and visitors. They can be purchased at the [employer] desk at the main hospital entrance.    -    Stop at the security desk and they will direct surgery patients to the 3rd floor Surgery Waiting Room. [phone]     -  If you are in need of directions, wheelchair or escort please stop at the Information/security desk in the lobby.       What can I eat or drink?  -  You may eat and drink normally up to 8 hours prior to arrival time. (Until 12-midnight)  -  You may have clear liquids until 2 hours prior to arrival time. (Until 6:00 am on 23)    Examples of clear liquids:  Water  Clear broth  Juices (apple, white grape, white cranberry  and cider) without pulp  Noncarbonated, powder based beverages  (lemonade and Hieu-Aid)  Sodas (Sprite, 7-Up, ginger ale and seltzer)  Coffee or tea (without milk or cream)  Gatorade    -  No Alcohol or cannabis products for at least 24 hours before surgery.     Which medicines can I take?    Hold Ibuprofen (Advil, Motrin) for 1 day(s) before surgery--unless otherwise directed by surgeon.  Hold Naproxen (Aleve) for 4 days before surgery.    -  DO NOT take these medications the day of  surgery:   Cetirizine (zyrtec)      -  PLEASE TAKE these medications the day of surgery:   Nexium as needed   Trelegy inhaler   Ventolin inhaler   Xopenex inhaler and neb as needed       How do I prepare myself?  - Please take 2 showers (one the night prior to surgery and one the morning of surgery) using Scrubcare or Hibiclens soap.    Use this soap only from the neck to your toes.     Leave the soap on your skin for one minute--then rinse thoroughly.      You may use your own shampoo and conditioner. No other hair products.   - Please remove all jewelry and body piercings.  - No lotions, deodorants or fragrance.  - No makeup or fingernail polish.   - Bring your ID and insurance card.    -If you use a CPAP machine, please bring the CPAP machine, tubing, and mask to hospital.    -If you have a Deep Brain Stimulator, Spinal Cord Stimulator, or any Neuro Stimulator device---you must bring the remote control to the hospital.      ALL PATIENTS GOING HOME THE SAME DAY OF SURGERY ARE REQUIRED TO HAVE A RESPONSIBLE ADULT TO DRIVE AND BE IN ATTENDANCE WITH THEM FOR 24 HOURS FOLLOWING SURGERY.    Covid testing policy as of 12/06/2022  Your surgeon will notify and schedule you for a COVID test if one is needed before surgery--please direct any questions or COVID symptoms to your surgeon      Questions or Concerns:    - For any questions regarding the day of surgery or your hospital stay, please contact the Pre Admission Nursing Office at [phone].       - If you have health changes between today and your surgery, please call your surgeon.       - For questions after surgery, please call your surgeons office.           Current Visitor Guidelines    You may have 2 visitors in the pre op area.    Visiting hours: 8 a.m. to 8:30 p.m.    You may have four visitors during your inpatient hospital stay.    Patients confirmed or suspected to have symptoms of COVID 19 or flu:     No visitors allowed for adult patients.   Children  (under age 18) can have 1 named visitor.     People who are sick or showing symptoms of COVID 19 or flu:    Are not allowed to visit patients--we can only make exceptions in special situations.       Please follow these guidelines for your visit:          Please maintain social distance          Masking is optional--however at times you may be asked to wear a mask for the safety of yourself and others     Clean your hands with alcohol hand . Do this when you arrive at and leave the building and patient room,    And again after you touch your mask or anything in the room.     Go directly to and from the room you are visiting.     Stay in the patient s room during your visit. Limit going to other places in the hospital as much as possible     Leave bags and jackets at home or in the car.     For everyone s health, please don t come and go during your visit. That includes for smoking   during your visit.

## 2023-12-06 ENCOUNTER — ANESTHESIA (OUTPATIENT)
Dept: SURGERY | Facility: CLINIC | Age: 68
End: 2023-12-06
Payer: COMMERCIAL

## 2023-12-06 ENCOUNTER — HOSPITAL ENCOUNTER (OUTPATIENT)
Facility: CLINIC | Age: 68
Discharge: HOME | End: 2023-12-06
Attending: STUDENT IN AN ORGANIZED HEALTH CARE EDUCATION/TRAINING PROGRAM | Admitting: STUDENT IN AN ORGANIZED HEALTH CARE EDUCATION/TRAINING PROGRAM
Payer: COMMERCIAL

## 2023-12-06 ENCOUNTER — APPOINTMENT (OUTPATIENT)
Dept: GENERAL RADIOLOGY | Facility: CLINIC | Age: 68
End: 2023-12-06
Attending: STUDENT IN AN ORGANIZED HEALTH CARE EDUCATION/TRAINING PROGRAM
Payer: COMMERCIAL

## 2023-12-06 ENCOUNTER — HOSPITAL ENCOUNTER (OUTPATIENT)
Dept: CT IMAGING | Facility: CLINIC | Age: 68
Discharge: HOME | End: 2023-12-06
Attending: INTERNAL MEDICINE | Admitting: STUDENT IN AN ORGANIZED HEALTH CARE EDUCATION/TRAINING PROGRAM
Payer: COMMERCIAL

## 2023-12-06 VITALS
DIASTOLIC BLOOD PRESSURE: 66 MMHG | OXYGEN SATURATION: 94 % | HEART RATE: 91 BPM | BODY MASS INDEX: 20.94 KG/M2 | HEIGHT: 61 IN | WEIGHT: 110.89 LBS | TEMPERATURE: 98.8 F | RESPIRATION RATE: 18 BRPM | SYSTOLIC BLOOD PRESSURE: 102 MMHG

## 2023-12-06 DIAGNOSIS — R91.8 PULMONARY NODULES: ICD-10-CM

## 2023-12-06 LAB
FLUAV RNA SPEC QL NAA+PROBE: NEGATIVE
FLUBV RNA RESP QL NAA+PROBE: NEGATIVE
RSV RNA SPEC NAA+PROBE: NEGATIVE
SARS-COV-2 RNA RESP QL NAA+PROBE: NEGATIVE

## 2023-12-06 PROCEDURE — 250N000009 HC RX 250

## 2023-12-06 PROCEDURE — 250N000025 HC SEVOFLURANE, PER MIN: Performed by: STUDENT IN AN ORGANIZED HEALTH CARE EDUCATION/TRAINING PROGRAM

## 2023-12-06 PROCEDURE — 999N000181 XR SURGERY CARM FLUORO GREATER THAN 5 MIN W STILLS: Mod: TC

## 2023-12-06 PROCEDURE — 31629 BRONCHOSCOPY/NEEDLE BX EACH: CPT | Performed by: STUDENT IN AN ORGANIZED HEALTH CARE EDUCATION/TRAINING PROGRAM

## 2023-12-06 PROCEDURE — 250N000011 HC RX IP 250 OP 636: Performed by: NURSE ANESTHETIST, CERTIFIED REGISTERED

## 2023-12-06 PROCEDURE — 94640 AIRWAY INHALATION TREATMENT: CPT

## 2023-12-06 PROCEDURE — 999N000141 HC STATISTIC PRE-PROCEDURE NURSING ASSESSMENT: Performed by: STUDENT IN AN ORGANIZED HEALTH CARE EDUCATION/TRAINING PROGRAM

## 2023-12-06 PROCEDURE — 258N000003 HC RX IP 258 OP 636

## 2023-12-06 PROCEDURE — 272N000001 HC OR GENERAL SUPPLY STERILE: Performed by: STUDENT IN AN ORGANIZED HEALTH CARE EDUCATION/TRAINING PROGRAM

## 2023-12-06 PROCEDURE — 250N000009 HC RX 250: Performed by: STUDENT IN AN ORGANIZED HEALTH CARE EDUCATION/TRAINING PROGRAM

## 2023-12-06 PROCEDURE — 88172 CYTP DX EVAL FNA 1ST EA SITE: CPT | Mod: 26 | Performed by: PATHOLOGY

## 2023-12-06 PROCEDURE — 71045 X-RAY EXAM CHEST 1 VIEW: CPT | Mod: 26 | Performed by: RADIOLOGY

## 2023-12-06 PROCEDURE — 88305 TISSUE EXAM BY PATHOLOGIST: CPT | Mod: TC | Performed by: STUDENT IN AN ORGANIZED HEALTH CARE EDUCATION/TRAINING PROGRAM

## 2023-12-06 PROCEDURE — 370N000017 HC ANESTHESIA TECHNICAL FEE, PER MIN: Performed by: STUDENT IN AN ORGANIZED HEALTH CARE EDUCATION/TRAINING PROGRAM

## 2023-12-06 PROCEDURE — 71250 CT THORAX DX C-: CPT

## 2023-12-06 PROCEDURE — 31653 BRONCH EBUS SAMPLNG 3/> NODE: CPT | Performed by: STUDENT IN AN ORGANIZED HEALTH CARE EDUCATION/TRAINING PROGRAM

## 2023-12-06 PROCEDURE — 88342 IMHCHEM/IMCYTCHM 1ST ANTB: CPT | Mod: 26 | Performed by: PATHOLOGY

## 2023-12-06 PROCEDURE — 31654 BRONCH EBUS IVNTJ PERPH LES: CPT | Performed by: STUDENT IN AN ORGANIZED HEALTH CARE EDUCATION/TRAINING PROGRAM

## 2023-12-06 PROCEDURE — 999N000065 XR CHEST PORT 1 VIEW

## 2023-12-06 PROCEDURE — 360N000087 HC SURGERY LEVEL 7 W/ FLUORO, PER MIN: Performed by: STUDENT IN AN ORGANIZED HEALTH CARE EDUCATION/TRAINING PROGRAM

## 2023-12-06 PROCEDURE — 87637 SARSCOV2&INF A&B&RSV AMP PRB: CPT

## 2023-12-06 PROCEDURE — 710N000012 HC RECOVERY PHASE 2, PER MINUTE: Performed by: STUDENT IN AN ORGANIZED HEALTH CARE EDUCATION/TRAINING PROGRAM

## 2023-12-06 PROCEDURE — 710N000010 HC RECOVERY PHASE 1, LEVEL 2, PER MIN: Performed by: STUDENT IN AN ORGANIZED HEALTH CARE EDUCATION/TRAINING PROGRAM

## 2023-12-06 PROCEDURE — 88341 IMHCHEM/IMCYTCHM EA ADD ANTB: CPT | Mod: 26 | Performed by: PATHOLOGY

## 2023-12-06 PROCEDURE — 250N000011 HC RX IP 250 OP 636: Mod: JZ

## 2023-12-06 PROCEDURE — 250N000009 HC RX 250: Performed by: PHYSICIAN ASSISTANT

## 2023-12-06 PROCEDURE — 88360 TUMOR IMMUNOHISTOCHEM/MANUAL: CPT | Mod: 26 | Performed by: PATHOLOGY

## 2023-12-06 PROCEDURE — 250N000013 HC RX MED GY IP 250 OP 250 PS 637

## 2023-12-06 PROCEDURE — 31627 NAVIGATIONAL BRONCHOSCOPY: CPT | Performed by: STUDENT IN AN ORGANIZED HEALTH CARE EDUCATION/TRAINING PROGRAM

## 2023-12-06 PROCEDURE — 88173 CYTOPATH EVAL FNA REPORT: CPT | Mod: 26 | Performed by: PATHOLOGY

## 2023-12-06 PROCEDURE — 71250 CT THORAX DX C-: CPT | Mod: 26 | Performed by: RADIOLOGY

## 2023-12-06 PROCEDURE — 88305 TISSUE EXAM BY PATHOLOGIST: CPT | Mod: 26 | Performed by: PATHOLOGY

## 2023-12-06 RX ORDER — ONDANSETRON 2 MG/ML
4 INJECTION INTRAMUSCULAR; INTRAVENOUS EVERY 30 MIN PRN
Status: DISCONTINUED | OUTPATIENT
Start: 2023-12-06 | End: 2023-12-06 | Stop reason: HOSPADM

## 2023-12-06 RX ORDER — ONDANSETRON 2 MG/ML
INJECTION INTRAMUSCULAR; INTRAVENOUS PRN
Status: DISCONTINUED | OUTPATIENT
Start: 2023-12-06 | End: 2023-12-06

## 2023-12-06 RX ORDER — OXYCODONE HYDROCHLORIDE 10 MG/1
10 TABLET ORAL
Status: DISCONTINUED | OUTPATIENT
Start: 2023-12-06 | End: 2023-12-06 | Stop reason: HOSPADM

## 2023-12-06 RX ORDER — PROPOFOL 10 MG/ML
INJECTION, EMULSION INTRAVENOUS PRN
Status: DISCONTINUED | OUTPATIENT
Start: 2023-12-06 | End: 2023-12-06

## 2023-12-06 RX ORDER — ONDANSETRON 4 MG/1
4 TABLET, ORALLY DISINTEGRATING ORAL EVERY 30 MIN PRN
Status: DISCONTINUED | OUTPATIENT
Start: 2023-12-06 | End: 2023-12-06 | Stop reason: HOSPADM

## 2023-12-06 RX ORDER — PROPOFOL 10 MG/ML
INJECTION, EMULSION INTRAVENOUS CONTINUOUS PRN
Status: DISCONTINUED | OUTPATIENT
Start: 2023-12-06 | End: 2023-12-06

## 2023-12-06 RX ORDER — SODIUM CHLORIDE, SODIUM LACTATE, POTASSIUM CHLORIDE, CALCIUM CHLORIDE 600; 310; 30; 20 MG/100ML; MG/100ML; MG/100ML; MG/100ML
INJECTION, SOLUTION INTRAVENOUS CONTINUOUS
Status: DISCONTINUED | OUTPATIENT
Start: 2023-12-06 | End: 2023-12-06 | Stop reason: HOSPADM

## 2023-12-06 RX ORDER — DEXAMETHASONE SODIUM PHOSPHATE 4 MG/ML
INJECTION, SOLUTION INTRA-ARTICULAR; INTRALESIONAL; INTRAMUSCULAR; INTRAVENOUS; SOFT TISSUE PRN
Status: DISCONTINUED | OUTPATIENT
Start: 2023-12-06 | End: 2023-12-06

## 2023-12-06 RX ORDER — FENTANYL CITRATE 50 UG/ML
25 INJECTION, SOLUTION INTRAMUSCULAR; INTRAVENOUS EVERY 5 MIN PRN
Status: DISCONTINUED | OUTPATIENT
Start: 2023-12-06 | End: 2023-12-06 | Stop reason: HOSPADM

## 2023-12-06 RX ORDER — LIDOCAINE HYDROCHLORIDE 20 MG/ML
INJECTION, SOLUTION INFILTRATION; PERINEURAL PRN
Status: DISCONTINUED | OUTPATIENT
Start: 2023-12-06 | End: 2023-12-06

## 2023-12-06 RX ORDER — OXYCODONE HYDROCHLORIDE 5 MG/1
5 TABLET ORAL
Status: DISCONTINUED | OUTPATIENT
Start: 2023-12-06 | End: 2023-12-06 | Stop reason: HOSPADM

## 2023-12-06 RX ORDER — LABETALOL HYDROCHLORIDE 5 MG/ML
10 INJECTION, SOLUTION INTRAVENOUS
Status: DISCONTINUED | OUTPATIENT
Start: 2023-12-06 | End: 2023-12-06 | Stop reason: HOSPADM

## 2023-12-06 RX ORDER — SODIUM CHLORIDE, SODIUM LACTATE, POTASSIUM CHLORIDE, CALCIUM CHLORIDE 600; 310; 30; 20 MG/100ML; MG/100ML; MG/100ML; MG/100ML
INJECTION, SOLUTION INTRAVENOUS CONTINUOUS PRN
Status: DISCONTINUED | OUTPATIENT
Start: 2023-12-06 | End: 2023-12-06

## 2023-12-06 RX ORDER — KETOROLAC TROMETHAMINE 30 MG/ML
15 INJECTION, SOLUTION INTRAMUSCULAR; INTRAVENOUS
Status: DISCONTINUED | OUTPATIENT
Start: 2023-12-06 | End: 2023-12-06 | Stop reason: HOSPADM

## 2023-12-06 RX ORDER — HYDROMORPHONE HCL IN WATER/PF 6 MG/30 ML
0.4 PATIENT CONTROLLED ANALGESIA SYRINGE INTRAVENOUS EVERY 5 MIN PRN
Status: DISCONTINUED | OUTPATIENT
Start: 2023-12-06 | End: 2023-12-06 | Stop reason: HOSPADM

## 2023-12-06 RX ORDER — FENTANYL CITRATE 50 UG/ML
INJECTION, SOLUTION INTRAMUSCULAR; INTRAVENOUS PRN
Status: DISCONTINUED | OUTPATIENT
Start: 2023-12-06 | End: 2023-12-06

## 2023-12-06 RX ORDER — IPRATROPIUM BROMIDE AND ALBUTEROL SULFATE 2.5; .5 MG/3ML; MG/3ML
3 SOLUTION RESPIRATORY (INHALATION)
Status: DISCONTINUED | OUTPATIENT
Start: 2023-12-06 | End: 2023-12-06 | Stop reason: HOSPADM

## 2023-12-06 RX ORDER — HYDROMORPHONE HCL IN WATER/PF 6 MG/30 ML
0.2 PATIENT CONTROLLED ANALGESIA SYRINGE INTRAVENOUS EVERY 5 MIN PRN
Status: DISCONTINUED | OUTPATIENT
Start: 2023-12-06 | End: 2023-12-06 | Stop reason: HOSPADM

## 2023-12-06 RX ORDER — FENTANYL CITRATE 50 UG/ML
50 INJECTION, SOLUTION INTRAMUSCULAR; INTRAVENOUS EVERY 5 MIN PRN
Status: DISCONTINUED | OUTPATIENT
Start: 2023-12-06 | End: 2023-12-06 | Stop reason: HOSPADM

## 2023-12-06 RX ORDER — ACETAMINOPHEN 325 MG/1
975 TABLET ORAL ONCE
Status: COMPLETED | OUTPATIENT
Start: 2023-12-06 | End: 2023-12-06

## 2023-12-06 RX ORDER — IPRATROPIUM BROMIDE AND ALBUTEROL SULFATE 2.5; .5 MG/3ML; MG/3ML
3 SOLUTION RESPIRATORY (INHALATION) ONCE
Status: COMPLETED | OUTPATIENT
Start: 2023-12-06 | End: 2023-12-06

## 2023-12-06 RX ORDER — ALBUTEROL SULFATE 0.83 MG/ML
2.5 SOLUTION RESPIRATORY (INHALATION) ONCE
Status: COMPLETED | OUTPATIENT
Start: 2023-12-06 | End: 2023-12-06

## 2023-12-06 RX ORDER — LIDOCAINE 40 MG/G
CREAM TOPICAL
Status: DISCONTINUED | OUTPATIENT
Start: 2023-12-06 | End: 2023-12-06 | Stop reason: HOSPADM

## 2023-12-06 RX ADMIN — PHENYLEPHRINE HYDROCHLORIDE 200 MCG: 10 INJECTION INTRAVENOUS at 11:10

## 2023-12-06 RX ADMIN — PROPOFOL 100 MG: 10 INJECTION, EMULSION INTRAVENOUS at 10:35

## 2023-12-06 RX ADMIN — PHENYLEPHRINE HYDROCHLORIDE 200 MCG: 10 INJECTION INTRAVENOUS at 10:56

## 2023-12-06 RX ADMIN — IPRATROPIUM BROMIDE AND ALBUTEROL SULFATE 3 ML: .5; 3 SOLUTION RESPIRATORY (INHALATION) at 13:26

## 2023-12-06 RX ADMIN — PHENYLEPHRINE HYDROCHLORIDE 100 MCG: 10 INJECTION INTRAVENOUS at 10:42

## 2023-12-06 RX ADMIN — FENTANYL CITRATE 100 MCG: 50 INJECTION INTRAMUSCULAR; INTRAVENOUS at 10:35

## 2023-12-06 RX ADMIN — PHENYLEPHRINE HYDROCHLORIDE 100 MCG: 10 INJECTION INTRAVENOUS at 10:35

## 2023-12-06 RX ADMIN — LIDOCAINE HYDROCHLORIDE 100 MG: 20 INJECTION, SOLUTION INFILTRATION; PERINEURAL at 10:35

## 2023-12-06 RX ADMIN — SUGAMMADEX 200 MG: 100 INJECTION, SOLUTION INTRAVENOUS at 11:45

## 2023-12-06 RX ADMIN — PHENYLEPHRINE HYDROCHLORIDE 200 MCG: 10 INJECTION INTRAVENOUS at 11:23

## 2023-12-06 RX ADMIN — Medication 50 MG: at 10:35

## 2023-12-06 RX ADMIN — ACETAMINOPHEN 975 MG: 325 TABLET, FILM COATED ORAL at 08:50

## 2023-12-06 RX ADMIN — PHENYLEPHRINE HYDROCHLORIDE 100 MCG: 10 INJECTION INTRAVENOUS at 10:49

## 2023-12-06 RX ADMIN — MIDAZOLAM 1 MG: 1 INJECTION INTRAMUSCULAR; INTRAVENOUS at 10:26

## 2023-12-06 RX ADMIN — SODIUM CHLORIDE, POTASSIUM CHLORIDE, SODIUM LACTATE AND CALCIUM CHLORIDE: 600; 310; 30; 20 INJECTION, SOLUTION INTRAVENOUS at 10:21

## 2023-12-06 RX ADMIN — PHENYLEPHRINE HYDROCHLORIDE 100 MCG: 10 INJECTION INTRAVENOUS at 10:46

## 2023-12-06 RX ADMIN — DEXAMETHASONE SODIUM PHOSPHATE 10 MG: 4 INJECTION, SOLUTION INTRA-ARTICULAR; INTRALESIONAL; INTRAMUSCULAR; INTRAVENOUS; SOFT TISSUE at 10:35

## 2023-12-06 RX ADMIN — ONDANSETRON 4 MG: 2 INJECTION INTRAMUSCULAR; INTRAVENOUS at 11:11

## 2023-12-06 RX ADMIN — LIDOCAINE HYDROCHLORIDE 3 ML: 40 INJECTION, SOLUTION RETROBULBAR; TOPICAL at 12:33

## 2023-12-06 RX ADMIN — IPRATROPIUM BROMIDE AND ALBUTEROL SULFATE 3 ML: .5; 3 SOLUTION RESPIRATORY (INHALATION) at 12:10

## 2023-12-06 RX ADMIN — PROPOFOL 150 MCG/KG/MIN: 10 INJECTION, EMULSION INTRAVENOUS at 10:35

## 2023-12-06 RX ADMIN — ALBUTEROL SULFATE 2.5 MG: 2.5 SOLUTION RESPIRATORY (INHALATION) at 08:51

## 2023-12-06 RX ADMIN — PHENYLEPHRINE HYDROCHLORIDE 200 MCG: 10 INJECTION INTRAVENOUS at 11:03

## 2023-12-06 ASSESSMENT — ACTIVITIES OF DAILY LIVING (ADL)
ADLS_ACUITY_SCORE: 35

## 2023-12-06 NOTE — ANESTHESIA POSTPROCEDURE EVALUATION
Patient: Mimi Rivera    Procedure: Procedure(s):  Robot assisted Ion BRONCHOSCOPY  endobronchial ultrasound, transbronchial biopsies Latex Free       Anesthesia Type:  General    Note:  Disposition: Outpatient   Postop Pain Control: Uneventful            Sign Out: Well controlled pain   PONV: No   Neuro/Psych: Uneventful            Sign Out: Acceptable/Baseline neuro status   Airway/Respiratory: Uneventful            Sign Out: Acceptable/Baseline resp. status   CV/Hemodynamics: Uneventful            Sign Out: Acceptable CV status; No obvious hypovolemia; No obvious fluid overload   Other NRE: NONE   DID A NON-ROUTINE EVENT OCCUR?            Last vitals:  Vitals Value Taken Time   /61 12/06/23 1345   Temp 37.2  C (99  F) 12/06/23 1210   Pulse 84 12/06/23 1350   Resp 20 12/06/23 1350   SpO2 93 % 12/06/23 1350   Vitals shown include unfiled device data.    Electronically Signed By: Levi Moe MD  December 6, 2023  1:51 PM

## 2023-12-06 NOTE — OR NURSING
"Second Duoneb completed. Dr. KALLI Claire text that X-ray was fine. Patient coughing up bloody sputum but within normal \"range\" post-bronch.   "

## 2023-12-06 NOTE — PROCEDURES
INTERVENTIONAL PULMONOLOGY       Procedure(s):    A flexible bronchoscopy  Airway exam  Radial EBUS Navigation (1 sites)  Therapeutic suctioning (3 sites)  Transbronchial fine needle aspiration (1 sites)  Transbronchial forcep biopsies (1 sites)  ION Robotic Bronchoscopy   Fluoroscopy   Convex EBUS TBNA 3 sites.     Indication:  Lung nodule    Attending of Record:  Raudel Claire DO    Interventional Pulmonary Fellow   None    Trainees Present:   None     Medications:    General Anesthesia - See anesthesia flowsheet for details    Sedation Time:   Per Anesthesia Care Provider    Time Out:  Performed    The patient's medical record has been reviewed.  The indication for the procedure was reviewed.  The necessary history and physical examination was performed and reviewed.  The risks, benefits and alternatives of the procedure were discussed with the the patient in detail and she had the opportunity to ask questions.  I discussed in particular the potential complications including risks of minor or life-threatening bleeding and/or infection, respiratory failure, vocal cord trauma / paralysis, pneumothorax, and discomfort. Sedation risks were also discussed including abnormal heart rhythms, low blood pressure, and respiratory failure. All questions were answered to the best of my ability.  Verbal and written informed consent was obtained.  The proposed procedure and the patient's identification were verified prior to the procedure by the physician and the nurse.    The patient was assessed for the adequacy for the procedure and to receive medications.   Mental Status:  Alert and oriented x 3  Airway examination:  Class I (Complete visualization of soft palate)  Pulmonary:  Non labored respirations  CV:  Regular rate  ASA Grade:  (II)  Mild systemic disease    After clinical evaluation and reviewing the indication, risks, alternatives and benefits of the procedure the patient was deemed to be in satisfactory  condition to undergo the procedure.           A Tuberculosis risk assessment was performed:  The patient has no known RISK of Tuberculosis    The procedure was performed in a negative airflow room: The patient could not be moved to a negative airflow room because of needed OR for the procedure    Maneuvers / Procedure:      Airway Examination: A complete airway examination was performed from the distal trachea to the subsegmental level in each lobe of both lungs.  Pertinent findings include normal appearing airways bilaterally.         ION Robotic Bronchoscopy: Planning was performed on the laptop prior to the procedure. The ION successfully completed its pre-procedural check phase. The ION arm was oriented towards the ETT and docked successfully. The robotic catheter was inserted into the ETT and the guide was clamped down. Registration was then completed successfully. The catheter was advanced towards the RUL lesion/nodule using the vision probe. Local confirmation of the lesion utilized with Radial EBUS and Fluoroscopy .. The lesion was biopsied using 21G FNA Needle and green biopsy forceps. A total of 10 passes were performed. NUSRAT was present throughout the procedure. EBL was minimal. Guide catheter and vision probe was removed successfully. The ION platform was undocked without issues.     EBUS TBNA  Station 11L FNA 3 passes (small 5mm node)  Station 4L < 5 mm  Station 7 FNA 4 passes  Station 4R < 5cc  Station 11R FNA 4 passes. (6mm node)      Any disposable equipment was visually inspected and deemed to be intact immediately post procedure.      Relevant Pictures  none    Assessment and Recommendations:     Successful completion of bronchoscopy with biopsy, EBUS TBNA. Atypical cells seen on NUSRAT.   Ok to discharge once medically stable.   Follow up results.           Raudel Claire  Staff Interventional Pulmonologist

## 2023-12-06 NOTE — BRIEF OP NOTE
[facility]    Brief Operative Note    Pre-operative diagnosis: Pulmonary nodules [R91.8]  Post-operative diagnosis Same as pre-operative diagnosis    Procedure: Robot assisted Ion BRONCHOSCOPY, Bilateral - Bronchus  endobronchial ultrasound, transbronchial biopsies Latex Free, N/A - Bronchus    Surgeon: Surgeon(s) and Role:     * Raudel Claire DO - Primary  Anesthesia: General   Estimated Blood Loss: Minimal    Drains: None  Specimens:   ID Type Source Tests Collected by Time Destination   1 : Right Upper Lobe Mass Fine Needle Aspiration Lung, Upper Lobe, Right FINE NEEDLE ASPIRATE Raudel Claire, DO 12/6/2023 11:02 AM    2 : Station #11L Fine Needle Aspiration Other FINE NEEDLE ASPIRATE Raudel Claire, DO 12/6/2023 11:38 AM    3 : Station #7 Fine Needle Aspiration Other FINE NEEDLE ASPIRATE Raudel Claire, DO 12/6/2023 11:42 AM    4 : Station #11R Fine Needle Aspiration Other FINE NEEDLE ASPIRATE Raudel Claire, DO 12/6/2023 11:48 AM      Findings:   None .  Complications: None.  Implants: * No implants in log *

## 2023-12-06 NOTE — ANESTHESIA CARE TRANSFER NOTE
Patient: Mimi Rivera    Procedure: Procedure(s):  Robot assisted Ion BRONCHOSCOPY  endobronchial ultrasound, transbronchial biopsies Latex Free       Diagnosis: Pulmonary nodules [R91.8]  Diagnosis Additional Information: No value filed.    Anesthesia Type:   General     Note:    Oropharynx: oropharynx clear of all foreign objects  Level of Consciousness: awake and drowsy  Oxygen Supplementation: face mask  Level of Supplemental Oxygen (L/min / FiO2): 6  Independent Airway: airway patency satisfactory and stable  Dentition: dentition unchanged  Vital Signs Stable: post-procedure vital signs reviewed and stable  Report to RN Given: handoff report given  Patient transferred to: PACU  Comments: Patient with significant cough and wheezing, given duoneb on arrival.  Handoff Report: Identifed the Patient, Identified the Reponsible Provider, Reviewed the pertinent medical history, Discussed the surgical course, Reviewed Intra-OP anesthesia mangement and issues during anesthesia, Set expectations for post-procedure period and Allowed opportunity for questions and acknowledgement of understanding      Vitals:  Vitals Value Taken Time   BP 94/59 12/06/23 1215   Temp 37.2  C (99  F) 12/06/23 1210   Pulse 77 12/06/23 1227   Resp 7 12/06/23 1227   SpO2 96 % 12/06/23 1227   Vitals shown include unfiled device data.    Electronically Signed By: Jaswinder Webster  December 6, 2023  12:28 PM

## 2023-12-06 NOTE — ANESTHESIA PROCEDURE NOTES
Airway       Patient location during procedure: OR       Procedure Start/Stop Times: 12/6/2023 10:38 AM  Staff -        Anesthesiologist:  Antonia Scott MD       Resident/Fellow: Jaswinder Webster       Performed By: residentIndications and Patient Condition       Indications for airway management: ortiz-procedural       Induction type:intravenous       Mask difficulty assessment: 1 - vent by mask    Final Airway Details       Final airway type: endotracheal airway       Successful airway: ETT - single  Endotracheal Airway Details        ETT size (mm): 8.5       Successful intubation technique: video laryngoscopy       VL Blade Size: MAC 3       Grade View of Cords: 1       Adjucts: stylet       Position: Center       Measured from: gums/teeth       Secured at (cm): 22       Bite block used: Soft    Post intubation assessment        Placement verified by: capnometry, equal breath sounds and chest rise        Number of attempts at approach: 1       Number of other approaches attempted: 0       Secured with: tape       Ease of procedure: easy       Dentition: Intact and Unchanged    Medication(s) Administered   Medication Administration Time: 12/6/2023 10:38 AM

## 2023-12-08 ENCOUNTER — CARE COORDINATION (OUTPATIENT)
Dept: PULMONOLOGY | Facility: CLINIC | Age: 68
End: 2023-12-08
Payer: COMMERCIAL

## 2023-12-08 LAB
PATH REPORT.COMMENTS IMP SPEC: ABNORMAL
PATH REPORT.COMMENTS IMP SPEC: YES
PATH REPORT.FINAL DX SPEC: ABNORMAL
PATH REPORT.GROSS SPEC: ABNORMAL
PATH REPORT.MICROSCOPIC SPEC OTHER STN: ABNORMAL

## 2023-12-09 NOTE — PROGRESS NOTES
FNA results forwarded to Dr. Ortiz and Dr. Bridges requesting one of them reach out to pt with results and next steps.    PET 11/13  Brain mri n/a  Oncology: n/a  Pfts: 11/10    Final Diagnosis      A.  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  OTHER, 11L, ENDOBRONCHIAL ULTRASOUND-GUIDED FINE-NEEDLE ASPIRATION:              - Scant polymorphous population of lymphocytes              - No evidence of malignancy              Adequacy: Satisfactory for evaluation but limited by scant cellularity     C.  OTHER, STATION 7, ENDOBRONCHIAL ULTRASOUND-GUIDED FINE-NEEDLE ASPIRATION:              - Nondiagnostic specimen              Adequacy: Satisfactory for evaluation but limited by absence of lymphoid cells     D.  OTHER, 11R, ENDOBRONCHIAL ULTRASOUND-GUIDED FINE-NEEDLE ASPIRATION:              - Scant polymorphous population of lymphocytes              - No evidence of malignancy               Adequacy: Satisfactory for evaluation but limited by scant cellularity      Detail Level: Detailed

## 2023-12-12 ENCOUNTER — TELEPHONE (OUTPATIENT)
Dept: PULMONOLOGY | Facility: CLINIC | Age: 68
End: 2023-12-12
Payer: COMMERCIAL

## 2023-12-12 DIAGNOSIS — R91.8 PULMONARY NODULES: Primary | ICD-10-CM

## 2023-12-12 DIAGNOSIS — C7A.090 ATYPICAL CARCINOID LUNG TUMOR (H): ICD-10-CM

## 2023-12-12 DIAGNOSIS — J43.1 PANLOBULAR EMPHYSEMA (H): ICD-10-CM

## 2023-12-12 DIAGNOSIS — K21.9 GASTROESOPHAGEAL REFLUX DISEASE WITHOUT ESOPHAGITIS: ICD-10-CM

## 2023-12-12 DIAGNOSIS — J42 CHRONIC BRONCHITIS, UNSPECIFIED CHRONIC BRONCHITIS TYPE (H): ICD-10-CM

## 2023-12-12 NOTE — TELEPHONE ENCOUNTER
Pulmonary Staff    I reviewed the results of the Kallie Needle aspiration done using robot assisted ion Bronchoscopy by Dr. Claire on 12/6/2023 that reveals atypical carcinoid.  I discussed the findings with Dr. Ortiz who has helped advise previously on this patient's diagnostic workup.    We discussed options of (1) Surgical removal - but relatively high risk; (2) RT - but usually not very responsive;  (3) chemoRx - but to our knowledge no very high benefit/risk treatments; (4) Localized RX - either transthoracic or bronchoscopic; might help if there is signficant airway involvement now or in the future, or possible cryoRx for debulking and slowing progression; OR (5) observation with potential later intervention.    Given her severe underlying disease and anxiety, and in view of the typical slow local prograssion of this type of tumor with lower likelihood of distant aggressive metastasis, the two primary choices likely are high risk surgery vs observation for now with the option of some cryo debulking as another possibility.  Dr. Ortiz and I agreed that it would be helpful to seek formal opinion and advice from lung cancer oncologist first and then, possibly, to get surgery input on whether or not she might be a candidate for relatively high risk surgery.    I tried to call Ms Rivera to share the result and propose medical oncology referral, but she was not home.  I left voice mail message that I would call her back again to discuss planned next steps.  I did not disclose Dx or referral.    Ishmael Bridges MD

## 2023-12-14 ENCOUNTER — PATIENT OUTREACH (OUTPATIENT)
Dept: ONCOLOGY | Facility: CLINIC | Age: 68
End: 2023-12-14
Payer: COMMERCIAL

## 2023-12-14 DIAGNOSIS — C7A.090 ATYPICAL CARCINOID LUNG TUMOR (H): Primary | ICD-10-CM

## 2023-12-14 DIAGNOSIS — K21.9 GASTROESOPHAGEAL REFLUX DISEASE WITHOUT ESOPHAGITIS: ICD-10-CM

## 2023-12-14 DIAGNOSIS — J43.1 PANLOBULAR EMPHYSEMA (H): ICD-10-CM

## 2023-12-14 DIAGNOSIS — J42 CHRONIC BRONCHITIS, UNSPECIFIED CHRONIC BRONCHITIS TYPE (H): ICD-10-CM

## 2023-12-14 DIAGNOSIS — R91.8 PULMONARY NODULES: Primary | ICD-10-CM

## 2023-12-14 NOTE — PROGRESS NOTES
Pulmonary Staff    I called Ms Rivera today and informed her of her diagnosis.  I also discussed with her a range of treatment options from observation and possible later intervention to surgical resection in a high risk setting.    Orders placed for   Medical Oncology consult with lung Ca specialist and  Thoracic Surgery to evaluate potential for wedge resection and assess risks/benefits.    Ishmael Bridges MD

## 2023-12-14 NOTE — PROGRESS NOTES
New Patient Oncology Nurse Navigator Note     Referring provider: Dr. 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 Atherosclerosis. Gallstones.    12/06/2023 Fine Needle Aspirate (bookmarked) showed:   Final Diagnosis      A.  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  OTHER, 11L, ENDOBRONCHIAL ULTRASOUND-GUIDED FINE-NEEDLE ASPIRATION:              - Scant polymorphous population of lymphocytes              - No evidence of malignancy              Adequacy: Satisfactory for evaluation but limited by scant cellularity     C.  OTHER, STATION 7, ENDOBRONCHIAL ULTRASOUND-GUIDED FINE-NEEDLE ASPIRATION:              - Nondiagnostic specimen              Adequacy: Satisfactory for evaluation but limited by absence of lymphoid cells     D.  OTHER, 11R, ENDOBRONCHIAL ULTRASOUND-GUIDED FINE-NEEDLE ASPIRATION:              - Scant polymorphous population of lymphocytes              - No evidence of malignancy               Adequacy: Satisfactory for  evaluation but limited by scant cellularity      Electronically signed by Ishmael Ryan MD on 12/8/2023 at  3:34 PM     Clinical Assessment / Barriers to Care (Per Nurse):    Tobacco History    Smoking Status  Former Quit Date  3/19/2015 Smoking Frequency  10.00 packs/day Smoking Tobacco Type  Cigarettes quit in 3/19/2015     Records Location: [facility]   Records Needed: None  Additional testing needed prior to consult: None  Writer called Mimi and discussed the referral with her. She confirmed she is aware of the referrals and is ready to schedule. She agreed to see Dr. Romano on 12/27 and Dr. Pandya 1/8. Writer will reach out to Dr. Romano to see if any additional work up is needed.     Per Dr. Romano he recommends prioritizing the surgery consult first.     Writer called Mimi back and she confirmed she is able to see Dr. Pandya 12/18. Clinic address and phone number given to pt as well as NPS phone number. Her call was warm transferred to NPS.    ADELE Bowers, RN, OCN  [facility] Oncology Nurse Navigator  [phone] / [phone]

## 2023-12-16 NOTE — PROGRESS NOTES
THORACIC SURGERY - NEW PATIENT OFFICE VISIT      Dear Dr. Morejon,    I saw Mimi Rivera at Dr. 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 Lately, for the last 6 weeks, she has been coughing and wakes up sweaty. She has sinus pressure and post-nasal drip. Since she has been feeling ill, she cannot walk more than a block without stopping. However, her exercise tolerance was at least two blocks prior to that.     ECOG performance status  0- Fully active, without restriction                 Previsit Tests   PFT (2023): FEV1 46% predicted, 0.9 L, DLCO 60%.  5/3/2019: FEV1 1.2 (53%)  Pathology (2023): Right upper lobe mass:  atypical carcinoid  CT scan (10/12/2023): RIGHT upper lobe 0.9 cm pulmonary Nodule, multiple other smaller nodules, all new. without mediastinal adenopathy, with no pleural effusion   x  PET scan (2023): FDG avid 0.9 x 0.6 cm RIGHT upper lobe Nodule (Max SUV 7.0). No suspicious hypermetabolic activity elsewhere        PMH  Reviewed, as below    Past Medical History:   Diagnosis Date    ASCUS on Pap smear 2006    unable to run HPV typing, f/u paps NIL    Chronic rhinosinusitis     COPD (chronic obstructive pulmonary disease) (H)     Depression, anxiety     Herpes     occasional outbreak    Hypertension     Primary stress urinary incontinence     Pulmonary nodules     Rhinosinusitis         PSH  Reviewed, as below    Past Surgical History:   Procedure Laterality Date    BRONCHOSCOPY, WITH BIOPSY, ROBOT ASSISTED Bilateral 2023    Procedure: Robot assisted Ion BRONCHOSCOPY;  Surgeon: Raudel Claire DO;  Location: [facility] OR     SECTION      x 3    ENDOBRONCHIAL ULTRASOUND FLEXIBLE N/A 2023    Procedure: endobronchial ultrasound, transbronchial biopsies Latex  "Free;  Surgeon: Raudel Claire DO;  Location: [facility] OR        No Known Allergies    Current Outpatient Medications   Medication    amLODIPine (NORVASC) 10 MG tablet    azithromycin (ZITHROMAX) 250 MG tablet    cetirizine (ZYRTEC) 10 MG tablet    EPINEPHrine (ANY BX GENERIC EQUIV) 0.3 MG/0.3ML injection 2-pack    esomeprazole (NEXIUM) 20 MG DR capsule    Fluticasone-Umeclidin-Vilant (TRELEGY ELLIPTA) 100-62.5-25 MCG/ACT oral inhaler    levalbuterol (XOPENEX HFA) 45 MCG/ACT inhaler    levalbuterol (XOPENEX) 1.25 MG/3ML neb solution    VENTOLIN  (90 Base) MCG/ACT inhaler     No current facility-administered medications for this visit.       ETOH: Occasional  TOBACCO: ages 16 - 60. 1/2 pack per day  OTHER DRUGS: None    Physical examination  BP (!) 164/86   Pulse 98   Temp 97.9  F (36.6  C) (Tympanic)   Resp 18   Ht 1.549 m (5' 1\")   Wt 49.2 kg (108 lb 6.4 oz)   LMP 09/22/2010   SpO2 91%   BMI 20.48 kg/m     Physical Exam  Constitutional:       Appearance: Normal appearance. She is normal weight.   Eyes:      Conjunctiva/sclera: Conjunctivae normal.   Cardiovascular:      Rate and Rhythm: Normal rate.   Pulmonary:      Effort: Pulmonary effort is normal.   Skin:     General: Skin is warm and dry.   Neurological:      Mental Status: She is alert and oriented to person, place, and time.   Psychiatric:         Mood and Affect: Mood normal.         Behavior: Behavior normal.         Thought Content: Thought content normal.         Judgment: Judgment normal.        From a personal perspective, she lives with her . They have three children and two grandchildren.     IMPRESSION   68 year old female patient with RIGHT upper lobe atypical carcinoid. She is a reasonable candidate for a wedge resection pending treatment of her sinusitis     Stage: Clinical stage IA1    PLAN  I spent 30 min on the date of the encounter in chart review, patient visit, review of tests, documentation and/or discussion with other " providers about the issues documented above. I reviewed the plan as follows:    I will treat the sinusitis with a 10 day course of clarithromycin and have her hold the azithromycin for now. If she improves, then new PFT mid-January and likely surgery. I will also discuss her case at tumor  board to discuss options.  Procedure planned: robotic-assisted right thoracoscopic wedge resection, mediastinal lymph node dissection.  I discussed the risks and benefits of the operation, including resecting and staging the tumor. The risks include bleeding, infection, prolonged air leak, chylothorax, deep venous thrombosis and pulmonary embolism, and death.  There is also a risk of prolonged pain, which could require further treatment.  Prolonged air leak could be treated with bronchoscopy and endobronchial valve insertion  or going home with a chest tube.  Postoperative bleeding (rare) could require a return to the OR.   Necessary Preop Tests & Appointments: Preoperative assessment clinic, Pulmonary function testing, and CT Chest  Regional Anesthesia Plan: Intraoperative intercostal nerve block  Anticoagulation Plan: Prophylactic Lovenox    I appreciate the opportunity to participate in the care of your patient and will keep you updated.  Sincerely,    Good Pandya MD

## 2023-12-18 ENCOUNTER — ONCOLOGY VISIT (OUTPATIENT)
Dept: ONCOLOGY | Facility: CLINIC | Age: 68
End: 2023-12-18
Attending: INTERNAL MEDICINE
Payer: COMMERCIAL

## 2023-12-18 ENCOUNTER — PRE VISIT (OUTPATIENT)
Dept: ONCOLOGY | Facility: CLINIC | Age: 68
End: 2023-12-18
Payer: COMMERCIAL

## 2023-12-18 VITALS
WEIGHT: 108.4 LBS | DIASTOLIC BLOOD PRESSURE: 86 MMHG | RESPIRATION RATE: 18 BRPM | SYSTOLIC BLOOD PRESSURE: 164 MMHG | HEART RATE: 98 BPM | BODY MASS INDEX: 20.47 KG/M2 | TEMPERATURE: 97.9 F | HEIGHT: 61 IN | OXYGEN SATURATION: 91 %

## 2023-12-18 DIAGNOSIS — J43.1 PANLOBULAR EMPHYSEMA (H): ICD-10-CM

## 2023-12-18 DIAGNOSIS — J01.40 ACUTE PANSINUSITIS, RECURRENCE NOT SPECIFIED: Primary | ICD-10-CM

## 2023-12-18 DIAGNOSIS — J42 CHRONIC BRONCHITIS, UNSPECIFIED CHRONIC BRONCHITIS TYPE (H): ICD-10-CM

## 2023-12-18 DIAGNOSIS — C7A.090 ATYPICAL CARCINOID LUNG TUMOR (H): ICD-10-CM

## 2023-12-18 PROCEDURE — 99204 OFFICE O/P NEW MOD 45 MIN: CPT | Performed by: THORACIC SURGERY (CARDIOTHORACIC VASCULAR SURGERY)

## 2023-12-18 PROCEDURE — G0463 HOSPITAL OUTPT CLINIC VISIT: HCPCS | Performed by: THORACIC SURGERY (CARDIOTHORACIC VASCULAR SURGERY)

## 2023-12-18 RX ORDER — CLARITHROMYCIN 500 MG
500 TABLET ORAL 2 TIMES DAILY
Qty: 20 TABLET | Refills: 0 | Status: SHIPPED | OUTPATIENT
Start: 2023-12-18 | End: 2023-12-28

## 2023-12-18 ASSESSMENT — PAIN SCALES - GENERAL: PAINLEVEL: NO PAIN (0)

## 2023-12-18 NOTE — TELEPHONE ENCOUNTER
RECORDS STATUS - ALL OTHER DIAGNOSIS      RECORDS RECEIVED FROM: [facility]   DATE RECEIVED:    NOTES STATUS DETAILS   OFFICE NOTE from referring provider Epic 9/22/23: Dr. Ishmael Bridges   OFFICE NOTE from other specialist [facility] 11/14/23: Dr. Elijah Ortiz   DISCHARGE SUMMARY from hospital [facility]  3/28/15: [facility]  3/23/15: [facility]   DISCHARGE REPORT from the ER [facility]  3/18/19: [facility] ED  3/10/19: [facility]   OPERATIVE REPORT [facility] 12/6/23: BRONCHOSCOPY    MEDICATION LIST [facility]    LABS     PATHOLOGY REPORTS Report in Epic 12/6/23: [id number]   ANYTHING RELATED TO DIAGNOSIS Epic Most recent 12/6/23   IMAGING (NEED IMAGES & REPORT)     CT SCANS PACS 12/6/23-4/14/16: CT Chest  3/18/19: CT Chest Pulm Embolism   XRAYS PACS 12/6/23-3/23/15: XR Chest    PET PACS 11/13/23: PET Eyes to Thighs

## 2023-12-18 NOTE — LETTER
12/18/2023         RE: Mimi Rivera  [address] [place] [address]        Dear Colleague,    Thank you for referring your patient, Mimi Rivera, to the [facility]. Please see a copy of my visit note below.    THORACIC SURGERY - NEW PATIENT OFFICE VISIT      Dear Dr. Morejon,    I saw Mimi Rivera at Dr. 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 Lately, for the last 6 weeks, she has been coughing and wakes up sweaty. She has sinus pressure and post-nasal drip. Since she has been feeling ill, she cannot walk more than a block without stopping. However, her exercise tolerance was at least two blocks prior to that.     ECOG performance status  0- Fully active, without restriction                 Previsit Tests   PFT (11/20/2023): FEV1 46% predicted, 0.9 L, DLCO 60%.  5/3/2019: FEV1 1.2 (53%)  Pathology (12/6/2023): Right upper lobe mass:  atypical carcinoid  CT scan (10/12/2023): RIGHT upper lobe 0.9 cm pulmonary Nodule, multiple other smaller nodules, all new. without mediastinal adenopathy, with no pleural effusion   x  PET scan (11/13/2023): FDG avid 0.9 x 0.6 cm RIGHT upper lobe Nodule (Max SUV 7.0). No suspicious hypermetabolic activity elsewhere        PMH  Reviewed, as below    Past Medical History:   Diagnosis Date     ASCUS on Pap smear 01/01/2006    unable to run HPV typing, f/u paps NIL     Chronic rhinosinusitis      COPD (chronic obstructive pulmonary disease) (H)      Depression, anxiety      Herpes     occasional outbreak     Hypertension      Primary stress urinary incontinence      Pulmonary nodules      Rhinosinusitis         PSH  Reviewed, as below    Past Surgical History:   Procedure Laterality Date     BRONCHOSCOPY, WITH BIOPSY, ROBOT  "ASSISTED Bilateral 2023    Procedure: Robot assisted Ion BRONCHOSCOPY;  Surgeon: Raudel Claire DO;  Location: [facility] OR      SECTION      x 3     ENDOBRONCHIAL ULTRASOUND FLEXIBLE N/A 2023    Procedure: endobronchial ultrasound, transbronchial biopsies Latex Free;  Surgeon: Raudel Claire DO;  Location: [facility] OR        No Known Allergies    Current Outpatient Medications   Medication     amLODIPine (NORVASC) 10 MG tablet     azithromycin (ZITHROMAX) 250 MG tablet     cetirizine (ZYRTEC) 10 MG tablet     EPINEPHrine (ANY BX GENERIC EQUIV) 0.3 MG/0.3ML injection 2-pack     esomeprazole (NEXIUM) 20 MG DR capsule     Fluticasone-Umeclidin-Vilant (TRELEGY ELLIPTA) 100-62.5-25 MCG/ACT oral inhaler     levalbuterol (XOPENEX HFA) 45 MCG/ACT inhaler     levalbuterol (XOPENEX) 1.25 MG/3ML neb solution     VENTOLIN  (90 Base) MCG/ACT inhaler     No current facility-administered medications for this visit.       ETOH: Occasional  TOBACCO: ages 16 - 60. 1/2 pack per day  OTHER DRUGS: None    Physical examination  BP (!) 164/86   Pulse 98   Temp 97.9  F (36.6  C) (Tympanic)   Resp 18   Ht 1.549 m (5' 1\")   Wt 49.2 kg (108 lb 6.4 oz)   LMP 2010   SpO2 91%   BMI 20.48 kg/m     Physical Exam  Constitutional:       Appearance: Normal appearance. She is normal weight.   Eyes:      Conjunctiva/sclera: Conjunctivae normal.   Cardiovascular:      Rate and Rhythm: Normal rate.   Pulmonary:      Effort: Pulmonary effort is normal.   Skin:     General: Skin is warm and dry.   Neurological:      Mental Status: She is alert and oriented to person, place, and time.   Psychiatric:         Mood and Affect: Mood normal.         Behavior: Behavior normal.         Thought Content: Thought content normal.         Judgment: Judgment normal.        From a personal perspective, she lives with her . They have three children and two grandchildren.     IMPRESSION   68 year old female patient with RIGHT upper " lobe atypical carcinoid. She is a reasonable candidate for a wedge resection pending treatment of her sinusitis     Stage: Clinical stage IA1    PLAN  I spent 30 min on the date of the encounter in chart review, patient visit, review of tests, documentation and/or discussion with other providers about the issues documented above. I reviewed the plan as follows:  Procedure planned: robotic-assisted right thoracoscopic wedge resection, mediastinal lymph node dissection.  I discussed the risks and benefits of the operation, including resecting and staging the tumor. The risks include bleeding, infection, prolonged air leak, chylothorax, deep venous thrombosis and pulmonary embolism, and death.  There is also a risk of prolonged pain, which could require further treatment.  Prolonged air leak could be treated with bronchoscopy and endobronchial valve insertion  or going home with a chest tube.  Postoperative bleeding (rare) could require a return to the OR.   Necessary Preop Tests & Appointments: Preoperative assessment clinic, Pulmonary function testing, and CT Chest  Regional Anesthesia Plan: Intraoperative intercostal nerve block  Anticoagulation Plan: Prophylactic Lovenox    I appreciate the opportunity to participate in the care of your patient and will keep you updated.  Sincerely,    Good Pandya MD            Again, thank you for allowing me to participate in the care of your patient.        Sincerely,        Good Pandya MD

## 2023-12-18 NOTE — NURSING NOTE
"Oncology Rooming Note    December 18, 2023 11:15 AM   Mimi Rivera is a 68 year old female who presents for:    Chief Complaint   Patient presents with    Oncology Clinic Visit     New patient-atypical carcinoid lung tumor     Initial Vitals: BP (!) 164/86   Pulse 98   Temp 97.9  F (36.6  C) (Tympanic)   Resp 18   Ht 1.549 m (5' 1\")   Wt 49.2 kg (108 lb 6.4 oz)   LMP 09/22/2010   SpO2 91%   BMI 20.48 kg/m   Estimated body mass index is 20.48 kg/m  as calculated from the following:    Height as of this encounter: 1.549 m (5' 1\").    Weight as of this encounter: 49.2 kg (108 lb 6.4 oz). Body surface area is 1.46 meters squared.  No Pain (0) Comment: Data Unavailable   Patient's last menstrual period was 09/22/2010.  Allergies reviewed: Yes  Medications reviewed: Yes    Medications: Medication refills not needed today.  Pharmacy name entered into [facility]: [id number]  [place], [place] - [address] [address]    Clinical concerns: New patient       Saadia Morgan MA              "

## 2023-12-19 ENCOUNTER — TUMOR CONFERENCE (OUTPATIENT)
Dept: ONCOLOGY | Facility: CLINIC | Age: 68
End: 2023-12-19
Payer: COMMERCIAL

## 2023-12-20 NOTE — TELEPHONE ENCOUNTER
RECORDS STATUS - ALL OTHER DIAGNOSIS             RECORDS RECEIVED FROM: [facility]   DATE RECEIVED:    NOTES STATUS DETAILS   OFFICE NOTE from referring provider Epic 9/22/23: Dr. Ishmael Bridges   OFFICE NOTE from medical oncologist Epic 12/18/23: Dr. Good Pandya   OFFICE NOTE from other specialist [facility] 11/14/23: Dr. Elijah Ortiz   DISCHARGE SUMMARY from hospital [facility]  3/28/15: [facility]  3/23/15: [facility]   DISCHARGE REPORT from the ER [facility]  3/18/19: [facility]  3/10/19: [facility]   OPERATIVE REPORT [facility] 12/6/23: BRONCHOSCOPY    MEDICATION LIST [facility]     LABS       PATHOLOGY REPORTS Report in Epic 12/6/23: [id number]   ANYTHING RELATED TO DIAGNOSIS Epic Most recent 12/6/23   IMAGING (NEED IMAGES & REPORT)       CT SCANS PACS 12/6/23-4/14/16: CT Chest  3/18/19: CT Chest Pulm Embolism   XRAYS PACS 12/6/23-3/23/15: XR Chest    PET PACS 11/13/23: PET Eyes to Thighs

## 2023-12-26 NOTE — PROGRESS NOTES
MEDICAL ONCOLOGY INITIAL CONSULT NOTE    PATIENT NAME: Mimi Rivera  ENCOUNTER DATE: 12/27/2023    Care Team  Primary Oncologist: Dr. Good Romano  Surgery: Dr. Good Pandya    REASON FOR CURRENT VISIT: Initial evaluation for new diagnosis of atypical carcinoid    HISTORY OF PRESENT ILLNESS:  Ms. Mimi Rivera is a 68 year old  female with PMHx of HTN, COPD, chronic rhinosinusitis, depression, and recently diagnosed Stage IA1 atypical carcinoid of the RUL    Oncologic Hx:    Diagnosis: Stage (AJCC 8th edition)  IA1 ([id number])    Treatment:  Pending; anticipate possible resection    Intent of treatment: Curative    Oncologic course:    09/22/2023: Ongoing subacute-chronic sinus and respiratory complaints during visit with pulmonologist, Dr. Ishmael Bridges. CT chest and sinus arranged.    10/12/2023: CT chest with  multiple new nodules, including a 9 mm RUL nodule. Planned for PET/CT.    11/13/2023: PET/CT with 0.9 x 0.6 cm hypermetabolic pulmonary nodule of RUL (SUV max 7.0). No additional hypermetabolic pulmonary nodules identified. No questionable lymph nodes identified, or other sites suggestive of distant metastases.    11/20/2023: PFTs with FEV1 = 46% predicted, 0.9L, DLCO 60%.    12/06/2023: Bronchoscopy with EBUS and RUL nodule FNA along with LN biopsies. RUL mass returned with malignancy, compatible with atypical carcinoid. Cells positive for INSM1 and synaptophysin with Ki-67 ~10%. No grade provided given biopsy was FNA. Station 11L, 7, and 11R LNs all without any evidence of malignancy.    12/18/2023: Initial consultation with Dr. Good Pandya (Thoracic Surgery). Ongoing sinus infection concerns; transitioned to clarithromycin with plan for repeat PFTs and possible resection (wedge) pending improvement.    Interval Hx:    Ms. Rivera arrives independently today.     She has been feeling somewhat poorly over the last several months, dating to October or September. Mostly she has been suffering from  "what she feels is consistent with her documented rhinosinusitis, with associated headaches, sinus pressure, post-nasal drip, and associated sleep disturbances. She does not feel like her symptoms have improved over the last months. It limits her enough where she has periods where she does not feel comfortable driving or completing similar activities.    She also has feels that her breathing has been poor over this period as well. She has more exertional dyspnea, and struggles to complete a flight of stairs (able to complete at least two flights without difficulty at baseline), but she remains able to ambulate for significant distances without difficulty. She also has more of a cough, which she relates to her post-nasal drip. In addition, more recently, she has had more wheezing (which she calls \"squeaking\". She also has been having significant daily fatigue, and has been having what she feels are night sweats (though she does not drench the sheets).    She otherwise has been feeling about baseline and has no other complaints. She denies any facial flushing or redness elsewhere, diarrhea, or abdominal pain.       REVIEW OF SYSTEMS: 14 point ROS negative other than the symptoms noted above in the HPI.    Wt Readings from Last 4 Encounters:   12/18/23 49.2 kg (108 lb 6.4 oz)   12/06/23 50.3 kg (110 lb 14.3 oz)   11/14/23 49.9 kg (110 lb)   09/18/23 49 kg (108 lb)          Review of Systems:  A comprehensive ROS was performed and found to be negative or non-contributory with the exception of that noted in the HPI above.    Past Medical History:  Past Medical History:   Diagnosis Date    ASCUS on Pap smear 01/01/2006    unable to run HPV typing, f/u paps NIL    Chronic rhinosinusitis     COPD (chronic obstructive pulmonary disease) (H)     Depression, anxiety     Herpes     occasional outbreak    Hypertension     Primary stress urinary incontinence     Pulmonary nodules     Rhinosinusitis        Past Surgical " History:  Past Surgical History:   Procedure Laterality Date    BRONCHOSCOPY, WITH BIOPSY, ROBOT ASSISTED Bilateral 2023    Procedure: Robot assisted Ion BRONCHOSCOPY;  Surgeon: Raudel Claire DO;  Location: [facility] OR     SECTION      x 3    ENDOBRONCHIAL ULTRASOUND FLEXIBLE N/A 2023    Procedure: endobronchial ultrasound, transbronchial biopsies Latex Free;  Surgeon: Raudel Claire DO;  Location: [facility] OR       Social History:  Social History     Socioeconomic History    Marital status:    Tobacco Use    Smoking status: Former     Packs/day: 10     Types: Cigarettes     Quit date: 3/19/2015     Years since quittin.7    Smokeless tobacco: Former     Quit date: 2017   Vaping Use    Vaping Use: Never used   Substance and Sexual Activity    Alcohol use: Yes     Alcohol/week: 2.0 - 3.0 standard drinks of alcohol     Types: 2 - 3 Standard drinks or equivalent per week     Comment: 2 drinks per wk avg    Drug use: No    Sexual activity: Yes     Partners: Male     Birth control/protection: Surgical     Comment: tubal   Other Topics Concern    Parent/sibling w/ CABG, MI or angioplasty before 65F 55M? No      Lives at home with her . Has three children, all grown. Two remain local. She is retired and previously worked in a bakery. She stopped smoking in 2017, previously smoked ~1/4-1/2 pack for about 30 years. She consumes about 6 beers/week. She denies any illicit drug use.     Family History  Family History   Problem Relation Age of Onset    Hypertension Mother     Cancer Mother         liver    C.A.D. Father     Anesthesia Reaction No family hx of     Deep Vein Thrombosis (DVT) No family hx of      Mother  of colon cancer.    Outpatient Medications:  amLODIPine (NORVASC) 10 MG tablet, Take 1 tablet (10 mg) by mouth daily (Patient taking differently: Take 10 mg by mouth every evening)  clarithromycin (BIAXIN) 500 MG tablet, Take 1 tablet (500 mg) by mouth 2 times daily for 10  "days  Fluticasone-Umeclidin-Vilant (TRELEGY ELLIPTA) 100-62.5-25 MCG/ACT oral inhaler, INHALE ONE PUFF BY MOUTH EVERY DAY (Patient taking differently: Inhale 1 puff into the lungs every morning)  azithromycin (ZITHROMAX) 250 MG tablet, Take 1 tablet (250 mg) by mouth daily (Patient not taking: Reported on 12/18/2023)  cetirizine (ZYRTEC) 10 MG tablet, Take 1 tablet (10 mg) by mouth daily (Patient not taking: Reported on 12/27/2023)  EPINEPHrine (ANY BX GENERIC EQUIV) 0.3 MG/0.3ML injection 2-pack, Inject 0.3 mLs (0.3 mg) into the muscle as needed for anaphylaxis May repeat one time in 5-15 minutes if response to initial dose is inadequate. (Patient not taking: Reported on 12/27/2023)  esomeprazole (NEXIUM) 20 MG DR capsule, Take 20 mg by mouth as needed (heartburn) Take 30-60 minutes before eating. (Patient not taking: Reported on 12/18/2023)  levalbuterol (XOPENEX HFA) 45 MCG/ACT inhaler, Inhale 2 puffs into the lungs every 4 hours as needed for shortness of breath / dyspnea or wheezing  levalbuterol (XOPENEX) 1.25 MG/3ML neb solution, Take 3 mLs (1.25 mg) by nebulization every 4 hours as needed for shortness of breath or wheezing  VENTOLIN  (90 Base) MCG/ACT inhaler, Inhale 2 puffs into the lungs every 6 hours (Patient not taking: Reported on 12/18/2023)    No current facility-administered medications on file prior to visit.       Physical Exam:    Blood pressure (!) 167/99, pulse 89, temperature 97.8  F (36.6  C), temperature source Oral, resp. rate 20, height 1.52 m (4' 11.84\"), weight 48.6 kg (107 lb 3.2 oz), last menstrual period 09/22/2010, SpO2 93%, not currently breastfeeding.  General: alert and cooperative, no acute distress  HEENT: sclera anicteric, EOMI, MMM  Neck: supple, normal ROM  CV: RRR, no murmurs  Resp: Bilateral expiratory wheezing; otherwise clear.  GI: soft, non-tender, non-distended, bowel sounds present and normoactive  MSK: warm and well-perfused, normal tone  Skin: no rashes on " limited exam, no jaundice  Neuro: alert and interactive, moves all extremities equally, no focal deficits    Labs & Studies: I personally reviewed the following studies:    Recent Labs   Lab Test 02/14/23  1602 09/13/22  1555 03/29/22  1453 04/14/21  1501 01/27/21  1042   WBC 8.6 9.6 7.1   < > 9.0   RBC 4.83 4.98 4.87   < > 5.09   HGB 15.7 16.5* 15.6   < > 15.9*   HCT 46.2 46.4 45.6   < > 47.1*   MCV 96 93 94   < > 93   MCH 32.5 33.1* 32.0   < > 31.2   MCHC 34.0 35.6 34.2   < > 33.8   RDW 12.3 12.5 11.9   < > 12.6    281 267   < > 252   NEUTROPHIL 73 70  --   --  74.7   LYMPH 20 19  --   --  21.0    < > = values in this interval not displayed.     Recent Labs   Lab Test 02/14/23  1602 09/13/22  1555 04/14/21  1501    138 135   POTASSIUM 4.4 4.2 4.0   CHLORIDE 102 108 102   CO2 27 26 30   ANIONGAP 11 4 3   * 90 79   BUN 9.8 12 7   CR 0.56 0.45* 0.60   JI 9.7 9.8 9.4     Recent Labs   Lab Test 04/14/21  1501   PROTTOTAL 7.3   ALBUMIN 4.1   BILITOTAL 0.5   ALKPHOS 83   AST 19   ALT 21             ASSESSMENT AND PLAN:    # Stage IA1 atypical carcinoid  Presents with newly diagnosed localized RUL pulmonary atypical carcinoid with Ki-67 ~10%. No evidence of metastasis. Ideally would resect in this scenario followed by surveillance, but she is apparently a borderline candidate given her severe COPD and ongoing sinus/respiratory issues. Current plan is to reassess in January followed by decision on surgery. Should resection prove not possible, we would likely recommend referral to radiation oncology for SBRT. Systemic therapies are certainly available, but not the ideal approach give her very limited disease. Certainly, we can see her back if her disease progresses and systemic approaches are indicated. Furthermore, observation is still a valid option.   She also has significant enough COPD and symptoms that are out of proportion of her imaging, which raises a question of DIPNECH. This is challenging to  diagnose, but should she be able to complete wedge resection, hopefully we can examine any additional nodules to gain a better sense of whether this is a possibility.    Plan:  - Follow-up in January with Dr. Pandya for surgical assessment; can consider radiation oncology referral should she not be a surgical candidate  - No plan for routine oncology follow-up at this time, but can return should neither of the above be an option, and/or if DIPNECH remains on the differential following resection    # Severe COPD  Following with Dr. Bridges. Planning repeat PFTs in January '24. Ongoing wheezing and productive cough could be considered exacerbation but will defer to Dr. Bridges. Also with question of DIPNECH, as discussed above.  - Recommended patient contact Dr. Bridges's office for advice; patient will reach out tomorrow    # Chronic rhinosinusitis  Question of possible ongoing infection, with recent transition from azithromycin to clarithromycin.   - Introduced option of trying to arrange for additional ENT follow-up in light of possible upcoming wedge resection; she will consider reaching out    The patient was seen by and discussed with Dr. Romano.    Abner Maria MD PhD  Hematology/Oncology Fellow  Pgr: [phone]    The pt was evaluated with resident/fellow and the note was edited to relflect the combined assessment and plan      On the day of service  Chart review: 5 minutes  Visit duration: 50 minutes  Care coordination: 5 minutes    Good Romano M.D.   of Medicine  Division of Hematology, Oncology and Transplantation  [facility]

## 2023-12-27 ENCOUNTER — PRE VISIT (OUTPATIENT)
Dept: ONCOLOGY | Facility: CLINIC | Age: 68
End: 2023-12-27
Payer: COMMERCIAL

## 2023-12-27 ENCOUNTER — ONCOLOGY VISIT (OUTPATIENT)
Dept: ONCOLOGY | Facility: CLINIC | Age: 68
End: 2023-12-27
Attending: INTERNAL MEDICINE
Payer: COMMERCIAL

## 2023-12-27 VITALS
DIASTOLIC BLOOD PRESSURE: 99 MMHG | RESPIRATION RATE: 20 BRPM | TEMPERATURE: 97.8 F | SYSTOLIC BLOOD PRESSURE: 167 MMHG | HEIGHT: 60 IN | BODY MASS INDEX: 21.05 KG/M2 | HEART RATE: 89 BPM | WEIGHT: 107.2 LBS | OXYGEN SATURATION: 93 %

## 2023-12-27 DIAGNOSIS — J42 CHRONIC BRONCHITIS, UNSPECIFIED CHRONIC BRONCHITIS TYPE (H): ICD-10-CM

## 2023-12-27 DIAGNOSIS — C7A.090 ATYPICAL CARCINOID LUNG TUMOR (H): ICD-10-CM

## 2023-12-27 DIAGNOSIS — R91.8 PULMONARY NODULES: ICD-10-CM

## 2023-12-27 DIAGNOSIS — J43.1 PANLOBULAR EMPHYSEMA (H): ICD-10-CM

## 2023-12-27 DIAGNOSIS — K21.9 GASTROESOPHAGEAL REFLUX DISEASE WITHOUT ESOPHAGITIS: ICD-10-CM

## 2023-12-27 PROCEDURE — G0463 HOSPITAL OUTPT CLINIC VISIT: HCPCS | Performed by: STUDENT IN AN ORGANIZED HEALTH CARE EDUCATION/TRAINING PROGRAM

## 2023-12-27 PROCEDURE — 99205 OFFICE O/P NEW HI 60 MIN: CPT | Mod: GC | Performed by: STUDENT IN AN ORGANIZED HEALTH CARE EDUCATION/TRAINING PROGRAM

## 2023-12-27 ASSESSMENT — PAIN SCALES - GENERAL: PAINLEVEL: NO PAIN (0)

## 2023-12-27 NOTE — NURSING NOTE
"Oncology Rooming Note    December 27, 2023 1:51 PM   Mimi Rivera is a 68 year old female who presents for:    Chief Complaint   Patient presents with    Oncology Clinic Visit     Pulmonary nodules     Initial Vitals: BP (!) 167/99 (BP Location: Right arm, Patient Position: Sitting, Cuff Size: Adult Small)   Pulse 89   Temp 97.8  F (36.6  C) (Oral)   Resp 20   Ht 1.52 m (4' 11.84\")   Wt 48.6 kg (107 lb 3.2 oz)   LMP 09/22/2010   SpO2 93%   BMI 21.05 kg/m   Estimated body mass index is 21.05 kg/m  as calculated from the following:    Height as of this encounter: 1.52 m (4' 11.84\").    Weight as of this encounter: 48.6 kg (107 lb 3.2 oz). Body surface area is 1.43 meters squared.  No Pain (0) Comment: Data Unavailable   Patient's last menstrual period was 09/22/2010.  Allergies reviewed: Yes  Medications reviewed: Yes    Medications: Medication refills not needed today.  Pharmacy name entered into [facility]: [id number] - [place], [place] - [address] [address]    Frailty Screening:   Is the patient here for a new oncology consult visit in cancer care? 1. Yes. Over the past month, have you experienced difficulty or required a caregiver to assist with:   1. Balance, walking or general mobility (including any falls)? NO  2. Completion of self-care tasks such as bathing, dressing, toileting, grooming/hygiene?  NO  3. Concentration or memory that affects your daily life?  NO       Clinical concerns: None       Maddie Ye"

## 2023-12-27 NOTE — LETTER
12/27/2023         RE: Mimi Rivera  [id number] [place] [address]        Dear Colleague,    Thank you for referring your patient, Mimi Rivera, to the [facility]. Please see a copy of my visit note below.    MEDICAL ONCOLOGY INITIAL CONSULT NOTE    PATIENT NAME: Mimi Rivera  ENCOUNTER DATE: 12/27/2023    Care Team  Primary Oncologist: Dr. Good Romano  Surgery: Dr. Good Pandya    REASON FOR CURRENT VISIT: Initial evaluation for new diagnosis of atypical carcinoid    HISTORY OF PRESENT ILLNESS:  Ms. 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 Ishmael Bridges. CT chest and sinus arranged.    10/12/2023: CT chest with  multiple new nodules, including a 9 mm RUL nodule. Planned for PET/CT.    11/13/2023: PET/CT with 0.9 x 0.6 cm hypermetabolic pulmonary nodule of RUL (SUV max 7.0). No additional hypermetabolic pulmonary nodules identified. No questionable lymph nodes identified, or other sites suggestive of distant metastases.    11/20/2023: PFTs with FEV1 = 46% predicted, 0.9L, DLCO 60%.    12/06/2023: Bronchoscopy with EBUS and RUL nodule FNA along with LN biopsies. RUL mass returned with malignancy, compatible with atypical carcinoid. Cells positive for INSM1 and synaptophysin with Ki-67 ~10%. No grade provided given biopsy was FNA. Station 11L, 7, and 11R LNs all without any evidence of malignancy.    12/18/2023: Initial consultation with Dr. Good Pandya (Thoracic Surgery). Ongoing sinus infection concerns; transitioned to clarithromycin with plan for  "repeat PFTs and possible resection (wedge) pending improvement.    Interval Hx:    Ms. Rivera arrives independently today.     She has been feeling somewhat poorly over the last several months, dating to October or September. Mostly she has been suffering from what she feels is consistent with her documented rhinosinusitis, with associated headaches, sinus pressure, post-nasal drip, and associated sleep disturbances. She does not feel like her symptoms have improved over the last months. It limits her enough where she has periods where she does not feel comfortable driving or completing similar activities.    She also has feels that her breathing has been poor over this period as well. She has more exertional dyspnea, and struggles to complete a flight of stairs (able to complete at least two flights without difficulty at baseline), but she remains able to ambulate for significant distances without difficulty. She also has more of a cough, which she relates to her post-nasal drip. In addition, more recently, she has had more wheezing (which she calls \"squeaking\". She also has been having significant daily fatigue, and has been having what she feels are night sweats (though she does not drench the sheets).    She otherwise has been feeling about baseline and has no other complaints. She denies any facial flushing or redness elsewhere, diarrhea, or abdominal pain.       REVIEW OF SYSTEMS: 14 point ROS negative other than the symptoms noted above in the HPI.    Wt Readings from Last 4 Encounters:   12/18/23 49.2 kg (108 lb 6.4 oz)   12/06/23 50.3 kg (110 lb 14.3 oz)   11/14/23 49.9 kg (110 lb)   09/18/23 49 kg (108 lb)          Review of Systems:  A comprehensive ROS was performed and found to be negative or non-contributory with the exception of that noted in the HPI above.    Past Medical History:  Past Medical History:   Diagnosis Date    ASCUS on Pap smear 01/01/2006    unable to run HPV typing, f/u paps NIL    " Chronic rhinosinusitis     COPD (chronic obstructive pulmonary disease) (H)     Depression, anxiety     Herpes     occasional outbreak    Hypertension     Primary stress urinary incontinence     Pulmonary nodules     Rhinosinusitis        Past Surgical History:  Past Surgical History:   Procedure Laterality Date    BRONCHOSCOPY, WITH BIOPSY, ROBOT ASSISTED Bilateral 2023    Procedure: Robot assisted Ion BRONCHOSCOPY;  Surgeon: Raudel Claire DO;  Location: [facility] OR     SECTION      x 3    ENDOBRONCHIAL ULTRASOUND FLEXIBLE N/A 2023    Procedure: endobronchial ultrasound, transbronchial biopsies Latex Free;  Surgeon: Raudel Claire DO;  Location: [facility] OR       Social History:  Social History     Socioeconomic History    Marital status:    Tobacco Use    Smoking status: Former     Packs/day: 10     Types: Cigarettes     Quit date: 3/19/2015     Years since quittin.7    Smokeless tobacco: Former     Quit date: 2017   Vaping Use    Vaping Use: Never used   Substance and Sexual Activity    Alcohol use: Yes     Alcohol/week: 2.0 - 3.0 standard drinks of alcohol     Types: 2 - 3 Standard drinks or equivalent per week     Comment: 2 drinks per wk avg    Drug use: No    Sexual activity: Yes     Partners: Male     Birth control/protection: Surgical     Comment: tubal   Other Topics Concern    Parent/sibling w/ CABG, MI or angioplasty before 65F 55M? No      Lives at home with her . Has three children, all grown. Two remain local. She is retired and previously worked in a bakery. She stopped smoking in 2017, previously smoked ~1/4-1/2 pack for about 30 years. She consumes about 6 beers/week. She denies any illicit drug use.     Family History  Family History   Problem Relation Age of Onset    Hypertension Mother     Cancer Mother         liver    C.A.D. Father     Anesthesia Reaction No family hx of     Deep Vein Thrombosis (DVT) No family hx of      Mother  of colon  "cancer.    Outpatient Medications:  amLODIPine (NORVASC) 10 MG tablet, Take 1 tablet (10 mg) by mouth daily (Patient taking differently: Take 10 mg by mouth every evening)  clarithromycin (BIAXIN) 500 MG tablet, Take 1 tablet (500 mg) by mouth 2 times daily for 10 days  Fluticasone-Umeclidin-Vilant (TRELEGY ELLIPTA) 100-62.5-25 MCG/ACT oral inhaler, INHALE ONE PUFF BY MOUTH EVERY DAY (Patient taking differently: Inhale 1 puff into the lungs every morning)  azithromycin (ZITHROMAX) 250 MG tablet, Take 1 tablet (250 mg) by mouth daily (Patient not taking: Reported on 12/18/2023)  cetirizine (ZYRTEC) 10 MG tablet, Take 1 tablet (10 mg) by mouth daily (Patient not taking: Reported on 12/27/2023)  EPINEPHrine (ANY BX GENERIC EQUIV) 0.3 MG/0.3ML injection 2-pack, Inject 0.3 mLs (0.3 mg) into the muscle as needed for anaphylaxis May repeat one time in 5-15 minutes if response to initial dose is inadequate. (Patient not taking: Reported on 12/27/2023)  esomeprazole (NEXIUM) 20 MG DR capsule, Take 20 mg by mouth as needed (heartburn) Take 30-60 minutes before eating. (Patient not taking: Reported on 12/18/2023)  levalbuterol (XOPENEX HFA) 45 MCG/ACT inhaler, Inhale 2 puffs into the lungs every 4 hours as needed for shortness of breath / dyspnea or wheezing  levalbuterol (XOPENEX) 1.25 MG/3ML neb solution, Take 3 mLs (1.25 mg) by nebulization every 4 hours as needed for shortness of breath or wheezing  VENTOLIN  (90 Base) MCG/ACT inhaler, Inhale 2 puffs into the lungs every 6 hours (Patient not taking: Reported on 12/18/2023)    No current facility-administered medications on file prior to visit.       Physical Exam:    Blood pressure (!) 167/99, pulse 89, temperature 97.8  F (36.6  C), temperature source Oral, resp. rate 20, height 1.52 m (4' 11.84\"), weight 48.6 kg (107 lb 3.2 oz), last menstrual period 09/22/2010, SpO2 93%, not currently breastfeeding.  General: alert and cooperative, no acute distress  HEENT: " sclera anicteric, EOMI, MMM  Neck: supple, normal ROM  CV: RRR, no murmurs  Resp: Bilateral expiratory wheezing; otherwise clear.  GI: soft, non-tender, non-distended, bowel sounds present and normoactive  MSK: warm and well-perfused, normal tone  Skin: no rashes on limited exam, no jaundice  Neuro: alert and interactive, moves all extremities equally, no focal deficits    Labs & Studies: I personally reviewed the following studies:    Recent Labs   Lab Test 02/14/23  1602 09/13/22  1555 03/29/22  1453 04/14/21  1501 01/27/21  1042   WBC 8.6 9.6 7.1   < > 9.0   RBC 4.83 4.98 4.87   < > 5.09   HGB 15.7 16.5* 15.6   < > 15.9*   HCT 46.2 46.4 45.6   < > 47.1*   MCV 96 93 94   < > 93   MCH 32.5 33.1* 32.0   < > 31.2   MCHC 34.0 35.6 34.2   < > 33.8   RDW 12.3 12.5 11.9   < > 12.6    281 267   < > 252   NEUTROPHIL 73 70  --   --  74.7   LYMPH 20 19  --   --  21.0    < > = values in this interval not displayed.     Recent Labs   Lab Test 02/14/23  1602 09/13/22  1555 04/14/21  1501    138 135   POTASSIUM 4.4 4.2 4.0   CHLORIDE 102 108 102   CO2 27 26 30   ANIONGAP 11 4 3   * 90 79   BUN 9.8 12 7   CR 0.56 0.45* 0.60   IJ 9.7 9.8 9.4     Recent Labs   Lab Test 04/14/21  1501   PROTTOTAL 7.3   ALBUMIN 4.1   BILITOTAL 0.5   ALKPHOS 83   AST 19   ALT 21             ASSESSMENT AND PLAN:    # Stage IA1 atypical carcinoid  Presents with newly diagnosed localized RUL pulmonary atypical carcinoid with Ki-67 ~10%. No evidence of metastasis. Ideally would resect in this scenario followed by surveillance, but she is apparently a borderline candidate given her severe COPD and ongoing sinus/respiratory issues. Current plan is to reassess in January followed by decision on surgery. Should resection prove not possible, we would likely recommend referral to radiation oncology for SBRT. Systemic therapies are certainly available, but not the ideal approach give her very limited disease. Certainly, we can see her back  if her disease progresses and systemic approaches are indicated. Furthermore, observation is still a valid option.   She also has significant enough COPD and symptoms that are out of proportion of her imaging, which raises a question of DIPNECH. This is challenging to diagnose, but should she be able to complete wedge resection, hopefully we can examine any additional nodules to gain a better sense of whether this is a possibility.    Plan:  - Follow-up in January with Dr. Pandya for surgical assessment; can consider radiation oncology referral should she not be a surgical candidate  - No plan for routine oncology follow-up at this time, but can return should neither of the above be an option, and/or if DIPNECH remains on the differential following resection    # Severe COPD  Following with Dr. Bridges. Planning repeat PFTs in January '24. Ongoing wheezing and productive cough could be considered exacerbation but will defer to Dr. Bridges. Also with question of DIPNECH, as discussed above.  - Recommended patient contact Dr. Bridges's office for advice; patient will reach out tomorrow    # Chronic rhinosinusitis  Question of possible ongoing infection, with recent transition from azithromycin to clarithromycin.   - Introduced option of trying to arrange for additional ENT follow-up in light of possible upcoming wedge resection; she will consider reaching out    The patient was seen by and discussed with Dr. Romano.    Abner Maria MD PhD  Hematology/Oncology Fellow  Pgr: [phone]    The pt was evaluated with resident/fellow and the note was edited to relflect the combined assessment and plan      On the day of service  Chart review: 5 minutes  Visit duration: 50 minutes  Care coordination: 5 minutes    Good Romano M.D.   of Medicine  Division of Hematology, Oncology and Transplantation  [facility]

## 2023-12-28 NOTE — TELEPHONE ENCOUNTER
Pulmonary Staff Telephone Note    Results of PET-CT reviewed - nodule has increased high metabolic activity    I have been in contact with Dr. Ortiz and IP team who recommend Bx - likely by robotic bronch and EBUS under general anesthesia.  I think she will tolerate this procedure.    I called her and we discussed that we are uncertain if this is cancer, infection, inflammation or something else and that biopsy is needed.  She agrees and will await scheduling phone call.    She is concerned that she continues to cough up a lot of sputum even while on Trelegy Ellipta and daily azithromycin 250 mg.  I prescribed a 10 day course of Augmentin for her for this.    Ishmael Bridges MD   Abdominal pain/labor

## 2023-12-29 ENCOUNTER — PREP FOR PROCEDURE (OUTPATIENT)
Dept: SURGERY | Facility: CLINIC | Age: 68
End: 2023-12-29
Payer: COMMERCIAL

## 2023-12-29 ENCOUNTER — PATIENT OUTREACH (OUTPATIENT)
Dept: ONCOLOGY | Facility: CLINIC | Age: 68
End: 2023-12-29
Payer: COMMERCIAL

## 2023-12-29 DIAGNOSIS — R91.1 SOLITARY LUNG NODULE: Primary | ICD-10-CM

## 2023-12-29 RX ORDER — ENOXAPARIN SODIUM 100 MG/ML
40 INJECTION SUBCUTANEOUS
Status: CANCELLED | OUTPATIENT
Start: 2023-12-29

## 2023-12-29 NOTE — PROGRESS NOTES
S-(situation): Symptoms of pressure in her forehead, thick milky nasal drainage and cough continue.     B-(background): Patient will complete 10 days of Clarithromycin today with feeling no better or no worse than her visit with Dr. Pandya on 12/18    A-(assessment): Temperature 99.9 at times and comes down with the use of Ibuprofen, O2 saturation is 90-92%, Shortness of breath is unchanged.    R-(recommendations): Message sent to Dr. Pandya. With the following response   Good Pandya MD  You; Desire May RN6 minutes ago (11:47 AM)     AB  The best would be to see her PCP and consider another course of antibiotics.   Patient aware and will contact her PCP for an appointment  Izzy Badillo RN on 12/29/2023 at 11:58 AM

## 2024-01-17 ENCOUNTER — PATIENT OUTREACH (OUTPATIENT)
Dept: ONCOLOGY | Facility: CLINIC | Age: 69
End: 2024-01-17
Payer: COMMERCIAL

## 2024-01-17 NOTE — PROGRESS NOTES
"S-(situation): Symptoms continue: sinus pressure, cough/congestion, thick mucus    B-(background):  Patient completed 10 days of Clarithromycin, no improvement in symptoms and unable to schedule appt with PCP for follow-up (indicates \"they are booked months out\"). Not currently on any ABX.    A-(assessment): Elevated temp 100.5, at times; mostly at night, O2 saturations: 93-94%, at rest on RA, increased pressure in sinus areas on face, particularly in the forehead area.    R-(recommendations): Message sent to Dr. Pandya for further recommendations. Of note, patient is open to going to an Urgent Care to be assessed, if advised by Dr. Pandya.     Desire May RN on 1/17/2024 at 2:47 PM      Good Pandya MD  You     If she can't get into the PCP, urgent care is probably best.    Thanks     Writer called patient to updated her of the above. Patient stated understanding and that she will plan to go to an Urgent Care to be assessed. Writer will monitor for completion and assist with care moving forward as needed.     Desire May RN on 1/17/2024 at 3:31 PM    "

## 2024-02-09 ENCOUNTER — HOSPITAL ENCOUNTER (EMERGENCY)
Facility: CLINIC | Age: 69
Discharge: HOME | End: 2024-02-09
Attending: STUDENT IN AN ORGANIZED HEALTH CARE EDUCATION/TRAINING PROGRAM | Admitting: STUDENT IN AN ORGANIZED HEALTH CARE EDUCATION/TRAINING PROGRAM
Payer: COMMERCIAL

## 2024-02-09 ENCOUNTER — APPOINTMENT (OUTPATIENT)
Dept: GENERAL RADIOLOGY | Facility: CLINIC | Age: 69
End: 2024-02-09
Attending: STUDENT IN AN ORGANIZED HEALTH CARE EDUCATION/TRAINING PROGRAM
Payer: COMMERCIAL

## 2024-02-09 VITALS
OXYGEN SATURATION: 90 % | TEMPERATURE: 98.2 F | DIASTOLIC BLOOD PRESSURE: 92 MMHG | HEART RATE: 82 BPM | RESPIRATION RATE: 24 BRPM | SYSTOLIC BLOOD PRESSURE: 156 MMHG

## 2024-02-09 DIAGNOSIS — R05.1 ACUTE COUGH: ICD-10-CM

## 2024-02-09 DIAGNOSIS — R06.02 SHORTNESS OF BREATH: ICD-10-CM

## 2024-02-09 DIAGNOSIS — J44.1 COPD EXACERBATION (H): Primary | ICD-10-CM

## 2024-02-09 LAB
ANION GAP SERPL CALCULATED.3IONS-SCNC: 12 MMOL/L (ref 7–15)
ATRIAL RATE - MUSE: 82 BPM
BASOPHILS # BLD AUTO: 0 10E3/UL (ref 0–0.2)
BASOPHILS NFR BLD AUTO: 0 %
BUN SERPL-MCNC: 10.4 MG/DL (ref 8–23)
CALCIUM SERPL-MCNC: 9.7 MG/DL (ref 8.8–10.2)
CHLORIDE SERPL-SCNC: 102 MMOL/L (ref 98–107)
CREAT SERPL-MCNC: 0.48 MG/DL (ref 0.51–0.95)
DEPRECATED HCO3 PLAS-SCNC: 26 MMOL/L (ref 22–29)
DIASTOLIC BLOOD PRESSURE - MUSE: NORMAL MMHG
EGFRCR SERPLBLD CKD-EPI 2021: >90 ML/MIN/1.73M2
EOSINOPHIL # BLD AUTO: 0.1 10E3/UL (ref 0–0.7)
EOSINOPHIL NFR BLD AUTO: 1 %
ERYTHROCYTE [DISTWIDTH] IN BLOOD BY AUTOMATED COUNT: 12.1 % (ref 10–15)
FLUAV RNA SPEC QL NAA+PROBE: NEGATIVE
FLUBV RNA RESP QL NAA+PROBE: NEGATIVE
GLUCOSE SERPL-MCNC: 103 MG/DL (ref 70–99)
HCT VFR BLD AUTO: 49.5 % (ref 35–47)
HGB BLD-MCNC: 16.7 G/DL (ref 11.7–15.7)
IMM GRANULOCYTES # BLD: 0 10E3/UL
IMM GRANULOCYTES NFR BLD: 0 %
INTERPRETATION ECG - MUSE: NORMAL
LYMPHOCYTES # BLD AUTO: 1.5 10E3/UL (ref 0.8–5.3)
LYMPHOCYTES NFR BLD AUTO: 19 %
MCH RBC QN AUTO: 31.8 PG (ref 26.5–33)
MCHC RBC AUTO-ENTMCNC: 33.7 G/DL (ref 31.5–36.5)
MCV RBC AUTO: 94 FL (ref 78–100)
MONOCYTES # BLD AUTO: 0.5 10E3/UL (ref 0–1.3)
MONOCYTES NFR BLD AUTO: 7 %
NEUTROPHILS # BLD AUTO: 5.8 10E3/UL (ref 1.6–8.3)
NEUTROPHILS NFR BLD AUTO: 73 %
NRBC # BLD AUTO: 0 10E3/UL
NRBC BLD AUTO-RTO: 0 /100
P AXIS - MUSE: 84 DEGREES
PLATELET # BLD AUTO: 240 10E3/UL (ref 150–450)
POTASSIUM SERPL-SCNC: 4.2 MMOL/L (ref 3.4–5.3)
PR INTERVAL - MUSE: 148 MS
QRS DURATION - MUSE: 80 MS
QT - MUSE: 420 MS
QTC - MUSE: 490 MS
R AXIS - MUSE: 108 DEGREES
RBC # BLD AUTO: 5.25 10E6/UL (ref 3.8–5.2)
RSV RNA SPEC NAA+PROBE: NEGATIVE
SARS-COV-2 RNA RESP QL NAA+PROBE: NEGATIVE
SODIUM SERPL-SCNC: 140 MMOL/L (ref 135–145)
SYSTOLIC BLOOD PRESSURE - MUSE: NORMAL MMHG
T AXIS - MUSE: 79 DEGREES
TROPONIN T SERPL HS-MCNC: 14 NG/L
TROPONIN T SERPL HS-MCNC: 15 NG/L
VENTRICULAR RATE- MUSE: 82 BPM
WBC # BLD AUTO: 7.9 10E3/UL (ref 4–11)

## 2024-02-09 PROCEDURE — 71046 X-RAY EXAM CHEST 2 VIEWS: CPT

## 2024-02-09 PROCEDURE — 84484 ASSAY OF TROPONIN QUANT: CPT | Performed by: STUDENT IN AN ORGANIZED HEALTH CARE EDUCATION/TRAINING PROGRAM

## 2024-02-09 PROCEDURE — 87637 SARSCOV2&INF A&B&RSV AMP PRB: CPT | Performed by: STUDENT IN AN ORGANIZED HEALTH CARE EDUCATION/TRAINING PROGRAM

## 2024-02-09 PROCEDURE — 99285 EMERGENCY DEPT VISIT HI MDM: CPT | Mod: 25

## 2024-02-09 PROCEDURE — 85025 COMPLETE CBC W/AUTO DIFF WBC: CPT | Performed by: STUDENT IN AN ORGANIZED HEALTH CARE EDUCATION/TRAINING PROGRAM

## 2024-02-09 PROCEDURE — 36415 COLL VENOUS BLD VENIPUNCTURE: CPT | Performed by: STUDENT IN AN ORGANIZED HEALTH CARE EDUCATION/TRAINING PROGRAM

## 2024-02-09 PROCEDURE — 96374 THER/PROPH/DIAG INJ IV PUSH: CPT

## 2024-02-09 PROCEDURE — 93005 ELECTROCARDIOGRAM TRACING: CPT

## 2024-02-09 PROCEDURE — 80048 BASIC METABOLIC PNL TOTAL CA: CPT | Performed by: STUDENT IN AN ORGANIZED HEALTH CARE EDUCATION/TRAINING PROGRAM

## 2024-02-09 PROCEDURE — 250N000011 HC RX IP 250 OP 636: Performed by: STUDENT IN AN ORGANIZED HEALTH CARE EDUCATION/TRAINING PROGRAM

## 2024-02-09 RX ORDER — PREDNISONE 10 MG/1
40 TABLET ORAL DAILY
Qty: 20 TABLET | Refills: 0 | Status: SHIPPED | OUTPATIENT
Start: 2024-02-10 | End: 2024-02-15

## 2024-02-09 RX ORDER — AZITHROMYCIN 250 MG/1
TABLET, FILM COATED ORAL
Qty: 6 TABLET | Refills: 0 | Status: SHIPPED | OUTPATIENT
Start: 2024-02-09 | End: 2024-02-14

## 2024-02-09 RX ORDER — METHYLPREDNISOLONE SODIUM SUCCINATE 125 MG/2ML
60 INJECTION, POWDER, LYOPHILIZED, FOR SOLUTION INTRAMUSCULAR; INTRAVENOUS ONCE
Status: COMPLETED | OUTPATIENT
Start: 2024-02-09 | End: 2024-02-09

## 2024-02-09 RX ADMIN — METHYLPREDNISOLONE SODIUM SUCCINATE 62.5 MG: 125 INJECTION, POWDER, FOR SOLUTION INTRAMUSCULAR; INTRAVENOUS at 08:47

## 2024-02-09 ASSESSMENT — ACTIVITIES OF DAILY LIVING (ADL)
ADLS_ACUITY_SCORE: 35
ADLS_ACUITY_SCORE: 35

## 2024-02-09 NOTE — ED TRIAGE NOTES
Pt to ER with c/o increasing SOB over the last 2 weeks, pt states fevers at home with productive cough, pt was given a duo neb enroute with no improvement, pt does have hx of COPD

## 2024-02-09 NOTE — ED PROVIDER NOTES
"History   Chief Complaint:  Shortness of Breath     HPI:  Mimi Rivera is a pleasant 68 year old female with COPD, right upper lobe atypical carcinoid, hypertension, presenting with shortness of breath. The patient states that for 2 weeks, she has been experiencing shortness of breath, a productive cough, and a headache. She reports that within the last week, she has also began experiencing night sweats, a fever, nausea, ear pain, and decreased sense of smell. She adds that she \"thinks this is a sinus infection.\" She adds that she has a hx of COPD and is on Trelegy and albuterol inhalers which have not provided relief. She was given a Duoneb en route to the emergency department by EMS. Denies being on home oxygen. Denies smoking. Denies chest pain. Denies diarrhea, vomiting, or urinary symptoms.     Independent Historian:  None. Only the patient provided history.    Review of External Notes:  I personally reviewed notes from the patient's clinic visit dated  12/18/23 . This provided me with information regarding patient's baseline medical problems.     I personally reviewed the patient's chart, including available medication list and available past medical history, past surgical history, family history, and social history.    Physical Exam   Patient Vitals for the past 24 hrs:   BP Temp Temp src Pulse Resp SpO2   02/09/24 0918 -- -- -- -- -- 90 %   02/09/24 0916 -- -- -- -- -- 90 %   02/09/24 0901 -- -- -- -- -- 93 %   02/09/24 0900 (!) 156/92 -- -- 82 -- 92 %   02/09/24 0845 (!) 154/91 -- -- -- -- 93 %   02/09/24 0820 (!) 154/87 -- -- -- -- 92 %   02/09/24 0800 -- -- -- -- -- 91 %   02/09/24 0653 -- -- -- -- -- 94 %   02/09/24 0623 (!) 146/118 -- -- -- -- 94 %   02/09/24 0621 (!) 146/118 98.2  F (36.8  C) Oral 81 24 97 %      Physical Exam  Vitals and nursing note reviewed.   Constitutional:       Appearance: She is well-developed.   HENT:      Mouth/Throat:      Mouth: Mucous membranes are moist. "   Cardiovascular:      Rate and Rhythm: Normal rate and regular rhythm.      Heart sounds: Normal heart sounds.   Pulmonary:      Effort: Pulmonary effort is normal.      Breath sounds: Normal breath sounds. No decreased breath sounds, wheezing, rhonchi or rales.   Abdominal:      Palpations: Abdomen is soft.      Tenderness: There is no abdominal tenderness.   Neurological:      Mental Status: She is alert.             Emergency Department Course     Imaging & ECG: Laboratory:   ECG results from 02/09/24   EKG 12-lead, tracing only     Value    Systolic Blood Pressure     Diastolic Blood Pressure     Ventricular Rate 82    Atrial Rate 82    VT Interval 148    QRS Duration 80        QTc 490    P Axis 84    R AXIS 108    T Axis 79    Interpretation ECG      Sinus rhythm  Biatrial enlargement  Rightward axis  Abnormal ECG  When compared with ECG of 14-FEB-2023 16:14,  No significant change was found    My interpretation          XR Chest 2 Views   Final Result   IMPRESSION: Hyperinflated lungs consistent with underlying obstructive pulmonary disease. Mild basilar atelectasis. No focal pulmonary consolidation otherwise, or significant pleural effusion. No pneumothorax. Our size is normal without pulmonary    vascular congestion. Atherosclerotic calcifications of the aortic arch. No acute osseous abnormality.         Report(s) per radiology.  Labs Ordered and Resulted from Time of ED Arrival to Time of ED Departure   BASIC METABOLIC PANEL - Abnormal       Result Value    Sodium 140      Potassium 4.2      Chloride 102      Carbon Dioxide (CO2) 26      Anion Gap 12      Urea Nitrogen 10.4      Creatinine 0.48 (*)     GFR Estimate >90      Calcium 9.7      Glucose 103 (*)    TROPONIN T, HIGH SENSITIVITY - Abnormal    Troponin T, High Sensitivity 15 (*)    CBC WITH PLATELETS AND DIFFERENTIAL - Abnormal    WBC Count 7.9      RBC Count 5.25 (*)     Hemoglobin 16.7 (*)     Hematocrit 49.5 (*)     MCV 94      MCH 31.8       MCHC 33.7      RDW 12.1      Platelet Count 240      % Neutrophils 73      % Lymphocytes 19      % Monocytes 7      % Eosinophils 1      % Basophils 0      % Immature Granulocytes 0      NRBCs per 100 WBC 0      Absolute Neutrophils 5.8      Absolute Lymphocytes 1.5      Absolute Monocytes 0.5      Absolute Eosinophils 0.1      Absolute Basophils 0.0      Absolute Immature Granulocytes 0.0      Absolute NRBCs 0.0     INFLUENZA A/B, RSV, & SARS-COV2 PCR - Normal    Influenza A PCR Negative      Influenza B PCR Negative      RSV PCR Negative      SARS CoV2 PCR Negative     TROPONIN T, HIGH SENSITIVITY - Normal    Troponin T, High Sensitivity 14           Interventions & Assessments:     Interventions:  Medications   methylPREDNISolone sodium succinate (solu-MEDROL) injection 62.5 mg (62.5 mg Intravenous $Given 2/9/24 0847)      Assessments:  ED Course as of 02/09/24 1414   Fri Feb 09, 2024   0619 I obtained history and examined the patient as noted above.      1017 I discussed findings and discharge with the patient. All questions answered.        Independent Interpretation (X-rays, CTs, rhythm strip):  I independently interpreted the patient's chest x-ray; reassuring against infiltrate.    Consultations/Discussion of Management or Tests:  None    Social Determinants of Health affecting care:   None.     Disposition:  The patient was discharged to home.     Impression & Plan     Medical Decision Making:  Patient with history of COPD presenting with cough and shortness of breath.  Vital signs notable for elevated blood pressure on arrival but otherwise reassuring.  Patient does not use oxygen at baseline and does not require oxygen him while in the emergency department.  She is able to talk in full sentences.  Considered differential including pneumonia, COPD exacerbation, viral syndrome such as influenza or COVID, acute coronary syndrome, among others.  Workup is reassuring.  Patient is negative for COVID and  influenza.  There is no evidence of infiltrate on chest x-ray and patient has no leukocytosis.  EKG was unchanged from prior without acute appearing ischemic changes.  Patient's troponin was marginally above normal range and stable on repeat.  Feel patient's presentation is most consistent with COPD exacerbation.  Discussed findings with the patient.  Will plan for treatment with 5 days of steroids and atypical antibiotic.  Recommend the patient continue use of home albuterol for acute exacerbation of symptoms.  Was recommended she follow-up with her primary care physician for reevaluation. Findings were discussed.  Additional verbal instructions were provided.  I discussed specific warning signs and instructed the patient to return to the emergency department if there are any concerns. Understanding of instructions was voiced, questions were answered and the patient was discharged.     Diagnosis:    ICD-10-CM    1. COPD exacerbation (H)  J44.1       2. Shortness of breath  R06.02       3. Acute cough  R05.1          Discharge Medications:  Discharge Medication List as of 2/9/2024 10:37 AM        START taking these medications    Details   predniSONE (DELTASONE) 10 MG tablet Take 4 tablets (40 mg) by mouth daily for 5 days, Disp-20 tablet, R-0, E-Prescribe              Kahlil Solorio MD  02/09/24 0559

## 2024-02-09 NOTE — DISCHARGE INSTRUCTIONS
Thank you for allowing us to evaluate you today.  Follow up with primary care clinician  in 1 week for reevaluation..  Take the steroid and antibiotics as prescribed   Please read the guidance provided with your discharge instructions.  Immediately return to the emergency department with any concerns.

## 2024-03-09 DIAGNOSIS — J42 CHRONIC BRONCHITIS, UNSPECIFIED CHRONIC BRONCHITIS TYPE (H): ICD-10-CM

## 2024-03-11 RX ORDER — AZITHROMYCIN 250 MG/1
TABLET, FILM COATED ORAL
Qty: 30 TABLET | Refills: 3 | Status: SHIPPED | OUTPATIENT
Start: 2024-03-11 | End: 2024-05-03

## 2024-03-28 ENCOUNTER — TELEPHONE (OUTPATIENT)
Dept: PULMONOLOGY | Facility: CLINIC | Age: 69
End: 2024-03-28
Payer: COMMERCIAL

## 2024-03-28 DIAGNOSIS — J44.9 CHRONIC OBSTRUCTIVE PULMONARY DISEASE, UNSPECIFIED COPD TYPE (H): Primary | ICD-10-CM

## 2024-03-28 DIAGNOSIS — J42 CHRONIC BRONCHITIS, UNSPECIFIED CHRONIC BRONCHITIS TYPE (H): ICD-10-CM

## 2024-03-28 RX ORDER — FLUTICASONE FUROATE, UMECLIDINIUM BROMIDE AND VILANTEROL TRIFENATATE 100; 62.5; 25 UG/1; UG/1; UG/1
POWDER RESPIRATORY (INHALATION)
Qty: 60 EACH | Refills: 3 | Status: SHIPPED | OUTPATIENT
Start: 2024-03-28 | End: 2024-07-24

## 2024-03-28 NOTE — TELEPHONE ENCOUNTER
Called and spoke with patient to confirm if she is able to come in early for a PFT. Patient is unable to come to clinic for PFTs prior to the appt, but said she could maybe do them afterwards.     PFT appt at 10am has been cancelled, and message is routed to RN.

## 2024-03-29 ENCOUNTER — OFFICE VISIT (OUTPATIENT)
Dept: PULMONOLOGY | Facility: CLINIC | Age: 69
End: 2024-03-29
Payer: COMMERCIAL

## 2024-03-29 ENCOUNTER — OFFICE VISIT (OUTPATIENT)
Dept: PULMONOLOGY | Facility: CLINIC | Age: 69
End: 2024-03-29
Attending: INTERNAL MEDICINE
Payer: COMMERCIAL

## 2024-03-29 VITALS
HEART RATE: 92 BPM | DIASTOLIC BLOOD PRESSURE: 88 MMHG | OXYGEN SATURATION: 91 % | HEIGHT: 60 IN | BODY MASS INDEX: 21.01 KG/M2 | SYSTOLIC BLOOD PRESSURE: 156 MMHG | WEIGHT: 107 LBS

## 2024-03-29 DIAGNOSIS — I70.0 AORTIC CALCIFICATION (H): ICD-10-CM

## 2024-03-29 DIAGNOSIS — C7A.090 ATYPICAL CARCINOID LUNG TUMOR (H): Primary | ICD-10-CM

## 2024-03-29 DIAGNOSIS — J44.9 CHRONIC OBSTRUCTIVE PULMONARY DISEASE, UNSPECIFIED COPD TYPE (H): ICD-10-CM

## 2024-03-29 DIAGNOSIS — K21.9 GASTROESOPHAGEAL REFLUX DISEASE WITHOUT ESOPHAGITIS: ICD-10-CM

## 2024-03-29 DIAGNOSIS — R91.8 PULMONARY NODULES: ICD-10-CM

## 2024-03-29 DIAGNOSIS — T63.441A BEE STING REACTION, ACCIDENTAL OR UNINTENTIONAL, INITIAL ENCOUNTER: ICD-10-CM

## 2024-03-29 DIAGNOSIS — F32.1 MODERATE MAJOR DEPRESSION (H): ICD-10-CM

## 2024-03-29 DIAGNOSIS — J42 CHRONIC BRONCHITIS, UNSPECIFIED CHRONIC BRONCHITIS TYPE (H): ICD-10-CM

## 2024-03-29 DIAGNOSIS — I10 HYPERTENSION GOAL BP (BLOOD PRESSURE) < 140/90: ICD-10-CM

## 2024-03-29 DIAGNOSIS — J43.1 PANLOBULAR EMPHYSEMA (H): ICD-10-CM

## 2024-03-29 PROCEDURE — 99417 PROLNG OP E/M EACH 15 MIN: CPT | Performed by: INTERNAL MEDICINE

## 2024-03-29 PROCEDURE — G0463 HOSPITAL OUTPT CLINIC VISIT: HCPCS | Performed by: INTERNAL MEDICINE

## 2024-03-29 PROCEDURE — 94726 PLETHYSMOGRAPHY LUNG VOLUMES: CPT | Performed by: INTERNAL MEDICINE

## 2024-03-29 PROCEDURE — 94729 DIFFUSING CAPACITY: CPT | Performed by: INTERNAL MEDICINE

## 2024-03-29 PROCEDURE — 94375 RESPIRATORY FLOW VOLUME LOOP: CPT | Performed by: INTERNAL MEDICINE

## 2024-03-29 PROCEDURE — 99207 PR NO CHARGE LOS: CPT

## 2024-03-29 PROCEDURE — 99215 OFFICE O/P EST HI 40 MIN: CPT | Mod: 25 | Performed by: INTERNAL MEDICINE

## 2024-03-29 RX ORDER — CETIRIZINE HYDROCHLORIDE 10 MG/1
10 TABLET ORAL DAILY
Qty: 7 TABLET | Refills: 0 | Status: SHIPPED | OUTPATIENT
Start: 2024-03-29 | End: 2024-05-03

## 2024-03-29 RX ORDER — AMLODIPINE BESYLATE 10 MG/1
10 TABLET ORAL EVERY EVENING
Qty: 90 TABLET | Refills: 3 | Status: SHIPPED | OUTPATIENT
Start: 2024-03-29

## 2024-03-29 RX ORDER — METHYLPREDNISOLONE 4 MG
TABLET, DOSE PACK ORAL
Qty: 21 TABLET | Refills: 3 | Status: SHIPPED | OUTPATIENT
Start: 2024-03-29 | End: 2024-04-15

## 2024-03-29 RX ORDER — AMOXICILLIN AND CLAVULANATE POTASSIUM 500; 125 MG/1; MG/1
1 TABLET, FILM COATED ORAL 2 TIMES DAILY
Qty: 20 TABLET | Refills: 3 | Status: SHIPPED | OUTPATIENT
Start: 2024-03-29 | End: 2024-04-15

## 2024-03-29 ASSESSMENT — PAIN SCALES - GENERAL: PAINLEVEL: NO PAIN (0)

## 2024-03-29 NOTE — PROGRESS NOTES
"Reason for Visit    Mimi Rivera is a 68 year old year old female who is being seen for severe emphysema / COPD    Pulmonary HPI:    The patient was seen and examined by Osmani Serra MD and Ishmael Bridges MD     I had the pleasure of seeing Ms. Rivera today at the [facility] in follow-up for her severe emphysema / COPD and chronic rhinosinusitis.      I last saw her in clinic on 3/10/2023.  At that time she had some increased cough and sputum production with a worsening sinus infection for 2 to 3 weeks.  She was on Trelegy and low-dose daily azithromycin plus occasional albuterol or leave albuterol by MDI or nebulizer.  She felt her sinuses were not improved.  She denied hemoptysis, fevers, chills.  She also was concerned about the chest x-ray interpretation of \"heavy atherosclerotic calcification of the aortic arch.\".  Her exam was not remarkable.  I felt that she was having a subacute flare of COPD likely triggered by ongoing/recurrent sinus infection that is only partially been treated.  I did not have access to outside ENT note.  Based on her 3 to 4 weeks of symptoms I treated her with a Medrol Dosepak and 14 days of Augmentin.  I also explained to her that the aortic calcification dated back 2017 recommended she discussed appropriateness of statin therapy with her primary care physician.  I also reviewed her visit with her primary care Dr. Xu MAR on 9/18/2023 when he felt that her COPD symptoms were slightly worse in the past with more fatigue shortness of breath and apparently recent fever.  She told him she could walk 2-5 blocks without stopping to rest was able to climb 2 flights of stairs without stopping.  He began her according (according to the patient) Augmentin.    She returns to clinic today the Trelegy low strength and daily azithromycin 250 mg plus occasional use of MDI or nebulizer.  She complains of \"a full head and " "chest with junk in there.\"  She had sinus surgery approximately year ago.  After seeing me in clinic she also did a follow-up visit with Dr. Moncho Rodríguez of the [facility].  He felt that the surgery had healed well his note says that she was not particularly interested in trying therapy as he had to offer-although that is not her memory of the situation.  She tells me she can walk 2-3 blocks but that in the spring time she was able to walk a couple of miles without stopping.  She has a cough that is productive of thick yellow to white phlegm that sometimes tastes bad after coughing hard.  She denies hemoptysis.  She hears recent wheezing that is very difficult to clear.  It is easy for her to cough up phlegm.  She tells me she wakes up 1-3 times per night partially due to her bleeding breathing.  She recently started herself on omeprazole due to GERD symptoms that started with the Augmentin.  Her cough is worse at night.  She occasionally uses leave albuterol nebulizer treatments.  Otherwise there is no change in her detailed pulmonary ROS.    I personally reviewed the images ports from her last PFTs in May 2019 with FEV1/FVC = 1.2/2.1 (53/72% predicted, respectively) for ratio of 15 9%.  Her most recent chest x-ray is from 2/14/2023 that showed the heavy aortic arch calcification but no other abnormalities.  Most recent chest CT is from 3/12/2021 showed centrilobular emphysema with some groundglass opacities and distortion in the posterior lateral right upper lobe and some scarring in the anterior medial right middle lobe.  A right lower lobe lung nodule measured 0.4 cm..      Interval history 3/29/2024:  Today she states that things have not been good lately. Her breathing has been worse. She is waking up frequently at night with shortness of breath, and constantly feels like she has a sinus infection. She is also having shortness of breath with activity, like cleaning, grocery shopping. Her pulse ox at home " "has been dropping to around 90 if she checks it after she does something. The other day, she woke up in the middle of the night and checked and it was 88. She will wake up with a very fast heart rate and feels the need to sit up rapidly. She also has a chronic cough and wheezing, which has worsened over the last few months, with yellow sputum. She has a lot of post nasal drip and sinus congestion as well. Still taking Trelegy one puff daily, but feels like it has been less effective recently. She will use the levalbuterol nebulized 1-2 times per day, 2 puffs and feels like this is not working well either. She feels that levalbuterol makes her feel jittery and so tries to avoid taking it. Still takes azithromycin and zyrtec daily. Not taking anything for acid reflux. She does get some GERD symptoms, especially in the night and in the morining; sour taste in mouth \"can taste what she has eaten for dinner\". Now thinks she can walk about 1-2 blocks, compared to 2-3 at the last visit. She is chronically fatigued due to the poor sleep. She has also been having night sweats. Overall, she feels quite a bit worse than previous visit and is wanting to figure out what can be done to help with symptoms, and is wondering about the treatment plan going forward for her COPD and carcinoid tumor.     Current Outpatient Medications   Medication    amLODIPine (NORVASC) 10 MG tablet    amoxicillin-clavulanate (AUGMENTIN) 500-125 MG tablet    azithromycin (ZITHROMAX) 250 MG tablet    cetirizine (ZYRTEC) 10 MG tablet    esomeprazole (NEXIUM) 20 MG DR capsule    Fluticasone-Umeclidin-Vilant (TRELEGY ELLIPTA) 100-62.5-25 MCG/ACT oral inhaler    levalbuterol (XOPENEX HFA) 45 MCG/ACT inhaler    levalbuterol (XOPENEX) 1.25 MG/3ML neb solution    methylPREDNISolone (MEDROL DOSEPAK) 4 MG tablet therapy pack    EPINEPHrine (ANY BX GENERIC EQUIV) 0.3 MG/0.3ML injection 2-pack    esomeprazole (NEXIUM) 20 MG DR capsule    VENTOLIN  (90 Base) " MCG/ACT inhaler     No current facility-administered medications for this visit.     No Known Allergies  Past Medical History:   Diagnosis Date    ASCUS on Pap smear 2006    unable to run HPV typing, f/u paps NIL    Chronic rhinosinusitis     COPD (chronic obstructive pulmonary disease) (H)     Depression, anxiety     Herpes     occasional outbreak    Hypertension     Primary stress urinary incontinence     Pulmonary nodules     Rhinosinusitis      Past Surgical History:   Procedure Laterality Date    BRONCHOSCOPY, WITH BIOPSY, ROBOT ASSISTED Bilateral 2023    Procedure: Robot assisted Ion BRONCHOSCOPY;  Surgeon: Raudel Claire DO;  Location: [facility] OR     SECTION      x 3    ENDOBRONCHIAL ULTRASOUND FLEXIBLE N/A 2023    Procedure: endobronchial ultrasound, transbronchial biopsies Latex Free;  Surgeon: Raudel Claire DO;  Location:  OR     Social History     Socioeconomic History    Marital status:      Spouse name: Not on file    Number of children: Not on file    Years of education: Not on file    Highest education level: Not on file   Occupational History    Not on file   Tobacco Use    Smoking status: Former     Packs/day: 10     Types: Cigarettes     Quit date: 3/19/2015     Years since quittin.0    Smokeless tobacco: Former     Quit date: 2017   Vaping Use    Vaping Use: Never used   Substance and Sexual Activity    Alcohol use: Yes     Alcohol/week: 2.0 - 3.0 standard drinks of alcohol     Types: 2 - 3 Standard drinks or equivalent per week     Comment: 2 drinks per wk avg    Drug use: No    Sexual activity: Yes     Partners: Male     Birth control/protection: Surgical     Comment: tubal   Other Topics Concern    Parent/sibling w/ CABG, MI or angioplasty before 65F 55M? No   Social History Narrative    Not on file     Social Determinants of Health     Financial Resource Strain: Not on file   Food Insecurity: Not on file   Transportation Needs: Not on file  "  Physical Activity: Not on file   Stress: Not on file   Social Connections: Not on file   Interpersonal Safety: Not on file   Housing Stability: Not on file       ROS Pulmonary:  A complete ROS was otherwise negative except as noted in the HPI.    Exam:   BP (!) 156/88   Pulse 92   Ht 1.52 m (4' 11.84\")   Wt 48.5 kg (107 lb)   LMP 09/22/2010   SpO2 91%   BMI 21.01 kg/m    GENERAL APPEARANCE: Well developed, well nourished, alert, and in no apparent distress. No tachypnea, stridor, cough, or audible wheeze.  Somewhat depressed affect.  EYES: PERRL, EOMI  HENT: Nasal mucosa with no edema and no hyperemia. No nasal polyps.  EARS: Deferred  MOUTH: Oral mucosa is moist, without any lesions, no tonsillar enlargement, no oropharyngeal exudate.  NECK: supple, no masses, no thyromegaly.  RESP: normal inspection, palpation, with good air flow throughout.  No crackles. No rhonchi, no wheezing  CV: RRR Normal S1, S2, regular rhythm, normal rate. No murmur.  No rub. No gallop. No LE edema.   ABDOMEN:  Deferred.   MS: extremities normal. No clubbing. No cyanosis.  SKIN: no rash on limited exam  NEURO: Mentation intact, speech normal, normal strength and tone, normal gait and stance  PSYCH: mentation appears normal. and affect normal/bright    Results:  Recent Results (from the past 168 hour(s))   General PFT Lab (Please always keep checked)    Collection Time: 03/29/24 12:26 PM   Result Value Ref Range    FVC-Pred 2.44 L    FVC-Pre 1.85 L    FVC-%Pred-Pre 75 %    FEV1-Pre 0.92 L    FEV1-%Pred-Pre 47 %    FEV1FVC-Pred 80 %    FEV1FVC-Pre 50 %    FEFMax-Pred 5.42 L/sec    FEFMax-Pre 2.76 L/sec    FEFMax-%Pred-Pre 50 %    FEF2575-Pred 1.76 L/sec    FEF2575-Pre 0.33 L/sec    DAB3687-%Pred-Pre 18 %    ExpTime-Pre 9.97 sec    FIFMax-Pre 2.22 L/sec    VC-Pred 2.83 L    VC-Pre 1.78 L    VC-%Pred-Pre 62 %    IC-Pred 1.81 L    IC-Pre 1.26 L    IC-%Pred-Pre 69 %    ERV-Pred 0.91 L    ERV-Pre 0.51 L    ERV-%Pred-Pre 56 %    " FEV1FEV6-Pred 80 %    FEV1FEV6-Pre 54 %    FRCPleth-Pred 2.54 L    FRCPleth-Pre 3.05 L    FRCPleth-%Pred-Pre 120 %    RVPleth-Pred 1.87 L    RVPleth-Pre 2.53 L    RVPleth-%Pred-Pre 135 %    TLCPleth-Pred 4.44 L    TLCPleth-Pre 4.31 L    TLCPleth-%Pred-Pre 97 %    DLCOunc-Pred 17.78 ml/min/mmHg    DLCOunc-Pre 11.59 ml/min/mmHg    DLCOunc-%Pred-Pre 65 %    VA-Pre 2.80 L    VA-%Pred-Pre 67 %    FEV1SVC-Pred 69 %    FEV1SVC-Pre 52 %       Assessment and plan:  Encounter Diagnoses   Name Primary?    Atypical carcinoid lung tumor (H) Yes    Panlobular emphysema (H)     Aortic calcification (H24)     Moderate major depression (H)     Bee sting reaction, accidental or unintentional, initial encounter     Hypertension goal BP (blood pressure) < 140/90     Gastroesophageal reflux disease without esophagitis       Ms. Hilario is a 68 year old female with severe COPD, atypical carcinoid lung tumor and acid reflux, presenting for follow up. She is feeling worse at this point in terms of shortness of breath and chronic cough. We did get PFT's today, which are stable from previous. Also plan to repeat the CT chest. We will attempt to optimize her breathing and lung function with a course of steroids and antibiotics, given that it is planned for her to have a wedge resection, per the latest note from Dr. Pandya. We will also restart her on therapy for GERD today, as this may also be contributing to the chronic cough, though it is likely a combination of this and COPD and chronic rhinitis/sinusitis.     Severe COPD  -PFT's stable today, FEV1 shows slight improvement, but subjective worsening  -Repeat chest CT ordered  -Augmentin 500-125mg BID x10 days, Medrol dosepak   -She was curious about starting Breztri, as opposed to Trelegy; would be willing to change if this is cheaper for her. She will ask the pharmacy about cost    2. Atypical Carcinoid lung tumor  -Will attempt to optimize pulmonary function with abx/steroid as  above  -Repeat Chest CT ordered, and will notify Dr. Pandya to look out for this as it may impact timing of wedge resection    3. GERD  -uncontrolled  -restart esomeprazole 20mg BID    4. HTN  -sent Rx for refill of amlodipine, as she was nearly out of this    Plan to follow up in 6 months.     Osmani Gordon MD  Occupational Medicine PGY-3  Patient seen with attending, Dr. Bridges    Pulmonary Attending Attestation    I saw and examined the patient with Dr. Gordon, confirming bonilla aspects of the history and exam.  I personally reviewed the recent Xrays and other labs.  The residents note reflects our detailed discussion of the findings, assessment and plan.    I communicated in advance of today's clinic visit with her oncologist and Dr. Good Pandya her thoracic surgeon.  PFTs were obtained today that are showing somewhat improved FVC from her November 2023 values and unchanged FVC and other components.  Given her ongoing COPD symptoms with cough, sputum production shortness of breath I think it is worth I short course of antibiotics and Medrol Dosepak to try to optimize her for surgery and 2 to 3 weeks time.  She is agreeable to this and I have prescribed those medications.  She also was continue to have some symptoms of GERD which may be triggering any reversible component of bronchoconstriction and we added back her Nexium therapy twice daily.  She will continue on her maintenance therapy with Trelegy Ellipta.  She did ask me about Breztri which had seen advertised on TV.  I told her that I am willing to make that substitution out if it saves her money out-of-pocket to do this.  I will plan to see her back in approximately 6 months time-presumably postoperatively at that point.  I will contact her when we have the results of the chest CT that I ordered to complete preop preparation as requested by Dr. Pandya and will also let him know when this is done.    I spent a total of 60 minutes devoted to Ms.  Yanelis's care today, including review past medical records including prior PFTs and chest imaging (reports and images), time with the patient time and placing orders and time and documentation.    PFTs today (not optimized yet)  FEV1/FVC 0.92/1.85 (47/75%) = 50%  TLC 4.3 (93%)  DLCO 11.6 (59%) - uncorrected for hemoglobin  Improved FVC from 11/2023 PFTs (FEV1/FVC = 0.9/1.6)    Ishmael Bridges MD

## 2024-03-29 NOTE — LETTER
"    3/29/2024         RE: Mimi Rivera  [id number] [place] [address]        Dear Colleague,    Thank you for referring your patient, Mimi Rivera, to the [facility] LUNG [facility] AND [facility] [place]. Please see a copy of my visit note below.    Reason for Visit    Mimi Rivera is a 68 year old year old female who is being seen for severe emphysema / COPD    Pulmonary HPI:    The patient was seen and examined by Osmani Serra MD and Ishmael Bridges MD     I had the pleasure of seeing Ms. Rivera today at the [facility] and [facility] in follow-up for her severe emphysema / COPD and chronic rhinosinusitis.      I last saw her in clinic on 3/10/2023.  At that time she had some increased cough and sputum production with a worsening sinus infection for 2 to 3 weeks.  She was on Trelegy and low-dose daily azithromycin plus occasional albuterol or leave albuterol by MDI or nebulizer.  She felt her sinuses were not improved.  She denied hemoptysis, fevers, chills.  She also was concerned about the chest x-ray interpretation of \"heavy atherosclerotic calcification of the aortic arch.\".  Her exam was not remarkable.  I felt that she was having a subacute flare of COPD likely triggered by ongoing/recurrent sinus infection that is only partially been treated.  I did not have access to outside ENT note.  Based on her 3 to 4 weeks of symptoms I treated her with a Medrol Dosepak and 14 days of Augmentin.  I also explained to her that the aortic calcification dated back 2017 recommended she discussed appropriateness of statin therapy with her primary care physician.  I also reviewed her visit with her primary care Dr. Xu MAR on 9/18/2023 when he felt that her COPD symptoms were slightly worse in the past with more fatigue shortness of breath and apparently recent fever.  She told him she could walk 2-5 blocks without " "stopping to rest was able to climb 2 flights of stairs without stopping.  He began her according (according to the patient) Augmentin.    She returns to clinic today the Trelegy low strength and daily azithromycin 250 mg plus occasional use of MDI or nebulizer.  She complains of \"a full head and chest with junk in there.\"  She had sinus surgery approximately year ago.  After seeing me in clinic she also did a follow-up visit with Dr. Moncho Rodríguez of the [facility] ENT group.  He felt that the surgery had healed well his note says that she was not particularly interested in trying therapy as he had to offer-although that is not her memory of the situation.  She tells me she can walk 2-3 blocks but that in the spring time she was able to walk a couple of miles without stopping.  She has a cough that is productive of thick yellow to white phlegm that sometimes tastes bad after coughing hard.  She denies hemoptysis.  She hears recent wheezing that is very difficult to clear.  It is easy for her to cough up phlegm.  She tells me she wakes up 1-3 times per night partially due to her bleeding breathing.  She recently started herself on omeprazole due to GERD symptoms that started with the Augmentin.  Her cough is worse at night.  She occasionally uses leave albuterol nebulizer treatments.  Otherwise there is no change in her detailed pulmonary ROS.    I personally reviewed the images ports from her last PFTs in May 2019 with FEV1/FVC = 1.2/2.1 (53/72% predicted, respectively) for ratio of 15 9%.  Her most recent chest x-ray is from 2/14/2023 that showed the heavy aortic arch calcification but no other abnormalities.  Most recent chest CT is from 3/12/2021 showed centrilobular emphysema with some groundglass opacities and distortion in the posterior lateral right upper lobe and some scarring in the anterior medial right middle lobe.  A right lower lobe lung nodule measured 0.4 cm..      Interval history 3/29/2024:  Today she " "states that things have not been good lately. Her breathing has been worse. She is waking up frequently at night with shortness of breath, and constantly feels like she has a sinus infection. She is also having shortness of breath with activity, like cleaning, grocery shopping. Her pulse ox at home has been dropping to around 90 if she checks it after she does something. The other day, she woke up in the middle of the night and checked and it was 88. She will wake up with a very fast heart rate and feels the need to sit up rapidly. She also has a chronic cough and wheezing, which has worsened over the last few months, with yellow sputum. She has a lot of post nasal drip and sinus congestion as well. Still taking Trelegy one puff daily, but feels like it has been less effective recently. She will use the levalbuterol nebulized 1-2 times per day, 2 puffs and feels like this is not working well either. She feels that levalbuterol makes her feel jittery and so tries to avoid taking it. Still takes azithromycin and zyrtec daily. Not taking anything for acid reflux. She does get some GERD symptoms, especially in the night and in the morining; sour taste in mouth \"can taste what she has eaten for dinner\". Now thinks she can walk about 1-2 blocks, compared to 2-3 at the last visit. She is chronically fatigued due to the poor sleep. She has also been having night sweats. Overall, she feels quite a bit worse than previous visit and is wanting to figure out what can be done to help with symptoms, and is wondering about the treatment plan going forward for her COPD and carcinoid tumor.     Current Outpatient Medications   Medication    amLODIPine (NORVASC) 10 MG tablet    amoxicillin-clavulanate (AUGMENTIN) 500-125 MG tablet    azithromycin (ZITHROMAX) 250 MG tablet    cetirizine (ZYRTEC) 10 MG tablet    esomeprazole (NEXIUM) 20 MG DR capsule    Fluticasone-Umeclidin-Vilant (TRELEGY ELLIPTA) 100-62.5-25 MCG/ACT oral inhaler    " levalbuterol (XOPENEX HFA) 45 MCG/ACT inhaler    levalbuterol (XOPENEX) 1.25 MG/3ML neb solution    methylPREDNISolone (MEDROL DOSEPAK) 4 MG tablet therapy pack    EPINEPHrine (ANY BX GENERIC EQUIV) 0.3 MG/0.3ML injection 2-pack    esomeprazole (NEXIUM) 20 MG DR capsule    VENTOLIN  (90 Base) MCG/ACT inhaler     No current facility-administered medications for this visit.     No Known Allergies  Past Medical History:   Diagnosis Date    ASCUS on Pap smear 2006    unable to run HPV typing, f/u paps NIL    Chronic rhinosinusitis     COPD (chronic obstructive pulmonary disease) (H)     Depression, anxiety     Herpes     occasional outbreak    Hypertension     Primary stress urinary incontinence     Pulmonary nodules     Rhinosinusitis      Past Surgical History:   Procedure Laterality Date    BRONCHOSCOPY, WITH BIOPSY, ROBOT ASSISTED Bilateral 2023    Procedure: Robot assisted Ion BRONCHOSCOPY;  Surgeon: Raudel Claire DO;  Location: [facility] OR     SECTION      x 3    ENDOBRONCHIAL ULTRASOUND FLEXIBLE N/A 2023    Procedure: endobronchial ultrasound, transbronchial biopsies Latex Free;  Surgeon: Raudel Claire DO;  Location: [facility] OR     Social History     Socioeconomic History    Marital status:      Spouse name: Not on file    Number of children: Not on file    Years of education: Not on file    Highest education level: Not on file   Occupational History    Not on file   Tobacco Use    Smoking status: Former     Packs/day: 10     Types: Cigarettes     Quit date: 3/19/2015     Years since quittin.0    Smokeless tobacco: Former     Quit date: 2017   Vaping Use    Vaping Use: Never used   Substance and Sexual Activity    Alcohol use: Yes     Alcohol/week: 2.0 - 3.0 standard drinks of alcohol     Types: 2 - 3 Standard drinks or equivalent per week     Comment: 2 drinks per wk avg    Drug use: No    Sexual activity: Yes     Partners: Male     Birth control/protection: Surgical  "    Comment: tubal   Other Topics Concern    Parent/sibling w/ CABG, MI or angioplasty before 65F 55M? No   Social History Narrative    Not on file     Social Determinants of Health     Financial Resource Strain: Not on file   Food Insecurity: Not on file   Transportation Needs: Not on file   Physical Activity: Not on file   Stress: Not on file   Social Connections: Not on file   Interpersonal Safety: Not on file   Housing Stability: Not on file       ROS Pulmonary:  A complete ROS was otherwise negative except as noted in the HPI.    Exam:   BP (!) 156/88   Pulse 92   Ht 1.52 m (4' 11.84\")   Wt 48.5 kg (107 lb)   LMP 09/22/2010   SpO2 91%   BMI 21.01 kg/m    GENERAL APPEARANCE: Well developed, well nourished, alert, and in no apparent distress. No tachypnea, stridor, cough, or audible wheeze.  Somewhat depressed affect.  EYES: PERRL, EOMI  HENT: Nasal mucosa with no edema and no hyperemia. No nasal polyps.  EARS: Deferred  MOUTH: Oral mucosa is moist, without any lesions, no tonsillar enlargement, no oropharyngeal exudate.  NECK: supple, no masses, no thyromegaly.  RESP: normal inspection, palpation, with good air flow throughout.  No crackles. No rhonchi, no wheezing  CV: RRR Normal S1, S2, regular rhythm, normal rate. No murmur.  No rub. No gallop. No LE edema.   ABDOMEN:  Deferred.   MS: extremities normal. No clubbing. No cyanosis.  SKIN: no rash on limited exam  NEURO: Mentation intact, speech normal, normal strength and tone, normal gait and stance  PSYCH: mentation appears normal. and affect normal/bright    Results:  Recent Results (from the past 168 hour(s))   General PFT Lab (Please always keep checked)    Collection Time: 03/29/24 12:26 PM   Result Value Ref Range    FVC-Pred 2.44 L    FVC-Pre 1.85 L    FVC-%Pred-Pre 75 %    FEV1-Pre 0.92 L    FEV1-%Pred-Pre 47 %    FEV1FVC-Pred 80 %    FEV1FVC-Pre 50 %    FEFMax-Pred 5.42 L/sec    FEFMax-Pre 2.76 L/sec    FEFMax-%Pred-Pre 50 %    FEF2575-Pred 1.76 " L/sec    FEF2575-Pre 0.33 L/sec    SJC8412-%Pred-Pre 18 %    ExpTime-Pre 9.97 sec    FIFMax-Pre 2.22 L/sec    VC-Pred 2.83 L    VC-Pre 1.78 L    VC-%Pred-Pre 62 %    IC-Pred 1.81 L    IC-Pre 1.26 L    IC-%Pred-Pre 69 %    ERV-Pred 0.91 L    ERV-Pre 0.51 L    ERV-%Pred-Pre 56 %    FEV1FEV6-Pred 80 %    FEV1FEV6-Pre 54 %    FRCPleth-Pred 2.54 L    FRCPleth-Pre 3.05 L    FRCPleth-%Pred-Pre 120 %    RVPleth-Pred 1.87 L    RVPleth-Pre 2.53 L    RVPleth-%Pred-Pre 135 %    TLCPleth-Pred 4.44 L    TLCPleth-Pre 4.31 L    TLCPleth-%Pred-Pre 97 %    DLCOunc-Pred 17.78 ml/min/mmHg    DLCOunc-Pre 11.59 ml/min/mmHg    DLCOunc-%Pred-Pre 65 %    VA-Pre 2.80 L    VA-%Pred-Pre 67 %    FEV1SVC-Pred 69 %    FEV1SVC-Pre 52 %       Assessment and plan:  Encounter Diagnoses   Name Primary?    Atypical carcinoid lung tumor (H) Yes    Panlobular emphysema (H)     Aortic calcification (H24)     Moderate major depression (H)     Bee sting reaction, accidental or unintentional, initial encounter     Hypertension goal BP (blood pressure) < 140/90     Gastroesophageal reflux disease without esophagitis       Ms. Hilario is a 68 year old female with severe COPD, atypical carcinoid lung tumor and acid reflux, presenting for follow up. She is feeling worse at this point in terms of shortness of breath and chronic cough. We did get PFT's today, which are stable from previous. Also plan to repeat the CT chest. We will attempt to optimize her breathing and lung function with a course of steroids and antibiotics, given that it is planned for her to have a wedge resection, per the latest note from Dr. Pandya. We will also restart her on therapy for GERD today, as this may also be contributing to the chronic cough, though it is likely a combination of this and COPD and chronic rhinitis/sinusitis.     Severe COPD  -PFT's stable today, FEV1 shows slight improvement, but subjective worsening  -Repeat chest CT ordered  -Augmentin 500-125mg BID x10 days,  Medrol dosepak   -She was curious about starting Breztri, as opposed to Trelegy; would be willing to change if this is cheaper for her. She will ask the pharmacy about cost    2. Atypical Carcinoid lung tumor  -Will attempt to optimize pulmonary function with abx/steroid as above  -Repeat Chest CT ordered, and will notify Dr. Pandya to look out for this as it may impact timing of wedge resection    3. GERD  -uncontrolled  -restart esomeprazole 20mg BID    4. HTN  -sent Rx for refill of amlodipine, as she was nearly out of this    Plan to follow up in 6 months.     Osmani Gordon MD  Occupational Medicine PGY-3  Patient seen with attending, Dr. Bridges    Pulmonary Attending Attestation    I saw and examined the patient with Dr. Gordon, confirming bonilla aspects of the history and exam.  I personally reviewed the recent Xrays and other labs.  The residents note reflects our detailed discussion of the findings, assessment and plan.    I communicated in advance of today's clinic visit with her oncologist and Dr. Good Pandya her thoracic surgeon.  PFTs were obtained today that are showing somewhat improved FVC from her November 2023 values and unchanged FVC and other components.  Given her ongoing COPD symptoms with cough, sputum production shortness of breath I think it is worth I short course of antibiotics and Medrol Dosepak to try to optimize her for surgery and 2 to 3 weeks time.  She is agreeable to this and I have prescribed those medications.  She also was continue to have some symptoms of GERD which may be triggering any reversible component of bronchoconstriction and we added back her Nexium therapy twice daily.  She will continue on her maintenance therapy with Trelegy Ellipta.  She did ask me about Breztri which had seen advertised on TV.  I told her that I am willing to make that substitution out if it saves her money out-of-pocket to do this.  I will plan to see her back in approximately 6 months  time-presumably postoperatively at that point.  I will contact her when we have the results of the chest CT that I ordered to complete preop preparation as requested by Dr. Pandya and will also let him know when this is done.    I spent a total of 60 minutes devoted to Ms. Hilario's care today, including review past medical records including prior PFTs and chest imaging (reports and images), time with the patient time and placing orders and time and documentation.    PFTs today (not optimized yet)  FEV1/FVC 0.92/1.85 (47/75%) = 50%  TLC 4.3 (93%)  DLCO 11.6 (59%) - uncorrected for hemoglobin  Improved FVC from 11/2023 PFTs (FEV1/FVC = 0.9/1.6)    Ishmael Bridges MD

## 2024-03-29 NOTE — NURSING NOTE
Chief Complaint   Patient presents with    RECHECK     Return Pulmonary - Atypical carcinoid lung tumor    Medications reviewed and vital signs taken.   Rao Lamb, CMA

## 2024-03-29 NOTE — PROGRESS NOTES
Mimi Rivera comes into clinic today at the request of Dr. Bridges Ordering Provider for PFT      This service provided today was under the supervising provider of the day Dr. Perlman, who was available if needed.    Rylee Rao

## 2024-03-29 NOTE — PATIENT INSTRUCTIONS
Severe COPD  Atypical Carcinoid lung tumor  Acid Reflux    Plan  Breathing tests (PFTs) today  Repeat chest CT ordered earlier today by me - today here if possible;  [place] [place] [facility]  To optimize your breathing in prep for likely surgery take Augmentin 1 pill twice daily x 10 days and Medrol dose edwige  Schedule surgery with Dr. Pandya's office - I will let him know when CT is done and he will review them and breathing tests.  Can ask pharmacist if Zafari is cheaper out of pocket than Trelegy - I am willing to substitute if you'd like  Call our lung nurses for questions or concerns - [phone]  Add anti-reflux therapy (nexium) twice daily - ordered

## 2024-04-01 ENCOUNTER — TELEPHONE (OUTPATIENT)
Dept: PULMONOLOGY | Facility: CLINIC | Age: 69
End: 2024-04-01
Payer: COMMERCIAL

## 2024-04-01 NOTE — TELEPHONE ENCOUNTER
Patient confirmed scheduled appointment:  Date: 04/12/2024  Time: 1:30PM  Visit type: CT CHEST  Provider: BRENNAN  Location: [facility] [place] [place]  Testing/imaging: N/A  Additional notes: N/A

## 2024-04-02 LAB
DLCOUNC-%PRED-PRE: 65 %
DLCOUNC-PRE: 11.59 ML/MIN/MMHG
DLCOUNC-PRED: 17.78 ML/MIN/MMHG
ERV-%PRED-PRE: 56 %
ERV-PRE: 0.51 L
ERV-PRED: 0.91 L
EXPTIME-PRE: 9.97 SEC
FEF2575-%PRED-PRE: 18 %
FEF2575-PRE: 0.33 L/SEC
FEF2575-PRED: 1.76 L/SEC
FEFMAX-%PRED-PRE: 50 %
FEFMAX-PRE: 2.76 L/SEC
FEFMAX-PRED: 5.42 L/SEC
FEV1-%PRED-PRE: 47 %
FEV1-PRE: 0.92 L
FEV1FEV6-PRE: 54 %
FEV1FEV6-PRED: 80 %
FEV1FVC-PRE: 50 %
FEV1FVC-PRED: 80 %
FEV1SVC-PRE: 52 %
FEV1SVC-PRED: 69 %
FIFMAX-PRE: 2.22 L/SEC
FRCPLETH-%PRED-PRE: 120 %
FRCPLETH-PRE: 3.05 L
FRCPLETH-PRED: 2.54 L
FVC-%PRED-PRE: 75 %
FVC-PRE: 1.85 L
FVC-PRED: 2.44 L
IC-%PRED-PRE: 69 %
IC-PRE: 1.26 L
IC-PRED: 1.81 L
RVPLETH-%PRED-PRE: 135 %
RVPLETH-PRE: 2.53 L
RVPLETH-PRED: 1.87 L
TLCPLETH-%PRED-PRE: 97 %
TLCPLETH-PRE: 4.31 L
TLCPLETH-PRED: 4.44 L
VA-%PRED-PRE: 67 %
VA-PRE: 2.8 L
VC-%PRED-PRE: 62 %
VC-PRE: 1.78 L
VC-PRED: 2.83 L

## 2024-04-07 NOTE — PROGRESS NOTES
THORACIC SURGERY - FOLLOW UP OFFICE VISIT       Dear Dr. Morejon,     I saw Mimi Rivera in follow up for the evaluation and treatment of an atypical carcinoid of the RIGHT upper lobe.     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     From  our first visit on  12/18/2023: Lately, for the last 6 weeks, she has been coughing and wakes up sweaty. She has sinus pressure and post-nasal drip. Since she has been feeling ill, she cannot walk more than a block without stopping. However, her exercise tolerance was at least two blocks prior to that.     She saw Dr. Bridges on 3/29/2024 and still has congested sinuses. Her breathing was worse and she wakes up frequently with shortness of breath. Her oximetry went down to 88% at night. She is supposed to start a steroid taper but hasn't yet because it makes her anxious. She is taking the antibiotic prescribed but it makes her sick to her stomach.      ECOG performance status  0- Fully active, without restriction                 Previsit Tests  CT chest 12/6/2023: RIGHT upper lobe 0.9 cm pulmonary Nodule, stable multiple other smaller nodules, all new. without mediastinal adenopathy, with no pleural effusion        PFT   3/29/2024: FEV1 47% predicted, 0.92 L, DLCO 65%    11/20/2023: FEV1 46% predicted, 0.9 L, DLCO 60%.  5/3/2019: FEV1 1.2 (53%)    Pathology (12/6/2023): Right upper lobe mass:  atypical carcinoid    CT scan (10/12/2023): RIGHT upper lobe 0.9 cm pulmonary Nodule, multiple other smaller nodules, all new. without mediastinal adenopathy, with no pleural effusion     x    PET scan (11/13/2023): FDG avid 0.9 x 0.6 cm RIGHT upper lobe Nodule (Max SUV 7.0). No suspicious hypermetabolic activity elsewhere            PMH  Reviewed, as below     Past Medical History        Past Medical History:   Diagnosis Date    ASCUS on Pap smear 01/01/2006     unable to run HPV typing, f/u paps  "NIL    Chronic rhinosinusitis      COPD (chronic obstructive pulmonary disease) (H)      Depression, anxiety      Herpes       occasional outbreak    Hypertension      Primary stress urinary incontinence      Pulmonary nodules      Rhinosinusitis              PSH  Reviewed, as below     Past Surgical History         Past Surgical History:   Procedure Laterality Date    BRONCHOSCOPY, WITH BIOPSY, ROBOT ASSISTED Bilateral 2023     Procedure: Robot assisted Ion BRONCHOSCOPY;  Surgeon: Raudel Claire DO;  Location: [facility] OR     SECTION         x 3    ENDOBRONCHIAL ULTRASOUND FLEXIBLE N/A 2023     Procedure: endobronchial ultrasound, transbronchial biopsies Latex Free;  Surgeon: Raudel Claire DO;  Location: [facility] OR            No Known Allergies     Current Facility-Administered Medications       Current Outpatient Medications   Medication    amLODIPine (NORVASC) 10 MG tablet    azithromycin (ZITHROMAX) 250 MG tablet    cetirizine (ZYRTEC) 10 MG tablet    EPINEPHrine (ANY BX GENERIC EQUIV) 0.3 MG/0.3ML injection 2-pack    esomeprazole (NEXIUM) 20 MG DR capsule    Fluticasone-Umeclidin-Vilant (TRELEGY ELLIPTA) 100-62.5-25 MCG/ACT oral inhaler    levalbuterol (XOPENEX HFA) 45 MCG/ACT inhaler    levalbuterol (XOPENEX) 1.25 MG/3ML neb solution    VENTOLIN  (90 Base) MCG/ACT inhaler      No current facility-administered medications for this visit.            ETOH: Occasional  TOBACCO: ages 16 - 60. 1/2 pack per day  OTHER DRUGS: None     Physical examination  /82   Pulse 103   Temp 97  F (36.1  C) (Tympanic)   Resp 20   Ht 1.518 m (4' 11.75\")   Wt 50.3 kg (111 lb)   LMP 2010   SpO2 93%   BMI 21.86 kg/m     Physical Exam  Constitutional:       Appearance: Normal appearance. She is normal weight.   Eyes:      Conjunctiva/sclera: Conjunctivae normal.   Cardiovascular:      Rate and Rhythm: Normal rate.   Pulmonary:      Effort: Pulmonary effort is normal.   Skin:     General: Skin is " warm and dry.   Neurological:      Mental Status: She is alert and oriented to person, place, and time.   Psychiatric:         Mood and Affect: Mood normal.         Behavior: Behavior normal.         Thought Content: Thought content normal.         Judgment: Judgment normal.         From a personal perspective, she lives with her . They have three children and two grandchildren.    Code status: Full code    Health Care Proxy:  Her daughter, Page, would function in that role.       IMPRESSION   68 year old female patient with RIGHT upper lobe atypical carcinoid. She is a reasonable candidate for a wedge resection.       Stage: Clinical stage IA1     PLAN  I spent 20 min on the date of the encounter in chart review, patient visit, review of tests, documentation and/or discussion with other providers about the issues documented above. I reviewed the plan as follows:     Procedure planned: robotic-assisted right thoracoscopic wedge resection, mediastinal lymph node dissection.    I discussed the risks and benefits of the operation, including resecting and staging the tumor. The risks include bleeding, infection, prolonged air leak, chylothorax, deep venous thrombosis and pulmonary embolism, and death.  There is also a risk of prolonged pain, which could require further treatment.  Prolonged air leak could be treated with bronchoscopy and endobronchial valve insertion  or going home with a chest tube.  Postoperative bleeding (rare) could require a return to the OR.     Necessary Preop Tests & Appointments: Preoperative assessment clinic, CT chest (sched 4/12). I recommend she see her ENT to see if there is anything else to do for her sinuses.    Regional Anesthesia Plan: Intraoperative intercostal nerve block    Anticoagulation Plan: Prophylactic Lovenox     I appreciate the opportunity to participate in the care of your patient and will keep you updated.    Sincerely,      Good Pandya MD

## 2024-04-08 ENCOUNTER — ONCOLOGY VISIT (OUTPATIENT)
Dept: ONCOLOGY | Facility: CLINIC | Age: 69
End: 2024-04-08
Attending: THORACIC SURGERY (CARDIOTHORACIC VASCULAR SURGERY)
Payer: COMMERCIAL

## 2024-04-08 VITALS
RESPIRATION RATE: 20 BRPM | WEIGHT: 111 LBS | BODY MASS INDEX: 21.79 KG/M2 | TEMPERATURE: 97 F | HEART RATE: 103 BPM | DIASTOLIC BLOOD PRESSURE: 82 MMHG | OXYGEN SATURATION: 93 % | HEIGHT: 60 IN | SYSTOLIC BLOOD PRESSURE: 134 MMHG

## 2024-04-08 DIAGNOSIS — C7A.090 ATYPICAL CARCINOID LUNG TUMOR (H): Primary | ICD-10-CM

## 2024-04-08 PROCEDURE — 99213 OFFICE O/P EST LOW 20 MIN: CPT | Performed by: THORACIC SURGERY (CARDIOTHORACIC VASCULAR SURGERY)

## 2024-04-08 PROCEDURE — G0463 HOSPITAL OUTPT CLINIC VISIT: HCPCS | Performed by: THORACIC SURGERY (CARDIOTHORACIC VASCULAR SURGERY)

## 2024-04-08 ASSESSMENT — PAIN SCALES - GENERAL: PAINLEVEL: NO PAIN (0)

## 2024-04-08 NOTE — LETTER
4/8/2024         RE: Mimi Rivera  [address] [place] [address]        Dear Colleague,    Thank you for referring your patient, Mimi Rivera, to the [facility]. Please see a copy of my visit note below.    THORACIC SURGERY - FOLLOW UP OFFICE VISIT       Dear Dr. Morejon,     I saw Mimi Rivera in follow up for the evaluation and treatment of an atypical carcinoid of the RIGHT upper lobe.     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     From  our first visit on  12/18/2023: Lately, for the last 6 weeks, she has been coughing and wakes up sweaty. She has sinus pressure and post-nasal drip. Since she has been feeling ill, she cannot walk more than a block without stopping. However, her exercise tolerance was at least two blocks prior to that.     She saw Dr. Bridges on 3/29/2024 and still has congested sinuses. Her breathing was worse and she wakes up frequently with shortness of breath. Her oximetry went down to 88% at night. She is supposed to start a steroid taper but hasn't yet because it makes her anxious. She is taking the antibiotic prescribed but it makes her sick to her stomach.      ECOG performance status  0- Fully active, without restriction                 Previsit Tests  CT chest 12/6/2023: RIGHT upper lobe 0.9 cm pulmonary Nodule, stable multiple other smaller nodules, all new. without mediastinal adenopathy, with no pleural effusion        PFT   3/29/2024: FEV1 47% predicted, 0.92 L, DLCO 65%    11/20/2023: FEV1 46% predicted, 0.9 L, DLCO 60%.  5/3/2019: FEV1 1.2 (53%)    Pathology (12/6/2023): Right upper lobe mass:  atypical carcinoid    CT scan (10/12/2023): RIGHT upper lobe 0.9 cm pulmonary Nodule, multiple other smaller nodules, all new. without mediastinal adenopathy, with no pleural effusion     x    PET scan (11/13/2023): FDG avid 0.9 x 0.6 cm RIGHT  "upper lobe Nodule (Max SUV 7.0). No suspicious hypermetabolic activity elsewhere            PMH  Reviewed, as below     Past Medical History        Past Medical History:   Diagnosis Date     ASCUS on Pap smear 2006     unable to run HPV typing, f/u paps NIL     Chronic rhinosinusitis       COPD (chronic obstructive pulmonary disease) (H)       Depression, anxiety       Herpes       occasional outbreak     Hypertension       Primary stress urinary incontinence       Pulmonary nodules       Rhinosinusitis              PSH  Reviewed, as below     Past Surgical History         Past Surgical History:   Procedure Laterality Date     BRONCHOSCOPY, WITH BIOPSY, ROBOT ASSISTED Bilateral 2023     Procedure: Robot assisted Ion BRONCHOSCOPY;  Surgeon: Raudel Claire DO;  Location: [facility] OR      SECTION         x 3     ENDOBRONCHIAL ULTRASOUND FLEXIBLE N/A 2023     Procedure: endobronchial ultrasound, transbronchial biopsies Latex Free;  Surgeon: Raudel Claire DO;  Location: [facility] OR            No Known Allergies     Current Facility-Administered Medications       Current Outpatient Medications   Medication     amLODIPine (NORVASC) 10 MG tablet     azithromycin (ZITHROMAX) 250 MG tablet     cetirizine (ZYRTEC) 10 MG tablet     EPINEPHrine (ANY BX GENERIC EQUIV) 0.3 MG/0.3ML injection 2-pack     esomeprazole (NEXIUM) 20 MG DR capsule     Fluticasone-Umeclidin-Vilant (TRELEGY ELLIPTA) 100-62.5-25 MCG/ACT oral inhaler     levalbuterol (XOPENEX HFA) 45 MCG/ACT inhaler     levalbuterol (XOPENEX) 1.25 MG/3ML neb solution     VENTOLIN  (90 Base) MCG/ACT inhaler      No current facility-administered medications for this visit.            ETOH: Occasional  TOBACCO: ages 16 - 60. 1/2 pack per day  OTHER DRUGS: None     Physical examination  /82   Pulse 103   Temp 97  F (36.1  C) (Tympanic)   Resp 20   Ht 1.518 m (4' 11.75\")   Wt 50.3 kg (111 lb)   LMP 2010   SpO2 93%   BMI 21.86 kg/m   "   Physical Exam  Constitutional:       Appearance: Normal appearance. She is normal weight.   Eyes:      Conjunctiva/sclera: Conjunctivae normal.   Cardiovascular:      Rate and Rhythm: Normal rate.   Pulmonary:      Effort: Pulmonary effort is normal.   Skin:     General: Skin is warm and dry.   Neurological:      Mental Status: She is alert and oriented to person, place, and time.   Psychiatric:         Mood and Affect: Mood normal.         Behavior: Behavior normal.         Thought Content: Thought content normal.         Judgment: Judgment normal.         From a personal perspective, she lives with her . They have three children and two grandchildren.    Code status: Full code    Health Care Proxy:  Her daughter, Page, would function in that role.       IMPRESSION   68 year old female patient with RIGHT upper lobe atypical carcinoid. She is a reasonable candidate for a wedge resection.       Stage: Clinical stage IA1     PLAN  I spent 20 min on the date of the encounter in chart review, patient visit, review of tests, documentation and/or discussion with other providers about the issues documented above. I reviewed the plan as follows:     Procedure planned: robotic-assisted right thoracoscopic wedge resection, mediastinal lymph node dissection.    I discussed the risks and benefits of the operation, including resecting and staging the tumor. The risks include bleeding, infection, prolonged air leak, chylothorax, deep venous thrombosis and pulmonary embolism, and death.  There is also a risk of prolonged pain, which could require further treatment.  Prolonged air leak could be treated with bronchoscopy and endobronchial valve insertion  or going home with a chest tube.  Postoperative bleeding (rare) could require a return to the OR.     Necessary Preop Tests & Appointments: Preoperative assessment clinic, CT chest (sched 4/12). I recommend she see her ENT to see if there is anything else to do for her  sinuses.    Regional Anesthesia Plan: Intraoperative intercostal nerve block    Anticoagulation Plan: Prophylactic Lovenox     I appreciate the opportunity to participate in the care of your patient and will keep you updated.    Sincerely,      Good Pandya MD       Again, thank you for allowing me to participate in the care of your patient.        Sincerely,        Good Pandya MD

## 2024-04-08 NOTE — NURSING NOTE
"Oncology Rooming Note    April 8, 2024 9:33 AM   Mimi Rivera is a 68 year old female who presents for:    Chief Complaint   Patient presents with    Lung Nodule     Initial Vitals: /82   Pulse 103   Temp 97  F (36.1  C) (Tympanic)   Resp 20   Ht 1.518 m (4' 11.75\")   Wt 50.3 kg (111 lb)   LMP 09/22/2010   SpO2 93%   BMI 21.86 kg/m   Estimated body mass index is 21.86 kg/m  as calculated from the following:    Height as of this encounter: 1.518 m (4' 11.75\").    Weight as of this encounter: 50.3 kg (111 lb). Body surface area is 1.46 meters squared.  No Pain (0) Comment: Data Unavailable   Patient's last menstrual period was 09/22/2010.  Allergies reviewed: Yes  Medications reviewed: Yes    Medications: Medication refills not needed today.  Pharmacy name entered into [facility]: [id number] [place] [address] [address]    Frailty Screening:   Is the patient here for a new oncology consult visit in cancer care? 2. No      Clinical concerns: f/u       Anita Mcclain CMA              "

## 2024-04-09 ENCOUNTER — TELEPHONE (OUTPATIENT)
Dept: ONCOLOGY | Facility: CLINIC | Age: 69
End: 2024-04-09

## 2024-04-09 ENCOUNTER — TUMOR CONFERENCE (OUTPATIENT)
Dept: SURGERY | Facility: CLINIC | Age: 69
End: 2024-04-09

## 2024-04-09 ENCOUNTER — PATIENT OUTREACH (OUTPATIENT)
Dept: ONCOLOGY | Facility: CLINIC | Age: 69
End: 2024-04-09

## 2024-04-09 NOTE — PROGRESS NOTES
Late entry from 4/8/24    Patient here for pre-op instructions for robotic-assisted right thoracoscopic wedge resection, mediastinal lymph node dissection. Handouts given and reviewed. The patient was instructed to stop aspirin, ibuprofen, naproxen, NSAIDS, fish oil/flax seed oil, Vit E one week before surgery.  NPO guidelines and showering instructions given. Patient given 1 bottle of CHG soap for pre-op showering. Patient is aware she will have an overnoc hospital stay and will need a  after hospital discharge and a responsible caregiver to stay with them for 24 hours afterwards. The patient was instructed to notify the provider if there are any signs of a cold/flu prior to surgery. On the day before surgery, the patient was instructed to avoid smoking, chewing tobacco, or drinking alcohol. The hospital arrival time and estimated time for procedure to be given to the patient by Corrine surgery scheduler.    Post-op: Reviewed the following points of when to contact the MD: Signs of infection including - Temp >/= 100.4, chills, bloody drainage from surgical site, warmth/redness at surgical site; Increased drainage from surgical site; Difficulty breathing/SOB; Dizziness/light-headedness; persistent cough. Is aware she will return home with narcotic pain med and should not drive until off medication. If post-op pain is not well controlled by prescribed pain med they should call the clinic. The patient was notified of post-op restrictions including: no heavy lifting >/= 20 lbs for 6-8 weeks; no drastic changes in air pressure (no flying or scuba diving) for 8 weeks. Patient should continue to use their incentive spirometer and ambulation post-op until they feel they have returned to their regular level of activity. Patient is aware she needs to remain well hydrated and eat a protein-rich diet post op. Verbalized understanding of potential post-op complications.        Pt instructed to call with further  questions or concerns.  Patient verbalized understanding and is in agreement with this plan. Patient given contact information and will reach out to care coordinator or MD with additional questions or concerns. Patient discharged ambulatory and in no distress.    Desire May RN on 4/9/2024 at 8:37 AM

## 2024-04-09 NOTE — TELEPHONE ENCOUNTER
Called and spoke with pt to let her know potential surgery date is 4/25. Informed pt writer will no more later in the week and will give her a call by 4/11. Pt understood and agreed.     Corrine Rodgers on 4/9/2024 at 12:30 PM

## 2024-04-11 NOTE — TELEPHONE ENCOUNTER
FUTURE VISIT INFORMATION      SURGERY INFORMATION:  Date: 24  [facility] or  Surgeon:  Good Pandya MD   Anesthesia Type:  general  Procedure: Robot-assisted thoracoscopic right wedge resection, mediastinal lymph node dissection   Consult: ov 24    RECORDS REQUESTED FROM:       Primary Care Provider: [facility]    Pertinent Medical History: hypertension, aortic calcification     Most recent EKG+ Tracin24    Most recent PFT's: 3/29/24

## 2024-04-11 NOTE — TELEPHONE ENCOUNTER
Called pt and left detailed vcm explaining the 4/25 surgery date was approved. Sending my chart wit pre/POP and surgery details.     Corrine Rodgers on 4/11/2024 at 12:23 PM

## 2024-04-12 ENCOUNTER — ANCILLARY PROCEDURE (OUTPATIENT)
Dept: CT IMAGING | Facility: CLINIC | Age: 69
End: 2024-04-12
Attending: INTERNAL MEDICINE
Payer: COMMERCIAL

## 2024-04-12 DIAGNOSIS — J42 CHRONIC BRONCHITIS, UNSPECIFIED CHRONIC BRONCHITIS TYPE (H): ICD-10-CM

## 2024-04-12 DIAGNOSIS — J44.9 CHRONIC OBSTRUCTIVE PULMONARY DISEASE, UNSPECIFIED COPD TYPE (H): ICD-10-CM

## 2024-04-12 DIAGNOSIS — R91.8 PULMONARY NODULES: ICD-10-CM

## 2024-04-12 DIAGNOSIS — C7A.090 ATYPICAL CARCINOID LUNG TUMOR (H): ICD-10-CM

## 2024-04-12 PROCEDURE — 71250 CT THORAX DX C-: CPT | Mod: GC | Performed by: RADIOLOGY

## 2024-04-15 ENCOUNTER — ANESTHESIA EVENT (OUTPATIENT)
Dept: SURGERY | Facility: CLINIC | Age: 69
DRG: 168 | End: 2024-04-15
Payer: COMMERCIAL

## 2024-04-15 ENCOUNTER — VIRTUAL VISIT (OUTPATIENT)
Dept: SURGERY | Facility: CLINIC | Age: 69
End: 2024-04-15
Payer: COMMERCIAL

## 2024-04-15 ENCOUNTER — PRE VISIT (OUTPATIENT)
Dept: SURGERY | Facility: CLINIC | Age: 69
End: 2024-04-15

## 2024-04-15 VITALS — WEIGHT: 110 LBS | HEIGHT: 60 IN | BODY MASS INDEX: 21.6 KG/M2

## 2024-04-15 DIAGNOSIS — Z01.818 PRE-OP EVALUATION: Primary | ICD-10-CM

## 2024-04-15 DIAGNOSIS — J32.9 CHRONIC RHINOSINUSITIS: ICD-10-CM

## 2024-04-15 DIAGNOSIS — R06.00 PND (PAROXYSMAL NOCTURNAL DYSPNEA): ICD-10-CM

## 2024-04-15 DIAGNOSIS — R91.1 SOLITARY LUNG NODULE: ICD-10-CM

## 2024-04-15 DIAGNOSIS — R06.09 DYSPNEA ON EXERTION: ICD-10-CM

## 2024-04-15 PROCEDURE — 99215 OFFICE O/P EST HI 40 MIN: CPT | Mod: 95 | Performed by: NURSE PRACTITIONER

## 2024-04-15 RX ORDER — GABAPENTIN 100 MG/1
100 CAPSULE ORAL
Status: CANCELLED | OUTPATIENT
Start: 2024-04-15

## 2024-04-15 RX ORDER — ACETAMINOPHEN 325 MG/1
975 TABLET ORAL ONCE
Status: CANCELLED | OUTPATIENT
Start: 2024-04-15 | End: 2024-04-15

## 2024-04-15 RX ORDER — CELECOXIB 200 MG/1
200 CAPSULE ORAL ONCE
Status: CANCELLED | OUTPATIENT
Start: 2024-04-15 | End: 2024-04-15

## 2024-04-15 RX ORDER — CHLORHEXIDINE GLUCONATE ORAL RINSE 1.2 MG/ML
15 SOLUTION DENTAL ONCE
Status: CANCELLED | OUTPATIENT
Start: 2024-04-15 | End: 2024-04-15

## 2024-04-15 ASSESSMENT — PAIN SCALES - GENERAL: PAINLEVEL: NO PAIN (0)

## 2024-04-15 ASSESSMENT — ENCOUNTER SYMPTOMS: SEIZURES: 0

## 2024-04-15 ASSESSMENT — COPD QUESTIONNAIRES
CAT_SEVERITY: SEVERE
COPD: 1

## 2024-04-15 ASSESSMENT — LIFESTYLE VARIABLES: TOBACCO_USE: 1

## 2024-04-15 NOTE — PATIENT INSTRUCTIONS
Preparing for Your Surgery      Name:  Mimi Rivera   MRN:  [id number]   :  1955   Today's Date:  4/15/2024       Arriving for surgery:  Surgery date:  24  Arrival time:  12:00 pm  Surgery time: 2:00 pm    Please come to:     Please come to:       [facility]    [address] [place]  [address]     The [facility] [place] Patient/Visitor Ramp is at [address]. Patients and visitors who self-park will receive the reduced hospital parking rate. If the Patient /Visitor Ramp is full, please follow the signs to the [employer] car park located at the main hospital entrance.       parking is available (24 hours/ 7 days a week)      Discounted parking pass options are available for patients and visitors. They can be purchased at the [email or web address] desk at the main hospital entrance.     -    Stop at the security desk and they will direct surgery patients to the [facility] [phone]        - If you need directions, a wheelchair or an escort please stop at the Information/security desk in the lobby.     What can I eat or drink?  -  You may eat and drink normally up to 8 hours prior to arrival time. (Until 4:00 am)  -  You may have clear liquids until 2 hours prior to arrival time. (Until 10:00 am)    Examples of clear liquids:  Water  Clear broth  Juices (apple, white grape, white cranberry  and cider) without pulp  Noncarbonated, powder based beverages  (lemonade and Hieu-Aid)  Sodas (Sprite, 7-Up, ginger ale and seltzer)  Coffee or tea (without milk or cream)  Gatorade    -  No Alcohol or cannabis products for at least 24 hours before surgery.     Which medicines can I take?    Hold Aspirin for 7 days before surgery.   Hold Multivitamins for 7 days before surgery.  Hold Supplements for 7 days before surgery.  Hold Ibuprofen (Advil, Motrin) for 1 day(s) before  surgery--unless otherwise directed by surgeon.  Hold Naproxen (Aleve) for 4 days before surgery.    -  DO NOT take these medications the day of surgery:  Cetirizine (Zyrtec)    -  PLEASE CONTINUE TO TAKE these medications per your usual routine:  Amlodipine (Norvasc)  Azithromycin (Zithromax)  Esomeprazole (Nexium)  Fluticasone-Umeclidin-Vilant (Trelegy Ellipta) inhaler and bring this with you to the hospital  Levalbuterol (Xopenex) inhaler if needed and bring this with you to the hospital  Levalbuterol (Xopenex) nebulizer if needed      Enhanced Recovery After Surgery - start 1&2 today, start 3 the day before surgery     This is a team effort, including you, to get you back on your feet, eating and drinking      normally and out of the hospital as quickly as possible.  The goals are: 1) NO INFECTIONS and   2) RETURN TO NORMAL DIET    How can we achieve these goals?  1) STAY ACTIVE: Walk every day before your surgery; try to increase the amount every day.  Walk after surgery as much as you can-the nurses will help you.  Walking speeds healing and gets you home quicker, you heal better at home and have less risk of infection.     2) INCENTIVE SPIROMETER: Practice your incentive spirometer 4 times per day with 5 repetitions each time.  Using the incentive spirometer can strengthen your muscles between your ribs and help you have a strong cough after surgery.  A more effective cough can help prevent problems with your lungs.    3) STAY HYDRATED: Drink clear liquids up until 2 hours prior to arrival. We would like you to purchase a drink such as Gatorade or Ensure Clear (not the milkshake type).  Drink this before bedtime and the morning of surgery, drink between 8-10 ounces or until you feel hydrated.  Keeping well hydrated leads to your veins being plump, you wake up faster, and you are less likely to be nauseated. Start drinking water as soon as you can after surgery and advance to clear liquids and food as  tolerated.  IV fluids contain salt, drinking fluids will minimize the amount of IV fluids you need and decrease the amount of salt you get.    The most common reason for the patient to be readmitted is dehydration. Staying hydrated after you go home from the hospital is very important.  Ensure or Ensure Clear are good options to keep you hydrated.     4) PAIN MANAGEMENT: If we minimize the amount of opioids and narcotics, and use regional blocks (which numb the area where your surgery is) along with oral pain medications; you will have less side effects of nausea and constipation. Narcotics can slow down your bowels and cause you to stay in the hospital longer.     Our goal is to keep you comfortable; eating and drinking normally and back home safely.       How do I prepare myself?  - Please take 2 showers (one the night prior to surgery and one the morning of surgery) using Scrubcare or Hibiclens soap.    Use this soap only from the neck to your toes.     Leave the soap on your skin for one minute--then rinse thoroughly.      You may use your own shampoo and conditioner. No other hair products.   - Please remove all jewelry and body piercings.  - No lotions, deodorants or fragrance.  - No makeup or fingernail polish.   - Bring your ID and insurance card.    -If you use a CPAP machine, please bring the CPAP machine, tubing, and mask to hospital.    -If you have a Deep Brain Stimulator, Spinal Cord Stimulator, or any Neuro Stimulator device---you must bring the remote control to the hospital.      ALL PATIENTS GOING HOME THE SAME DAY OF SURGERY ARE REQUIRED TO HAVE A RESPONSIBLE ADULT TO DRIVE AND BE IN ATTENDANCE WITH THEM FOR 24 HOURS FOLLOWING SURGERY.    Covid testing policy as of 12/06/2022  Your surgeon will notify and schedule you for a COVID test if one is needed before surgery--please direct any questions or COVID symptoms to your surgeon      Questions or Concerns:    - For any questions regarding the day  of surgery or your hospital stay, please contact the Pre Admission Nursing Office at [phone].       - If you have health changes between today and your surgery, please call your surgeon.       - For questions after surgery, please call your surgeons office.           Current Visitor Guidelines    You may have 2 visitors in the pre op area.    Visiting hours: 8 a.m. to 8:30 p.m.    Patients confirmed or suspected to have symptoms of COVID 19 or flu:     No visitors allowed for adult patients.   Children (under age 18) can have 1 named visitor.     People who are sick or showing symptoms of COVID 19 or flu:    Are not allowed to visit patients--we can only make exceptions in special situations.       Please follow these guidelines for your visit:          Please maintain social distance          Masking is optional--however at times you may be asked to wear a mask for the safety of yourself and others     Clean your hands with alcohol hand . Do this when you arrive at and leave the building and patient room,    And again after you touch your mask or anything in the room.     Go directly to and from the room you are visiting.     Stay in the patient s room during your visit. Limit going to other places in the hospital as much as possible     Leave bags and jackets at home or in the car.     For everyone s health, please don t come and go during your visit. That includes for smoking   during your visit.

## 2024-04-15 NOTE — H&P
Pre-Operative H & P     CC:  Preoperative exam to assess for increased cardiopulmonary risk while undergoing surgery and anesthesia.    Date of Encounter: 4/15/2024  Primary Care Physician:  Aye Morejon     Reason for visit:   Encounter Diagnoses   Name Primary?    Pre-op evaluation Yes    Solitary lung nodule        HPI  Mimi Rivera is a 68 year old female who presents for pre-operative H & P in preparation for  Procedure Information       Case: [id number] Date/Time: 04/25/24 1400    Procedure: Robot-assisted thoracoscopic right wedge resection, mediastinal lymph node dissection (Right: Chest)    Anesthesia type: General    Diagnosis: Solitary lung nodule [R91.1]    Pre-op diagnosis: Solitary lung nodule [R91.1]    Location:  OR  /  OR    Providers: Good Pandya MD            The patient presents to the PAC virtually today in preparation for the above scheduled procedure with comorbid conditions including HTN, aortic calcifications, COPD/emphysema, former smoker, chronic rhinitis/sinusitis, GERD and anxiety.    The patient was seen by Dr. 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  Dr. Pandya counseled the patient of the findings and treatment options.  The patient has now been scheduled for the procedure as listed above.      History is obtained from the patient and chart review    Hx of abnormal bleeding or anti-platelet use: denies     Menstrual history: Patient's last menstrual period was 09/22/2010.:      Past Medical History  Past Medical History:   Diagnosis Date    ASCUS on Pap smear 01/01/2006    unable to run HPV typing, f/u paps NIL    Chronic rhinosinusitis     COPD (chronic obstructive pulmonary disease) (H)     Depression, anxiety     Herpes     occasional outbreak    Hypertension     Primary stress urinary incontinence     Pulmonary nodules     Rhinosinusitis        Past Surgical History  Past Surgical History:   Procedure Laterality Date     BRONCHOSCOPY, WITH BIOPSY, ROBOT ASSISTED Bilateral 2023    Procedure: Robot assisted Ion BRONCHOSCOPY;  Surgeon: Raudel Claire DO;  Location: [facility] OR     SECTION      x 3    ENDOBRONCHIAL ULTRASOUND FLEXIBLE N/A 2023    Procedure: endobronchial ultrasound, transbronchial biopsies Latex Free;  Surgeon: Raudel Claire DO;  Location: [facility] OR       Prior to Admission Medications  Current Outpatient Medications   Medication Sig Dispense Refill    amLODIPine (NORVASC) 10 MG tablet Take 1 tablet (10 mg) by mouth every evening 90 tablet 3    azithromycin (ZITHROMAX) 250 MG tablet Take 1 tablet (250 mg) by mouth daily (Used to reduce lung inflammation and flare-ups) (Patient taking differently: Take 250 mg by mouth daily (with lunch)) 30 tablet 3    esomeprazole (NEXIUM) 20 MG DR capsule Take 1 capsule (20 mg) by mouth 2 times daily Take 30-60 minutes before eating. 60 capsule 11    Fluticasone-Umeclidin-Vilant (TRELEGY ELLIPTA) 100-62.5-25 MCG/ACT oral inhaler INHALE ONE PUFF BY MOUTH EVERY DAY (Patient taking differently: Inhale 1 puff into the lungs daily (with lunch)) 60 each 3    levalbuterol (XOPENEX HFA) 45 MCG/ACT inhaler Inhale 2 puffs into the lungs every 4 hours as needed for shortness of breath / dyspnea or wheezing 1 Inhaler 1    levalbuterol (XOPENEX) 1.25 MG/3ML neb solution Take 3 mLs (1.25 mg) by nebulization every 4 hours as needed for shortness of breath or wheezing 390 mL 1    cetirizine (ZYRTEC) 10 MG tablet Take 1 tablet (10 mg) by mouth daily (Patient taking differently: Take 10 mg by mouth daily (with lunch)) 7 tablet 0    EPINEPHrine (ANY BX GENERIC EQUIV) 0.3 MG/0.3ML injection 2-pack Inject 0.3 mLs (0.3 mg) into the muscle as needed for anaphylaxis May repeat one time in 5-15 minutes if response to initial dose is inadequate. (Patient not taking: Reported on 2023) 2 each 0       Allergies  No Known Allergies    Social History  Social History     Socioeconomic History  "   Marital status:      Spouse name: Not on file    Number of children: Not on file    Years of education: Not on file    Highest education level: Not on file   Occupational History    Not on file   Tobacco Use    Smoking status: Former     Current packs/day: 0.00     Types: Cigarettes     Quit date: 3/19/2015     Years since quittin.0    Smokeless tobacco: Former     Quit date: 2017   Vaping Use    Vaping status: Never Used   Substance and Sexual Activity    Alcohol use: Yes     Alcohol/week: 2.0 - 3.0 standard drinks of alcohol     Types: 2 - 3 Standard drinks or equivalent per week     Comment: 2 drinks per wk avg    Drug use: No    Sexual activity: Yes     Partners: Male     Birth control/protection: Surgical     Comment: tubal   Other Topics Concern    Parent/sibling w/ CABG, MI or angioplasty before 65F 55M? No   Social History Narrative    Not on file     Social Determinants of Health     Financial Resource Strain: Not on file   Food Insecurity: Not on file   Transportation Needs: Not on file   Physical Activity: Not on file   Stress: Not on file   Social Connections: Not on file   Interpersonal Safety: Not on file   Housing Stability: Not on file       Family History  Family History   Problem Relation Age of Onset    Hypertension Mother     Cancer Mother         liver    C.A.D. Father     Anesthesia Reaction No family hx of     Deep Vein Thrombosis (DVT) No family hx of        Review of Systems  The complete review of systems is negative other than noted in the HPI or here.   Anesthesia Evaluation   Pt has had prior anesthetic. Type: MAC and General.    No history of anesthetic complications       ROS/MED HX  ENT/Pulmonary: Comment: Recently completed Augmentin for sinus infection. Did not complete medrol dose pack as does not like side effects/how it makes her feel.  She reports that she continues to wake up \"sweaty\", sinus congestion, can't smell (w/ (-) COVID test, earache and productive " cough.     (+)     JAIRO risk factors,  hypertension,         tobacco use (Quit 2016), Past use,        severe,  COPD,              Neurologic:     (+)    no peripheral neuropathy                         (-) no seizures, no CVA, no TIA and migraines   Cardiovascular: Comment: Denies cardiac symptoms including chest pain,palpitations, or syncope.  Does endorse ANSARI, and new orthopnea and PND.        (+)  hypertension- -   -  - -                                 Previous cardiac testing  (-) taking anticoagulants/antiplatelets   METS/Exercise Tolerance:  Comment: Able to walk a couple blocks without stopping.  Going to grocery store and able to walk around.  Ascends a flight of stairs and occasionally will need to stop to catch her breath.    Hematologic:  - neg hematologic  ROS  (-) history of blood clots, anemia and history of blood transfusion   Musculoskeletal:  - neg musculoskeletal ROS     GI/Hepatic:     (+) GERD, Asymptomatic on medication,               (-) appendicitis, cholecystitis/cholelithiasis and liver disease   Renal/Genitourinary:  - neg Renal ROS     Endo:  - neg endo ROS     Psychiatric/Substance Use:     (+) psychiatric history (Reports anxiety and has good support system.) anxiety    (-) alcohol abuse history and chronic opioid use history   Infectious Disease:  - neg infectious disease ROS     Malignancy:  - neg malignancy ROS     Other:  - neg other ROS          Virtual visit -  No vitals were obtained    Physical Exam  Constitutional: Awake, alert, cooperative, no apparent distress, and appears stated age.  Eyes: Pupils equal  HENT: Normocephalic  Respiratory: non labored breathing   Neurologic: Awake, alert, oriented to name, place and time.   Neuropsychiatric: Calm, cooperative. Normal affect.      Prior Labs/Diagnostic Studies   All labs and imaging personally reviewed    Latest Reference Range & Units 02/09/24 06:26   Sodium 135 - 145 mmol/L 140   Potassium 3.4 - 5.3 mmol/L 4.2   Chloride 98 -  107 mmol/L 102   Carbon Dioxide (CO2) 22 - 29 mmol/L 26   Urea Nitrogen 8.0 - 23.0 mg/dL 10.4   Creatinine 0.51 - 0.95 mg/dL 0.48 (L)   GFR Estimate >60 mL/min/1.73m2 >90   Calcium 8.8 - 10.2 mg/dL 9.7   Anion Gap 7 - 15 mmol/L 12   Glucose 70 - 99 mg/dL 103 (H)   Troponin T, High Sensitivity <=14 ng/L 15 (H)   WBC 4.0 - 11.0 10e3/uL 7.9   Hemoglobin 11.7 - 15.7 g/dL 16.7 (H)   Hematocrit 35.0 - 47.0 % 49.5 (H)   Platelet Count 150 - 450 10e3/uL 240   RBC Count 3.80 - 5.20 10e6/uL 5.25 (H)   MCV 78 - 100 fL 94   MCH 26.5 - 33.0 pg 31.8   MCHC 31.5 - 36.5 g/dL 33.7   RDW 10.0 - 15.0 % 12.1   % Neutrophils % 73   % Lymphocytes % 19   % Monocytes % 7   % Eosinophils % 1   % Basophils % 0   Absolute Basophils 0.0 - 0.2 10e3/uL 0.0   Absolute Eosinophils 0.0 - 0.7 10e3/uL 0.1   Absolute Immature Granulocytes <=0.4 10e3/uL 0.0   Absolute Lymphocytes 0.8 - 5.3 10e3/uL 1.5   Absolute Monocytes 0.0 - 1.3 10e3/uL 0.5   % Immature Granulocytes % 0   Absolute Neutrophils 1.6 - 8.3 10e3/uL 5.8   Absolute NRBCs 10e3/uL 0.0   NRBCs per 100 WBC <1 /100 0   SARS CoV2 PCR Negative  Negative   Influenza A Negative  Negative   Influenza B Negative  Negative   Resp Syncytial Virus Negative  Negative   (L): Data is abnormally low  (H): Data is abnormally high    PATHOLOGY 12/8/23  Final Diagnosis      A.  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  OTHER, 11L, ENDOBRONCHIAL ULTRASOUND-GUIDED FINE-NEEDLE ASPIRATION:              - Scant polymorphous population of lymphocytes              - No evidence of malignancy              Adequacy: Satisfactory for evaluation but limited by scant cellularity     C.  OTHER, STATION 7, ENDOBRONCHIAL ULTRASOUND-GUIDED FINE-NEEDLE ASPIRATION:              - Nondiagnostic specimen               Adequacy: Satisfactory for evaluation but limited by absence of lymphoid cells     D.  OTHER, 11R, ENDOBRONCHIAL ULTRASOUND-GUIDED FINE-NEEDLE ASPIRATION:              - Scant polymorphous population of lymphocytes              - No evidence of malignancy               Adequacy: Satisfactory for evaluation but limited by scant cellularity       PROCEDURES  EKG 2/9/2024  Sinus rhythm Biatrial enlargement Rightward axis Cannot rule out Anterior infarct , age undetermined Abnormal ECG When compared with ECG of 14-FEB-2023 16:14, No significant change was found Confirmed by - EMERGENCY ROOM, PHYSICIAN ([id number]),  RANDEE ISAAC ([id number]) on 2/9/2024 6:49:28 AM 25mm/s 10mm/mV 150Hz 9.0.10 12SL 243 COLT: [id number] Confirmed By: PHYSICIAN - EMERGENCY ROOM     PET and CT on  11/13/2023 1:56 PM :       INDICATION: Pt with significant smoking and COPD hx with new 9 mm RUL  and other nodules since 2021. PET recommended by IP group; Pulmonary  nodules; COPD exacerbation (H); Panlobular emphysema (H); Chronic  bronchitis, unspecified chronic bronchitis type (H)     ADDITIONAL INFORMATION OBTAINED FROM EMR: 68-year-old female with  severe emphysema/COPD with new nodules on CT chest 10/12/2023  including a 9 mm right upper lobe nodule.     COMPARISON: CT chest 10/12/2023.      FINDINGS:      HEAD/NECK:  Evaluation is somewhat limited by motion. There is multifocal uptake  throughout the posterior neck without CT correlate, favored to  represent muscular activation or inflammation.     CHEST:  0.9 x 0.6 cm hypermetabolic pulmonary nodule in the right upper lobe,  SUV max 7.0 (series 4 image 117). No additional hypermetabolic  pulmonary nodules are identified.  Extensive thoracic FDG uptake in Brown fat activation.     Stable subcentimeter hypodensities in the thyroid gland. Multivessel  coronary artery calcifications. Centrilobular emphysematous changes.  Bilateral dependent atelectasis.     ABDOMEN AND PELVIS:  There is FDG uptake  within the cecum which appears to be intraluminal  contents likely physiologic.   Cholelithiasis without pericholecystic inflammation. Stable 1 cm right  adrenal adenoma. 2.0 simple cyst in the inferior pole of the right  kidney. Colonic diverticulosis without evidence of acute  diverticulitis. Extensive aortobiiliac calcifications.     LOWER EXTREMITIES:   No abnormal masses or hypermetabolic lesions.     BONES:   There are no suspicious lytic or blastic osseous lesions.  There is no  abnormal FDG uptake in the skeleton. Rightward convex curvature of the  lumbar spine. Multilevel degenerative changes.                                                                      IMPRESSION: In this patient with new nodules on CT chest 10/12/2023  including a 9 mm right upper lobe nodule:  1. Hypermetabolic 0.9 cm right upper lobe pulmonary nodule, highly  likely malignancy.  2. No hilar, mediastinal, or distant metastases demonstrated.   3. Extensive thoracic brown fat activation.  This FDG uptake is normal  physiologic uptake secondary to the patient being cold.     I have personally reviewed the examination and initial interpretation  and I agree with the findings.     ARNOL DOUGHERTY MD         Latest Ref Rng & Units 3/29/2024    12:26 PM   PFT   FVC L 1.85    FEV1 L 0.92    FVC% % 75    FEV1% % 47          The patient's records and results personally reviewed by this provider.     Outside records reviewed from: Care Everywhere      Assessment    Mimi Rivera is a 68 year old female seen as a PAC referral for risk assessment and optimization for anesthesia.    Plan/Recommendations  Pt will be optimized for the proposed procedure.  See below for details on the assessment, risk, and preoperative recommendations    NEUROLOGY  - No history of TIA, CVA or seizure    -Post Op delirium risk factors:  No risk identified    ENT  - No current airway concerns.  Will need to be reassessed day of surgery.  Mallampati: Unable  "to assess  TM: Unable to assess    CARDIAC  - Does endorse ANSARI and new orthopnea and PND    - HTN   ~ managed with amlodipine     - METS (Metabolic Equivalents)<4.  Able to ascend a flight of stairs, but occasionally will need to stop to catch her breath.     RCRI-Low risk: Class 2 0.9% complication rate             Total Score: 1    RCRI: High Risk Surgery      DISCUSSION:  Given ongoing ANSARI with new symptoms of orthopnea and PND along with limited METS, discussed with Dr. Levy and will get DSE prior to above surgery.     ADDENDUM 4/20/2024  DSE 4/19/2024  Interpretation Summary  Normal Dobutamine stress echocardiogram at target heart rate.  No resting or stress induced regional wallmotion abnormalities.  Normal resting and stress EKGs.  Normal heart rate and hypotensive BP response to dobutamine. No symptoms  during test.  Normal baseline screening echocardiogram with no significant valvular heart  disease and normal size of visualized aorta.      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 Bridges's staff message 4/24/2024:  Thanks for everyone's comments.   I second Dr Pandya that I don't think she has carcinoid syndrome per se.   A lot of her pulmonary symptoms are chronic, and we've been trying to optimize.     Ishmael      - Chronic rhinosinusitis with COPD: completed Augmentin but did NOT take the Medrol dose pack as does not like side effects.  Continues to wake up \"sweaty\", sinus congestion, can't smell (w/ (-) COVID test), earache and productive cough. Questioning if she should take the Medrol dose pack that was prescribed " "initially with the Augmentin.  Instructed to connect with prescriber of Medrol dose pack.    ~ Placed an ENT referral for further evaluation based on Dr. Pandya's 4/8/2024 note,\" I recommend she see her ENT to see if there is anything else to do for her sinuses.\"     - Tobacco History    History   Smoking Status    Former    Packs/day: 10.00    Types: Cigarettes    Quit date: 3/19/2015   Smokeless Tobacco    Former    Quit date: 12/11/2017       GI  - GERD   ~ Controlled on medications: Proton Pump Inhibitor    PONV High Risk  Total Score: 3           1 AN PONV: Pt is Female    1 AN PONV: Patient is not a current smoker    1 AN PONV: Intended Post Op Opioids        /RENAL  - Baseline Creatinine  see above     ENDOCRINE    - BMI: Estimated body mass index is 21.48 kg/m  as calculated from the following:    Height as of this encounter: 1.524 m (5').    Weight as of this encounter: 49.9 kg (110 lb).  Healthy Weight (BMI 18.5-24.9)  - No history of Diabetes Mellitus    HEME/ONC  - VTE Medium Risk 1.8%             Total Score: 6    VTE: Greater than 59 yrs old    VTE: Current cancer      - No history of abnormal bleeding or antiplatelet use.    - Denies a h/o anemia or previous blood transfusion    - Patient evaluated by oncology December 2023.  Per Dr. Romano's 12/27/23 note:  # Stage IA1 atypical carcinoid  Presents with newly diagnosed localized RUL pulmonary atypical carcinoid with Ki-67 ~10%. No evidence of metastasis. Ideally would resect in this scenario followed by surveillance, but she is apparently a borderline candidate given her severe COPD and ongoing sinus/respiratory issues. Current plan is to reassess in January followed by decision on surgery. Should resection prove not possible, we would likely recommend referral to radiation oncology for SBRT. Systemic therapies are certainly available, but not the ideal approach give her very limited disease. Certainly, we can see her back if her disease " progresses and systemic approaches are indicated. Furthermore, observation is still a valid option.                She also has significant enough COPD and symptoms that are out of proportion of her imaging, which raises a question of DIPNECH. This is challenging to diagnose, but should she be able to complete wedge resection, hopefully we can examine any additional nodules to gain a better sense of whether this is a possibility.    MSK  Patient is NOT Frail             Total Score: 2    Frailty: Increased exhaustion    Frailty: Overall lack of energy      PSYCH  - REports anxiety with recent health issues/diagnosis. Reports a good support system.      Different anesthesia methods/types have been discussed with the patient, but they are aware that the final plan will be decided by the assigned anesthesia provider on the date of service.  Patient was discussed with Dr Mildred CHAO with information on surgery time/arrival time, meds and NPO status given by nursing staff. No further diagnostic testing indicated. Patient is optimized for above surgery.     Please refer to the physical examination documented by the anesthesiologist in the anesthesia record on the day of surgery.    Video-Visit Details    Type of service:  Video Visit    Provider received verbal consent for a Video Visit from the patient? Yes   Video Start Time: 14:15  Video End Time:14:28    Originating Location (pt. Location): Parked in their car    Distant Location (provider location):  Off-site  Mode of Communication:  Video Conference via [place]  On the day of service:     Prep time: 10 minutes  Visit time: 13 minutes  Documentation time: 18 minutes  Coordinating care: 12 minutes   ------------------------------------------  Total time: 53 minutes      EDEN Mcknight CNP  Preoperative Assessment Center  [facility]  Clinic and Surgery Center  Phone: [phone]  Fax: [phone]

## 2024-04-15 NOTE — PROGRESS NOTES
Mimi is a 68 year old who is being evaluated via a billable video visit.    How would you like to obtain your AVS? MyChart  If the video visit is dropped, the invitation should be resent by: Text to cell phone: [phone]    Subjective   Mimi is a 68 year old, presenting for the following health issues:  Pre-Op Exam      HPI         Physical Exam

## 2024-04-17 ENCOUNTER — TELEPHONE (OUTPATIENT)
Dept: OTOLARYNGOLOGY | Facility: CLINIC | Age: 69
End: 2024-04-17
Payer: COMMERCIAL

## 2024-04-17 NOTE — TELEPHONE ENCOUNTER
This encounter is being sent to inform the clinic that this patient has a referral from Jessica Colin APRN CNP  for the diagnoses of Patient with chronic rhinosinusitis with upcoming Robotic wedge resection with Dr. Alarcon on 4/25/24 for newly dx RIGHT upper lobe atypical carcinoid.  and has requested that this patient be seen within 3-5 DAYS and/or with ANY PROVIDER.  Based on the availability of our provider(s), we are unable to accommodate this request.    Were all sites offered this patient?  Yes    Does scheduling algorithm request to schedule next available?  Patient has been scheduled for the first available opening with DR SHERIDAN on 5/30.  We have informed the patient that the clinic will review their referral and reach out if a sooner appointment is medically necessary.     ALSO OPEN TO MG, FK, AND AN

## 2024-04-18 DIAGNOSIS — Z01.818 PRE-OP EVALUATION: Primary | ICD-10-CM

## 2024-04-18 LAB
ABO/RH(D): NORMAL
ANTIBODY SCREEN: NEGATIVE
SPECIMEN EXPIRATION DATE: NORMAL

## 2024-04-18 NOTE — TELEPHONE ENCOUNTER
FUTURE VISIT INFORMATION      FUTURE VISIT INFORMATION:  Date: 5/30/24  Time: 3:20pm  [place] [facility] [place]:  Referring provider:  Jessica Colin APRN CNP  Referring providers clinic:  [facility]  Reason for visit/diagnosis  Patient with chronic rhinosinusitis with upcoming Robotic wedge resection with Dr. Alarcon on 4/25/24 for newly dx RIGHT upper lobe atypical carcinoid.  Solitary lung nodule [R91.1]; Chronic rhinosinusitis [J32.9]  REF BY Jessica Colin APRN CNP  RECS IN [facility]  CONF LOC  SCHED W/PT     RECORDS REQUESTED FROM:       Clinic name Comments Records Status Imaging Status   [facility] Assessment  4/15/24- OV Jessica Colin APRN CNP  [facility] [place]  1/28/22 note and referral from Ishmael Bridges MD [facility]     Imaging 11/13/23- PET   10/12/23- CT Sinus   4/14/16 CT Maxillofacial  [facility] PACS   [facility] 12/10/2020 note from Aye Morejon MD [facility] ENT 4/24/2017 note from Dr Dejuan Brunner Scanned in [facility]     [facility]  4/16/2019 CT Sinus Care everywhere  req 1/31/22 - PACS   [facility] ENT  Evangelista Murillo MD   Need LIBAN

## 2024-04-19 ENCOUNTER — LAB (OUTPATIENT)
Dept: LAB | Facility: CLINIC | Age: 69
End: 2024-04-19
Attending: NURSE PRACTITIONER
Payer: COMMERCIAL

## 2024-04-19 ENCOUNTER — HOSPITAL ENCOUNTER (OUTPATIENT)
Dept: CARDIOLOGY | Facility: CLINIC | Age: 69
Discharge: HOME | End: 2024-04-19
Attending: NURSE PRACTITIONER
Payer: COMMERCIAL

## 2024-04-19 DIAGNOSIS — R06.00 PND (PAROXYSMAL NOCTURNAL DYSPNEA): ICD-10-CM

## 2024-04-19 DIAGNOSIS — Z01.818 PRE-OP EVALUATION: ICD-10-CM

## 2024-04-19 DIAGNOSIS — R06.09 DYSPNEA ON EXERTION: ICD-10-CM

## 2024-04-19 PROCEDURE — 93018 CV STRESS TEST I&R ONLY: CPT | Performed by: INTERNAL MEDICINE

## 2024-04-19 PROCEDURE — 93350 STRESS TTE ONLY: CPT | Mod: 26 | Performed by: INTERNAL MEDICINE

## 2024-04-19 PROCEDURE — 258N000003 HC RX IP 258 OP 636: Performed by: INTERNAL MEDICINE

## 2024-04-19 PROCEDURE — 250N000011 HC RX IP 250 OP 636: Mod: JZ | Performed by: INTERNAL MEDICINE

## 2024-04-19 PROCEDURE — C8928 TTE W OR W/O FOL W/CON,STRES: HCPCS

## 2024-04-19 PROCEDURE — 255N000002 HC RX 255 OP 636: Performed by: INTERNAL MEDICINE

## 2024-04-19 PROCEDURE — 36415 COLL VENOUS BLD VENIPUNCTURE: CPT

## 2024-04-19 PROCEDURE — 999N000208 ECHO STRESS ECHOCARDIOGRAM

## 2024-04-19 PROCEDURE — 86900 BLOOD TYPING SEROLOGIC ABO: CPT

## 2024-04-19 PROCEDURE — 250N000009 HC RX 250: Performed by: INTERNAL MEDICINE

## 2024-04-19 PROCEDURE — 93321 DOPPLER ECHO F-UP/LMTD STD: CPT | Mod: 26 | Performed by: INTERNAL MEDICINE

## 2024-04-19 PROCEDURE — 93016 CV STRESS TEST SUPVJ ONLY: CPT | Performed by: INTERNAL MEDICINE

## 2024-04-19 PROCEDURE — 93325 DOPPLER ECHO COLOR FLOW MAPG: CPT | Mod: 26 | Performed by: INTERNAL MEDICINE

## 2024-04-19 RX ORDER — SODIUM CHLORIDE 9 MG/ML
INJECTION, SOLUTION INTRAVENOUS CONTINUOUS
Status: ACTIVE | OUTPATIENT
Start: 2024-04-19 | End: 2024-04-19

## 2024-04-19 RX ORDER — METOPROLOL TARTRATE 1 MG/ML
1-20 INJECTION, SOLUTION INTRAVENOUS
Status: ACTIVE | OUTPATIENT
Start: 2024-04-19 | End: 2024-04-19

## 2024-04-19 RX ORDER — DOBUTAMINE HYDROCHLORIDE 200 MG/100ML
10-50 INJECTION INTRAVENOUS CONTINUOUS
Status: ACTIVE | OUTPATIENT
Start: 2024-04-19 | End: 2024-04-19

## 2024-04-19 RX ORDER — ATROPINE SULFATE 0.4 MG/ML
.2-2 AMPUL (ML) INJECTION
Status: COMPLETED | OUTPATIENT
Start: 2024-04-19 | End: 2024-04-19

## 2024-04-19 RX ADMIN — METOPROLOL TARTRATE 2 MG: 5 INJECTION INTRAVENOUS at 14:44

## 2024-04-19 RX ADMIN — PERFLUTREN 6 ML: 6.52 INJECTION, SUSPENSION INTRAVENOUS at 14:46

## 2024-04-19 RX ADMIN — ATROPINE SULFATE 0.4 MG: 0.4 INJECTION, SOLUTION INTRAVENOUS at 14:39

## 2024-04-19 RX ADMIN — DEXTROSE MONOHYDRATE 10 MCG/KG/MIN: 50 INJECTION, SOLUTION INTRAVENOUS at 14:31

## 2024-04-19 NOTE — PROGRESS NOTES
Pt here for dobutamine stress test. Test, meds and side effects reviewed with patient. Achieved target HR at 30 mcg Dobutamine and a total of 0.4 mg IV atropine. Gave a total of 2 mg IV Metoprolol to bring HR back to baseline. Post monitoring complete and VSS. Pt escorted out to the gold waiting room.

## 2024-04-22 ENCOUNTER — PREP FOR PROCEDURE (OUTPATIENT)
Dept: ONCOLOGY | Facility: CLINIC | Age: 69
End: 2024-04-22
Payer: COMMERCIAL

## 2024-04-22 NOTE — TELEPHONE ENCOUNTER
Called and spoke with pt to let her know surgery start time has changed and she should now arrive by 5:30 am. Pt understood and agreed.    Corrine Rodgers on 4/22/2024 at 2:39 PM

## 2024-04-24 ENCOUNTER — PREP FOR PROCEDURE (OUTPATIENT)
Dept: ONCOLOGY | Facility: CLINIC | Age: 69
End: 2024-04-24
Payer: COMMERCIAL

## 2024-04-24 NOTE — TELEPHONE ENCOUNTER
Called and spoke with pt to let her know surgery has been moved back to Friday to accommodate the need for a robot. Informed pt to arrive by 5:30 am. Pt understood and agreed.    Corrine Rodgers on 4/24/2024 at 10:48 AM

## 2024-04-25 ENCOUNTER — ANESTHESIA (OUTPATIENT)
Dept: SURGERY | Facility: CLINIC | Age: 69
DRG: 168 | End: 2024-04-25
Payer: COMMERCIAL

## 2024-04-25 ASSESSMENT — ENCOUNTER SYMPTOMS: SEIZURES: 0

## 2024-04-25 ASSESSMENT — LIFESTYLE VARIABLES: TOBACCO_USE: 1

## 2024-04-25 ASSESSMENT — COPD QUESTIONNAIRES
CAT_SEVERITY: SEVERE
COPD: 1

## 2024-04-25 NOTE — ANESTHESIA PREPROCEDURE EVALUATION
"Anesthesia Pre-Procedure Evaluation    Patient: Mimi Rivera   MRN: [id number] : 1955        Procedure : Procedure(s):  robot assisted thoracoscopic right wedge resection, mediastinal lymph node dissection          Past Medical History:   Diagnosis Date    ASCUS on Pap smear 2006    unable to run HPV typing, f/u paps NIL    Chronic rhinosinusitis     COPD (chronic obstructive pulmonary disease) (H)     Depression, anxiety     Herpes     occasional outbreak    Hypertension     Primary stress urinary incontinence     Pulmonary nodules     Rhinosinusitis       Past Surgical History:   Procedure Laterality Date    BRONCHOSCOPY, WITH BIOPSY, ROBOT ASSISTED Bilateral 2023    Procedure: Robot assisted Ion BRONCHOSCOPY;  Surgeon: Raudel Claire DO;  Location: [facility] OR     SECTION      x 3    ENDOBRONCHIAL ULTRASOUND FLEXIBLE N/A 2023    Procedure: endobronchial ultrasound, transbronchial biopsies Latex Free;  Surgeon: Raudel Claire DO;  Location: [facility] OR      No Known Allergies   Social History     Tobacco Use    Smoking status: Former     Current packs/day: 0.00     Types: Cigarettes     Quit date: 3/19/2015     Years since quittin.1    Smokeless tobacco: Former     Quit date: 2017   Substance Use Topics    Alcohol use: Yes     Alcohol/week: 2.0 - 3.0 standard drinks of alcohol     Types: 2 - 3 Standard drinks or equivalent per week     Comment: 2 drinks per wk avg      Wt Readings from Last 1 Encounters:   04/15/24 49.9 kg (110 lb)        Anesthesia Evaluation   Pt has had prior anesthetic. Type: MAC and General.    No history of anesthetic complications       ROS/MED HX  ENT/Pulmonary: Comment: Recently completed Augmentin for sinus infection. Did not complete medrol dose pack as does not like side effects/how it makes her feel.  She reports that she continues to wake up \"sweaty\", sinus congestion, can't smell (w/ (-) COVID test, earache and productive cough.     (+)     " JAIRO risk factors,  hypertension,         tobacco use (Quit 2016), Past use,        severe,  COPD,              Neurologic:     (+)    no peripheral neuropathy                         (-) no seizures, no CVA, no TIA and migraines   Cardiovascular: Comment: Denies cardiac symptoms including chest pain,palpitations, or syncope.  Does endorse ANSARI, and new orthopnea and PND.        (+)  hypertension- -   -  - -                                 Previous cardiac testing  (-) taking anticoagulants/antiplatelets   METS/Exercise Tolerance:  Comment: Able to walk a couple blocks without stopping.  Going to grocery store and able to walk around.  Ascends a flight of stairs and occasionally will need to stop to catch her breath.    Hematologic:  - neg hematologic  ROS  (-) history of blood clots, anemia and history of blood transfusion   Musculoskeletal:  - neg musculoskeletal ROS     GI/Hepatic:     (+) GERD, Asymptomatic on medication,               (-) appendicitis, cholecystitis/cholelithiasis and liver disease   Renal/Genitourinary:  - neg Renal ROS     Endo:  - neg endo ROS     Psychiatric/Substance Use:     (+) psychiatric history (Reports anxiety and has good support system.) anxiety    (-) alcohol abuse history and chronic opioid use history   Infectious Disease:  - neg infectious disease ROS     Malignancy:  - neg malignancy ROS     Other:  - neg other ROS             OUTSIDE LABS:  CBC:   Lab Results   Component Value Date    WBC 7.9 02/09/2024    WBC 8.6 02/14/2023    HGB 16.7 (H) 02/09/2024    HGB 15.7 02/14/2023    HCT 49.5 (H) 02/09/2024    HCT 46.2 02/14/2023     02/09/2024     02/14/2023     BMP:   Lab Results   Component Value Date     02/09/2024     02/14/2023    POTASSIUM 4.2 02/09/2024    POTASSIUM 4.4 02/14/2023    CHLORIDE 102 02/09/2024    CHLORIDE 102 02/14/2023    CO2 26 02/09/2024    CO2 27 02/14/2023    BUN 10.4 02/09/2024    BUN 9.8 02/14/2023    CR 0.48 (L) 02/09/2024    CR  "0.56 02/14/2023     (H) 02/09/2024     (H) 02/14/2023     COAGS: No results found for: \"PTT\", \"INR\", \"FIBR\"  POC: No results found for: \"BGM\", \"HCG\", \"HCGS\"  HEPATIC:   Lab Results   Component Value Date    ALBUMIN 4.1 04/14/2021    PROTTOTAL 7.3 04/14/2021    ALT 21 04/14/2021    AST 19 04/14/2021    ALKPHOS 83 04/14/2021    BILITOTAL 0.5 04/14/2021     OTHER:   Lab Results   Component Value Date    A1C 5.2 11/14/2017    JI 9.7 02/09/2024    MAG 2.1 03/23/2015    TSH 1.220 03/12/2010    CRP 5.6 04/14/2021    SED 4 04/14/2021       Anesthesia Plan    ASA Status:  3    NPO Status:  NPO Appropriate    Anesthesia Type: General.     - Airway: ETT   Induction: Intravenous.   Maintenance: Balanced.   Techniques and Equipment:     - Airway: Video-Laryngoscope, Double lumen ETT     - Lines/Monitors: 2nd IV, Arterial Line, BIS     - Blood: T&S     Consents            Postoperative Care    Pain management: IV analgesics, Oral pain medications, Multi-modal analgesia.   PONV prophylaxis: Ondansetron (or other 5HT-3), Dexamethasone or Solumedrol     Comments:               Moshe Quiroz MD    I have reviewed the pertinent notes and labs in the chart from the past 30 days and (re)examined the patient.  Any updates or changes from those notes are reflected in this note.                  "

## 2024-04-26 ENCOUNTER — HOSPITAL ENCOUNTER (INPATIENT)
Facility: CLINIC | Age: 69
LOS: 3 days | Discharge: HOME | DRG: 168 | End: 2024-04-29
Attending: THORACIC SURGERY (CARDIOTHORACIC VASCULAR SURGERY) | Admitting: THORACIC SURGERY (CARDIOTHORACIC VASCULAR SURGERY)
Payer: COMMERCIAL

## 2024-04-26 ENCOUNTER — APPOINTMENT (OUTPATIENT)
Dept: GENERAL RADIOLOGY | Facility: CLINIC | Age: 69
DRG: 168 | End: 2024-04-26
Attending: STUDENT IN AN ORGANIZED HEALTH CARE EDUCATION/TRAINING PROGRAM
Payer: COMMERCIAL

## 2024-04-26 DIAGNOSIS — C34.10 MALIGNANT NEOPLASM OF UPPER LOBE OF LUNG, UNSPECIFIED LATERALITY (H): ICD-10-CM

## 2024-04-26 DIAGNOSIS — C34.11 MALIGNANT NEOPLASM OF UPPER LOBE OF RIGHT LUNG (H): Primary | ICD-10-CM

## 2024-04-26 PROBLEM — C34.90 LUNG CANCER (H): Status: ACTIVE | Noted: 2024-04-26

## 2024-04-26 LAB
ABO/RH(D): NORMAL
ANION GAP SERPL CALCULATED.3IONS-SCNC: 11 MMOL/L (ref 7–15)
ANTIBODY SCREEN: NEGATIVE
BASE EXCESS BLDA CALC-SCNC: 1.6 MMOL/L (ref -3–3)
BUN SERPL-MCNC: 9.6 MG/DL (ref 8–23)
CA-I BLD-MCNC: 4.7 MG/DL (ref 4.4–5.2)
CALCIUM SERPL-MCNC: 10.2 MG/DL (ref 8.8–10.2)
CHLORIDE SERPL-SCNC: 104 MMOL/L (ref 98–107)
CREAT SERPL-MCNC: 0.58 MG/DL (ref 0.51–0.95)
CREAT SERPL-MCNC: 0.6 MG/DL (ref 0.51–0.95)
DEPRECATED HCO3 PLAS-SCNC: 26 MMOL/L (ref 22–29)
EGFRCR SERPLBLD CKD-EPI 2021: >90 ML/MIN/1.73M2
EGFRCR SERPLBLD CKD-EPI 2021: >90 ML/MIN/1.73M2
ERYTHROCYTE [DISTWIDTH] IN BLOOD BY AUTOMATED COUNT: 12.6 % (ref 10–15)
GLUCOSE BLD-MCNC: 146 MG/DL (ref 70–99)
GLUCOSE BLDC GLUCOMTR-MCNC: 107 MG/DL (ref 70–99)
GLUCOSE SERPL-MCNC: 113 MG/DL (ref 70–99)
HCO3 BLDA-SCNC: 27 MMOL/L (ref 21–28)
HCT VFR BLD AUTO: 50.3 % (ref 35–47)
HGB BLD-MCNC: 14.1 G/DL (ref 11.7–15.7)
HGB BLD-MCNC: 17.4 G/DL (ref 11.7–15.7)
LACTATE BLD-SCNC: 0.8 MMOL/L (ref 0.7–2)
MCH RBC QN AUTO: 32.9 PG (ref 26.5–33)
MCHC RBC AUTO-ENTMCNC: 34.6 G/DL (ref 31.5–36.5)
MCV RBC AUTO: 95 FL (ref 78–100)
O2/TOTAL GAS SETTING VFR VENT: 72 %
PCO2 BLDA: 47 MM HG (ref 35–45)
PH BLDA: 7.38 [PH] (ref 7.35–7.45)
PLATELET # BLD AUTO: 262 10E3/UL (ref 150–450)
PO2 BLDA: 139 MM HG (ref 80–105)
POTASSIUM BLD-SCNC: 4.1 MMOL/L (ref 3.4–5.3)
POTASSIUM SERPL-SCNC: 4.4 MMOL/L (ref 3.4–5.3)
RBC # BLD AUTO: 5.29 10E6/UL (ref 3.8–5.2)
SAO2 % BLDA: 100 % (ref 95–96)
SODIUM BLD-SCNC: 139 MMOL/L (ref 135–145)
SODIUM SERPL-SCNC: 141 MMOL/L (ref 135–145)
SPECIMEN EXPIRATION DATE: NORMAL
WBC # BLD AUTO: 8.8 10E3/UL (ref 4–11)

## 2024-04-26 PROCEDURE — 999N000141 HC STATISTIC PRE-PROCEDURE NURSING ASSESSMENT: Performed by: THORACIC SURGERY (CARDIOTHORACIC VASCULAR SURGERY)

## 2024-04-26 PROCEDURE — 71045 X-RAY EXAM CHEST 1 VIEW: CPT | Mod: 26 | Performed by: RADIOLOGY

## 2024-04-26 PROCEDURE — 250N000009 HC RX 250

## 2024-04-26 PROCEDURE — 250N000013 HC RX MED GY IP 250 OP 250 PS 637: Performed by: STUDENT IN AN ORGANIZED HEALTH CARE EDUCATION/TRAINING PROGRAM

## 2024-04-26 PROCEDURE — 360N000080 HC SURGERY LEVEL 7, PER MIN: Performed by: THORACIC SURGERY (CARDIOTHORACIC VASCULAR SURGERY)

## 2024-04-26 PROCEDURE — 250N000025 HC SEVOFLURANE, PER MIN: Performed by: THORACIC SURGERY (CARDIOTHORACIC VASCULAR SURGERY)

## 2024-04-26 PROCEDURE — 88307 TISSUE EXAM BY PATHOLOGIST: CPT | Mod: TC | Performed by: THORACIC SURGERY (CARDIOTHORACIC VASCULAR SURGERY)

## 2024-04-26 PROCEDURE — 250N000009 HC RX 250: Performed by: THORACIC SURGERY (CARDIOTHORACIC VASCULAR SURGERY)

## 2024-04-26 PROCEDURE — 250N000011 HC RX IP 250 OP 636: Performed by: STUDENT IN AN ORGANIZED HEALTH CARE EDUCATION/TRAINING PROGRAM

## 2024-04-26 PROCEDURE — 82330 ASSAY OF CALCIUM: CPT

## 2024-04-26 PROCEDURE — 250N000011 HC RX IP 250 OP 636: Performed by: THORACIC SURGERY (CARDIOTHORACIC VASCULAR SURGERY)

## 2024-04-26 PROCEDURE — 710N000010 HC RECOVERY PHASE 1, LEVEL 2, PER MIN: Performed by: THORACIC SURGERY (CARDIOTHORACIC VASCULAR SURGERY)

## 2024-04-26 PROCEDURE — 258N000003 HC RX IP 258 OP 636

## 2024-04-26 PROCEDURE — 36415 COLL VENOUS BLD VENIPUNCTURE: CPT | Performed by: NURSE PRACTITIONER

## 2024-04-26 PROCEDURE — 250N000011 HC RX IP 250 OP 636

## 2024-04-26 PROCEDURE — 80048 BASIC METABOLIC PNL TOTAL CA: CPT | Performed by: NURSE PRACTITIONER

## 2024-04-26 PROCEDURE — 272N000001 HC OR GENERAL SUPPLY STERILE: Performed by: THORACIC SURGERY (CARDIOTHORACIC VASCULAR SURGERY)

## 2024-04-26 PROCEDURE — 250N000013 HC RX MED GY IP 250 OP 250 PS 637

## 2024-04-26 PROCEDURE — 32666 THORACOSCOPY W/WEDGE RESECT: CPT | Mod: RT | Performed by: THORACIC SURGERY (CARDIOTHORACIC VASCULAR SURGERY)

## 2024-04-26 PROCEDURE — 07B74ZX EXCISION OF THORAX LYMPHATIC, PERCUTANEOUS ENDOSCOPIC APPROACH, DIAGNOSTIC: ICD-10-PCS | Performed by: THORACIC SURGERY (CARDIOTHORACIC VASCULAR SURGERY)

## 2024-04-26 PROCEDURE — 88307 TISSUE EXAM BY PATHOLOGIST: CPT | Mod: 26 | Performed by: STUDENT IN AN ORGANIZED HEALTH CARE EDUCATION/TRAINING PROGRAM

## 2024-04-26 PROCEDURE — 32663 THORACOSCOPY W/LOBECTOMY: CPT | Performed by: ANESTHESIOLOGY

## 2024-04-26 PROCEDURE — 82565 ASSAY OF CREATININE: CPT | Performed by: STUDENT IN AN ORGANIZED HEALTH CARE EDUCATION/TRAINING PROGRAM

## 2024-04-26 PROCEDURE — 85027 COMPLETE CBC AUTOMATED: CPT | Performed by: NURSE PRACTITIONER

## 2024-04-26 PROCEDURE — 999N000065 XR CHEST PORT 1 VIEW

## 2024-04-26 PROCEDURE — 86900 BLOOD TYPING SEROLOGIC ABO: CPT | Performed by: NURSE PRACTITIONER

## 2024-04-26 PROCEDURE — 370N000017 HC ANESTHESIA TECHNICAL FEE, PER MIN: Performed by: THORACIC SURGERY (CARDIOTHORACIC VASCULAR SURGERY)

## 2024-04-26 PROCEDURE — 120N000002 HC R&B MED SURG/OB UMMC

## 2024-04-26 PROCEDURE — 8E0W4CZ ROBOTIC ASSISTED PROCEDURE OF TRUNK REGION, PERCUTANEOUS ENDOSCOPIC APPROACH: ICD-10-PCS | Performed by: THORACIC SURGERY (CARDIOTHORACIC VASCULAR SURGERY)

## 2024-04-26 PROCEDURE — 36415 COLL VENOUS BLD VENIPUNCTURE: CPT | Performed by: STUDENT IN AN ORGANIZED HEALTH CARE EDUCATION/TRAINING PROGRAM

## 2024-04-26 PROCEDURE — 250N000013 HC RX MED GY IP 250 OP 250 PS 637: Performed by: NURSE PRACTITIONER

## 2024-04-26 PROCEDURE — 0BBC4ZX EXCISION OF RIGHT UPPER LUNG LOBE, PERCUTANEOUS ENDOSCOPIC APPROACH, DIAGNOSTIC: ICD-10-PCS | Performed by: THORACIC SURGERY (CARDIOTHORACIC VASCULAR SURGERY)

## 2024-04-26 RX ORDER — DIPHENHYDRAMINE HYDROCHLORIDE 50 MG/ML
INJECTION INTRAMUSCULAR; INTRAVENOUS PRN
Status: DISCONTINUED | OUTPATIENT
Start: 2024-04-26 | End: 2024-04-26

## 2024-04-26 RX ORDER — ACETAMINOPHEN 325 MG/1
975 TABLET ORAL ONCE
Status: DISCONTINUED | OUTPATIENT
Start: 2024-04-26 | End: 2024-04-26 | Stop reason: HOSPADM

## 2024-04-26 RX ORDER — GABAPENTIN 100 MG/1
100 CAPSULE ORAL AT BEDTIME
Status: DISCONTINUED | OUTPATIENT
Start: 2024-04-26 | End: 2024-04-29 | Stop reason: HOSPADM

## 2024-04-26 RX ORDER — METHOCARBAMOL 500 MG/1
500 TABLET, FILM COATED ORAL EVERY 6 HOURS PRN
Status: DISCONTINUED | OUTPATIENT
Start: 2024-04-26 | End: 2024-04-29 | Stop reason: HOSPADM

## 2024-04-26 RX ORDER — ENOXAPARIN SODIUM 100 MG/ML
40 INJECTION SUBCUTANEOUS
Status: COMPLETED | OUTPATIENT
Start: 2024-04-26 | End: 2024-04-26

## 2024-04-26 RX ORDER — ALBUTEROL SULFATE 90 UG/1
4 AEROSOL, METERED RESPIRATORY (INHALATION) 4 TIMES DAILY
Status: DISCONTINUED | OUTPATIENT
Start: 2024-04-26 | End: 2024-04-29 | Stop reason: HOSPADM

## 2024-04-26 RX ORDER — PROPOFOL 10 MG/ML
INJECTION, EMULSION INTRAVENOUS PRN
Status: DISCONTINUED | OUTPATIENT
Start: 2024-04-26 | End: 2024-04-26

## 2024-04-26 RX ORDER — NALOXONE HYDROCHLORIDE 0.4 MG/ML
0.2 INJECTION, SOLUTION INTRAMUSCULAR; INTRAVENOUS; SUBCUTANEOUS
Status: DISCONTINUED | OUTPATIENT
Start: 2024-04-26 | End: 2024-04-29 | Stop reason: HOSPADM

## 2024-04-26 RX ORDER — CEFAZOLIN SODIUM/WATER 2 G/20 ML
2 SYRINGE (ML) INTRAVENOUS
Status: COMPLETED | OUTPATIENT
Start: 2024-04-26 | End: 2024-04-26

## 2024-04-26 RX ORDER — ONDANSETRON 2 MG/ML
INJECTION INTRAMUSCULAR; INTRAVENOUS PRN
Status: DISCONTINUED | OUTPATIENT
Start: 2024-04-26 | End: 2024-04-26

## 2024-04-26 RX ORDER — NALOXONE HYDROCHLORIDE 0.4 MG/ML
0.4 INJECTION, SOLUTION INTRAMUSCULAR; INTRAVENOUS; SUBCUTANEOUS
Status: DISCONTINUED | OUTPATIENT
Start: 2024-04-26 | End: 2024-04-29 | Stop reason: HOSPADM

## 2024-04-26 RX ORDER — NALOXONE HYDROCHLORIDE 0.4 MG/ML
0.1 INJECTION, SOLUTION INTRAMUSCULAR; INTRAVENOUS; SUBCUTANEOUS
Status: DISCONTINUED | OUTPATIENT
Start: 2024-04-26 | End: 2024-04-26 | Stop reason: HOSPADM

## 2024-04-26 RX ORDER — ACETAMINOPHEN 325 MG/1
650 TABLET ORAL EVERY 4 HOURS PRN
Status: DISCONTINUED | OUTPATIENT
Start: 2024-04-29 | End: 2024-04-29 | Stop reason: HOSPADM

## 2024-04-26 RX ORDER — ONDANSETRON 4 MG/1
4 TABLET, ORALLY DISINTEGRATING ORAL EVERY 30 MIN PRN
Status: DISCONTINUED | OUTPATIENT
Start: 2024-04-26 | End: 2024-04-26 | Stop reason: HOSPADM

## 2024-04-26 RX ORDER — SODIUM CHLORIDE, SODIUM LACTATE, POTASSIUM CHLORIDE, CALCIUM CHLORIDE 600; 310; 30; 20 MG/100ML; MG/100ML; MG/100ML; MG/100ML
INJECTION, SOLUTION INTRAVENOUS CONTINUOUS
Status: DISCONTINUED | OUTPATIENT
Start: 2024-04-26 | End: 2024-04-26 | Stop reason: HOSPADM

## 2024-04-26 RX ORDER — GABAPENTIN 100 MG/1
100 CAPSULE ORAL
Status: COMPLETED | OUTPATIENT
Start: 2024-04-26 | End: 2024-04-26

## 2024-04-26 RX ORDER — ALBUTEROL SULFATE 0.83 MG/ML
2.5 SOLUTION RESPIRATORY (INHALATION) EVERY 4 HOURS PRN
Status: DISCONTINUED | OUTPATIENT
Start: 2024-04-26 | End: 2024-04-26 | Stop reason: HOSPADM

## 2024-04-26 RX ORDER — HYDROMORPHONE HCL IN WATER/PF 6 MG/30 ML
0.2 PATIENT CONTROLLED ANALGESIA SYRINGE INTRAVENOUS EVERY 5 MIN PRN
Status: DISCONTINUED | OUTPATIENT
Start: 2024-04-26 | End: 2024-04-26 | Stop reason: HOSPADM

## 2024-04-26 RX ORDER — DIMENHYDRINATE 50 MG/ML
25 INJECTION, SOLUTION INTRAMUSCULAR; INTRAVENOUS
Status: DISCONTINUED | OUTPATIENT
Start: 2024-04-26 | End: 2024-04-26 | Stop reason: HOSPADM

## 2024-04-26 RX ORDER — BISACODYL 10 MG
10 SUPPOSITORY, RECTAL RECTAL DAILY PRN
Status: DISCONTINUED | OUTPATIENT
Start: 2024-04-29 | End: 2024-04-29 | Stop reason: HOSPADM

## 2024-04-26 RX ORDER — ENOXAPARIN SODIUM 100 MG/ML
40 INJECTION SUBCUTANEOUS EVERY 24 HOURS
Status: DISCONTINUED | OUTPATIENT
Start: 2024-04-27 | End: 2024-04-29 | Stop reason: HOSPADM

## 2024-04-26 RX ORDER — DEXAMETHASONE SODIUM PHOSPHATE 4 MG/ML
INJECTION, SOLUTION INTRA-ARTICULAR; INTRALESIONAL; INTRAMUSCULAR; INTRAVENOUS; SOFT TISSUE PRN
Status: DISCONTINUED | OUTPATIENT
Start: 2024-04-26 | End: 2024-04-26

## 2024-04-26 RX ORDER — PROCHLORPERAZINE MALEATE 5 MG
5 TABLET ORAL EVERY 6 HOURS PRN
Status: DISCONTINUED | OUTPATIENT
Start: 2024-04-26 | End: 2024-04-29 | Stop reason: HOSPADM

## 2024-04-26 RX ORDER — FENTANYL CITRATE 50 UG/ML
25 INJECTION, SOLUTION INTRAMUSCULAR; INTRAVENOUS EVERY 5 MIN PRN
Status: DISCONTINUED | OUTPATIENT
Start: 2024-04-26 | End: 2024-04-26 | Stop reason: HOSPADM

## 2024-04-26 RX ORDER — BUPIVACAINE HYDROCHLORIDE AND EPINEPHRINE 2.5; 5 MG/ML; UG/ML
INJECTION, SOLUTION INFILTRATION; PERINEURAL PRN
Status: DISCONTINUED | OUTPATIENT
Start: 2024-04-26 | End: 2024-04-26 | Stop reason: HOSPADM

## 2024-04-26 RX ORDER — ONDANSETRON 2 MG/ML
4 INJECTION INTRAMUSCULAR; INTRAVENOUS EVERY 6 HOURS PRN
Status: DISCONTINUED | OUTPATIENT
Start: 2024-04-26 | End: 2024-04-29 | Stop reason: HOSPADM

## 2024-04-26 RX ORDER — HYDROMORPHONE HCL IN WATER/PF 6 MG/30 ML
0.2 PATIENT CONTROLLED ANALGESIA SYRINGE INTRAVENOUS
Status: DISCONTINUED | OUTPATIENT
Start: 2024-04-26 | End: 2024-04-29 | Stop reason: HOSPADM

## 2024-04-26 RX ORDER — CHLORHEXIDINE GLUCONATE ORAL RINSE 1.2 MG/ML
15 SOLUTION DENTAL ONCE
Status: COMPLETED | OUTPATIENT
Start: 2024-04-26 | End: 2024-04-26

## 2024-04-26 RX ORDER — FLUTICASONE FUROATE AND VILANTEROL 100; 25 UG/1; UG/1
1 POWDER RESPIRATORY (INHALATION) DAILY
Status: DISCONTINUED | OUTPATIENT
Start: 2024-04-26 | End: 2024-04-29 | Stop reason: HOSPADM

## 2024-04-26 RX ORDER — FENTANYL CITRATE 50 UG/ML
INJECTION, SOLUTION INTRAMUSCULAR; INTRAVENOUS PRN
Status: DISCONTINUED | OUTPATIENT
Start: 2024-04-26 | End: 2024-04-26

## 2024-04-26 RX ORDER — HYDROMORPHONE HCL IN WATER/PF 6 MG/30 ML
0.4 PATIENT CONTROLLED ANALGESIA SYRINGE INTRAVENOUS EVERY 5 MIN PRN
Status: DISCONTINUED | OUTPATIENT
Start: 2024-04-26 | End: 2024-04-26 | Stop reason: HOSPADM

## 2024-04-26 RX ORDER — OXYCODONE HYDROCHLORIDE 10 MG/1
10 TABLET ORAL EVERY 4 HOURS PRN
Status: DISCONTINUED | OUTPATIENT
Start: 2024-04-26 | End: 2024-04-29 | Stop reason: HOSPADM

## 2024-04-26 RX ORDER — CELECOXIB 200 MG/1
200 CAPSULE ORAL ONCE
Status: COMPLETED | OUTPATIENT
Start: 2024-04-26 | End: 2024-04-26

## 2024-04-26 RX ORDER — SODIUM CHLORIDE, SODIUM LACTATE, POTASSIUM CHLORIDE, CALCIUM CHLORIDE 600; 310; 30; 20 MG/100ML; MG/100ML; MG/100ML; MG/100ML
INJECTION, SOLUTION INTRAVENOUS CONTINUOUS PRN
Status: DISCONTINUED | OUTPATIENT
Start: 2024-04-26 | End: 2024-04-26

## 2024-04-26 RX ORDER — HYDRALAZINE HYDROCHLORIDE 20 MG/ML
2.5-5 INJECTION INTRAMUSCULAR; INTRAVENOUS EVERY 10 MIN PRN
Status: DISCONTINUED | OUTPATIENT
Start: 2024-04-26 | End: 2024-04-26 | Stop reason: HOSPADM

## 2024-04-26 RX ORDER — LIDOCAINE HYDROCHLORIDE 20 MG/ML
INJECTION, SOLUTION INFILTRATION; PERINEURAL PRN
Status: DISCONTINUED | OUTPATIENT
Start: 2024-04-26 | End: 2024-04-26

## 2024-04-26 RX ORDER — ALBUTEROL SULFATE 90 UG/1
2 AEROSOL, METERED RESPIRATORY (INHALATION) EVERY 4 HOURS PRN
Status: DISCONTINUED | OUTPATIENT
Start: 2024-04-26 | End: 2024-04-29 | Stop reason: HOSPADM

## 2024-04-26 RX ORDER — POLYETHYLENE GLYCOL 3350 17 G/17G
17 POWDER, FOR SOLUTION ORAL DAILY
Status: DISCONTINUED | OUTPATIENT
Start: 2024-04-27 | End: 2024-04-29

## 2024-04-26 RX ORDER — ACETAMINOPHEN 325 MG/1
975 TABLET ORAL ONCE
Status: COMPLETED | OUTPATIENT
Start: 2024-04-26 | End: 2024-04-26

## 2024-04-26 RX ORDER — FAMOTIDINE 20 MG/1
20 TABLET, FILM COATED ORAL ONCE
Status: COMPLETED | OUTPATIENT
Start: 2024-04-26 | End: 2024-04-26

## 2024-04-26 RX ORDER — SODIUM CHLORIDE, SODIUM GLUCONATE, SODIUM ACETATE, POTASSIUM CHLORIDE AND MAGNESIUM CHLORIDE 526; 502; 368; 37; 30 MG/100ML; MG/100ML; MG/100ML; MG/100ML; MG/100ML
INJECTION, SOLUTION INTRAVENOUS CONTINUOUS PRN
Status: DISCONTINUED | OUTPATIENT
Start: 2024-04-26 | End: 2024-04-26

## 2024-04-26 RX ORDER — ACETAMINOPHEN 325 MG/1
975 TABLET ORAL EVERY 8 HOURS
Status: COMPLETED | OUTPATIENT
Start: 2024-04-26 | End: 2024-04-29

## 2024-04-26 RX ORDER — ONDANSETRON 4 MG/1
4 TABLET, ORALLY DISINTEGRATING ORAL EVERY 6 HOURS PRN
Status: DISCONTINUED | OUTPATIENT
Start: 2024-04-26 | End: 2024-04-29 | Stop reason: HOSPADM

## 2024-04-26 RX ORDER — AMOXICILLIN 250 MG
1 CAPSULE ORAL 2 TIMES DAILY
Status: DISCONTINUED | OUTPATIENT
Start: 2024-04-26 | End: 2024-04-29

## 2024-04-26 RX ORDER — HYDROMORPHONE HCL IN WATER/PF 6 MG/30 ML
0.4 PATIENT CONTROLLED ANALGESIA SYRINGE INTRAVENOUS
Status: DISCONTINUED | OUTPATIENT
Start: 2024-04-26 | End: 2024-04-29 | Stop reason: HOSPADM

## 2024-04-26 RX ORDER — OCTREOTIDE ACETATE 500 UG/ML
500 INJECTION, SOLUTION INTRAVENOUS; SUBCUTANEOUS ONCE
Status: DISCONTINUED | OUTPATIENT
Start: 2024-04-26 | End: 2024-04-26 | Stop reason: HOSPADM

## 2024-04-26 RX ORDER — FENTANYL CITRATE 50 UG/ML
50 INJECTION, SOLUTION INTRAMUSCULAR; INTRAVENOUS EVERY 5 MIN PRN
Status: DISCONTINUED | OUTPATIENT
Start: 2024-04-26 | End: 2024-04-26 | Stop reason: HOSPADM

## 2024-04-26 RX ORDER — OXYCODONE HYDROCHLORIDE 5 MG/1
5 TABLET ORAL EVERY 4 HOURS PRN
Status: DISCONTINUED | OUTPATIENT
Start: 2024-04-26 | End: 2024-04-29 | Stop reason: HOSPADM

## 2024-04-26 RX ORDER — CEFAZOLIN SODIUM/WATER 2 G/20 ML
2 SYRINGE (ML) INTRAVENOUS SEE ADMIN INSTRUCTIONS
Status: DISCONTINUED | OUTPATIENT
Start: 2024-04-26 | End: 2024-04-26 | Stop reason: HOSPADM

## 2024-04-26 RX ORDER — ONDANSETRON 2 MG/ML
4 INJECTION INTRAMUSCULAR; INTRAVENOUS EVERY 30 MIN PRN
Status: DISCONTINUED | OUTPATIENT
Start: 2024-04-26 | End: 2024-04-26 | Stop reason: HOSPADM

## 2024-04-26 RX ORDER — PANTOPRAZOLE SODIUM 40 MG/1
40 TABLET, DELAYED RELEASE ORAL
Status: DISCONTINUED | OUTPATIENT
Start: 2024-04-27 | End: 2024-04-29 | Stop reason: HOSPADM

## 2024-04-26 RX ORDER — LIDOCAINE 40 MG/G
CREAM TOPICAL
Status: DISCONTINUED | OUTPATIENT
Start: 2024-04-26 | End: 2024-04-26 | Stop reason: HOSPADM

## 2024-04-26 RX ORDER — IPRATROPIUM BROMIDE AND ALBUTEROL SULFATE 2.5; .5 MG/3ML; MG/3ML
3 SOLUTION RESPIRATORY (INHALATION) ONCE
Status: COMPLETED | OUTPATIENT
Start: 2024-04-26 | End: 2024-04-26

## 2024-04-26 RX ADMIN — DIPHENHYDRAMINE HYDROCHLORIDE 25 MG: 50 INJECTION, SOLUTION INTRAMUSCULAR; INTRAVENOUS at 08:40

## 2024-04-26 RX ADMIN — METHOCARBAMOL 500 MG: 500 TABLET ORAL at 21:13

## 2024-04-26 RX ADMIN — HYDROMORPHONE HYDROCHLORIDE 0.5 MG: 1 INJECTION, SOLUTION INTRAMUSCULAR; INTRAVENOUS; SUBCUTANEOUS at 08:36

## 2024-04-26 RX ADMIN — SODIUM CHLORIDE, POTASSIUM CHLORIDE, SODIUM LACTATE AND CALCIUM CHLORIDE: 600; 310; 30; 20 INJECTION, SOLUTION INTRAVENOUS at 07:29

## 2024-04-26 RX ADMIN — SODIUM CHLORIDE, SODIUM GLUCONATE, SODIUM ACETATE, POTASSIUM CHLORIDE AND MAGNESIUM CHLORIDE: 526; 502; 368; 37; 30 INJECTION, SOLUTION INTRAVENOUS at 08:10

## 2024-04-26 RX ADMIN — SUGAMMADEX 100 MG: 100 INJECTION, SOLUTION INTRAVENOUS at 09:43

## 2024-04-26 RX ADMIN — PHENYLEPHRINE HYDROCHLORIDE 100 MCG: 10 INJECTION INTRAVENOUS at 09:41

## 2024-04-26 RX ADMIN — ENOXAPARIN SODIUM 40 MG: 40 INJECTION SUBCUTANEOUS at 06:54

## 2024-04-26 RX ADMIN — MIDAZOLAM 1 MG: 1 INJECTION INTRAMUSCULAR; INTRAVENOUS at 07:40

## 2024-04-26 RX ADMIN — HYDROMORPHONE HYDROCHLORIDE 0.2 MG: 0.2 INJECTION, SOLUTION INTRAMUSCULAR; INTRAVENOUS; SUBCUTANEOUS at 19:14

## 2024-04-26 RX ADMIN — FENTANYL CITRATE 50 MCG: 50 INJECTION INTRAMUSCULAR; INTRAVENOUS at 09:39

## 2024-04-26 RX ADMIN — GABAPENTIN 100 MG: 100 CAPSULE ORAL at 06:53

## 2024-04-26 RX ADMIN — DEXAMETHASONE SODIUM PHOSPHATE 8 MG: 4 INJECTION, SOLUTION INTRA-ARTICULAR; INTRALESIONAL; INTRAMUSCULAR; INTRAVENOUS; SOFT TISSUE at 08:10

## 2024-04-26 RX ADMIN — DEXMEDETOMIDINE HYDROCHLORIDE 8 MCG: 100 INJECTION, SOLUTION INTRAVENOUS at 09:49

## 2024-04-26 RX ADMIN — ACETAMINOPHEN 975 MG: 325 TABLET, FILM COATED ORAL at 15:56

## 2024-04-26 RX ADMIN — FENTANYL CITRATE 25 MCG: 50 INJECTION, SOLUTION INTRAMUSCULAR; INTRAVENOUS at 10:45

## 2024-04-26 RX ADMIN — PHENYLEPHRINE HYDROCHLORIDE 200 MCG: 10 INJECTION INTRAVENOUS at 08:33

## 2024-04-26 RX ADMIN — PROPOFOL 100 MG: 10 INJECTION, EMULSION INTRAVENOUS at 08:00

## 2024-04-26 RX ADMIN — FLUTICASONE FUROATE AND VILANTEROL TRIFENATATE 1 PUFF: 100; 25 POWDER RESPIRATORY (INHALATION) at 16:01

## 2024-04-26 RX ADMIN — CELECOXIB 200 MG: 200 CAPSULE ORAL at 06:53

## 2024-04-26 RX ADMIN — FAMOTIDINE 20 MG: 20 TABLET ORAL at 06:53

## 2024-04-26 RX ADMIN — PHENYLEPHRINE HYDROCHLORIDE 100 MCG: 10 INJECTION INTRAVENOUS at 08:11

## 2024-04-26 RX ADMIN — ACETAMINOPHEN 975 MG: 325 TABLET, FILM COATED ORAL at 06:53

## 2024-04-26 RX ADMIN — HYDROMORPHONE HYDROCHLORIDE 0.4 MG: 0.2 INJECTION, SOLUTION INTRAMUSCULAR; INTRAVENOUS; SUBCUTANEOUS at 21:14

## 2024-04-26 RX ADMIN — HYDROMORPHONE HYDROCHLORIDE 0.2 MG: 0.2 INJECTION, SOLUTION INTRAMUSCULAR; INTRAVENOUS; SUBCUTANEOUS at 11:58

## 2024-04-26 RX ADMIN — SUCCINYLCHOLINE CHLORIDE 100 MG: 20 INJECTION, SOLUTION INTRAMUSCULAR; INTRAVENOUS; PARENTERAL at 07:49

## 2024-04-26 RX ADMIN — ONDANSETRON 4 MG: 2 INJECTION INTRAMUSCULAR; INTRAVENOUS at 09:23

## 2024-04-26 RX ADMIN — LIDOCAINE HYDROCHLORIDE 100 MG: 20 INJECTION, SOLUTION INFILTRATION; PERINEURAL at 07:48

## 2024-04-26 RX ADMIN — IPRATROPIUM BROMIDE AND ALBUTEROL SULFATE 3 ML: .5; 3 SOLUTION RESPIRATORY (INHALATION) at 07:06

## 2024-04-26 RX ADMIN — LIDOCAINE HYDROCHLORIDE 0.2 ML: 10 INJECTION, SOLUTION EPIDURAL; INFILTRATION; INTRACAUDAL; PERINEURAL at 06:45

## 2024-04-26 RX ADMIN — PHENYLEPHRINE HYDROCHLORIDE 200 MCG: 10 INJECTION INTRAVENOUS at 07:58

## 2024-04-26 RX ADMIN — CHLORHEXIDINE GLUCONATE 0.12% ORAL RINSE 15 ML: 1.2 LIQUID ORAL at 06:53

## 2024-04-26 RX ADMIN — SUGAMMADEX 100 MG: 100 INJECTION, SOLUTION INTRAVENOUS at 09:49

## 2024-04-26 RX ADMIN — Medication 2 G: at 08:10

## 2024-04-26 RX ADMIN — Medication 50 MG: at 08:00

## 2024-04-26 RX ADMIN — FENTANYL CITRATE 25 MCG: 50 INJECTION, SOLUTION INTRAMUSCULAR; INTRAVENOUS at 11:30

## 2024-04-26 RX ADMIN — PHENYLEPHRINE HYDROCHLORIDE 200 MCG: 10 INJECTION INTRAVENOUS at 08:17

## 2024-04-26 RX ADMIN — FENTANYL CITRATE 50 MCG: 50 INJECTION INTRAMUSCULAR; INTRAVENOUS at 07:48

## 2024-04-26 RX ADMIN — UMECLIDINIUM 1 PUFF: 62.5 AEROSOL, POWDER ORAL at 16:01

## 2024-04-26 RX ADMIN — OXYCODONE HYDROCHLORIDE 10 MG: 10 TABLET ORAL at 22:31

## 2024-04-26 RX ADMIN — GABAPENTIN 100 MG: 100 CAPSULE ORAL at 21:14

## 2024-04-26 RX ADMIN — HYDROMORPHONE HYDROCHLORIDE 0.2 MG: 0.2 INJECTION, SOLUTION INTRAMUSCULAR; INTRAVENOUS; SUBCUTANEOUS at 17:58

## 2024-04-26 RX ADMIN — PROPOFOL 100 MG: 10 INJECTION, EMULSION INTRAVENOUS at 07:48

## 2024-04-26 ASSESSMENT — ACTIVITIES OF DAILY LIVING (ADL)
ADLS_ACUITY_SCORE: 19
ADLS_ACUITY_SCORE: 20
ADLS_ACUITY_SCORE: 18
ADLS_ACUITY_SCORE: 19
ADLS_ACUITY_SCORE: 18
ADLS_ACUITY_SCORE: 20
ADLS_ACUITY_SCORE: 18
ADLS_ACUITY_SCORE: 20
ADLS_ACUITY_SCORE: 20
ADLS_ACUITY_SCORE: 19
ADLS_ACUITY_SCORE: 16
ADLS_ACUITY_SCORE: 18

## 2024-04-26 NOTE — ANESTHESIA POSTPROCEDURE EVALUATION
Patient: Mimi Rivera    Procedure: Procedure(s):  robot assisted thoracoscopic right wedge resection, mediastinal lymph node dissection       Anesthesia Type:  General    Note:  Disposition: Inpatient   Postop Pain Control: Uneventful            Sign Out: Well controlled pain   PONV: No   Neuro/Psych: Uneventful            Sign Out: Acceptable/Baseline neuro status   Airway/Respiratory: Uneventful            Sign Out: Acceptable/Baseline resp. status   CV/Hemodynamics: Uneventful            Sign Out: Acceptable CV status; No obvious hypovolemia; No obvious fluid overload   Other NRE: NONE   DID A NON-ROUTINE EVENT OCCUR? No           Last vitals:  Vitals Value Taken Time   /67 04/26/24 1045   Temp 36.6  C (97.9  F) 04/26/24 1005   Pulse 61 04/26/24 1048   Resp 12 04/26/24 1048   SpO2 97 % 04/26/24 1048   Vitals shown include unfiled device data.    Electronically Signed By: Rocky Rodriguez MD  April 26, 2024  10:49 AM

## 2024-04-26 NOTE — PROVIDER NOTIFICATION
Paged thoracic, post op chest xray is complete.     1105 Return call, Xray looks good and patient okay to transfer to floor once meeting criteria.

## 2024-04-26 NOTE — BRIEF OP NOTE
[facility]    Brief Operative Note    Pre-operative diagnosis: Solitary lung nodule [R91.1]  Post-operative diagnosis Same    Procedure: robot assisted thoracoscopic right wedge resection, mediastinal lymph node dissection, Right - Chest    Surgeon: Surgeons and Role:     * Good Pandya MD - Primary     * Trenton Schneider PA-C - Assisting     * Marcy Gautam MD - Fellow - Assisting  Anesthesia: General   Estimated Blood Loss: 20mls    Drains: 28 Fr pleural drain  Specimens:   ID Type Source Tests Collected by Time Destination   1 : right upper lobe wedge - freida margins Tissue Lung, Upper Lobe, Right SURGICAL PATHOLOGY EXAM Good Pandya MD 4/26/2024  8:57 AM    2 : level 7 Tissue Lymph Node(s) SURGICAL PATHOLOGY EXAM Good Pandya MD 4/26/2024  9:10 AM    3 : 4R Tissue Lymph Node(s) SURGICAL PATHOLOGY EXAM Good Pandya MD 4/26/2024  9:17 AM      Findings:   RUL NSCLC  Complications: None   Implants: None

## 2024-04-26 NOTE — ANESTHESIA CARE TRANSFER NOTE
Patient: Mimi Rivera    Procedure: Procedure(s):  robot assisted thoracoscopic right wedge resection, mediastinal lymph node dissection       Diagnosis: Solitary lung nodule [R91.1]  Diagnosis Additional Information: No value filed.    Anesthesia Type:   General     Note:    Oropharynx: oropharynx clear of all foreign objects and spontaneously breathing  Level of Consciousness: drowsy  Oxygen Supplementation: face mask  Level of Supplemental Oxygen (L/min / FiO2): 6  Independent Airway: airway patency satisfactory and stable  Dentition: dentition unchanged  Vital Signs Stable: post-procedure vital signs reviewed and stable  Report to RN Given: handoff report given  Patient transferred to: PACU    Handoff Report: Identifed the Patient, Identified the Reponsible Provider, Reviewed the pertinent medical history, Discussed the surgical course, Reviewed Intra-OP anesthesia mangement and issues during anesthesia, Set expectations for post-procedure period and Allowed opportunity for questions and acknowledgement of understanding      Vitals:  Vitals Value Taken Time   /55 04/26/24 1245   Temp 36.9  C (98.4  F) 04/26/24 1145   Pulse 70 04/26/24 1255   Resp 18 04/26/24 1255   SpO2 93 % 04/26/24 1255   Vitals shown include unfiled device data.    Electronically Signed By: Moshe Quiroz MD  April 26, 2024  1:48 PM

## 2024-04-26 NOTE — PROGRESS NOTES
Pt sleepy awake to repeat name calling pt admitted from PACU. Post-op for robot assisted thoracoscopic right wedge resection. Medial sternal lymph node dissection. Tolerated well. Chest tube to water seal with some air-leak, thoracic came by and assess pt. VS stable denied pain or discomfort at this time. Will continue to monitor.

## 2024-04-26 NOTE — ANESTHESIA PROCEDURE NOTES
Arterial Line Procedure Note    Pre-Procedure   Staff -        Anesthesiologist:  Behrens, Christopher J, MD       Resident/Fellow: Moshe Quiroz MD       Performed By: resident and with residents       Procedure performed by resident/fellow/CRNA in presence of a teaching physician.         Location: OR       Pre-Anesthestic Checklist: patient identified, IV checked, risks and benefits discussed, informed consent, monitors and equipment checked, pre-op evaluation and at physician/surgeon's request  Timeout:       Correct Patient: Yes        Correct Procedure: Yes        Correct Site: Yes        Correct Position: Yes   Line Placement:   This line was placed Post Induction  Procedure   Procedure: arterial line and elective       Diagnosis: Blood Pressure Monitoring and/or Frequent Blood Draws       Laterality: left       Insertion Site: radial.  Sterile Prep        Standard elements of sterile barrier followed       Skin prep: Chloraprep  Insertion/Injection        Technique: Contreras's test completed, Seldinger Technique and ultrasound guided        1. Ultrasound was used to evaluate the access site.       2. Artery evaluated via ultrasound for patency/adequacy.       3. Using real-time ultrasound the needle/catheter was observed entering the artery/vein.       Catheter Type/Size: 20 G, 1.75 in/4.5 cm quick cath (integral wire)  Narrative        Tegaderm dressing used.       Complications: None apparent,        Arterial waveform: Yes        IBP within 10% of NIBP: Yes   Comments:  Routine arterial line placement without complications.

## 2024-04-26 NOTE — ANESTHESIA PROCEDURE NOTES
Airway       Patient location during procedure: OR       Procedure Start/Stop Times: 4/26/2024 7:52 AM  Staff -        Anesthesiologist:  Behrens, Christopher J, MD       Resident/Fellow: Moshe Quiroz MD       Performed By: resident and with residents       Procedure performed by resident/fellow/CRNA in presence of a teaching physician.    Consent for Airway        Urgency: elective  Indications and Patient Condition       Indications for airway management: ortiz-procedural       Induction type:intravenous       Mask difficulty assessment: 1 - vent by mask    Final Airway Details       Final airway type: endotracheal airway       Successful airway: ETT - double lumen left  Endotracheal Airway Details        Cuffed: yes       Successful intubation technique: video laryngoscopy       VL Blade Size: Glidescope 3       Grade View of Cords: 1       Adjucts: stylet       Position: Right       Measured from: lips       Secured at (cm): 33       Bite block used: None       ETT Double lumen (fr): 37    Post intubation assessment        Placement verified by: capnometry, equal breath sounds and chest rise        Number of attempts at approach: 1       Number of other approaches attempted: 0       Secured with: pink tape       Ease of procedure: easy       Dentition: Intact    Medication(s) Administered   Medication Administration Time: 4/26/2024 7:52 AM    Additional Comments       Routine intubation without complications. Positioned confirmed with fiberoptic bronchoscope before and after lateral positioning.

## 2024-04-26 NOTE — OP NOTE
Preoperative diagnosis:                        Atypical carcinoid  Postoperative diagnosis:                      Atypical carcinoid     Procedure:   Right thoracoscopic robot-assisted right upper lobe wedge resection  Mediastinal lymph node dissection     Anesthesia: General   Surgeon: Good Pandya   Assistants:  Marcy Gautam MD; Trenton Schneider PA-C  EBL: Less than 20 mL     Complications: none immediate     Description of procedure  The patient was brought to the room and placed supine upon the table.  After confirming the patient's identity and verifying the consent, appropriate monitoring devices were placed, as well as SCD boots. General anesthesia was administered.  The patient was intubated with a double-lumen endotracheal tube.  Proper position was confirmed by fiberoptic bronchoscopy.  Intravenous antibiotic was administered within 1 hour prior to the incision. The patient was turned to the left lateral decubitus position and all pressure points were padded. The bed was flexed, and the patient was secured to the table and the right arm was placed on an airplane board.  The right chest was prepped with chlorhexidine and  draped in the standard surgical fashion.       An 8 mm incision was made in the eighth intercostal space in line with the anterior superior iliac spine.  This was carried down to the pleural cavity.  An 8 mm port was placed and an 8 mm, 30-degree thoracoscope was inserted into the pleural cavity.  There were no adhesions.  A 12 mm port was placed anteriorly and another 12 mm port placed posteriorly, approximately 6 cm apart.  A final 8 mm port was placed 6 cm more posterior from the posterior 12 mm port.  Finally, a 12 mm assistant port was made in the low anterior chest.  The robot was docked without difficulty. The nodule was located in the right upper lobe and a wedge resection was performed with blue loads of the stapler. The frozen section revealed a margin just next to the staple  line. A multiple level intercostal nerve block was performed and Progel was applied to the raw surfaces of the lung. A 28 Armenian chest tube was then placed going posteriorly to the apex  through the initial camera port site.  This was secured to the skin using 0 silk suture.  The lung was observed to inflate appropriately.  The utility incision was closed in layers, irrigating each layer prior to closure.  The smaller incisions were closed in layers. The areas were cleaned and dried and benzoin and steri-strips were applied. At  the end of the case, sponge, needle, and instrument counts were correct.  There were no qualified residents available, so LAZARO Schneider acted as my bedside assistant.

## 2024-04-26 NOTE — PROGRESS NOTES
"Thoracic Surgery Post Op Check  4/26/2024    Mimi Rivera is a 68 year old female with h/o atypical carcinoid tumor of RUL now POD#0 s/p robot-assisted thoracoscopic RUL wedge resection and MLND.    Pt reports pain is well controlled and minimal. Denies any fevers, chills, lightheadedness, dizziness, chest pain, shortness of breath, nausea, vomiting. Was incontinent of urine once since arrival to floor. No other concerns at this time.     BP 90/70 (BP Location: Left arm)   Pulse 63   Temp 98  F (36.7  C) (Oral)   Resp 16   Ht 1.549 m (5' 1\")   Wt 50.9 kg (112 lb 3.4 oz)   LMP 09/22/2010   SpO2 94%   BMI 21.20 kg/m    Body mass index is 21.2 kg/m .    Gen: A&O x3, NAD  Chest: NLB on 3L NC  Abdomen: soft, non-tender, non-distended  Incision: clean, dry, intact  Extremities: warm and well perfused    R 28F chest tube: 70cc s/s drainage, no air leak noted    Postop CXR reviewed: R pleural tube in place, bilateral opacities    A/P: No acute post-op issues.  - MMPC  - Chest tube to waterseal  - ADAT  - Lovenox POD1    Jaswinder Land MD  General Surgery, PGY-1  [phone]    "

## 2024-04-27 ENCOUNTER — APPOINTMENT (OUTPATIENT)
Dept: GENERAL RADIOLOGY | Facility: CLINIC | Age: 69
DRG: 168 | End: 2024-04-27
Attending: STUDENT IN AN ORGANIZED HEALTH CARE EDUCATION/TRAINING PROGRAM
Payer: COMMERCIAL

## 2024-04-27 ENCOUNTER — APPOINTMENT (OUTPATIENT)
Dept: OCCUPATIONAL THERAPY | Facility: CLINIC | Age: 69
DRG: 168 | End: 2024-04-27
Attending: STUDENT IN AN ORGANIZED HEALTH CARE EDUCATION/TRAINING PROGRAM
Payer: COMMERCIAL

## 2024-04-27 LAB
ANION GAP SERPL CALCULATED.3IONS-SCNC: 9 MMOL/L (ref 7–15)
BUN SERPL-MCNC: 14.8 MG/DL (ref 8–23)
CALCIUM SERPL-MCNC: 9.1 MG/DL (ref 8.8–10.2)
CHLORIDE SERPL-SCNC: 102 MMOL/L (ref 98–107)
CREAT SERPL-MCNC: 0.59 MG/DL (ref 0.51–0.95)
DEPRECATED HCO3 PLAS-SCNC: 25 MMOL/L (ref 22–29)
EGFRCR SERPLBLD CKD-EPI 2021: >90 ML/MIN/1.73M2
ERYTHROCYTE [DISTWIDTH] IN BLOOD BY AUTOMATED COUNT: 12.9 % (ref 10–15)
GLUCOSE SERPL-MCNC: 129 MG/DL (ref 70–99)
HCT VFR BLD AUTO: 39.8 % (ref 35–47)
HGB BLD-MCNC: 13.4 G/DL (ref 11.7–15.7)
MCH RBC QN AUTO: 32.9 PG (ref 26.5–33)
MCHC RBC AUTO-ENTMCNC: 33.7 G/DL (ref 31.5–36.5)
MCV RBC AUTO: 98 FL (ref 78–100)
PLATELET # BLD AUTO: 240 10E3/UL (ref 150–450)
POTASSIUM SERPL-SCNC: 4.5 MMOL/L (ref 3.4–5.3)
RBC # BLD AUTO: 4.07 10E6/UL (ref 3.8–5.2)
SODIUM SERPL-SCNC: 136 MMOL/L (ref 135–145)
WBC # BLD AUTO: 14.9 10E3/UL (ref 4–11)

## 2024-04-27 PROCEDURE — 71045 X-RAY EXAM CHEST 1 VIEW: CPT | Mod: 26 | Performed by: RADIOLOGY

## 2024-04-27 PROCEDURE — 120N000002 HC R&B MED SURG/OB UMMC

## 2024-04-27 PROCEDURE — 999N000128 HC STATISTIC PERIPHERAL IV START W/O US GUIDANCE

## 2024-04-27 PROCEDURE — 71045 X-RAY EXAM CHEST 1 VIEW: CPT

## 2024-04-27 PROCEDURE — 97165 OT EVAL LOW COMPLEX 30 MIN: CPT | Mod: GO

## 2024-04-27 PROCEDURE — 250N000011 HC RX IP 250 OP 636: Performed by: STUDENT IN AN ORGANIZED HEALTH CARE EDUCATION/TRAINING PROGRAM

## 2024-04-27 PROCEDURE — 97530 THERAPEUTIC ACTIVITIES: CPT | Mod: GO

## 2024-04-27 PROCEDURE — 250N000013 HC RX MED GY IP 250 OP 250 PS 637

## 2024-04-27 PROCEDURE — 80048 BASIC METABOLIC PNL TOTAL CA: CPT | Performed by: STUDENT IN AN ORGANIZED HEALTH CARE EDUCATION/TRAINING PROGRAM

## 2024-04-27 PROCEDURE — 250N000013 HC RX MED GY IP 250 OP 250 PS 637: Performed by: STUDENT IN AN ORGANIZED HEALTH CARE EDUCATION/TRAINING PROGRAM

## 2024-04-27 PROCEDURE — 36415 COLL VENOUS BLD VENIPUNCTURE: CPT | Performed by: STUDENT IN AN ORGANIZED HEALTH CARE EDUCATION/TRAINING PROGRAM

## 2024-04-27 PROCEDURE — 85027 COMPLETE CBC AUTOMATED: CPT | Performed by: STUDENT IN AN ORGANIZED HEALTH CARE EDUCATION/TRAINING PROGRAM

## 2024-04-27 PROCEDURE — 97110 THERAPEUTIC EXERCISES: CPT | Mod: GO

## 2024-04-27 PROCEDURE — 71045 X-RAY EXAM CHEST 1 VIEW: CPT | Mod: 76

## 2024-04-27 RX ORDER — LIDOCAINE 4 G/G
1 PATCH TOPICAL EVERY 24 HOURS
Status: DISCONTINUED | OUTPATIENT
Start: 2024-04-27 | End: 2024-04-29 | Stop reason: HOSPADM

## 2024-04-27 RX ORDER — ACETAMINOPHEN 500 MG
500-1000 TABLET ORAL EVERY 8 HOURS PRN
Qty: 42 TABLET | Refills: 0 | Status: ON HOLD | OUTPATIENT
Start: 2024-04-27 | End: 2024-05-07

## 2024-04-27 RX ORDER — POLYETHYLENE GLYCOL 3350 17 G/17G
17 POWDER, FOR SOLUTION ORAL DAILY
Qty: 119 G | Refills: 0 | Status: SHIPPED | OUTPATIENT
Start: 2024-04-27 | End: 2024-05-03

## 2024-04-27 RX ORDER — OXYCODONE HYDROCHLORIDE 5 MG/1
5 TABLET ORAL EVERY 8 HOURS PRN
Qty: 40 TABLET | Refills: 0 | Status: SHIPPED | OUTPATIENT
Start: 2024-04-27 | End: 2024-05-10

## 2024-04-27 RX ORDER — ENOXAPARIN SODIUM 100 MG/ML
40 INJECTION SUBCUTANEOUS DAILY
Qty: 2.8 ML | Refills: 0 | Status: ON HOLD | OUTPATIENT
Start: 2024-04-27 | End: 2024-05-07

## 2024-04-27 RX ORDER — IBUPROFEN 200 MG
400 TABLET ORAL EVERY 6 HOURS PRN
Status: DISCONTINUED | OUTPATIENT
Start: 2024-04-27 | End: 2024-04-29 | Stop reason: HOSPADM

## 2024-04-27 RX ORDER — METHOCARBAMOL 500 MG/1
500 TABLET, FILM COATED ORAL 3 TIMES DAILY PRN
Qty: 21 TABLET | Refills: 0 | Status: ON HOLD | OUTPATIENT
Start: 2024-04-27 | End: 2024-05-07

## 2024-04-27 RX ADMIN — OXYCODONE HYDROCHLORIDE 10 MG: 10 TABLET ORAL at 08:57

## 2024-04-27 RX ADMIN — ACETAMINOPHEN 975 MG: 325 TABLET, FILM COATED ORAL at 02:54

## 2024-04-27 RX ADMIN — METHOCARBAMOL 500 MG: 500 TABLET ORAL at 09:29

## 2024-04-27 RX ADMIN — METHOCARBAMOL 500 MG: 500 TABLET ORAL at 02:57

## 2024-04-27 RX ADMIN — HYDROMORPHONE HYDROCHLORIDE 0.4 MG: 0.2 INJECTION, SOLUTION INTRAMUSCULAR; INTRAVENOUS; SUBCUTANEOUS at 02:47

## 2024-04-27 RX ADMIN — METHOCARBAMOL 500 MG: 500 TABLET ORAL at 21:01

## 2024-04-27 RX ADMIN — ENOXAPARIN SODIUM 40 MG: 40 INJECTION SUBCUTANEOUS at 08:20

## 2024-04-27 RX ADMIN — GABAPENTIN 100 MG: 100 CAPSULE ORAL at 21:00

## 2024-04-27 RX ADMIN — OXYCODONE HYDROCHLORIDE 10 MG: 10 TABLET ORAL at 21:00

## 2024-04-27 RX ADMIN — IBUPROFEN 400 MG: 200 TABLET, FILM COATED ORAL at 12:25

## 2024-04-27 RX ADMIN — HYDROMORPHONE HYDROCHLORIDE 0.4 MG: 0.2 INJECTION, SOLUTION INTRAMUSCULAR; INTRAVENOUS; SUBCUTANEOUS at 06:59

## 2024-04-27 RX ADMIN — METHOCARBAMOL 500 MG: 500 TABLET ORAL at 15:34

## 2024-04-27 RX ADMIN — OXYCODONE HYDROCHLORIDE 10 MG: 10 TABLET ORAL at 15:34

## 2024-04-27 RX ADMIN — PANTOPRAZOLE SODIUM 40 MG: 40 TABLET, DELAYED RELEASE ORAL at 08:20

## 2024-04-27 RX ADMIN — LIDOCAINE 4% 1 PATCH: 40 PATCH TOPICAL at 12:25

## 2024-04-27 RX ADMIN — ACETAMINOPHEN 975 MG: 325 TABLET, FILM COATED ORAL at 08:19

## 2024-04-27 RX ADMIN — ACETAMINOPHEN 975 MG: 325 TABLET, FILM COATED ORAL at 15:28

## 2024-04-27 ASSESSMENT — ACTIVITIES OF DAILY LIVING (ADL)
ADLS_ACUITY_SCORE: 19
ADLS_ACUITY_SCORE: 19
ADLS_ACUITY_SCORE: 18
ADLS_ACUITY_SCORE: 19
ADLS_ACUITY_SCORE: 18
ADLS_ACUITY_SCORE: 18
ADLS_ACUITY_SCORE: 19
ADLS_ACUITY_SCORE: 19
ADLS_ACUITY_SCORE: 18
ADLS_ACUITY_SCORE: 19
ADLS_ACUITY_SCORE: 18
ADLS_ACUITY_SCORE: 19
ADLS_ACUITY_SCORE: 18
ADLS_ACUITY_SCORE: 18
ADLS_ACUITY_SCORE: 19
ADLS_ACUITY_SCORE: 19
PREVIOUS_RESPONSIBILITIES: MEAL PREP;HOUSEKEEPING;LAUNDRY;SHOPPING;YARDWORK;MEDICATION MANAGEMENT;FINANCES;DRIVING

## 2024-04-27 NOTE — DISCHARGE SUMMARY
[facility]    Discharge Summary  Thoracic Surgery    Date of Admission:  4/26/2024  Date of Discharge:  4/29/2024  Date of Service (when I saw the patient): 04/29/24    Discharge Diagnoses   Active Problems:    Lung cancer (H)      Procedure/Surgery Information   Procedure: Procedure(s):  robot assisted thoracoscopic right wedge resection, mediastinal lymph node dissection   Surgeon(s): Surgeons and Role:     * Good Pandya MD - Primary     * Trenton Schneider PA-GIANA - Assisting     * Marcy Gautam MD - Fellow - Assisting   Specimens: ID Type Source Tests Collected by Time Destination   1 : right upper lobe wedge - freida margins Tissue Lung, Upper Lobe, Right SURGICAL PATHOLOGY EXAM Good Pandya MD 4/26/2024  8:57 AM    2 : level 7 Tissue Lymph Node(s) SURGICAL PATHOLOGY EXAM Good Pandya MD 4/26/2024  9:10 AM    3 : 4R Tissue Lymph Node(s) SURGICAL PATHOLOGY EXAM Good Pandya MD 4/26/2024  9:17 AM       Non-operative procedures None performed     History of Present Illness   Mimi Rivera is a 68 year old female w/ known RML malignancy and suspicion for pleural carcinomatosis who underwent elective uVATS Right pleural biopsy w/ aborted RML after positive pleural frozen sections. Patient was admitted to the Thoracic service post-op for ongoing recovery and monitoring w/ right chest tube in place.     Hospital Course   Patient's hospital course was largely uneventful. Patient had some difficulties with pain management earlier AM of 4/27 however, it is now well managed on PO meds. At this time, she is tolerating PO intake. Her chest tube was removed without difficulties. She did require supplemental oxygen to maintain SpO2 over 92%, so home oxygen was arranged over the weekend. She is voiding and has evidence of anterograde bowel function, and is ambulatory. Patient thus deemed safe for discharge home w/ plans to follow up outpatient with the thoracic  surgery team in [facility]. Patient was understanding and agreeable to the plan and had no further questions/concerns.       Active Problems:    Lung cancer (H)      Jaswinder Land MD    Medications discontinued or adjusted during this hospitalization: No change     Antibiotics prescribed at discharge: None prescribed     Imaging study follow up needs:   -No studies this admission, will have pre-clinic repeat CXR.     Significant Findings: N/a    Discharge Disposition   Discharged to home   Condition at discharge: Stable    Pending Results   Final pathology results: Pending at time of discharge  These results will be followed up by Dr. Allen  Unresulted Labs Ordered in the Past 30 Days of this Admission       Date and Time Order Name Status Description    4/26/2024  8:59 AM Surgical Pathology Exam In process             Primary Care Physician   Aye Morejon    Physical Exam   Temp: 98.1  F (36.7  C) Temp src: Oral BP: 132/74 Pulse: 67   Resp: 18 SpO2: 94 % O2 Device: Nasal cannula Oxygen Delivery: 2 LPM  Vitals:    04/26/24 0556   Weight: 50.9 kg (112 lb 3.4 oz)     Vital Signs with Ranges  Temp:  [97.3  F (36.3  C)-98.4  F (36.9  C)] 98.1  F (36.7  C)  Pulse:  [67-93] 67  Resp:  [16-18] 18  BP: (102-141)/(51-78) 132/74  SpO2:  [83 %-98 %] 94 %  I/O last 3 completed shifts:  In: 550 [P.O.:550]  Out: 1050 [Urine:1050]    General: Alert, resting comfortably in NAD/NTA. Cooperative  HEENT: NC/AT, sclerae anicteric. Oral mucosa moist   Neck: Neck supple, Trachea midline  Pulm: NLB on 3L NC, no tachypnea/dyspnea, no wheezing  CV: RRR by RP, non-cyanotic, no significant LE edema. RP 2+ b/l  GI/ABD: Soft, non- distended, non- tender to palpation, no guarding or rebound tenderness.  Incision: Right uVats incicison is C/D/I, minimal strike through on dressings, no erythema/cellulitic changes  Extrem: MA4E, no obvious deformities  Neuro/Psych: A/Ox3, no gross neurologic deficits. Appropriate mood/affect  Skin: Warm and well  perfused    Consultations This Hospital Stay   PHYSICAL THERAPY ADULT IP CONSULT  OCCUPATIONAL THERAPY ADULT IP CONSULT  NURSING TO CONSULT FOR VASCULAR ACCESS CARE IP CONSULT  CARE MANAGEMENT / SOCIAL WORK IP CONSULT    Time Spent on this Encounter   I have spent less than 30 minutes on this discharge.    Discharge Orders      X-ray Chest 2 vws*     Primary Care - Care Coordination Referral      Reason for your hospital stay    RUL robot wedge resection and mediastinal lymphadenectomy     Activity    Your activity upon discharge: no lifting, driving, or strenuous exercise for 4-6 weeks     Discharge Instructions    THORACIC SURGERY DISCHARGE INSTRUCTIONS    Diet and Fluids/oral intake:   - Regular diet  as tolerated.  Start with softer foods and slowly add harder textures.  It may take several days to a couple weeks to return to your previous, pre-operative diet.   - Stay hydrated.   - Take over the counter fiber (metamucil or benefiber) and stool softeners (Miralax, docusate or senna) if becoming constipated.     Activity  - No strenuous exercise for 3-4 weeks  - No lifting, pushing, pulling more than 15-20 pounds for 3-4 weeks  - Do not strain with bowel movements  - Do not drive until you can press the brake pedal quickly and fully without pain  - Do not operate a motor vehicle while taking narcotic pain medications    - Activity as tolerated, no strenous activity or heavy lifting for 1 week      If your plans upon discharge include prolonged periods of sitting (i.e a lengthy car or plane ride), it is highly beneficial to get up and walk at least once per hour to help prevent swelling and blood clots. You can also consider compression stockings in addition to walking at least once an hour.     Medications and Pain control  - We recommend to most patients that they take over-the-counter tylenol and ibuprofen for pain control unless otherwise contraindicated by their primary care provider.   - Do not take any  additional Tylenol (acetaminophen) while using a narcotic pain medication which includes acetaminophen. Oxycodone does NOT include acetominophen which you are prescribed  - Do not take more than 4,000mg of acetaminophen in any 24 hour period, as this can cause liver damage. We typically recommend three regular strength tylenol (325mg x3 pills) or two extra strength tylenol (500mg x2) every 8 hours as needed for pain control. Please check your over the counter tylenol bottle to determine which strength of tylenol dose you have.  - Take stool softeners such as Senna or Miralax while you are using narcotics, but stop if you develop diarrhea  - Wean yourself off of narcotic pain medications - we recommend using oxycodone only if your pain is not managed with tylenol/ibuprofen.   - No driving while taking narcotic pain medication.  - If you have known ulcer problems, or kidney trouble (elevated creatinine) do not take the ibuprofen or other NSAIDS such as Alieve.    A brief note on narcotic pain medications: Typically, patients will need this at night to help them rest and/or sleep. It is OK! To use the oxycodone for severe pain, this is what it is prescribed for. There is typically little harm using this medication as prescribed for severe, uncontrollable pain in the short-term (3-5 days) following surgery. If your pain is well controlled with tylenol/ibuprofen and other over the counter pain medications, then we would recommend you not use the oxycodone. If you need to dispose of extra unused oxycodone or other narcotic medications, you can drop the medication at one of our medication return boxes found in most [facility] pharmacies, or you can bring them with you to your next clinic appointment and we will dispose of it for you.     Incisions  - The type of wound closure used for your incision:  Steri-strips  - You may remove wound dressing 24 hours after surgery  - You may shower and get incisions wet starting 24 hrs  "after surgery  - Do not scrub incisions or submerge wounds (e.g. bath, pool, hot tub, etc.) for 2 weeks or until the glue or steri-strips have come off  - Avoid applying any lotions or ointments near the areas where the Dermabond glue was used  - Steri-strips will fall off on their own in approximately 7-10 days  - Leave incision open to air.  Cover with gauze only if needed for comfort or to protect clothing from drainage      Follow-Up:  - Call your primary care provider to touch base regarding your recent admission. Let the office staff know you were recently in the hospital and need close follow up - this should get you an appointment within 5-7 days.     - You will also follow up with Dr. Pandya or cameron Hill in the Thoracic surgery clinic in 1-2 weeks. You will be notified by the office staff in 2-3 business days of your scheduled appointment time. If you have not heard from us please call our clinic at [phone] to reach our staff to assist you with setting up an appointment.     -  If you are receiving home care, please inform your home care nurse (RN) of our contact number above.    - Call or return sooner than your regularly scheduled visit if you develop any of the following: fever >101.5, uncontrolled pain, uncontrolled nausea or vomiting, as well as increased redness, swelling, or drainage from your wound.     For Emergencies, please call [phone]    For other Questions or Concerns;     A.) During weekday working hours (Monday through Friday 8am to 4:30pm)   call [phone] and ask to speak to a clinical nurse specialist.     B.) At nights (after 4:30pm), on weekends, or if urgent call [phone] and   tell the  \"I would like to page job code [phone], the thoracic surgery   fellow on call, please.\"     [facility] Follow-up and recommended labs and tests    Follow up with primary care provider, Aye Morejon, within 7 days for hospital follow- up and to follow up on results.  No " follow up labs or test are needed.    Follow up with Dr. Lanza or Kyleigh Hill NP at the thoracic surgery clinic in roughly 2-4 weeks at [address] [facility].      Appointments on [place] and/or [facility] (with [facility] or [facility] provider or service). Call [phone] if you haven't heard regarding these appointments within 7 days of discharge.     Other Respiratory Supplies Order    DME Documentation:   Describe the reason for need to support medical necessity: oxygen requirement status post lung resection.     I, the undersigned, certify that the above prescribed supplies are medically necessary for this patient and is both reasonable and necessary in reference to accepted standards of medical and necessary in reference to accepted standards of medical practice in the treatment of this patient's condition and is not prescribed as a convenience.     Oxygen Order    Oxygen Documentation  I certify that this patient, Mimi Rivera has been under my care (or a nurse practitioner or physican's assistant working with me). This is the face-to-face encounter for oxygen medical necessity.      At the time of this encounter, I have reviewed the qualifying testing and have determined that supplemental oxygen is reasonable and necessary and is expected to improve the patient's condition in a home setting.         Patient has continued oxygen desaturation due to COPD    If portability is ordered, is the patient mobile within the home? yes    Was this visit performed as a telehealth visit: No     Diet    Follow this diet upon discharge: Orders Placed This Encounter      Advance Diet as Tolerated: Regular Diet Adult     Discharge Medications   Current Discharge Medication List        START taking these medications    Details   acetaminophen (TYLENOL) 500 MG tablet Take 1-2 tablets (500-1,000 mg) by mouth every 8 hours as needed for mild pain  Qty: 42 tablet, Refills: 0    Associated Diagnoses: Malignant neoplasm of  upper lobe of right lung (H)      enoxaparin ANTICOAGULANT (LOVENOX) 40 MG/0.4ML syringe Inject 0.4 mLs (40 mg) Subcutaneous daily for 7 days  Qty: 2.8 mL, Refills: 0    Associated Diagnoses: Malignant neoplasm of upper lobe of right lung (H)      methocarbamol (ROBAXIN) 500 MG tablet Take 1 tablet (500 mg) by mouth 3 times daily as needed for muscle spasms  Qty: 21 tablet, Refills: 0    Associated Diagnoses: Malignant neoplasm of upper lobe of right lung (H)      oxyCODONE (ROXICODONE) 5 MG tablet Take 1 tablet (5 mg) by mouth every 8 hours as needed for pain  Qty: 40 tablet, Refills: 0    Associated Diagnoses: Malignant neoplasm of upper lobe of right lung (H)      polyethylene glycol (MIRALAX) 17 GM/Dose powder Take 17 g by mouth daily for 7 days  Qty: 119 g, Refills: 0    Associated Diagnoses: Malignant neoplasm of upper lobe of right lung (H)      senna-docusate (SENOKOT-S/PERICOLACE) 8.6-50 MG tablet Take 2 tablets by mouth 2 times daily  Qty: 30 tablet, Refills: 0    Associated Diagnoses: Malignant neoplasm of upper lobe of lung, unspecified laterality (H)           CONTINUE these medications which have NOT CHANGED    Details   amLODIPine (NORVASC) 10 MG tablet Take 1 tablet (10 mg) by mouth every evening  Qty: 90 tablet, Refills: 3    Associated Diagnoses: Hypertension goal BP (blood pressure) < 140/90      EPINEPHrine (ANY BX GENERIC EQUIV) 0.3 MG/0.3ML injection 2-pack Inject 0.3 mLs (0.3 mg) into the muscle as needed for anaphylaxis May repeat one time in 5-15 minutes if response to initial dose is inadequate.  Qty: 2 each, Refills: 0    Associated Diagnoses: Bee sting reaction, accidental or unintentional, initial encounter      esomeprazole (NEXIUM) 20 MG DR capsule Take 1 capsule (20 mg) by mouth 2 times daily Take 30-60 minutes before eating.  Qty: 60 capsule, Refills: 11    Associated Diagnoses: Gastroesophageal reflux disease without esophagitis      Fluticasone-Umeclidin-Vilant (TRELEGY ELLIPTA)  100-62.5-25 MCG/ACT oral inhaler INHALE ONE PUFF BY MOUTH EVERY DAY  Qty: 60 each, Refills: 3    Associated Diagnoses: Chronic bronchitis, unspecified chronic bronchitis type (H)      levalbuterol (XOPENEX HFA) 45 MCG/ACT inhaler Inhale 2 puffs into the lungs every 4 hours as needed for shortness of breath / dyspnea or wheezing  Qty: 1 Inhaler, Refills: 1    Associated Diagnoses: Chronic obstructive pulmonary disease, unspecified COPD type (H)      levalbuterol (XOPENEX) 1.25 MG/3ML neb solution Take 3 mLs (1.25 mg) by nebulization every 4 hours as needed for shortness of breath or wheezing  Qty: 390 mL, Refills: 1    Associated Diagnoses: Chronic bronchitis, unspecified chronic bronchitis type (H); Acute exacerbation of chronic bronchitis (H); Panlobular emphysema (H); COPD exacerbation (H)      azithromycin (ZITHROMAX) 250 MG tablet Take 1 tablet (250 mg) by mouth daily (Used to reduce lung inflammation and flare-ups)  Qty: 30 tablet, Refills: 3    Associated Diagnoses: Chronic bronchitis, unspecified chronic bronchitis type (H)      cetirizine (ZYRTEC) 10 MG tablet Take 1 tablet (10 mg) by mouth daily  Qty: 7 tablet, Refills: 0    Associated Diagnoses: Bee sting reaction, accidental or unintentional, initial encounter           Allergies   No Known Allergies  Data   Most Recent 3 CBC's:  Recent Labs   Lab Test 04/29/24  0615 04/28/24  0648 04/27/24  0645   WBC 7.4 9.6 14.9*   HGB 11.6* 12.2 13.4   MCV 97 98 98    227 240      Most Recent 3 BMP's:  Recent Labs   Lab Test 04/29/24  0615 04/28/24  0648 04/27/24  0645    138 136   POTASSIUM 3.9 4.1 4.5   CHLORIDE 101 104 102   CO2 28 26 25   BUN 9.9 13.8 14.8   CR 0.64 0.60 0.59   ANIONGAP 7 8 9   JI 9.0 9.1 9.1   GLC 96 103* 129*     Most Recent 2 LFT's:  Recent Labs   Lab Test 04/14/21  1501   AST 19   ALT 21   ALKPHOS 83   BILITOTAL 0.5     Most Recent INR's and Anticoagulation Dosing History:  Anticoagulation Dose History           No data to  display              Most Recent 3 Troponin's:  Recent Labs   Lab Test 03/18/19  1542   TROPI <0.015     Most Recent Cholesterol Panel:  Recent Labs   Lab Test 01/06/21  1107   CHOL 210*   *   HDL 55   TRIG 50     Most Recent 6 Bacteria Isolates From Any Culture (See EPIC Reports for Culture Details):  Recent Labs   Lab Test 09/04/18  1556 12/15/17  1348 10/24/16  1708   CULT No beta hemolytic Streptococcus Group A isolated No beta hemolytic Streptococcus Group A isolated No Beta Streptococcus isolated     Most Recent TSH, T4 and A1c Labs:  Recent Labs   Lab Test 11/14/17  1408   A1C 5.2

## 2024-04-27 NOTE — PHARMACY-ADMISSION MEDICATION HISTORY
Pharmacist Admission Medication History    Admission medication history is complete. The information provided in this note is only as accurate as the sources available at the time of the update.    Information Source(s): Fill history ([employer]); Pre-op nursing documented last times of administration; Patient     Changes made to PTA medication list:  Added: None  Deleted: None  Changed: None    Pertinent Information: Consistent fill history at [facility] in [place], [place]    Prior to Admission medications    Medication Sig Last Dose Taking? Auth Provider Long Term End Date   amLODIPine (NORVASC) 10 MG tablet Take 1 tablet (10 mg) by mouth every evening 4/25/2024 Yes Ishmael Bridges MD Yes    EPINEPHrine (ANY BX GENERIC EQUIV) 0.3 MG/0.3ML injection 2-pack Inject 0.3 mLs (0.3 mg) into the muscle as needed for anaphylaxis May repeat one time in 5-15 minutes if response to initial dose is inadequate. More than a month Yes Félix Cortes MD     esomeprazole (NEXIUM) 20 MG DR capsule Take 1 capsule (20 mg) by mouth 2 times daily Take 30-60 minutes before eating. 4/24/2024 Yes Ishmael Bridges MD No    Fluticasone-Umeclidin-Vilant (TRELEGY ELLIPTA) 100-62.5-25 MCG/ACT oral inhaler INHALE ONE PUFF BY MOUTH EVERY DAY  Patient taking differently: Inhale 1 puff into the lungs daily (with lunch) 4/25/2024 Yes Ishmael Bridges MD     levalbuterol (XOPENEX HFA) 45 MCG/ACT inhaler Inhale 2 puffs into the lungs every 4 hours as needed for shortness of breath / dyspnea or wheezing Past Week at [facility] Yes Aye Morejon MD Yes    levalbuterol (XOPENEX) 1.25 MG/3ML neb solution Take 3 mLs (1.25 mg) by nebulization every 4 hours as needed for shortness of breath or wheezing Past Week at PR[facility] Yes Ishmael Bridges MD Yes    azithromycin (ZITHROMAX) 250 MG tablet Take 1 tablet (250 mg) by mouth daily (Used to reduce lung inflammation and flare-ups)  Patient taking differently: Take 250 mg by mouth daily (with lunch) 4/23/2024  Ishmael Bridges  MD JALEN     cetirizine (ZYRTEC) 10 MG tablet Take 1 tablet (10 mg) by mouth daily  Patient taking differently: Take 10 mg by mouth daily (with lunch) 4/23/2024  Ishmael Bridges MD       Medication History Completed By: Arpan Gardner [facility] 4/26/2024 7:00 PM

## 2024-04-27 NOTE — PLAN OF CARE
"Goal Outcome Evaluation:       BP (!) 140/81 (BP Location: Left arm)   Pulse 66   Temp 98.6  F (37  C) (Oral)   Resp 18   Ht 1.549 m (5' 1\")   Wt 50.9 kg (112 lb 3.4 oz)   LMP 09/22/2010   SpO2 93%   BMI 21.20 kg/m      Plan of Care Reviewed with: Patient     Overall Patient Progress: improving    1299-1972  PT noted to be comfortable on this shift, VSS, but cont on 3L via NC, c/o of SOB upon exertion.  PT also c/o abd incisional pain PRN IV dilaudid administer with schedule tylenol and Prn robaxin. R PIV,  R CT to waterseal, denies N/V, voiding spon, cont  POC.                  "

## 2024-04-27 NOTE — PROGRESS NOTES
04/27/24 0928   Appointment Info   Signing Clinician's Name / Credentials (OT) Alis Arenas OTR/L   Rehab Comments (OT) thoracotomy prec   Living Environment   People in Home spouse   Current Living Arrangements house   Home Accessibility stairs to enter home;stairs within home   Number of Stairs, Main Entrance 2   Number of Stairs, Within Home, Primary greater than 10 stairs  (flight)   Stair Railings, Within Home, Primary railings safe and in good condition   Transportation Anticipated family or friend will provide   Living Environment Comments Pt lives with her  in a 2SH. Pt's bed/bath are on 2nd level, but there is a bathroom on main floor and pt reports she is able to sleep on the couch is necessary. Pt has both a tub shower and walk-in shower on the 2nd level.   Self-Care   Usual Activity Tolerance good   Current Activity Tolerance fair   Regular Exercise No   Equipment Currently Used at Home none   Fall history within last six months no   Activity/Exercise/Self-Care Comment Pt is IND with all ADLs at baseline and active with chores around the house.   Instrumental Activities of Daily Living (IADL)   Previous Responsibilities meal prep;housekeeping;laundry;shopping;yardwork;medication management;finances;driving   IADL Comments Pt is IND with all IADLs at baseline, but  able to assist   General Information   Onset of Illness/Injury or Date of Surgery 04/26/24   Referring Physician Marcy Gautam MD   Patient/Family Therapy Goal Statement (OT) return home and pain mgmt   Additional Occupational Profile Info/Pertinent History of Current Problem Mimi Rivera is a 68 year old female with h/o atypical carcinoid tumor of RUL now POD#0 s/p robot-assisted thoracoscopic RUL wedge resection and MLND.   Existing Precautions/Restrictions thoracotomy   Left Upper Extremity (Weight-bearing Status) full weight-bearing (FWB)   Right Upper Extremity (Weight-bearing Status) partial weight-bearing  (PWB)  (10#)   Left Lower Extremity (Weight-bearing Status) full weight-bearing (FWB)   Right Lower Extremity (Weight-bearing Status) full weight-bearing (FWB)   General Observations and Info Pt sitting EOB, agreeable to therapy   Cognitive Status Examination   Orientation Status orientation to person, place and time   Affect/Mental Status (Cognitive) WFL   Follows Commands WFL   Cognitive Status Comments intact   Visual Perception   Visual Impairment/Limitations WFL   Sensory   Sensory Quick Adds sensation intact   Pain Assessment   Patient Currently in Pain Yes, see Vital Sign flowsheet   Posture   Posture forward head position   Range of Motion Comprehensive   General Range of Motion bilateral upper extremity ROM WFL   Comment, General Range of Motion RUE limited by pain at surgical site   Strength Comprehensive (MMT)   General Manual Muscle Testing (MMT) Assessment no strength deficits identified   Muscle Tone Assessment   Muscle Tone Quick Adds No deficits were identified   Coordination   Upper Extremity Coordination No deficits were identified   Gross Motor Coordination No deficits were identified   Fine Motor Coordination No deficits were identified   Bed Mobility   Bed Mobility No deficits identified   Transfers   Transfers sit-stand transfer   Sit-Stand Transfer   Sit-Stand [place] (Transfers) supervision   Sit/Stand Transfer Comments required raised bed height   Balance   Balance Assessment standing dynamic balance   Balance Comments SBA   Activities of Daily Living   BADL Assessment/Intervention lower body dressing;grooming;toileting   Lower Body Dressing Assessment/Training   Comment, (Lower Body Dressing) per clinical judgment, limited by pain   [place] Level (Lower Body Dressing) minimum assist (75% patient effort)   Grooming Assessment/Training   [place] Level (Grooming) set up;supervision   Comment, (Grooming) per clinical judgment   Toileting   Comment, (Toileting) Per clinical  judgment, min A to stand from toilet   [place] Level (Toileting) minimum assist (75% patient effort)   Clinical Impression   Criteria for Skilled Therapeutic Interventions Met (OT) Yes, treatment indicated   OT Diagnosis impaired activity tolerance and ROM   OT Problem List-Impairments impacting ADL problems related to;activity tolerance impaired;pain;post-surgical precautions;range of motion (ROM)   Assessment of Occupational Performance 1-3 Performance Deficits   Identified Performance Deficits dressing, toileting, home mgmt   Planned Therapy Interventions (OT) ADL retraining;IADL retraining;stretching;ROM;transfer training;progressive activity/exercise;risk factor education   Clinical Decision Making Complexity (OT) problem focused assessment/low complexity   Risk & Benefits of therapy have been explained evaluation/treatment results reviewed;risks/benefits reviewed;current/potential barriers reviewed;participants voiced agreement with care plan;participants included;patient   Clinical Impression Comments Pt below baseline level of function, limited mostly by pain and activity tolerance. Pt will beneift from skilled OT to progress toward baseline level of function.   OT Total Evaluation Time   OT Eval, Low Complexity Minutes (45932) 8   OT Goals   Therapy Frequency (OT) 6 times/week   OT Predicted Duration/Target Date for Goal Attainment 05/03/24   OT Goals OT Goal 1;Aerobic Activity;Toilet Transfer/Toileting;OT Goal 2   OT: Toilet Transfer/Toileting Independent;toilet transfer;cleaning and garment management   OT: Perform aerobic activity with stable cardiovascular response continuous activity;10 minutes;ambulation   OT: Goal 1 Pt will demonstrate independence with home exercise program by time of discharge.   OT: Goal 2 Pt will safely ascend/descend 12 steps with supervision only prior discharge.   OT Discharge Planning   OT Plan progress activity tolerance, review thoracotomy prec/exercises, stairs   OT  Discharge Recommendation (DC Rec) home with assist   OT Rationale for DC Rec Pt currently limited by activity tolerance, pain, and O2 needs. Anticipate once medically ready for d/c, pt will be safe to return home w/ assist from her  prn. At this time, pt required 3L O2 to recover SpO2 after ambulating ~30'.   OT Brief overview of current status SBA; mon O2 when up   OT Equipment Needed at Discharge other (see comments)  (TBD)   Total Session Time   Timed Code Treatment Minutes 21   Total Session Time (sum of timed and untimed services) 29

## 2024-04-27 NOTE — PLAN OF CARE
Goal Outcome Evaluation:      Plan of Care Reviewed With: patient    Overall Patient Progress: improvingOverall Patient Progress: improving    Vitals: Stable on 3 L oxygen nasal cannula  Neuro: A&Ox4  Mobility: Standby assist  Pain/Nausea: Pain controlled with meds. Denies nausea.   Diet & GI: Regular diet. No BM this shift.  Labs: Monitored.   LDAs: R PIV, R CT to water seal  Skin/incisions: All dressings CDI  Respiratory: No acute changes this shift.  Cardiac: No acute changes this shift.  : Voiding appropriately and spontaneously.     NEW CHANGES: Pt arrived from PACU just prior to shift. Diet advanced quickly with good tolerance. Pt wary of opioids early in shift, but agreed to medication appropriate to pain rating.     Continue to implement Care Plan.    Ishmael Moe RN

## 2024-04-27 NOTE — PLAN OF CARE
"Goal Outcome Evaluation:    Plan of Care Reviewed With: patient  Overall Patient Progress: improvingOverall Patient Progress: improving    /59 (BP Location: Left arm)   Pulse 70   Temp 98.2  F (36.8  C) (Oral)   Resp 16   Ht 1.549 m (5' 1\")   Wt 50.9 kg (112 lb 3.4 oz)   LMP 09/22/2010   SpO2 90%   BMI 21.20 kg/m     Alert and oriented x4.  C/o feeling weak.   Reports fair pain control with Tylenol, Lido patch, Advil, Robaxin and oxy.   OOB x1. Sitting on edge of bed for meals.   CT removed. Dressing intact.   Fair po intake. No c/o nausea.   Lovenox teaching on going. Print out given - and demo done.   Stable - reports feeling tired and weak.   Possible discharge today or tomorrow.         "

## 2024-04-27 NOTE — PROGRESS NOTES
"Thoracic Surgery  [facility]  4/27/2024    Subjective:  No acute events overnight, some pain this morning that was poorly controlled. Otherwise tolerating clears w/o issue, no N/V. Voiding, no evidence of flatus, Ambulatory.       Objective  /59 (BP Location: Left arm)   Pulse 70   Temp 98.2  F (36.8  C) (Oral)   Resp 16   Ht 1.549 m (5' 1\")   Wt 50.9 kg (112 lb 3.4 oz)   LMP 09/22/2010   SpO2 90%   BMI 21.20 kg/m        General: Alert , resting comfortably in NAD/NTA . Cooperative  Pulm: NLB on 1-2L NC, no tachypnea/dyspnea, no wheezing  CV: RRR by RP, non-cyanotic, no significant LE edema.   GI/ABD: Soft, non- distended, non- tender to palpation , no guarding or rebound.  Skin: Warm and well perfused  Incision: C/D/I,Dressings over steris in place, no strike through, no erythema/cellulitic changes    Imaging:  AM CXR: Stable AM CXR -     Chest tubes: Right 24 Fr Straight chest tube.     A/P:    Mimi Rivera is a 68 year old female with h/o atypical carcinoid tumor of RUL now POD#1 s/p robot-assisted thoracoscopic RUL wedge resection and MLND.     Doing well this morning but with some ongoing pain. Will pull chest tube, optimize pain regimen and if stable this PM will plan for discharge.     - Continue MMPC, add ibuprofen  - Chest tube removal w/ post-pull CXR  - Restart PTA meds as able.   - Bowel reg  - discontinue IVF   Dispo Likely today or tomorrow.       Patient seen on rounds with fellow, Dr. Gautam, who will discuss with staff.    Please page if questions,    Jaswinder Fitch MD, MS  PGY-3, General Surgery    "

## 2024-04-28 ENCOUNTER — APPOINTMENT (OUTPATIENT)
Dept: OCCUPATIONAL THERAPY | Facility: CLINIC | Age: 69
DRG: 168 | End: 2024-04-28
Attending: THORACIC SURGERY (CARDIOTHORACIC VASCULAR SURGERY)
Payer: COMMERCIAL

## 2024-04-28 ENCOUNTER — APPOINTMENT (OUTPATIENT)
Dept: GENERAL RADIOLOGY | Facility: CLINIC | Age: 69
DRG: 168 | End: 2024-04-28
Attending: STUDENT IN AN ORGANIZED HEALTH CARE EDUCATION/TRAINING PROGRAM
Payer: COMMERCIAL

## 2024-04-28 LAB
ANION GAP SERPL CALCULATED.3IONS-SCNC: 8 MMOL/L (ref 7–15)
BUN SERPL-MCNC: 13.8 MG/DL (ref 8–23)
CALCIUM SERPL-MCNC: 9.1 MG/DL (ref 8.8–10.2)
CHLORIDE SERPL-SCNC: 104 MMOL/L (ref 98–107)
CREAT SERPL-MCNC: 0.6 MG/DL (ref 0.51–0.95)
DEPRECATED HCO3 PLAS-SCNC: 26 MMOL/L (ref 22–29)
EGFRCR SERPLBLD CKD-EPI 2021: >90 ML/MIN/1.73M2
ERYTHROCYTE [DISTWIDTH] IN BLOOD BY AUTOMATED COUNT: 13.1 % (ref 10–15)
GLUCOSE SERPL-MCNC: 103 MG/DL (ref 70–99)
HCT VFR BLD AUTO: 36.7 % (ref 35–47)
HGB BLD-MCNC: 12.2 G/DL (ref 11.7–15.7)
MCH RBC QN AUTO: 32.7 PG (ref 26.5–33)
MCHC RBC AUTO-ENTMCNC: 33.2 G/DL (ref 31.5–36.5)
MCV RBC AUTO: 98 FL (ref 78–100)
PLATELET # BLD AUTO: 227 10E3/UL (ref 150–450)
POTASSIUM SERPL-SCNC: 4.1 MMOL/L (ref 3.4–5.3)
RBC # BLD AUTO: 3.73 10E6/UL (ref 3.8–5.2)
SODIUM SERPL-SCNC: 138 MMOL/L (ref 135–145)
WBC # BLD AUTO: 9.6 10E3/UL (ref 4–11)

## 2024-04-28 PROCEDURE — 71045 X-RAY EXAM CHEST 1 VIEW: CPT

## 2024-04-28 PROCEDURE — 999N000147 HC STATISTIC PT IP EVAL DEFER

## 2024-04-28 PROCEDURE — 80048 BASIC METABOLIC PNL TOTAL CA: CPT | Performed by: STUDENT IN AN ORGANIZED HEALTH CARE EDUCATION/TRAINING PROGRAM

## 2024-04-28 PROCEDURE — 250N000013 HC RX MED GY IP 250 OP 250 PS 637

## 2024-04-28 PROCEDURE — 85027 COMPLETE CBC AUTOMATED: CPT | Performed by: STUDENT IN AN ORGANIZED HEALTH CARE EDUCATION/TRAINING PROGRAM

## 2024-04-28 PROCEDURE — 71045 X-RAY EXAM CHEST 1 VIEW: CPT | Mod: 26 | Performed by: RADIOLOGY

## 2024-04-28 PROCEDURE — 97530 THERAPEUTIC ACTIVITIES: CPT | Mod: GO

## 2024-04-28 PROCEDURE — 36415 COLL VENOUS BLD VENIPUNCTURE: CPT | Performed by: STUDENT IN AN ORGANIZED HEALTH CARE EDUCATION/TRAINING PROGRAM

## 2024-04-28 PROCEDURE — 97110 THERAPEUTIC EXERCISES: CPT | Mod: GO

## 2024-04-28 PROCEDURE — 120N000002 HC R&B MED SURG/OB UMMC

## 2024-04-28 PROCEDURE — 250N000013 HC RX MED GY IP 250 OP 250 PS 637: Performed by: STUDENT IN AN ORGANIZED HEALTH CARE EDUCATION/TRAINING PROGRAM

## 2024-04-28 PROCEDURE — 250N000011 HC RX IP 250 OP 636: Performed by: STUDENT IN AN ORGANIZED HEALTH CARE EDUCATION/TRAINING PROGRAM

## 2024-04-28 RX ORDER — FUROSEMIDE 20 MG
20 TABLET ORAL ONCE
Status: COMPLETED | OUTPATIENT
Start: 2024-04-28 | End: 2024-04-28

## 2024-04-28 RX ADMIN — OXYCODONE HYDROCHLORIDE 10 MG: 10 TABLET ORAL at 06:05

## 2024-04-28 RX ADMIN — METHOCARBAMOL 500 MG: 500 TABLET ORAL at 06:05

## 2024-04-28 RX ADMIN — ALBUTEROL SULFATE 4 PUFF: 90 AEROSOL, METERED RESPIRATORY (INHALATION) at 19:59

## 2024-04-28 RX ADMIN — UMECLIDINIUM 1 PUFF: 62.5 AEROSOL, POWDER ORAL at 08:43

## 2024-04-28 RX ADMIN — METHOCARBAMOL 500 MG: 500 TABLET ORAL at 21:01

## 2024-04-28 RX ADMIN — ACETAMINOPHEN 650 MG: 325 TABLET, FILM COATED ORAL at 15:46

## 2024-04-28 RX ADMIN — IBUPROFEN 400 MG: 200 TABLET, FILM COATED ORAL at 23:45

## 2024-04-28 RX ADMIN — OXYCODONE HYDROCHLORIDE 10 MG: 10 TABLET ORAL at 15:45

## 2024-04-28 RX ADMIN — PANTOPRAZOLE SODIUM 40 MG: 40 TABLET, DELAYED RELEASE ORAL at 08:43

## 2024-04-28 RX ADMIN — ALBUTEROL SULFATE 4 PUFF: 90 AEROSOL, METERED RESPIRATORY (INHALATION) at 12:45

## 2024-04-28 RX ADMIN — ACETAMINOPHEN 650 MG: 325 TABLET, FILM COATED ORAL at 08:43

## 2024-04-28 RX ADMIN — OXYCODONE HYDROCHLORIDE 10 MG: 10 TABLET ORAL at 21:12

## 2024-04-28 RX ADMIN — FUROSEMIDE 20 MG: 20 TABLET ORAL at 16:28

## 2024-04-28 RX ADMIN — ENOXAPARIN SODIUM 40 MG: 40 INJECTION SUBCUTANEOUS at 08:43

## 2024-04-28 RX ADMIN — ACETAMINOPHEN 975 MG: 325 TABLET, FILM COATED ORAL at 23:41

## 2024-04-28 RX ADMIN — GABAPENTIN 100 MG: 100 CAPSULE ORAL at 21:27

## 2024-04-28 RX ADMIN — ACETAMINOPHEN 975 MG: 325 TABLET, FILM COATED ORAL at 01:29

## 2024-04-28 RX ADMIN — ALBUTEROL SULFATE 4 PUFF: 90 AEROSOL, METERED RESPIRATORY (INHALATION) at 08:43

## 2024-04-28 RX ADMIN — ALBUTEROL SULFATE 2 PUFF: 90 AEROSOL, METERED RESPIRATORY (INHALATION) at 16:28

## 2024-04-28 RX ADMIN — FLUTICASONE FUROATE AND VILANTEROL TRIFENATATE 1 PUFF: 100; 25 POWDER RESPIRATORY (INHALATION) at 08:43

## 2024-04-28 RX ADMIN — OXYCODONE HYDROCHLORIDE 5 MG: 5 TABLET ORAL at 01:30

## 2024-04-28 ASSESSMENT — ACTIVITIES OF DAILY LIVING (ADL)
ADLS_ACUITY_SCORE: 18
DEPENDENT_IADLS:: INDEPENDENT
ADLS_ACUITY_SCORE: 18

## 2024-04-28 NOTE — PROGRESS NOTES
Oxygen Documentation  I certify that this patient, Mimi Rivera has been under my care (or a nurse practitioner or physican's assistant working with me). This is the face-to-face encounter for oxygen medical necessity.      At the time of this encounter, I have reviewed the qualifying testing and have determined that supplemental oxygen is reasonable and necessary and is expected to improve the patient's condition in a home setting.           Patient has been assessed for Home Oxygen needs. Oxygen readings:     *Pulse oximetry (SpO2) = 87% on room air at rest while awake.     *SpO2 improved to 95% on 2 liters/minute at rest.     *SpO2 = NA% on room air during activity/with exercise.     *SpO2 improved to 98% on 3 liters/minute during activity/with exercise.       Patient has continued oxygen desaturation due to COPD    If portability is ordered, is the patient mobile within the home? yes    Was this visit performed as a telehealth visit: No

## 2024-04-28 NOTE — PLAN OF CARE
Goal Outcome Evaluation:      Plan of Care Reviewed With: patient    Overall Patient Progress: improvingOverall Patient Progress: improving    Outcome Evaluation: Px alert and oriented x4, able to make needs known. c/o headache  and right flank pain. scheduled tylenol given. Going home today Discharge instructions given and was undestood. Lovenox teaching done. Ambulated in the hallway with therapy.  Home O2 test done, started at RA but px was desating to 87% at rest. O2 given at 2 Lpm and sat came up to 95%. Ambulated around the unit and O2 came down to 75%, increased a little after rest but only up to low 80's. O2 bumped up to 3Lpm and sat went up to 93-98%, resumed walking and went back to room. Rested on bed, O2 came down to 94-95% at 3 Lpm. Provider made aware of the result. A bit anxious today, because of discharge order. Provider saw her and witnessed the desat at  while she is awake. O2 applied and discharge was moved. For probable discharge tomorrow. C/o headache, right flank and chills this afternoon, temp checked and was 98.3, warm blankets offered and accepted, PRN oxycodone and scheduled tylenol given and was effective, px slept after eating dinner. Px home meds for discharge sent to [facility] for [facility] keeping. Continue with POC.

## 2024-04-28 NOTE — PROGRESS NOTES
Care Management Follow Up    Length of Stay (days): 2    Expected Discharge Date: 4/29/24     Concerns to be Addressed: New home oxygen needed    Patient plan of care discussed at interdisciplinary rounds: Yes    Anticipated Discharge Disposition: Home     Anticipated Discharge Services: None  Anticipated Discharge DME: Oxygen    Patient/family educated on Medicare website which has current facility and service quality ratings:  yes  Education Provided on the Discharge Plan:  yes  Patient/Family in Agreement with the Plan:  yes    Referrals Placed by CM/SW:  Home O2 with [facility]  Private pay costs discussed: Not applicable    Additional Information:  Pt was expected to discharge today, but felt uncomfortable with discharge, which has been delayed until tomorrow. Writer had reached out to [facility] for discharge O2. Orders placed and documentation of walk test and face to face progress notes completed. [facility] updated that pt will discharge tomorrow and requested a call from Care Management tomorrow to verify and coordinate delivery.     Luis Agustin RNCC  Covering for 7B  Phone [phone]  Pager [phone]      RN Care Coordinator     Social Work and Care Management Department      SEARCHABLE in [facility] - search CARE COORDINATOR       [place] ([phone]) Saturday & Sunday; ([phone]) [facility] Holidays     Units: 5A Onc Vocera & 5C Vocera Pager: [phone]    Units: 6B Vocera & 6C Vocera  Pager: [phone]    Units: 7A SOT RNCC Vocera, 7B Med Surg Vocera, & 7C Med Surg Vocera  Pager: [phone]    Units: 6A Vocera & 4A CVICU Vocera, 4C MICU Vocera, and 4E SICU Vocera   Pager: [phone]    Units: 5 Ortho Vocera & 5 Med Surg Vocera  Pager: [phone]    Units: 6 Med Surg Vocera & 8 Med Surg Vocera  Pager [phone]

## 2024-04-28 NOTE — PLAN OF CARE
Physical Therapy: Orders received. Chart reviewed and discussed with OT.? Physical Therapy not indicated due to pt mobilizing well post-op, ambulating with SBA and no  AD, good stability and no evident balance concerns.? No acute IP PT needs identified. Defer discharge recommendations to Occupational Therapy.? Will complete orders.

## 2024-04-28 NOTE — PLAN OF CARE
"Goal Outcome Evaluation:    /52 (BP Location: Left arm)   Pulse 75   Temp 97.8  F (36.6  C) (Oral)   Resp 16   Ht 1.549 m (5' 1\")   Wt 50.9 kg (112 lb 3.4 oz)   LMP 09/22/2010   SpO2 94%   BMI 21.20 kg/m           Plan of Care Reviewed with: Patient      Overall Patient Progress: improving     0845-1389  PT noted to be comfortable on this shift, VSS, but cont on 1L via NC, no SOB or resp distress noted. PT also c/o abd incisional pain PRN oxycodone, PRN robaxin  with schedule tylenol administer.  Voiding spon, denies N/V, R CT site CDI, lap site CDI, PETER, no acute events noted.                       "

## 2024-04-28 NOTE — PROGRESS NOTES
Patient seen     No acute events overnight.     Reports that pain is much better since chest tube pull. O2 sats 91 - 92% on room air this morning.     O/E -    Gen: Elderly looking female, not in acute distress  Resp: Good air entry bilaterally  Ext: No peripheral edema    Plan:   - Discharge home today.  - Outpatient follow up with Thoracic surgery    Marcy Gautam MD MPH  Thoracic surgery fellow.

## 2024-04-28 NOTE — CONSULTS
Care Management Initial Consult    General Information  Assessment completed with: Patient, Mimi  Type of CM/SW Visit: Initial Assessment    Primary Care Provider verified and updated as needed: Yes   Readmission within the last 30 days: no previous admission in last 30 days         Advance Care Planning: Advance Care Planning Reviewed: no concerns identified          Communication Assessment  Patient's communication style: spoken language (English or Bilingual)    Hearing Difficulty or Deaf: no   Wear Glasses or Blind: no    Cognitive  Cognitive/Neuro/Behavioral: WDL                      Living Environment:   People in home: spouse  Ladarius  Current living Arrangements: house      Able to return to prior arrangements: yes       Family/Social Support:  Care provided by: self  Provides care for: no one  Marital Status:   , Children  Ladarius       Description of Support System: Supportive, Involved    Support Assessment: Adequate family and caregiver support, Adequate social supports    Current Resources:   Patient receiving home care services: No     Community Resources: None  Equipment currently used at home: none  Supplies currently used at home: None    Employment/Financial:  Employment Status: retired        Financial Concerns: none   Referral to Financial Worker: No       Does the patient's insurance plan have a 3 day qualifying hospital stay waiver?  No    Lifestyle & Psychosocial Needs:  Social Determinants of Health     Food Insecurity: Not on file   Depression: Not at risk (4/15/2024)    PHQ-2     PHQ-2 Score: 0   Housing Stability: Not on file   Tobacco Use: Medium Risk (4/26/2024)    Patient History     Smoking Tobacco Use: Former     Smokeless Tobacco Use: Former     Passive Exposure: Not on file   Financial Resource Strain: Not on file   Alcohol Use: Not on file   Transportation Needs: Not on file   Physical Activity: Not on file   Interpersonal Safety: Not on file   Stress: Not on file   Social  "Connections: Not on file   Health Literacy: Not on file       Functional Status:  Prior to admission patient needed assistance:   Dependent ADLs:: Independent  Dependent IADLs:: Independent  Assesssment of Functional Status: At functional baseline    Mental Health Status:  Mental Health Status: No Current Concerns       Chemical Dependency Status:  Chemical Dependency Status: No Current Concerns                 Additional Information:  Pt is \"a 68 year old female with h/o atypical carcinoid tumor of RUL now POD#0 s/p robot-assisted thoracoscopic RUL wedge resection and MLND.\"     Met with patient to introduce self/role and complete initial assessment.     Pt reports she lives in a home with her spouse Ladarius. She is retired but does occasionally help him with his work. Prior to hospitalization, pt was independent in all ADL's and IADL's and does not use any medical equipment or in-home medical or community services. Pt reports having good support from spouse and adult daughter Page. PCP verified though pt reports she has not seen this provider for awhile due to unavailability of appts. Writer informed pt we would send a hand off at discharge and recommended pt follow up with their PCP.     Pt has needed oxygen during hospitalization and was noted by Occupational Therapist during OT session to not be able to maintain adequate O2 sats on room air. Order for oxygen DME placed by provider and walk test with pt performed by bedside RN. [facility] contacted by writer to provide discharge oxygen today, as soon as possible per pt request. Pt reported to writer that she is disappointed she will need oxygen and nervous about how to use it. Writer explained that with delivery of the oxygen unit to the hospital, pt would receive education on how to operate the unit. Writer additionally requested from provider an order for an oxygen probe so pt could be provided with one with her discharge medications as well as very " clear instructions on her AVS as to how and when to use the oxygen.     Luis Agustin RNCC  Covering for 7B  Phone [phone]  Pager [phone]      RN Care Coordinator     Social Work and Care Management Department      SEARCHABLE in [facility] - search CARE COORDINATOR       [place] ([phone]) Saturday & Sunday; ([phone]) [facility] Recognized Holidays     Units: 5A Onc Vocera & 5C Vocera Pager: [phone]    Units: 6B Vocera & 6C Vocera  Pager: [phone]    Units: 7A SOT RNCC Vocera, 7B Med Surg Vocera, & 7C Med Surg Vocera  Pager: [phone]    Units: 6A Vocera & 4A CVICU Vocera, 4C MICU Vocera, and 4E SICU Vocera   Pager: [phone]    Units: 5 Ortho Vocera & 5 Med Surg Vocera  Pager: [phone]    Units: 6 Med Surg Vocera & 8 Med Surg Vocera  Pager [phone]

## 2024-04-28 NOTE — PLAN OF CARE
Goal Outcome Evaluation:      Plan of Care Reviewed With: patient    Overall Patient Progress: improvingOverall Patient Progress: improving    Vitals: Stable on RA, will request 1 L nasal cannula for comfort  Neuro: A&Ox4  Mobility: Pt moves independently.  Pain/Nausea: Pain controlled with meds. Denies nausea.   Diet & GI: Regular diet. No BM this shift.  Labs: Monitored.  LDAs: R PIV  Skin/incisions: R CT site CDI, Lap sites CDI or PETER  Respiratory: COPD - sats in upper 80s on RA - WDL  Cardiac: No acute changes this shift.  : Voiding appropriately and spontaneously.     NEW CHANGES: No acute changes this shift.    Continue to implement Care Plan.    Ishmael Moe RN

## 2024-04-28 NOTE — PROGRESS NOTES
Patient has been assessed for Home Oxygen needs. Oxygen readings:    *Pulse oximetry (SpO2) = 87% on room air at rest while awake.    *SpO2 improved to 95% on 2 liters/minute at rest.    *SpO2 = NA% on room air during activity/with exercise.    *SpO2 improved to 98% on 3 liters/minute during activity/with exercise.

## 2024-04-28 NOTE — PLAN OF CARE
Goal Outcome Evaluation:      Plan of Care Reviewed With: patient    Overall Patient Progress: improvingOverall Patient Progress: improving    Outcome Evaluation: Ready for discharge, waiting for new home O2

## 2024-04-29 ENCOUNTER — APPOINTMENT (OUTPATIENT)
Dept: GENERAL RADIOLOGY | Facility: CLINIC | Age: 69
DRG: 168 | End: 2024-04-29
Attending: STUDENT IN AN ORGANIZED HEALTH CARE EDUCATION/TRAINING PROGRAM
Payer: COMMERCIAL

## 2024-04-29 VITALS
TEMPERATURE: 98.1 F | OXYGEN SATURATION: 96 % | WEIGHT: 112.21 LBS | HEIGHT: 61 IN | BODY MASS INDEX: 21.19 KG/M2 | SYSTOLIC BLOOD PRESSURE: 154 MMHG | HEART RATE: 81 BPM | DIASTOLIC BLOOD PRESSURE: 83 MMHG | RESPIRATION RATE: 20 BRPM

## 2024-04-29 LAB
ANION GAP SERPL CALCULATED.3IONS-SCNC: 7 MMOL/L (ref 7–15)
BUN SERPL-MCNC: 9.9 MG/DL (ref 8–23)
CALCIUM SERPL-MCNC: 9 MG/DL (ref 8.8–10.2)
CHLORIDE SERPL-SCNC: 101 MMOL/L (ref 98–107)
CREAT SERPL-MCNC: 0.64 MG/DL (ref 0.51–0.95)
DEPRECATED HCO3 PLAS-SCNC: 28 MMOL/L (ref 22–29)
EGFRCR SERPLBLD CKD-EPI 2021: >90 ML/MIN/1.73M2
ERYTHROCYTE [DISTWIDTH] IN BLOOD BY AUTOMATED COUNT: 12.9 % (ref 10–15)
GLUCOSE SERPL-MCNC: 96 MG/DL (ref 70–99)
HCT VFR BLD AUTO: 35 % (ref 35–47)
HGB BLD-MCNC: 11.6 G/DL (ref 11.7–15.7)
MCH RBC QN AUTO: 32.2 PG (ref 26.5–33)
MCHC RBC AUTO-ENTMCNC: 33.1 G/DL (ref 31.5–36.5)
MCV RBC AUTO: 97 FL (ref 78–100)
PLATELET # BLD AUTO: 210 10E3/UL (ref 150–450)
POTASSIUM SERPL-SCNC: 3.9 MMOL/L (ref 3.4–5.3)
RBC # BLD AUTO: 3.6 10E6/UL (ref 3.8–5.2)
SODIUM SERPL-SCNC: 136 MMOL/L (ref 135–145)
WBC # BLD AUTO: 7.4 10E3/UL (ref 4–11)

## 2024-04-29 PROCEDURE — 250N000013 HC RX MED GY IP 250 OP 250 PS 637

## 2024-04-29 PROCEDURE — 71045 X-RAY EXAM CHEST 1 VIEW: CPT

## 2024-04-29 PROCEDURE — 250N000011 HC RX IP 250 OP 636: Performed by: STUDENT IN AN ORGANIZED HEALTH CARE EDUCATION/TRAINING PROGRAM

## 2024-04-29 PROCEDURE — 85027 COMPLETE CBC AUTOMATED: CPT | Performed by: STUDENT IN AN ORGANIZED HEALTH CARE EDUCATION/TRAINING PROGRAM

## 2024-04-29 PROCEDURE — 250N000013 HC RX MED GY IP 250 OP 250 PS 637: Performed by: STUDENT IN AN ORGANIZED HEALTH CARE EDUCATION/TRAINING PROGRAM

## 2024-04-29 PROCEDURE — 71045 X-RAY EXAM CHEST 1 VIEW: CPT | Mod: 26 | Performed by: RADIOLOGY

## 2024-04-29 PROCEDURE — 36415 COLL VENOUS BLD VENIPUNCTURE: CPT | Performed by: STUDENT IN AN ORGANIZED HEALTH CARE EDUCATION/TRAINING PROGRAM

## 2024-04-29 PROCEDURE — 80048 BASIC METABOLIC PNL TOTAL CA: CPT | Performed by: STUDENT IN AN ORGANIZED HEALTH CARE EDUCATION/TRAINING PROGRAM

## 2024-04-29 RX ORDER — AMOXICILLIN 250 MG
2 CAPSULE ORAL 2 TIMES DAILY
Status: DISCONTINUED | OUTPATIENT
Start: 2024-04-29 | End: 2024-04-29 | Stop reason: HOSPADM

## 2024-04-29 RX ORDER — AMOXICILLIN 250 MG
2 CAPSULE ORAL 2 TIMES DAILY
Qty: 30 TABLET | Refills: 0 | Status: SHIPPED | OUTPATIENT
Start: 2024-04-29 | End: 2024-05-03

## 2024-04-29 RX ORDER — POLYETHYLENE GLYCOL 3350 17 G/17G
17 POWDER, FOR SOLUTION ORAL 2 TIMES DAILY
Status: DISCONTINUED | OUTPATIENT
Start: 2024-04-29 | End: 2024-04-29 | Stop reason: HOSPADM

## 2024-04-29 RX ADMIN — POLYETHYLENE GLYCOL 3350 17 G: 17 POWDER, FOR SOLUTION ORAL at 08:56

## 2024-04-29 RX ADMIN — ENOXAPARIN SODIUM 40 MG: 40 INJECTION SUBCUTANEOUS at 08:56

## 2024-04-29 RX ADMIN — OXYCODONE HYDROCHLORIDE 10 MG: 10 TABLET ORAL at 01:02

## 2024-04-29 RX ADMIN — LIDOCAINE 4% 1 PATCH: 40 PATCH TOPICAL at 13:04

## 2024-04-29 RX ADMIN — PANTOPRAZOLE SODIUM 40 MG: 40 TABLET, DELAYED RELEASE ORAL at 08:56

## 2024-04-29 RX ADMIN — UMECLIDINIUM 1 PUFF: 62.5 AEROSOL, POWDER ORAL at 08:55

## 2024-04-29 RX ADMIN — OXYCODONE HYDROCHLORIDE 10 MG: 10 TABLET ORAL at 10:01

## 2024-04-29 RX ADMIN — ALBUTEROL SULFATE 4 PUFF: 90 AEROSOL, METERED RESPIRATORY (INHALATION) at 08:55

## 2024-04-29 RX ADMIN — ALBUTEROL SULFATE 4 PUFF: 90 AEROSOL, METERED RESPIRATORY (INHALATION) at 13:01

## 2024-04-29 RX ADMIN — FLUTICASONE FUROATE AND VILANTEROL TRIFENATATE 1 PUFF: 100; 25 POWDER RESPIRATORY (INHALATION) at 08:56

## 2024-04-29 RX ADMIN — DOCUSATE SODIUM 50 MG AND SENNOSIDES 8.6 MG 2 TABLET: 8.6; 5 TABLET, FILM COATED ORAL at 08:56

## 2024-04-29 ASSESSMENT — ACTIVITIES OF DAILY LIVING (ADL)
ADLS_ACUITY_SCORE: 18

## 2024-04-29 NOTE — PROGRESS NOTES
[id number] Shift  Goal Outcome Evaluation:       Plan of Care Reviewed With: patient     Overall Patient Progress: improvingOverall Patient Progress: improving     Outcome Evaluation: Px alert and oriented x4, able to make needs known. c/o headache  and right flank pain. PRN oxy and robaxin given. Probable discharge tomorrow. C/o headache, right flank and chills this shift, temp checked and was WNL,  Px home meds for discharge at [facility] for [facility] keeping. Continuous pulse ox.  Up independent to bathroom voided, LBM 4/27.  M.D. assessed her.  No new orders.  Continue with POC.

## 2024-04-29 NOTE — PLAN OF CARE
"Goal Outcome Evaluation:      Plan of Care Reviewed With: patient    Overall Patient Progress: improvingOverall Patient Progress: improving    [id number]    /51 (BP Location: Right arm)   Pulse 84   Temp 97.3  F (36.3  C) (Oral)   Resp 16   Ht 1.549 m (5' 1\")   Wt 50.9 kg (112 lb 3.4 oz)   LMP 09/22/2010   SpO2 98%   BMI 21.20 kg/m        Activity: up ad lucía in room   Neuros: alert and orientated x 4, calm and cooperative   Cardiac: WNL    Respiratory: O2 sats mid 90's on 2L NC, lung sounds diminished on right    GI/: abdomen soft/non-tender.  Last 4/27.  Voiding spont (adequate amounts)   Diet: regular as tolerated   Lines: PIV   Incisions/Drains: right chest inc CDI.  Old CT site dressing CDI    Labs: reviewed   Pain/nausea:  \"adequate\" pain control taking prn ibuprofen and oxycodone overnight.  No nausea    New changes this shift: none    Plan: plan for discharge to home today               "

## 2024-04-29 NOTE — PLAN OF CARE
Patient discharging. Discharge paperwork was reviewed with patient. Patient expressed understanding. A copy was given to patient. Enoxaparin care instructions given and a print out provider to pt. Pt discharging home. Education provided on home oxygen. Pt was educated on how to turn the machine on and off. How to disconnect and connect oxygen. Pt was educated on how to increase to decrease oxygen. Pt son to meet in lobby and will be transport home. All patient belongings were taken with patient.        Sravani Miguel RN

## 2024-04-29 NOTE — PROGRESS NOTES
Px c/o feeling icky that something is not right. Having altered taste, alternate chills and feeling hot and sweaty, temp checked and was 98.7 F. Saying she is having headache and dizziness too. Provider Jae Clark notified, waiting for reply.

## 2024-04-29 NOTE — PROGRESS NOTES
Care Management Discharge Note    Discharge Date: 04/29/2024       Discharge Disposition: Home    Discharge Services: None    Discharge DME: Oxygen    Discharge Transportation: family or friend will provide    Private pay costs discussed: Not applicable    Does the patient's insurance plan have a 3 day qualifying hospital stay waiver?  No    PAS Confirmation Code: NA   Patient/family educated on Medicare website which has current facility and service quality ratings:  yes    Education Provided on the Discharge Plan:  yes  Persons Notified of Discharge Plans: patient  Patient/Family in Agreement with the Plan:  yes    Handoff Referral Completed: Yes    Additional Information:  Received update from MD team that patient is medically ready for discharge to home today.  Called [facility] and they will deliver the portable oxygen to the patients room prior to discharge.  RNCC will remain available if further needs arise.      [facility](oxygen)  Phone: [phone]    Kyleigh Khalil RNCC  Phone: [phone]  Pager: [phone]  7B Med Surg Vocera  Nurse Coordinator      Social Work and Care Management Department       [facility]ABLE in [facility] - search CARE COORDINATOR       [place] [employer] [place] ([phone]) Saturday & Sunday; ([phone]) [facility] Recognized Holidays     Units: 5A Onc Vocera & 5C Vocera Pager: [phone]    Units: 6B Vocera & 6C Vocera  Pager: [phone]    Units: 7A SOT RNCC Vocera, 7B Med Surg Vocera, & 7C Med Surg Vocera  Pager: [phone]    Units: 6A Vocera & 4A CVICU Vocera, 4C MICU Vocera, and 4E SICU Vocera   Pager: [phone]    Units: 5 Ortho Vocera & 5 Med Surg Vocera  Pager: [phone]    Units: 6 Med Surg Vocera & 8 Med Surg Vocera  Pager [phone]

## 2024-04-30 ENCOUNTER — NURSE TRIAGE (OUTPATIENT)
Dept: FAMILY MEDICINE | Facility: CLINIC | Age: 69
End: 2024-04-30
Payer: COMMERCIAL

## 2024-04-30 ENCOUNTER — PATIENT OUTREACH (OUTPATIENT)
Dept: CARE COORDINATION | Facility: CLINIC | Age: 69
End: 2024-04-30
Payer: COMMERCIAL

## 2024-04-30 NOTE — LETTER
[facility] CARE COORDINATION  [address]  [facility] [address]    April 30, 2024    Mimi Arevalosammbabatunde  [address]  [facility] [address]      Dear Mimi,    I am a clinic care coordinator who works with Aye Morejon MD with the [facility]. I wanted to introduce myself and provide you with my contact information for you to be able to call me with any questions or concerns. I wanted to thank you for spending the time to talk with me.  Below is a description of clinic care coordination and how I can further assist you.       The clinic care coordination team is made up of a registered nurse, , financial resource worker and community health worker who understand the health care system. The goal of clinic care coordination is to help you manage your health and improve access to the health care system. Our team works alongside your provider to assist you in determining your health and social needs. We can help you obtain health care and community resources, providing you with necessary information and education. We can work with you through any barriers and develop a care plan that helps coordinate and strengthen the communication between you and your care team.  Our services are voluntary and are offered without charge to you personally.    Please feel free to contact me with any questions or concerns regarding care coordination and what we can offer.      We are focused on providing you with the highest-quality healthcare experience possible.    Sincerely,     Suzanne Paredes RN, BSN, CPHN, CCM  [facility] Ambulatory Care Management  [facility]  Phone: [phone]  Email: [email or web address]    Enclosed: Additional information on Care Coordination.     WHAT IS CARE COORDINATION?     [facility] Care Coordination supports patients and families dealing with chronic or complex health conditions, developmental issues, and  social service needs. This service is available to patients of all ages, from babies to seniors. When you re facing a difficult decision about caring for yourself or someone you love, we can help you understand your options. We identify and refer you to community resources that help with financial, legal, mental health, transportation, and other issues. We also help with your medical and related education needs.     IS CARE COORDINATION RIGHT FOR ME?      Discuss a referral to Care Coordination with your primary care provider or care team member.     HOW CAN I CONNECT WITH CARE COORDINATION?      Contact your clinic.  Speak with your doctor or clinic staff.  Discuss care coordination with hospital staff before discharge.    MEET YOUR CARE COORDINATION TEAM     Registered Nurse Care Coordinator  Provides education on medications, disease management, and new diagnoses.  Addresses concerns about medical conditions and connects you to resources.  Develops patient-centered goals and communicates with patient s care team.     Social Work Care Coordinator  Provides education on emotional wellbeing.  Connects you to a variety of community-based resources.  Communicates with care team and community partners.     Community Health Worker  Identifies health and social barriers and connects you with community resources.  Develops nonclinical patient and family centered goals.  Enhances communication between patients and care teams.     Financial Resource Worker  Assists patients with applying for health [place] ([place], [place]  Helps patients with applying for county benefits.  Connects patients with [facility]

## 2024-04-30 NOTE — PROGRESS NOTES
Clinic Care Coordination Contact  [facility]: Post-Discharge Note  SITUATION                                                      Admission:    Admission Date: 04/26/24   Reason for Admission: Lung cancer  Discharge:   Discharge Date: 04/29/24  Discharge Diagnosis: Lung cancer;robot assisted thoracoscopic right wedge resection, mediastinal lymph node dissection    BACKGROUND                                                      Per hospital discharge summary and inpatient provider notes:  Hospital Course  Patient's hospital course was largely uneventful. Patient had some difficulties with pain management earlier AM of 4/27 however, it is now well managed on PO meds. At this time, she is tolerating PO intake. Her chest tube was removed without difficulties. She did require supplemental oxygen to maintain SpO2 over 92%, so home oxygen was arranged over the weekend. She is voiding and has evidence of anterograde bowel function, and is ambulatory. Patient thus deemed safe for discharge home w/ plans to follow up outpatient with the thoracic surgery team in [facility]. Patient was understanding and agreeable to the plan and had no further questions/concerns.     ASSESSMENT       Discharge Assessment  How are you doing now that you are home?: Awful. I'm in a lot of pain.  How are your symptoms? (Red Flag symptoms escalate to triage hotline per guidelines): Improved  Do you feel your condition is stable enough to be safe at home until your provider visit?: Yes  Does the patient have their discharge instructions? : Yes  Does the patient have questions regarding their discharge instructions? : No  Were you started on any new medications or were there changes to any of your previous medications? : Yes  Does the patient have all of their medications?: Yes  Do you have questions regarding any of your medications? : Yes (see comment) (Patient nervous about self-administering her Enoxaparin injection. RN CC walked her through  the process. She verbalized understanding.)  Do you have all of your needed medical supplies or equipment (DME)?  (i.e. oxygen tank, CPAP, cane, etc.): Yes  Discharge follow-up appointment scheduled within 14 calendar days? : Yes  Discharge Follow Up Appointment Date: 05/29/24  Discharge Follow Up Appointment Scheduled with?: Specialty Care Provider (Surgery)    Post-op ([facility] Only)  If the patient had a surgery or procedure, do they have any questions for a nurse?: No    Post-op (Clinicians Only)  Did the patient have surgery or a procedure: Yes  Incision: closed  Drainage: No  Bleeding: none  Fever: No  Chills: No  Redness: No  Warmth: No  Swelling: No  Incision site pain: No  Closure: suture  Eating & Drinking:  (No appetite due to pain.)  PO Intake: regular diet  Bowel Function: normal  Date of last BM: 04/30/24  Urinary Status: voiding without complaint/concerns    PLAN                                                      Outpatient Plan:  Follow up with primary care provider, Aye Morejon, within 7 days for hospital follow- up and to follow up on results.  No follow up labs or test are needed.    Follow up with Dr. Lanza or Kyleigh Hill NP at the thoracic surgery clinic in roughly 2-4 weeks at [address] [place] [address].     RN CC contacted patient for post-hospital follow up and to introduce Care Coordination. Full assessment not indicated as patient declined to have Care Coordination support at this time. She was agreeable to receiving an introduction letter with additional information on Care Coordination via [employer]. Verified patient has access to [employer].     RN CC will send patient introduction letter via [employer].     Future Appointments   Date Time Provider Department Center   5/29/2024  9:30 AM [id number]   5/29/2024 10:15 AM Kyleigh Hill APRN CNS ONS [facility]   5/30/2024  3:20 PM Juan Manuel Murdock MD [facility]   9/13/2024 11:30 AM Ishmael Bridges MD [facility]     For any  urgent concerns, please contact our 24 hour nurse triage line: [phone] ([phone])       Suzanne Paredes RN, BSN, CPHN, [facility] Ambulatory Care Management  [facility]  Phone: [phone]  Email: [email or web address]

## 2024-05-01 ENCOUNTER — PATIENT OUTREACH (OUTPATIENT)
Dept: ONCOLOGY | Facility: CLINIC | Age: 69
End: 2024-05-01
Payer: COMMERCIAL

## 2024-05-01 ENCOUNTER — NURSE TRIAGE (OUTPATIENT)
Dept: ONCOLOGY | Facility: CLINIC | Age: 69
End: 2024-05-01
Payer: COMMERCIAL

## 2024-05-01 NOTE — TELEPHONE ENCOUNTER
"Oncology Nurse Triage - Reporting Symptoms  Situation:   Son, Ishmael, on behalf of Mimi reporting the following symptoms:  Verbal consent given by pt to talk to Ishmael.     Background:   Treating Provider: Dr. Pandya    Recent treatments: Yes: 4/26/24 Right thoracoscopic robot-assisted right upper lobe wedge resection  Mediastinal lymph node dissection    Assessment  Concerned w/ O2 sat at 92%- son said this is not abnormal readings, usually they vary 91-93%.  Cold- son took temperature and was afebrile.   Son reports pt has \"energy to argue\" and had a lot of anxiety prior to surgery. He states pt has told. Son states pt verbalizes pain, but is able to sleep and fall back asleep if woken quickly. He said he does not want to \"down play symptoms\", but many symptoms are not new.    Writer reviewed Patient Outreach encounter from today with recommendations to go to ED. Writer advised ED was likely recommended to rule out blood clot and for pain management. He does not think pt wants to go now, as she is sleeping/resting.     Son also questioning wound care instructions, as he thinks pt still has dressing on that she returned home with from the hospital.     Recommendations:   Writer again advised ED if pain is not currently managed and SOB is worsening, reviewed nonverbal signs of pain to watch for, encourage deep breathing in through nose, out through mouth to get supplemental O2 into system.   Writer reviewed discharge recommendations for wound care- okay to take off dressing 24 hours after surgery, okay to shower but not submerge incision or scrub for 2 weeks, keep steri strips on until they fall off on their own, leave incision PETER and okay to cover with gauze as needed. Son asked if dressing can stay on- writer recommended removing, as incision needs air to scab over and start healing, educated if draining this could cause irritation at the good skin around incision.   Writer informed son will request care " coordinator call and check in pt tomorrow, especially if she does not plan to go to ED. Son voiced understanding and in agreement with plan.

## 2024-05-01 NOTE — PROGRESS NOTES
"Writer called patient for follow up on post-surgery completed on 4/26/24. Patient states she is \"Not doing very good\". She was discharged on home oxygen, currently on 2LPM, states \"my breathing is twice as bad as before the surgery\". Patient endorses SOB at rest and is feeling dizzy/lightheaded at times. Reports \"I can't get my oxygen levels up over 92%\". Patient also endorsing on-going pain, reports she is taking pain medications as prescribed.     Writer directly transferred call to [facility] triage team to further assist.     Desire May RN, BSN, PHN, OCN     5/1/2024 at 2:35 PM  Nurse Care Coordinator  [facility]  P: [phone]   F: [phone]      ADDENDUM    After discussing with Rosa Aguilar, thoracic nurse at [facility] location, writer called patient back to advise that she is seen in the ED right away with the symptoms she is having. Patient is agreeable to this and indicates she has someone to take her, declined to have writer 911 for transport. Will monitor for ED visit.     Desire May RN on 5/1/2024 at 3:27 PM    " [Initial Consultation] : an initial consultation for [Mother] : mother [FreeTextEntry2] : difficulty hearing and recurrent ear infections

## 2024-05-01 NOTE — TELEPHONE ENCOUNTER
We can check if [facility] will see her otherwise agree with ER or urgent care evaluation .  I am not in clinic and do not have opening this week .    Aye Morejon MD.

## 2024-05-01 NOTE — TELEPHONE ENCOUNTER
"Pt calls stating she needs to schedule a Hospital follow up appointment she came home on Monday and doesn't feel good. Pt has lung surgery over the weekend.     She is having pain, taking pain pills, Pain level today, 6-7/10 with her pain medication.   Using 2 L oxygen at 92% saturation.     She feels like not getting enough air though.     Feels nauseated and hot and cold.   Dizzy and weak. Feels like that now, just sitting and feels like this.   Denies feeling faint.     No fever, 98.8.     Eating and drinking fluids OK. She says she could be dehydrated, her urine is dark. She is urinating often, more than once every 8 hours.     She doesn't use a walker, feels OK doing this, can get to the bathroom on her own. Doesn't have to hang onto anything.   She is having regular BM.     Doesn't check BP at home.     Pt doesn't want to go back to the Hospital or ED. She \"just wants a Hospital follow up appointment\". I was told they save slots for this\".    Please advise.     Reason for Disposition   Patient sounds very sick or weak to the triager    Additional Information   Negative: SEVERE difficulty breathing (e.g., struggling for each breath, speaks in single words)   Negative: [1] Difficulty breathing or swallowing AND [2] started suddenly after medicine, an allergic food or bee sting   Negative: Shock suspected (e.g., cold/pale/clammy skin, too weak to stand, low BP, rapid pulse)   Negative: Difficult to awaken or acting confused (e.g., disoriented, slurred speech)   Negative: [1] Weakness (i.e., paralysis, loss of muscle strength) of the face, arm or leg on one side of the body AND [2] sudden onset AND [3] present now   Negative: [1] Numbness (i.e., loss of sensation) of the face, arm or leg on one side of the body AND [2] sudden onset AND [3] present now   Negative: [1] Loss of speech or garbled speech AND [2] sudden onset AND [3] present now   Negative: Overdose (accidental or intentional) of medications   Negative: " "[1] Fainted > 15 minutes ago AND [2] still feels too weak or dizzy to stand   Negative: Heart beating < 50 beats per minute OR > 140 beats per minute   Negative: Sounds like a life-threatening emergency to the triager   Negative: Chest pain   Negative: Rectal bleeding, bloody stool, or tarry-black stool   Negative: [1] Vomiting AND [2] contains red blood or black (\"coffee ground\") material   Negative: Vomiting is main symptom   Negative: Diarrhea is main symptom   Negative: Headache is main symptom   Negative: Patient states that they are having an anxiety or panic attack   Negative: Dizziness from low blood sugar (i.e., < 60 mg/dl or 3.5 mmol/l)   Negative: Dizziness is described as a spinning sensation (i.e., vertigo)   Negative: Heat exhaustion suspected (i.e., dehydration from heat exposure)   Negative: Difficulty breathing   Negative: SEVERE dizziness (e.g., unable to stand, requires support to walk, feels like passing out now)   Negative: Extra heartbeats, irregular heart beating, or heart is beating very fast  (i.e., \"palpitations\")   Negative: [1] Drinking very little AND [2] dehydration suspected (e.g., no urine > 12 hours, very dry mouth, very lightheaded)   Negative: [1] Weakness (i.e., paralysis, loss of muscle strength) of the face, arm / hand, or leg / foot on one side of the body AND [2] sudden onset AND [3] brief (now gone)   Negative: [1] Numbness (i.e., loss of sensation) of the face, arm / hand, or leg / foot on one side of the body AND [2] sudden onset AND [3] brief (now gone)   Negative: [1] Loss of speech or garbled speech AND [2] sudden onset AND [3] brief (now gone)   Negative: Loss of vision or double vision  (Exception: Similar to previous migraines.)    Protocols used: Dizziness - [id number]    "

## 2024-05-01 NOTE — TELEPHONE ENCOUNTER
ADS provider Elva Dumont recommends patient be seen in the emergency room.   Writer calls patient back informed her that she should go to the emergency room to be seen today, given her symptoms and recent surgery.   Patient is in agreement to go to [facility] in [place].

## 2024-05-01 NOTE — TELEPHONE ENCOUNTER
Writer calls patient, she is interested in being seen in [facility], if possible tomorrow as she doesn't want to go anywhere today.  Writer called [facility], provider is with a patient, will await call back by [facility]

## 2024-05-02 ENCOUNTER — PATIENT OUTREACH (OUTPATIENT)
Dept: ONCOLOGY | Facility: CLINIC | Age: 69
End: 2024-05-02
Payer: COMMERCIAL

## 2024-05-02 LAB
PATH REPORT.COMMENTS IMP SPEC: ABNORMAL
PATH REPORT.COMMENTS IMP SPEC: YES
PATH REPORT.FINAL DX SPEC: ABNORMAL
PATH REPORT.GROSS SPEC: ABNORMAL
PATH REPORT.MICROSCOPIC SPEC OTHER STN: ABNORMAL
PATH REPORT.RELEVANT HX SPEC: ABNORMAL
PATHOLOGY SYNOPTIC REPORT: ABNORMAL
PHOTO IMAGE: ABNORMAL

## 2024-05-02 NOTE — PROGRESS NOTES
"Writer called patient to check on her symptoms today, as she did not go to ED to be assessed yesterday. Patient reports she is \"feeling about the same\". Still having some SOB, even at rest. Of note, patient able to carry on conversation with writer without needing to stop to catch her breath. Is still on 2 LPM supplemental oxygen, and reports oxygen saturations still \"in the low 90's\". Endorses some on-going dizziness/lightheadedness. When asked if she has had a fever, she stated \"I am not sure,  I feel feverish at times\". States she hasn't checked her temperature with a thermometer though. Reports she still has the dressing on the incision site, but states her daughter is coming in about an hour to help get the dressing off. Reports she is using the IS, with productive cough, at times. She states she is eating and drinking \"okay\", reports some mild nausea, usually when she takes the pain medication; no emesis. Reports she is urinating, without any s/s of UTI, and has had BMs. Reports her pain \"is still the same\". Indicates she woke up ~3 times last night due to pain. Reports she is taking 500mg Tylenol TID, Robaxin TID, and oxycodone every 8 hours.    Writer advised to assess incision site once bandage is removed for any s/s of infection, reviewed these with patient. Recommended checking her temperature with thermometer to ensure no fever is present when she is feeling \"feverish\". Advised to increase Tylenol to 1,000mg Q8H, and continue other pain medications. Advised to eat small meals, more frequently and supplement with protein drinks/shakes; also to take medications with food. Advised she call triage/thoracic team if her oxygen saturations drop to 90-91%, and/or if she has any s/s of infection at incision site. Again recommended she call 911, or go to ED, immediately if her oxygen saturations drop into the 80's, for any chest pain, increased SOB, increased trouble breathing, or other emergent symptoms. Patient " stated understanding, all questions answered.    Desire May, RN, BSN, PHN, OCN     5/2/2024 at 12:37 PM  Nurse Care Coordinator  [facility] [place]  P: [phone]   F: [phone]

## 2024-05-03 ENCOUNTER — APPOINTMENT (OUTPATIENT)
Dept: GENERAL RADIOLOGY | Facility: CLINIC | Age: 69
DRG: 199 | End: 2024-05-03
Attending: EMERGENCY MEDICINE
Payer: COMMERCIAL

## 2024-05-03 ENCOUNTER — MYC MEDICAL ADVICE (OUTPATIENT)
Dept: ONCOLOGY | Facility: CLINIC | Age: 69
End: 2024-05-03
Payer: COMMERCIAL

## 2024-05-03 ENCOUNTER — TELEPHONE (OUTPATIENT)
Dept: ONCOLOGY | Facility: CLINIC | Age: 69
End: 2024-05-03
Payer: COMMERCIAL

## 2024-05-03 ENCOUNTER — HOSPITAL ENCOUNTER (INPATIENT)
Facility: CLINIC | Age: 69
LOS: 4 days | Discharge: HOME | DRG: 199 | End: 2024-05-07
Attending: EMERGENCY MEDICINE | Admitting: INTERNAL MEDICINE
Payer: COMMERCIAL

## 2024-05-03 DIAGNOSIS — C34.91 MALIGNANT NEOPLASM OF RIGHT LUNG, UNSPECIFIED PART OF LUNG (H): ICD-10-CM

## 2024-05-03 DIAGNOSIS — J44.9 CHRONIC OBSTRUCTIVE PULMONARY DISEASE, UNSPECIFIED COPD TYPE (H): Primary | ICD-10-CM

## 2024-05-03 DIAGNOSIS — J95.811 PNEUMOTHORAX, POSTOPERATIVE: ICD-10-CM

## 2024-05-03 DIAGNOSIS — Z85.118 HISTORY OF LUNG CANCER: ICD-10-CM

## 2024-05-03 DIAGNOSIS — Z90.2 S/P PARTIAL LOBECTOMY OF LUNG: ICD-10-CM

## 2024-05-03 DIAGNOSIS — Z87.09 HISTORY OF COPD: ICD-10-CM

## 2024-05-03 LAB
ANION GAP SERPL CALCULATED.3IONS-SCNC: 9 MMOL/L (ref 7–15)
BASE EXCESS BLDV CALC-SCNC: -1.2 MMOL/L (ref -3–3)
BASOPHILS # BLD AUTO: 0 10E3/UL (ref 0–0.2)
BASOPHILS NFR BLD AUTO: 0 %
BUN SERPL-MCNC: 9.4 MG/DL (ref 8–23)
CALCIUM SERPL-MCNC: 9.9 MG/DL (ref 8.8–10.2)
CHLORIDE SERPL-SCNC: 100 MMOL/L (ref 98–107)
CREAT SERPL-MCNC: 0.49 MG/DL (ref 0.51–0.95)
D DIMER PPP FEU-MCNC: 3.81 UG/ML FEU (ref 0–0.5)
DEPRECATED HCO3 PLAS-SCNC: 29 MMOL/L (ref 22–29)
EGFRCR SERPLBLD CKD-EPI 2021: >90 ML/MIN/1.73M2
EOSINOPHIL # BLD AUTO: 0.1 10E3/UL (ref 0–0.7)
EOSINOPHIL NFR BLD AUTO: 1 %
ERYTHROCYTE [DISTWIDTH] IN BLOOD BY AUTOMATED COUNT: 12.5 % (ref 10–15)
GLUCOSE SERPL-MCNC: 110 MG/DL (ref 70–99)
HCO3 BLDV-SCNC: 27 MMOL/L (ref 21–28)
HCT VFR BLD AUTO: 40.1 % (ref 35–47)
HGB BLD-MCNC: 13.4 G/DL (ref 11.7–15.7)
IMM GRANULOCYTES # BLD: 0 10E3/UL
IMM GRANULOCYTES NFR BLD: 0 %
LYMPHOCYTES # BLD AUTO: 1.5 10E3/UL (ref 0.8–5.3)
LYMPHOCYTES NFR BLD AUTO: 15 %
MCH RBC QN AUTO: 32 PG (ref 26.5–33)
MCHC RBC AUTO-ENTMCNC: 33.4 G/DL (ref 31.5–36.5)
MCV RBC AUTO: 96 FL (ref 78–100)
MONOCYTES # BLD AUTO: 0.8 10E3/UL (ref 0–1.3)
MONOCYTES NFR BLD AUTO: 8 %
NEUTROPHILS # BLD AUTO: 7.5 10E3/UL (ref 1.6–8.3)
NEUTROPHILS NFR BLD AUTO: 76 %
NRBC # BLD AUTO: 0 10E3/UL
NRBC BLD AUTO-RTO: 0 /100
O2/TOTAL GAS SETTING VFR VENT: 0 %
OXYHGB MFR BLDV: 72 % (ref 70–75)
PCO2 BLDV: 56 MM HG (ref 40–50)
PH BLDV: 7.29 [PH] (ref 7.32–7.43)
PLATELET # BLD AUTO: 342 10E3/UL (ref 150–450)
PO2 BLDV: 31 MM HG (ref 25–47)
POTASSIUM SERPL-SCNC: 4.9 MMOL/L (ref 3.4–5.3)
RADIOLOGIST FLAGS: ABNORMAL
RBC # BLD AUTO: 4.19 10E6/UL (ref 3.8–5.2)
SAO2 % BLDV: 73.8 % (ref 70–75)
SODIUM SERPL-SCNC: 138 MMOL/L (ref 135–145)
WBC # BLD AUTO: 10 10E3/UL (ref 4–11)

## 2024-05-03 PROCEDURE — 99223 1ST HOSP IP/OBS HIGH 75: CPT | Performed by: INTERNAL MEDICINE

## 2024-05-03 PROCEDURE — 36415 COLL VENOUS BLD VENIPUNCTURE: CPT | Performed by: EMERGENCY MEDICINE

## 2024-05-03 PROCEDURE — 96374 THER/PROPH/DIAG INJ IV PUSH: CPT

## 2024-05-03 PROCEDURE — 250N000009 HC RX 250: Performed by: INTERNAL MEDICINE

## 2024-05-03 PROCEDURE — 258N000003 HC RX IP 258 OP 636: Performed by: EMERGENCY MEDICINE

## 2024-05-03 PROCEDURE — 250N000013 HC RX MED GY IP 250 OP 250 PS 637: Performed by: EMERGENCY MEDICINE

## 2024-05-03 PROCEDURE — 82805 BLOOD GASES W/O2 SATURATION: CPT | Performed by: EMERGENCY MEDICINE

## 2024-05-03 PROCEDURE — 120N000001 HC R&B MED SURG/OB

## 2024-05-03 PROCEDURE — 71045 X-RAY EXAM CHEST 1 VIEW: CPT

## 2024-05-03 PROCEDURE — 250N000011 HC RX IP 250 OP 636

## 2024-05-03 PROCEDURE — 250N000009 HC RX 250: Performed by: EMERGENCY MEDICINE

## 2024-05-03 PROCEDURE — 250N000011 HC RX IP 250 OP 636: Performed by: INTERNAL MEDICINE

## 2024-05-03 PROCEDURE — 0W9930Z DRAINAGE OF RIGHT PLEURAL CAVITY WITH DRAINAGE DEVICE, PERCUTANEOUS APPROACH: ICD-10-PCS | Performed by: EMERGENCY MEDICINE

## 2024-05-03 PROCEDURE — 999N000157 HC STATISTIC RCP TIME EA 10 MIN

## 2024-05-03 PROCEDURE — 85025 COMPLETE CBC W/AUTO DIFF WBC: CPT | Performed by: EMERGENCY MEDICINE

## 2024-05-03 PROCEDURE — 85379 FIBRIN DEGRADATION QUANT: CPT | Performed by: EMERGENCY MEDICINE

## 2024-05-03 PROCEDURE — 99291 CRITICAL CARE FIRST HOUR: CPT | Mod: 25

## 2024-05-03 PROCEDURE — 32551 INSERTION OF CHEST TUBE: CPT

## 2024-05-03 PROCEDURE — 94640 AIRWAY INHALATION TREATMENT: CPT

## 2024-05-03 PROCEDURE — 96375 TX/PRO/DX INJ NEW DRUG ADDON: CPT

## 2024-05-03 PROCEDURE — 71045 X-RAY EXAM CHEST 1 VIEW: CPT | Mod: 76

## 2024-05-03 PROCEDURE — 80048 BASIC METABOLIC PNL TOTAL CA: CPT | Performed by: EMERGENCY MEDICINE

## 2024-05-03 PROCEDURE — 250N000011 HC RX IP 250 OP 636: Mod: JZ | Performed by: EMERGENCY MEDICINE

## 2024-05-03 RX ORDER — AMOXICILLIN 250 MG
1 CAPSULE ORAL 2 TIMES DAILY PRN
Status: DISCONTINUED | OUTPATIENT
Start: 2024-05-03 | End: 2024-05-07 | Stop reason: HOSPADM

## 2024-05-03 RX ORDER — ENOXAPARIN SODIUM 100 MG/ML
40 INJECTION SUBCUTANEOUS EVERY 24 HOURS
Status: DISCONTINUED | OUTPATIENT
Start: 2024-05-04 | End: 2024-05-07 | Stop reason: HOSPADM

## 2024-05-03 RX ORDER — CALCIUM CARBONATE 500 MG/1
1000 TABLET, CHEWABLE ORAL 4 TIMES DAILY PRN
Status: DISCONTINUED | OUTPATIENT
Start: 2024-05-03 | End: 2024-05-07 | Stop reason: HOSPADM

## 2024-05-03 RX ORDER — OXYCODONE HYDROCHLORIDE 5 MG/1
5 TABLET ORAL ONCE
Status: COMPLETED | OUTPATIENT
Start: 2024-05-03 | End: 2024-05-03

## 2024-05-03 RX ORDER — IPRATROPIUM BROMIDE AND ALBUTEROL SULFATE 2.5; .5 MG/3ML; MG/3ML
3 SOLUTION RESPIRATORY (INHALATION) EVERY 4 HOURS PRN
Status: DISCONTINUED | OUTPATIENT
Start: 2024-05-03 | End: 2024-05-07 | Stop reason: HOSPADM

## 2024-05-03 RX ORDER — SODIUM CHLORIDE 9 MG/ML
INJECTION, SOLUTION INTRAVENOUS CONTINUOUS PRN
Status: COMPLETED | OUTPATIENT
Start: 2024-05-03 | End: 2024-05-03

## 2024-05-03 RX ORDER — ONDANSETRON 4 MG/1
4 TABLET, ORALLY DISINTEGRATING ORAL EVERY 6 HOURS PRN
Status: DISCONTINUED | OUTPATIENT
Start: 2024-05-03 | End: 2024-05-07 | Stop reason: HOSPADM

## 2024-05-03 RX ORDER — ONDANSETRON 2 MG/ML
4 INJECTION INTRAMUSCULAR; INTRAVENOUS EVERY 6 HOURS PRN
Status: DISCONTINUED | OUTPATIENT
Start: 2024-05-03 | End: 2024-05-07 | Stop reason: HOSPADM

## 2024-05-03 RX ORDER — HYDROMORPHONE HYDROCHLORIDE 1 MG/ML
0.5 INJECTION, SOLUTION INTRAMUSCULAR; INTRAVENOUS; SUBCUTANEOUS
Status: DISCONTINUED | OUTPATIENT
Start: 2024-05-03 | End: 2024-05-07 | Stop reason: HOSPADM

## 2024-05-03 RX ORDER — IPRATROPIUM BROMIDE AND ALBUTEROL SULFATE 2.5; .5 MG/3ML; MG/3ML
3 SOLUTION RESPIRATORY (INHALATION)
Status: DISCONTINUED | OUTPATIENT
Start: 2024-05-03 | End: 2024-05-03

## 2024-05-03 RX ORDER — ACETAMINOPHEN 325 MG/1
650 TABLET ORAL EVERY 4 HOURS PRN
Status: DISCONTINUED | OUTPATIENT
Start: 2024-05-03 | End: 2024-05-07 | Stop reason: HOSPADM

## 2024-05-03 RX ORDER — ACETAMINOPHEN 650 MG/1
650 SUPPOSITORY RECTAL EVERY 4 HOURS PRN
Status: DISCONTINUED | OUTPATIENT
Start: 2024-05-03 | End: 2024-05-07 | Stop reason: HOSPADM

## 2024-05-03 RX ORDER — HALOPERIDOL 5 MG/ML
2.5 INJECTION INTRAMUSCULAR ONCE
Status: COMPLETED | OUTPATIENT
Start: 2024-05-03 | End: 2024-05-03

## 2024-05-03 RX ORDER — HYDROMORPHONE HYDROCHLORIDE 1 MG/ML
INJECTION, SOLUTION INTRAMUSCULAR; INTRAVENOUS; SUBCUTANEOUS
Status: COMPLETED
Start: 2024-05-03 | End: 2024-05-04

## 2024-05-03 RX ORDER — AMOXICILLIN 250 MG
2 CAPSULE ORAL 2 TIMES DAILY PRN
Status: DISCONTINUED | OUTPATIENT
Start: 2024-05-03 | End: 2024-05-07 | Stop reason: HOSPADM

## 2024-05-03 RX ORDER — GLYCOPYRROLATE 0.2 MG/ML
0.2 INJECTION, SOLUTION INTRAMUSCULAR; INTRAVENOUS ONCE
Status: COMPLETED | OUTPATIENT
Start: 2024-05-03 | End: 2024-05-03

## 2024-05-03 RX ORDER — FLUMAZENIL 0.1 MG/ML
0.2 INJECTION, SOLUTION INTRAVENOUS
Status: ACTIVE | OUTPATIENT
Start: 2024-05-03 | End: 2024-05-04

## 2024-05-03 RX ORDER — OXYCODONE HYDROCHLORIDE 5 MG/1
5 TABLET ORAL EVERY 4 HOURS PRN
Status: DISCONTINUED | OUTPATIENT
Start: 2024-05-03 | End: 2024-05-07 | Stop reason: HOSPADM

## 2024-05-03 RX ORDER — IPRATROPIUM BROMIDE AND ALBUTEROL SULFATE 2.5; .5 MG/3ML; MG/3ML
3 SOLUTION RESPIRATORY (INHALATION) 4 TIMES DAILY
Status: DISCONTINUED | OUTPATIENT
Start: 2024-05-04 | End: 2024-05-07 | Stop reason: HOSPADM

## 2024-05-03 RX ORDER — HYDROMORPHONE HYDROCHLORIDE 1 MG/ML
0.5 INJECTION, SOLUTION INTRAMUSCULAR; INTRAVENOUS; SUBCUTANEOUS EVERY 4 HOURS PRN
Status: DISCONTINUED | OUTPATIENT
Start: 2024-05-03 | End: 2024-05-03

## 2024-05-03 RX ORDER — FENTANYL CITRATE 50 UG/ML
25 INJECTION, SOLUTION INTRAMUSCULAR; INTRAVENOUS
Status: COMPLETED | OUTPATIENT
Start: 2024-05-03 | End: 2024-05-03

## 2024-05-03 RX ORDER — KETOROLAC TROMETHAMINE 15 MG/ML
15 INJECTION, SOLUTION INTRAMUSCULAR; INTRAVENOUS EVERY 6 HOURS PRN
Status: DISCONTINUED | OUTPATIENT
Start: 2024-05-03 | End: 2024-05-07 | Stop reason: HOSPADM

## 2024-05-03 RX ORDER — BUPIVACAINE HYDROCHLORIDE 5 MG/ML
INJECTION, SOLUTION EPIDURAL; INTRACAUDAL
Status: COMPLETED
Start: 2024-05-03 | End: 2024-05-03

## 2024-05-03 RX ADMIN — HYDROMORPHONE HYDROCHLORIDE 0.5 MG: 1 INJECTION, SOLUTION INTRAMUSCULAR; INTRAVENOUS; SUBCUTANEOUS at 21:19

## 2024-05-03 RX ADMIN — BUPIVACAINE HYDROCHLORIDE: 5 INJECTION, SOLUTION EPIDURAL; INTRACAUDAL; PERINEURAL at 20:07

## 2024-05-03 RX ADMIN — SODIUM CHLORIDE 1000 ML: 0.9 INJECTION, SOLUTION INTRAVENOUS at 20:18

## 2024-05-03 RX ADMIN — IPRATROPIUM BROMIDE AND ALBUTEROL SULFATE 3 ML: .5; 3 SOLUTION RESPIRATORY (INHALATION) at 22:08

## 2024-05-03 RX ADMIN — Medication 25 MG: at 20:16

## 2024-05-03 RX ADMIN — FENTANYL CITRATE 25 MCG: 50 INJECTION, SOLUTION INTRAMUSCULAR; INTRAVENOUS at 20:29

## 2024-05-03 RX ADMIN — Medication 50 MG: at 20:07

## 2024-05-03 RX ADMIN — OXYCODONE HYDROCHLORIDE 5 MG: 5 TABLET ORAL at 18:31

## 2024-05-03 RX ADMIN — HALOPERIDOL LACTATE 2.5 MG: 5 INJECTION, SOLUTION INTRAMUSCULAR at 20:42

## 2024-05-03 ASSESSMENT — ACTIVITIES OF DAILY LIVING (ADL)
WALKING_OR_CLIMBING_STAIRS: AMBULATION DIFFICULTY, REQUIRES EQUIPMENT;STAIR CLIMBING DIFFICULTY, ASSISTANCE 1 PERSON
HEARING_DIFFICULTY_OR_DEAF: NO
ADLS_ACUITY_SCORE: 35
DIFFICULTY_COMMUNICATING: NO
WEAR_GLASSES_OR_BLIND: NO
ADLS_ACUITY_SCORE: 35
TOILETING_ISSUES: NO
DIFFICULTY_EATING/SWALLOWING: NO
ADLS_ACUITY_SCORE: 35
FALL_HISTORY_WITHIN_LAST_SIX_MONTHS: NO
WALKING_OR_CLIMBING_STAIRS_DIFFICULTY: YES
ADLS_ACUITY_SCORE: 35
DRESSING/BATHING_DIFFICULTY: NO
DOING_ERRANDS_INDEPENDENTLY_DIFFICULTY: YES
ADLS_ACUITY_SCORE: 35
ADLS_ACUITY_SCORE: 22
CONCENTRATING,_REMEMBERING_OR_MAKING_DECISIONS_DIFFICULTY: NO
ADLS_ACUITY_SCORE: 35
CHANGE_IN_FUNCTIONAL_STATUS_SINCE_ONSET_OF_CURRENT_ILLNESS/INJURY: NO

## 2024-05-03 NOTE — RESULT ENCOUNTER NOTE
I have personally reviewed the pathology results which support a diagnosis of atypical carcinoid.  Sites involved in this diagnosis include: lung. This is a stage I tumor with benign margins including the staple line. She will be followed with serial CT scans.      Good Pandya MD

## 2024-05-03 NOTE — TELEPHONE ENCOUNTER
"Oncology Nurse Triage    Situation:   Mimi reporting the following symptoms: shortness of breath    Background:   Treating Provider:   Dr Pandya    Recent Treatments:4/26/2024: Procedure:   Right thoracoscopic robot-assisted right upper lobe wedge resection  Mediastinal lymph node dissection    Assessment:     Pt's daughter Page is calling.  States that she has been talking to pt today.  Page states that pt reports that it is hard to breathe. Feels like her chest is burning. Pt states that she is convinced that she is going to suffocate and is therefore afraid to fall asleep.   Also reports pain in her upper chest. Feels like her lungs are stiff.  Has been using 2 liters of oxygen and Page states that oxygen saturation is 94%.   Denies fever/chills, nausea/vomiting, diarrhea/constipation. Has been eating and drinking OK.    Page states that pt thinks that she needs to go to the ER, and she is wondering if this is truly needed.    Recommendations:     Per chart review, [facility], has already advised pt to go to ER today.   Writer advised Page that pt should be seen in ER. She needs to be evaluated today.  Page again asks if pt truly needs to go to the ER or if that is just \"what we always recommend\".  Writer again advised that pt should be seen in ER today due to symptoms.    Page then wonders why pt's post op appt was booked out so far on 5/29/24? She feels like pt should be seen sooner and is requesting an appt for Monday, 5/6/24.  Dr Pandya does not have any openings on that day.  Message will be sent to Desrie VILLANUEVA for follow up on post op appt.  Page is requesting return call as soon as possible.  Page verbalized understanding and agreement with plan.  Page was instructed to call the clinic with any questions, concerns, or worsening symptoms.    Kusum Hartman RN on 5/3/2024 at 4:18 PM        "

## 2024-05-03 NOTE — ED NOTES
Educated patient on importance of keeping bp cuff, oximeter, and oxygen on. Patient verbalized understanding.

## 2024-05-03 NOTE — TELEPHONE ENCOUNTER
Desire May, RN  You2 minutes ago (4:25 PM)     CT  Hey, I just talked with her and she said her son was there to take her to the ED. Dr. Pandya wants her to go to the ED to be assessed. You can put her on for the 0800 slot, if they want too. It was held for a potential new patient, but being he hasn't been scheduled yet, ok to use it.    Thanks,  Desire Abel was notified, and Page states that she would like to have pt scheduled to see Dr Pandya on 5/6/24 at 8:00 am. Appt has been scheduled.    Page states that pt is going to the ER now.    Page verbalized understanding and agreement with plan.  Page was instructed to call the clinic with any questions, concerns, or worsening symptoms.    Kusum Hartman RN on 5/3/2024 at 4:37 PM

## 2024-05-03 NOTE — TELEPHONE ENCOUNTER
Writer called patient to advise she increase her supplemental oxygen LMP, okay'd per Dr. Pandya, to keep her oxygen saturations to at least 90%, preferably 92% or higher. Patient indicates her son is at her home now, and he will transport her to the ED. Writer again reiterated that the ED team would be able to complete imaging, or other testing, to hopefully help determine the cause of her symptoms.     Desire May RN, BSN, PHN, OCN     5/3/2024 at 3:56 PM  Nurse Care Coordinator  [facility]  P: [phone]   F: [phone]

## 2024-05-03 NOTE — ED PROVIDER NOTES
"  Emergency Department Note      History of Present Illness     Chief Complaint  Post-op Problem and Shortness of Breath    HPI  Mimi Rivera is a 68 year old female s/p right lung lobectomy for shortness of breath and post-op assessment. Procedure was last Friday. The patient is on Lovenox, of note. Low-grade fever endorsed in addition to perspiration. Pain management has not been well controlled after the surgery, she reports. Mimi is on 2 L oxygen at baseline following the procedure. She describes her discomfort as more labored, needing to use inhalers to help with \"swollen\" sensation. Feels different from COPD flare-up at present. The patient is scheduled to follow-up with her surgeon later this month.    Independent Historian  The patient is present with family, helping contribute to the history as described above.    Review of External Notes  Discharge summary 4/29/2024, admission for robotic assisted partial resection of right middle lobe secondary to lung cancer.  Chest x-ray 4/29 was without pneumothorax after chest tube was removed the day prior.    Past Medical History   Medical History and Problem List  Past Medical History:   Diagnosis Date    ASCUS on Pap smear 01/01/2006    Chronic rhinosinusitis     COPD (chronic obstructive pulmonary disease) (H)     Depression, anxiety     Herpes     Hypertension     Primary stress urinary incontinence     Pulmonary nodules     Rhinosinusitis        Medications  acetaminophen (TYLENOL) 500 MG tablet  amLODIPine (NORVASC) 10 MG tablet  enoxaparin ANTICOAGULANT (LOVENOX) 40 MG/0.4ML syringe  esomeprazole (NEXIUM) 20 MG DR capsule  Fluticasone-Umeclidin-Vilant (TRELEGY ELLIPTA) 100-62.5-25 MCG/ACT oral inhaler  methocarbamol (ROBAXIN) 500 MG tablet  oxyCODONE (ROXICODONE) 5 MG tablet  EPINEPHrine (ANY BX GENERIC EQUIV) 0.3 MG/0.3ML injection 2-pack  levalbuterol (XOPENEX HFA) 45 MCG/ACT inhaler        Surgical History   Past Surgical History:   Procedure " Laterality Date    BRONCHOSCOPY, WITH BIOPSY, ROBOT ASSISTED Bilateral 2023    Procedure: Robot assisted Ion BRONCHOSCOPY;  Surgeon: Raudel Claire DO;  Location: [facility] OR     SECTION      x 3    DAVINCI LOBECTOMY LUNG Right 2024    Procedure: robot assisted thoracoscopic right wedge resection, mediastinal lymph node dissection;  Surgeon: Good Pandya MD;  Location: [facility] OR    ENDOBRONCHIAL ULTRASOUND FLEXIBLE N/A 2023    Procedure: endobronchial ultrasound, transbronchial biopsies Latex Free;  Surgeon: Raudel Claire DO;  Location: [facility] OR     Physical Exam   Patient Vitals for the past 24 hrs:   BP Temp Temp src Pulse Resp SpO2 Height Weight   24 (!) 146/94 -- -- 67 15 95 % -- --   24 (!) 148/87 -- -- 64 15 98 % -- --   24 (!) 146/87 -- -- 72 13 97 % -- --   24 (!) 147/81 -- -- 68 11 96 % -- --   24 -- -- -- 75 22 91 % -- --   24 (!) 145/91 -- -- 78 24 94 % -- --   24 -- -- -- 71 21 91 % -- --   24 -- -- -- 78 15 94 % -- --   24 (!) 166/112 -- -- 80 18 94 % -- --   24 -- -- -- 77 16 94 % -- --   24 -- -- -- 80 17 93 % -- --   24 -- -- -- 78 -- -- -- --   24 (!) 162/96 -- -- 79 20 94 % -- --   24 -- -- -- 81 11 91 % -- --   24 (!) 163/114 -- -- 76 29 94 % -- --   24 -- -- -- 81 20 94 % -- --   24 -- -- -- 82 16 96 % -- --   24 (!) 170/108 -- -- 81 18 92 % -- --   24 -- -- -- 84 18 93 % -- --   24 -- -- -- 87 20 92 % -- --   24 (!) 174/103 -- -- 90 17 94 % -- --   24 -- -- -- 76 23 97 % -- --   24 -- -- -- 78 28 93 % -- --   24 (!) 183/109 -- -- 82 (!) 41 -- -- --   24 -- -- -- 80 28 99 % -- --   24 (!) 185/101 -- -- -- -- -- -- --   24 -- -- -- 85 21 97 % -- --   24 -- -- -- 82 24 98 % --  "--   05/03/24 2025 -- -- -- 81 20 94 % -- --   05/03/24 2024 (!) 190/105 -- -- 84 26 98 % -- --   05/03/24 2021 -- -- -- 81 25 99 % -- --   05/03/24 2020 -- -- -- 82 16 99 % -- --   05/03/24 2019 -- -- -- 81 19 97 % -- --   05/03/24 2018 -- -- -- 80 24 98 % -- --   05/03/24 2017 -- -- -- 81 27 99 % -- --   05/03/24 2016 -- -- -- 80 29 97 % -- --   05/03/24 2015 -- -- -- 81 -- -- -- --   05/03/24 2015 (!) 189/97 -- -- 80 (!) 43 98 % -- --   05/03/24 2014 -- -- -- 79 26 99 % -- --   05/03/24 2013 -- -- -- 77 22 99 % -- --   05/03/24 2012 (!) 179/103 -- -- 77 30 100 % -- --   05/03/24 2011 -- -- -- 77 23 100 % -- --   05/03/24 2010 -- -- -- 75 10 100 % -- --   05/03/24 2009 -- -- -- 70 23 98 % -- --   05/03/24 2008 -- -- -- 72 17 99 % -- --   05/03/24 2007 -- -- -- -- 21 -- -- --   05/03/24 2007 (!) 164/89 -- -- 72 (!) 7 98 % -- --   05/03/24 2002 (!) 163/86 -- -- 84 (!) 41 95 % -- --   05/03/24 2000 (!) 153/92 -- -- -- -- -- -- --   05/03/24 1959 -- -- -- 71 19 95 % -- --   05/03/24 1951 (!) 152/84 97.4  F (36.3  C) Oral 73 23 96 % -- --   05/03/24 1800 (!) 187/119 -- -- 94 20 90 % -- --   05/03/24 1748 (!) 149/81 -- -- 81 12 95 % -- --   05/03/24 1720 -- -- -- 79 28 (!) 89 % -- --   05/03/24 1700 123/88 -- -- 92 28 (!) 86 % -- --   05/03/24 1645 135/68 -- -- 88 -- 94 % -- --   05/03/24 1630 (!) 153/89 99.5  F (37.5  C) Temporal 87 24 91 % 1.575 m (5' 2\") 51.6 kg (113 lb 12.1 oz)     Physical Exam   Constitutional: Alert, attentive, GCS 15   HENT:    Nose: Nose normal.    Mouth/Throat: Oropharynx is clear, mucous membranes are moist  Eyes: EOM are normal, anicteric, conjugate gaze  CV: regular rate and rhythm   Chest: Decreased right upper lung breath sounds, faint wheezing right lower lung, left lung is clear, on nasal cannula: Posterior chest incisions are well-healing without signs of infection  GI:  non tender. No distension. No guarding or rebound.    MSK: No LE edema, no tenderness to palpation of " BLE.  Neurological: Alert, attentive, moving all extremities equally.   Skin: Skin is warm and dry.  Marked bruising over right lateral abdomen    Diagnostics   Lab Results   Labs Ordered and Resulted from Time of ED Arrival to Time of ED Departure   BLOOD GAS VENOUS - Abnormal       Result Value    pH Venous 7.29 (*)     pCO2 Venous 56 (*)     pO2 Venous 31      Bicarbonate Venous 27      Base Excess/Deficit Venous -1.2      FIO2 0      Oxyhemoglobin Venous 72      O2 Sat, Venous 73.8     BASIC METABOLIC PANEL - Abnormal    Sodium 138      Potassium 4.9      Chloride 100      Carbon Dioxide (CO2) 29      Anion Gap 9      Urea Nitrogen 9.4      Creatinine 0.49 (*)     GFR Estimate >90      Calcium 9.9      Glucose 110 (*)    D DIMER QUANTITATIVE - Abnormal    D-Dimer Quantitative 3.81 (*)    CBC WITH PLATELETS AND DIFFERENTIAL    WBC Count 10.0      RBC Count 4.19      Hemoglobin 13.4      Hematocrit 40.1      MCV 96      MCH 32.0      MCHC 33.4      RDW 12.5      Platelet Count 342      % Neutrophils 76      % Lymphocytes 15      % Monocytes 8      % Eosinophils 1      % Basophils 0      % Immature Granulocytes 0      NRBCs per 100 WBC 0      Absolute Neutrophils 7.5      Absolute Lymphocytes 1.5      Absolute Monocytes 0.8      Absolute Eosinophils 0.1      Absolute Basophils 0.0      Absolute Immature Granulocytes 0.0      Absolute NRBCs 0.0         Imaging  XR Chest Port 1 View   Final Result   IMPRESSION:       New right chest tube with tip at the apex. Interval resolution of right pneumothorax.      Small right pleural effusion. No left pleural effusion. No left pneumothorax.      No focal airspace disease.      Stable cardiomegaly. Aortic calcifications.      XR Chest Port 1 View   Final Result   Abnormal   IMPRESSION:       Large right pneumothorax with apical and basilar components measuring up to 7.6 cm at the apex. There is associated partial collapse of the right lung. No mediastinal shift or other signs  of tension. Sutures are noted along the superior aspect of the    partially collapsed right lung.      Left lung is clear. No left pneumothorax. No pleural effusion.      Stable cardiomegaly and aortic atherosclerotic calcifications.      [Critical Result: Pneumothorax]      Finding was identified on 5/3/2024 5:54 PM CDT.       Dr. Mata was contacted by me on 5/3/2024 5:57 PM CDT and verbalized understanding of the critical result.          Independent Interpretation  I personally reviewed her initial portable chest x-ray and send right-sided pneumothorax but does not appear to be causing any right to left shift of the mediastinum.  I personally reviewed her portable chest x-ray after pigtail tube thoracostomy was placed which shows reexpansion of the right lung and the pigtail catheter in adequate positioning in the right apex.    ED Course    Medications Administered  Medications   flumazenil (ROMAZICON) injection 0.2 mg (has no administration in time range)   senna-docusate (SENOKOT-S/PERICOLACE) 8.6-50 MG per tablet 1 tablet (has no administration in time range)     Or   senna-docusate (SENOKOT-S/PERICOLACE) 8.6-50 MG per tablet 2 tablet (has no administration in time range)   calcium carbonate (TUMS) chewable tablet 1,000 mg (has no administration in time range)   enoxaparin ANTICOAGULANT (LOVENOX) injection 40 mg (has no administration in time range)   acetaminophen (TYLENOL) tablet 650 mg (has no administration in time range)     Or   acetaminophen (TYLENOL) Suppository 650 mg (has no administration in time range)   oxyCODONE (ROXICODONE) tablet 5 mg (has no administration in time range)   HYDROmorphone (PF) (DILAUDID) injection 0.5 mg (0.5 mg Intravenous $Given 5/3/24 2119)   ondansetron (ZOFRAN ODT) ODT tab 4 mg (has no administration in time range)     Or   ondansetron (ZOFRAN) injection 4 mg (has no administration in time range)   ipratropium - albuterol 0.5 mg/2.5 mg/3 mL (DUONEB) neb solution 3 mL  (has no administration in time range)   ipratropium - albuterol 0.5 mg/2.5 mg/3 mL (DUONEB) neb solution 3 mL (has no administration in time range)   oxyCODONE (ROXICODONE) tablet 5 mg (5 mg Oral $Given 5/3/24 1831)   ketamine (KETALAR) injection 50 mg (50 mg Intravenous $Given 5/3/24 2007)   ketamine (KETALAR) injection 50 mg (25 mg Intravenous $Given 5/3/24 2016)   glycopyrrolate (ROBINUL) injection 0.2 mg (0.2 mg Intravenous Not Given 5/3/24 2052)   BUPivacaine (PF) (MARCAINE) 0.5 % injection (  $Given by Other 5/3/24 2007)   sodium chloride 0.9 % infusion (1,000 mLs Intravenous $New Bag 5/3/24 2018)   fentaNYL (PF) (SUBLIMAZE) injection 25 mcg (25 mcg Intravenous $Given 5/3/24 2029)   haloperidol lactate (HALDOL) injection 2.5 mg (2.5 mg Intravenous $Given 5/3/24 2042)       [facility]    -Chest Tube Insertion    Date/Time: 5/3/2024 9:35 PM    Performed by: Umer Mata MD  Authorized by: Umer Mata MD    Risks, benefits and alternatives discussed.    ED EVALUATION:      Assessment Time: 5/3/2024 8:00 PM      I have performed an Emergency Department Evaluation including taking a history and physical examination, this evaluation will be documented in the electronic medical record for this ED encounter.     Indication: Pneumothorax    ASA Class: Class 3- Severe systemic disease, definite functional limitations    Mallampati: Grade 3- soft palate visible, posterior pharyngeal wall not visible and Grade 2- soft palate, base of uvula, tonsillar pillars, and portion of posterior pharyngeal wall visible    UNIVERSAL PROTOCOL   Site Marked: Yes  Prior Images Obtained and Reviewed:  Yes  Required items: Required blood products, implants, devices and special equipment available    Patient identity confirmed:  Verbally with patient and arm band  Patient was reevaluated immediately before administering moderate or deep sedation or anesthesia  Confirmation Checklist:   Patient's identity using two indicators and relevant allergies  Time out: Immediately prior to the procedure a time out was called    Universal Protocol: the Joint Commission Universal Protocol was followed    Preparation: Patient was prepped and draped in usual sterile fashion      PRE-PROCEDURE DETAILS:     Skin preparation:  Betadine    SEDATION  Patient Sedated: Yes    Sedation Type:  Deep  Sedation:  Ketamine  Vital signs: Vital signs monitored during sedation      ANESTHESIA (see MAR for exact dosages):     Anesthesia method:  Local infiltration    Local anesthetic:  Bupivacaine 0.5% w/o epi      PROCEDURE DETAILS:     Placement location:  R anterior    Tube size (Fr):  8    Ultrasound guidance: yes      Tension pneumothorax: no      Tube connected to:  Suction    Suture material:  0 silk    POST-PROCEDURE DETAILS:     Post-insertion x-ray findings: tube in good position      PROCEDURE    Patient Tolerance:  Patient tolerated the procedure well with no immediate complications  Length of time physician/provider present for 1:1 monitoring during sedation: 20   Mild emergence from ketmine. Improved with 2.5mg IV haldol.        Discussion of Management  Dr. Tamez - thoracic surgery, reviewed chest x-ray, was comfortable with pigtail catheter placement.  Reports they can round on the patient here, no indication to transfer to [facility].    Social Determinants of Health adding to complexity of care  None      Medical Decision Making / Diagnosis   CT for PE was ordered because the patient is high risk for pulmonary embolism     MDM  Mimi Padmini Rivera is a 68 year old female complex recent past medical history including robotic assisted partial resection of right middle lobe secondary to lung cancer 1 week prior, history of COPD, currently on Oxymizer at 2 L presenting for increasing shortness of breath, lethargy and fatigue since discharge.  On exam she was noted to have decreased breath sounds on the  right concerning for pneumothorax given her recent partial pneumonectomy.  Portable chest x-ray can firmed this, with her VBG showing mild chronic hypoxic respiratory failure.  Soon as I saw the chest x-ray, I consulted thoracic surgery who are comfortable with us placing a pigtail catheter.  Patient underwent this without complication other than some mild emergence phenomenon from the ketamine requiring 2 and half milligrams of IV Haldol.  she does have an elevated D-dimer, as such we will plan for CT PE study however I have lower suspicion for this given the finding of pneumothorax on chest x-ray.  Right lung reexpansion was confirmed with chest x-ray after pigtail catheter was placed, as well as a confirmed placement.  CT PE is pending.  We will plan for medicine admission. CT PE pending.    Disposition  The patient was admitted to the hospital under the care of Dr. Smith    ICD-10 Codes:    ICD-10-CM    1. Pneumothorax, postoperative  J95.811       2. S/P partial lobectomy of lung  Z90.2       3. History of lung cancer  Z85.118       4. History of COPD  Z87.09            Umer Mata MD  [employer]  9:38 PM 05/03/24           Scribe Disclosure:  I, Geraldo Sony, am serving as a scribe at 4:57 PM on 5/3/2024 to document services personally performed by Umer Mata MD based on my observations and the provider's statements to me.     [employer]        Umer Mata MD  05/03/24 [id number]

## 2024-05-03 NOTE — ED TRIAGE NOTES
Recent surgery on lung. Patient reports increased shortness of breath. On 2L NC with sats at 91%.

## 2024-05-03 NOTE — TELEPHONE ENCOUNTER
"Writer called patient to follow up on symptoms. Patient reports her breathing \"seems to be getting worse\", with oxygen saturations dropping into the 80's even at rest on 2LPM. Writer advised pateint to go to ED immediately  to be assessed, and offered to call 911 for transport. Patient declined and indicated she would contact her son to take her. Writer reiterated again that she should go to an ED for assessment immediately. Patient stated understanding.     Desire May, RN, BSN, PHN, OCN     5/3/2024 at 1:28 PM  Nurse Care Coordinator  [facility]  P: [phone]   F: [phone]    "

## 2024-05-04 ENCOUNTER — APPOINTMENT (OUTPATIENT)
Dept: GENERAL RADIOLOGY | Facility: CLINIC | Age: 69
DRG: 199 | End: 2024-05-04
Attending: INTERNAL MEDICINE
Payer: COMMERCIAL

## 2024-05-04 LAB
ANION GAP SERPL CALCULATED.3IONS-SCNC: 8 MMOL/L (ref 7–15)
BUN SERPL-MCNC: 9.6 MG/DL (ref 8–23)
CALCIUM SERPL-MCNC: 8.9 MG/DL (ref 8.8–10.2)
CHLORIDE SERPL-SCNC: 104 MMOL/L (ref 98–107)
CREAT SERPL-MCNC: 0.54 MG/DL (ref 0.51–0.95)
DEPRECATED HCO3 PLAS-SCNC: 27 MMOL/L (ref 22–29)
EGFRCR SERPLBLD CKD-EPI 2021: >90 ML/MIN/1.73M2
ERYTHROCYTE [DISTWIDTH] IN BLOOD BY AUTOMATED COUNT: 12.6 % (ref 10–15)
GLUCOSE SERPL-MCNC: 102 MG/DL (ref 70–99)
HCT VFR BLD AUTO: 38.3 % (ref 35–47)
HGB BLD-MCNC: 12.8 G/DL (ref 11.7–15.7)
MCH RBC QN AUTO: 32.4 PG (ref 26.5–33)
MCHC RBC AUTO-ENTMCNC: 33.4 G/DL (ref 31.5–36.5)
MCV RBC AUTO: 97 FL (ref 78–100)
PLATELET # BLD AUTO: 285 10E3/UL (ref 150–450)
POTASSIUM SERPL-SCNC: 4.5 MMOL/L (ref 3.4–5.3)
RBC # BLD AUTO: 3.95 10E6/UL (ref 3.8–5.2)
SODIUM SERPL-SCNC: 139 MMOL/L (ref 135–145)
WBC # BLD AUTO: 8.2 10E3/UL (ref 4–11)

## 2024-05-04 PROCEDURE — 250N000011 HC RX IP 250 OP 636: Performed by: INTERNAL MEDICINE

## 2024-05-04 PROCEDURE — 36415 COLL VENOUS BLD VENIPUNCTURE: CPT | Performed by: INTERNAL MEDICINE

## 2024-05-04 PROCEDURE — 94640 AIRWAY INHALATION TREATMENT: CPT | Mod: 76

## 2024-05-04 PROCEDURE — 120N000001 HC R&B MED SURG/OB

## 2024-05-04 PROCEDURE — 80048 BASIC METABOLIC PNL TOTAL CA: CPT | Performed by: INTERNAL MEDICINE

## 2024-05-04 PROCEDURE — 250N000011 HC RX IP 250 OP 636

## 2024-05-04 PROCEDURE — 99232 SBSQ HOSP IP/OBS MODERATE 35: CPT | Performed by: INTERNAL MEDICINE

## 2024-05-04 PROCEDURE — 85027 COMPLETE CBC AUTOMATED: CPT | Performed by: INTERNAL MEDICINE

## 2024-05-04 PROCEDURE — 999N000157 HC STATISTIC RCP TIME EA 10 MIN

## 2024-05-04 PROCEDURE — 94640 AIRWAY INHALATION TREATMENT: CPT

## 2024-05-04 PROCEDURE — 250N000009 HC RX 250: Performed by: INTERNAL MEDICINE

## 2024-05-04 PROCEDURE — 71045 X-RAY EXAM CHEST 1 VIEW: CPT

## 2024-05-04 PROCEDURE — 250N000013 HC RX MED GY IP 250 OP 250 PS 637: Performed by: INTERNAL MEDICINE

## 2024-05-04 RX ORDER — NALOXONE HYDROCHLORIDE 0.4 MG/ML
0.2 INJECTION, SOLUTION INTRAMUSCULAR; INTRAVENOUS; SUBCUTANEOUS
Status: DISCONTINUED | OUTPATIENT
Start: 2024-05-04 | End: 2024-05-07 | Stop reason: HOSPADM

## 2024-05-04 RX ORDER — PANTOPRAZOLE SODIUM 40 MG/1
40 TABLET, DELAYED RELEASE ORAL
Status: DISCONTINUED | OUTPATIENT
Start: 2024-05-04 | End: 2024-05-07 | Stop reason: HOSPADM

## 2024-05-04 RX ORDER — METHOCARBAMOL 500 MG/1
500 TABLET, FILM COATED ORAL 3 TIMES DAILY PRN
Status: DISCONTINUED | OUTPATIENT
Start: 2024-05-04 | End: 2024-05-07 | Stop reason: HOSPADM

## 2024-05-04 RX ORDER — NALOXONE HYDROCHLORIDE 0.4 MG/ML
0.4 INJECTION, SOLUTION INTRAMUSCULAR; INTRAVENOUS; SUBCUTANEOUS
Status: DISCONTINUED | OUTPATIENT
Start: 2024-05-04 | End: 2024-05-07 | Stop reason: HOSPADM

## 2024-05-04 RX ORDER — AMLODIPINE BESYLATE 10 MG/1
10 TABLET ORAL EVERY EVENING
Status: DISCONTINUED | OUTPATIENT
Start: 2024-05-04 | End: 2024-05-07 | Stop reason: HOSPADM

## 2024-05-04 RX ADMIN — IPRATROPIUM BROMIDE AND ALBUTEROL SULFATE 3 ML: .5; 3 SOLUTION RESPIRATORY (INHALATION) at 09:22

## 2024-05-04 RX ADMIN — OXYCODONE HYDROCHLORIDE 5 MG: 5 TABLET ORAL at 23:18

## 2024-05-04 RX ADMIN — PANTOPRAZOLE SODIUM 40 MG: 40 TABLET, DELAYED RELEASE ORAL at 08:40

## 2024-05-04 RX ADMIN — METHOCARBAMOL 500 MG: 500 TABLET ORAL at 10:04

## 2024-05-04 RX ADMIN — METHOCARBAMOL 500 MG: 500 TABLET ORAL at 17:27

## 2024-05-04 RX ADMIN — IPRATROPIUM BROMIDE AND ALBUTEROL SULFATE 3 ML: .5; 3 SOLUTION RESPIRATORY (INHALATION) at 18:46

## 2024-05-04 RX ADMIN — OXYCODONE HYDROCHLORIDE 5 MG: 5 TABLET ORAL at 12:52

## 2024-05-04 RX ADMIN — ENOXAPARIN SODIUM 40 MG: 40 INJECTION SUBCUTANEOUS at 10:04

## 2024-05-04 RX ADMIN — KETOROLAC TROMETHAMINE 15 MG: 15 INJECTION, SOLUTION INTRAMUSCULAR; INTRAVENOUS at 14:39

## 2024-05-04 RX ADMIN — HYDROMORPHONE HYDROCHLORIDE: 1 INJECTION, SOLUTION INTRAMUSCULAR; INTRAVENOUS; SUBCUTANEOUS at 00:01

## 2024-05-04 RX ADMIN — OXYCODONE HYDROCHLORIDE 5 MG: 5 TABLET ORAL at 17:27

## 2024-05-04 RX ADMIN — IPRATROPIUM BROMIDE AND ALBUTEROL SULFATE 3 ML: .5; 3 SOLUTION RESPIRATORY (INHALATION) at 16:19

## 2024-05-04 RX ADMIN — OXYCODONE HYDROCHLORIDE 5 MG: 5 TABLET ORAL at 08:49

## 2024-05-04 RX ADMIN — KETOROLAC TROMETHAMINE 15 MG: 15 INJECTION, SOLUTION INTRAMUSCULAR; INTRAVENOUS at 01:44

## 2024-05-04 RX ADMIN — IPRATROPIUM BROMIDE AND ALBUTEROL SULFATE 3 ML: .5; 3 SOLUTION RESPIRATORY (INHALATION) at 12:24

## 2024-05-04 RX ADMIN — HYDROMORPHONE HYDROCHLORIDE 0.5 MG: 1 INJECTION, SOLUTION INTRAMUSCULAR; INTRAVENOUS; SUBCUTANEOUS at 05:45

## 2024-05-04 ASSESSMENT — ACTIVITIES OF DAILY LIVING (ADL)
ADLS_ACUITY_SCORE: 25
ADLS_ACUITY_SCORE: 25
ADLS_ACUITY_SCORE: 29
ADLS_ACUITY_SCORE: 25
ADLS_ACUITY_SCORE: 29
ADLS_ACUITY_SCORE: 27
ADLS_ACUITY_SCORE: 29
ADLS_ACUITY_SCORE: 27
ADLS_ACUITY_SCORE: 29
ADLS_ACUITY_SCORE: 25
ADLS_ACUITY_SCORE: 25
ADLS_ACUITY_SCORE: 29
ADLS_ACUITY_SCORE: 25
ADLS_ACUITY_SCORE: 29

## 2024-05-04 NOTE — ED NOTES
"[facility]  ED Nurse Handoff Report    ED Chief complaint: Post-op Problem and Shortness of Breath  . ED Diagnosis:   Final diagnoses:   None       Allergies: No Known Allergies    Code Status: Full Code    Activity level - Baseline/Home:  independent.  Activity Level - Current:   standby.   Lift room needed: No.   Bariatric: No   Needed: No   Isolation: No.   Infection: Not Applicable.     Respiratory status: Nasal cannula    Vital Signs (within 30 minutes):   Vitals:    05/03/24 1700 05/03/24 1720 05/03/24 1748 05/03/24 1800   BP: 123/88  (!) 149/81 (!) 187/119   Pulse: 92 79 81 94   Resp: 28 28 12 20   Temp:       TempSrc:       SpO2: (!) 86% (!) 89% 95% 90%   Weight:       Height:           Cardiac Rhythm:  ,      Pain level:    Patient confused: No.   Patient Falls Risk: nonskid shoes/slippers when out of bed, arm band in place, and patient and family education.   Elimination Status: Has voided     Patient Report - Initial Complaint: shortness of breath.   Focused Assessment: Mimi Rivera is a 68 year old female s/p right lung lobectomy for shortness of breath and post-op assessment. Procedure was last Friday. The patient is on Lovenox, of note. Low-grade fever endorsed in addition to perspiration. Pain management has not been well controlled after the surgery, she reports. Mimi is on 2 L oxygen at baseline following the procedure. She describes her discomfort as more labored, needing to use inhalers to help with \"swollen\" sensation. Feels different from COPD flare-up at present. Imaging shows pneumothorax to right side, chest tube placed by ED MD with sedation. Alert and oriented x 4.      Abnormal Results:   Labs Ordered and Resulted from Time of ED Arrival to Time of ED Departure   BLOOD GAS VENOUS - Abnormal       Result Value    pH Venous 7.29 (*)     pCO2 Venous 56 (*)     pO2 Venous 31      Bicarbonate Venous 27      Base Excess/Deficit Venous -1.2      FIO2 0      " Oxyhemoglobin Venous 72      O2 Sat, Venous 73.8     BASIC METABOLIC PANEL - Abnormal    Sodium 138      Potassium 4.9      Chloride 100      Carbon Dioxide (CO2) 29      Anion Gap 9      Urea Nitrogen 9.4      Creatinine 0.49 (*)     GFR Estimate >90      Calcium 9.9      Glucose 110 (*)    D DIMER QUANTITATIVE - Abnormal    D-Dimer Quantitative 3.81 (*)    CBC WITH PLATELETS AND DIFFERENTIAL    WBC Count 10.0      RBC Count 4.19      Hemoglobin 13.4      Hematocrit 40.1      MCV 96      MCH 32.0      MCHC 33.4      RDW 12.5      Platelet Count 342      % Neutrophils 76      % Lymphocytes 15      % Monocytes 8      % Eosinophils 1      % Basophils 0      % Immature Granulocytes 0      NRBCs per 100 WBC 0      Absolute Neutrophils 7.5      Absolute Lymphocytes 1.5      Absolute Monocytes 0.8      Absolute Eosinophils 0.1      Absolute Basophils 0.0      Absolute Immature Granulocytes 0.0      Absolute NRBCs 0.0          XR Chest Port 1 View   Final Result   Abnormal   IMPRESSION:       Large right pneumothorax with apical and basilar components measuring up to 7.6 cm at the apex. There is associated partial collapse of the right lung. No mediastinal shift or other signs of tension. Sutures are noted along the superior aspect of the    partially collapsed right lung.      Left lung is clear. No left pneumothorax. No pleural effusion.      Stable cardiomegaly and aortic atherosclerotic calcifications.      [Critical Result: Pneumothorax]      Finding was identified on 5/3/2024 5:54 PM CDT.       Dr. Mata was contacted by me on 5/3/2024 5:57 PM CDT and verbalized understanding of the critical result.          Treatments provided: labs/imaging/chest tube/pain control  Family Comments: Son at bedside.  OBS brochure/video discussed/provided to patient:  No  ED Medications:   Medications   flumazenil (ROMAZICON) injection 0.2 mg (has no administration in time range)   ketamine (KETALAR) injection 50 mg (has no  administration in time range)   ketamine (KETALAR) injection 50 mg (has no administration in time range)   glycopyrrolate (ROBINUL) injection 0.2 mg (has no administration in time range)   oxyCODONE (ROXICODONE) tablet 5 mg (5 mg Oral $Given 5/3/24 1831)       Drips infusing:  No  For the majority of the shift this patient was Green.   Interventions performed were none.    Sepsis treatment initiated: No    Cares/treatment/interventions/medications to be completed following ED care: see orders.    ED Nurse Name: Reva Cyr RN    RECEIVING UNIT ED HANDOFF REVIEW    Above ED Nurse Handoff Report was reviewed: Yes  Reviewed by: Noa Raymond RN on May 3, 2024 at 9:18 PM   AURELIO Win called the ED to inform them the note was read: Yes     7:18 PM

## 2024-05-04 NOTE — PHARMACY-ADMISSION MEDICATION HISTORY
Pharmacist Admission Medication History    Admission medication history is complete. The information provided in this note is only as accurate as the sources available at the time of the update.    Information Source(s): Patient via in-person    Pertinent Information: Pt is on 4th day of Lovenox 40mg, 3 shots left     Changes made to PTA medication list:  Added: None  Deleted: Azithromycin 250mg, Cetirizine 10mg, Levalbuterol 1.25mg/3ml neb solution, Miralax, Senna-S  Changed: None    Allergies reviewed with patient and updates made in EHR: yes    Medication History Completed By: Uday Pastor [facility] 5/3/2024 7:42 PM    PTA Med List   Medication Sig Last Dose    acetaminophen (TYLENOL) 500 MG tablet Take 1-2 tablets (500-1,000 mg) by mouth every 8 hours as needed for mild pain 5/3/2024    amLODIPine (NORVASC) 10 MG tablet Take 1 tablet (10 mg) by mouth every evening 5/2/2024    enoxaparin ANTICOAGULANT (LOVENOX) 40 MG/0.4ML syringe Inject 0.4 mLs (40 mg) Subcutaneous daily for 7 days 5/3/2024 at 4th shot    esomeprazole (NEXIUM) 20 MG DR capsule Take 1 capsule (20 mg) by mouth 2 times daily Take 30-60 minutes before eating. 4/28/2024    Fluticasone-Umeclidin-Vilant (TRELEGY ELLIPTA) 100-62.5-25 MCG/ACT oral inhaler INHALE ONE PUFF BY MOUTH EVERY DAY (Patient taking differently: Inhale 1 puff into the lungs daily (with lunch)) 5/3/2024    methocarbamol (ROBAXIN) 500 MG tablet Take 1 tablet (500 mg) by mouth 3 times daily as needed for muscle spasms 5/3/2024 at x1    oxyCODONE (ROXICODONE) 5 MG tablet Take 1 tablet (5 mg) by mouth every 8 hours as needed for pain 5/3/2024 at x1

## 2024-05-04 NOTE — CONSULTS
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    HPI: Mrs. Rivera experienced progressive dyspnea and she had a large RIGHT pneumothorax in the ED. A pigtail catheter was placed with resolution.    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    1) Chest tube to water seal, if CXR is stable on water seal, then we will clamp it for 24h    Patient and plan discussed with Dr. Torres.    Thank you for the opportunity to participate in the care of this patient.    PMH:  Past Medical History:   Diagnosis Date    ASCUS on Pap smear 2006    unable to run HPV typing, f/u paps NIL    Chronic rhinosinusitis     COPD (chronic obstructive pulmonary disease) (H)     Depression, anxiety     Herpes     occasional outbreak    Hypertension     Primary stress urinary incontinence     Pulmonary nodules     Rhinosinusitis      Severe COPD     PSH:  Past Surgical History:   Procedure Laterality Date    BRONCHOSCOPY, WITH BIOPSY, ROBOT ASSISTED Bilateral 2023    Procedure: Robot assisted Ion BRONCHOSCOPY;  Surgeon: Raudel Claire DO;  Location: [facility]     SECTION      x 3    DAVINCI LOBECTOMY LUNG Right 2024    Procedure: robot assisted thoracoscopic right wedge resection, mediastinal lymph node dissection;  Surgeon: Good Pandya MD;  Location:  OR    ENDOBRONCHIAL ULTRASOUND FLEXIBLE N/A 2023    Procedure: endobronchial ultrasound, transbronchial biopsies Latex Free;  Surgeon: Raudel Claire DO;  Location:  OR         FH:  family history includes C.A.D. in her father; Cancer in her mother; Hypertension in her mother.      SH:  Social History     Tobacco Use    Smoking status: Former     Current packs/day: 0.00     Types: Cigarettes     Quit date: 3/19/2015     Years since quittin.1     Smokeless tobacco: Former     Quit date: 12/11/2017   Vaping Use    Vaping status: Never Used   Substance Use Topics    Alcohol use: Yes     Alcohol/week: 2.0 - 3.0 standard drinks of alcohol     Types: 2 - 3 Standard drinks or equivalent per week     Comment: 2 drinks per wk avg    Drug use: No    Tobacco quit 2015  EtOH 2-3 drinks/week      Allergies:  No Known Allergies    Home Meds:  Medications Prior to Admission   Medication Sig Dispense Refill Last Dose    acetaminophen (TYLENOL) 500 MG tablet Take 1-2 tablets (500-1,000 mg) by mouth every 8 hours as needed for mild pain 42 tablet 0 5/3/2024    amLODIPine (NORVASC) 10 MG tablet Take 1 tablet (10 mg) by mouth every evening 90 tablet 3 5/2/2024    enoxaparin ANTICOAGULANT (LOVENOX) 40 MG/0.4ML syringe Inject 0.4 mLs (40 mg) Subcutaneous daily for 7 days 2.8 mL 0 5/3/2024 at 4th shot    esomeprazole (NEXIUM) 20 MG DR capsule Take 1 capsule (20 mg) by mouth 2 times daily Take 30-60 minutes before eating. 60 capsule 11 4/28/2024    Fluticasone-Umeclidin-Vilant (TRELEGY ELLIPTA) 100-62.5-25 MCG/ACT oral inhaler INHALE ONE PUFF BY MOUTH EVERY DAY (Patient taking differently: Inhale 1 puff into the lungs daily (with lunch)) 60 each 3 5/3/2024    methocarbamol (ROBAXIN) 500 MG tablet Take 1 tablet (500 mg) by mouth 3 times daily as needed for muscle spasms 21 tablet 0 5/3/2024 at x1    oxyCODONE (ROXICODONE) 5 MG tablet Take 1 tablet (5 mg) by mouth every 8 hours as needed for pain 40 tablet 0 5/3/2024 at x1    EPINEPHrine (ANY BX GENERIC EQUIV) 0.3 MG/0.3ML injection 2-pack Inject 0.3 mLs (0.3 mg) into the muscle as needed for anaphylaxis May repeat one time in 5-15 minutes if response to initial dose is inadequate. 2 each 0  at PRN    levalbuterol (XOPENEX HFA) 45 MCG/ACT inhaler Inhale 2 puffs into the lungs every 4 hours as needed for shortness of breath / dyspnea or wheezing 1 Inhaler 1  at PRN       ROS: .  Remainder of pertinent 12pt ROS negative.    Physical  Exam:  Temp:  [97.4  F (36.3  C)-99.5  F (37.5  C)] 97.4  F (36.3  C)  Pulse:  [64-94] 73  Resp:  [7-43] 18  BP: (122-190)/() 122/54  SpO2:  [86 %-100 %] 96 %    Chest tube: In good position, no kinking, no air-leak and minimal column movement with respirations.  Gen: NAD, resting comfortably in bed  Lungs: CTAB, non-labored breathing  CV: regular rhythm, normal rate    Labs:  ABG No lab results found in last 7 days.  CBC  Recent Labs   Lab 05/04/24  0645 05/03/24  1710 04/29/24  0615 04/28/24  0648   WBC 8.2 10.0 7.4 9.6   HGB 12.8 13.4 11.6* 12.2    342 210 227     BMP  Recent Labs   Lab 05/04/24  0645 05/03/24  1710 04/29/24  0615 04/28/24  0648    138 136 138   POTASSIUM 4.5 4.9 3.9 4.1   CHLORIDE 104 100 101 104   CO2 27 29 28 26   BUN 9.6 9.4 9.9 13.8   CR 0.54 0.49* 0.64 0.60   * 110* 96 103*     LFTNo lab results found in last 7 days.  PancreasNo lab results found in last 7 days.    Imaging:  Resolved pneumothorax on CXR          Jamel Tamez MD

## 2024-05-04 NOTE — PLAN OF CARE
"To Do:  End of Shift Summary  For vital signs and complete assessments, please see documentation flowsheets.     Pertinent assessments: A/Ox4. BP elevated, other VSS on 3L NC. Pain rated 6/10 in chest tube site and back, refused pain medications. Chest tube CDI. Bruised area on back.   Major Shift Events: admitted from ED.   Treatment Plan: pain management, lovenox, chest tube.   Bedside Nurse: Noa Raymond RN     Goal Outcome Evaluation:      Plan of Care Reviewed With: patient    Overall Patient Progress: no changeOverall Patient Progress: no change    Outcome Evaluation: Pain 6/10, On 3L NC.      Problem: Adult Inpatient Plan of Care  Goal: Plan of Care Review  Description: The Plan of Care Review/Shift note should be completed every shift.  The Outcome Evaluation is a brief statement about your assessment that the patient is improving, declining, or no change.  This information will be displayed automatically on your shift  note.  Outcome: Progressing  Flowsheets (Taken 5/3/2024 8575)  Outcome Evaluation: Pain 6/10, On 3L NC.  Plan of Care Reviewed With: patient  Overall Patient Progress: no change  Goal: Patient-Specific Goal (Individualized)  Description: You can add care plan individualizations to a care plan. Examples of Individualization might be:  \"Parent requests to be called daily at 9am for status\", \"I have a hard time hearing out of my right ear\", or \"Do not touch me to wake me up as it startles  me\".  Outcome: Progressing  Goal: Absence of Hospital-Acquired Illness or Injury  Outcome: Progressing  Intervention: Prevent and Manage VTE (Venous Thromboembolism) Risk  Recent Flowsheet Documentation  Taken 5/3/2024 2228 by Noa Raymond RN  VTE Prevention/Management: SCDs (sequential compression devices) off  Intervention: Prevent Infection  Recent Flowsheet Documentation  Taken 5/3/2024 2228 by Noa Raymond RN  Infection Prevention:   rest/sleep promoted   hand hygiene promoted   " single patient room provided  Goal: Optimal Comfort and Wellbeing  Outcome: Progressing  Goal: Readiness for Transition of Care  Outcome: Progressing  Intervention: Mutually Develop Transition Plan  Recent Flowsheet Documentation  Taken 5/3/2024 2219 by Noa Raymond RN  Equipment Currently Used at Home: none     Problem: Pain Acute  Goal: Optimal Pain Control and Function  Outcome: Progressing     Problem: Fall Injury Risk  Goal: Absence of Fall and Fall-Related Injury  Outcome: Progressing

## 2024-05-04 NOTE — PLAN OF CARE
Goal Outcome Evaluation:      Plan of Care Reviewed With: patient    Overall Patient Progress: improvingOverall Patient Progress: improving    Outcome Evaluation: Pt A&O x 4. VSS. BP slightly elevated. Assist x 1 with with gait belt and walker. O2 Sat 96% on oxygen 2LPM. BS+. Lap sites behind back WNL. Steri strips in place. CMS in tack. Chest tube on RU chest patent and on continuous low intermittent suctioning. Tele SR with occasional PVCs HR 79 per tele tech. R chest and flank pain managed with oral medications. Oxycodone 5 mg and Robaxin given for pain. Had some decreased in pain.Tolerating regular diet. Denies SOB or nausea. Has large bruising on back, flank, abdomen - scattered bruises. Cardiothoracic consulted today. Oncology following      Problem: Adult Inpatient Plan of Care  Goal: Plan of Care Review  Description: The Plan of Care Review/Shift note should be completed every shift.  The Outcome Evaluation is a brief statement about your assessment that the patient is improving, declining, or no change.  This information will be displayed automatically on your shift  note.  Outcome: Progressing  Flowsheets (Taken 5/4/2024 1755)  Outcome Evaluation: Pt A&O x 4. VSS. BP slightly elevated. Assist x 1 with with gait belt and walker. O2 Sat 96% on oxygen 2LPM. BS+. Lap sites behind back WNL. Steri strips in place. CMS in tack. Chest tube on RU chest patent and on continuous low intermittent suctioning. Tele SR with occasional PVCs HR 79 per tele tech. R chest and flank pain managed with oral medications. Oxycodone 5 mg and Robaxin given for pain. Had some decreased in pain.Tolerating regular diet. Denies SOB or nausea. Has large bruising on back, flank, abdomen - scattered bruises. Cardiothoracic consulted today. Oncology following  Plan of Care Reviewed With: patient  Overall Patient Progress: improving  Goal: Patient-Specific Goal (Individualized)  Description: You can add care plan individualizations to a  "care plan. Examples of Individualization might be:  \"Parent requests to be called daily at 9am for status\", \"I have a hard time hearing out of my right ear\", or \"Do not touch me to wake me up as it startles  me\".  Outcome: Progressing  Goal: Absence of Hospital-Acquired Illness or Injury  Outcome: Progressing  Intervention: Prevent Skin Injury  Recent Flowsheet Documentation  Taken 5/4/2024 1609 by Deana Rizvi RN  Body Position: position changed independently  Intervention: Prevent Infection  Recent Flowsheet Documentation  Taken 5/4/2024 1609 by Deana Rizvi, RN  Infection Prevention:   hand hygiene promoted   single patient room provided  Goal: Optimal Comfort and Wellbeing  Outcome: Progressing  Intervention: Monitor Pain and Promote Comfort  Recent Flowsheet Documentation  Taken 5/4/2024 1700 by Deana Rizvi, RN  Pain Management Interventions: medication (see MAR)  Taken 5/4/2024 1609 by Deana Rizvi, RN  Pain Management Interventions: pain management plan reviewed with patient/caregiver  Goal: Readiness for Transition of Care  Outcome: Progressing     "

## 2024-05-04 NOTE — PLAN OF CARE
A&Ox4. Up Ax1 with gait belt and walker. 8-10/10 pain reported. IV dilaudid and Toradol given. On 3L oxygen overnight. Chest tube to -20cm H20. Unable to use IS. Tele in place. Discharge pending.     Problem: Adult Inpatient Plan of Care  Goal: Plan of Care Review  Description: The Plan of Care Review/Shift note should be completed every shift.  The Outcome Evaluation is a brief statement about your assessment that the patient is improving, declining, or no change.  This information will be displayed automatically on your shift  note.  Outcome: Not Progressing  Flowsheets (Taken 5/4/2024 0602)  Outcome Evaluation: IV Dilaudid and Toradol for pain. 3L oxygen overnight. Chest tube to -20cm H20.  Plan of Care Reviewed With: patient   Goal Outcome Evaluation:      Plan of Care Reviewed With: patient          Outcome Evaluation: IV Dilaudid and Toradol for pain. 3L oxygen overnight. Chest tube to -20cm H20.

## 2024-05-04 NOTE — PLAN OF CARE
"End of Shift Summary  For vital signs and complete assessments, please see documentation flowsheets.     Pertinent assessments: Pt A&Ox4, up with SBA and walker to BR. Chest tube to R upper chest, dressing CDI, clamps at bedside. Serosanguinous output. To wall suction. Bruising noted on back and R side. Back incisions closed with steri strips, CDI. C/o pain, PO oxycodone, toradol, and robaxin given with relief. O2 sats stable on 2L nc, baseline for pt. Tele SR.     Major Shift Events: none    Treatment Plan: Continue pain control, lovenox, tele monitoring, daily XR, possibly remove chest tube in am      Problem: Adult Inpatient Plan of Care  Goal: Plan of Care Review  Description: The Plan of Care Review/Shift note should be completed every shift.  The Outcome Evaluation is a brief statement about your assessment that the patient is improving, declining, or no change.  This information will be displayed automatically on your shift  note.  Outcome: Progressing  Flowsheets (Taken 5/4/2024 1444)  Outcome Evaluation: Pain well controlled. 2L O2. Chest tube in place, pnuemothorax resolved.  Plan of Care Reviewed With: patient  Overall Patient Progress: improving  Goal: Patient-Specific Goal (Individualized)  Description: You can add care plan individualizations to a care plan. Examples of Individualization might be:  \"Parent requests to be called daily at 9am for status\", \"I have a hard time hearing out of my right ear\", or \"Do not touch me to wake me up as it startles  me\".  Outcome: Progressing  Goal: Absence of Hospital-Acquired Illness or Injury  Outcome: Progressing  Intervention: Identify and Manage Fall Risk  Recent Flowsheet Documentation  Taken 5/4/2024 0849 by Sofiya Zimmer RN  Safety Promotion/Fall Prevention:   activity supervised   safety round/check completed  Intervention: Prevent and Manage VTE (Venous Thromboembolism) Risk  Recent Flowsheet Documentation  Taken 5/4/2024 0849 by Sofiya Zimmer, " RN  VTE Prevention/Management: SCDs (sequential compression devices) off  Intervention: Prevent Infection  Recent Flowsheet Documentation  Taken 5/4/2024 0849 by Sofiya Zimmer RN  Infection Prevention:   rest/sleep promoted   hand hygiene promoted   single patient room provided  Goal: Optimal Comfort and Wellbeing  Outcome: Progressing  Intervention: Monitor Pain and Promote Comfort  Recent Flowsheet Documentation  Taken 5/4/2024 0849 by Sofiya Zimmer RN  Pain Management Interventions:   medication (see MAR)   repositioned  Goal: Readiness for Transition of Care  Outcome: Progressing     Problem: Pain Acute  Goal: Optimal Pain Control and Function  Outcome: Progressing  Intervention: Develop Pain Management Plan  Recent Flowsheet Documentation  Taken 5/4/2024 0849 by Sofiya Zimmer RN  Pain Management Interventions:   medication (see MAR)   repositioned  Intervention: Prevent or Manage Pain  Recent Flowsheet Documentation  Taken 5/4/2024 0849 by Sofiya Zimmer RN  Medication Review/Management: medications reviewed     Problem: Fall Injury Risk  Goal: Absence of Fall and Fall-Related Injury  Outcome: Progressing  Intervention: Identify and Manage Contributors  Recent Flowsheet Documentation  Taken 5/4/2024 0849 by Sofiya Zimmer RN  Medication Review/Management: medications reviewed  Intervention: Promote Injury-Free Environment  Recent Flowsheet Documentation  Taken 5/4/2024 0849 by Sofiya Zimmer RN  Safety Promotion/Fall Prevention:   activity supervised   safety round/check completed     Problem: Gas Exchange Impaired  Goal: Optimal Gas Exchange  Outcome: Progressing   Goal Outcome Evaluation:      Plan of Care Reviewed With: patient    Overall Patient Progress: improvingOverall Patient Progress: improving    Outcome Evaluation: Pain well controlled. 2L O2. Chest tube in place, pnuemothorax resolved.

## 2024-05-04 NOTE — PROGRESS NOTES
"[facility] RCAT     Date:5/3/24  Admission Dx:pneumothorax.  Pulmonary History:COPD  Home Nebulizer/MDI Use:Xopenex Q4prn  Home Oxygen:None  Acuity Level (RCAT flow sheet):3  Aerosol Therapy initiated:Duoneb QID/prn      Pulmonary Hygiene initiated:Deep breathing and cough.      Volume Expansion initiated:IS      Current Oxygen Requirements:3L NC  Current SpO2:95%    Re-evaluation date:5/6/24    Patient Education:Done      See \"RT Assessments\" flow sheet for patient assessment scoring and Acuity Level Details.         /  "

## 2024-05-04 NOTE — PROGRESS NOTES
"    [facility]     Hospitalist Progress Note     Assessment & Plan     ASSESSMENT     68F with hx of HTN, COPD, and carcinoid tumor of lung s/p recent thoracic surgery presents with shortness of breath and back pain and found to have a large R-sided pneumothorax s/p chest tube placement.    Pain improved. Repeat CXR for this morning and thoracic surgery consultation are pending.     PLAN     Acute Hypoxic Respiratory Failure  Post-Operative Right-Sided Pneumothorax  -Had lung biopsy completed 04/26 and since then has been experiencing progressive SOB and chest pain  -In ED, needing 4L O2 to keep sats >90% and found to have large right sided pneumothorax  -S/p chest tube placement in the ED  PLAN  -Chest tube to continuous low intermittent suction  -Repeat CXR this morning  -Pain regimen in place  -Cardiothoracic consult     Carcinoid Tumor  -Per op note says s/p RUL wedge resection and MLND although discharge summary notes part of procedure was aborted  -Cardiothoracic consult per above  -Patient already follows with oncology     Essential HTN  -Home medications     Chronic COPD  -Scheduled and PRN nebs     DVT Prophy  -LMWH     Disposition  -Med Surg        Nahun Torres MD    Subjective     Seen at bedside. Pain improved. Repeat CXR for this morning is pending.        Objective   Blood pressure 122/54, pulse 64, temperature 97.4  F (36.3  C), temperature source Oral, resp. rate 16, height 1.575 m (5' 2\"), weight 51.6 kg (113 lb 12.1 oz), last menstrual period 09/22/2010, SpO2 97%, not currently breastfeeding.    PHYSICAL EXAM  General: In no acute distress  CV: RRR.  Lungs: CTAB. Nl WOB.  Abd: Non-tender.  Ext: No edema.    LABS AND IMAGING  Reviewed and pertinent results discussed in assessment and plan.   "

## 2024-05-04 NOTE — SEDATION DOCUMENTATION
Chest tube hooked up to low intermittent suction per verbal order from Dr. Mata. Called for portable chest xray.

## 2024-05-04 NOTE — H&P
[facility]       Hospitalist History & Physical     Assessment & Plan     ASSESSMENT    68F with hx of HTN, COPD, and carcinoid tumor of lung s/p recent thoracic surgery presents with shortness of breath and back pain and found to have a large R-sided pneumothorax s/p chest tube placement.    PLAN    Acute Hypoxic Respiratory Failure  Post-Operative Right-Sided Pneumothorax  -Had lung biopsy completed 04/26 and since then has been experiencing progressive SOB and chest pain  -In ED, needing 4L O2 to keep sats >90% and found to have large right sided pneumothorax  -S/p chest tube placement in the ED  PLAN  -Chest tube to continuous low intermittent suction  -Repeat CXR in the morning  -Pain regimen in place  -Cardiothoracic consult    Carcinoid Tumor  -Per op note says s/p RUL wedge resection and MLND although discharge summary notes part of procedure was aborted  -Cardiothoracic consult per above  -Patient already follows with oncology    Essential HTN  -Home medications    Chronic COPD  -Scheduled and PRN nebs    DVT Prophy  -LMWH    Disposition  -Med Surg      Nahun Torres MD    History of Present Illness     Mimi Rivera is a 68 year old with hx of HTN, COPD, and carcinoid tumor of lung s/p recent thoracic surgery presents with shortness of breath and back pain. Patient was recently admitted to [facility] for elective robot-assisted thoracoscopic RUL wedge resection and MLND 04/26. Chest tube removed during hospital stay and patient discharged home. Since discharge, patient has been experiencing SOB and right sided back pain that has been progressively worse in the last few days. Called her thoracic clinic who told her to go to the ED. In the ED, needing 4L O2 to keep sats >90% (discharged on 2L O2). Other VSS. CXR with evidence of large R-sided pneumothorax s/p chest tube placement with re-expansion on follow-up CXR. Case discussed with cardiothoracic surgery and patient admitted to  medicine.    Review of Systems     A Comprehensive greater than 10 system review of systems was carried out.  Pertinent positives and negatives are noted above.  Otherwise negative for contributory information.     Past Medical History     Past Medical History:   Diagnosis Date    ASCUS on Pap smear 01/01/2006    unable to run HPV typing, f/u paps NIL    Chronic rhinosinusitis     COPD (chronic obstructive pulmonary disease) (H)     Depression, anxiety     Herpes     occasional outbreak    Hypertension     Primary stress urinary incontinence     Pulmonary nodules     Rhinosinusitis      Medications     Current Outpatient Medications   Medication Sig Dispense Refill    acetaminophen (TYLENOL) 500 MG tablet Take 1-2 tablets (500-1,000 mg) by mouth every 8 hours as needed for mild pain 42 tablet 0    amLODIPine (NORVASC) 10 MG tablet Take 1 tablet (10 mg) by mouth every evening 90 tablet 3    enoxaparin ANTICOAGULANT (LOVENOX) 40 MG/0.4ML syringe Inject 0.4 mLs (40 mg) Subcutaneous daily for 7 days 2.8 mL 0    esomeprazole (NEXIUM) 20 MG DR capsule Take 1 capsule (20 mg) by mouth 2 times daily Take 30-60 minutes before eating. 60 capsule 11    Fluticasone-Umeclidin-Vilant (TRELEGY ELLIPTA) 100-62.5-25 MCG/ACT oral inhaler INHALE ONE PUFF BY MOUTH EVERY DAY (Patient taking differently: Inhale 1 puff into the lungs daily (with lunch)) 60 each 3    methocarbamol (ROBAXIN) 500 MG tablet Take 1 tablet (500 mg) by mouth 3 times daily as needed for muscle spasms 21 tablet 0    oxyCODONE (ROXICODONE) 5 MG tablet Take 1 tablet (5 mg) by mouth every 8 hours as needed for pain 40 tablet 0    EPINEPHrine (ANY BX GENERIC EQUIV) 0.3 MG/0.3ML injection 2-pack Inject 0.3 mLs (0.3 mg) into the muscle as needed for anaphylaxis May repeat one time in 5-15 minutes if response to initial dose is inadequate. 2 each 0    levalbuterol (XOPENEX HFA) 45 MCG/ACT inhaler Inhale 2 puffs into the lungs every 4 hours as needed for shortness of  "breath / dyspnea or wheezing 1 Inhaler 1      Past Surgical History     Past Surgical History:   Procedure Laterality Date    BRONCHOSCOPY, WITH BIOPSY, ROBOT ASSISTED Bilateral 2023    Procedure: Robot assisted Ion BRONCHOSCOPY;  Surgeon: Raudel Claire DO;  Location: [facility] OR     SECTION      x 3    DAVINCI LOBECTOMY LUNG Right 2024    Procedure: robot assisted thoracoscopic right wedge resection, mediastinal lymph node dissection;  Surgeon: Good Pandya MD;  Location: [facility] OR    ENDOBRONCHIAL ULTRASOUND FLEXIBLE N/A 2023    Procedure: endobronchial ultrasound, transbronchial biopsies Latex Free;  Surgeon: Raudel Claire DO;  Location: [facility] OR     Family History     Family History   Problem Relation Age of Onset    Hypertension Mother     Cancer Mother         liver    C.A.D. Father     Anesthesia Reaction No family hx of     Deep Vein Thrombosis (DVT) No family hx of      Allergies   No Known Allergies    Social History     Social History     Tobacco Use    Smoking status: Former     Current packs/day: 0.00     Types: Cigarettes     Quit date: 3/19/2015     Years since quittin.1    Smokeless tobacco: Former     Quit date: 2017   Substance Use Topics    Alcohol use: Yes     Alcohol/week: 2.0 - 3.0 standard drinks of alcohol     Types: 2 - 3 Standard drinks or equivalent per week     Comment: 2 drinks per wk avg     Physical Exam   Blood pressure (!) 166/112, pulse 75, temperature 97.4  F (36.3  C), temperature source Oral, resp. rate 22, height 1.575 m (5' 2\"), weight 51.6 kg (113 lb 12.1 oz), last menstrual period 2010, SpO2 91%, not currently breastfeeding.    General: Ill appearing, cooperative with exam, in NAD.  HEENT: Atraumatic. No erythema in posterior pharynx.  Lymph: No cervical or inguinal lymphadenopathy.  Cardiac: RRR. No murmurs. Chest tube in place over right chest.  Lungs: CTAB. Nl WOB.  Abd: Non-tender. No rebound or gaurding. Nl bowel sounds.  Ext: No " edema. 2+ pulses.  Skin: No rashes, abrasions, or contusions.  Psych: A&Ox3. Nl affect.  Neuro: 5/5 strength. Sensation intact.    Labs & Imaging     Reviewed and Pertinent results discussed in assessment and plan.

## 2024-05-04 NOTE — SEDATION DOCUMENTATION
"Pt tolerated procedure well. Pt continues to complain of pain \"all over.\" Suction turned off for now per MD due to pain. Vitals remain stable on 4L O2 via NC.  "

## 2024-05-05 ENCOUNTER — APPOINTMENT (OUTPATIENT)
Dept: GENERAL RADIOLOGY | Facility: CLINIC | Age: 69
DRG: 199 | End: 2024-05-05
Attending: INTERNAL MEDICINE
Payer: COMMERCIAL

## 2024-05-05 ENCOUNTER — APPOINTMENT (OUTPATIENT)
Dept: GENERAL RADIOLOGY | Facility: CLINIC | Age: 69
DRG: 199 | End: 2024-05-05
Attending: THORACIC SURGERY (CARDIOTHORACIC VASCULAR SURGERY)
Payer: COMMERCIAL

## 2024-05-05 PROCEDURE — 94640 AIRWAY INHALATION TREATMENT: CPT | Mod: 76

## 2024-05-05 PROCEDURE — 250N000011 HC RX IP 250 OP 636: Performed by: INTERNAL MEDICINE

## 2024-05-05 PROCEDURE — 71045 X-RAY EXAM CHEST 1 VIEW: CPT

## 2024-05-05 PROCEDURE — 99232 SBSQ HOSP IP/OBS MODERATE 35: CPT | Performed by: INTERNAL MEDICINE

## 2024-05-05 PROCEDURE — 71045 X-RAY EXAM CHEST 1 VIEW: CPT | Mod: 77

## 2024-05-05 PROCEDURE — 250N000009 HC RX 250: Performed by: INTERNAL MEDICINE

## 2024-05-05 PROCEDURE — 120N000001 HC R&B MED SURG/OB

## 2024-05-05 PROCEDURE — 999N000157 HC STATISTIC RCP TIME EA 10 MIN

## 2024-05-05 PROCEDURE — 250N000013 HC RX MED GY IP 250 OP 250 PS 637: Performed by: INTERNAL MEDICINE

## 2024-05-05 RX ADMIN — PANTOPRAZOLE SODIUM 40 MG: 40 TABLET, DELAYED RELEASE ORAL at 07:38

## 2024-05-05 RX ADMIN — KETOROLAC TROMETHAMINE 15 MG: 15 INJECTION, SOLUTION INTRAMUSCULAR; INTRAVENOUS at 17:25

## 2024-05-05 RX ADMIN — OXYCODONE HYDROCHLORIDE 5 MG: 5 TABLET ORAL at 07:38

## 2024-05-05 RX ADMIN — IPRATROPIUM BROMIDE AND ALBUTEROL SULFATE 3 ML: .5; 3 SOLUTION RESPIRATORY (INHALATION) at 08:04

## 2024-05-05 RX ADMIN — OXYCODONE HYDROCHLORIDE 5 MG: 5 TABLET ORAL at 11:36

## 2024-05-05 RX ADMIN — SENNOSIDES AND DOCUSATE SODIUM 1 TABLET: 50; 8.6 TABLET ORAL at 20:13

## 2024-05-05 RX ADMIN — IPRATROPIUM BROMIDE AND ALBUTEROL SULFATE 3 ML: .5; 3 SOLUTION RESPIRATORY (INHALATION) at 11:23

## 2024-05-05 RX ADMIN — IPRATROPIUM BROMIDE AND ALBUTEROL SULFATE 3 ML: .5; 3 SOLUTION RESPIRATORY (INHALATION) at 16:37

## 2024-05-05 RX ADMIN — SENNOSIDES AND DOCUSATE SODIUM 1 TABLET: 50; 8.6 TABLET ORAL at 11:38

## 2024-05-05 RX ADMIN — METHOCARBAMOL 500 MG: 500 TABLET ORAL at 03:24

## 2024-05-05 RX ADMIN — OXYCODONE HYDROCHLORIDE 5 MG: 5 TABLET ORAL at 15:39

## 2024-05-05 RX ADMIN — OXYCODONE HYDROCHLORIDE 5 MG: 5 TABLET ORAL at 20:06

## 2024-05-05 RX ADMIN — ENOXAPARIN SODIUM 40 MG: 40 INJECTION SUBCUTANEOUS at 09:36

## 2024-05-05 RX ADMIN — IPRATROPIUM BROMIDE AND ALBUTEROL SULFATE 3 ML: .5; 3 SOLUTION RESPIRATORY (INHALATION) at 19:46

## 2024-05-05 RX ADMIN — METHOCARBAMOL 500 MG: 500 TABLET ORAL at 09:36

## 2024-05-05 RX ADMIN — OXYCODONE HYDROCHLORIDE 5 MG: 5 TABLET ORAL at 03:24

## 2024-05-05 ASSESSMENT — ACTIVITIES OF DAILY LIVING (ADL)
ADLS_ACUITY_SCORE: 29
ADLS_ACUITY_SCORE: 27
ADLS_ACUITY_SCORE: 27
ADLS_ACUITY_SCORE: 29
ADLS_ACUITY_SCORE: 27
ADLS_ACUITY_SCORE: 29
ADLS_ACUITY_SCORE: 29
ADLS_ACUITY_SCORE: 27
ADLS_ACUITY_SCORE: 27
ADLS_ACUITY_SCORE: 29
ADLS_ACUITY_SCORE: 29
ADLS_ACUITY_SCORE: 27
ADLS_ACUITY_SCORE: 29
ADLS_ACUITY_SCORE: 27
ADLS_ACUITY_SCORE: 29

## 2024-05-05 NOTE — PLAN OF CARE
"End of Shift Summary  For vital signs and complete assessments, please see documentation flowsheets.     Pertinent assessments: Pt A&Ox4, up with SBA in room. Chest tube in place, clamped per MD order. Lap sites CDI. XR stable. C/o pain, oxycodone and robaxin given. Tele SR. C/o constipation, senna given PRN.    Major Shift Events: Chest tube clamped    Treatment Plan: Continue pain control, thoracic consult, bowel regimen      Problem: Adult Inpatient Plan of Care  Goal: Plan of Care Review  Description: The Plan of Care Review/Shift note should be completed every shift.  The Outcome Evaluation is a brief statement about your assessment that the patient is improving, declining, or no change.  This information will be displayed automatically on your shift  note.  Outcome: Progressing  Flowsheets (Taken 5/5/2024 1827)  Outcome Evaluation: O2 stable on 1L NC, chest tube clamped.  Plan of Care Reviewed With: patient  Overall Patient Progress: improving  Goal: Patient-Specific Goal (Individualized)  Description: You can add care plan individualizations to a care plan. Examples of Individualization might be:  \"Parent requests to be called daily at 9am for status\", \"I have a hard time hearing out of my right ear\", or \"Do not touch me to wake me up as it startles  me\".  Outcome: Progressing  Goal: Absence of Hospital-Acquired Illness or Injury  Outcome: Progressing  Intervention: Identify and Manage Fall Risk  Recent Flowsheet Documentation  Taken 5/5/2024 0745 by Sofiya Zimmer RN  Safety Promotion/Fall Prevention:   activity supervised   safety round/check completed  Intervention: Prevent and Manage VTE (Venous Thromboembolism) Risk  Recent Flowsheet Documentation  Taken 5/5/2024 0745 by Sofiya Zimmer RN  VTE Prevention/Management: SCDs (sequential compression devices) off  Intervention: Prevent Infection  Recent Flowsheet Documentation  Taken 5/5/2024 0745 by Sofiya Zimmer RN  Infection Prevention:   " rest/sleep promoted   hand hygiene promoted   single patient room provided  Goal: Optimal Comfort and Wellbeing  Outcome: Progressing  Intervention: Monitor Pain and Promote Comfort  Recent Flowsheet Documentation  Taken 5/5/2024 0738 by Sofiya Zimmer RN  Pain Management Interventions: medication (see MAR)  Goal: Readiness for Transition of Care  Outcome: Progressing     Problem: Pain Acute  Goal: Optimal Pain Control and Function  Outcome: Progressing  Intervention: Develop Pain Management Plan  Recent Flowsheet Documentation  Taken 5/5/2024 0738 by Sofiya Zimmer RN  Pain Management Interventions: medication (see MAR)  Intervention: Prevent or Manage Pain  Recent Flowsheet Documentation  Taken 5/5/2024 0745 by Sofiya Zimmer RN  Medication Review/Management: medications reviewed     Problem: Fall Injury Risk  Goal: Absence of Fall and Fall-Related Injury  Outcome: Progressing  Intervention: Identify and Manage Contributors  Recent Flowsheet Documentation  Taken 5/5/2024 0745 by Sofiya Zimmer RN  Medication Review/Management: medications reviewed  Intervention: Promote Injury-Free Environment  Recent Flowsheet Documentation  Taken 5/5/2024 0745 by Sofiya Zimmer RN  Safety Promotion/Fall Prevention:   activity supervised   safety round/check completed     Problem: Gas Exchange Impaired  Goal: Optimal Gas Exchange  Outcome: Progressing     Problem: Comorbidity Management  Goal: Maintenance of COPD Symptom Control  Outcome: Progressing  Intervention: Maintain COPD Symptom Control  Recent Flowsheet Documentation  Taken 5/5/2024 0745 by Sofiya Zimmer RN  Medication Review/Management: medications reviewed  Goal: Blood Pressure in Desired Range  Outcome: Progressing  Intervention: Maintain Blood Pressure Management  Recent Flowsheet Documentation  Taken 5/5/2024 0745 by Sofiya Zimmer RN  Medication Review/Management: medications reviewed   Goal Outcome Evaluation:      Plan of Care  Reviewed With: patient    Overall Patient Progress: improvingOverall Patient Progress: improving    Outcome Evaluation: O2 stable on 1L NC, chest tube clamped.

## 2024-05-05 NOTE — PROGRESS NOTES
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    Chest tube placed to water seal yesterday, repeat CXR for this morning without pneumothorax, will have nursing clamp chest tube per thoracic surgery recommendations.     PLAN     Acute Hypoxic Respiratory Failure  Post-Operative Right-Sided Pneumothorax  -Had lung biopsy completed 04/26 and since then has been experiencing progressive SOB and chest pain  -In ED, needing 4L O2 to keep sats >90% and found to have large right sided pneumothorax  -S/p chest tube placement in the ED  PLAN  -Clamping chest tube today  -Repeat CXR tomorrow morning  -Pain regimen in place  -Cardiothoracic consulted, appreciate recommendations     Carcinoid Tumor  -Per op note says s/p RUL wedge resection and MLND although discharge summary notes part of procedure was aborted  -Cardiothoracic consult per above  -Patient already follows with oncology     Essential HTN  -Home medications     Chronic COPD  -Scheduled and PRN nebs     DVT Prophy  -LMWH     Disposition  -Med Surg        Nahun Torres MD    Subjective     No events overnight. Having some pain at site of surgery but denies pain she was experiencing on adm due to pneumothorax.        Objective   Blood pressure (!) 155/73, pulse 76, temperature 98.8  F (37.1  C), temperature source Oral, resp. rate 20, height 1.575 m (5' 2\"), weight 51.6 kg (113 lb 12.1 oz), last menstrual period 09/22/2010, SpO2 97%, not currently breastfeeding.    PHYSICAL EXAM  General: In no acute distress  CV: RRR.  Lungs: CTAB. Nl WOB.  Abd: Non-tender.  Ext: No edema.    LABS AND IMAGING  Reviewed and pertinent results discussed in assessment and plan.   "

## 2024-05-05 NOTE — PLAN OF CARE
"Pertinent assessments: A&Ox4. BP soft, amlodipine held. On 2L NC, pt denies SOB at rest. Chest tube to water seal this shift. Oxy & robaxin given for R sided chest/back pain. Up Ax1 to bathroom.     Major Shift Events: none  Treatment Plan: Continue pain control, lovenox, tele monitoring, daily XR, possibly remove chest tube in am  Bedside Nurse: Valencia Longoria RN     Goal Outcome Evaluation:      Plan of Care Reviewed With: patient    Overall Patient Progress: improvingOverall Patient Progress: improving    Outcome Evaluation: O2 stable on 2L, chest tube to water seal      Problem: Adult Inpatient Plan of Care  Goal: Plan of Care Review  Description: The Plan of Care Review/Shift note should be completed every shift.  The Outcome Evaluation is a brief statement about your assessment that the patient is improving, declining, or no change.  This information will be displayed automatically on your shift  note.  Outcome: Progressing  Flowsheets (Taken 5/5/2024 0624)  Outcome Evaluation: O2 stable on 2L, chest tube to water seal  Plan of Care Reviewed With: patient  Overall Patient Progress: improving  Goal: Patient-Specific Goal (Individualized)  Description: You can add care plan individualizations to a care plan. Examples of Individualization might be:  \"Parent requests to be called daily at 9am for status\", \"I have a hard time hearing out of my right ear\", or \"Do not touch me to wake me up as it startles  me\".  Outcome: Progressing  Goal: Absence of Hospital-Acquired Illness or Injury  Outcome: Progressing  Intervention: Prevent Skin Injury  Recent Flowsheet Documentation  Taken 5/4/2024 2030 by Valencia Longoria RN  Body Position: position changed independently  Intervention: Prevent and Manage VTE (Venous Thromboembolism) Risk  Recent Flowsheet Documentation  Taken 5/4/2024 2030 by Valencia Longoria, RN  VTE Prevention/Management: SCDs (sequential compression devices) off  Goal: Optimal Comfort and " Wellbeing  Outcome: Progressing  Intervention: Monitor Pain and Promote Comfort  Recent Flowsheet Documentation  Taken 5/4/2024 2318 by Valencia Longoria, RN  Pain Management Interventions: medication (see MAR)  Taken 5/4/2024 2030 by Valencia Longoria, RN  Pain Management Interventions: declines  Goal: Readiness for Transition of Care  Outcome: Progressing     Problem: Pain Acute  Goal: Optimal Pain Control and Function  Outcome: Progressing  Intervention: Develop Pain Management Plan  Recent Flowsheet Documentation  Taken 5/4/2024 2318 by Valencia Longoria, RN  Pain Management Interventions: medication (see MAR)  Taken 5/4/2024 2030 by Valencia Longoria, RN  Pain Management Interventions: declines     Problem: Fall Injury Risk  Goal: Absence of Fall and Fall-Related Injury  Outcome: Progressing     Problem: Gas Exchange Impaired  Goal: Optimal Gas Exchange  Outcome: Progressing  Intervention: Optimize Oxygenation and Ventilation  Recent Flowsheet Documentation  Taken 5/4/2024 2030 by Valencia Longoria, RN  Head of Bed (HOB) Positioning: HOB at 30-45 degrees     Problem: Comorbidity Management  Goal: Maintenance of COPD Symptom Control  Outcome: Progressing  Goal: Blood Pressure in Desired Range  Outcome: Progressing

## 2024-05-05 NOTE — PROGRESS NOTES
CXR stable after 4 h of chest tube clamping.   We'll leave it in till tomorrow (clamped) for extra safety since she has severe COPD> If her CXR is unchanged in the am, then we can remove the tube.

## 2024-05-06 ENCOUNTER — APPOINTMENT (OUTPATIENT)
Dept: GENERAL RADIOLOGY | Facility: CLINIC | Age: 69
DRG: 199 | End: 2024-05-06
Attending: THORACIC SURGERY (CARDIOTHORACIC VASCULAR SURGERY)
Payer: COMMERCIAL

## 2024-05-06 LAB
CREAT SERPL-MCNC: 0.56 MG/DL (ref 0.51–0.95)
EGFRCR SERPLBLD CKD-EPI 2021: >90 ML/MIN/1.73M2
PLATELET # BLD AUTO: 319 10E3/UL (ref 150–450)

## 2024-05-06 PROCEDURE — 99232 SBSQ HOSP IP/OBS MODERATE 35: CPT | Performed by: INTERNAL MEDICINE

## 2024-05-06 PROCEDURE — 36415 COLL VENOUS BLD VENIPUNCTURE: CPT | Performed by: INTERNAL MEDICINE

## 2024-05-06 PROCEDURE — 94640 AIRWAY INHALATION TREATMENT: CPT

## 2024-05-06 PROCEDURE — 82565 ASSAY OF CREATININE: CPT | Performed by: INTERNAL MEDICINE

## 2024-05-06 PROCEDURE — 94640 AIRWAY INHALATION TREATMENT: CPT | Mod: 76

## 2024-05-06 PROCEDURE — 999N000156 HC STATISTIC RCP CONSULT EA 30 MIN

## 2024-05-06 PROCEDURE — 250N000011 HC RX IP 250 OP 636: Performed by: INTERNAL MEDICINE

## 2024-05-06 PROCEDURE — 999N000157 HC STATISTIC RCP TIME EA 10 MIN

## 2024-05-06 PROCEDURE — 71046 X-RAY EXAM CHEST 2 VIEWS: CPT

## 2024-05-06 PROCEDURE — G0463 HOSPITAL OUTPT CLINIC VISIT: HCPCS

## 2024-05-06 PROCEDURE — 250N000009 HC RX 250: Performed by: INTERNAL MEDICINE

## 2024-05-06 PROCEDURE — 85049 AUTOMATED PLATELET COUNT: CPT | Performed by: INTERNAL MEDICINE

## 2024-05-06 PROCEDURE — 99222 1ST HOSP IP/OBS MODERATE 55: CPT | Mod: 24 | Performed by: STUDENT IN AN ORGANIZED HEALTH CARE EDUCATION/TRAINING PROGRAM

## 2024-05-06 PROCEDURE — 71045 X-RAY EXAM CHEST 1 VIEW: CPT

## 2024-05-06 PROCEDURE — 250N000013 HC RX MED GY IP 250 OP 250 PS 637: Performed by: INTERNAL MEDICINE

## 2024-05-06 PROCEDURE — 120N000001 HC R&B MED SURG/OB

## 2024-05-06 RX ORDER — METHYLPREDNISOLONE SODIUM SUCCINATE 40 MG/ML
40 INJECTION, POWDER, LYOPHILIZED, FOR SOLUTION INTRAMUSCULAR; INTRAVENOUS EVERY 12 HOURS
Status: DISCONTINUED | OUTPATIENT
Start: 2024-05-06 | End: 2024-05-07 | Stop reason: HOSPADM

## 2024-05-06 RX ORDER — DOXYCYCLINE 100 MG/1
100 CAPSULE ORAL EVERY 12 HOURS SCHEDULED
Status: DISCONTINUED | OUTPATIENT
Start: 2024-05-06 | End: 2024-05-07 | Stop reason: HOSPADM

## 2024-05-06 RX ADMIN — OXYCODONE HYDROCHLORIDE 5 MG: 5 TABLET ORAL at 10:17

## 2024-05-06 RX ADMIN — OXYCODONE HYDROCHLORIDE 5 MG: 5 TABLET ORAL at 19:07

## 2024-05-06 RX ADMIN — ENOXAPARIN SODIUM 40 MG: 40 INJECTION SUBCUTANEOUS at 10:17

## 2024-05-06 RX ADMIN — METHOCARBAMOL 500 MG: 500 TABLET ORAL at 00:13

## 2024-05-06 RX ADMIN — OXYCODONE HYDROCHLORIDE 5 MG: 5 TABLET ORAL at 05:28

## 2024-05-06 RX ADMIN — OXYCODONE HYDROCHLORIDE 5 MG: 5 TABLET ORAL at 00:13

## 2024-05-06 RX ADMIN — IPRATROPIUM BROMIDE AND ALBUTEROL SULFATE 3 ML: .5; 3 SOLUTION RESPIRATORY (INHALATION) at 19:42

## 2024-05-06 RX ADMIN — IPRATROPIUM BROMIDE AND ALBUTEROL SULFATE 3 ML: .5; 3 SOLUTION RESPIRATORY (INHALATION) at 12:23

## 2024-05-06 RX ADMIN — IPRATROPIUM BROMIDE AND ALBUTEROL SULFATE 3 ML: .5; 3 SOLUTION RESPIRATORY (INHALATION) at 16:10

## 2024-05-06 RX ADMIN — AMLODIPINE BESYLATE 10 MG: 10 TABLET ORAL at 19:42

## 2024-05-06 RX ADMIN — DOXYCYCLINE HYCLATE 100 MG: 100 CAPSULE ORAL at 19:42

## 2024-05-06 RX ADMIN — DOXYCYCLINE HYCLATE 100 MG: 100 CAPSULE ORAL at 15:05

## 2024-05-06 RX ADMIN — IPRATROPIUM BROMIDE AND ALBUTEROL SULFATE 3 ML: .5; 3 SOLUTION RESPIRATORY (INHALATION) at 08:55

## 2024-05-06 RX ADMIN — PANTOPRAZOLE SODIUM 40 MG: 40 TABLET, DELAYED RELEASE ORAL at 06:37

## 2024-05-06 RX ADMIN — METHYLPREDNISOLONE SODIUM SUCCINATE 40 MG: 40 INJECTION, POWDER, FOR SOLUTION INTRAMUSCULAR; INTRAVENOUS at 21:25

## 2024-05-06 RX ADMIN — METHYLPREDNISOLONE SODIUM SUCCINATE 40 MG: 40 INJECTION, POWDER, FOR SOLUTION INTRAMUSCULAR; INTRAVENOUS at 10:17

## 2024-05-06 RX ADMIN — OXYCODONE HYDROCHLORIDE 5 MG: 5 TABLET ORAL at 15:05

## 2024-05-06 ASSESSMENT — ACTIVITIES OF DAILY LIVING (ADL)
ADLS_ACUITY_SCORE: 27

## 2024-05-06 NOTE — CONSULTS
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 Pulmonary consulted for management of dyspnea and COPD.     Chronic hypoxic respiratory failure  Atypical Carcinoid s/p wedge resection  Resolved Pneumothorax  COPD  History of COPD with moderate obstruction, air trapping, and mild diffusing defect on most recent PFTs 3/2024. Follows outpatient with Pulmonary and on regimen of Trelegy and as needed Xopenex. Was treated for exacerbation at most recent visit and reports ongoing symptoms. Underwent wedge resection for carcinoid tumor 4/26. Worsening dyspnea following discharge with new oxygen requirement. Found to have pneumothorax on presentation during current admission s/p chest tube and re-expansion. Continues to require oxygen and having productive cough. Recent sick contact and will collect sputum culture if able. Possible that current symptoms represent COPD exacerbation in setting of recent surgery, possible underlying infection. May also have some degree of splinting and atelectasis given ongoing significant pain and tightness in right chest. Discussed that some of symptoms would expect to improve over time with current therapies and reviewed importance of activity in upcoming recovery.   - Agree with empiric antibiotics  - Started on methylpred BID, would continue IV today  "given lack of obvious improvement in symptoms, can consider transition to oral in 1-2 days  - Would do taper at discharge, can give prednisone 50 mg and decrease by 10 mg every three days with ending dose of 10 mg  - Not feeling obvious improvement from nebs but would continue scheduled for now while not on home inhaler  - Should resume Trelegy at discharge  - Sputum culture (ordered)  - Would refer to Pulmonary Rehab at discharge  - Will message Pulmonary clinic at time of discharge to see if can move up scheduled follow up (currently set for Sept)    Keshia Pruett MD PhD  Pulmonary and Critical Care     History of Present Illness:    HPI: History taken from patient and chart review.     Underwent robot assisted thoracoscopic right wedge resection for solitary lung nodule on 4/26 at [facility]. Discharged 4/29 with home oxygen at 2L. Was having dyspnea at rest and dizziness with ongoing pain. Presented to ED 5/3 and found to have pneumothorax with pigtail catheter placed in ED. Thoracic Surgery able to remove tube this morning.     Patient follows in Pulmonary clinic, last seen 3/29/24 by outpatient pulmonologist Dr. Bridges. At this time breathing was worse, nocturnal symptoms with poor sleep and chronic fatigue, ongoing chronic cough and wheezing. Was given Augmentin and Medrol.    Today reports that prior to recent events did not usually have a cough or any need for oxygen. Prior to surgery felt like kept getting \"re-sick\". Did not finish the Augmentin due to side effects but thinks finished the steroids. Did not feel much better and felt worse in right lung. Right side felt \"odd\" and she felt like this was the source of her cough. Currently is glad to be on antibiotics as thinks this will help her feel better. Does not think the Duonebs are helping. Is having numbness and tightness in right chest with pain. This morning coughed up thick yellow green sputum.  was sick recently and treated with antibiotics. "     Review of Systems:  Complete 12 point ROS negative unless mentioned in HPI    Histories, Prior to Admission Medications, Allergies:    Past Medical History:  Past Medical History:   Diagnosis Date    ASCUS on Pap smear 2006    unable to run HPV typing, f/u paps NIL    Chronic rhinosinusitis     COPD (chronic obstructive pulmonary disease) (H)     Depression, anxiety     Herpes     occasional outbreak    Hypertension     Primary stress urinary incontinence     Pulmonary nodules     Rhinosinusitis      Past Surgical History:  Past Surgical History:   Procedure Laterality Date    BRONCHOSCOPY, WITH BIOPSY, ROBOT ASSISTED Bilateral 2023    Procedure: Robot assisted Ion BRONCHOSCOPY;  Surgeon: Raudel Claire DO;  Location: [facility] OR     SECTION      x 3    DAVINCI LOBECTOMY LUNG Right 2024    Procedure: robot assisted thoracoscopic right wedge resection, mediastinal lymph node dissection;  Surgeon: Good Pandya MD;  Location: [facility] OR    ENDOBRONCHIAL ULTRASOUND FLEXIBLE N/A 2023    Procedure: endobronchial ultrasound, transbronchial biopsies Latex Free;  Surgeon: Raudel Claire DO;  Location: [facility] OR     Past Social History:  Social History     Socioeconomic History    Marital status:      Spouse name: Not on file    Number of children: Not on file    Years of education: Not on file    Highest education level: Not on file   Occupational History    Not on file   Tobacco Use    Smoking status: Former     Current packs/day: 0.00     Types: Cigarettes     Quit date: 3/19/2015     Years since quittin.1    Smokeless tobacco: Former     Quit date: 2017   Vaping Use    Vaping status: Never Used   Substance and Sexual Activity    Alcohol use: Yes     Alcohol/week: 2.0 - 3.0 standard drinks of alcohol     Types: 2 - 3 Standard drinks or equivalent per week     Comment: 2 drinks per wk avg    Drug use: No    Sexual activity: Yes     Partners: Male     Birth control/protection:  Surgical     Comment: tubal   Other Topics Concern    Parent/sibling w/ CABG, MI or angioplasty before 65F 55M? No   Social History Narrative    Not on file     Social Determinants of Health     Financial Resource Strain: Not on file   Food Insecurity: Not on file   Transportation Needs: Not on file   Physical Activity: Not on file   Stress: Not on file   Social Connections: Not on file   Interpersonal Safety: Not on file   Housing Stability: Not on file     Family History:  Family History   Problem Relation Age of Onset    Hypertension Mother     Cancer Mother         liver    C.A.D. Father     Anesthesia Reaction No family hx of     Deep Vein Thrombosis (DVT) No family hx of      Medications:  Current Facility-Administered Medications   Medication Dose Route Frequency Provider Last Rate Last Admin    amLODIPine (NORVASC) tablet 10 mg  10 mg Oral QPM Jaswinder Smith MD        enoxaparin ANTICOAGULANT (LOVENOX) injection 40 mg  40 mg Subcutaneous Q24H Jaswinder Smith MD   40 mg at 05/06/24 1017    ipratropium - albuterol 0.5 mg/2.5 mg/3 mL (DUONEB) neb solution 3 mL  3 mL Nebulization 4x Daily Jaswinder Smith MD   3 mL at 05/06/24 1223    methylPREDNISolone sodium succinate (SOLU-MEDROL) injection 40 mg  40 mg Intravenous Q12H Jaswinder Smith MD   40 mg at 05/06/24 1017    pantoprazole (PROTONIX) EC tablet 40 mg  40 mg Oral QAM AC Jaswinder Smith MD   40 mg at 05/06/24 0637     Current Facility-Administered Medications   Medication Dose Route Frequency Provider Last Rate Last Admin    acetaminophen (TYLENOL) tablet 650 mg  650 mg Oral Q4H PRN Jaswinder Smith MD        Or    acetaminophen (TYLENOL) Suppository 650 mg  650 mg Rectal Q4H PRN Jaswinder Smith MD        calcium carbonate (TUMS) chewable tablet 1,000 mg  1,000 mg Oral 4x Daily PRN Jaswinder Smith MD        HYDROmorphone (PF) (DILAUDID) injection 0.5 mg   0.5 mg Intravenous Q3H PRN Barrera Messina MD   0.5 mg at 05/04/24 0545    ipratropium - albuterol 0.5 mg/2.5 mg/3 mL (DUONEB) neb solution 3 mL  3 mL Nebulization Q4H PRN Jaswinder Smith MD        ketorolac (TORADOL) injection 15 mg  15 mg Intravenous Q6H PRN Barrera Messina MD   15 mg at 05/05/24 1725    methocarbamol (ROBAXIN) tablet 500 mg  500 mg Oral TID PRN Jaswinder Smith MD   500 mg at 05/06/24 0013    naloxone (NARCAN) injection 0.2 mg  0.2 mg Intravenous Q2 Min PRN Jaswinder Smith MD        Or    naloxone (NARCAN) injection 0.4 mg  0.4 mg Intravenous Q2 Min PRN Jaswinder Smith MD        Or    naloxone (NARCAN) injection 0.2 mg  0.2 mg Intramuscular Q2 Min PRN Jaswinder Smith MD        Or    naloxone (NARCAN) injection 0.4 mg  0.4 mg Intramuscular Q2 Min PRN Jaswinder Smith MD        ondansetron (ZOFRAN ODT) ODT tab 4 mg  4 mg Oral Q6H PRN Jaswinder Smith MD        Or    ondansetron (ZOFRAN) injection 4 mg  4 mg Intravenous Q6H PRN Jaswinder Smith MD        oxyCODONE (ROXICODONE) tablet 5 mg  5 mg Oral Q4H PRN Jaswinder Smith MD   5 mg at 05/06/24 1017    senna-docusate (SENOKOT-S/PERICOLACE) 8.6-50 MG per tablet 1 tablet  1 tablet Oral BID PRJaswinder Hutchins MD   1 tablet at 05/05/24 2013    Or    senna-docusate (SENOKOT-S/PERICOLACE) 8.6-50 MG per tablet 2 tablet  2 tablet Oral BID PRJaswinder Hutchins MD         Allergies:   No Known Allergies  Physical Exam:    Temp:  [97.6  F (36.4  C)-98.2  F (36.8  C)] 97.6  F (36.4  C)  Pulse:  [75-84] 76  Resp:  [16-20] 18  BP: (101-155)/(38-66) 155/66  SpO2:  [90 %-99 %] 99 %  No intake or output data in the 24 hours ending 05/06/24 1409    General: Sitting up in bed in NAD  HEENT: anicteric, moist mucosa, nasal cannula in place  Chest: Moving air, somewhat diminished on right with slight crackles, no  wheezing, prior incision sites clean and without erythema, bruising of right flank and in back, non-labored speech  Cardiac: RRR no murmurs  Abdomen: Soft, flat, non tender, active BS  Extremities: No LE Edema  Neuro: A&Ox3, no focal deficits   Skin: no rash noted    Laboratory, imaging, and microbiologic data:    All laboratory and imaging data reviewed, pertinent results discussed above.

## 2024-05-06 NOTE — PROGRESS NOTES
This RN from Radiology saw pt at bedside to remove anterior right chest tube. Tube removed without any  concerns and a gauze and tegaderm dressing was immediately applied to the site.    1.95

## 2024-05-06 NOTE — PLAN OF CARE
"Goal Outcome Evaluation:       End of Shift Summary  For vital signs and complete assessments, please see documentation flowsheets.     Pertinent assessments: Pt A&OX4. Vss and on 2L NC. LS dim and some ANSARI noted Tolerated diet and no nausea reported. Had pain of 6/10 on the right chest and oxy given with some relief. Chest tube out today and site CDI. Tele reading SR.    Major Shift Events :Xray    Treatment Plan: Pain management, monitor respiratory status, Po Doxy, IV Solumedrol. Monitor symptoms.  Bedside Nurse: Sharron Du RN         Problem: Adult Inpatient Plan of Care  Goal: Plan of Care Review  Description: The Plan of Care Review/Shift note should be completed every shift.  The Outcome Evaluation is a brief statement about your assessment that the patient is improving, declining, or no change.  This information will be displayed automatically on your shift  note.  Outcome: Progressing  Flowsheets (Taken 5/6/2024 1803)  Outcome Evaluation: Chest tube out and site CDI. On 2l NC. Oxy given X2 for chest pain on incision site.  Plan of Care Reviewed With: patient  Overall Patient Progress: improving  Goal: Patient-Specific Goal (Individualized)  Description: You can add care plan individualizations to a care plan. Examples of Individualization might be:  \"Parent requests to be called daily at 9am for status\", \"I have a hard time hearing out of my right ear\", or \"Do not touch me to wake me up as it startles  me\".  Outcome: Progressing  Goal: Absence of Hospital-Acquired Illness or Injury  Outcome: Progressing  Intervention: Identify and Manage Fall Risk  Recent Flowsheet Documentation  Taken 5/6/2024 1000 by Sharron Du RN  Safety Promotion/Fall Prevention:   activity supervised   clutter free environment maintained   increased rounding and observation   lighting adjusted   nonskid shoes/slippers when out of bed  Intervention: Prevent and Manage VTE (Venous Thromboembolism) Risk  Recent Flowsheet " Documentation  Taken 5/6/2024 1000 by Sharron Du RN  VTE Prevention/Management: SCDs (sequential compression devices) off  Intervention: Prevent Infection  Recent Flowsheet Documentation  Taken 5/6/2024 1000 by Sharron Du RN  Infection Prevention: hand hygiene promoted  Goal: Optimal Comfort and Wellbeing  Outcome: Progressing  Intervention: Monitor Pain and Promote Comfort  Recent Flowsheet Documentation  Taken 5/6/2024 0830 by Sharron Du RN  Pain Management Interventions: medication (see MAR)  Goal: Readiness for Transition of Care  Outcome: Progressing     Problem: Pain Acute  Goal: Optimal Pain Control and Function  Outcome: Progressing  Intervention: Develop Pain Management Plan  Recent Flowsheet Documentation  Taken 5/6/2024 0830 by Sharron Du RN  Pain Management Interventions: medication (see MAR)  Intervention: Prevent or Manage Pain  Recent Flowsheet Documentation  Taken 5/6/2024 1000 by Sharron Du RN  Medication Review/Management: medications reviewed     Problem: Fall Injury Risk  Goal: Absence of Fall and Fall-Related Injury  Outcome: Progressing  Intervention: Identify and Manage Contributors  Recent Flowsheet Documentation  Taken 5/6/2024 1000 by Sharron Du RN  Medication Review/Management: medications reviewed  Intervention: Promote Injury-Free Environment  Recent Flowsheet Documentation  Taken 5/6/2024 1000 by Sharron Du RN  Safety Promotion/Fall Prevention:   activity supervised   clutter free environment maintained   increased rounding and observation   lighting adjusted   nonskid shoes/slippers when out of bed     Problem: Gas Exchange Impaired  Goal: Optimal Gas Exchange  Outcome: Progressing     Problem: Comorbidity Management  Goal: Maintenance of COPD Symptom Control  Outcome: Progressing  Intervention: Maintain COPD Symptom Control  Recent Flowsheet Documentation  Taken 5/6/2024 1000 by Sharron Du RN  Medication  Review/Management: medications reviewed  Goal: Blood Pressure in Desired Range  Outcome: Progressing  Intervention: Maintain Blood Pressure Management  Recent Flowsheet Documentation  Taken 5/6/2024 1000 by Sharron Du RN  Medication Review/Management: medications reviewed     Problem: Infection  Goal: Absence of Infection Signs and Symptoms  Outcome: Progressing

## 2024-05-06 NOTE — PLAN OF CARE
"Pt alert and oriented x 4. C/O chest pain, PRN Oxycodone given x 3. Up with SBA and walker. Chest tube remains clamped. PIV x2 saline locked. Voiding adequately.     Problem: Adult Inpatient Plan of Care  Goal: Plan of Care Review  Description: The Plan of Care Review/Shift note should be completed every shift.  The Outcome Evaluation is a brief statement about your assessment that the patient is improving, declining, or no change.  This information will be displayed automatically on your shift  note.  Outcome: Progressing  Flowsheets (Taken 5/6/2024 0822)  Outcome Evaluation: Chest tube remain clamped, on 1L oxygen NC, PRN Oxy given for chest pain  Plan of Care Reviewed With: patient  Overall Patient Progress: improving  Goal: Patient-Specific Goal (Individualized)  Description: You can add care plan individualizations to a care plan. Examples of Individualization might be:  \"Parent requests to be called daily at 9am for status\", \"I have a hard time hearing out of my right ear\", or \"Do not touch me to wake me up as it startles  me\".  Outcome: Progressing  Goal: Absence of Hospital-Acquired Illness or Injury  Outcome: Progressing  Intervention: Identify and Manage Fall Risk  Recent Flowsheet Documentation  Taken 5/5/2024 2006 by Suzanne Benson RN  Safety Promotion/Fall Prevention:   activity supervised   clutter free environment maintained   increase visualization of patient   increased rounding and observation   nonskid shoes/slippers when out of bed   room near nurse's station   safety round/check completed  Intervention: Prevent Skin Injury  Recent Flowsheet Documentation  Taken 5/6/2024 0525 by Suzanne Benson, RN  Body Position: position changed independently  Taken 5/5/2024 2026 by Suzanne Benson, RN  Body Position: position changed independently  Intervention: Prevent and Manage VTE (Venous Thromboembolism) Risk  Recent Flowsheet Documentation  Taken 5/5/2024 2006 by Suzanne Benson" RIAZ RN  VTE Prevention/Management: SCDs (sequential compression devices) off  Intervention: Prevent Infection  Recent Flowsheet Documentation  Taken 5/5/2024 2006 by Suzanne Benson, RN  Infection Prevention:   hand hygiene promoted   equipment surfaces disinfected  Goal: Optimal Comfort and Wellbeing  Outcome: Progressing  Intervention: Monitor Pain and Promote Comfort  Recent Flowsheet Documentation  Taken 5/6/2024 0528 by Suzanne Benson, RN  Pain Management Interventions: medication (see MAR)  Taken 5/5/2024 2006 by Suzanne Benson, RN  Pain Management Interventions: medication (see MAR)  Goal: Readiness for Transition of Care  Outcome: Progressing     Problem: Pain Acute  Goal: Optimal Pain Control and Function  Outcome: Progressing  Intervention: Develop Pain Management Plan  Recent Flowsheet Documentation  Taken 5/6/2024 0528 by Suzanne Benson, RN  Pain Management Interventions: medication (see MAR)  Taken 5/5/2024 2006 by Suzanne Benson, RN  Pain Management Interventions: medication (see MAR)  Intervention: Prevent or Manage Pain  Recent Flowsheet Documentation  Taken 5/5/2024 2006 by Suzanne Benson, RN  Medication Review/Management: medications reviewed     Problem: Fall Injury Risk  Goal: Absence of Fall and Fall-Related Injury  Outcome: Progressing  Intervention: Identify and Manage Contributors  Recent Flowsheet Documentation  Taken 5/5/2024 2006 by Suzanne Benson, RN  Medication Review/Management: medications reviewed  Intervention: Promote Injury-Free Environment  Recent Flowsheet Documentation  Taken 5/5/2024 2006 by Suzanne Benson, RN  Safety Promotion/Fall Prevention:   activity supervised   clutter free environment maintained   increase visualization of patient   increased rounding and observation   nonskid shoes/slippers when out of bed   room near nurse's station   safety round/check completed     Problem: Gas Exchange Impaired  Goal: Optimal  Gas Exchange  Outcome: Progressing  Intervention: Optimize Oxygenation and Ventilation  Recent Flowsheet Documentation  Taken 5/6/2024 0525 by Suzanne Benson RN  Head of Bed (HOB) Positioning: HOB at 30-45 degrees  Taken 5/5/2024 2026 by Suzanne Benson RN  Head of Bed (HOB) Positioning: HOB at 30-45 degrees     Problem: Comorbidity Management  Goal: Maintenance of COPD Symptom Control  Outcome: Progressing  Intervention: Maintain COPD Symptom Control  Recent Flowsheet Documentation  Taken 5/5/2024 2006 by Suzanne Benson, RN  Medication Review/Management: medications reviewed  Goal: Blood Pressure in Desired Range  Outcome: Progressing  Intervention: Maintain Blood Pressure Management  Recent Flowsheet Documentation  Taken 5/5/2024 2006 by Suzanne Benson RN  Medication Review/Management: medications reviewed     Problem: Infection  Goal: Absence of Infection Signs and Symptoms  Outcome: Progressing   Goal Outcome Evaluation:      Plan of Care Reviewed With: patient    Overall Patient Progress: improvingOverall Patient Progress: improving    Outcome Evaluation: Chest tube remain clamped, on 1L oxygen NC, PRN Oxy given for chest pain

## 2024-05-06 NOTE — PROGRESS NOTES
"[facility]     Date:5/312024  Admission Dx: Right sided Pneumo  Pulmonary History COPD  Home Nebulizer/MDI Use: Trelegy,  Xopenex HFA prn  Home Oxygen: None  Acuity Level (RCAT flow sheet):3  Aerosol Therapy initiated: Duoneb QID      Pulmonary Hygiene initiated: cough and deep breath      Volume Expansion initiated: IS      Current Oxygen Requirements: 3L  Current SpO2:99%    Re-evaluation date:5/09/2024    Patient Education: Re- Eval education was performed with the patient in regards to indications/benefits and possible side effects of bronchodilators. Will continue to do education with patient.        See \"RT Assessments\" flow sheet for patient assessment scoring and Acuity Level Details.         "

## 2024-05-06 NOTE — PROGRESS NOTES
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    Chest tube removed this morning. Case discussed with CTS, who recommend pulm consult to optimize patient's resp status prior to discharge given severe COPD.     PLAN     Acute Hypoxic Respiratory Failure  Post-Operative Right-Sided Pneumothorax  -Had lung biopsy completed 04/26 and since then has been experiencing progressive SOB and chest pain  -In ED, needing 4L O2 to keep sats >90% and found to have large right sided pneumothorax  -S/p chest tube placement in the ED, CTS consulted, removed 05/06  PLAN  -Chest tube removed per CTS recommendations  -Pain regimen in place  -Cardiothoracic consulted, appreciate recommendations    Acute on Chronic COPD  -Hx of COPD, has since quit smoking, follows with pulmonology as an outpatient, on [facility]  -Only recently O2 dependent after surgery for carcinoid tumor  -Currently with mild SOB, diminished breath sounds on exam  -CTS recommends pulm consult to optimize patient's resp status prior to discharge given severe COPD  PLAN  -Methylprednisolone 40mg IV bid  -Scheduled and PRN nebs  -Pulmonology consult     Carcinoid Tumor  -Per op note says s/p RUL wedge resection and MLND although discharge summary notes part of procedure was aborted  -Cardiothoracic consult per above  -Patient already follows with oncology     Essential HTN  -Home medications     DVT Prophy  -LMWH     Disposition  -Med Surg        Nahun Torres MD    Subjective     No events overnight. Mild SOB this morning after chest tube removed.        Objective   Blood pressure (!) 155/66, pulse 75, temperature 97.6  F (36.4  C), temperature source Oral, resp. rate 16, height 1.575 m (5' 2\"), weight 51.6 kg (113 " lb 12.1 oz), last menstrual period 09/22/2010, SpO2 94%, not currently breastfeeding.    PHYSICAL EXAM  General: In no acute distress  CV: RRR.  Lungs: Diminished throughout. Nl WOB.  Abd: Non-tender.  Ext: No edema.    LABS AND IMAGING  Reviewed and pertinent results discussed in assessment and plan.

## 2024-05-06 NOTE — PROGRESS NOTES
"THORACIC & FOREGUT SURGERY    Seen and examined. She is still somewhat short of breath    BP (!) 155/66 (BP Location: Left arm)   Pulse 75   Temp 97.6  F (36.4  C) (Oral)   Resp 16   Ht 1.575 m (5' 2\")   Wt 51.6 kg (113 lb 12.1 oz)   LMP 09/22/2010   SpO2 94%   BMI 20.81 kg/m     I/O last 3 completed shifts:  In: -   Out: 70 [Chest Tube:70]    Most recent labs and imaging reviewed     No air leak    A/P: 68 year old female with resolved postoperative space  Tube out today  I discussed with her that her shortness of breath is likely more related to her chronic issues. She had not been willing to try steroids as recommended by Dr. Bridges before, but is now willing.  Pulmonary consult for steroids.      Good Pandya MD   "

## 2024-05-07 ENCOUNTER — APPOINTMENT (OUTPATIENT)
Dept: GENERAL RADIOLOGY | Facility: CLINIC | Age: 69
DRG: 199 | End: 2024-05-07
Attending: THORACIC SURGERY (CARDIOTHORACIC VASCULAR SURGERY)
Payer: COMMERCIAL

## 2024-05-07 VITALS
HEART RATE: 74 BPM | TEMPERATURE: 97.8 F | SYSTOLIC BLOOD PRESSURE: 143 MMHG | OXYGEN SATURATION: 92 % | HEIGHT: 62 IN | BODY MASS INDEX: 20.8 KG/M2 | DIASTOLIC BLOOD PRESSURE: 72 MMHG | RESPIRATION RATE: 18 BRPM | WEIGHT: 113 LBS

## 2024-05-07 PROCEDURE — 99239 HOSP IP/OBS DSCHRG MGMT >30: CPT | Performed by: INTERNAL MEDICINE

## 2024-05-07 PROCEDURE — 94640 AIRWAY INHALATION TREATMENT: CPT | Mod: 76

## 2024-05-07 PROCEDURE — 94640 AIRWAY INHALATION TREATMENT: CPT

## 2024-05-07 PROCEDURE — 71045 X-RAY EXAM CHEST 1 VIEW: CPT

## 2024-05-07 PROCEDURE — 999N000157 HC STATISTIC RCP TIME EA 10 MIN

## 2024-05-07 PROCEDURE — 250N000013 HC RX MED GY IP 250 OP 250 PS 637: Performed by: INTERNAL MEDICINE

## 2024-05-07 PROCEDURE — 250N000009 HC RX 250: Performed by: INTERNAL MEDICINE

## 2024-05-07 PROCEDURE — 250N000011 HC RX IP 250 OP 636: Performed by: INTERNAL MEDICINE

## 2024-05-07 RX ORDER — PREDNISONE 20 MG/1
TABLET ORAL
Qty: 30 TABLET | Refills: 0 | Status: SHIPPED | OUTPATIENT
Start: 2024-05-07 | End: 2024-08-08

## 2024-05-07 RX ORDER — DOXYCYCLINE 100 MG/1
100 CAPSULE ORAL EVERY 12 HOURS
Qty: 10 CAPSULE | Refills: 0 | Status: ON HOLD | OUTPATIENT
Start: 2024-05-07 | End: 2024-08-09

## 2024-05-07 RX ADMIN — IPRATROPIUM BROMIDE AND ALBUTEROL SULFATE 3 ML: .5; 3 SOLUTION RESPIRATORY (INHALATION) at 07:50

## 2024-05-07 RX ADMIN — OXYCODONE HYDROCHLORIDE 5 MG: 5 TABLET ORAL at 05:58

## 2024-05-07 RX ADMIN — METHYLPREDNISOLONE SODIUM SUCCINATE 40 MG: 40 INJECTION, POWDER, FOR SOLUTION INTRAMUSCULAR; INTRAVENOUS at 09:33

## 2024-05-07 RX ADMIN — OXYCODONE HYDROCHLORIDE 5 MG: 5 TABLET ORAL at 13:29

## 2024-05-07 RX ADMIN — METHOCARBAMOL 500 MG: 500 TABLET ORAL at 00:59

## 2024-05-07 RX ADMIN — IPRATROPIUM BROMIDE AND ALBUTEROL SULFATE 3 ML: .5; 3 SOLUTION RESPIRATORY (INHALATION) at 11:44

## 2024-05-07 RX ADMIN — OXYCODONE HYDROCHLORIDE 5 MG: 5 TABLET ORAL at 01:02

## 2024-05-07 RX ADMIN — PANTOPRAZOLE SODIUM 40 MG: 40 TABLET, DELAYED RELEASE ORAL at 07:46

## 2024-05-07 RX ADMIN — ENOXAPARIN SODIUM 40 MG: 40 INJECTION SUBCUTANEOUS at 09:33

## 2024-05-07 RX ADMIN — DOXYCYCLINE HYCLATE 100 MG: 100 CAPSULE ORAL at 07:46

## 2024-05-07 RX ADMIN — ACETAMINOPHEN 650 MG: 325 TABLET, FILM COATED ORAL at 05:58

## 2024-05-07 ASSESSMENT — ACTIVITIES OF DAILY LIVING (ADL)
ADLS_ACUITY_SCORE: 27

## 2024-05-07 NOTE — DISCHARGE SUMMARY
Discharge Note    Patient discharged to home via private vehicle  accompanied by son.  IV: Discontinued  Prescriptions filled and given to patient/family.   Belongings reviewed and sent with patient.   Home medications returned to patient: NA  Equipment sent with: patient.   patient verbalizes understanding of discharge instructions. AVS given to patient.  Additional education completed?  Pt educated how to take medication and when to follow up with the Md.

## 2024-05-07 NOTE — DISCHARGE SUMMARY
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 Cardiothoracic surgery consulted, pneumothorax resolved after 2d and chest tube was removed. Pulmonology was consulted to obtimize COPD and patient will finish a course of antibiotics and a prednisone taper. Will continue Trelegy inhaler. She will follow-up with pulmonology and cardiothoracic surgery after the hospital stay.    Patient Discharge Condition & Exam     Discharge condition: Improved    BP (!) 143/72 (BP Location: Left arm)   Pulse 74   Temp 97.8  F (36.6  C) (Oral)   Resp 18   Ht 1.575 m (5' 2\")   Wt 51.3 kg (113 lb)   LMP 09/22/2010   SpO2 92%   BMI 20.67 kg/m       General: In NAD.  Cardiac: RRR.  Lungs: CTAB.  Abd: Non-tender.  Ext: No edema.    Discharge Instructions     Patient/family instructions: Written discharge instruction given to patient/family    Discharge Medications:     Review of your medicines        START taking        Dose / Directions   doxycycline hyclate 100 MG capsule  Commonly known as: VIBRAMYCIN  Indication: COPD  Used for: Chronic obstructive pulmonary disease, unspecified COPD type (H)      Dose: 100 mg  Take 1 capsule (100 mg) by mouth every 12 hours  Quantity: 10 capsule  Refills: 0     predniSONE 20 MG tablet  Commonly known as: DELTASONE  Used for: Chronic obstructive pulmonary disease, unspecified COPD type (H)      Take 3 tabs by mouth daily x 3 days, then 2 tabs daily " x 3 days, then 1 tab daily x 3 days, then 1/2 tab daily until you follow-up with pulmonology.  Quantity: 30 tablet  Refills: 0            CONTINUE these medicines which may have CHANGED, or have new prescriptions. If we are uncertain of the size of tablets/capsules you have at home, strength may be listed as something that might have changed.        Dose / Directions   Trelegy Ellipta 100-62.5-25 MCG/ACT oral inhaler  This may have changed: when to take this  Used for: Chronic bronchitis, unspecified chronic bronchitis type (H)  Generic drug: Fluticasone-Umeclidin-Vilant      INHALE ONE PUFF BY MOUTH EVERY DAY  Quantity: 60 each  Refills: 3            CONTINUE these medicines which have NOT CHANGED        Dose / Directions   amLODIPine 10 MG tablet  Commonly known as: NORVASC  Used for: Hypertension goal BP (blood pressure) < 140/90      Dose: 10 mg  Take 1 tablet (10 mg) by mouth every evening  Quantity: 90 tablet  Refills: 3     EPINEPHrine 0.3 MG/0.3ML injection 2-pack  Commonly known as: ANY BX GENERIC EQUIV  Used for: Bee sting reaction, accidental or unintentional, initial encounter      Dose: 0.3 mg  Inject 0.3 mLs (0.3 mg) into the muscle as needed for anaphylaxis May repeat one time in 5-15 minutes if response to initial dose is inadequate.  Quantity: 2 each  Refills: 0     esomeprazole 20 MG DR capsule  Commonly known as: NexIUM  Used for: Gastroesophageal reflux disease without esophagitis      Dose: 20 mg  Take 1 capsule (20 mg) by mouth 2 times daily Take 30-60 minutes before eating.  Quantity: 60 capsule  Refills: 11     levalbuterol 45 MCG/ACT inhaler  Commonly known as: XOPENEX HFA  Used for: Chronic obstructive pulmonary disease, unspecified COPD type (H)      Dose: 2 puff  Inhale 2 puffs into the lungs every 4 hours as needed for shortness of breath / dyspnea or wheezing  Quantity: 1 Inhaler  Refills: 1     oxyCODONE 5 MG tablet  Commonly known as: ROXICODONE  Used for: Malignant neoplasm of upper  lobe of right lung (H)      Dose: 5 mg  Take 1 tablet (5 mg) by mouth every 8 hours as needed for pain  Quantity: 40 tablet  Refills: 0            STOP taking      acetaminophen 500 MG tablet  Commonly known as: TYLENOL        enoxaparin ANTICOAGULANT 40 MG/0.4ML syringe  Commonly known as: LOVENOX        methocarbamol 500 MG tablet  Commonly known as: ROBAXIN                  Where to get your medicines        These medications were sent to [facility] [address] [id number] [facility] [address]      Phone: [phone]   doxycycline hyclate 100 MG capsule  predniSONE 20 MG tablet        Discharge diet: regular diet    Discharge activity: Activity as tolerated    Discharge follow-up:    Follow up with primary care provider within one week or earlier if symptoms return or gets worse.    Follow up with consultant as instructed.    Pending Results     Unresulted Labs Ordered in the Past 30 Days of this Admission       No orders found from 4/3/2024 to 5/4/2024.             Patient Allergies   No Known Allergies    Disposition   Disposition: home     I saw and evaluated the patient on day of discharge and  discharge instructions reviewed  and  all the patient's questions and concerns addressed. Over 30 minutes spent on discharge and coordination of discharge process for this patient.

## 2024-05-07 NOTE — PLAN OF CARE
"Goal Outcome Evaluation:      Plan of Care Reviewed With: patient    Overall Patient Progress: improvingOverall Patient Progress: improving    Outcome Evaluation: Pt is A & O x 4, up sba, reg diet, Oxycodone & Robaxin given for pain, dressing on R chest tube site cdi, 3 steri strips on R rear back in place, Pt is getting Doxycycline po, duonebs, Solumedrol, thoracic surgery following.      Problem: Adult Inpatient Plan of Care  Goal: Plan of Care Review  Description: The Plan of Care Review/Shift note should be completed every shift.  The Outcome Evaluation is a brief statement about your assessment that the patient is improving, declining, or no change.  This information will be displayed automatically on your shift  note.  Outcome: Progressing  Flowsheets (Taken 5/7/2024 0200)  Outcome Evaluation: Pt is A & O x 4, up sba, reg diet, Oxycodone & Robaxin given for pain, dressing on R chest tube site cdi, 3 steri strips on R rear back in place, Pt is getting Doxycycline po, duonebs, Solumedrol, thoracic surgery following.  Plan of Care Reviewed With: patient  Overall Patient Progress: improving  Goal: Patient-Specific Goal (Individualized)  Description: You can add care plan individualizations to a care plan. Examples of Individualization might be:  \"Parent requests to be called daily at 9am for status\", \"I have a hard time hearing out of my right ear\", or \"Do not touch me to wake me up as it startles  me\".  Outcome: Progressing  Goal: Absence of Hospital-Acquired Illness or Injury  Outcome: Progressing  Intervention: Identify and Manage Fall Risk  Recent Flowsheet Documentation  Taken 5/6/2024 2046 by Bhanu Mccormack, RN  Safety Promotion/Fall Prevention:   activity supervised   clutter free environment maintained   increased rounding and observation   lighting adjusted   nonskid shoes/slippers when out of bed  Intervention: Prevent and Manage VTE (Venous Thromboembolism) Risk  Recent Flowsheet Documentation  Taken " 5/6/2024 2046 by Bhanu Mccormack RN  VTE Prevention/Management: SCDs (sequential compression devices) off  Intervention: Prevent Infection  Recent Flowsheet Documentation  Taken 5/6/2024 2046 by Bhanu Mccormack RN  Infection Prevention: hand hygiene promoted  Goal: Optimal Comfort and Wellbeing  Outcome: Progressing  Intervention: Monitor Pain and Promote Comfort  Recent Flowsheet Documentation  Taken 5/7/2024 0102 by Bhanu Mccormack RN  Pain Management Interventions: medication (see MAR)  Goal: Readiness for Transition of Care  Outcome: Progressing     Problem: Pain Acute  Goal: Optimal Pain Control and Function  Outcome: Progressing  Intervention: Develop Pain Management Plan  Recent Flowsheet Documentation  Taken 5/7/2024 0102 by Bhanu Mccormack RN  Pain Management Interventions: medication (see MAR)  Intervention: Prevent or Manage Pain  Recent Flowsheet Documentation  Taken 5/6/2024 2046 by Bhanu Mccormack RN  Medication Review/Management: medications reviewed     Problem: Fall Injury Risk  Goal: Absence of Fall and Fall-Related Injury  Outcome: Progressing  Intervention: Identify and Manage Contributors  Recent Flowsheet Documentation  Taken 5/6/2024 2046 by Bhanu Mccormack RN  Medication Review/Management: medications reviewed  Intervention: Promote Injury-Free Environment  Recent Flowsheet Documentation  Taken 5/6/2024 2046 by Bhanu Mccormack RN  Safety Promotion/Fall Prevention:   activity supervised   clutter free environment maintained   increased rounding and observation   lighting adjusted   nonskid shoes/slippers when out of bed     Problem: Gas Exchange Impaired  Goal: Optimal Gas Exchange  Outcome: Progressing     Problem: Comorbidity Management  Goal: Maintenance of COPD Symptom Control  Outcome: Progressing  Intervention: Maintain COPD Symptom Control  Recent Flowsheet Documentation  Taken 5/6/2024 2046 by Bhanu Mccormack RN  Medication Review/Management: medications reviewed  Goal: Blood Pressure  in Desired Range  Outcome: Progressing  Intervention: Maintain Blood Pressure Management  Recent Flowsheet Documentation  Taken 5/6/2024 2046 by Bhanu Mccormack, RN  Medication Review/Management: medications reviewed     Problem: Infection  Goal: Absence of Infection Signs and Symptoms  Outcome: Progressing

## 2024-05-08 ENCOUNTER — PATIENT OUTREACH (OUTPATIENT)
Dept: CARE COORDINATION | Facility: CLINIC | Age: 69
End: 2024-05-08
Payer: COMMERCIAL

## 2024-05-08 NOTE — PROGRESS NOTES
"  [facility]: [facility]: Post-Discharge Note  SITUATION                                                      Admission:    Admission Date: 05/03/24   Reason for Admission: 1. Pneumothorax, postoperative  J95.811       2. S/P partial lobectomy of lung  Z90.2       3. History of lung cancer  Z85.118       4. History of COPD  Z87.09  Discharge:   Discharge Date: 05/07/24  Discharge Diagnosis: Primary Discharge Diagnosis      Acute Hypoxic Respiratory Failure      Secondary Diagnoses     Post-Operative Right-Sided Pneumothorax   Acute on Chronic COPD   Carcinoid Tumor    BACKGROUND                                                      Per hospital discharge summary and inpatient provider notes:    Mimi Rivera is a 68 year old female s/p right lung lobectomy for shortness of breath and post-op assessment. Procedure was last Friday. The patient is on Lovenox, of note. Low-grade fever endorsed in addition to perspiration. Pain management has not been well controlled after the surgery, she reports. Mimi is on 2 L oxygen at baseline following the procedure. She describes her discomfort as more labored, needing to use inhalers to help with \"swollen\" sensation. Feels different from COPD flare-up at present. The patient is scheduled to follow-up with her surgeon later this month.    ASSESSMENT           Discharge Assessment  How are you doing now that you are home?: Patient states she is feeling \"crappy\". States she feels about the same as when she left the hospital. States her lungs hurt, hard to breath. Breathing is no different than when she was discharged, better than when she went into the hospital. Patient declined triage but took 24/7 nurse line information.  How are your symptoms? (Red Flag symptoms escalate to triage hotline per guidelines): Unchanged  Do you feel your condition is stable enough to be safe at home until your provider visit?: Yes  Does the patient have their discharge " instructions? : Yes  Does the patient have questions regarding their discharge instructions? : No  Were you started on any new medications or were there changes to any of your previous medications? : Yes  Does the patient have all of their medications?: Yes  Do you have questions regarding any of your medications? : No  Do you have all of your needed medical supplies or equipment (DME)?  (i.e. oxygen tank, CPAP, cane, etc.): Yes  Discharge follow-up appointment scheduled within 14 calendar days? : Yes  Discharge Follow Up Appointment Date: 05/29/24  Discharge Follow Up Appointment Scheduled with?: Specialty Care Provider         Post-op (Clinicians Only)  Did the patient have surgery or a procedure: Yes (Chest tube placement.)  Drainage: No  Bleeding: none  Fever: No (98.8)  Chills: Yes  Redness: No  Warmth: No  Swelling: No  Incision site pain: No  Eating & Drinking: eating and drinking without complaints/concerns  PO Intake: regular diet  Additional Symptoms: decreased appetite  Bowel Function: loose stools (Soft, not diarrhea.)  Urinary Status: voiding without complaint/concerns    PLAN                                                      Outpatient Plan:  Follow up with primary care provider within one week or earlier if symptoms return or gets worse.     Follow up with consultant as instructed.    Follow-up and recommended labs and tests  Thoracic surgery and pulmonology.    Future Appointments   Date Time Provider Department Center   5/29/2024  9:30 AM [id number]   5/29/2024 10:15 AM Kyleigh Hill APRN [facility]   5/30/2024  3:20 PM Juan Manuel Murdock MD [facility]   9/13/2024 11:30 AM Ishmael Bridges MD [facility]         For any urgent concerns, please contact our 24 hour nurse triage line: [phone] ([phone])         Reema Young RN

## 2024-05-10 ENCOUNTER — PATIENT OUTREACH (OUTPATIENT)
Dept: ONCOLOGY | Facility: CLINIC | Age: 69
End: 2024-05-10
Payer: COMMERCIAL

## 2024-05-10 NOTE — PROGRESS NOTES
"Writer called patient, for follow up on her symptoms/recent hospital visit. Patient reports she is \"coughing up a bunch of green stuff, it is solid puss\". Reports she is SOB, even at rest. Remains on supplemental oxygen, 2LPM. Reports that she is \"feeling really sore on the right side of my chest\". States she is in a lot of pain when she coughs. Reports she is running a fever, highest reading ~100.4, but is waking up \"drenched in sweat\". Reports she has been taking her ABX. Writer advised that call will be routed to providers for follow up if patient should go to ED now. Writer relayed that ED visit will be likely, given her symptoms.     Desire May RN, BSN, PHN, OCN     5/10/2024 at 11:31 AM  Nurse Care Coordinator  [facility]  P: [phone]   F: [phone]    ADDENDUM    Kyleigh Hill, EDEN CNS  You     Yeah-she has too many things to be addressed via My Chart. She needs to go to Urgent Care or ED for an evaluation.    Thanks  H     Writer called patient to update her of the above. Patient state understanding and indicated that she will have someone transport her to urgent care/ED; declined to have writer call 911 for transport. Patient questions why she \"can't just be prescribed a different ABX\". Writer educated that, at times, bacteria can become resistant to ABX and therefore in cases were infections seem to not be responding to current treatment, further work up is needed to determine the correct/appropriate ABX for hopefully the best possible outcome. Patient stated understanding, all questions answered.     Desire May RN, BSN, PHN, OCN     5/10/2024 at 1:21 PM  Nurse Care Coordinator  [facility]  P: [phone]   F: [phone]    "

## 2024-05-16 ENCOUNTER — MYC MEDICAL ADVICE (OUTPATIENT)
Dept: ONCOLOGY | Facility: CLINIC | Age: 69
End: 2024-05-16
Payer: COMMERCIAL

## 2024-05-16 DIAGNOSIS — C7A.090 ATYPICAL CARCINOID LUNG TUMOR (H): Primary | ICD-10-CM

## 2024-05-16 DIAGNOSIS — R91.8 PULMONARY NODULES: ICD-10-CM

## 2024-05-20 NOTE — TELEPHONE ENCOUNTER
"Writer called patient for follow up on symptoms she was having last week, and as no response to [employer] message writer sent was received either. Per patient, she is still \"not feeling well at all\". She reports she is still \"coughing up green stuff and my tonsils have greens stuff on them too\". She reports she was driving home from an ED (did not state which one), where she was told she would have ~10 hour wait to be seen. She states she is taking Tylenol round the clock and is still having fevers: \"100-101\". Patient reports on-going chest discomfort, from recent surgery and lung collapse, states pain hasn't changed in quality. Reports she is still using supplemental oxygen, 2LPM, and is saturating at 97% on the 2LPM.    Writer advised patient go to urgent care right away, with the hope that the wait time would be shorter. Advised if symptoms become emergent, to call 911.  Writer offered to call 911 for transport, but patient declined indicating she felt stable enough to transport herself to an urgent care.     Per patient request, appointment with Dr. Pandya scheduled for tomorrow, 5/21/24, at 1700. Patient indicates she will check her [employer] to get the address. Per Dr. Pandya, patient should get CXR prior to appt tomorrow. Writer called patient to update her of this, who is agreeable to have CXR scheduling at [facility],  and then [place] [facility] [place]. Pandya. Patient indicates she still has not gone to urgent care, and writer reiterated that patient should plan to do so, and seek immediate medical attention for emergent symptoms. Writer will plan to follow up tomorrow to ensure CXR is scheduled and check if patient did go to urgent care, as advised.     Desire May, RN, BSN, PHN, OCN     5/20/2024 at 4:40 PM  Nurse Care Coordinator  [facility]  P: [phone]   F: [phone]    ADDENDUM    Per chart review, writer unable to locate a visit to " ED or Urgent Care, as advised by writer yesterday. Patient is scheduled for CXR today and follow up appt with Dr. Pandya.     Desire May, RN, BSN, PHN, OCN     5/21/2024 at 11:51 AM  Nurse Care Coordinator  [facility]  P: [phone]   F: [phone]

## 2024-05-21 ENCOUNTER — HOSPITAL ENCOUNTER (OUTPATIENT)
Dept: GENERAL RADIOLOGY | Facility: CLINIC | Age: 69
Discharge: HOME | End: 2024-05-21
Attending: THORACIC SURGERY (CARDIOTHORACIC VASCULAR SURGERY) | Admitting: THORACIC SURGERY (CARDIOTHORACIC VASCULAR SURGERY)
Payer: COMMERCIAL

## 2024-05-21 ENCOUNTER — ONCOLOGY VISIT (OUTPATIENT)
Dept: SURGERY | Facility: CLINIC | Age: 69
End: 2024-05-21
Attending: THORACIC SURGERY (CARDIOTHORACIC VASCULAR SURGERY)
Payer: COMMERCIAL

## 2024-05-21 VITALS
OXYGEN SATURATION: 91 % | RESPIRATION RATE: 18 BRPM | BODY MASS INDEX: 20.27 KG/M2 | WEIGHT: 110.8 LBS | DIASTOLIC BLOOD PRESSURE: 101 MMHG | HEART RATE: 90 BPM | SYSTOLIC BLOOD PRESSURE: 154 MMHG | TEMPERATURE: 98.2 F

## 2024-05-21 DIAGNOSIS — C7A.090 ATYPICAL CARCINOID LUNG TUMOR (H): ICD-10-CM

## 2024-05-21 DIAGNOSIS — R91.8 PULMONARY NODULES: ICD-10-CM

## 2024-05-21 DIAGNOSIS — Z90.2 S/P PARTIAL LOBECTOMY OF LUNG: Primary | ICD-10-CM

## 2024-05-21 DIAGNOSIS — J06.9 UPPER RESPIRATORY TRACT INFECTION, UNSPECIFIED TYPE: ICD-10-CM

## 2024-05-21 PROCEDURE — 99024 POSTOP FOLLOW-UP VISIT: CPT | Performed by: THORACIC SURGERY (CARDIOTHORACIC VASCULAR SURGERY)

## 2024-05-21 PROCEDURE — G0463 HOSPITAL OUTPT CLINIC VISIT: HCPCS | Performed by: THORACIC SURGERY (CARDIOTHORACIC VASCULAR SURGERY)

## 2024-05-21 PROCEDURE — 71046 X-RAY EXAM CHEST 2 VIEWS: CPT

## 2024-05-21 RX ORDER — GABAPENTIN 300 MG/1
300 CAPSULE ORAL 3 TIMES DAILY
Qty: 90 CAPSULE | Refills: 2 | Status: ON HOLD | OUTPATIENT
Start: 2024-05-21 | End: 2024-08-09

## 2024-05-21 RX ORDER — LEVOFLOXACIN 500 MG/1
500 TABLET, FILM COATED ORAL DAILY
Qty: 10 TABLET | Refills: 0 | Status: SHIPPED | OUTPATIENT
Start: 2024-05-21 | End: 2024-05-31

## 2024-05-21 ASSESSMENT — PAIN SCALES - GENERAL: PAINLEVEL: SEVERE PAIN (6)

## 2024-05-21 NOTE — PROGRESS NOTES
THORACIC SURGERY FOLLOW UP VISIT    Dear Dr. Morejon,  I saw Ms. Rivera in follow-up today. The clinical summary follows:     PREOP DIAGNOSIS   Atypical carcinoid    PROCEDURE   Right thoracoscopic robot-assisted right upper lobe wedge resection  Mediastinal lymph node dissection    DATE OF PROCEDURE  4/26/2024    HISTOPATHOLOGY   SPECIMEN   Procedure  Wedge resection   Specimen Laterality  Right   TUMOR   Tumor Focality  Single focus   Tumor Site  Upper lobe of lung   Tumor Size     Total Tumor Size (size of entire tumor)  Greatest Dimension (Centimeters): 1.0 cm   Histologic Type  Atypical carcinoid / Neuroendocrine tumor, grade 2   Visceral Pleura Invasion  Not identified   Direct Invasion of Adjacent Structures  Not applicable (no adjacent structures present)   Treatment Effect  No known presurgical therapy   Lymphovascular Invasion  Present   MARGINS   Margin Status for Invasive Carcinoma  Invasive carcinoma present at margin   Margin(s) Involved by Invasive Carcinoma  Parenchymal   Margin Status for Non-Invasive Tumor  Not applicable   REGIONAL LYMPH NODES   Lymph Node(s) from Prior Procedures  No known prior lymph node sampling performed   Regional Lymph Node Status  All regional lymph nodes negative for tumor   Number of Lymph Nodes Examined  5   Adis Site(s) Examined  4R: Lower paratracheal     7: Subcarinal   PATHOLOGIC STAGE CLASSIFICATION (pTNM, AJCC 8th Edition)   Reporting of pT, pN, and (when applicable) pM categories is based on information available to the pathologist at the time the report is issued. As per the AJCC (Chapter 1, 8th Ed.) it is the managing physician s responsibility to establish the final pathologic stage based upon all pertinent information, including but potentially not limited to this pathology report.   pT Category  pT1a   pN Category  pN0   Comment(s)  Tissue block: A2       COMPLICATIONS  Reaccumulation of pneumothorax requiring readmission    INTERVAL  STUDIES  5/21/2024:      ETOH: Occasional  TOBACCO: ages 16 - 60. 1/2 pack per day  OTHER DRUGS: None    BP (!) 154/101 (BP Location: Right arm, Patient Position: Sitting, Cuff Size: Adult Regular)   Pulse 90   Temp 98.2  F (36.8  C) (Oral)   Resp 18   Wt 50.3 kg (110 lb 12.8 oz)   LMP 09/22/2010   SpO2 91%   BMI 20.27 kg/m       SUBJECTIVE   Ms. Rivera has been feeling ill since she left the hospital and was advised to go the emergency department twice when she described fevers and purulent productive cough and shortness of breath. She did not go either time and continues to feel ill.     IMPRESSION (Z90.2) S/P partial lobectomy of lung  (primary encounter diagnosis)  Plan: gabapentin (NEURONTIN) 300 MG capsule    (J06.9) Upper respiratory tract infection, unspecified type  Plan: Levaquin 500 mg daily for 10 days     This is a 68 year old woman who has an upper respiratory tract infection after undergoing a right upper lobe wedge resection on 4/26/2024 for a stage I atypical carcinoid tumor.    PLAN  I spent 20 min on the date of the encounter in chart review, patient visit, review of tests, documentation and/or discussion with other providers about the issues documented above. I reviewed the plan as follows:  I discussed her situation with Dr. Bridges and we decided on Levaquin for a 10 day course.     All questions were answered and the patient and present family were in agreement with the plan.  I appreciate the opportunity to participate in the care of your patient and will keep you updated.  Sincerely,     Good Pandya MD

## 2024-05-21 NOTE — NURSING NOTE
"Oncology Rooming Note    May 21, 2024 4:52 PM   Mimi Rivera is a 68 year old female who presents for:    Chief Complaint   Patient presents with    Oncology Clinic Visit     Lung Cancer     Initial Vitals: BP (!) 154/101 (BP Location: Right arm, Patient Position: Sitting, Cuff Size: Adult Regular)   Pulse 90   Temp 98.2  F (36.8  C) (Oral)   Resp 18   Wt 50.3 kg (110 lb 12.8 oz)   LMP 09/22/2010   SpO2 91%   BMI 20.27 kg/m   Estimated body mass index is 20.27 kg/m  as calculated from the following:    Height as of 5/3/24: 1.575 m (5' 2\").    Weight as of this encounter: 50.3 kg (110 lb 12.8 oz). Body surface area is 1.48 meters squared.  Severe Pain (6) Comment: RLQ/Lung pain with coughing/Head   Patient's last menstrual period was 09/22/2010.  Allergies reviewed: Yes  Medications reviewed: Yes    Medications: Medication refills not needed today.  Pharmacy name entered into [facility]: [id number] - [place], [place] - [address] [address]    Frailty Screening:   Is the patient here for a new oncology consult visit in cancer care? 2. No      Clinical concerns: HA/Lung pan with coughing/RLQ pain       Michelle Benedict LPN  5/21/2024              "

## 2024-05-21 NOTE — LETTER
5/21/2024         RE: Mimi Rivera  [address] [place] [address]        Dear Colleague,    Thank you for referring your patient, Mimi Rivera, to the [facility]. Please see a copy of my visit note below.    THORACIC SURGERY FOLLOW UP VISIT    Dear Dr. Morejon,  I saw Ms. Rivera in follow-up today. The clinical summary follows:     PREOP DIAGNOSIS   Atypical carcinoid    PROCEDURE   Right thoracoscopic robot-assisted right upper lobe wedge resection  Mediastinal lymph node dissection    DATE OF PROCEDURE  4/26/2024    HISTOPATHOLOGY   SPECIMEN   Procedure  Wedge resection   Specimen Laterality  Right   TUMOR   Tumor Focality  Single focus   Tumor Site  Upper lobe of lung   Tumor Size     Total Tumor Size (size of entire tumor)  Greatest Dimension (Centimeters): 1.0 cm   Histologic Type  Atypical carcinoid / Neuroendocrine tumor, grade 2   Visceral Pleura Invasion  Not identified   Direct Invasion of Adjacent Structures  Not applicable (no adjacent structures present)   Treatment Effect  No known presurgical therapy   Lymphovascular Invasion  Present   MARGINS   Margin Status for Invasive Carcinoma  Invasive carcinoma present at margin   Margin(s) Involved by Invasive Carcinoma  Parenchymal   Margin Status for Non-Invasive Tumor  Not applicable   REGIONAL LYMPH NODES   Lymph Node(s) from Prior Procedures  No known prior lymph node sampling performed   Regional Lymph Node Status  All regional lymph nodes negative for tumor   Number of Lymph Nodes Examined  5   Adis Site(s) Examined  4R: Lower paratracheal     7: Subcarinal   PATHOLOGIC STAGE CLASSIFICATION (pTNM, AJCC 8th Edition)   Reporting of pT, pN, and (when applicable) pM categories is based on information available to the pathologist at the time the report is issued. As per the AJCC (Chapter 1, 8th Ed.) it is the managing physician s responsibility to establish the final pathologic stage based upon all  pertinent information, including but potentially not limited to this pathology report.   pT Category  pT1a   pN Category  pN0   Comment(s)  Tissue block: A2       COMPLICATIONS  Reaccumulation of pneumothorax requiring readmission    INTERVAL STUDIES  5/21/2024:      ETOH: Occasional  TOBACCO: ages 16 - 60. 1/2 pack per day  OTHER DRUGS: None    BP (!) 154/101 (BP Location: Right arm, Patient Position: Sitting, Cuff Size: Adult Regular)   Pulse 90   Temp 98.2  F (36.8  C) (Oral)   Resp 18   Wt 50.3 kg (110 lb 12.8 oz)   LMP 09/22/2010   SpO2 91%   BMI 20.27 kg/m       SUBJECTIVE   Ms. Rivera has been feeling ill since she left the hospital and was advised to go the emergency department twice when she described fevers and purulent productive cough and shortness of breath. She did not go either time and continues to feel ill.     IMPRESSION (Z90.2) S/P partial lobectomy of lung  (primary encounter diagnosis)  Plan: gabapentin (NEURONTIN) 300 MG capsule    (J06.9) Upper respiratory tract infection, unspecified type  Plan: Levaquin 500 mg daily for 10 days     This is a 68 year old woman who has an upper respiratory tract infection after undergoing a right upper lobe wedge resection on 4/26/2024 for a stage I atypical carcinoid tumor.    PLAN  I spent 20 min on the date of the encounter in chart review, patient visit, review of tests, documentation and/or discussion with other providers about the issues documented above. I reviewed the plan as follows:  I discussed her situation with Dr. Bridges and we decided on Levaquin for a 10 day course.     All questions were answered and the patient and present family were in agreement with the plan.  I appreciate the opportunity to participate in the care of your patient and will keep you updated.  Sincerely,     Good Pandya MD

## 2024-05-23 PROBLEM — J06.9 UPPER RESPIRATORY TRACT INFECTION, UNSPECIFIED TYPE: Status: ACTIVE | Noted: 2024-05-23

## 2024-05-24 ENCOUNTER — PATIENT OUTREACH (OUTPATIENT)
Dept: SURGERY | Facility: CLINIC | Age: 69
End: 2024-05-24
Payer: COMMERCIAL

## 2024-05-24 NOTE — PROGRESS NOTES
Spoke with Mimi to let her know since she saw Dr. Pandya this past Tuesday 5/21/24 with a chest x-ray, she does not need another chest x-ray and to see him next week. Dr. Pandya would like her to follow up with Dr. Bridges or her primary care provider if she is still having symptoms after she has finished her 10 day course of Levaquin. Mimi understood and had no questions or concerns.

## 2024-05-30 ENCOUNTER — OFFICE VISIT (OUTPATIENT)
Dept: OTOLARYNGOLOGY | Facility: CLINIC | Age: 69
End: 2024-05-30
Attending: NURSE PRACTITIONER
Payer: COMMERCIAL

## 2024-05-30 ENCOUNTER — PRE VISIT (OUTPATIENT)
Dept: OTOLARYNGOLOGY | Facility: CLINIC | Age: 69
End: 2024-05-30

## 2024-05-30 VITALS
WEIGHT: 110 LBS | DIASTOLIC BLOOD PRESSURE: 88 MMHG | HEIGHT: 62 IN | HEART RATE: 87 BPM | SYSTOLIC BLOOD PRESSURE: 154 MMHG | BODY MASS INDEX: 20.24 KG/M2

## 2024-05-30 DIAGNOSIS — J32.9 CHRONIC RHINOSINUSITIS: ICD-10-CM

## 2024-05-30 DIAGNOSIS — R09.81 NASAL CONGESTION: Primary | ICD-10-CM

## 2024-05-30 DIAGNOSIS — J34.89 NASAL DRAINAGE: ICD-10-CM

## 2024-05-30 DIAGNOSIS — R51.9 FACIAL PAIN: ICD-10-CM

## 2024-05-30 DIAGNOSIS — R91.1 SOLITARY LUNG NODULE: ICD-10-CM

## 2024-05-30 PROCEDURE — 99214 OFFICE O/P EST MOD 30 MIN: CPT | Mod: 25 | Performed by: STUDENT IN AN ORGANIZED HEALTH CARE EDUCATION/TRAINING PROGRAM

## 2024-05-30 PROCEDURE — 31231 NASAL ENDOSCOPY DX: CPT | Performed by: STUDENT IN AN ORGANIZED HEALTH CARE EDUCATION/TRAINING PROGRAM

## 2024-05-30 RX ORDER — PREDNISONE 10 MG/1
TABLET ORAL
Qty: 35 TABLET | Refills: 0 | Status: SHIPPED | OUTPATIENT
Start: 2024-05-30 | End: 2024-06-13

## 2024-05-30 RX ORDER — AZITHROMYCIN 250 MG/1
TABLET, FILM COATED ORAL
COMMUNITY
Start: 2024-05-22 | End: 2024-08-08

## 2024-05-30 ASSESSMENT — PAIN SCALES - GENERAL: PAINLEVEL: SEVERE PAIN (6)

## 2024-05-30 NOTE — PATIENT INSTRUCTIONS
You were seen in the ENT Clinic today by Dr. Murdock. If you have any questions or concerns after your appointment, please contact us (see below)       2.   The following recommendations have been made based upon your appointment today:   -schedule a CT scan    -a prescription for prednisone has been sent to your pharmacy   -start taking zyrtec   -start using Flonase 2 sprays each nostril at night   -start Neilmed sinus rinses      3.   Plan to return to the ENT clinic 2-3 months           How to Contact Us:  Send a [employer] message to your provider. Our team will respond to you via Mailcloud. Occasionally, we will need to call you to get further information.  For urgent matters (Monday-Friday), call the ENT Clinic: [phone] and speak with a call center team member - they will route your call appropriately.   If you'd like to speak directly with a nurse, please find our contact information below. We do our best to check voicemail frequently throughout the day, and will work to call you back within 1-2 days. For urgent matters, please use the general clinic phone numbers listed above.     Subha RAO RN  Direct: [phone]  Malathi ESPOSITO LPN  Direct: [phone]         [facility]  Department of Otolaryngology   
97.7

## 2024-05-30 NOTE — NURSING NOTE
"Chief Complaint   Patient presents with    Consult   Blood pressure (!) 154/88, pulse 87, height 1.575 m (5' 2\"), weight 49.9 kg (110 lb), last menstrual period 09/22/2010, not currently breastfeeding. Aleksander Brooks, EMT    "

## 2024-05-30 NOTE — PROGRESS NOTES
[facility]  New Patient Visit      Encounter date:   May 30, 2024    Referring Provider:   Jessica Colin, EDEN CNP  [address] [place] [address]    Reason for Visit: New Patient      History of Present Illness:      Mimi Rivera is a 69 year old female who presents for consultation regarding Chronic rhinosinusitis. Today, she feels like she is having a current sinus infection. Endorses anosmia. She was recently seen at the ED on 5/3/24 for complaint of SOB and back pain. Whilst at the ED she was found to have a pneumothorax. She also notes of persistent green drainage from the back of her throat that started when she was in the hospital. She is unable to smell this discharge. No history of formal allergy testing. Will use Neti pot and Flonase only when she feels her symptoms are severe and has never used them for extended period of time.    Describes persistent frontal sinus pain and discomfort. Also some numbness. Has never seen a neurologist.     Of note described anosmia at prior ENT visit which demonstrated normosmia on UPSIT.     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    Number of episodes per year: Chronic  Facial pain/pressure: yes  Nasal drainage: yes  Nasal obstruction: yes  Changes in smell or taste: yes  Epistaxis: n/a    History of asthma/allergy: n/a    Prior medication trials: Flonase, Netti pot, Zyrtec  Prior sinus surgery: n/a  Prior allergy testing with positives: n/a  Prior CT scans: yes    Other otolaryngic complaints: anosmia    Sino-Nasal Outcome Test (SNOT - 22)  DNT    Review of Systems:  A comprehensive 14-point review of systems was performed with positives and pertinent negatives listed in the HPI.    Past Medical/Surgical History:  Reviewed today with the patient. No history of major medical comorbidities. Does not take any blood thinners. No prior history of sinonasal surgery or trauma.    Allergies:     No  Known Allergies    Medical History:  Past Medical History:   Diagnosis Date    ASCUS on Pap smear 2006    unable to run HPV typing, f/u paps NIL    Chronic rhinosinusitis     COPD (chronic obstructive pulmonary disease) (H)     Depression, anxiety     Herpes     occasional outbreak    Hypertension     Primary stress urinary incontinence     Pulmonary nodules     Rhinosinusitis         Surgical History:   Past Surgical History:   Procedure Laterality Date    BRONCHOSCOPY, WITH BIOPSY, ROBOT ASSISTED Bilateral 2023    Procedure: Robot assisted Ion BRONCHOSCOPY;  Surgeon: Raudel Claire DO;  Location: [facility] OR     SECTION      x 3    DAVINCI LOBECTOMY LUNG Right 2024    Procedure: robot assisted thoracoscopic right wedge resection, mediastinal lymph node dissection;  Surgeon: Good Pandya MD;  Location: [facility] OR    ENDOBRONCHIAL ULTRASOUND FLEXIBLE N/A 2023    Procedure: endobronchial ultrasound, transbronchial biopsies Latex Free;  Surgeon: Raudel Claire DO;  Location: [facility] OR        Family History:  Family History   Problem Relation Age of Onset    Hypertension Mother     Cancer Mother         liver    C.A.D. Father     Anesthesia Reaction No family hx of     Deep Vein Thrombosis (DVT) No family hx of         Social History:   Social History     Socioeconomic History    Marital status:    Tobacco Use    Smoking status: Former     Current packs/day: 0.00     Types: Cigarettes     Quit date: 3/19/2015     Years since quittin.2    Smokeless tobacco: Former     Quit date: 2017   Vaping Use    Vaping status: Never Used   Substance and Sexual Activity    Alcohol use: Yes     Alcohol/week: 2.0 - 3.0 standard drinks of alcohol     Types: 2 - 3 Standard drinks or equivalent per week     Comment: 2 drinks per wk avg    Drug use: No    Sexual activity: Yes     Partners: Male     Birth control/protection: Surgical     Comment: tubal   Other Topics Concern    Parent/sibling w/ CABG,  "MI or angioplasty before 65F 55M? No            Family History:  Reviewed with patient. No pertinent positives.    Physical Examination:  BP (!) 154/88 (BP Location: Left arm, Patient Position: Sitting, Cuff Size: Adult Regular)   Pulse 87   Ht 1.575 m (5' 2\")   Wt 49.9 kg (110 lb)   LMP 09/22/2010   BMI 20.12 kg/m      Constitutional/Psychiatric: This is a well-appearing, well-dressed patient in no acute distress. female communicates easily with no obvious speech issues. Oriented to self and environment with appropriate conversation. Does not appear depressed, anxious, or agitated.  Head: Normocephalic. Overall inspection reveals no prior scars, lesions, or masses. Facial motion is symmetric. No sinus tenderness.  Eyes: Extra-ocular motions are intact with symmetric gaze. Conjunctiva clear. No eyelid lesions. Pupils round, symmetric, and reactive to light.  Ears: Auricles without masses or lesions bilaterally. External auditory canals clear with minimal non-obstructive cerumen. Tympanic membranes intact without middle ear fluid or retraction. Hearing intact grossly at conversational volume.  Oral Cavity/Oropharynx: Lips, gingiva and teeth appear healthy. Floor of mouth without masses or lesions, normal appearing salivary glands. Oral mucosal membranes are well-hydrated. Tongue is mobile without deformity. Palate elevates symmetrically. No lesions of the posterior pharynx or tonsillar fossa.  Larynx/Hypopharynx: Indirect mirror examination deferred due to gag-reflex.  Neck/Lymphatic: Flat, symmetric without detectable lymphadenopathy. No thyroid/salivary gland tenderness or palpable nodules.  Respiratory: No increased work of breathing or respiratory distress. No audible stridor or stertor.  Peripheral/Cardiovascular: Brisk capillary refill bilaterally.   Neurologic: Cranial nerves II-XII grossly intact as tested.    Nasal examination: No lesions, masses, or scars of the external nose are visualized. I do not " appreciate any external deviation of the bony nasal vault. External valves patent without inspiratory alar collapse. Columella is midline.      Imaging Review:  Combined Report of: PET and CT on 11/13/2023   IMPRESSION: In this patient with new nodules on CT chest 10/12/2023  including a 9 mm right upper lobe nodule:  1. Hypermetabolic 0.9 cm right upper lobe pulmonary nodule, highly  likely malignancy.  2. No hilar, mediastinal, or distant metastases demonstrated.   3. Extensive thoracic brown fat activation.  This FDG uptake is normal  physiologic uptake secondary to the patient being cold.    CT OF THE PARANASAL SINUSES WITHOUT CONTRAST 10/12/2023   IMPRESSION:    1. Postoperative change including bilateral antrectomies and  ethmoidectomies bilaterally.  2. Otherwise, normal sinus CT. No evidence for acute sinusitis.      Procedure Note: Diagnostic Nasal Endoscopy, CPT 29542  Date of Service: May 30, 2024  Provider: Juan Manuel Murdock MD   Presumptive Diagnosis: No primary diagnosis found.  Anesthesia: Topical lidocaine and oxymetazoline via atomizer    Indication:  Evaluation of nasal/sinus complaints; inadequate visualization with anterior rhinoscopy    Description of procedure:  After obtaining consent for the procedure from the patient, the sinonasal cavity was sprayed with topical anesthetic. A rigid 30-degree nasal endoscope was used to first used to visualize the nasal floor and the nasopharynx on the left. A second pass was then made to visualize the middle meatus and sphenoethmoid recess. Finally, the scope was turned 90-degrees and used to visualize the olfactory cleft and frontal outflow tract. A similar approach was used for the contralateral side. She tolerated the procedure well without difficulty.    Endoscopic Findings:    [place]-Stu Endoscopic Scoring System  Endoscopic observation Right Left   Polyps in middle meatus (0 = absent, 1 = restricted to middle meatus, 2 = Beyond middle meatus) 0 0    Discharge (0 = absent, 1 = thin and clear, 2 = thick, purulent) 0 0   Edema (0 = absent, 1 = mild-moderate, 2 = moderate-severe) 1 1   Crusting (0 = absent, 1 = mild-moderate, 2 = moderate-severe) 0 0   Scarring (0= absent, 1 = mild-moderate, 2 = moderate-severe) 0 0   Total 1 1       Bilateral sinonasal edema is noted with out mucoid drainage.  No nasal polyposis or mucopurulent drainage is seen within the nasal cavity.  The middle meatus is clear without polyps.  The superior meatus is clear without polyps.  The sphenoethmoid recess and olfactory cleft are clear bilaterally.  No nasopharyngeal lesions are noted.  The eustachian tubes are patent and non-obstructed.  Redemonstration of septal deviation and turbinate hypertrophy as noted above.    Final Assessment/Plan  69 year old female who presents for evaluation of possible chronic rhinosinusitis. Has normal CT scan and multiple prior normal exams. Discussed that based on her endoscopy I don't see active infection but does have inflammation. Discussed that we should repeat CT scan with active symptoms. If completely negative she does not have chronic sinusitis. Certainly possibly that it could be underlying allergies vs alternative issues. Discussed that it's important to trail full course of nasal steroids and rinses to see if it improves anything. If not next step will be allergy referral and neurology/facial pain clinic.     We will start the patient on OTC Flonase (2 sprays at night once a day) and Zyrtec. She's also going to start doing Edenilson Med sinus rinse every day.  Will place her on a two week course of Prednisone (30 mg x7 days, 20 mg x7 days)  Order a CT scan of the sinuses within the next three days for her complaint of an active sinus infection to visualize.   Follow up with me in about 2 1/2 months to discuss how she's doing.           Scribe Disclosure:   I, Meghann Oconnell, am serving as a scribe; to document services personally performed by Juan Manuel  MD Mathieu -based on data collection and the provider's statements to me.     Provider Disclosure:  I agree with above History, Review of Systems, Physical exam and Plan.  I have reviewed the content of the documentation and have edited it as needed. I have personally performed the services documented here and the documentation accurately represents those services and the decisions I have made.      Electronically signed by:  Juan Manuel Murdock MD    [facility]  Rhinology  Endoscopic Skull Base Surgery  [facility]  Department of Otolaryngology - Head & Neck Surgery

## 2024-05-30 NOTE — LETTER
5/30/2024       RE: Mimi Rivera  [address]  [facility] [id number]     Dear Colleague,    Thank you for referring your patient, Mimi Rivera, to the [facility] [place] at [facility]. Please see a copy of my visit note below.      [facility]  New Patient Visit      Encounter date:   May 30, 2024    Referring Provider:   Jessica Colin, EDEN CNP  [address] [place] [address]    Reason for Visit: New Patient      History of Present Illness:      Mimi Rivera is a 69 year old female who presents for consultation regarding Chronic rhinosinusitis. Today, she feels like she is having a current sinus infection. Endorses anosmia. She was recently seen at the ED on 5/3/24 for complaint of SOB and back pain. Whilst at the ED she was found to have a pneumothorax. She also notes of persistent green drainage from the back of her throat that started when she was in the hospital. She is unable to smell this discharge. No history of formal allergy testing. Will use Neti pot and Flonase only when she feels her symptoms are severe and has never used them for extended period of time.    Describes persistent frontal sinus pain and discomfort. Also some numbness. Has never seen a neurologist.     Of note described anosmia at prior ENT visit which demonstrated normosmia on UPSIT.     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    Number of episodes per year: Chronic  Facial pain/pressure: yes  Nasal drainage: yes  Nasal obstruction: yes  Changes in smell or taste: yes  Epistaxis: n/a    History of asthma/allergy: n/a    Prior medication trials: Flonase, Netti pot, Zyrtec  Prior sinus surgery: n/a  Prior allergy testing with positives: n/a  Prior CT scans: yes    Other otolaryngic complaints: anosmia    Sino-Nasal Outcome Test (SNOT - 22)  DNT    Review of Systems:  A  comprehensive 14-point review of systems was performed with positives and pertinent negatives listed in the HPI.    Past Medical/Surgical History:  Reviewed today with the patient. No history of major medical comorbidities. Does not take any blood thinners. No prior history of sinonasal surgery or trauma.    Allergies:     No Known Allergies    Medical History:  Past Medical History:   Diagnosis Date     ASCUS on Pap smear 2006    unable to run HPV typing, f/u paps NIL     Chronic rhinosinusitis      COPD (chronic obstructive pulmonary disease) (H)      Depression, anxiety      Herpes     occasional outbreak     Hypertension      Primary stress urinary incontinence      Pulmonary nodules      Rhinosinusitis         Surgical History:   Past Surgical History:   Procedure Laterality Date     BRONCHOSCOPY, WITH BIOPSY, ROBOT ASSISTED Bilateral 2023    Procedure: Robot assisted Ion BRONCHOSCOPY;  Surgeon: Raudel Claire DO;  Location: [facility] OR      SECTION      x 3     DAVINCI LOBECTOMY LUNG Right 2024    Procedure: robot assisted thoracoscopic right wedge resection, mediastinal lymph node dissection;  Surgeon: Good Pandya MD;  Location: [facility] OR     ENDOBRONCHIAL ULTRASOUND FLEXIBLE N/A 2023    Procedure: endobronchial ultrasound, transbronchial biopsies Latex Free;  Surgeon: Raudel Claire DO;  Location: [facility] OR        Family History:  Family History   Problem Relation Age of Onset     Hypertension Mother      Cancer Mother         liver     C.A.D. Father      Anesthesia Reaction No family hx of      Deep Vein Thrombosis (DVT) No family hx of         Social History:   Social History     Socioeconomic History     Marital status:    Tobacco Use     Smoking status: Former     Current packs/day: 0.00     Types: Cigarettes     Quit date: 3/19/2015     Years since quittin.2     Smokeless tobacco: Former     Quit date: 2017   Vaping Use     Vaping status: Never Used   Substance  "and Sexual Activity     Alcohol use: Yes     Alcohol/week: 2.0 - 3.0 standard drinks of alcohol     Types: 2 - 3 Standard drinks or equivalent per week     Comment: 2 drinks per wk avg     Drug use: No     Sexual activity: Yes     Partners: Male     Birth control/protection: Surgical     Comment: tubal   Other Topics Concern     Parent/sibling w/ CABG, MI or angioplasty before 65F 55M? No            Family History:  Reviewed with patient. No pertinent positives.    Physical Examination:  BP (!) 154/88 (BP Location: Left arm, Patient Position: Sitting, Cuff Size: Adult Regular)   Pulse 87   Ht 1.575 m (5' 2\")   Wt 49.9 kg (110 lb)   LMP 09/22/2010   BMI 20.12 kg/m      Constitutional/Psychiatric: This is a well-appearing, well-dressed patient in no acute distress. female communicates easily with no obvious speech issues. Oriented to self and environment with appropriate conversation. Does not appear depressed, anxious, or agitated.  Head: Normocephalic. Overall inspection reveals no prior scars, lesions, or masses. Facial motion is symmetric. No sinus tenderness.  Eyes: Extra-ocular motions are intact with symmetric gaze. Conjunctiva clear. No eyelid lesions. Pupils round, symmetric, and reactive to light.  Ears: Auricles without masses or lesions bilaterally. External auditory canals clear with minimal non-obstructive cerumen. Tympanic membranes intact without middle ear fluid or retraction. Hearing intact grossly at conversational volume.  Oral Cavity/Oropharynx: Lips, gingiva and teeth appear healthy. Floor of mouth without masses or lesions, normal appearing salivary glands. Oral mucosal membranes are well-hydrated. Tongue is mobile without deformity. Palate elevates symmetrically. No lesions of the posterior pharynx or tonsillar fossa.  Larynx/Hypopharynx: Indirect mirror examination deferred due to gag-reflex.  Neck/Lymphatic: Flat, symmetric without detectable lymphadenopathy. No thyroid/salivary gland " tenderness or palpable nodules.  Respiratory: No increased work of breathing or respiratory distress. No audible stridor or stertor.  Peripheral/Cardiovascular: Brisk capillary refill bilaterally.   Neurologic: Cranial nerves II-XII grossly intact as tested.    Nasal examination: No lesions, masses, or scars of the external nose are visualized. I do not appreciate any external deviation of the bony nasal vault. External valves patent without inspiratory alar collapse. Columella is midline.      Imaging Review:  Combined Report of: PET and CT on 11/13/2023   IMPRESSION: In this patient with new nodules on CT chest 10/12/2023  including a 9 mm right upper lobe nodule:  1. Hypermetabolic 0.9 cm right upper lobe pulmonary nodule, highly  likely malignancy.  2. No hilar, mediastinal, or distant metastases demonstrated.   3. Extensive thoracic brown fat activation.  This FDG uptake is normal  physiologic uptake secondary to the patient being cold.    CT OF THE PARANASAL SINUSES WITHOUT CONTRAST 10/12/2023   IMPRESSION:    1. Postoperative change including bilateral antrectomies and  ethmoidectomies bilaterally.  2. Otherwise, normal sinus CT. No evidence for acute sinusitis.      Procedure Note: Diagnostic Nasal Endoscopy, CPT 89108  Date of Service: May 30, 2024  Provider: Juan Manuel Murdock MD   Presumptive Diagnosis: No primary diagnosis found.  Anesthesia: Topical lidocaine and oxymetazoline via atomizer    Indication:  Evaluation of nasal/sinus complaints; inadequate visualization with anterior rhinoscopy    Description of procedure:  After obtaining consent for the procedure from the patient, the sinonasal cavity was sprayed with topical anesthetic. A rigid 30-degree nasal endoscope was used to first used to visualize the nasal floor and the nasopharynx on the left. A second pass was then made to visualize the middle meatus and sphenoethmoid recess. Finally, the scope was turned 90-degrees and used to visualize the  olfactory cleft and frontal outflow tract. A similar approach was used for the contralateral side. She tolerated the procedure well without difficulty.    Endoscopic Findings:    [place]-Stu Endoscopic Scoring System  Endoscopic observation Right Left   Polyps in middle meatus (0 = absent, 1 = restricted to middle meatus, 2 = Beyond middle meatus) 0 0   Discharge (0 = absent, 1 = thin and clear, 2 = thick, purulent) 0 0   Edema (0 = absent, 1 = mild-moderate, 2 = moderate-severe) 1 1   Crusting (0 = absent, 1 = mild-moderate, 2 = moderate-severe) 0 0   Scarring (0= absent, 1 = mild-moderate, 2 = moderate-severe) 0 0   Total 1 1       Bilateral sinonasal edema is noted with out mucoid drainage.  No nasal polyposis or mucopurulent drainage is seen within the nasal cavity.  The middle meatus is clear without polyps.  The superior meatus is clear without polyps.  The sphenoethmoid recess and olfactory cleft are clear bilaterally.  No nasopharyngeal lesions are noted.  The eustachian tubes are patent and non-obstructed.  Redemonstration of septal deviation and turbinate hypertrophy as noted above.    Final Assessment/Plan  69 year old female who presents for evaluation of possible chronic rhinosinusitis. Has normal CT scan and multiple prior normal exams. Discussed that based on her endoscopy I don't see active infection but does have inflammation. Discussed that we should repeat CT scan with active symptoms. If completely negative she does not have chronic sinusitis. Certainly possibly that it could be underlying allergies vs alternative issues. Discussed that it's important to trail full course of nasal steroids and rinses to see if it improves anything. If not next step will be allergy referral and neurology/facial pain clinic.     We will start the patient on OTC Flonase (2 sprays at night once a day) and Zyrtec. She's also going to start doing Edenilson Med sinus rinse every day.  Will place her on a two week course of  Prednisone (30 mg x7 days, 20 mg x7 days)  Order a CT scan of the sinuses within the next three days for her complaint of an active sinus infection to visualize.   Follow up with me in about 2 1/2 months to discuss how she's doing.           Scribe Disclosure:   I, Meghann Oconnell, am serving as a scribe; to document services personally performed by Juan Manuel Murdock MD -based on data collection and the provider's statements to me.     Provider Disclosure:  I agree with above History, Review of Systems, Physical exam and Plan.  I have reviewed the content of the documentation and have edited it as needed. I have personally performed the services documented here and the documentation accurately represents those services and the decisions I have made.      Electronically signed by:  Juan Manuel Murdock MD    [facility]  Rhinology  Endoscopic Skull Base Surgery  [facility]  Department of Otolaryngology - Head & Neck Surgery        Again, thank you for allowing me to participate in the care of your patient.      Sincerely,    Juan Manuel Murdock MD

## 2024-06-03 DIAGNOSIS — C7A.090 ATYPICAL CARCINOID LUNG TUMOR (H): Primary | ICD-10-CM

## 2024-06-04 ENCOUNTER — HOSPITAL ENCOUNTER (OUTPATIENT)
Dept: CT IMAGING | Facility: CLINIC | Age: 69
Discharge: HOME | End: 2024-06-04
Attending: STUDENT IN AN ORGANIZED HEALTH CARE EDUCATION/TRAINING PROGRAM | Admitting: STUDENT IN AN ORGANIZED HEALTH CARE EDUCATION/TRAINING PROGRAM
Payer: COMMERCIAL

## 2024-06-04 DIAGNOSIS — J32.9 CHRONIC RHINOSINUSITIS: ICD-10-CM

## 2024-06-04 PROCEDURE — 70486 CT MAXILLOFACIAL W/O DYE: CPT

## 2024-06-06 ENCOUNTER — TELEPHONE (OUTPATIENT)
Dept: OTOLARYNGOLOGY | Facility: CLINIC | Age: 69
End: 2024-06-06
Payer: COMMERCIAL

## 2024-06-06 NOTE — TELEPHONE ENCOUNTER
Writer called pt to discuss recent Ct. Writer stated that Dr. Murdock has reviewed the CT and stated that there is no evidence of sinusitis. Writer informed pt to continue the plan of care (sprays and rinses) and reach out if pt has any questions. Pt expressed understanding.    Subha Hoffmann RN

## 2024-06-17 PROBLEM — Z71.89 ADVANCED DIRECTIVES, COUNSELING/DISCUSSION: Status: RESOLVED | Noted: 2017-06-01 | Resolved: 2024-06-17

## 2024-06-20 ENCOUNTER — HOSPITAL ENCOUNTER (OUTPATIENT)
Dept: CARDIAC REHAB | Facility: CLINIC | Age: 69
Discharge: HOME | End: 2024-06-20
Attending: INTERNAL MEDICINE
Payer: COMMERCIAL

## 2024-06-20 DIAGNOSIS — J44.9 CHRONIC OBSTRUCTIVE PULMONARY DISEASE, UNSPECIFIED COPD TYPE (H): ICD-10-CM

## 2024-06-20 PROCEDURE — 94625 PHY/QHP OP PULM RHB W/O MNTR: CPT

## 2024-06-27 ENCOUNTER — HOSPITAL ENCOUNTER (OUTPATIENT)
Dept: CARDIAC REHAB | Facility: CLINIC | Age: 69
Discharge: HOME | End: 2024-06-27
Attending: INTERNAL MEDICINE
Payer: COMMERCIAL

## 2024-06-27 PROCEDURE — 94625 PHY/QHP OP PULM RHB W/O MNTR: CPT | Performed by: REHABILITATION PRACTITIONER

## 2024-07-02 ENCOUNTER — HOSPITAL ENCOUNTER (OUTPATIENT)
Dept: CARDIAC REHAB | Facility: CLINIC | Age: 69
Discharge: HOME | End: 2024-07-02
Attending: INTERNAL MEDICINE
Payer: COMMERCIAL

## 2024-07-02 PROCEDURE — 94625 PHY/QHP OP PULM RHB W/O MNTR: CPT | Performed by: REHABILITATION PRACTITIONER

## 2024-07-11 ENCOUNTER — HOSPITAL ENCOUNTER (OUTPATIENT)
Dept: CARDIAC REHAB | Facility: CLINIC | Age: 69
Discharge: HOME | End: 2024-07-11
Attending: INTERNAL MEDICINE
Payer: COMMERCIAL

## 2024-07-11 PROCEDURE — 94625 PHY/QHP OP PULM RHB W/O MNTR: CPT

## 2024-07-16 ENCOUNTER — HOSPITAL ENCOUNTER (OUTPATIENT)
Dept: CARDIAC REHAB | Facility: CLINIC | Age: 69
Discharge: HOME | End: 2024-07-16
Attending: INTERNAL MEDICINE
Payer: COMMERCIAL

## 2024-07-16 PROCEDURE — 94625 PHY/QHP OP PULM RHB W/O MNTR: CPT

## 2024-07-18 ENCOUNTER — HOSPITAL ENCOUNTER (OUTPATIENT)
Dept: CARDIAC REHAB | Facility: CLINIC | Age: 69
Discharge: HOME | End: 2024-07-18
Attending: INTERNAL MEDICINE
Payer: COMMERCIAL

## 2024-07-18 PROCEDURE — 94625 PHY/QHP OP PULM RHB W/O MNTR: CPT

## 2024-07-24 DIAGNOSIS — J42 CHRONIC BRONCHITIS, UNSPECIFIED CHRONIC BRONCHITIS TYPE (H): ICD-10-CM

## 2024-07-24 RX ORDER — FLUTICASONE FUROATE, UMECLIDINIUM BROMIDE AND VILANTEROL TRIFENATATE 100; 62.5; 25 UG/1; UG/1; UG/1
POWDER RESPIRATORY (INHALATION)
Qty: 60 EACH | Refills: 5 | Status: SHIPPED | OUTPATIENT
Start: 2024-07-24

## 2024-07-25 ENCOUNTER — HOSPITAL ENCOUNTER (OUTPATIENT)
Dept: CARDIAC REHAB | Facility: CLINIC | Age: 69
Discharge: HOME | End: 2024-07-25
Attending: INTERNAL MEDICINE
Payer: COMMERCIAL

## 2024-07-25 PROCEDURE — 94625 PHY/QHP OP PULM RHB W/O MNTR: CPT

## 2024-07-31 ENCOUNTER — OFFICE VISIT (OUTPATIENT)
Dept: OTOLARYNGOLOGY | Facility: CLINIC | Age: 69
End: 2024-07-31
Payer: COMMERCIAL

## 2024-07-31 VITALS
OXYGEN SATURATION: 95 % | BODY MASS INDEX: 20.3 KG/M2 | HEART RATE: 100 BPM | DIASTOLIC BLOOD PRESSURE: 92 MMHG | WEIGHT: 110.3 LBS | HEIGHT: 62 IN | SYSTOLIC BLOOD PRESSURE: 162 MMHG

## 2024-07-31 DIAGNOSIS — J32.9 CHRONIC RHINOSINUSITIS: ICD-10-CM

## 2024-07-31 DIAGNOSIS — R09.81 NASAL CONGESTION: Primary | ICD-10-CM

## 2024-07-31 PROCEDURE — 99213 OFFICE O/P EST LOW 20 MIN: CPT | Mod: 25 | Performed by: STUDENT IN AN ORGANIZED HEALTH CARE EDUCATION/TRAINING PROGRAM

## 2024-07-31 PROCEDURE — 31231 NASAL ENDOSCOPY DX: CPT | Performed by: STUDENT IN AN ORGANIZED HEALTH CARE EDUCATION/TRAINING PROGRAM

## 2024-07-31 RX ORDER — IPRATROPIUM BROMIDE 42 UG/1
2 SPRAY, METERED NASAL 4 TIMES DAILY PRN
Qty: 15 ML | Refills: 1 | Status: SHIPPED | OUTPATIENT
Start: 2024-07-31 | End: 2024-10-29

## 2024-07-31 RX ORDER — BUDESONIDE 0.5 MG/2ML
0.5 INHALANT ORAL DAILY
Qty: 60 ML | Refills: 1 | Status: SHIPPED | OUTPATIENT
Start: 2024-07-31

## 2024-07-31 ASSESSMENT — PAIN SCALES - GENERAL: PAINLEVEL: MODERATE PAIN (5)

## 2024-07-31 NOTE — PATIENT INSTRUCTIONS
You were seen in the ENT Clinic today by Dr. Murdock. If you have any questions or concerns after your appointment, please contact us (see below)       2.   The following recommendations have been made based upon your appointment today:   -a prescription for budesonide nasal rinses has been sent to your pharmacy, please use twice a day with your sinus rinses   -a prescription for ipratropium nasal spray has been sent to your pharmacy, please use 1 spray in each nostril up to 4 times a day as needed   -a referral to the allergy clinic has been placed, they will reach out to you to schedule      3.   Plan to return to the ENT clinic after your allergy appointment           How to Contact Us:  Send a [employer] message to your provider. Our team will respond to you via [employer]. Occasionally, we will need to call you to get further information.  For urgent matters (Monday-Friday), call the ENT Clinic: [phone] and speak with a call center team member - they will route your call appropriately.   If you'd like to speak directly with a nurse, please find our contact information below. We do our best to check voicemail frequently throughout the day, and will work to call you back within 1-2 days. For urgent matters, please use the general clinic phone numbers listed above.     Subha RAO RN  Direct: [phone]  Malathi ESPOSITO LPN  Direct: [phone]         [facility]  Department of Otolaryngology

## 2024-07-31 NOTE — NURSING NOTE
"Chief Complaint   Patient presents with    RECHECK   Blood pressure (!) 162/92, pulse 100, height 1.575 m (5' 2\"), weight 50 kg (110 lb 4.8 oz), last menstrual period 09/22/2010, SpO2 95%, not currently breastfeeding. Aleksander Brooks, EMT    "

## 2024-07-31 NOTE — LETTER
7/31/2024       RE: Mimi Rivera  [address] [place] [address]     Dear Colleague,    Thank you for referring your patient, Mimi Rivera, to the [facility] [place] at [facility]. Please see a copy of my visit note below.      [facility]  New Patient Visit        Encounter date:   July 31, 2024    Referring Provider:   Referred Self, MD  No address on file    Reason for Visit: New Patient      History of Present Illness:   Mimi Rivera is a 69 year old female who presents for follow up regarding chronic rhinosinusitis. She has a daily headache and facial pain located over the frontal sinuses. She endorses anosmia. Additionally she has an irritating chronic post-nasal drip sensation. Reports that flonase made her smell worse. Nothing she has tried has helped so far. Previous history of sinus surgery. CT demonstrates no significant mucosal thickening. Endorses voice changes recently. Denies history of smoking.     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 She has been doing well since this but continues to have some unilateral chest pain.    Sino-Nasal Outcome Test (SNOT - 22)  Not tested    Review of Systems:  A comprehensive 14-point review of systems was performed with positives and pertinent negatives listed in the HPI.    Past Medical/Surgical History:  Reviewed today with the patient. No history of major medical comorbidities. Does not take any blood thinners. No prior history of sinonasal surgery or trauma.    Allergies:   No Known Allergies    Medical History:  Past Medical History:   Diagnosis Date     ASCUS on Pap smear 01/01/2006    unable to run HPV typing, f/u paps NIL     Chronic rhinosinusitis      COPD (chronic obstructive pulmonary disease) (H)      Depression, anxiety      Herpes     occasional outbreak     Hypertension      Primary stress urinary  incontinence      Pulmonary nodules      Rhinosinusitis         Surgical History:   Past Surgical History:   Procedure Laterality Date     BRONCHOSCOPY, WITH BIOPSY, ROBOT ASSISTED Bilateral 2023    Procedure: Robot assisted Ion BRONCHOSCOPY;  Surgeon: Raudel Claire DO;  Location: [facility] OR      SECTION      x 3     DAVINCI LOBECTOMY LUNG Right 2024    Procedure: robot assisted thoracoscopic right wedge resection, mediastinal lymph node dissection;  Surgeon: Good Pandya MD;  Location: [facility] OR     ENDOBRONCHIAL ULTRASOUND FLEXIBLE N/A 2023    Procedure: endobronchial ultrasound, transbronchial biopsies Latex Free;  Surgeon: Raudel Claire DO;  Location: [facility] OR        Family History:  Family History   Problem Relation Age of Onset     Hypertension Mother      Cancer Mother         liver     C.A.D. Father      Anesthesia Reaction No family hx of      Deep Vein Thrombosis (DVT) No family hx of         Social History:   Social History     Socioeconomic History     Marital status:    Tobacco Use     Smoking status: Former     Current packs/day: 0.00     Types: Cigarettes     Quit date: 3/19/2015     Years since quittin.3     Smokeless tobacco: Former     Quit date: 2017   Vaping Use     Vaping status: Never Used   Substance and Sexual Activity     Alcohol use: Yes     Alcohol/week: 2.0 - 3.0 standard drinks of alcohol     Types: 2 - 3 Standard drinks or equivalent per week     Comment: 2 drinks per wk avg     Drug use: No     Sexual activity: Yes     Partners: Male     Birth control/protection: Surgical     Comment: tubal   Other Topics Concern     Parent/sibling w/ CABG, MI or angioplasty before 65F 55M? No      Family History:  Reviewed with patient. No pertinent positives.    Physical Examination:    Constitutional/Psychiatric: This is a well-appearing, well-dressed patient in no acute distress. Female communicates easily with no obvious speech issues, however does have a  raspy voice. Oriented to self and environment with appropriate conversation. Does not appear depressed, anxious, or agitated.  Head: Normocephalic. Overall inspection reveals no prior scars, lesions, or masses. Facial motion is symmetric. No sinus tenderness.  Eyes: Extra-ocular motions intact.  Ears: Auricles without masses or lesions bilaterally.   Oral Cavity/Oropharynx: Lips, gingiva and teeth appear healthy.  Respiratory: No increased work of breathing or respiratory distress. No audible stridor or stertor.  Neurologic: Cranial nerves II-XII grossly intact as tested.    Nasal examination: No lesions, masses, or scars of the external nose are visualized. I do not appreciate any external deviation of the bony nasal vault. External valves patent without inspiratory alar collapse. Columella is midline.    Given the patient's symptoms and the incomplete visualization of critical sinonasal areas with anterior rhinoscopy, a separately performed diagnostic nasal endoscopy procedure is indicated for a complete rhinologic evaluation per American Rhinologic Society recommendations (https://www.american-rhinologic.org/position_31231).    Procedure Note: Diagnostic Nasal Endoscopy, CPT 26662  Date of Service: July 31, 2024  Provider: Juan Manuel Murdock MD   Presumptive Diagnosis: No primary diagnosis found.  Anesthesia: Topical lidocaine and oxymetazoline via atomizer    Indication:  Evaluation of nasal/sinus complaints; inadequate visualization with anterior rhinoscopy    Description of procedure:  After obtaining consent for the procedure from the patient, the sinonasal cavity was sprayed with topical anesthetic. A rigid 30-degree nasal endoscope was used to first used to visualize the nasal floor and the nasopharynx on the left. A second pass was then made to visualize the middle meatus and sphenoethmoid recess. Finally, the scope was turned 90-degrees and used to visualize the olfactory cleft and frontal outflow tract. A  similar approach was used for the contralateral side. female tolerated the procedure well without difficulty.    Endoscopic Findings:    [place] Endoscopic Scoring System  Endoscopic observation Right Left   Polyps in middle meatus (0 = absent, 1 = restricted to middle meatus, 2 = Beyond middle meatus) 0 0   Discharge (0 = absent, 1 = thin and clear, 2 = thick, purulent) 0 0   Edema (0 = absent, 1 = mild-moderate, 2 = moderate-severe) 0 0   Crusting (0 = absent, 1 = mild-moderate, 2 = moderate-severe) 0 0   Scarring (0= absent, 1 = mild-moderate, 2 = moderate-severe) 0 0   Total 0 0       Bilateral sinonasal edema is noted with out mucoid drainage.  Sinuses are quiescent and without significant drainage or infection.    Final Assessment/Plan  Patient is a  69 year old female  who presents today for continued symptoms of anosmia, post-nasal drip, and pain in the forehead. CT imaging was reviewed without thickening of the mucosa noted or any evidence sinusitis. Normal endoscopic exam with open sinuses. We discussed the negative imaging and exam. We talked about the possibilities her symptoms are due to allergies or a neurological cause. She is not interested in seeing a headache specialist at this point in time.      1.  We will start patient on BID budesonide rinses and the oral steroids did seem to help a little and ipratropium nasal spray to assist with the post nasal drip and see if there is a non-allergic cause  2.  We will refer her to an allergist for allergy testing   3.  She will contact us after completing allergy testing for follow-up  4. I do think that her facial sensation is most likely atypical pain vs. Atypical headache. Discussed that at some point may be a reasonable move to see neurology.       I, Juan Manuel Murdock MD, saw this patient with the resident/fellow and agree with the resident's findings and plan of care as documented in the resident's/fellow's note. I performed the endoscopy myself.      Juan Manuel Murdock MD    [facility]  Rhinology  Endoscopic Skull Base Surgery  [facility]  Department of Otolaryngology - Head & Neck Surgery        Again, thank you for allowing me to participate in the care of your patient.      Sincerely,    Juan Manuel Murdock MD

## 2024-07-31 NOTE — PROGRESS NOTES
[facility]  New Patient Visit        Encounter date:   2024    Referring Provider:   Referred Self, MD  No address on file    Reason for Visit: New Patient      History of Present Illness:   Mimi Rivera is a 69 year old female who presents for follow up regarding chronic rhinosinusitis. She has a daily headache and facial pain located over the frontal sinuses. She endorses anosmia. Additionally she has an irritating chronic post-nasal drip sensation. Reports that flonase made her smell worse. Nothing she has tried has helped so far. Previous history of sinus surgery. CT demonstrates no significant mucosal thickening. Endorses voice changes recently. Denies history of smoking.     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 She has been doing well since this but continues to have some unilateral chest pain.    Sino-Nasal Outcome Test (SNOT - 22)  Not tested    Review of Systems:  A comprehensive 14-point review of systems was performed with positives and pertinent negatives listed in the HPI.    Past Medical/Surgical History:  Reviewed today with the patient. No history of major medical comorbidities. Does not take any blood thinners. No prior history of sinonasal surgery or trauma.    Allergies:   No Known Allergies    Medical History:  Past Medical History:   Diagnosis Date    ASCUS on Pap smear 2006    unable to run HPV typing, f/u paps NIL    Chronic rhinosinusitis     COPD (chronic obstructive pulmonary disease) (H)     Depression, anxiety     Herpes     occasional outbreak    Hypertension     Primary stress urinary incontinence     Pulmonary nodules     Rhinosinusitis         Surgical History:   Past Surgical History:   Procedure Laterality Date    BRONCHOSCOPY, WITH BIOPSY, ROBOT ASSISTED Bilateral 2023    Procedure: Robot assisted Ion BRONCHOSCOPY;  Surgeon: Raudel Claire DO;  Location: [facility] OR     SECTION      x 3    [facility]  LOBECTOMY LUNG Right 2024    Procedure: robot assisted thoracoscopic right wedge resection, mediastinal lymph node dissection;  Surgeon: Good Pandya MD;  Location: [facility] OR    ENDOBRONCHIAL ULTRASOUND FLEXIBLE N/A 2023    Procedure: endobronchial ultrasound, transbronchial biopsies Latex Free;  Surgeon: Raudel Claire DO;  Location: [facility] OR        Family History:  Family History   Problem Relation Age of Onset    Hypertension Mother     Cancer Mother         liver    C.A.D. Father     Anesthesia Reaction No family hx of     Deep Vein Thrombosis (DVT) No family hx of         Social History:   Social History     Socioeconomic History    Marital status:    Tobacco Use    Smoking status: Former     Current packs/day: 0.00     Types: Cigarettes     Quit date: 3/19/2015     Years since quittin.3    Smokeless tobacco: Former     Quit date: 2017   Vaping Use    Vaping status: Never Used   Substance and Sexual Activity    Alcohol use: Yes     Alcohol/week: 2.0 - 3.0 standard drinks of alcohol     Types: 2 - 3 Standard drinks or equivalent per week     Comment: 2 drinks per wk avg    Drug use: No    Sexual activity: Yes     Partners: Male     Birth control/protection: Surgical     Comment: tubal   Other Topics Concern    Parent/sibling w/ CABG, MI or angioplasty before 65F 55M? No      Family History:  Reviewed with patient. No pertinent positives.    Physical Examination:    Constitutional/Psychiatric: This is a well-appearing, well-dressed patient in no acute distress. Female communicates easily with no obvious speech issues, however does have a raspy voice. Oriented to self and environment with appropriate conversation. Does not appear depressed, anxious, or agitated.  Head: Normocephalic. Overall inspection reveals no prior scars, lesions, or masses. Facial motion is symmetric. No sinus tenderness.  Eyes: Extra-ocular motions intact.  Ears: Auricles without masses or lesions bilaterally.    Oral Cavity/Oropharynx: Lips, gingiva and teeth appear healthy.  Respiratory: No increased work of breathing or respiratory distress. No audible stridor or stertor.  Neurologic: Cranial nerves II-XII grossly intact as tested.    Nasal examination: No lesions, masses, or scars of the external nose are visualized. I do not appreciate any external deviation of the bony nasal vault. External valves patent without inspiratory alar collapse. Columella is midline.    Given the patient's symptoms and the incomplete visualization of critical sinonasal areas with anterior rhinoscopy, a separately performed diagnostic nasal endoscopy procedure is indicated for a complete rhinologic evaluation per American Rhinologic Society recommendations (https://www.american-rhinologic.org/position_31231).    Procedure Note: Diagnostic Nasal Endoscopy, CPT 53849  Date of Service: July 31, 2024  Provider: Juan Manuel Murdock MD   Presumptive Diagnosis: No primary diagnosis found.  Anesthesia: Topical lidocaine and oxymetazoline via atomizer    Indication:  Evaluation of nasal/sinus complaints; inadequate visualization with anterior rhinoscopy    Description of procedure:  After obtaining consent for the procedure from the patient, the sinonasal cavity was sprayed with topical anesthetic. A rigid 30-degree nasal endoscope was used to first used to visualize the nasal floor and the nasopharynx on the left. A second pass was then made to visualize the middle meatus and sphenoethmoid recess. Finally, the scope was turned 90-degrees and used to visualize the olfactory cleft and frontal outflow tract. A similar approach was used for the contralateral side. female tolerated the procedure well without difficulty.    Endoscopic Findings:    [place] Endoscopic Scoring System  Endoscopic observation Right Left   Polyps in middle meatus (0 = absent, 1 = restricted to middle meatus, 2 = Beyond middle meatus) 0 0   Discharge (0 = absent, 1 = thin and  clear, 2 = thick, purulent) 0 0   Edema (0 = absent, 1 = mild-moderate, 2 = moderate-severe) 0 0   Crusting (0 = absent, 1 = mild-moderate, 2 = moderate-severe) 0 0   Scarring (0= absent, 1 = mild-moderate, 2 = moderate-severe) 0 0   Total 0 0       Bilateral sinonasal edema is noted with out mucoid drainage.  Sinuses are quiescent and without significant drainage or infection.    Final Assessment/Plan  Patient is a  69 year old female  who presents today for continued symptoms of anosmia, post-nasal drip, and pain in the forehead. CT imaging was reviewed without thickening of the mucosa noted or any evidence sinusitis. Normal endoscopic exam with open sinuses. We discussed the negative imaging and exam. We talked about the possibilities her symptoms are due to allergies or a neurological cause. She is not interested in seeing a headache specialist at this point in time.      1.  We will start patient on BID budesonide rinses and the oral steroids did seem to help a little and ipratropium nasal spray to assist with the post nasal drip and see if there is a non-allergic cause  2.  We will refer her to an allergist for allergy testing   3.  She will contact us after completing allergy testing for follow-up  4. I do think that her facial sensation is most likely atypical pain vs. Atypical headache. Discussed that at some point may be a reasonable move to see neurology.       I, Juan Manuel Murdock MD, saw this patient with the resident/fellow and agree with the resident's findings and plan of care as documented in the resident's/fellow's note. I performed the endoscopy myself.     Juan Manuel Murdock MD    [facility]  Rhinology  Endoscopic Skull Base Surgery  [facility]  Department of Otolaryngology - Head & Neck Surgery

## 2024-08-02 ENCOUNTER — TELEPHONE (OUTPATIENT)
Dept: PULMONOLOGY | Facility: CLINIC | Age: 69
End: 2024-08-02
Payer: COMMERCIAL

## 2024-08-02 DIAGNOSIS — J44.9 CHRONIC OBSTRUCTIVE PULMONARY DISEASE, UNSPECIFIED COPD TYPE (H): Primary | ICD-10-CM

## 2024-08-02 NOTE — TELEPHONE ENCOUNTER
RN spoke with patient to follow up on symptoms. Patient states that every since she had surgery on her right lung in April, her breathing has worsened. Patient reports not being able to fully breathe out right lung, some wheezing from the right lung, and pain when she is laying on her right side. Patient reports being short of breath with little exertion and walking. Patient also reports a productive cough, yellow and white colored phlegm and very foul tasting. Patient has started using oxygen during the day at 2L, and her sats are in high 90's. When she was only using O2 during sleep, her O2 during the day was around  92%93%. Patient is compliant with her respiratory medications.     RN advised patient to seek care in ED or Urgent Care should her symptoms worsens or if she cannot keep sats above 92% while on oxygen. Patient verbalized agreement.     -MAURY Mcdonough

## 2024-08-02 NOTE — TELEPHONE ENCOUNTER
[facility] Call Center    Phone Message    May a detailed message be left on voicemail: yes     Reason for Call: Other: .     Patient states she is not feeling good and is wanting Dr. Bridges to give her a call back. Patient did not want to provider any further detail when asked. Please advise.         Action Taken: Message routed to:  [facility]    Travel Screening: Not Applicable     Date of Service:

## 2024-08-05 ENCOUNTER — TELEPHONE (OUTPATIENT)
Dept: PULMONOLOGY | Facility: CLINIC | Age: 69
End: 2024-08-05
Payer: COMMERCIAL

## 2024-08-05 NOTE — TELEPHONE ENCOUNTER
"Per Dr Bridges   \"Please add one (6MWT) to her Sept visit  I will call her early next week    Ishmael Rivera RN  "

## 2024-08-05 NOTE — TELEPHONE ENCOUNTER
Patient Contacted for the patient to call back and schedule the following:    Appointment type: PFT  Provider: Cyrus  Return date: 9/13/2024  Specialty phone number: [phone]  Additional appointment(s) needed: 6mwt  Additonal Notes: Per Sharonda: Please schedule 6mwt

## 2024-08-06 ENCOUNTER — HOSPITAL ENCOUNTER (OUTPATIENT)
Dept: CARDIAC REHAB | Facility: CLINIC | Age: 69
Discharge: HOME | End: 2024-08-06
Attending: INTERNAL MEDICINE
Payer: COMMERCIAL

## 2024-08-06 PROCEDURE — 94625 PHY/QHP OP PULM RHB W/O MNTR: CPT

## 2024-08-08 ENCOUNTER — HOSPITAL ENCOUNTER (OUTPATIENT)
Dept: CARDIAC REHAB | Facility: CLINIC | Age: 69
Discharge: HOME | End: 2024-08-08
Attending: INTERNAL MEDICINE
Payer: COMMERCIAL

## 2024-08-08 ENCOUNTER — HOSPITAL ENCOUNTER (EMERGENCY)
Facility: CLINIC | Age: 69
Discharge: HOME | DRG: 193 | End: 2024-08-08
Attending: STUDENT IN AN ORGANIZED HEALTH CARE EDUCATION/TRAINING PROGRAM | Admitting: STUDENT IN AN ORGANIZED HEALTH CARE EDUCATION/TRAINING PROGRAM
Payer: COMMERCIAL

## 2024-08-08 ENCOUNTER — APPOINTMENT (OUTPATIENT)
Dept: GENERAL RADIOLOGY | Facility: CLINIC | Age: 69
DRG: 193 | End: 2024-08-08
Attending: STUDENT IN AN ORGANIZED HEALTH CARE EDUCATION/TRAINING PROGRAM
Payer: COMMERCIAL

## 2024-08-08 VITALS
SYSTOLIC BLOOD PRESSURE: 165 MMHG | TEMPERATURE: 99.2 F | OXYGEN SATURATION: 94 % | HEIGHT: 62 IN | DIASTOLIC BLOOD PRESSURE: 92 MMHG | HEART RATE: 88 BPM | RESPIRATION RATE: 20 BRPM | WEIGHT: 110.67 LBS | BODY MASS INDEX: 20.37 KG/M2

## 2024-08-08 DIAGNOSIS — R05.1 ACUTE COUGH: ICD-10-CM

## 2024-08-08 DIAGNOSIS — J44.1 COPD WITH ACUTE EXACERBATION (H): ICD-10-CM

## 2024-08-08 DIAGNOSIS — J18.9 PNEUMONIA OF RIGHT LOWER LOBE DUE TO INFECTIOUS ORGANISM: Primary | ICD-10-CM

## 2024-08-08 LAB
ANION GAP SERPL CALCULATED.3IONS-SCNC: 13 MMOL/L (ref 7–15)
BASOPHILS # BLD AUTO: 0 10E3/UL (ref 0–0.2)
BASOPHILS NFR BLD AUTO: 0 %
BUN SERPL-MCNC: 6.2 MG/DL (ref 8–23)
CALCIUM SERPL-MCNC: 10.1 MG/DL (ref 8.8–10.4)
CHLORIDE SERPL-SCNC: 100 MMOL/L (ref 98–107)
CREAT SERPL-MCNC: 0.52 MG/DL (ref 0.51–0.95)
EGFRCR SERPLBLD CKD-EPI 2021: >90 ML/MIN/1.73M2
EOSINOPHIL # BLD AUTO: 0.1 10E3/UL (ref 0–0.7)
EOSINOPHIL NFR BLD AUTO: 1 %
ERYTHROCYTE [DISTWIDTH] IN BLOOD BY AUTOMATED COUNT: 12 % (ref 10–15)
FLUAV RNA SPEC QL NAA+PROBE: NEGATIVE
FLUBV RNA RESP QL NAA+PROBE: NEGATIVE
GLUCOSE SERPL-MCNC: 108 MG/DL (ref 70–99)
HCO3 SERPL-SCNC: 26 MMOL/L (ref 22–29)
HCT VFR BLD AUTO: 47.4 % (ref 35–47)
HGB BLD-MCNC: 16.1 G/DL (ref 11.7–15.7)
HOLD SPECIMEN: NORMAL
HOLD SPECIMEN: NORMAL
IMM GRANULOCYTES # BLD: 0 10E3/UL
IMM GRANULOCYTES NFR BLD: 0 %
LYMPHOCYTES # BLD AUTO: 1.7 10E3/UL (ref 0.8–5.3)
LYMPHOCYTES NFR BLD AUTO: 17 %
MCH RBC QN AUTO: 31.6 PG (ref 26.5–33)
MCHC RBC AUTO-ENTMCNC: 34 G/DL (ref 31.5–36.5)
MCV RBC AUTO: 93 FL (ref 78–100)
MONOCYTES # BLD AUTO: 0.6 10E3/UL (ref 0–1.3)
MONOCYTES NFR BLD AUTO: 6 %
NEUTROPHILS # BLD AUTO: 7.7 10E3/UL (ref 1.6–8.3)
NEUTROPHILS NFR BLD AUTO: 76 %
NRBC # BLD AUTO: 0 10E3/UL
NRBC BLD AUTO-RTO: 0 /100
PLATELET # BLD AUTO: 269 10E3/UL (ref 150–450)
POTASSIUM SERPL-SCNC: 4 MMOL/L (ref 3.4–5.3)
RBC # BLD AUTO: 5.1 10E6/UL (ref 3.8–5.2)
RSV RNA SPEC NAA+PROBE: NEGATIVE
SARS-COV-2 RNA RESP QL NAA+PROBE: NEGATIVE
SODIUM SERPL-SCNC: 139 MMOL/L (ref 135–145)
WBC # BLD AUTO: 10.1 10E3/UL (ref 4–11)

## 2024-08-08 PROCEDURE — 93005 ELECTROCARDIOGRAM TRACING: CPT

## 2024-08-08 PROCEDURE — 85025 COMPLETE CBC W/AUTO DIFF WBC: CPT | Performed by: STUDENT IN AN ORGANIZED HEALTH CARE EDUCATION/TRAINING PROGRAM

## 2024-08-08 PROCEDURE — 82374 ASSAY BLOOD CARBON DIOXIDE: CPT | Performed by: STUDENT IN AN ORGANIZED HEALTH CARE EDUCATION/TRAINING PROGRAM

## 2024-08-08 PROCEDURE — 71046 X-RAY EXAM CHEST 2 VIEWS: CPT

## 2024-08-08 PROCEDURE — 36415 COLL VENOUS BLD VENIPUNCTURE: CPT | Performed by: STUDENT IN AN ORGANIZED HEALTH CARE EDUCATION/TRAINING PROGRAM

## 2024-08-08 PROCEDURE — 87637 SARSCOV2&INF A&B&RSV AMP PRB: CPT | Performed by: STUDENT IN AN ORGANIZED HEALTH CARE EDUCATION/TRAINING PROGRAM

## 2024-08-08 PROCEDURE — 94625 PHY/QHP OP PULM RHB W/O MNTR: CPT

## 2024-08-08 PROCEDURE — 99285 EMERGENCY DEPT VISIT HI MDM: CPT | Mod: 25

## 2024-08-08 RX ORDER — CEFUROXIME AXETIL 500 MG/1
500 TABLET ORAL 2 TIMES DAILY
Qty: 10 TABLET | Refills: 0 | Status: ON HOLD | OUTPATIENT
Start: 2024-08-08 | End: 2024-08-12

## 2024-08-08 RX ORDER — PREDNISONE 20 MG/1
TABLET ORAL
Qty: 10 TABLET | Refills: 0 | Status: ON HOLD | OUTPATIENT
Start: 2024-08-08 | End: 2024-08-12

## 2024-08-08 RX ORDER — AZITHROMYCIN 250 MG/1
TABLET, FILM COATED ORAL
Qty: 6 TABLET | Refills: 0 | Status: ON HOLD | OUTPATIENT
Start: 2024-08-08 | End: 2024-08-12

## 2024-08-08 ASSESSMENT — COLUMBIA-SUICIDE SEVERITY RATING SCALE - C-SSRS
2. HAVE YOU ACTUALLY HAD ANY THOUGHTS OF KILLING YOURSELF IN THE PAST MONTH?: NO
6. HAVE YOU EVER DONE ANYTHING, STARTED TO DO ANYTHING, OR PREPARED TO DO ANYTHING TO END YOUR LIFE?: NO
1. IN THE PAST MONTH, HAVE YOU WISHED YOU WERE DEAD OR WISHED YOU COULD GO TO SLEEP AND NOT WAKE UP?: NO

## 2024-08-08 ASSESSMENT — ACTIVITIES OF DAILY LIVING (ADL): ADLS_ACUITY_SCORE: 38

## 2024-08-08 NOTE — ED PROVIDER NOTES
"  Emergency Department Note      History of Present Illness     Chief Complaint   Shortness of Breath      JON Rivera is a pleasant 69 year old female with COPD, history of lung cancer status post partial lobectomy of right lung, former smoker, presenting with right lung pain and cough. The patient reports that her right lung has been causing her trouble for the past week. She explains that she has recently been having occasional stabbing pains in her right lung due to a significant productive cough. She notes her phlegm is sometimes \"blood tingled.\" Mimi mentions that her right lung hurts more when she breaths in. She states that coughing is abnormal for her. The patient mentions that her left leg appears to be more swollen than the other, but she denies pain within the region. She reports fever and chills. The patient reports history of of lung cancer in her right lung, for which she had removal surgery 4 months ago. Mimi denies history of blood clots. She denies recent travel.     Independent Historian   None    Review of External Notes   None.    I personally reviewed the patient's chart, including available medication list and available past medical history, past surgical history, family history, and social history.    Physical Exam     Patient Vitals for the past 24 hrs:   BP Temp Temp src Pulse Resp SpO2 Height Weight   08/08/24 1734 (!) 165/92 -- -- 88 -- 94 % -- --   08/08/24 1537 (!) 189/126 99.2  F (37.3  C) Temporal 88 20 93 % 1.575 m (5' 2\") 50.2 kg (110 lb 10.7 oz)      Physical Exam  Vitals and nursing note reviewed.   Constitutional:       Appearance: She is ill-appearing. She is not diaphoretic.   HENT:      Mouth/Throat:      Mouth: Mucous membranes are moist.   Cardiovascular:      Rate and Rhythm: Normal rate and regular rhythm.      Heart sounds: Normal heart sounds.   Pulmonary:      Effort: Pulmonary effort is normal. No tachypnea or respiratory distress.      Breath sounds: " Examination of the right-lower field reveals decreased breath sounds. Decreased breath sounds and rales present.   Musculoskeletal:      Right lower leg: No tenderness. No edema.      Left lower leg: No tenderness. No edema.   Skin:     General: Skin is warm and dry.      Findings: No erythema.   Neurological:      Mental Status: She is alert and oriented to person, place, and time.          Diagnostics     Lab Results   Labs Ordered and Resulted from Time of ED Arrival to Time of ED Departure   BASIC METABOLIC PANEL - Abnormal       Result Value    Sodium 139      Potassium 4.0      Chloride 100      Carbon Dioxide (CO2) 26      Anion Gap 13      Urea Nitrogen 6.2 (*)     Creatinine 0.52      GFR Estimate >90      Calcium 10.1      Glucose 108 (*)    CBC WITH PLATELETS AND DIFFERENTIAL - Abnormal    WBC Count 10.1      RBC Count 5.10      Hemoglobin 16.1 (*)     Hematocrit 47.4 (*)     MCV 93      MCH 31.6      MCHC 34.0      RDW 12.0      Platelet Count 269      % Neutrophils 76      % Lymphocytes 17      % Monocytes 6      % Eosinophils 1      % Basophils 0      % Immature Granulocytes 0      NRBCs per 100 WBC 0      Absolute Neutrophils 7.7      Absolute Lymphocytes 1.7      Absolute Monocytes 0.6      Absolute Eosinophils 0.1      Absolute Basophils 0.0      Absolute Immature Granulocytes 0.0      Absolute NRBCs 0.0     INFLUENZA A/B, RSV, & SARS-COV2 PCR - Normal    Influenza A PCR Negative      Influenza B PCR Negative      RSV PCR Negative      SARS CoV2 PCR Negative         Imaging   XR Chest 2 Views   Final Result   IMPRESSION: Stable cardiomegaly. Increased patchy nodular opacities in the right lung base which could be infectious. Pulmonary nodules could appear similar. Postsurgical changes in the right lung. No pleural effusion or pneumothorax.          EKG  ECG taken at 1637, ECG read at 1733  Normal sinus rhythm   Right atrial enlargement   Rate 92 bpm. IA interval 144 ms. QRS duration 74 ms. QT/QTc  388/479 ms. P-R-T axes 81 22 80.     Independent Interpretation   See ED Course below    ED Course      Medications Administered   Medications - No data to display    Procedures   Procedures   None performed    Discussion of Management   See ED Course below    Social Determinants of Health adding to complexity of care   None.      ED Course   Independent Interpretation / Discussion of Management / Repeat Assessments  ED Course as of 08/08/24 1834   Thu Aug 08, 2024   1633 I obtained history and examined the patient as noted above     1706 XR Chest 2 Views  I independently interpreted the patient's chest x-ray; reassuring against infiltrate.         Medical Decision Making / Diagnosis     CMS Diagnoses: None    MIPS       None    MDM   Patient presenting with cough, fever, shortness of breath.  Vital signs are reassuring other than mild elevated blood pressure.  Considered differential including pneumonia, viral infection, bronchitis, COPD exacerbation, COVID, among others.  I have low suspicion for pulmonary embolism.  I do not appreciate significant left lower extremity leg swelling, and there is no evidence of DVT.  Patient's symptoms, with fever and chills and cough, are more consistent with an infectious process.  Workup reassuring against COVID. Patient has no leukocytosis.  Chest x-ray reveals possible right lower lobe infiltrate.  Patient is not in significant respiratory distress at this time.  I feel she is appropriate for discharge with outpatient follow-up.  I started her on antibiotics (cefuroxime and azithromycin) and a course of prednisone to treat COPD exacerbation.  Questions were answered.  Return precautions provided.  The patient was discharged.      Disposition   The patient was discharged.     Diagnosis     ICD-10-CM    1. Pneumonia of right lower lobe due to infectious organism  J18.9       2. Acute cough  R05.1       3. COPD with acute exacerbation (H)  J44.1            Discharge Medications    Discharge Medication List as of 8/8/2024  5:29 PM        START taking these medications    Details   cefuroxime (CEFTIN) 500 MG tablet Take 1 tablet (500 mg) by mouth 2 times daily for 5 days, Disp-10 tablet, R-0, E-Prescribe                Kahlil Solorio MD  08/08/24 1034

## 2024-08-08 NOTE — ED TRIAGE NOTES
Pt state that she has been having right sided lung pain for about a week.  She has been having fever and chills and that she has a cough that is productive.  Pt states that she had lung surgery in April and that she has a hx of copd.       Triage Assessment (Adult)       Row Name 08/08/24 1538          Triage Assessment    Airway WDL WDL        Respiratory WDL    Respiratory WDL X;cough     Cough Frequency infrequent        Skin Circulation/Temperature WDL    Skin Circulation/Temperature WDL WDL        Cardiac WDL    Cardiac WDL WDL        Peripheral/Neurovascular WDL    Peripheral Neurovascular WDL WDL        Cognitive/Neuro/Behavioral WDL    Cognitive/Neuro/Behavioral WDL WDL

## 2024-08-08 NOTE — DISCHARGE INSTRUCTIONS
Thank you for allowing us to evaluate you today.  Follow up with primary care clinician  in 1 week for reevaluation..  Use the antibiotic(s) as prescribed.   Take the steroid as prescribed   Please read the guidance provided with your discharge instructions.  Immediately return to the emergency department with any concerns.

## 2024-08-09 ENCOUNTER — HOSPITAL ENCOUNTER (INPATIENT)
Facility: CLINIC | Age: 69
LOS: 3 days | Discharge: HOME | DRG: 193 | End: 2024-08-12
Attending: EMERGENCY MEDICINE | Admitting: STUDENT IN AN ORGANIZED HEALTH CARE EDUCATION/TRAINING PROGRAM
Payer: COMMERCIAL

## 2024-08-09 ENCOUNTER — APPOINTMENT (OUTPATIENT)
Dept: CT IMAGING | Facility: CLINIC | Age: 69
DRG: 193 | End: 2024-08-09
Payer: COMMERCIAL

## 2024-08-09 DIAGNOSIS — J44.1 COPD EXACERBATION (H): ICD-10-CM

## 2024-08-09 DIAGNOSIS — R09.02 HYPOXIA: ICD-10-CM

## 2024-08-09 DIAGNOSIS — Z87.09 HISTORY OF COPD: Primary | ICD-10-CM

## 2024-08-09 DIAGNOSIS — Z85.118 HISTORY OF LUNG CANCER: ICD-10-CM

## 2024-08-09 DIAGNOSIS — J18.9 PNEUMONIA OF RIGHT LOWER LOBE DUE TO INFECTIOUS ORGANISM: ICD-10-CM

## 2024-08-09 LAB
ANION GAP SERPL CALCULATED.3IONS-SCNC: 14 MMOL/L (ref 7–15)
ATRIAL RATE - MUSE: 92 BPM
BASOPHILS # BLD AUTO: 0 10E3/UL (ref 0–0.2)
BASOPHILS NFR BLD AUTO: 0 %
BUN SERPL-MCNC: 7.3 MG/DL (ref 8–23)
CALCIUM SERPL-MCNC: 9.8 MG/DL (ref 8.8–10.4)
CHLORIDE SERPL-SCNC: 97 MMOL/L (ref 98–107)
CREAT SERPL-MCNC: 0.55 MG/DL (ref 0.51–0.95)
D DIMER PPP FEU-MCNC: <0.27 UG/ML FEU (ref 0–0.5)
DIASTOLIC BLOOD PRESSURE - MUSE: NORMAL MMHG
EGFRCR SERPLBLD CKD-EPI 2021: >90 ML/MIN/1.73M2
EOSINOPHIL # BLD AUTO: 0 10E3/UL (ref 0–0.7)
EOSINOPHIL NFR BLD AUTO: 0 %
ERYTHROCYTE [DISTWIDTH] IN BLOOD BY AUTOMATED COUNT: 12.1 % (ref 10–15)
GLUCOSE SERPL-MCNC: 94 MG/DL (ref 70–99)
HCO3 SERPL-SCNC: 26 MMOL/L (ref 22–29)
HCT VFR BLD AUTO: 45.4 % (ref 35–47)
HGB BLD-MCNC: 15.6 G/DL (ref 11.7–15.7)
IMM GRANULOCYTES # BLD: 0.1 10E3/UL
IMM GRANULOCYTES NFR BLD: 1 %
INTERPRETATION ECG - MUSE: NORMAL
LACTATE SERPL-SCNC: 1 MMOL/L (ref 0.7–2)
LYMPHOCYTES # BLD AUTO: 1.4 10E3/UL (ref 0.8–5.3)
LYMPHOCYTES NFR BLD AUTO: 14 %
MCH RBC QN AUTO: 32.3 PG (ref 26.5–33)
MCHC RBC AUTO-ENTMCNC: 34.4 G/DL (ref 31.5–36.5)
MCV RBC AUTO: 94 FL (ref 78–100)
MONOCYTES # BLD AUTO: 0.7 10E3/UL (ref 0–1.3)
MONOCYTES NFR BLD AUTO: 7 %
NEUTROPHILS # BLD AUTO: 7.8 10E3/UL (ref 1.6–8.3)
NEUTROPHILS NFR BLD AUTO: 78 %
NRBC # BLD AUTO: 0 10E3/UL
NRBC BLD AUTO-RTO: 0 /100
P AXIS - MUSE: 81 DEGREES
PLATELET # BLD AUTO: 270 10E3/UL (ref 150–450)
POTASSIUM SERPL-SCNC: 3.8 MMOL/L (ref 3.4–5.3)
PR INTERVAL - MUSE: 144 MS
QRS DURATION - MUSE: 74 MS
QT - MUSE: 388 MS
QTC - MUSE: 479 MS
R AXIS - MUSE: 22 DEGREES
RBC # BLD AUTO: 4.83 10E6/UL (ref 3.8–5.2)
SODIUM SERPL-SCNC: 137 MMOL/L (ref 135–145)
SYSTOLIC BLOOD PRESSURE - MUSE: NORMAL MMHG
T AXIS - MUSE: 80 DEGREES
VENTRICULAR RATE- MUSE: 92 BPM
WBC # BLD AUTO: 10.1 10E3/UL (ref 4–11)

## 2024-08-09 PROCEDURE — 36415 COLL VENOUS BLD VENIPUNCTURE: CPT | Performed by: EMERGENCY MEDICINE

## 2024-08-09 PROCEDURE — 250N000009 HC RX 250

## 2024-08-09 PROCEDURE — 93005 ELECTROCARDIOGRAM TRACING: CPT

## 2024-08-09 PROCEDURE — 250N000011 HC RX IP 250 OP 636: Performed by: EMERGENCY MEDICINE

## 2024-08-09 PROCEDURE — 80048 BASIC METABOLIC PNL TOTAL CA: CPT | Performed by: EMERGENCY MEDICINE

## 2024-08-09 PROCEDURE — 250N000009 HC RX 250: Performed by: EMERGENCY MEDICINE

## 2024-08-09 PROCEDURE — 94640 AIRWAY INHALATION TREATMENT: CPT

## 2024-08-09 PROCEDURE — 83605 ASSAY OF LACTIC ACID: CPT | Performed by: EMERGENCY MEDICINE

## 2024-08-09 PROCEDURE — 71250 CT THORAX DX C-: CPT

## 2024-08-09 PROCEDURE — 96375 TX/PRO/DX INJ NEW DRUG ADDON: CPT

## 2024-08-09 PROCEDURE — 99222 1ST HOSP IP/OBS MODERATE 55: CPT

## 2024-08-09 PROCEDURE — 99285 EMERGENCY DEPT VISIT HI MDM: CPT | Mod: 25

## 2024-08-09 PROCEDURE — 85379 FIBRIN DEGRADATION QUANT: CPT

## 2024-08-09 PROCEDURE — 85048 AUTOMATED LEUKOCYTE COUNT: CPT | Performed by: EMERGENCY MEDICINE

## 2024-08-09 PROCEDURE — 87040 BLOOD CULTURE FOR BACTERIA: CPT | Performed by: EMERGENCY MEDICINE

## 2024-08-09 PROCEDURE — 250N000013 HC RX MED GY IP 250 OP 250 PS 637: Performed by: EMERGENCY MEDICINE

## 2024-08-09 PROCEDURE — 258N000003 HC RX IP 258 OP 636

## 2024-08-09 PROCEDURE — 36415 COLL VENOUS BLD VENIPUNCTURE: CPT

## 2024-08-09 PROCEDURE — 96374 THER/PROPH/DIAG INJ IV PUSH: CPT

## 2024-08-09 PROCEDURE — 250N000011 HC RX IP 250 OP 636

## 2024-08-09 PROCEDURE — 999N000157 HC STATISTIC RCP TIME EA 10 MIN

## 2024-08-09 PROCEDURE — 120N000001 HC R&B MED SURG/OB

## 2024-08-09 PROCEDURE — 94640 AIRWAY INHALATION TREATMENT: CPT | Mod: 76

## 2024-08-09 RX ORDER — GABAPENTIN 300 MG/1
300 CAPSULE ORAL DAILY PRN
COMMUNITY

## 2024-08-09 RX ORDER — GUAIFENESIN/DEXTROMETHORPHAN 100-10MG/5
10 SYRUP ORAL EVERY 4 HOURS PRN
Status: DISCONTINUED | OUTPATIENT
Start: 2024-08-09 | End: 2024-08-12 | Stop reason: HOSPADM

## 2024-08-09 RX ORDER — PANTOPRAZOLE SODIUM 40 MG/1
40 TABLET, DELAYED RELEASE ORAL
Status: DISCONTINUED | OUTPATIENT
Start: 2024-08-10 | End: 2024-08-12 | Stop reason: HOSPADM

## 2024-08-09 RX ORDER — AZITHROMYCIN 250 MG/1
500 TABLET, FILM COATED ORAL ONCE
Status: DISCONTINUED | OUTPATIENT
Start: 2024-08-09 | End: 2024-08-09

## 2024-08-09 RX ORDER — IPRATROPIUM BROMIDE AND ALBUTEROL SULFATE 2.5; .5 MG/3ML; MG/3ML
3 SOLUTION RESPIRATORY (INHALATION)
Status: DISCONTINUED | OUTPATIENT
Start: 2024-08-09 | End: 2024-08-09

## 2024-08-09 RX ORDER — ONDANSETRON 2 MG/ML
4 INJECTION INTRAMUSCULAR; INTRAVENOUS EVERY 6 HOURS PRN
Status: DISCONTINUED | OUTPATIENT
Start: 2024-08-09 | End: 2024-08-12 | Stop reason: HOSPADM

## 2024-08-09 RX ORDER — CEFTRIAXONE 2 G/1
2 INJECTION, POWDER, FOR SOLUTION INTRAMUSCULAR; INTRAVENOUS ONCE
Status: COMPLETED | OUTPATIENT
Start: 2024-08-09 | End: 2024-08-09

## 2024-08-09 RX ORDER — LIDOCAINE 40 MG/G
CREAM TOPICAL
Status: DISCONTINUED | OUTPATIENT
Start: 2024-08-09 | End: 2024-08-12 | Stop reason: HOSPADM

## 2024-08-09 RX ORDER — ALBUTEROL SULFATE 0.83 MG/ML
3 SOLUTION RESPIRATORY (INHALATION)
Status: DISCONTINUED | OUTPATIENT
Start: 2024-08-09 | End: 2024-08-12 | Stop reason: HOSPADM

## 2024-08-09 RX ORDER — ACETAMINOPHEN 650 MG/1
650 SUPPOSITORY RECTAL EVERY 4 HOURS PRN
Status: DISCONTINUED | OUTPATIENT
Start: 2024-08-09 | End: 2024-08-12 | Stop reason: HOSPADM

## 2024-08-09 RX ORDER — AMOXICILLIN 250 MG
2 CAPSULE ORAL 2 TIMES DAILY PRN
Status: DISCONTINUED | OUTPATIENT
Start: 2024-08-09 | End: 2024-08-12 | Stop reason: HOSPADM

## 2024-08-09 RX ORDER — PROCHLORPERAZINE 25 MG
12.5 SUPPOSITORY, RECTAL RECTAL EVERY 12 HOURS PRN
Status: DISCONTINUED | OUTPATIENT
Start: 2024-08-09 | End: 2024-08-12 | Stop reason: HOSPADM

## 2024-08-09 RX ORDER — DOXYCYCLINE 100 MG/1
100 CAPSULE ORAL ONCE
Status: COMPLETED | OUTPATIENT
Start: 2024-08-09 | End: 2024-08-09

## 2024-08-09 RX ORDER — PIPERACILLIN SODIUM, TAZOBACTAM SODIUM 4; .5 G/20ML; G/20ML
4.5 INJECTION, POWDER, LYOPHILIZED, FOR SOLUTION INTRAVENOUS EVERY 6 HOURS
Status: DISCONTINUED | OUTPATIENT
Start: 2024-08-09 | End: 2024-08-09

## 2024-08-09 RX ORDER — SODIUM CHLORIDE, SODIUM LACTATE, POTASSIUM CHLORIDE, CALCIUM CHLORIDE 600; 310; 30; 20 MG/100ML; MG/100ML; MG/100ML; MG/100ML
INJECTION, SOLUTION INTRAVENOUS CONTINUOUS
Status: DISCONTINUED | OUTPATIENT
Start: 2024-08-09 | End: 2024-08-10

## 2024-08-09 RX ORDER — ACETAMINOPHEN 325 MG/1
650 TABLET ORAL EVERY 4 HOURS PRN
Status: DISCONTINUED | OUTPATIENT
Start: 2024-08-09 | End: 2024-08-12 | Stop reason: HOSPADM

## 2024-08-09 RX ORDER — CEFTRIAXONE 2 G/1
2 INJECTION, POWDER, FOR SOLUTION INTRAMUSCULAR; INTRAVENOUS EVERY 24 HOURS
Status: DISCONTINUED | OUTPATIENT
Start: 2024-08-10 | End: 2024-08-12 | Stop reason: HOSPADM

## 2024-08-09 RX ORDER — ENOXAPARIN SODIUM 100 MG/ML
40 INJECTION SUBCUTANEOUS EVERY 24 HOURS
Status: DISCONTINUED | OUTPATIENT
Start: 2024-08-09 | End: 2024-08-12 | Stop reason: HOSPADM

## 2024-08-09 RX ORDER — GABAPENTIN 300 MG/1
300 CAPSULE ORAL 3 TIMES DAILY
Status: DISCONTINUED | OUTPATIENT
Start: 2024-08-09 | End: 2024-08-12 | Stop reason: HOSPADM

## 2024-08-09 RX ORDER — CALCIUM CARBONATE 500 MG/1
1000 TABLET, CHEWABLE ORAL 4 TIMES DAILY PRN
Status: DISCONTINUED | OUTPATIENT
Start: 2024-08-09 | End: 2024-08-12 | Stop reason: HOSPADM

## 2024-08-09 RX ORDER — AMOXICILLIN 250 MG
1 CAPSULE ORAL 2 TIMES DAILY PRN
Status: DISCONTINUED | OUTPATIENT
Start: 2024-08-09 | End: 2024-08-12 | Stop reason: HOSPADM

## 2024-08-09 RX ORDER — AZITHROMYCIN 500 MG/5ML
500 INJECTION, POWDER, LYOPHILIZED, FOR SOLUTION INTRAVENOUS ONCE
Status: COMPLETED | OUTPATIENT
Start: 2024-08-09 | End: 2024-08-09

## 2024-08-09 RX ORDER — PROCHLORPERAZINE MALEATE 5 MG
5 TABLET ORAL EVERY 6 HOURS PRN
Status: DISCONTINUED | OUTPATIENT
Start: 2024-08-09 | End: 2024-08-12 | Stop reason: HOSPADM

## 2024-08-09 RX ORDER — ONDANSETRON 2 MG/ML
4 INJECTION INTRAMUSCULAR; INTRAVENOUS ONCE
Status: COMPLETED | OUTPATIENT
Start: 2024-08-09 | End: 2024-08-09

## 2024-08-09 RX ORDER — IPRATROPIUM BROMIDE AND ALBUTEROL SULFATE 2.5; .5 MG/3ML; MG/3ML
3 SOLUTION RESPIRATORY (INHALATION) 4 TIMES DAILY
Status: DISCONTINUED | OUTPATIENT
Start: 2024-08-09 | End: 2024-08-12 | Stop reason: HOSPADM

## 2024-08-09 RX ORDER — ONDANSETRON 4 MG/1
4 TABLET, ORALLY DISINTEGRATING ORAL EVERY 6 HOURS PRN
Status: DISCONTINUED | OUTPATIENT
Start: 2024-08-09 | End: 2024-08-12 | Stop reason: HOSPADM

## 2024-08-09 RX ORDER — FLUTICASONE FUROATE AND VILANTEROL 100; 25 UG/1; UG/1
1 POWDER RESPIRATORY (INHALATION) DAILY
Status: DISCONTINUED | OUTPATIENT
Start: 2024-08-10 | End: 2024-08-12 | Stop reason: HOSPADM

## 2024-08-09 RX ORDER — METHYLPREDNISOLONE SODIUM SUCCINATE 40 MG/ML
40 INJECTION, POWDER, LYOPHILIZED, FOR SOLUTION INTRAMUSCULAR; INTRAVENOUS DAILY
Status: DISCONTINUED | OUTPATIENT
Start: 2024-08-10 | End: 2024-08-12 | Stop reason: HOSPADM

## 2024-08-09 RX ORDER — METHYLPREDNISOLONE SODIUM SUCCINATE 125 MG/2ML
125 INJECTION, POWDER, LYOPHILIZED, FOR SOLUTION INTRAMUSCULAR; INTRAVENOUS ONCE
Status: COMPLETED | OUTPATIENT
Start: 2024-08-09 | End: 2024-08-09

## 2024-08-09 RX ORDER — AZITHROMYCIN 500 MG/5ML
500 INJECTION, POWDER, LYOPHILIZED, FOR SOLUTION INTRAVENOUS EVERY 24 HOURS
Status: DISCONTINUED | OUTPATIENT
Start: 2024-08-09 | End: 2024-08-09

## 2024-08-09 RX ADMIN — IPRATROPIUM BROMIDE AND ALBUTEROL SULFATE 3 ML: .5; 3 SOLUTION RESPIRATORY (INHALATION) at 20:04

## 2024-08-09 RX ADMIN — ENOXAPARIN SODIUM 40 MG: 40 INJECTION SUBCUTANEOUS at 21:50

## 2024-08-09 RX ADMIN — DOXYCYCLINE HYCLATE 100 MG: 100 CAPSULE ORAL at 17:55

## 2024-08-09 RX ADMIN — METHYLPREDNISOLONE SODIUM SUCCINATE 125 MG: 125 INJECTION, POWDER, FOR SOLUTION INTRAMUSCULAR; INTRAVENOUS at 17:48

## 2024-08-09 RX ADMIN — AZITHROMYCIN MONOHYDRATE 500 MG: 500 INJECTION, POWDER, LYOPHILIZED, FOR SOLUTION INTRAVENOUS at 21:49

## 2024-08-09 RX ADMIN — SODIUM CHLORIDE, POTASSIUM CHLORIDE, SODIUM LACTATE AND CALCIUM CHLORIDE: 600; 310; 30; 20 INJECTION, SOLUTION INTRAVENOUS at 21:49

## 2024-08-09 RX ADMIN — IPRATROPIUM BROMIDE AND ALBUTEROL SULFATE 3 ML: .5; 3 SOLUTION RESPIRATORY (INHALATION) at 17:47

## 2024-08-09 RX ADMIN — CEFTRIAXONE 2 G: 2 INJECTION, POWDER, FOR SOLUTION INTRAMUSCULAR; INTRAVENOUS at 17:55

## 2024-08-09 RX ADMIN — ONDANSETRON 4 MG: 2 INJECTION INTRAMUSCULAR; INTRAVENOUS at 18:05

## 2024-08-09 ASSESSMENT — COLUMBIA-SUICIDE SEVERITY RATING SCALE - C-SSRS
6. HAVE YOU EVER DONE ANYTHING, STARTED TO DO ANYTHING, OR PREPARED TO DO ANYTHING TO END YOUR LIFE?: NO
1. IN THE PAST MONTH, HAVE YOU WISHED YOU WERE DEAD OR WISHED YOU COULD GO TO SLEEP AND NOT WAKE UP?: NO
2. HAVE YOU ACTUALLY HAD ANY THOUGHTS OF KILLING YOURSELF IN THE PAST MONTH?: NO

## 2024-08-09 ASSESSMENT — ACTIVITIES OF DAILY LIVING (ADL)
DOING_ERRANDS_INDEPENDENTLY_DIFFICULTY: YES
CONCENTRATING,_REMEMBERING_OR_MAKING_DECISIONS_DIFFICULTY: NO
TOILETING: 0-->INDEPENDENT
DRESSING/BATHING_DIFFICULTY: NO
ADLS_ACUITY_SCORE: 38
ADLS_ACUITY_SCORE: 37
TOILETING_ISSUES: YES
TOILETING: 0-->INDEPENDENT
WALKING_OR_CLIMBING_STAIRS_DIFFICULTY: NO
TOILETING_ASSISTANCE: TOILETING DIFFICULTY, REQUIRES EQUIPMENT
ADLS_ACUITY_SCORE: 27
ADLS_ACUITY_SCORE: 36
DIFFICULTY_COMMUNICATING: NO
ADLS_ACUITY_SCORE: 37
DIFFICULTY_EATING/SWALLOWING: NO
HEARING_DIFFICULTY_OR_DEAF: NO
WEAR_GLASSES_OR_BLIND: NO
CHANGE_IN_FUNCTIONAL_STATUS_SINCE_ONSET_OF_CURRENT_ILLNESS/INJURY: YES
ADLS_ACUITY_SCORE: 38
ADLS_ACUITY_SCORE: 24
FALL_HISTORY_WITHIN_LAST_SIX_MONTHS: NO

## 2024-08-09 NOTE — H&P
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    Was seen in the ED on 8/8 for right lung pain and cough.  Had been having stabbing pains in her right lung due to significant productive cough.  Her phlegm was occasionally blood-tinged, now resolved.  The patient also thought her left leg appeared more swollen but denied any pain.  ED provider did not appreciate any significant swelling or signs of DVT.  No fevers or chills.  Viral respiratory panel was negative.  Chest x-ray per radiology read showed increased patchy nodular opacities in right lung base which could be infectious. She was sent home on cefuroxime and azithromycin and a course of prednisone to treat COPD exacerbation.    Returned to the ED on 8/9 due to continuing to feel unwell.  Unable to tolerate her oral antibiotics due to vomiting.  She thinks the vomiting may be due to her \"frequent coughing or not having any food in her stomach.\"  Very poor appetite and fluid intake the last week.  Continuing to have subjective fevers and chills.  Has been feeling unwell for the last week.  Also reports her  was hospitalized for pneumonia 2 weeks ago.  Currently denies any chest pain, nausea, vomiting, abdominal pain.  Feels bloated. In the ED she received 2 g ceftriaxone, PO doxycycline, 125 mg of Solu-Medrol.    Acute hypoxic respiratory failure  Possible community-acquired pneumonia  Acute COPD exacerbation  Possible CAP with the right lower lobe opacities and feeling unwell, however the patient is afebrile, & no leukocytosis.  Question if she could have progression of her lung carcinoid tumor.  Chest x-ray back in May also noted these right lower lung opacities.  Per " "radiology the right lower lung opacities appear slightly increased on CXR today. Last chest CT was in early April.  Will obtain CT chest to further assess the right lower lung opacities.  I do think the patient has a COPD exacerbation as she does have expiratory wheezing throughout and a productive cough.  Plan:  - IV antibiotics ceftriaxone & azithromycin, low threshold to broaden abx if not improving  - IVF until tolerating PO intake  - d-dimer: < 0.27  - will obtain chest CT   - IV solu-medrol 40 mg daily  - duo nebs 4 times daily  - albuterol PRN   - obtain sputum sample if able   - wean oxygen as tolerated, goal O2 > 90  - blood culture pending   - continue pta inhaler trelegy     Carcinoid tumor of lung  Recent right side pneumothorax   Follows with oncology outpatient.  Was recently admitted to [facility] from 5/3-5/7 after she was found to have a large right-sided pneumothorax that required a chest tube after a right upper lobe wedge resection.  Underwent a right wedge resection of the right upper lobe on 4/26/2024 at [facility].     Essential hypertension  - continue pta medication once reconciled          Diet:  regular diet   DVT Prophylaxis: Enoxaparin (Lovenox) SQ  Lino Catheter: Not present  Lines: None     Cardiac Monitoring: None  Code Status:  Full code    Clinically Significant Risk Factors Present on Admission                  # Hypertension: Noted on problem list             # Financial/Environmental Concerns:          Disposition Plan     Medically Ready for Discharge: Anticipated in 2-4 Days         The patient's care was discussed with the Bedside Nurse, Patient, and ED provider .    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    Was seen in the ED on 8/8 for right lung pain and cough.  Had been having stabbing pains in her right lung due to significant productive cough.  Her phlegm was occasionally blood-tinged.  The patient also thought her left leg appeared more swollen but denied any pain.  ED provider did not appreciate any significant swelling or signs of DVT.  No fevers or chills.  Viral respiratory panel was negative.  Chest x-ray per radiology read showed increased patchy nodular opacities in right lung base which could be infectious.  She was sent home on cefuroxime and azithromycin and a course of prednisone to treat COPD exacerbation.    Returned to the ED on 8/9 due to continuing to feel unwell.  Unable to tolerate her oral antibiotics due to vomiting.  She thinks the vomiting may be due to her \"frequent coughing or not having any food in her stomach.\"  Very poor appetite and fluid intake.  Continuing to have subjective fevers and chills.  Has been feeling unwell for the last week.  Also reports her  was hospitalized for pneumonia 2 weeks ago.  Currently denies any chest pain, nausea, vomiting, abdominal pain.  Feels bloated.    In the ED, afebrile, blood pressure 161/90, heart rate 84, respirations 22, oxygen 89% on room air.  BMP notable for chloride of 97.  CBC unremarkable.  Blood culture pending.  In the ED she received 2 g ceftriaxone, doxycycline, 125 mg of Solu-Medrol.      Past Medical History    Past Medical History:   Diagnosis Date    ASCUS on Pap smear 01/01/2006    unable to run HPV typing, f/u paps NIL    Chronic rhinosinusitis     COPD (chronic obstructive pulmonary disease) (H)     Depression, anxiety     Herpes     occasional outbreak    Hypertension     Primary stress " urinary incontinence     Pulmonary nodules     Rhinosinusitis        Past Surgical History   Past Surgical History:   Procedure Laterality Date    BRONCHOSCOPY, WITH BIOPSY, ROBOT ASSISTED Bilateral 2023    Procedure: Robot assisted Ion BRONCHOSCOPY;  Surgeon: Raudel Claire DO;  Location: [facility] OR     SECTION      x 3    DAVINCI LOBECTOMY LUNG Right 2024    Procedure: robot assisted thoracoscopic right wedge resection, mediastinal lymph node dissection;  Surgeon: Good Pandya MD;  Location: [facility] OR    ENDOBRONCHIAL ULTRASOUND FLEXIBLE N/A 2023    Procedure: endobronchial ultrasound, transbronchial biopsies Latex Free;  Surgeon: Raudel Claire DO;  Location: [facility] OR       Prior to Admission Medications   Prior to Admission Medications   Prescriptions Last Dose Informant Patient Reported? Taking?   EPINEPHrine (ANY BX GENERIC EQUIV) 0.3 MG/0.3ML injection 2-pack   No No   Sig: Inject 0.3 mLs (0.3 mg) into the muscle as needed for anaphylaxis May repeat one time in 5-15 minutes if response to initial dose is inadequate.   Fluticasone-Umeclidin-Vilant (TRELEGY ELLIPTA) 100-62.5-25 MCG/ACT oral inhaler   No No   Sig: INHALE ONE PUFF BY MOUTH EVERY DAY   amLODIPine (NORVASC) 10 MG tablet   No No   Sig: Take 1 tablet (10 mg) by mouth every evening   azithromycin (ZITHROMAX) 250 MG tablet   No No   Sig: Take 2 tablets (500 mg) by mouth daily for 1 day, THEN 1 tablet (250 mg) daily for 4 days.   budesonide (PULMICORT) 0.5 MG/2ML neb solution   No No   Sig: Spray 2 mLs (0.5 mg) in nostril daily Empty contents of ampule into 240mL of saline solution and rinse both nasal cavities as instructed twice daily.   cefuroxime (CEFTIN) 500 MG tablet   No No   Sig: Take 1 tablet (500 mg) by mouth 2 times daily for 5 days   doxycycline hyclate (VIBRAMYCIN) 100 MG capsule   No No   Sig: Take 1 capsule (100 mg) by mouth every 12 hours   esomeprazole (NEXIUM) 20 MG DR capsule   No No   Sig: Take 1 capsule (20 mg)  by mouth 2 times daily Take 30-60 minutes before eating.   gabapentin (NEURONTIN) 300 MG capsule   No No   Sig: Take 1 capsule (300 mg) by mouth 3 times daily   ipratropium (ATROVENT) 0.06 % nasal spray   No No   Sig: Spray 2 sprays into both nostrils 4 times daily as needed for rhinitis   levalbuterol (XOPENEX HFA) 45 MCG/ACT inhaler   No No   Sig: Inhale 2 puffs into the lungs every 4 hours as needed for shortness of breath / dyspnea or wheezing   predniSONE (DELTASONE) 20 MG tablet   No No   Sig: Take two tablets (= 40mg) each day for 5 (five) days      Facility-Administered Medications: None        Social History   I have reviewed this patient's social history and updated it with pertinent information if needed.  Social History     Tobacco Use    Smoking status: Former     Current packs/day: 0.00     Types: Cigarettes     Quit date: 3/19/2015     Years since quittin.4    Smokeless tobacco: Former     Quit date: 2017   Vaping Use    Vaping status: Never Used   Substance Use Topics    Alcohol use: Yes     Alcohol/week: 2.0 - 3.0 standard drinks of alcohol     Types: 2 - 3 Standard drinks or equivalent per week     Comment: 2 drinks per wk avg    Drug use: No     Allergies   No Known Allergies     Physical Exam   Vital Signs: Temp: 98.5  F (36.9  C) Temp src: Temporal BP: (!) 161/90 Pulse: 86   Resp: 22 SpO2: 93 % O2 Device: None (Room air)    Weight: 110 lbs 7.21 oz    GENERAL:  Alert, appears unwell, No acute distress. Sitting up in bed.   PSYCH: pleasant, oriented.  HEENT:  Normocephalic, No scleral icterus or conjunctival injection  HEART:  Normal S1, S2 with no murmur, RRR  LUNGS:  Normal Respiratory effort.  Expiratory wheeze bilaterally worse on the right. the right also has rhonchi and the lower lobe has coarse crackles.  ABDOMEN:  Soft, non-tender, non distended. No peritoneal signs.   EXTREMITIES:  No pitting pedal edema, pain with palpation, No cyanosis.   SKIN:  Warm, dry to touch. No  rash.  NEUROLOGIC:   Speech clear, alert & orientated x 4, no focal deficits.     Medical Decision Making       MANAGEMENT DISCUSSED with the following over the past 24 hours: patient, ED provider, nursing   NOTE(S)/MEDICAL RECORDS REVIEWED over the past 24 hours: labs, imaging, progress notes       Data     I have personally reviewed the following data over the past 24 hrs:    10.1  \   15.6   / 270     137 97 (L) 7.3 (L) /  94   3.8 26 0.55 \     Procal: N/A CRP: N/A Lactic Acid: 1.0         Imaging results reviewed over the past 24 hrs:   No results found for this or any previous visit (from the past 24 hour(s)).

## 2024-08-09 NOTE — ED PROVIDER NOTES
Emergency Department Note      History of Present Illness     Chief Complaint   Shortness of Breath      HPI   Mimi Rivera is a 69 year old female who presents emergency department for shortness of breath.  Patient reports that she was seen yesterday and diagnosed with pneumonia.  She is nodding on the date to her medications that she has been vomiting.  She notes a low-grade fever yesterday.  Coughing up green-yellow pussy stuff.  Reports that she has a history of COPD.  Denies any history of DVT, recent trips travel surgery, leg swelling.  She says that she was feeling terrible last night.  Feels like this is consistent with the prior pneumonia.    Independent Historian   None    Review of External Notes   Reviewed chest x-ray as well as ER visit from 2024 regarding diagnosis for pneumonia.    Past Medical History     Medical History and Problem List   Past Medical History:   Diagnosis Date    ASCUS on Pap smear 2006    Chronic rhinosinusitis     COPD (chronic obstructive pulmonary disease) (H)     Depression, anxiety     Herpes     Hypertension     Primary stress urinary incontinence     Pulmonary nodules     Rhinosinusitis        Medications   No current outpatient medications on file.      Surgical History   Past Surgical History:   Procedure Laterality Date    BRONCHOSCOPY, WITH BIOPSY, ROBOT ASSISTED Bilateral 2023    Procedure: Robot assisted Ion BRONCHOSCOPY;  Surgeon: Raudel Claire DO;  Location: [facility] OR     SECTION      x 3    DAVINCI LOBECTOMY LUNG Right 2024    Procedure: robot assisted thoracoscopic right wedge resection, mediastinal lymph node dissection;  Surgeon: Good Pandya MD;  Location: [facility] OR    ENDOBRONCHIAL ULTRASOUND FLEXIBLE N/A 2023    Procedure: endobronchial ultrasound, transbronchial biopsies Latex Free;  Surgeon: Raudel Claire DO;  Location: [facility] OR       Physical Exam     Patient Vitals for the past 24 hrs:   BP Temp Temp src Pulse Resp  "SpO2 Height Weight   08/09/24 2004 -- -- -- 85 (!) 118 95 % -- --   08/09/24 1925 (!) 157/76 98.4  F (36.9  C) Oral 81 18 95 % -- --   08/09/24 1815 -- -- -- -- -- 94 % -- --   08/09/24 1812 -- -- -- -- -- (!) 85 % -- --   08/09/24 1743 (!) 146/89 -- -- -- -- 91 % -- --   08/09/24 1720 (!) 161/90 -- -- 86 -- 93 % -- --   08/09/24 1652 (!) 179/108 98.5  F (36.9  C) Temporal 99 22 (!) 89 % 1.575 m (5' 2\") 50.1 kg (110 lb 7.2 oz)     Physical Exam  General: Resting on the bed.  Head: No obvious trauma to head.  Ears, Nose, Throat:  External ears normal.  Nose normal.    Eyes:  Conjunctivae clear.  Pupils are equal, round, and reactive.   Neck: Normal range of motion.  Neck supple.   CV: Regular rate and rhythm.  No murmurs.      Respiratory: Effort normal and breath sounds with wheezing and coarse breath sounds noted on the right lung fields.  No retractions.  No increased work of breathing.  Gastrointestinal: Soft.  No distension. There is no tenderness.  There is no rigidity, no rebound and no guarding.   Musculoskeletal: Non tender non edematous calves  Neuro: Alert. Moving all extremities appropriately.  Normal speech.    Skin: Skin is warm and dry.  No rash noted.       Diagnostics     Lab Results   Labs Ordered and Resulted from Time of ED Arrival to Time of ED Departure   BASIC METABOLIC PANEL - Abnormal       Result Value    Sodium 137      Potassium 3.8      Chloride 97 (*)     Carbon Dioxide (CO2) 26      Anion Gap 14      Urea Nitrogen 7.3 (*)     Creatinine 0.55      GFR Estimate >90      Calcium 9.8      Glucose 94     LACTIC ACID WHOLE BLOOD WITH 1X REPEAT IN 2 HR WHEN >2 - Normal    Lactic Acid, Initial 1.0     CBC WITH PLATELETS AND DIFFERENTIAL    WBC Count 10.1      RBC Count 4.83      Hemoglobin 15.6      Hematocrit 45.4      MCV 94      MCH 32.3      MCHC 34.4      RDW 12.1      Platelet Count 270      % Neutrophils 78      % Lymphocytes 14      % Monocytes 7      % Eosinophils 0      % Basophils 0  "     % Immature Granulocytes 1      NRBCs per 100 WBC 0      Absolute Neutrophils 7.8      Absolute Lymphocytes 1.4      Absolute Monocytes 0.7      Absolute Eosinophils 0.0      Absolute Basophils 0.0      Absolute Immature Granulocytes 0.1      Absolute NRBCs 0.0     BLOOD CULTURE       Imaging   CT Chest w/o Contrast   Final Result   IMPRESSION:    1.  Expected postoperative appearance after wedge resection right upper lobe. No suspicious pulmonary lesion.   2.  Increasing callus formation at right lateral rib fractures accounts for the perceived nodularity at right base on recent chest x-ray.             Independent Interpretation   None    ED Course      Medications Administered   Medications   pantoprazole (PROTONIX) EC tablet 40 mg (has no administration in time range)   gabapentin (NEURONTIN) capsule 300 mg (300 mg Oral Not Given 8/9/24 2149)   fluticasone-vilanterol (BREO ELLIPTA) 100-25 MCG/ACT inhaler 1 puff (has no administration in time range)     And   umeclidinium (INCRUSE ELLIPTA) 62.5 MCG/ACT inhaler 1 puff (has no administration in time range)   lidocaine 1 % 0.1-1 mL (has no administration in time range)   lidocaine (LMX4) cream (has no administration in time range)   sodium chloride (PF) 0.9% PF flush 3 mL (3 mLs Intracatheter Not Given 8/10/24 0400)   sodium chloride (PF) 0.9% PF flush 3 mL (has no administration in time range)   senna-docusate (SENOKOT-S/PERICOLACE) 8.6-50 MG per tablet 1 tablet (has no administration in time range)     Or   senna-docusate (SENOKOT-S/PERICOLACE) 8.6-50 MG per tablet 2 tablet (has no administration in time range)   calcium carbonate (TUMS) chewable tablet 1,000 mg (has no administration in time range)   albuterol (PROVENTIL) neb solution 2.5 mg (has no administration in time range)   ipratropium - albuterol 0.5 mg/2.5 mg/3 mL (DUONEB) neb solution 3 mL (3 mLs Nebulization $Given 8/9/24 2004)   guaiFENesin-dextromethorphan (ROBITUSSIN DM) 100-10 MG/5ML syrup 10 mL  (has no administration in time range)   methylPREDNISolone sodium succinate (SOLU-MEDROL) injection 40 mg (has no administration in time range)   ondansetron (ZOFRAN ODT) ODT tab 4 mg (has no administration in time range)     Or   ondansetron (ZOFRAN) injection 4 mg (has no administration in time range)   prochlorperazine (COMPAZINE) injection 5 mg (has no administration in time range)     Or   prochlorperazine (COMPAZINE) tablet 5 mg (has no administration in time range)     Or   prochlorperazine (COMPAZINE) suppository 12.5 mg (has no administration in time range)   melatonin tablet 1 mg (has no administration in time range)   acetaminophen (TYLENOL) tablet 650 mg (has no administration in time range)     Or   acetaminophen (TYLENOL) Suppository 650 mg (has no administration in time range)   lactated ringers infusion ( Intravenous $New Bag 8/9/24 2149)   enoxaparin ANTICOAGULANT (LOVENOX) injection 40 mg (40 mg Subcutaneous $Given 8/9/24 2150)   cefTRIAXone (ROCEPHIN) 2 g vial to attach to  ml bag for ADULTS or NS 50 ml bag for PEDS (has no administration in time range)   azithromycin (ZITHROMAX) 250 mg in  mL intermittent infusion (has no administration in time range)   cefTRIAXone (ROCEPHIN) 2 g vial to attach to  ml bag for ADULTS or NS 50 ml bag for PEDS (2 g Intravenous $New Bag 8/9/24 1755)   methylPREDNISolone sodium succinate (solu-MEDROL) injection 125 mg (125 mg Intravenous $Given 8/9/24 1748)   doxycycline hyclate (VIBRAMYCIN) capsule 100 mg (100 mg Oral $Given 8/9/24 1755)   ondansetron (ZOFRAN) injection 4 mg (4 mg Intravenous $Given 8/9/24 1805)   azithromycin (ZITHROMAX) 500 mg in  mL intermittent infusion (500 mg Intravenous $New Bag 8/9/24 2149)       Procedures   Procedures     Discussion of Management   Admitting Hospitalist, see below    ED Course   ED Course as of 08/09/24 2301   Fri Aug 09, 2024   1818 I spoke with Valencia Garcia for admission        Additional  Documentation  None    Medical Decision Making / Diagnosis     CMS Diagnoses: None    MIPS       None    [facility]   Mimi Padmini Rivera is a 69 year old female who presents emergency department for pneumonia, COPD, lung pain.  Vitals are stable except for hypoxia noted 85% on room air.  2 L were applied.  Broad differentials considered include not limited to pneumonia, pneumothorax, effusion, COPD exacerbation, PE, ACS, arrhythmia, etc.  CBC without leukocytosis or anemia.  BMP without acute electrolyte, metabolic or renal dysfunction.  Lactate is normal not concerning for severe sepsis or septic shock.  Patient had EKG done yesterday that was reassuring.  Patient has no chest pain but rather lung pain.  I considered PE but patient has no lower leg swelling, no hemoptysis, overall does not appear consistent with PE.  Breath sounds are consistent with COPD; DuoNebs, steroids, antibiotics given as patient was not able to tolerate p.o. antibiotics.  At this point patient does not appear to be stable for discharge but rather requires inpatient admission.  Patient also having nausea and vomiting but has no abdominal tenderness on exam to indicate acute intra-abdominal process.  Zofran given.  Admitted to the hospitalist.    Disposition   The patient was admitted to the hospital.     Diagnosis     ICD-10-CM    1. Pneumonia of right lower lobe due to infectious organism  J18.9       2. COPD exacerbation (H)  J44.1       3. Hypoxia  R09.02            Discharge Medications   Current Discharge Medication List            MD Pierre Martinez Jennifer L, MD  08/09/24 1281

## 2024-08-09 NOTE — ED TRIAGE NOTES
Pt presents to the ED with cough and SOB stating she was here yesterday and diagnosed with pneumonia and has been vomiting so she can't take her antibiotics. Pt was 89% RA after walking to triage and recovered within 45 seconds to 94% RA. Pain 7/10 in lungs per pt.

## 2024-08-09 NOTE — ED NOTES
[facility]  ED Nurse Handoff Report    ED Chief complaint: Shortness of Breath  . ED Diagnosis:   Final diagnoses:   Pneumonia of right lower lobe due to infectious organism   COPD exacerbation (H)   Hypoxia       Allergies: No Known Allergies    Code Status: Full Code    Activity level - Baseline/Home:  independent.  Activity Level - Current:   standby.   Lift room needed: No.   Bariatric: No   Needed: No   Isolation: No.   Infection: Not Applicable.     Respiratory status: Nasal cannula    Vital Signs (within 30 minutes):   Vitals:    08/09/24 1720 08/09/24 1743 08/09/24 1812 08/09/24 1815   BP: (!) 161/90 (!) 146/89     Pulse: 86      Resp:       Temp:       TempSrc:       SpO2: 93% 91% (!) 85% 94%   Weight:       Height:           Cardiac Rhythm:  ,      Pain level:    Patient confused: No.   Patient Falls Risk: patient and family education.   Elimination Status: Has voided     Patient Report - Initial Complaint: Pt presents to the ED with cough and SOB stating she was here yesterday and diagnosed with pneumonia and has been vomiting so she can't take her antibiotics. Pt was 89% RA after walking to triage and recovered within 45 seconds to 94% RA. Pain 7/10 in lungs per pt.     Focused Assessment: A&Ox4. HTN, placed 2L NC on pt d/t desating  to 85% on RA. Pt tolerating 2L NC. LS inspiratory & expiratory wheezing; after neb treatment expiratory wheezing. C/O SOB. C/O nausea, zofran given.     Abnormal Results:   Labs Ordered and Resulted from Time of ED Arrival to Time of ED Departure   BASIC METABOLIC PANEL - Abnormal       Result Value    Sodium 137      Potassium 3.8      Chloride 97 (*)     Carbon Dioxide (CO2) 26      Anion Gap 14      Urea Nitrogen 7.3 (*)     Creatinine 0.55      GFR Estimate >90      Calcium 9.8      Glucose 94     LACTIC ACID WHOLE BLOOD WITH 1X REPEAT IN 2 HR WHEN >2 - Normal    Lactic Acid, Initial 1.0     CBC WITH PLATELETS AND DIFFERENTIAL    WBC  Count 10.1      RBC Count 4.83      Hemoglobin 15.6      Hematocrit 45.4      MCV 94      MCH 32.3      MCHC 34.4      RDW 12.1      Platelet Count 270      % Neutrophils 78      % Lymphocytes 14      % Monocytes 7      % Eosinophils 0      % Basophils 0      % Immature Granulocytes 1      NRBCs per 100 WBC 0      Absolute Neutrophils 7.8      Absolute Lymphocytes 1.4      Absolute Monocytes 0.7      Absolute Eosinophils 0.0      Absolute Basophils 0.0      Absolute Immature Granulocytes 0.1      Absolute NRBCs 0.0     BLOOD CULTURE        No orders to display       Treatments provided: see MAR, imaging, & labs  Family Comments: n/a  OBS brochure/video discussed/provided to patient:  No  ED Medications:   Medications   cefTRIAXone (ROCEPHIN) 2 g vial to attach to  ml bag for ADULTS or NS 50 ml bag for PEDS (2 g Intravenous $New Bag 8/9/24 1755)   ipratropium - albuterol 0.5 mg/2.5 mg/3 mL (DUONEB) neb solution 3 mL (3 mLs Nebulization $Given 8/9/24 1747)   methylPREDNISolone sodium succinate (solu-MEDROL) injection 125 mg (125 mg Intravenous $Given 8/9/24 1748)   doxycycline hyclate (VIBRAMYCIN) capsule 100 mg (100 mg Oral $Given 8/9/24 1755)   ondansetron (ZOFRAN) injection 4 mg (4 mg Intravenous $Given 8/9/24 1805)       Drips infusing:  No  For the majority of the shift this patient was Green.   Interventions performed were n/a.    Sepsis treatment initiated: No    Cares/treatment/interventions/medications to be completed following ED care: see in-pt orders    ED Nurse Name: Christie Gilman RN  6:22 PM  RECEIVING UNIT ED HANDOFF REVIEW    Above ED Nurse Handoff Report was reviewed: Yes  Reviewed by: Sharron Du RN on August 9, 2024 at 6:38 PM   AURELIO Win called the ED to inform them the note was read: Yes       9

## 2024-08-10 LAB
ANION GAP SERPL CALCULATED.3IONS-SCNC: 12 MMOL/L (ref 7–15)
BUN SERPL-MCNC: 9.6 MG/DL (ref 8–23)
CALCIUM SERPL-MCNC: 9.4 MG/DL (ref 8.8–10.4)
CHLORIDE SERPL-SCNC: 100 MMOL/L (ref 98–107)
CREAT SERPL-MCNC: 0.53 MG/DL (ref 0.51–0.95)
EGFRCR SERPLBLD CKD-EPI 2021: >90 ML/MIN/1.73M2
ERYTHROCYTE [DISTWIDTH] IN BLOOD BY AUTOMATED COUNT: 12.1 % (ref 10–15)
GLUCOSE SERPL-MCNC: 131 MG/DL (ref 70–99)
HCO3 SERPL-SCNC: 25 MMOL/L (ref 22–29)
HCT VFR BLD AUTO: 44.1 % (ref 35–47)
HGB BLD-MCNC: 14.7 G/DL (ref 11.7–15.7)
MCH RBC QN AUTO: 31.6 PG (ref 26.5–33)
MCHC RBC AUTO-ENTMCNC: 33.3 G/DL (ref 31.5–36.5)
MCV RBC AUTO: 95 FL (ref 78–100)
PLATELET # BLD AUTO: 270 10E3/UL (ref 150–450)
POTASSIUM SERPL-SCNC: 4.5 MMOL/L (ref 3.4–5.3)
RBC # BLD AUTO: 4.65 10E6/UL (ref 3.8–5.2)
SODIUM SERPL-SCNC: 137 MMOL/L (ref 135–145)
WBC # BLD AUTO: 5.1 10E3/UL (ref 4–11)

## 2024-08-10 PROCEDURE — 94640 AIRWAY INHALATION TREATMENT: CPT | Mod: 76

## 2024-08-10 PROCEDURE — 94640 AIRWAY INHALATION TREATMENT: CPT

## 2024-08-10 PROCEDURE — 94799 UNLISTED PULMONARY SVC/PX: CPT

## 2024-08-10 PROCEDURE — 80048 BASIC METABOLIC PNL TOTAL CA: CPT

## 2024-08-10 PROCEDURE — 36415 COLL VENOUS BLD VENIPUNCTURE: CPT

## 2024-08-10 PROCEDURE — 120N000001 HC R&B MED SURG/OB

## 2024-08-10 PROCEDURE — 85027 COMPLETE CBC AUTOMATED: CPT

## 2024-08-10 PROCEDURE — 250N000009 HC RX 250

## 2024-08-10 PROCEDURE — 250N000011 HC RX IP 250 OP 636

## 2024-08-10 PROCEDURE — 258N000003 HC RX IP 258 OP 636

## 2024-08-10 PROCEDURE — 99232 SBSQ HOSP IP/OBS MODERATE 35: CPT | Performed by: HOSPITALIST

## 2024-08-10 PROCEDURE — 250N000013 HC RX MED GY IP 250 OP 250 PS 637: Performed by: HOSPITALIST

## 2024-08-10 PROCEDURE — 250N000013 HC RX MED GY IP 250 OP 250 PS 637

## 2024-08-10 PROCEDURE — 999N000157 HC STATISTIC RCP TIME EA 10 MIN

## 2024-08-10 RX ORDER — AMLODIPINE BESYLATE 10 MG/1
10 TABLET ORAL EVERY EVENING
Status: DISCONTINUED | OUTPATIENT
Start: 2024-08-10 | End: 2024-08-12 | Stop reason: HOSPADM

## 2024-08-10 RX ORDER — LORATADINE 10 MG/1
10 TABLET ORAL DAILY
Status: DISCONTINUED | OUTPATIENT
Start: 2024-08-10 | End: 2024-08-11

## 2024-08-10 RX ORDER — GUAIFENESIN 600 MG/1
600 TABLET, EXTENDED RELEASE ORAL 2 TIMES DAILY
Status: DISCONTINUED | OUTPATIENT
Start: 2024-08-10 | End: 2024-08-11

## 2024-08-10 RX ORDER — PANTOPRAZOLE SODIUM 40 MG/1
40 TABLET, DELAYED RELEASE ORAL
Status: DISCONTINUED | OUTPATIENT
Start: 2024-08-10 | End: 2024-08-10

## 2024-08-10 RX ADMIN — ENOXAPARIN SODIUM 40 MG: 40 INJECTION SUBCUTANEOUS at 20:28

## 2024-08-10 RX ADMIN — CEFTRIAXONE 2 G: 2 INJECTION, POWDER, FOR SOLUTION INTRAMUSCULAR; INTRAVENOUS at 16:57

## 2024-08-10 RX ADMIN — PANTOPRAZOLE SODIUM 40 MG: 40 TABLET, DELAYED RELEASE ORAL at 06:31

## 2024-08-10 RX ADMIN — IPRATROPIUM BROMIDE AND ALBUTEROL SULFATE 3 ML: .5; 3 SOLUTION RESPIRATORY (INHALATION) at 15:58

## 2024-08-10 RX ADMIN — AMLODIPINE BESYLATE 10 MG: 10 TABLET ORAL at 20:28

## 2024-08-10 RX ADMIN — LORATADINE 10 MG: 10 TABLET ORAL at 13:53

## 2024-08-10 RX ADMIN — AZITHROMYCIN MONOHYDRATE 250 MG: 500 INJECTION, POWDER, LYOPHILIZED, FOR SOLUTION INTRAVENOUS at 20:29

## 2024-08-10 RX ADMIN — FLUTICASONE FUROATE AND VILANTEROL TRIFENATATE 1 PUFF: 100; 25 POWDER RESPIRATORY (INHALATION) at 08:11

## 2024-08-10 RX ADMIN — GUAIFENESIN 600 MG: 600 TABLET, EXTENDED RELEASE ORAL at 20:28

## 2024-08-10 RX ADMIN — IPRATROPIUM BROMIDE AND ALBUTEROL SULFATE 3 ML: .5; 3 SOLUTION RESPIRATORY (INHALATION) at 12:11

## 2024-08-10 RX ADMIN — METHYLPREDNISOLONE SODIUM SUCCINATE 40 MG: 40 INJECTION, POWDER, FOR SOLUTION INTRAMUSCULAR; INTRAVENOUS at 08:02

## 2024-08-10 RX ADMIN — GABAPENTIN 300 MG: 300 CAPSULE ORAL at 22:36

## 2024-08-10 RX ADMIN — IPRATROPIUM BROMIDE AND ALBUTEROL SULFATE 3 ML: .5; 3 SOLUTION RESPIRATORY (INHALATION) at 08:11

## 2024-08-10 ASSESSMENT — ACTIVITIES OF DAILY LIVING (ADL)
ADLS_ACUITY_SCORE: 28
ADLS_ACUITY_SCORE: 28
ADLS_ACUITY_SCORE: 27
ADLS_ACUITY_SCORE: 28
ADLS_ACUITY_SCORE: 27
ADLS_ACUITY_SCORE: 28
ADLS_ACUITY_SCORE: 27
ADLS_ACUITY_SCORE: 27
ADLS_ACUITY_SCORE: 28
ADLS_ACUITY_SCORE: 28
DEPENDENT_IADLS:: INDEPENDENT
ADLS_ACUITY_SCORE: 28
ADLS_ACUITY_SCORE: 28
ADLS_ACUITY_SCORE: 27
ADLS_ACUITY_SCORE: 27
ADLS_ACUITY_SCORE: 28
ADLS_ACUITY_SCORE: 27

## 2024-08-10 NOTE — PLAN OF CARE
"End of Shift Summary  For vital signs and complete assessments, please see documentation flowsheets.     Pertinent assessments: A&O x4. VSS on 2 L NC. Denies N/V. Reports some lung pain, declined intervention. Some SOB noted. LS dim with some expiratory wheezing. Up SBA with IV to bathroom. Tolerating a regular diet.  mL/hr. IV Zithromax given.     Major Shift Events: Admitted to floor, CT completed    Treatment Plan: Wean supplemental oxygen, IV Zithromax, pain management, symptom management, Nebs     Bedside Nurse: Chelsea Joyner RN           Goal Outcome Evaluation:      Plan of Care Reviewed With: patient    Overall Patient Progress: no changeOverall Patient Progress: no change    Outcome Evaluation: pt addmitted to floor, on 2 L NC, IV xithro              Problem: Adult Inpatient Plan of Care  Goal: Plan of Care Review  Description: The Plan of Care Review/Shift note should be completed every shift.  The Outcome Evaluation is a brief statement about your assessment that the patient is improving, declining, or no change.  This information will be displayed automatically on your shift  note.  Outcome: Progressing  Flowsheets (Taken 8/10/2024 8488)  Outcome Evaluation: pt addmitted to floor, on 2 L NC, IV xithro  Plan of Care Reviewed With: patient  Overall Patient Progress: no change  Goal: Patient-Specific Goal (Individualized)  Description: You can add care plan individualizations to a care plan. Examples of Individualization might be:  \"Parent requests to be called daily at 9am for status\", \"I have a hard time hearing out of my right ear\", or \"Do not touch me to wake me up as it startles  me\".  Outcome: Progressing  Goal: Absence of Hospital-Acquired Illness or Injury  Outcome: Progressing  Intervention: Identify and Manage Fall Risk  Recent Flowsheet Documentation  Taken 8/9/2024 2150 by Chelsea Joyner, RN  Safety Promotion/Fall Prevention:   activity supervised   assistive device/personal items " within reach   clutter free environment maintained   nonskid shoes/slippers when out of bed   patient and family education   room near nurse's station   room organization consistent   safety round/check completed   supervised activity  Intervention: Prevent Skin Injury  Recent Flowsheet Documentation  Taken 8/9/2024 2150 by Chelsea Joyner RN  Body Position: position changed independently  Skin Protection:   adhesive use limited   incontinence pads utilized  Device Skin Pressure Protection: absorbent pad utilized/changed  Intervention: Prevent and Manage VTE (Venous Thromboembolism) Risk  Recent Flowsheet Documentation  Taken 8/9/2024 2150 by Chelsea Joyner RN  VTE Prevention/Management: SCDs off (sequential compression devices)  Intervention: Prevent Infection  Recent Flowsheet Documentation  Taken 8/9/2024 2150 by Chelsea Joyner RN  Infection Prevention:   cohorting utilized   hand hygiene promoted   rest/sleep promoted   single patient room provided  Goal: Optimal Comfort and Wellbeing  Outcome: Progressing  Intervention: Monitor Pain and Promote Comfort  Recent Flowsheet Documentation  Taken 8/9/2024 2150 by Chelsea oJyner RN  Pain Management Interventions: declines  Goal: Readiness for Transition of Care  Outcome: Progressing  Intervention: Mutually Develop Transition Plan  Recent Flowsheet Documentation  Taken 8/9/2024 2156 by Chelsea Joyner RN  Equipment Currently Used at Home: none     Problem: Pain Acute  Goal: Optimal Pain Control and Function  Outcome: Progressing  Intervention: Develop Pain Management Plan  Recent Flowsheet Documentation  Taken 8/9/2024 2150 by Chelsea Joyner RN  Pain Management Interventions: declines  Intervention: Prevent or Manage Pain  Recent Flowsheet Documentation  Taken 8/9/2024 2150 by Chelsea Joyner RN  Medication Review/Management: medications reviewed

## 2024-08-10 NOTE — CONSULTS
Care Management Initial Consult    General Information  Assessment completed with: Patient,    Type of CM/SW Visit: Initial Assessment    Primary Care Provider verified and updated as needed: Yes   Readmission within the last 30 days:        Reason for Consult: care coordination/care conference, discharge planning  Advance Care Planning:            Communication Assessment  Patient's communication style: spoken language (English or Bilingual)    Hearing Difficulty or Deaf: no   Wear Glasses or Blind: no    Cognitive  Cognitive/Neuro/Behavioral: WDL                      Living Environment:   People in home: alone (spouse currently in a TCU)     Current living Arrangements: house      Able to return to prior arrangements: yes       Family/Social Support:  Care provided by: self  Provides care for: no one  Marital Status:   Children          Description of Support System: Supportive, Involved       Family support. 3 children (x2 in [place]-son& daughter), x2 grandchildren  Current Resources:   Patient receiving home care services: No     Community Resources: None  Equipment currently used at home: none  Supplies currently used at home: None    Employment/Financial:  Financial Concerns: none         Does the patient's insurance plan have a 3 day qualifying hospital stay waiver?  No    Lifestyle & Psychosocial Needs:  Social Determinants of Health     Food Insecurity: Not on file   Depression: Not at risk (4/15/2024)    PHQ-2     PHQ-2 Score: 0   Housing Stability: Not on file   Tobacco Use: Medium Risk (5/21/2024)    Patient History     Smoking Tobacco Use: Former     Smokeless Tobacco Use: Former     Passive Exposure: Not on file   Financial Resource Strain: Not on file   Alcohol Use: Not on file   Transportation Needs: Not on file   Physical Activity: Not on file   Interpersonal Safety: Not on file   Stress: Not on file   Social Connections: Not on file   Health Literacy: Not on file       Functional Status:  Prior  to admission patient needed assistance:   Dependent ADLs:: Independent  Dependent IADLs:: Independent       Mental Health Status:  Mental Health Status: No Current Concerns       Additional Information:  Patient admitted with acute COPD exacerbation in history of lung cancer and recent pneumothorax. Met with patient and introduced CM role. Patient lives in a 2 story home with her .  is currently in a TCU recovering. She is alone at home. States she is independent in all ADLs. She does not use a walker or cane. Patient is not open with any services. Her 3 children provide support when needed. Patient has no concerns with discharging home. She declines needing any services or support.    No further care management needs at this time. CM will remain available if needs arise.     Bianca Gusman RN Case Manager  Inpatient Care Coordination   [facility]   [phone]        Bianca Gusman RN

## 2024-08-10 NOTE — PHARMACY-ADMISSION MEDICATION HISTORY
Pharmacist Admission Medication History    Admission medication history is complete. The information provided in this note is only as accurate as the sources available at the time of the update.    Information Source(s): Patient via in-person    Pertinent Information: None    Changes made to PTA medication list:  Added: None  Deleted: doxycycline  Changed: budesonide to daily, Nexium to as needed instead of scheduled, gabapentin to as needed instead of scheduled    Allergies reviewed with patient and updates made in EHR: yes    Medication History Completed By: Anushka Garcia formerly [facility] 8/9/2024 11:08 PM    PTA Med List   Medication Sig Last Dose    amLODIPine (NORVASC) 10 MG tablet Take 1 tablet (10 mg) by mouth every evening 8/8/2024    azithromycin (ZITHROMAX) 250 MG tablet Take 2 tablets (500 mg) by mouth daily for 1 day, THEN 1 tablet (250 mg) daily for 4 days. 8/9/2024    budesonide (PULMICORT) 0.5 MG/2ML neb solution Spray 2 mLs (0.5 mg) in nostril daily Empty contents of ampule into 240mL of saline solution and rinse both nasal cavities as instructed twice daily. (Patient taking differently: Spray 0.5 mg in nostril daily Empty contents of ampule into 240mL of saline solution and rinse both nasal cavities as instructed daily.) 8/8/2024    cefuroxime (CEFTIN) 500 MG tablet Take 1 tablet (500 mg) by mouth 2 times daily for 5 days 8/9/2024 at x1    esomeprazole (NEXIUM) 20 MG DR capsule Take 20 mg by mouth 2 times daily as needed Take 30-60 minutes before eating. prn at prn    Fluticasone-Umeclidin-Vilant (TRELEGY ELLIPTA) 100-62.5-25 MCG/ACT oral inhaler INHALE ONE PUFF BY MOUTH EVERY DAY 8/8/2024    gabapentin (NEURONTIN) 300 MG capsule Take 300 mg by mouth daily as needed prn at prn    ipratropium (ATROVENT) 0.06 % nasal spray Spray 2 sprays into both nostrils 4 times daily as needed for rhinitis prn at prn    levalbuterol (XOPENEX HFA) 45 MCG/ACT inhaler Inhale 2 puffs into the lungs every 4 hours as needed for  shortness of breath / dyspnea or wheezing prn at prn    predniSONE (DELTASONE) 20 MG tablet Take two tablets (= 40mg) each day for 5 (five) days 8/9/2024 at am

## 2024-08-10 NOTE — PLAN OF CARE
"To Do:  End of Shift Summary  For vital signs and complete assessments, please see documentation flowsheets.     Pertinent assessments: Pt is A/O x4, up independent in the room. VSS, weaned to RA. Has a congested productive cough, lungs expiratory wheezes. Pt is on Zithromax and Rcephin for Possible PNA and Solu-medrol for COPD exacerbation. Receiving scheduled nebs. Pt does not feel like she is improving well and is very congested- paged and added Claritin and Mucinex. No N/V but feels her appetite is decreased. Plan to discharge home when medically cleared.     Major Shift Events: Weaned to RA. Added Mucinex and Claritin. IS instructions provided.     Treatment Plan: IV ABX, Solu-medrol, IS, nebs.     Bedside Nurse: Courtney Lamb RN      Goal Outcome Evaluation:      Plan of Care Reviewed With: patient    Overall Patient Progress: improving  Overall Patient Progress: improving    Outcome Evaluation: Pt is upset - does not feel like she is improving and is very congested. Added Mucinex and Claratin.      Problem: Adult Inpatient Plan of Care  Goal: Plan of Care Review  Description: The Plan of Care Review/Shift note should be completed every shift.  The Outcome Evaluation is a brief statement about your assessment that the patient is improving, declining, or no change.  This information will be displayed automatically on your shift  note.  Outcome: Progressing  Flowsheets (Taken 8/10/2024 1401)  Outcome Evaluation: Pt is upset - does not feel like she is improving and is very congested. Added Mucinex and Claratin.  Plan of Care Reviewed With: patient  Overall Patient Progress: improving  Goal: Patient-Specific Goal (Individualized)  Description: You can add care plan individualizations to a care plan. Examples of Individualization might be:  \"Parent requests to be called daily at 9am for status\", \"I have a hard time hearing out of my right ear\", or \"Do not touch me to wake me up as it startles  me\".  Outcome: " Progressing  Goal: Absence of Hospital-Acquired Illness or Injury  Outcome: Progressing  Intervention: Identify and Manage Fall Risk  Recent Flowsheet Documentation  Taken 8/10/2024 0800 by Courtney Lamb RN  Safety Promotion/Fall Prevention:   activity supervised   assistive device/personal items within reach   nonskid shoes/slippers when out of bed   room organization consistent   safety round/check completed   treat underlying cause   treat reversible contributory factors   clutter free environment maintained   patient and family education   room near nurse's station  Intervention: Prevent and Manage VTE (Venous Thromboembolism) Risk  Recent Flowsheet Documentation  Taken 8/10/2024 0800 by Courtney Lamb RN  VTE Prevention/Management: SCDs off (sequential compression devices)  Intervention: Prevent Infection  Recent Flowsheet Documentation  Taken 8/10/2024 0800 by Courtney Lamb RN  Infection Prevention: single patient room provided  Goal: Optimal Comfort and Wellbeing  Outcome: Progressing  Goal: Readiness for Transition of Care  Outcome: Progressing     Problem: Pain Acute  Goal: Optimal Pain Control and Function  Outcome: Progressing     Problem: Gas Exchange Impaired  Goal: Optimal Gas Exchange  Outcome: Progressing

## 2024-08-10 NOTE — PROGRESS NOTES
"[facility]     Date: 8/9/2024   Admission Dx: 8/9/2024  Pulmonary History: COPD, Pneumonia, Partial Pneumonectomy  Home Nebulizer/MDI Use: N/A  Home Oxygen: N/A  Acuity Level (RCAT flow sheet): 3   Aerosol Therapy initiated: Duoneb QID      Pulmonary Hygiene initiated:  Deep Breathing and Coughing      Volume Expansion initiated: Incentive Spirometer      Current Oxygen Requirements: 2LPM  Current SpO2: 94%    Re-evaluation date: 8/12/2024    Patient Education: N/A      See \"RT Assessments\" flow sheet for patient assessment scoring and Acuity Level Details.     Leeroy Cortez, RT on 8/9/2024 at 8:12 PM        "

## 2024-08-10 NOTE — PROGRESS NOTES
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     Was seen in the ED on 8/8 for right lung pain and cough.  Had been having stabbing pains in her right lung due to significant productive cough.  Her phlegm was occasionally blood-tinged, now resolved.  The patient also thought her left leg appeared more swollen but denied any pain.  ED provider did not appreciate any significant swelling or signs of DVT.  No fevers or chills.  Viral respiratory panel was negative.  Chest x-ray per radiology read showed increased patchy nodular opacities in right lung base which could be infectious. She was sent home on cefuroxime and azithromycin and a course of prednisone to treat COPD exacerbation.     Returned to the ED on 8/9 due to continuing to feel unwell.  Unable to tolerate her oral antibiotics due to vomiting.  She thinks the vomiting may be due to her \"frequent coughing or not having any food in her stomach.\"  Very poor appetite and fluid intake the last week.  Continuing to have subjective fevers and chills.  Has been feeling unwell for the last week.  Also reports her  was hospitalized for pneumonia 2 weeks ago.  Currently denies any chest pain, nausea, vomiting, abdominal pain.  Feels bloated. In the ED she received 2 g ceftriaxone, PO doxycycline, 125 mg of Solu-Medrol.     Acute hypoxic respiratory failure  Possible community-acquired pneumonia  Acute COPD exacerbation  Possible CAP with the right lower lobe opacities and feeling unwell, however the patient is afebrile, & no leukocytosis.  Question if she could have progression of her lung carcinoid tumor.  Chest x-ray back in May also noted " "these right lower lung opacities.  Per radiology the right lower lung opacities appear slightly increased on CXR today.   - IV antibiotics ceftriaxone & azithromycin  - CT chest: Expected postoperative appearance after wedge resection right upper lobe. No suspicious pulmonary lesion, Increasing callus formation at right lateral rib fractures accounts for the perceived nodularity at right base on recent chest x-ray.   - IV solu-medrol 40 mg daily  - duo nebs 4 times daily  - albuterol PRN   - wean oxygen as tolerated, goal O2 > 90  - continue pta inhaler trelegy      Carcinoid tumor of lung  Recent right side pneumothorax   Follows with oncology outpatient.  Was recently admitted to [facility] from 5/3-5/7 after she was found to have a large right-sided pneumothorax that required a chest tube after a right upper lobe wedge resection.  Underwent a right wedge resection of the right upper lobe on 4/26/2024 at [facility].      Essential hypertension  - Amlodipine 10 mg      # Code status: Full code   # DVT: Lovenox   # IVF: none           Medically Ready for Discharge: Anticipated Tomorrow                    Melly Holcomb MD    Subjective   Chief Complaint:  Sob  Subjective:   Wants to go home but still not back to baseline, is tearful about circumstances of quality of life since surgery, has chest congestion and pain with coughing        Objective   BP (!) 151/76 (BP Location: Left arm)   Pulse 86   Temp 98.1  F (36.7  C) (Oral)   Resp 18   Ht 1.575 m (5' 2\")   Wt 50.1 kg (110 lb 7.2 oz)   LMP 09/22/2010   SpO2 94%   BMI 20.20 kg/m       Physical Exam  General: Pt in NAD, normal appearance  HEENT: OP clear MMM, no JVD  Lungs: Expiratory wheezing, normal breathing  without accessory muscle usage, no wheezing, rhonchi or crackles  Cardiac: +S1, S2, RRR, no MRG, no edema  Abdominal: normal bowel sounds, NT/ND  Skin: warm, dry, normal turgor, no rash  Psyche: A& O x3, appropriate affect             Intake/Output Summary " "(Last 24 hours) at 8/10/2024 1145  Last data filed at 8/10/2024 1029  Gross per 24 hour   Intake 800 ml   Output --   Net 800 ml           Labs and Imaging Results:      Recent Labs   Lab 08/10/24  0710 08/09/24  1719   WBC 5.1 10.1   HGB 14.7 15.6    270        Recent Labs   Lab 08/10/24  0710 08/09/24  1719    137   CO2 25 26   BUN 9.6 7.3*      No results for input(s): \"INR\", \"PTT\" in the last 168 hours.   No results for input(s): \"CKMB\" in the last 168 hours.    Invalid input(s): \"TROPONINT\"   No results for input(s): \"ALBUMIN\", \"AST\", \"ALT\", \"ALKPHOS\", \"BILITOT\" in the last 168 hours.     Micro:     Radio:  CT Chest w/o Contrast   Final Result   IMPRESSION:    1.  Expected postoperative appearance after wedge resection right upper lobe. No suspicious pulmonary lesion.   2.  Increasing callus formation at right lateral rib fractures accounts for the perceived nodularity at right base on recent chest x-ray.                 Medications:      Scheduled Meds:    Current Facility-Administered Medications   Medication Dose Route Frequency Provider Last Rate Last Admin    amLODIPine (NORVASC) tablet 10 mg  10 mg Oral QPM Melly Holcomb MD        azithromycin (ZITHROMAX) 250 mg in  mL intermittent infusion  250 mg Intravenous Q24H Valencia Garcia PA-C        cefTRIAXone (ROCEPHIN) 2 g vial to attach to  ml bag for ADULTS or NS 50 ml bag for PEDS  2 g Intravenous Q24H Valencia Garcia PA-C        enoxaparin ANTICOAGULANT (LOVENOX) injection 40 mg  40 mg Subcutaneous Q24H Valencia Garcia PA-C   40 mg at 08/09/24 2150    fluticasone-vilanterol (BREO ELLIPTA) 100-25 MCG/ACT inhaler 1 puff  1 puff Inhalation Daily Valencia Garcia PA-C   1 puff at 08/10/24 0811    And    umeclidinium (INCRUSE ELLIPTA) 62.5 MCG/ACT inhaler 1 puff  1 puff Inhalation Daily Valencia Garcia PA-C        gabapentin (NEURONTIN) capsule 300 mg  300 mg Oral TID Valencia Garcia PA-C        ipratropium - albuterol 0.5 mg/2.5 " mg/3 mL (DUONEB) neb solution 3 mL  3 mL Nebulization 4x Daily Valencia Garcia PA-C   3 mL at 08/10/24 0811    methylPREDNISolone sodium succinate (SOLU-MEDROL) injection 40 mg  40 mg Intravenous Daily Valencia Garcia PA-C   40 mg at 08/10/24 0802    pantoprazole (PROTONIX) EC tablet 40 mg  40 mg Oral QAM AC Valencia Garcia PA-C   40 mg at 08/10/24 0631    sodium chloride (PF) 0.9% PF flush 3 mL  3 mL Intracatheter Q8H Valencia Garcia PA-C   3 mL at 08/09/24 2150     Continuous Infusions:    Current Facility-Administered Medications   Medication Dose Route Frequency Provider Last Rate Last Admin     PRN Meds:    Current Facility-Administered Medications   Medication Dose Route Frequency Provider Last Rate Last Admin    acetaminophen (TYLENOL) tablet 650 mg  650 mg Oral Q4H PRN Valencia Garcia PA-C        Or    acetaminophen (TYLENOL) Suppository 650 mg  650 mg Rectal Q4H PRN Valencia Garcia PA-C        albuterol (PROVENTIL) neb solution 2.5 mg  3 mL Nebulization Q2H PRN Valencia Garcia PA-C        calcium carbonate (TUMS) chewable tablet 1,000 mg  1,000 mg Oral 4x Daily PRN Valencia Garcia PA-C        guaiFENesin-dextromethorphan (ROBITUSSIN DM) 100-10 MG/5ML syrup 10 mL  10 mL Oral Q4H PRN Valencia Garcia PA-C        lidocaine (LMX4) cream   Topical Q1H PRN Valencia Garcia PA-C        lidocaine 1 % 0.1-1 mL  0.1-1 mL Other Q1H PRN Valencia Garcia PA-C        melatonin tablet 1 mg  1 mg Oral At Bedtime PRN Valencia Garcia PA-C        ondansetron (ZOFRAN ODT) ODT tab 4 mg  4 mg Oral Q6H PRN Valencia Garcia PA-C        Or    ondansetron (ZOFRAN) injection 4 mg  4 mg Intravenous Q6H PRN Valencia Garcia PA-C        prochlorperazine (COMPAZINE) injection 5 mg  5 mg Intravenous Q6H PRN Valencia Garcia PA-C        Or    prochlorperazine (COMPAZINE) tablet 5 mg  5 mg Oral Q6H PRN Valencia Garcia PA-C        Or    prochlorperazine (COMPAZINE) suppository 12.5 mg  12.5 mg Rectal Q12H PRN Valencia Garcia PA-C         senna-docusate (SENOKOT-S/PERICOLACE) 8.6-50 MG per tablet 1 tablet  1 tablet Oral BID PRN Valencia Garcia PA-C        Or    senna-docusate (SENOKOT-S/PERICOLACE) 8.6-50 MG per tablet 2 tablet  2 tablet Oral BID PRN Valencia Garcia PA-C        sodium chloride (PF) 0.9% PF flush 3 mL  3 mL Intracatheter q1 min prn Valencia Garcia PA-C

## 2024-08-11 LAB
TROPONIN T SERPL HS-MCNC: 15 NG/L
TROPONIN T SERPL HS-MCNC: 15 NG/L
TROPONIN T SERPL HS-MCNC: 17 NG/L
TROPONIN T SERPL HS-MCNC: 25 NG/L

## 2024-08-11 PROCEDURE — 94640 AIRWAY INHALATION TREATMENT: CPT | Mod: 76

## 2024-08-11 PROCEDURE — 258N000003 HC RX IP 258 OP 636

## 2024-08-11 PROCEDURE — 250N000013 HC RX MED GY IP 250 OP 250 PS 637: Performed by: HOSPITALIST

## 2024-08-11 PROCEDURE — 94640 AIRWAY INHALATION TREATMENT: CPT

## 2024-08-11 PROCEDURE — 84484 ASSAY OF TROPONIN QUANT: CPT | Performed by: HOSPITALIST

## 2024-08-11 PROCEDURE — 93010 ELECTROCARDIOGRAM REPORT: CPT | Performed by: INTERNAL MEDICINE

## 2024-08-11 PROCEDURE — 120N000001 HC R&B MED SURG/OB

## 2024-08-11 PROCEDURE — 250N000009 HC RX 250

## 2024-08-11 PROCEDURE — 250N000011 HC RX IP 250 OP 636

## 2024-08-11 PROCEDURE — 36415 COLL VENOUS BLD VENIPUNCTURE: CPT | Performed by: HOSPITALIST

## 2024-08-11 PROCEDURE — 99232 SBSQ HOSP IP/OBS MODERATE 35: CPT | Performed by: HOSPITALIST

## 2024-08-11 PROCEDURE — 999N000157 HC STATISTIC RCP TIME EA 10 MIN

## 2024-08-11 PROCEDURE — 250N000013 HC RX MED GY IP 250 OP 250 PS 637

## 2024-08-11 RX ORDER — FLUTICASONE PROPIONATE 50 MCG
1 SPRAY, SUSPENSION (ML) NASAL DAILY
Status: DISCONTINUED | OUTPATIENT
Start: 2024-08-11 | End: 2024-08-12 | Stop reason: HOSPADM

## 2024-08-11 RX ORDER — LORATADINE 10 MG/1
10 TABLET ORAL DAILY
Status: DISCONTINUED | OUTPATIENT
Start: 2024-08-11 | End: 2024-08-12 | Stop reason: HOSPADM

## 2024-08-11 RX ADMIN — AZITHROMYCIN MONOHYDRATE 250 MG: 500 INJECTION, POWDER, LYOPHILIZED, FOR SOLUTION INTRAVENOUS at 20:57

## 2024-08-11 RX ADMIN — IPRATROPIUM BROMIDE AND ALBUTEROL SULFATE 3 ML: .5; 3 SOLUTION RESPIRATORY (INHALATION) at 12:09

## 2024-08-11 RX ADMIN — LORATADINE 10 MG: 10 TABLET ORAL at 08:59

## 2024-08-11 RX ADMIN — IPRATROPIUM BROMIDE AND ALBUTEROL SULFATE 3 ML: .5; 3 SOLUTION RESPIRATORY (INHALATION) at 20:20

## 2024-08-11 RX ADMIN — CEFTRIAXONE 2 G: 2 INJECTION, POWDER, FOR SOLUTION INTRAMUSCULAR; INTRAVENOUS at 17:05

## 2024-08-11 RX ADMIN — IPRATROPIUM BROMIDE AND ALBUTEROL SULFATE 3 ML: .5; 3 SOLUTION RESPIRATORY (INHALATION) at 08:22

## 2024-08-11 RX ADMIN — GABAPENTIN 300 MG: 300 CAPSULE ORAL at 21:01

## 2024-08-11 RX ADMIN — FLUTICASONE PROPIONATE 1 SPRAY: 50 SPRAY, METERED NASAL at 11:57

## 2024-08-11 RX ADMIN — ENOXAPARIN SODIUM 40 MG: 40 INJECTION SUBCUTANEOUS at 20:55

## 2024-08-11 RX ADMIN — METHYLPREDNISOLONE SODIUM SUCCINATE 40 MG: 40 INJECTION, POWDER, FOR SOLUTION INTRAMUSCULAR; INTRAVENOUS at 08:59

## 2024-08-11 RX ADMIN — PANTOPRAZOLE SODIUM 40 MG: 40 TABLET, DELAYED RELEASE ORAL at 06:38

## 2024-08-11 RX ADMIN — AMLODIPINE BESYLATE 10 MG: 10 TABLET ORAL at 20:56

## 2024-08-11 RX ADMIN — FLUTICASONE FUROATE AND VILANTEROL TRIFENATATE 1 PUFF: 100; 25 POWDER RESPIRATORY (INHALATION) at 08:22

## 2024-08-11 RX ADMIN — GUAIFENESIN 600 MG: 600 TABLET, EXTENDED RELEASE ORAL at 08:59

## 2024-08-11 ASSESSMENT — ACTIVITIES OF DAILY LIVING (ADL)
ADLS_ACUITY_SCORE: 27
ADLS_ACUITY_SCORE: 25
ADLS_ACUITY_SCORE: 27
ADLS_ACUITY_SCORE: 25
ADLS_ACUITY_SCORE: 25
ADLS_ACUITY_SCORE: 27
ADLS_ACUITY_SCORE: 28
ADLS_ACUITY_SCORE: 25
ADLS_ACUITY_SCORE: 28
ADLS_ACUITY_SCORE: 25
ADLS_ACUITY_SCORE: 25
ADLS_ACUITY_SCORE: 28
ADLS_ACUITY_SCORE: 25
ADLS_ACUITY_SCORE: 27
ADLS_ACUITY_SCORE: 25
ADLS_ACUITY_SCORE: 28
ADLS_ACUITY_SCORE: 27
ADLS_ACUITY_SCORE: 28
ADLS_ACUITY_SCORE: 27
ADLS_ACUITY_SCORE: 27
ADLS_ACUITY_SCORE: 28

## 2024-08-11 NOTE — PLAN OF CARE
"To Do:  End of Shift Summary  For vital signs and complete assessments, please see documentation flowsheets.     Pertinent assessments: Pt A&Ox4, VSS  on 1L NC overnight. Flat affect, feeling down about not seeing/ feeling that she is betting better. Afebrile. Congested cough noted. Independent in the room. Regular diet, minimal appetite. Rates pain at 5 in head, declining intervention. PIV infiltrated. New PIV placed. Zithro and rocephin given. Sputum sample still needed.      Major Shift Events: None.     Treatment Plan: IV ABX, Solu-medrol, IS, nebs.     Bedside Nurse: Shelia Armas RN    Problem: Adult Inpatient Plan of Care  Goal: Plan of Care Review  Description: The Plan of Care Review/Shift note should be completed every shift.  The Outcome Evaluation is a brief statement about your assessment that the patient is improving, declining, or no change.  This information will be displayed automatically on your shift  note.  Outcome: Progressing  Flowsheets (Taken 8/10/2024 2251)  Outcome Evaluation: Pt frusterated. VSS on RA.  Plan of Care Reviewed With: patient  Overall Patient Progress: improving  Goal: Patient-Specific Goal (Individualized)  Description: You can add care plan individualizations to a care plan. Examples of Individualization might be:  \"Parent requests to be called daily at 9am for status\", \"I have a hard time hearing out of my right ear\", or \"Do not touch me to wake me up as it startles  me\".  Outcome: Progressing  Goal: Absence of Hospital-Acquired Illness or Injury  Outcome: Progressing  Intervention: Identify and Manage Fall Risk  Recent Flowsheet Documentation  Taken 8/10/2024 1704 by Shelia Armas, RN  Safety Promotion/Fall Prevention:   safety round/check completed   room organization consistent   room near nurse's station   room door open   assistive device/personal items within reach  Intervention: Prevent and Manage VTE (Venous Thromboembolism) Risk  Recent Flowsheet " Documentation  Taken 8/10/2024 1704 by Shelia Armas RN  VTE Prevention/Management: SCDs off (sequential compression devices)  Intervention: Prevent Infection  Recent Flowsheet Documentation  Taken 8/10/2024 1704 by Shelia Armas RN  Infection Prevention: single patient room provided  Goal: Optimal Comfort and Wellbeing  Outcome: Progressing  Intervention: Monitor Pain and Promote Comfort  Recent Flowsheet Documentation  Taken 8/10/2024 1704 by Shelia Armas RN  Pain Management Interventions: declines  Goal: Readiness for Transition of Care  Outcome: Progressing     Problem: Pain Acute  Goal: Optimal Pain Control and Function  Outcome: Progressing  Intervention: Develop Pain Management Plan  Recent Flowsheet Documentation  Taken 8/10/2024 1704 by Shelia Armas RN  Pain Management Interventions: declines  Intervention: Prevent or Manage Pain  Recent Flowsheet Documentation  Taken 8/10/2024 1704 by Shelia Armas RN  Medication Review/Management: medications reviewed     Problem: Gas Exchange Impaired  Goal: Optimal Gas Exchange  Outcome: Progressing   Goal Outcome Evaluation:      Plan of Care Reviewed With: patient    Overall Patient Progress: improvingOverall Patient Progress: improving    Outcome Evaluation: Pt frusterated. VSS on RA.

## 2024-08-11 NOTE — PLAN OF CARE
"End of Shift Summary  For vital signs and complete assessments, please see documentation flowsheets.     Pertinent assessments: A&O x4. Elevated bp (163/94) other VSS on RA/1 L NC overnight for comfort. Denies pain, N/V. Some SOB, ANSARI noted. LS dim with expiratory wheezing. Congested cough noted but pt claims it is getting better. Up independent in room. Tolerating a regular diet, minimal appetite. PIV SL. Sputum sample still needed.      Major Shift Events: Uneventful     Treatment Plan: IV ABX, Solu-medrol, IS, nebs.     Bedside Nurse: Chelsea Joyner RN            Goal Outcome Evaluation:      Plan of Care Reviewed With: patient    Overall Patient Progress: improvingOverall Patient Progress: improving    Outcome Evaluation: elevated pt 163/94, still frustrated            Problem: Adult Inpatient Plan of Care  Goal: Plan of Care Review  Description: The Plan of Care Review/Shift note should be completed every shift.  The Outcome Evaluation is a brief statement about your assessment that the patient is improving, declining, or no change.  This information will be displayed automatically on your shift  note.  Outcome: Progressing  Flowsheets (Taken 8/11/2024 0697)  Outcome Evaluation: elevated pt 163/94, still frustrated  Plan of Care Reviewed With: patient  Overall Patient Progress: improving  Goal: Patient-Specific Goal (Individualized)  Description: You can add care plan individualizations to a care plan. Examples of Individualization might be:  \"Parent requests to be called daily at 9am for status\", \"I have a hard time hearing out of my right ear\", or \"Do not touch me to wake me up as it startles  me\".  Outcome: Progressing  Goal: Absence of Hospital-Acquired Illness or Injury  Outcome: Progressing  Intervention: Identify and Manage Fall Risk  Recent Flowsheet Documentation  Taken 8/11/2024 0640 by Chelsea Joyner, RN  Safety Promotion/Fall Prevention:   activity supervised   assistive device/personal " items within reach   clutter free environment maintained   nonskid shoes/slippers when out of bed   patient and family education   room near nurse's station   room organization consistent   safety round/check completed   supervised activity  Intervention: Prevent Skin Injury  Recent Flowsheet Documentation  Taken 8/11/2024 0638 by Chelsea Joyner RN  Body Position: position changed independently  Skin Protection:   adhesive use limited   incontinence pads utilized  Device Skin Pressure Protection: absorbent pad utilized/changed  Intervention: Prevent and Manage VTE (Venous Thromboembolism) Risk  Recent Flowsheet Documentation  Taken 8/11/2024 0638 by Chelsea Joyner RN  VTE Prevention/Management: SCDs off (sequential compression devices)  Intervention: Prevent Infection  Recent Flowsheet Documentation  Taken 8/11/2024 0638 by Chelsea Jonyer RN  Infection Prevention:   cohorting utilized   hand hygiene promoted   rest/sleep promoted   single patient room provided  Goal: Optimal Comfort and Wellbeing  Outcome: Progressing  Goal: Readiness for Transition of Care  Outcome: Progressing     Problem: Pain Acute  Goal: Optimal Pain Control and Function  Outcome: Progressing  Intervention: Prevent or Manage Pain  Recent Flowsheet Documentation  Taken 8/11/2024 0638 by Chelsea Joyner RN  Medication Review/Management: medications reviewed     Problem: Gas Exchange Impaired  Goal: Optimal Gas Exchange  Outcome: Progressing  Intervention: Optimize Oxygenation and Ventilation  Recent Flowsheet Documentation  Taken 8/11/2024 0638 by Chelsea Joyner RN  Head of Bed (HOB) Positioning: HOB at 20-30 degrees

## 2024-08-11 NOTE — PROGRESS NOTES
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     Was seen in the ED on 8/8 for right lung pain and cough.  Had been having stabbing pains in her right lung due to significant productive cough.  Her phlegm was occasionally blood-tinged, now resolved.  The patient also thought her left leg appeared more swollen but denied any pain.  ED provider did not appreciate any significant swelling or signs of DVT.  No fevers or chills.  Viral respiratory panel was negative.  Chest x-ray per radiology read showed increased patchy nodular opacities in right lung base which could be infectious. She was sent home on cefuroxime and azithromycin and a course of prednisone to treat COPD exacerbation.     Returned to the ED on 8/9 due to continuing to feel unwell.  Unable to tolerate her oral antibiotics due to vomiting.  She thinks the vomiting may be due to her \"frequent coughing or not having any food in her stomach.\"  Very poor appetite and fluid intake the last week.  Continuing to have subjective fevers and chills.  Has been feeling unwell for the last week.  Also reports her  was hospitalized for pneumonia 2 weeks ago.  Currently denies any chest pain, nausea, vomiting, abdominal pain.  Feels bloated. In the ED she received 2 g ceftriaxone, PO doxycycline, 125 mg of Solu-Medrol.     Acute hypoxic respiratory failure  Possible community-acquired pneumonia  Acute COPD exacerbation  Possible CAP with the right lower lobe opacities and feeling unwell, however the patient is afebrile, & no leukocytosis.  Question if she could have progression of her lung carcinoid tumor.  Chest x-ray back in May also noted " "these right lower lung opacities.  Per radiology the right lower lung opacities appear slightly increased on CXR   -not hypoxic, but still feels she has ANSARI, wheezing appears to be improving.  - IV antibiotics ceftriaxone & azithromycin  - CT chest: Expected postoperative appearance after wedge resection right upper lobe. No suspicious pulmonary lesion, Increasing callus formation at right lateral rib fractures accounts for the perceived nodularity at right base on recent chest x-ray.   - IV solu-medrol 40 mg daily for another day  - duo nebs 4 times daily  - albuterol PRN   - wean oxygen as tolerated, goal O2 > 90  - continue pta inhaler trelegy      Elevated troponin  Chest pain appears to be Pleuritics  Likely NSTEMI type II  - continues to have chest congestion, ECG no changes, trop mildly elevated, will continue to trend, if uptrend's, will order echocardiogram to evaluate this further    Carcinoid tumor of lung  Recent right side pneumothorax   Follows with oncology outpatient.  Was recently admitted to [facility] from 5/3-5/7 after she was found to have a large right-sided pneumothorax that required a chest tube after a right upper lobe wedge resection.  Underwent a right wedge resection of the right upper lobe on 4/26/2024 at [facility].      Essential hypertension  - Amlodipine 10 mg      # Code status: Full code   # DVT: Lovenox   # IVF: none           Medically Ready for Discharge: Anticipated Tomorrow                    Melly Holcomb MD    Subjective   Chief Complaint:  Sob  Subjective:   Wants to go home but still not back to baseline, continues to complain of sinus congestion, along with cough, and mucous congestion  with ANSARI. Otherwise, no other complaints         Objective   BP (!) 146/85 (BP Location: Left arm)   Pulse 91   Temp 98.3  F (36.8  C) (Oral)   Resp 18   Ht 1.575 m (5' 2\")   Wt 50.6 kg (111 lb 8 oz)   LMP 09/22/2010   SpO2 91%   BMI 20.39 kg/m       Physical Exam  General: Pt in NAD, normal " "appearance  HEENT: OP clear MMM, no JVD  Lungs: Expiratory wheezing, normal breathing  without accessory muscle usage, no wheezing, rhonchi or crackles  Cardiac: +S1, S2, RRR, no MRG, no edema  Abdominal: normal bowel sounds, NT/ND  Skin: warm, dry, normal turgor, no rash  Psyche: A& O x3, appropriate affect             Intake/Output Summary (Last 24 hours) at 8/10/2024 1145  Last data filed at 8/10/2024 1029  Gross per 24 hour   Intake 800 ml   Output --   Net 800 ml           Labs and Imaging Results:      Recent Labs   Lab 08/10/24  0710 08/09/24  1719   WBC 5.1 10.1   HGB 14.7 15.6    270        Recent Labs   Lab 08/10/24  0710 08/09/24  1719    137   CO2 25 26   BUN 9.6 7.3*      No results for input(s): \"INR\", \"PTT\" in the last 168 hours.   No results for input(s): \"CKMB\" in the last 168 hours.    Invalid input(s): \"TROPONINT\"   No results for input(s): \"ALBUMIN\", \"AST\", \"ALT\", \"ALKPHOS\", \"BILITOT\" in the last 168 hours.     Micro:     Radio:  CT Chest w/o Contrast   Final Result   IMPRESSION:    1.  Expected postoperative appearance after wedge resection right upper lobe. No suspicious pulmonary lesion.   2.  Increasing callus formation at right lateral rib fractures accounts for the perceived nodularity at right base on recent chest x-ray.                 Medications:      Scheduled Meds:    Current Facility-Administered Medications   Medication Dose Route Frequency Provider Last Rate Last Admin    amLODIPine (NORVASC) tablet 10 mg  10 mg Oral QPM Melly Holcomb MD   10 mg at 08/10/24 2028    azithromycin (ZITHROMAX) 250 mg in  mL intermittent infusion  250 mg Intravenous Q24H Valencia Garcia PA-C   250 mg at 08/10/24 2029    cefTRIAXone (ROCEPHIN) 2 g vial to attach to  ml bag for ADULTS or NS 50 ml bag for PEDS  2 g Intravenous Q24H Valencia Garcia PA-C   2 g at 08/10/24 1657    enoxaparin ANTICOAGULANT (LOVENOX) injection 40 mg  40 mg Subcutaneous Q24H Valencia Garcia PA-C   " 40 mg at 08/10/24 2028    fluticasone (FLONASE) 50 MCG/ACT spray 1 spray  1 spray Both Nostrils Daily Melly Holcomb MD   1 spray at 08/11/24 1157    fluticasone-vilanterol (BREO ELLIPTA) 100-25 MCG/ACT inhaler 1 puff  1 puff Inhalation Daily Valencia Garcia PA-C   1 puff at 08/11/24 0822    And    umeclidinium (INCRUSE ELLIPTA) 62.5 MCG/ACT inhaler 1 puff  1 puff Inhalation Daily Valencia Garcia PA-C        gabapentin (NEURONTIN) capsule 300 mg  300 mg Oral TID Valencia Garcia PA-C   300 mg at 08/10/24 2236    ipratropium - albuterol 0.5 mg/2.5 mg/3 mL (DUONEB) neb solution 3 mL  3 mL Nebulization 4x Daily Valencia Garcia PA-C   3 mL at 08/11/24 1209    loratadine (CLARITIN) tablet 10 mg  10 mg Oral Daily Melly Holcomb MD        methylPREDNISolone sodium succinate (SOLU-MEDROL) injection 40 mg  40 mg Intravenous Daily Valencia Garcia PA-C   40 mg at 08/11/24 0859    pantoprazole (PROTONIX) EC tablet 40 mg  40 mg Oral QAM AC Valencia Garcia PA-C   40 mg at 08/11/24 0638    sodium chloride (PF) 0.9% PF flush 3 mL  3 mL Intracatheter Q8H Valencia Garcia PA-C   3 mL at 08/11/24 0900     Continuous Infusions:    Current Facility-Administered Medications   Medication Dose Route Frequency Provider Last Rate Last Admin     PRN Meds:    Current Facility-Administered Medications   Medication Dose Route Frequency Provider Last Rate Last Admin    acetaminophen (TYLENOL) tablet 650 mg  650 mg Oral Q4H PRN Valencia Garcia PA-C        Or    acetaminophen (TYLENOL) Suppository 650 mg  650 mg Rectal Q4H PRN Valencia Garcia PA-C        albuterol (PROVENTIL) neb solution 2.5 mg  3 mL Nebulization Q2H PRN Valencia Garcia PA-C        calcium carbonate (TUMS) chewable tablet 1,000 mg  1,000 mg Oral 4x Daily PRN Valencia Garcia PA-C        guaiFENesin-dextromethorphan (ROBITUSSIN DM) 100-10 MG/5ML syrup 10 mL  10 mL Oral Q4H PRN Valencia Garcia PA-C        lidocaine (LMX4) cream   Topical Q1H PRN Valencia Garcia PA-C         lidocaine 1 % 0.1-1 mL  0.1-1 mL Other Q1H PRN Valencia Garcia PA-C        melatonin tablet 1 mg  1 mg Oral At Bedtime PRN Valencia Garcia PA-C        ondansetron (ZOFRAN ODT) ODT tab 4 mg  4 mg Oral Q6H PRN Valencia Garcia PA-C        Or    ondansetron (ZOFRAN) injection 4 mg  4 mg Intravenous Q6H PRN Valencia Garcia PA-C        prochlorperazine (COMPAZINE) injection 5 mg  5 mg Intravenous Q6H PRN Valencia Garcia PA-C        Or    prochlorperazine (COMPAZINE) tablet 5 mg  5 mg Oral Q6H PRN Valencia Garcia PA-C        Or    prochlorperazine (COMPAZINE) suppository 12.5 mg  12.5 mg Rectal Q12H PRN Valencia Garcia PA-C        senna-docusate (SENOKOT-S/PERICOLACE) 8.6-50 MG per tablet 1 tablet  1 tablet Oral BID PRN Valencia Garcia PA-C        Or    senna-docusate (SENOKOT-S/PERICOLACE) 8.6-50 MG per tablet 2 tablet  2 tablet Oral BID PRN Valencia Garcia PA-C        sodium chloride (PF) 0.9% PF flush 3 mL  3 mL Intracatheter q1 min prn Valencia Garcia PA-C

## 2024-08-11 NOTE — PLAN OF CARE
"Goal Outcome Evaluation:      Plan of Care Reviewed With: patient    Overall Patient Progress: no changeOverall Patient Progress: no change         To Do:  End of Shift Summary  For vital signs and complete assessments, please see documentation flowsheets.     Pertinent assessments: A&O x4. On room air @ 91%, VSS, denies pain, nausea, wanted oxygen for couple hours 1L for 94%, now weaned off, ind in the room and outside of the room,   Major Shift Events: complained of lung pain, azalea RIZVI, ordered labs and EKG    Treatment Plan: IV ABX, Solu-medrol, IS, nebs.     Problem: Adult Inpatient Plan of Care  Goal: Plan of Care Review  Description: The Plan of Care Review/Shift note should be completed every shift.  The Outcome Evaluation is a brief statement about your assessment that the patient is improving, declining, or no change.  This information will be displayed automatically on your shift  note.  Outcome: Progressing  Flowsheets (Taken 8/11/2024 1502)  Plan of Care Reviewed With: patient  Overall Patient Progress: no change  Goal: Patient-Specific Goal (Individualized)  Description: You can add care plan individualizations to a care plan. Examples of Individualization might be:  \"Parent requests to be called daily at 9am for status\", \"I have a hard time hearing out of my right ear\", or \"Do not touch me to wake me up as it startles  me\".  Outcome: Progressing  Goal: Absence of Hospital-Acquired Illness or Injury  Outcome: Progressing  Intervention: Identify and Manage Fall Risk  Recent Flowsheet Documentation  Taken 8/11/2024 0859 by Macho Schwartz RN  Safety Promotion/Fall Prevention: assistive device/personal items within reach  Intervention: Prevent Skin Injury  Recent Flowsheet Documentation  Taken 8/11/2024 0859 by Macho Schwartz, RN  Body Position: position changed independently  Intervention: Prevent and Manage VTE (Venous Thromboembolism) Risk  Recent Flowsheet Documentation  Taken 8/11/2024 0859 by Efren, " Macho MELLO RN  VTE Prevention/Management: SCDs off (sequential compression devices)  Intervention: Prevent Infection  Recent Flowsheet Documentation  Taken 8/11/2024 0859 by Macho Schwartz RN  Infection Prevention: rest/sleep promoted  Goal: Optimal Comfort and Wellbeing  Outcome: Progressing  Goal: Readiness for Transition of Care  Outcome: Progressing     Problem: Pain Acute  Goal: Optimal Pain Control and Function  Outcome: Progressing  Intervention: Prevent or Manage Pain  Recent Flowsheet Documentation  Taken 8/11/2024 0859 by Macho Schwartz RN  Medication Review/Management: medications reviewed     Problem: Gas Exchange Impaired  Goal: Optimal Gas Exchange  Outcome: Progressing  Intervention: Optimize Oxygenation and Ventilation  Recent Flowsheet Documentation  Taken 8/11/2024 0859 by Macho Schwartz RN  Head of Bed (HOB) Positioning: HOB at 20-30 degrees

## 2024-08-12 VITALS
HEART RATE: 88 BPM | DIASTOLIC BLOOD PRESSURE: 86 MMHG | HEIGHT: 62 IN | SYSTOLIC BLOOD PRESSURE: 150 MMHG | TEMPERATURE: 97.9 F | RESPIRATION RATE: 20 BRPM | WEIGHT: 111.1 LBS | BODY MASS INDEX: 20.44 KG/M2 | OXYGEN SATURATION: 92 %

## 2024-08-12 LAB
ATRIAL RATE - MUSE: 82 BPM
CREAT SERPL-MCNC: 0.51 MG/DL (ref 0.51–0.95)
DIASTOLIC BLOOD PRESSURE - MUSE: NORMAL MMHG
EGFRCR SERPLBLD CKD-EPI 2021: >90 ML/MIN/1.73M2
INTERPRETATION ECG - MUSE: NORMAL
P AXIS - MUSE: 76 DEGREES
PLATELET # BLD AUTO: 262 10E3/UL (ref 150–450)
PR INTERVAL - MUSE: 148 MS
QRS DURATION - MUSE: 74 MS
QT - MUSE: 394 MS
QTC - MUSE: 460 MS
R AXIS - MUSE: 61 DEGREES
SYSTOLIC BLOOD PRESSURE - MUSE: NORMAL MMHG
T AXIS - MUSE: 73 DEGREES
VENTRICULAR RATE- MUSE: 82 BPM

## 2024-08-12 PROCEDURE — 94640 AIRWAY INHALATION TREATMENT: CPT | Mod: 76

## 2024-08-12 PROCEDURE — 250N000013 HC RX MED GY IP 250 OP 250 PS 637: Performed by: HOSPITALIST

## 2024-08-12 PROCEDURE — 85049 AUTOMATED PLATELET COUNT: CPT

## 2024-08-12 PROCEDURE — 250N000011 HC RX IP 250 OP 636

## 2024-08-12 PROCEDURE — 82565 ASSAY OF CREATININE: CPT

## 2024-08-12 PROCEDURE — 250N000013 HC RX MED GY IP 250 OP 250 PS 637

## 2024-08-12 PROCEDURE — 94640 AIRWAY INHALATION TREATMENT: CPT

## 2024-08-12 PROCEDURE — 999N000157 HC STATISTIC RCP TIME EA 10 MIN

## 2024-08-12 PROCEDURE — 250N000009 HC RX 250

## 2024-08-12 PROCEDURE — 36415 COLL VENOUS BLD VENIPUNCTURE: CPT

## 2024-08-12 PROCEDURE — 99239 HOSP IP/OBS DSCHRG MGMT >30: CPT | Performed by: HOSPITALIST

## 2024-08-12 RX ORDER — PREDNISONE 10 MG/1
TABLET ORAL
Qty: 23 TABLET | Refills: 0 | Status: SHIPPED | OUTPATIENT
Start: 2024-08-12 | End: 2024-08-12

## 2024-08-12 RX ORDER — IPRATROPIUM BROMIDE AND ALBUTEROL SULFATE 2.5; .5 MG/3ML; MG/3ML
1 SOLUTION RESPIRATORY (INHALATION) EVERY 6 HOURS PRN
Qty: 90 ML | Refills: 0 | Status: SHIPPED | OUTPATIENT
Start: 2024-08-12 | End: 2024-08-12

## 2024-08-12 RX ORDER — PREDNISONE 10 MG/1
TABLET ORAL
Qty: 15 TABLET | Refills: 0 | Status: SHIPPED | OUTPATIENT
Start: 2024-08-12 | End: 2024-08-20

## 2024-08-12 RX ADMIN — IPRATROPIUM BROMIDE AND ALBUTEROL SULFATE 3 ML: .5; 3 SOLUTION RESPIRATORY (INHALATION) at 08:27

## 2024-08-12 RX ADMIN — FLUTICASONE PROPIONATE 1 SPRAY: 50 SPRAY, METERED NASAL at 09:12

## 2024-08-12 RX ADMIN — FLUTICASONE FUROATE AND VILANTEROL TRIFENATATE 1 PUFF: 100; 25 POWDER RESPIRATORY (INHALATION) at 08:27

## 2024-08-12 RX ADMIN — LORATADINE 10 MG: 10 TABLET ORAL at 09:11

## 2024-08-12 RX ADMIN — IPRATROPIUM BROMIDE AND ALBUTEROL SULFATE 3 ML: .5; 3 SOLUTION RESPIRATORY (INHALATION) at 12:05

## 2024-08-12 RX ADMIN — PANTOPRAZOLE SODIUM 40 MG: 40 TABLET, DELAYED RELEASE ORAL at 09:11

## 2024-08-12 RX ADMIN — METHYLPREDNISOLONE SODIUM SUCCINATE 40 MG: 40 INJECTION, POWDER, FOR SOLUTION INTRAMUSCULAR; INTRAVENOUS at 09:12

## 2024-08-12 RX ADMIN — CEFTRIAXONE 2 G: 2 INJECTION, POWDER, FOR SOLUTION INTRAMUSCULAR; INTRAVENOUS at 13:07

## 2024-08-12 ASSESSMENT — ACTIVITIES OF DAILY LIVING (ADL)
ADLS_ACUITY_SCORE: 25

## 2024-08-12 NOTE — DISCHARGE SUMMARY
"Discharge Summary  Hospitalist Service      Mimi Rivera MRN# [id number]   YOB: 1955 Age: 69 year old     Date of Admission:  8/9/2024  Date of Discharge:  8/12/2024  Admitting Physician:  Penelope Ghotra DO  Discharge Physician: Melly Holcomb MD   Discharging Service: Hospitalist Service     Primary Provider: Aye Morejon  Primary Care Physician Phone Number: [phone]         Discharge Diagnoses/Problem Oriented Hospital Course (Providers):      Discharge Diagnoses   Acute hypoxic respiratory failure  Possible community-acquired pneumonia  Acute COPD exacerbation  Elevated troponin  Chest pain appears to be Pleuritics  Likely NSTEMI type II  Hospital Course   Mimi Rivera is a 69 year old female with PMH COPD, essential hypertension, 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     Was seen in the ED on 8/8 for right lung pain and cough.  Had been having stabbing pains in her right lung due to significant productive cough.  Her phlegm was occasionally blood-tinged, now resolved.  The patient also thought her left leg appeared more swollen but denied any pain.  ED provider did not appreciate any significant swelling or signs of DVT.  No fevers or chills.  Viral respiratory panel was negative.  Chest x-ray per radiology read showed increased patchy nodular opacities in right lung base which could be infectious. She was sent home on cefuroxime and azithromycin and a course of prednisone to treat COPD exacerbation.     Returned to the ED on 8/9 due to continuing to feel unwell.  Unable to tolerate her oral antibiotics due to vomiting.  She thinks the vomiting may be due to her \"frequent coughing or not having any food in her stomach.\"  Very poor appetite and fluid intake the last week.  Continuing to have subjective fevers and chills.  Has been feeling unwell for the last week.  Also reports her  was hospitalized for " pneumonia 2 weeks ago.  Currently denies any chest pain, nausea, vomiting, abdominal pain.  Feels bloated. In the ED she received 2 g ceftriaxone, PO doxycycline, 125 mg of Solu-Medrol.     Acute hypoxic respiratory failure  Possible community-acquired pneumonia  Acute COPD exacerbation  Possible CAP with the right lower lobe opacities and feeling unwell, however the patient is afebrile, & no leukocytosis.  Question if she could have progression of her lung carcinoid tumor.  Chest x-ray back in May also noted these right lower lung opacities.  Per radiology the right lower lung opacities appear slightly increased on CXR   -not hypoxic, but still feels she has ANSARI, wheezing appears to be improving.  - IV antibiotics ceftriaxone & azithromycin  - CT chest: Expected postoperative appearance after wedge resection right upper lobe. No suspicious pulmonary lesion, Increasing callus formation at right lateral rib fractures accounts for the perceived nodularity at right base on recent chest x-ray.   - IV solu-medrol 40 mg daily for another day, patient clinically improved, will discharge on oral Augmentin, and steroid taper     Elevated troponin  Chest pain appears to be Pleuritics  Likely NSTEMI type II  - continues to have chest congestion, ECG no changes, trop mildly elevated, will continue to trend, has remained flat to improved     Carcinoid tumor of lung  Recent right side pneumothorax   Follows with oncology outpatient.  Was recently admitted to [facility] from 5/3-5/7 after she was found to have a large right-sided pneumothorax that required a chest tube after a right upper lobe wedge resection.  Underwent a right wedge resection of the right upper lobe on 4/26/2024 at [facility].      Essential hypertension  - Amlodipine 10 mg            Code Status:      Full Code         Important Results:                  Pending Results:        Unresulted Labs Ordered in the Past 30 Days of this Admission       Date and Time Order Name  Status Description    8/9/2024  4:55 PM Blood Culture Peripheral Blood Preliminary                  Discharge Instructions and Follow-Up:      Follow-up Appointments     Follow-up and recommended labs and tests       Follow up with primary care provider, Aye Morejon, within 7 days for   hospital follow- up.  No follow up labs or test are needed.                 Discharge Disposition:        Discharged to home          Discharge Medications:        Current Discharge Medication List        START taking these medications    Details   amoxicillin-clavulanate (AUGMENTIN) 875-125 MG tablet Take 1 tablet by mouth 2 times daily for 6 days  Qty: 12 tablet, Refills: 0    Associated Diagnoses: COPD exacerbation (H)      tiotropium (SPIRIVA RESPIMAT) 2.5 MCG/ACT inhaler Inhale 2 puffs into the lungs daily  Qty: 1 g, Refills: 0    Associated Diagnoses: COPD exacerbation (H)           CONTINUE these medications which have CHANGED    Details   predniSONE (DELTASONE) 10 MG tablet Take 4 tablets (40 mg) by mouth daily for 2 days, THEN 2 tablets (20 mg) daily for 2 days, THEN 1 tablet (10 mg) daily for 2 days, THEN 0.5 tablets (5 mg) daily for 2 days.  Qty: 15 tablet, Refills: 0    Associated Diagnoses: COPD exacerbation (H)           CONTINUE these medications which have NOT CHANGED    Details   amLODIPine (NORVASC) 10 MG tablet Take 1 tablet (10 mg) by mouth every evening  Qty: 90 tablet, Refills: 3    Associated Diagnoses: Hypertension goal BP (blood pressure) < 140/90      budesonide (PULMICORT) 0.5 MG/2ML neb solution Spray 2 mLs (0.5 mg) in nostril daily Empty contents of ampule into 240mL of saline solution and rinse both nasal cavities as instructed twice daily.  Qty: 60 mL, Refills: 1    Associated Diagnoses: Nasal congestion; Chronic rhinosinusitis      esomeprazole (NEXIUM) 20 MG DR capsule Take 20 mg by mouth 2 times daily as needed Take 30-60 minutes before eating.      Fluticasone-Umeclidin-Vilant (TRELEGY ELLIPTA)  "100-62.5-25 MCG/ACT oral inhaler INHALE ONE PUFF BY MOUTH EVERY DAY  Qty: 60 each, Refills: 5    Associated Diagnoses: Chronic bronchitis, unspecified chronic bronchitis type (H)      gabapentin (NEURONTIN) 300 MG capsule Take 300 mg by mouth daily as needed      ipratropium (ATROVENT) 0.06 % nasal spray Spray 2 sprays into both nostrils 4 times daily as needed for rhinitis  Qty: 15 mL, Refills: 1    Associated Diagnoses: Nasal congestion; Chronic rhinosinusitis      levalbuterol (XOPENEX HFA) 45 MCG/ACT inhaler Inhale 2 puffs into the lungs every 4 hours as needed for shortness of breath / dyspnea or wheezing  Qty: 1 Inhaler, Refills: 1    Associated Diagnoses: Chronic obstructive pulmonary disease, unspecified COPD type (H)      EPINEPHrine (ANY BX GENERIC EQUIV) 0.3 MG/0.3ML injection 2-pack Inject 0.3 mLs (0.3 mg) into the muscle as needed for anaphylaxis May repeat one time in 5-15 minutes if response to initial dose is inadequate.  Qty: 2 each, Refills: 0    Associated Diagnoses: Bee sting reaction, accidental or unintentional, initial encounter           STOP taking these medications       azithromycin (ZITHROMAX) 250 MG tablet Comments:   Reason for Stopping:         cefuroxime (CEFTIN) 500 MG tablet Comments:   Reason for Stopping:                    Allergies:       No Known Allergies         Consultations This Hospital Stay:        No consultations were requested during this admission          Condition and Physical Exam on Discharge:        Discharge condition: Stable   Discharge vitals: Blood pressure (!) 150/86, pulse 85, temperature 97.9  F (36.6  C), temperature source Oral, resp. rate 16, height 1.575 m (5' 2\"), weight 50.4 kg (111 lb 1.6 oz), last menstrual period 09/22/2010, SpO2 91%, not currently breastfeeding.   General: Pt in NAD, normal appearance  HEENT: OP clear MMM, no JVD  Lungs: Clear to Auscultation Bilateral, normal breathing  without accessory muscle usage, no wheezing, rhonchi or " crackles  Cardiac: +S1, S2, RRR, no MRG, no edema  Abdominal: normal bowel sounds, NT/ND, no hepatosplenomegaly  Skin: warm, dry, normal turgor, no rash  Psyche: A& O x3, appropriate affect            Discharge Orders for Skilled Facility (from Discharge Orders):        After Care Instructions       Activity      Your activity upon discharge: activity as tolerated        Diet      Follow this diet upon discharge: Orders Placed This Encounter      Combination Diet Regular Diet Adult                     Rehab orders for Skilled Facility (from Discharge Orders):               Discharge Time:      Greater than 30 minutes.        Image Results From This Hospital Stay (For Non-EPIC Providers):        Results for orders placed or performed during the hospital encounter of 08/09/24   CT Chest w/o Contrast    Narrative    EXAM: CT CHEST W/O CONTRAST  LOCATION: [facility]  DATE: 8/9/2024    INDICATION: further assess right lower opacity, history of carcinoid lung cancer, wedge resection and pneumothorax  COMPARISON: Chest x-ray from today. CT 4/12/2024  TECHNIQUE: CT chest without IV contrast. Multiplanar reformats were obtained. Dose reduction techniques were used.  CONTRAST: None.    FINDINGS:   LUNGS AND PLEURA: Interval right upper lobe wedge resection including the previous identified upper lobe nodule. Minimal linear scarring or atelectasis seen at right lung base but no other suspicious pulmonary lesion. No pleural effusion. Minimal opacity   posterior left lung base likely atelectatic. Emphysema unchanged.    MEDIASTINUM/AXILLAE: No lymphadenopathy. No thoracic aortic aneurysm.    CORONARY ARTERY CALCIFICATION: Moderate.    UPPER ABDOMEN: Cholelithiasis.    MUSCULOSKELETAL: Old healed right rib fractures demonstrating zones of callus along posterior lateral chest wall involving ribs 7-9. These sclerotic lesions account for the questionable lung nodules on recent chest x-ray.      Impression     IMPRESSION:   1.  Expected postoperative appearance after wedge resection right upper lobe. No suspicious pulmonary lesion.  2.  Increasing callus formation at right lateral rib fractures accounts for the perceived nodularity at right base on recent chest x-ray.             Most Recent Lab Results In EPIC (For Non-EPIC Providers):    Most Recent 3 CBC's:  Recent Labs   Lab Test 08/12/24  0709 08/10/24  0710 08/09/24  1719 08/08/24  1633   WBC  --  5.1 10.1 10.1   HGB  --  14.7 15.6 16.1*   MCV  --  95 94 93    270 270 269      Most Recent 3 BMP's:  Recent Labs   Lab Test 08/12/24  0709 08/10/24  0710 08/09/24  1719 08/08/24  1633   NA  --  137 137 139   POTASSIUM  --  4.5 3.8 4.0   CHLORIDE  --  100 97* 100   CO2  --  25 26 26   BUN  --  9.6 7.3* 6.2*   CR 0.51 0.53 0.55 0.52   ANIONGAP  --  12 14 13   JI  --  9.4 9.8 10.1   GLC  --  131* 94 108*     Most Recent 3 Troponin's:No lab results found.  Most Recent 3 INR's:No lab results found.  Most Recent 2 LFT's:  Recent Labs   Lab Test 04/14/21  1501   AST 19   ALT 21   ALKPHOS 83   BILITOTAL 0.5     Most Recent Cholesterol Panel:  Recent Labs   Lab Test 01/06/21  1107   CHOL 210*   *   HDL 55   TRIG 50     Most Recent 6 Bacteria Isolates From Any Culture (See EPIC Reports for Culture Details):  Recent Labs   Lab Test 09/04/18  1556 12/15/17  1348 10/24/16  1708   CULT No beta hemolytic Streptococcus Group A isolated No beta hemolytic Streptococcus Group A isolated No Beta Streptococcus isolated     Most Recent TSH, T4 and HgbA1c:  Recent Labs   Lab Test 11/14/17  1408   A1C 5.2

## 2024-08-12 NOTE — PLAN OF CARE
"Goal Outcome Evaluation:      Plan of Care Reviewed With: patient      The patient is adequate for discharge. Education completed and provided. IV steroid given this am. Patient tolerating room air on 94% and above. No pain reported. Patient feeling mildly weak but ready to go home and rest. Ambulating independently. VSS on room air.       Problem: Adult Inpatient Plan of Care  Goal: Plan of Care Review  Description: The Plan of Care Review/Shift note should be completed every shift.  The Outcome Evaluation is a brief statement about your assessment that the patient is improving, declining, or no change.  This information will be displayed automatically on your shift  note.  8/12/2024 1123 by Subha Fam RN  Outcome: Met  Flowsheets (Taken 8/12/2024 1123)  Plan of Care Reviewed With: patient  8/12/2024 0947 by Subha Fam RN  Outcome: Progressing  Flowsheets (Taken 8/12/2024 0947)  Plan of Care Reviewed With: patient  Goal: Patient-Specific Goal (Individualized)  Description: You can add care plan individualizations to a care plan. Examples of Individualization might be:  \"Parent requests to be called daily at 9am for status\", \"I have a hard time hearing out of my right ear\", or \"Do not touch me to wake me up as it startles  me\".  8/12/2024 1123 by Subha Fam RN  Outcome: Met  8/12/2024 0947 by Subha Fam RN  Outcome: Progressing  Goal: Absence of Hospital-Acquired Illness or Injury  8/12/2024 1123 by Subha Fam RN  Outcome: Met  8/12/2024 0947 by Subha Fam RN  Outcome: Progressing  Intervention: Identify and Manage Fall Risk  Recent Flowsheet Documentation  Taken 8/12/2024 0911 by Subha Fam RN  Safety Promotion/Fall Prevention: safety round/check completed  Intervention: Prevent Skin Injury  Recent Flowsheet Documentation  Taken 8/12/2024 0911 by Subha Fam RN  Body Position: position changed independently  Intervention: Prevent and Manage VTE (Venous Thromboembolism) " Risk  Recent Flowsheet Documentation  Taken 8/12/2024 0911 by Subha Fam RN  VTE Prevention/Management: SCDs off (sequential compression devices)  Intervention: Prevent Infection  Recent Flowsheet Documentation  Taken 8/12/2024 0911 by Subha Fam RN  Infection Prevention: single patient room provided  Goal: Optimal Comfort and Wellbeing  8/12/2024 1123 by Subha Fam RN  Outcome: Met  8/12/2024 0947 by Subha Fam RN  Outcome: Progressing  Goal: Readiness for Transition of Care  8/12/2024 1123 by Subha Fam RN  Outcome: Met  8/12/2024 0947 by Subha Fam RN  Outcome: Progressing     Problem: Pain Acute  Goal: Optimal Pain Control and Function  8/12/2024 1123 by Subha Fam RN  Outcome: Met  8/12/2024 0947 by Subha Fam RN  Outcome: Progressing  Intervention: Prevent or Manage Pain  Recent Flowsheet Documentation  Taken 8/12/2024 0911 by Subha Fam RN  Medication Review/Management: medications reviewed     Problem: Gas Exchange Impaired  Goal: Optimal Gas Exchange  8/12/2024 1123 by Subha Fam RN  Outcome: Met  8/12/2024 0947 by Subha Fam RN  Outcome: Progressing  Intervention: Optimize Oxygenation and Ventilation  Recent Flowsheet Documentation  Taken 8/12/2024 0911 by Subha Fam RN  Head of Bed (HOB) Positioning: HOB at 20-30 degrees     Problem: Comorbidity Management  Goal: Maintenance of Asthma Control  8/12/2024 1123 by Subha Fam RN  Outcome: Met  8/12/2024 0947 by Subha Fam RN  Outcome: Progressing  Intervention: Maintain Asthma Symptom Control  Recent Flowsheet Documentation  Taken 8/12/2024 0911 by Subha Fam RN  Medication Review/Management: medications reviewed  Goal: Maintenance of Behavioral Health Symptom Control  8/12/2024 1123 by Subha Fam RN  Outcome: Met  8/12/2024 0947 by Subha Fam RN  Outcome: Progressing  Intervention: Maintain Behavioral Health Symptom Control  Recent Flowsheet  Documentation  Taken 8/12/2024 0911 by Subha Fam RN  Medication Review/Management: medications reviewed  Goal: Maintenance of COPD Symptom Control  8/12/2024 1123 by Subha Fam RN  Outcome: Met  8/12/2024 0947 by Subha Fam RN  Outcome: Progressing  Intervention: Maintain COPD Symptom Control  Recent Flowsheet Documentation  Taken 8/12/2024 0911 by Subha Fam RN  Medication Review/Management: medications reviewed  Goal: Blood Glucose Levels Within Targeted Range  8/12/2024 1123 by Subha Fam RN  Outcome: Met  8/12/2024 0947 by Subha Fam RN  Outcome: Progressing  Intervention: Monitor and Manage Glycemia  Recent Flowsheet Documentation  Taken 8/12/2024 0911 by Subha Fam RN  Medication Review/Management: medications reviewed  Goal: Maintenance of Heart Failure Symptom Control  8/12/2024 1123 by Subha Fam RN  Outcome: Met  8/12/2024 0947 by Subha Fam RN  Outcome: Progressing  Intervention: Maintain Heart Failure Management  Recent Flowsheet Documentation  Taken 8/12/2024 0911 by Subha Fam RN  Medication Review/Management: medications reviewed  Goal: Blood Pressure in Desired Range  8/12/2024 1123 by Subha Fam RN  Outcome: Met  8/12/2024 0947 by Subha Fam RN  Outcome: Progressing  Intervention: Maintain Blood Pressure Management  Recent Flowsheet Documentation  Taken 8/12/2024 0911 by Subha Fam RN  Medication Review/Management: medications reviewed  Goal: Maintenance of Osteoarthritis Symptom Control  8/12/2024 1123 by Subha Fam RN  Outcome: Met  8/12/2024 0947 by Subha Fam RN  Outcome: Progressing  Intervention: Maintain Osteoarthritis Symptom Control  Recent Flowsheet Documentation  Taken 8/12/2024 0911 by Subha Fam RN  Activity Management: activity encouraged  Medication Review/Management: medications reviewed  Goal: Bariatric Home Regimen Maintained  8/12/2024 1123 by Subha Fam RN  Outcome:  Met  8/12/2024 0947 by Subha Fam, RN  Outcome: Progressing  Intervention: Maintain and Manage Postbariatric Surgery Care  Recent Flowsheet Documentation  Taken 8/12/2024 0911 by Subha Fam, RN  Medication Review/Management: medications reviewed  Goal: Maintenance of Seizure Control  8/12/2024 1123 by Subha Fam, RN  Outcome: Met  8/12/2024 0947 by Subha Fam RN  Outcome: Progressing  Intervention: Maintain Seizure Symptom Control  Recent Flowsheet Documentation  Taken 8/12/2024 0911 by Subha Fam, RN  Medication Review/Management: medications reviewed

## 2024-08-12 NOTE — PROGRESS NOTES
Care Management Discharge Note    Discharge Date: 08/12/2024       Discharge Disposition: Home    Discharge Services: None    Discharge DME:      Discharge Transportation: car, drives self    Private pay costs discussed: Not applicable    Does the patient's insurance plan have a 3 day qualifying hospital stay waiver?  No    PAS Confirmation Code:    Patient/family educated on Medicare website which has current facility and service quality ratings:      Education Provided on the Discharge Plan:    Persons Notified of Discharge Plans: aware  Patient/Family in Agreement with the Plan: yes    Handoff Referral Completed: Yes    Additional Information:  Patient discharging today to home with family. She will go with a nebulizer for home use. OP CC ordered for patient.    Saadia Ward, RN, BSN, CM  Inpatient Care Coordination  [facility]  [phone]

## 2024-08-12 NOTE — PLAN OF CARE
"To Do:  End of Shift Summary  For vital signs and complete assessments, please see documentation flowsheets.     Pertinent assessments: Pt A&Ox4, VSS on RA excpet elevated BP. Afebrile. Congested cough noted. Flat affect. Rates pain in lungs at 4, declines intervention. Denies nausea. Endorses SOB at rest and with ambulation. Ambulated in the halls this evening. Regular diet. Independent in the room. LPIV SL, rocephin and zithro given. Pt still expressing frustration with feeling like she is not getting better.Sputum sample still needed.      Major Shift Events:None.     Treatment Plan: IV ABX, Solu-medrol, IS, nebs.     Bedside Nurse:Shelia Armas RN    Problem: Adult Inpatient Plan of Care  Goal: Plan of Care Review  Description: The Plan of Care Review/Shift note should be completed every shift.  The Outcome Evaluation is a brief statement about your assessment that the patient is improving, declining, or no change.  This information will be displayed automatically on your shift  note.  Outcome: Progressing  Flowsheets (Taken 8/11/2024 2321)  Outcome Evaluation: Pt A&Ox4, VSS on RA.  Plan of Care Reviewed With: patient  Overall Patient Progress: improving  Goal: Patient-Specific Goal (Individualized)  Description: You can add care plan individualizations to a care plan. Examples of Individualization might be:  \"Parent requests to be called daily at 9am for status\", \"I have a hard time hearing out of my right ear\", or \"Do not touch me to wake me up as it startles  me\".  Outcome: Progressing  Goal: Absence of Hospital-Acquired Illness or Injury  Outcome: Progressing  Intervention: Identify and Manage Fall Risk  Recent Flowsheet Documentation  Taken 8/11/2024 1657 by Shelia Armas RN  Safety Promotion/Fall Prevention: assistive device/personal items within reach  Intervention: Prevent and Manage VTE (Venous Thromboembolism) Risk  Recent Flowsheet Documentation  Taken 8/11/2024 1657 by Shelia Armas RN  VTE " Prevention/Management: SCDs off (sequential compression devices)  Intervention: Prevent Infection  Recent Flowsheet Documentation  Taken 8/11/2024 1657 by Shelia Armas RN  Infection Prevention: rest/sleep promoted  Goal: Optimal Comfort and Wellbeing  Outcome: Progressing  Intervention: Monitor Pain and Promote Comfort  Recent Flowsheet Documentation  Taken 8/11/2024 1657 by Shelia Armas RN  Pain Management Interventions: declines  Goal: Readiness for Transition of Care  Outcome: Progressing     Problem: Pain Acute  Goal: Optimal Pain Control and Function  Outcome: Progressing  Intervention: Develop Pain Management Plan  Recent Flowsheet Documentation  Taken 8/11/2024 1657 by Shelia Armas RN  Pain Management Interventions: declines  Intervention: Prevent or Manage Pain  Recent Flowsheet Documentation  Taken 8/11/2024 1657 by Shelia Armas RN  Medication Review/Management: medications reviewed     Problem: Gas Exchange Impaired  Goal: Optimal Gas Exchange  Outcome: Progressing   Goal Outcome Evaluation:      Plan of Care Reviewed With: patient    Overall Patient Progress: improvingOverall Patient Progress: improving    Outcome Evaluation: Pt A&Ox4, VSS on RA.

## 2024-08-12 NOTE — PLAN OF CARE
"Pt is alert and oriented x4. Pt denies pain or discomfort. VSS. Pt is on Azithromycin and Rocephin for pneumonia. Pt is on 1L NC. Pt denies shortness of breath. Pt lungs are diminished. Pt is independent in her room. No acute distress noted during the shift. Pt is sleeping in bed and call light within reach.       Goal Outcome Evaluation:      Plan of Care Reviewed With: patient          Outcome Evaluation: pt is alert and oriented x4. pt denies pain or disocmfort. VSS.      Problem: Adult Inpatient Plan of Care  Goal: Plan of Care Review  Description: The Plan of Care Review/Shift note should be completed every shift.  The Outcome Evaluation is a brief statement about your assessment that the patient is improving, declining, or no change.  This information will be displayed automatically on your shift  note.  8/11/2024 2354 by Abel Frankel RN  Outcome: Progressing  8/11/2024 2354 by Abel Frankel RN  Outcome: Progressing  Flowsheets (Taken 8/11/2024 2354)  Plan of Care Reviewed With: patient  8/11/2024 2354 by Abel Frankel RN  Outcome: Progressing  Flowsheets (Taken 8/11/2024 2354)  Plan of Care Reviewed With: patient  8/11/2024 2353 by Abel Frankel RN  Outcome: Progressing  Flowsheets (Taken 8/11/2024 2353)  Outcome Evaluation: pt is alert and oriented x4. pt denies pain or disocmfort. VSS.  Plan of Care Reviewed With: patient  Goal: Patient-Specific Goal (Individualized)  Description: You can add care plan individualizations to a care plan. Examples of Individualization might be:  \"Parent requests to be called daily at 9am for status\", \"I have a hard time hearing out of my right ear\", or \"Do not touch me to wake me up as it startles  me\".  8/11/2024 2354 by Abel Frankel RN  Outcome: Progressing  8/11/2024 2354 by Abel Frankel RN  Outcome: Progressing  8/11/2024 2354 by Abel Frankel RN  Outcome: Progressing  8/11/2024 2353 by " Abel Frankel RN  Outcome: Progressing  Goal: Absence of Hospital-Acquired Illness or Injury  8/11/2024 2354 by Abel Frankel RN  Outcome: Progressing  8/11/2024 2354 by Abel Frankel RN  Outcome: Progressing  8/11/2024 2354 by Abel Frankel RN  Outcome: Progressing  8/11/2024 2353 by Abel Frankel RN  Outcome: Progressing  Intervention: Identify and Manage Fall Risk  Recent Flowsheet Documentation  Taken 8/11/2024 2348 by Abel Frankel RN  Safety Promotion/Fall Prevention:   assistive device/personal items within reach   activity supervised   clutter free environment maintained   increased rounding and observation   lighting adjusted   increase visualization of patient   patient and family education   nonskid shoes/slippers when out of bed   safety round/check completed  Intervention: Prevent Skin Injury  Recent Flowsheet Documentation  Taken 8/11/2024 2348 by Abel Frankel RN  Body Position: position changed independently  Intervention: Prevent and Manage VTE (Venous Thromboembolism) Risk  Recent Flowsheet Documentation  Taken 8/11/2024 2348 by Abel Frankel RN  VTE Prevention/Management: SCDs off (sequential compression devices)  Intervention: Prevent Infection  Recent Flowsheet Documentation  Taken 8/11/2024 2348 by Abel Frankel RN  Infection Prevention:   single patient room provided   rest/sleep promoted   hand hygiene promoted  Goal: Optimal Comfort and Wellbeing  8/11/2024 2354 by Abel Frankel RN  Outcome: Progressing  8/11/2024 2354 by Abel Frankel RN  Outcome: Progressing  8/11/2024 2354 by Abel Frankel RN  Outcome: Progressing  8/11/2024 2353 by Abel Frankel RN  Outcome: Progressing  Goal: Readiness for Transition of Care  8/11/2024 2354 by Abel Frankel RN  Outcome: Progressing  8/11/2024 2354 by Abel Frankel RN  Outcome:  Progressing  8/11/2024 2354 by Abel Frankel RN  Outcome: Progressing  8/11/2024 2353 by Abel Frankel RN  Outcome: Progressing     Problem: Pain Acute  Goal: Optimal Pain Control and Function  8/11/2024 2354 by Abel Frankel RN  Outcome: Progressing  8/11/2024 2354 by Abel Frankel RN  Outcome: Progressing  8/11/2024 2354 by Abel Frankel RN  Outcome: Progressing  8/11/2024 2353 by Abel Frankel RN  Outcome: Progressing  Intervention: Prevent or Manage Pain  Recent Flowsheet Documentation  Taken 8/11/2024 2348 by Abel Frankel RN  Medication Review/Management: medications reviewed     Problem: Gas Exchange Impaired  Goal: Optimal Gas Exchange  8/11/2024 2354 by Abel Frankel RN  Outcome: Progressing  8/11/2024 2354 by Abel Frankel RN  Outcome: Progressing  8/11/2024 2354 by Abel Frankel RN  Outcome: Progressing  8/11/2024 2353 by Abel Frankel RN  Outcome: Progressing  Intervention: Optimize Oxygenation and Ventilation  Recent Flowsheet Documentation  Taken 8/11/2024 2348 by Abel Frankel RN  Head of Bed (HOB) Positioning: HOB at 20-30 degrees     Problem: Comorbidity Management  Goal: Maintenance of Asthma Control  Outcome: Progressing  Intervention: Maintain Asthma Symptom Control  Recent Flowsheet Documentation  Taken 8/11/2024 2348 by Abel Frankel RN  Medication Review/Management: medications reviewed  Goal: Maintenance of Behavioral Health Symptom Control  Outcome: Progressing  Intervention: Maintain Behavioral Health Symptom Control  Recent Flowsheet Documentation  Taken 8/11/2024 2348 by Abel Frankel RN  Medication Review/Management: medications reviewed  Goal: Maintenance of COPD Symptom Control  Outcome: Progressing  Intervention: Maintain COPD Symptom Control  Recent Flowsheet Documentation  Taken 8/11/2024 2348 by Demetrius Cristina  MAURY Roman  Medication Review/Management: medications reviewed  Goal: Blood Glucose Levels Within Targeted Range  Outcome: Progressing  Intervention: Monitor and Manage Glycemia  Recent Flowsheet Documentation  Taken 8/11/2024 2348 by Abel Frankel RN  Medication Review/Management: medications reviewed  Goal: Maintenance of Heart Failure Symptom Control  Outcome: Progressing  Intervention: Maintain Heart Failure Management  Recent Flowsheet Documentation  Taken 8/11/2024 2348 by Abel Frankel RN  Medication Review/Management: medications reviewed  Goal: Blood Pressure in Desired Range  Outcome: Progressing  Intervention: Maintain Blood Pressure Management  Recent Flowsheet Documentation  Taken 8/11/2024 2348 by Abel Frankel RN  Medication Review/Management: medications reviewed  Goal: Maintenance of Osteoarthritis Symptom Control  Outcome: Progressing  Intervention: Maintain Osteoarthritis Symptom Control  Recent Flowsheet Documentation  Taken 8/11/2024 2348 by Abel Frankel RN  Activity Management:   activity encouraged   activity adjusted per tolerance  Medication Review/Management: medications reviewed  Goal: Bariatric Home Regimen Maintained  Outcome: Progressing  Intervention: Maintain and Manage Postbariatric Surgery Care  Recent Flowsheet Documentation  Taken 8/11/2024 2348 by Abel Frankel RN  Medication Review/Management: medications reviewed  Goal: Maintenance of Seizure Control  Outcome: Progressing  Intervention: Maintain Seizure Symptom Control  Recent Flowsheet Documentation  Taken 8/11/2024 2348 by Abel Frankel RN  Medication Review/Management: medications reviewed

## 2024-08-12 NOTE — PROGRESS NOTES
I attest that I have seen and evaluated Mimi Rivera. She has a diagnosis of J44.1 - Chronic obstructive pulmonary disease with (acute) exacerbation and a nebulizer machine is needed to administer medication to improve breathing passages.     I, the undersigned, certify that the above prescribed supplies are medically necessary for this patient and is both reasonable and necessary in reference to accepted standards of medical and necessary in reference to accepted standards of medical practice in the treatment of this patient's condition and is not prescribed as a convenience.

## 2024-08-13 ENCOUNTER — PATIENT OUTREACH (OUTPATIENT)
Dept: CARE COORDINATION | Facility: CLINIC | Age: 69
End: 2024-08-13
Payer: COMMERCIAL

## 2024-08-13 NOTE — PROGRESS NOTES
Clinic Care Coordination Contact  Transitions of Care Outreach  Chief Complaint   Patient presents with    Clinic Care Coordination - Post Hospital    Clinic Care Coordination - Initial     Most Recent Admission Date: 8/9/2024   Most Recent Admission Diagnosis: Hypoxia - R09.02  COPD exacerbation (H) - J44.1  Pneumonia of right lower lobe due to infectious organism - J18.9     Most Recent Discharge Date: 8/12/2024   Most Recent Discharge Diagnosis: Pneumonia of right lower lobe due to infectious organism - J18.9  COPD exacerbation (H) - J44.1  Hypoxia - R09.02  History of COPD - Z87.09  History of lung cancer - Z85.118     Transitions of Care Assessment    Discharge Assessment  How are you doing now that you are home?: Still doesn't feel well. Having a hard time breathing, still coughing up stuff.  How are your symptoms? (Red Flag symptoms escalate to triage hotline per guidelines): Improved  Do you know how to contact your clinic care team if you have future questions or changes to your health status? : Yes  Does the patient have their discharge instructions? : Yes  Does the patient have questions regarding their discharge instructions? : No  Were you started on any new medications or were there changes to any of your previous medications? : Yes  Does the patient have all of their medications?: Yes  Do you have questions regarding any of your medications? : No  Do you have all of your needed medical supplies or equipment (DME)?  (i.e. oxygen tank, CPAP, cane, etc.): Yes    Post-op ([facility] Only)  If the patient had a surgery or procedure, do they have any questions for a nurse?: No    Post-op (Clinicians Only)  Did the patient have surgery or a procedure: No  Fever: No  Chills: No  Eating & Drinking: eating and drinking without complaints/concerns  PO Intake: regular diet  Additional Symptoms: decreased appetite  Bowel Function: normal  Date of last BM: 08/14/24  Urinary Status: voiding without  complaint/concerns    Follow up Plan     Discharge Follow-Up  Discharge follow up appointment scheduled in alignment with recommended follow up timeframe or Transitions of Risk Category? (Low = within 30 days; Moderate= within 14 days; High= within 7 days): Yes  Discharge Follow Up Appointment Date: 09/10/24  Discharge Follow Up Appointment Scheduled with?: Specialty Care Provider (Pulmonolgist)    RN CC contacted patient for post-hospital follow up and to introduce Care Coordination. Full assessment not indicated as patient declined to have Care Coordination support at this time. She was agreeable to receiving an introduction letter with additional information on Care Coordination via [employer]. Verified patient has access to [employer].     RN CC will send patient introduction letter via [employer].     Future Appointments   Date Time Provider Department Center   8/20/2024  2:30 PM 1, [facility]   9/10/2024  3:30 PM  PFL 6 MINUTE WALK 1 [facility]   9/13/2024 11:30 AM Ishmael Bridges MD [facility]     Outpatient Plan as outlined on AVS reviewed with patient.    For any urgent concerns, please contact our 24 hour nurse triage line: [phone] ([phone])       Suzanne Paredes RN, BSN, CPHN, [facility] Ambulatory Care Management  [facility]  Phone: [phone]  Email: [email or web address]  (On behalf of GENEVA Vázquez)    Clinic Care Coordination Contact  [facility] [[email or web address]]/Voicemail    Clinical Data: Care Coordinator Outreach    Outreach Documentation Number of Outreach Attempt   8/13/2024   2:54 PM 1     Called and spoke with pt at about 2:30pm. Introduced self and asked if pt had a few minutes to speak. Pt requested that this writer call back in 15 minutes. This writer returned call in 15 minutes, as asked, and the call was forwarded to [employer]. Voicemail message left with call back information and requested return call.    Plan:  This writer is out of the office after today. CC asked RN CC Suzanne Paredes to follow-up and attempt second outreach to pt. RN CC in agreement with plan. Care Coordinator will try to reach patient again in 1-2 business days.    Stacy Bojorquez [facility]  Primary Care Social Work Care Coordinator  [facility], & [place]   PH: [phone]

## 2024-08-13 NOTE — LETTER
[facility] CARE COORDINATION  [id number]  [facility] [id number]    August 15, 2024    Mimi Rivera  [address] [place] [place] [address]      Dear Mimi,    I am a clinic care coordinator who works with Aye Morejon MD with the [facility]. I wanted to introduce myself and provide you with my contact information for you to be able to call me with any questions or concerns. I wanted to thank you for spending the time to talk with me.  Below is a description of clinic care coordination and how I can further assist you.       The clinic care coordination team is made up of a registered nurse, , financial resource worker and community health worker who understand the health care system. The goal of clinic care coordination is to help you manage your health and improve access to the health care system. Our team works alongside your provider to assist you in determining your health and social needs. We can help you obtain health care and community resources, providing you with necessary information and education. We can work with you through any barriers and develop a care plan that helps coordinate and strengthen the communication between you and your care team.  Our services are voluntary and are offered without charge to you personally.    Please feel free to contact me with any questions or concerns regarding care coordination and what we can offer.      We are focused on providing you with the highest-quality healthcare experience possible.    Sincerely,     Suzanne Paredes RN, BSN, CPHN, CCM  [facility] Ambulatory Care Management  [facility] [place]  (On behalf of GENEVA Vázquez )  Stacy Bojorquez [facility]  Primary Care Social Work Care Coordinator  [facility], & [place]   PH: [phone]    Enclosed: Additional information on Care Coordination.     WHAT IS CARE COORDINATION?      [facility]  Care Coordination supports patients and families dealing with chronic or complex health conditions, developmental issues, and social service needs. This service is available to patients of all ages, from babies to seniors. When you re facing a difficult decision about caring for yourself or someone you love, we can help you understand your options. We identify and refer you to community resources that help with financial, legal, mental health, transportation, and other issues. We also help with your medical and related education needs.     IS CARE COORDINATION RIGHT FOR ME? Discuss a referral to Care Coordination with your primary care provider or care team member.     HOW CAN I CONNECT WITH CARE COORDINATION?  Contact your clinic.  Speak with your doctor or clinic staff.  Discuss care coordination with hospital staff before discharge.     MEET YOUR CARE COORDINATION TEAM     Registered Nurse Care Coordinator  Provides education on medications, disease management, and new diagnoses.  Addresses concerns about medical conditions and connects you to resources.  Develops patient-centered goals and communicates with patient s care team.     Social Work Care Coordinator  Provides education on emotional wellbeing.  Connects you to a variety of community-based resources.  Communicates with care team and community partners.     Community Health Worker  Identifies health and social barriers and connects you with community resources.  Develops nonclinical patient and family centered goals.  Enhances communication between patients and care teams.     Financial Resource Worker  Assists patients with applying for health [place] ([place], [employer], [employer]).  Helps patients with applying for county benefits.  Connects patients with [facility].

## 2024-08-14 LAB — BACTERIA BLD CULT: NO GROWTH

## 2024-09-10 ENCOUNTER — OFFICE VISIT (OUTPATIENT)
Dept: PULMONOLOGY | Facility: CLINIC | Age: 69
End: 2024-09-10
Payer: COMMERCIAL

## 2024-09-10 DIAGNOSIS — J44.9 CHRONIC OBSTRUCTIVE PULMONARY DISEASE, UNSPECIFIED COPD TYPE (H): ICD-10-CM

## 2024-09-10 LAB
6 MIN WALK (FT): 1160 FT
6 MIN WALK (M): 354 M

## 2024-09-10 PROCEDURE — 94618 PULMONARY STRESS TESTING: CPT | Performed by: INTERNAL MEDICINE

## 2024-09-10 NOTE — PROGRESS NOTES
Mimi Rivera comes into clinic today at the request of Dr. Ishmael Bridges Ordering Provider for 6 minute walk      Phil Whittaker, RRT

## 2024-09-11 LAB — FIO2-PRE: 21 %

## 2024-09-13 ENCOUNTER — OFFICE VISIT (OUTPATIENT)
Dept: PULMONOLOGY | Facility: CLINIC | Age: 69
End: 2024-09-13
Attending: INTERNAL MEDICINE
Payer: COMMERCIAL

## 2024-09-13 VITALS
RESPIRATION RATE: 17 BRPM | BODY MASS INDEX: 20.43 KG/M2 | WEIGHT: 111 LBS | SYSTOLIC BLOOD PRESSURE: 189 MMHG | HEART RATE: 89 BPM | DIASTOLIC BLOOD PRESSURE: 108 MMHG | OXYGEN SATURATION: 93 % | HEIGHT: 62 IN

## 2024-09-13 DIAGNOSIS — I10 HYPERTENSION GOAL BP (BLOOD PRESSURE) < 140/90: ICD-10-CM

## 2024-09-13 DIAGNOSIS — J44.9 CHRONIC OBSTRUCTIVE PULMONARY DISEASE, UNSPECIFIED COPD TYPE (H): Primary | ICD-10-CM

## 2024-09-13 DIAGNOSIS — C7A.090 ATYPICAL CARCINOID LUNG TUMOR (H): ICD-10-CM

## 2024-09-13 DIAGNOSIS — F32.1 MODERATE MAJOR DEPRESSION (H): ICD-10-CM

## 2024-09-13 DIAGNOSIS — I70.0 AORTIC CALCIFICATION (H): ICD-10-CM

## 2024-09-13 DIAGNOSIS — J43.1 PANLOBULAR EMPHYSEMA (H): ICD-10-CM

## 2024-09-13 DIAGNOSIS — J42 CHRONIC BRONCHITIS, UNSPECIFIED CHRONIC BRONCHITIS TYPE (H): ICD-10-CM

## 2024-09-13 PROCEDURE — 99417 PROLNG OP E/M EACH 15 MIN: CPT | Performed by: INTERNAL MEDICINE

## 2024-09-13 PROCEDURE — G0463 HOSPITAL OUTPT CLINIC VISIT: HCPCS | Performed by: INTERNAL MEDICINE

## 2024-09-13 PROCEDURE — 99215 OFFICE O/P EST HI 40 MIN: CPT | Performed by: INTERNAL MEDICINE

## 2024-09-13 RX ORDER — BUDESONIDE, GLYCOPYRROLATE, AND FORMOTEROL FUMARATE 160; 9; 4.8 UG/1; UG/1; UG/1
2 AEROSOL, METERED RESPIRATORY (INHALATION) 2 TIMES DAILY
Qty: 10.7 G | Refills: 11 | Status: SHIPPED | OUTPATIENT
Start: 2024-09-13

## 2024-09-13 RX ORDER — LEVALBUTEROL TARTRATE 45 UG/1
2 AEROSOL, METERED ORAL EVERY 4 HOURS PRN
Qty: 15 G | Refills: 11 | Status: SHIPPED | OUTPATIENT
Start: 2024-09-13

## 2024-09-13 RX ORDER — METHYLPREDNISOLONE 4 MG
TABLET, DOSE PACK ORAL
Qty: 21 TABLET | Refills: 0 | Status: SHIPPED | OUTPATIENT
Start: 2024-09-13

## 2024-09-13 RX ORDER — CLOTRIMAZOLE 10 MG/1
10 LOZENGE ORAL 3 TIMES DAILY
Qty: 14 TROCHE | Refills: 3 | Status: SHIPPED | OUTPATIENT
Start: 2024-09-13

## 2024-09-13 RX ORDER — BUPROPION HYDROCHLORIDE 150 MG/1
150 TABLET ORAL EVERY MORNING
Qty: 60 TABLET | Refills: 11 | Status: SHIPPED | OUTPATIENT
Start: 2024-09-13

## 2024-09-13 RX ORDER — BUPROPION HYDROCHLORIDE 150 MG/1
150 TABLET ORAL EVERY MORNING
Qty: 30 TABLET | Refills: 11 | Status: SHIPPED | OUTPATIENT
Start: 2024-09-13 | End: 2024-09-13

## 2024-09-13 RX ORDER — LEVOFLOXACIN 500 MG/1
500 TABLET, FILM COATED ORAL DAILY
Qty: 14 TABLET | Refills: 3 | Status: SHIPPED | OUTPATIENT
Start: 2024-09-13

## 2024-09-13 ASSESSMENT — PAIN SCALES - GENERAL: PAINLEVEL: SEVERE PAIN (6)

## 2024-09-13 NOTE — LETTER
"9/13/2024      Mimi Rivera  [address] [place] [address]      Dear Colleague,    Thank you for referring your patient, Mimi Rivera, to the [facility] AND [facility] [place]. Please see a copy of my visit note below.    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      Pulmonary HPI:    The patient was seen and examined by Ishmael Bridges MD     I had the pleasure of seeing Ms. Rivera today at the [facility] in follow-up for her severe COPD with wedge resection of atypical carcinoid tumor RUL 4/2024 by Dr. KALLI Pandya.      I last saw her in clinic on I last saw Ms. Hilario in clinic preoperatively on 3/29/2024.  She was having some mild preoperative exacerbations.  At that time she was on Trelegy low strength and daily azithromycin 250 mg plus occasional use of MDI or nebulizer.  She was complaining of \"a full head and chest with junk in there.\".  She has been previously seen by ENT with prior sinus surgery and is more recently been seen by Moncho Rodríguez of the ENT group.  She told me she could walk approximately 2-3 blocks but previously been able to walk several miles.  She had a productive cough with thick yellow to white phlegm and hears wheezing not infrequently.  She was waking up 1-3 times per night partially due to her breathing.  She had recently started omeprazole for GERD symptoms that began when she started on the Augmentin.  Her cough is somewhat worse at night.  She felt that she had a sinus infection and was having a lot of postnasal drip and congestion.  Her PFTs at that visit were somewhat improved in terms of her FVC from November 2023 with stable very low FEV1.  I gave her a short course of antibiotics and Medrol Dosepak try to optimize her for surgery.  " Cussed potential substitution of Breztri for Trelegy and I ordered a preop chest CT as requested by Dr. Pandya..    Dr. Good Pandya   Saw her in preop clinic on 4/8/2024.  He assessed her as clinical stage Ia and planned robotic assisted right thoracoscopic wedge resection mediastinal lymph node dissection.  She underwent this procedure by Dr. Weaver on 4/26/2024 and required some supplemental oxygen postoperatively.  I reviewed the final pathology and she had atypical carcinoid with negative nodes pathologic stage T1 N0.    She had admission to [facility] from 5/3 to 5/7/2024 for operative right-sided pneumothorax.  She also was treated with a course of antibiotics and prednisone taper at that time.    She saw Dr. Morales in the oncology follow-up clinic on 5/21/2024 and she was having more shortness of breath.  I talked with Dr. Pandya on the telephone about her worsening pulmonary condition and her lack of desire to be admitted to the hospital.  We agreed on giving her an empiric course of Levaquin for 10 to 14 days and seeing if she improved or not.    She was again admitted to [facility] from 8/9-12 2024 for cute hypoxic respiratory failure with possible community-acquired pneumonia.  Initially been seen in the emergency department for right-sided lung pain and cough that was of stabbing nature.  She was sent home but returned the next day.  She was then started on ceftriaxone and doxycycline plus given steroids.  There was some concern for possible right lower lobe community-acquired pneumonia but the patient was afebrile and did not have leukocytosis.  Chest CT was performed that felt the question of right lower lobe abnormalities was actually due to callus formation from rib fractures.  She was discharged on oral Augmentin and a steroid taper.    I reviewed her prior clinic workup including PFTs of 3/29/2024 (preop) which showed FEV1 0.9/FVC 1.85 (47/75% predicted,  "respectively) for ratio 50%.  The TLC was 4.3 (97% predicted).  DLCO uncorrected for hemoglobin was 11.6 (65% of predicted.     Returning to clinic today, Mimi felt quite unwell.  She is frustrated sad and depressed about all the difficulties had postoperatively and says she is not feeling good.  She complains of some pain in the right lower chest.  She was emotional and teary and worried about whether she made a good decision in going ahead with the surgery.  Overall she felt like she was improved for a period of time postsurgery prior to the recent hospitalization at [facility].  He continues to have intermittent awakenings at night with some shortness of breath and cough of uncertain etiology.  She is not having any sleep in the same room with her recently.  There is no known history of snoring or sleep apnea and she is quite thin.  She believes she is febrile because she is having intermittent sweats but has not taken her temperature.  There has been no hemoptysis.  She is having oxygen saturation sitting at rest of 94 to 96%.  She says that when she wakes up it is 91 and 92% she uses oxygen at night.  She states she feels weirdly in the morning was gradually improved during the day.  She has a sensation that her right lung is \"chronically filled up.\".  Intermittently she has rapid heart rate accompanied by shortness of breath and this seems triggered by the rapid heart rate.  She says she is not sleeping well and she is feels swollen all over.  She has some intermittent dizziness as well.  She has previously been diagnosed and treated for major depression.  Intermittently in the past have given her Wellbutrin with significant improvement but she is not on this now    Otherwise detailed pulmonary ROS and general medical ROS are unchanged.      Current Outpatient Medications   Medication Sig Dispense Refill     amLODIPine (NORVASC) 10 MG tablet Take 1 tablet (10 mg) by mouth every evening 90 tablet 3     " budeson-glycopyrrol-formoterol (BREZTRI AEROSPHERE) 160-9-4.8 MCG/ACT AERO inhaler Inhale 2 puffs into the lungs 2 times daily. Rinse mouth after use. Alternative to Trelegy 10.7 g 11     budesonide (PULMICORT) 0.5 MG/2ML neb solution Spray 2 mLs (0.5 mg) in nostril daily Empty contents of ampule into 240mL of saline solution and rinse both nasal cavities as instructed twice daily. 60 mL 1     buPROPion (WELLBUTRIN XL) 150 MG 24 hr tablet Take 1 tablet (150 mg) by mouth every morning. For first 3 days, then increase to twice daily 60 tablet 11     clotrimazole (MYCELEX) 10 MG lozenge Place 1 lozenge (10 mg) inside cheek 3 times daily. 14 Nora 3     EPINEPHrine (ANY BX GENERIC EQUIV) 0.3 MG/0.3ML injection 2-pack Inject 0.3 mLs (0.3 mg) into the muscle as needed for anaphylaxis May repeat one time in 5-15 minutes if response to initial dose is inadequate. 2 each 0     esomeprazole (NEXIUM) 20 MG DR capsule Take 20 mg by mouth 2 times daily as needed Take 30-60 minutes before eating.       Fluticasone-Umeclidin-Vilant (TRELEGY ELLIPTA) 100-62.5-25 MCG/ACT oral inhaler INHALE ONE PUFF BY MOUTH EVERY DAY 60 each 5     gabapentin (NEURONTIN) 300 MG capsule Take 300 mg by mouth daily as needed       ipratropium (ATROVENT) 0.06 % nasal spray Spray 2 sprays into both nostrils 4 times daily as needed for rhinitis 15 mL 1     levalbuterol (XOPENEX HFA) 45 MCG/ACT inhaler Inhale 2 puffs into the lungs every 4 hours as needed for shortness of breath or wheezing. 15 g 11     levalbuterol (XOPENEX HFA) 45 MCG/ACT inhaler Inhale 2 puffs into the lungs every 4 hours as needed for shortness of breath / dyspnea or wheezing 1 Inhaler 1     levofloxacin (LEVAQUIN) 500 MG tablet Take 1 tablet (500 mg) by mouth daily. 14 tablet 3     methylPREDNISolone (MEDROL DOSEPAK) 4 MG tablet therapy pack Follow Package Directions 21 tablet 0     tiotropium (SPIRIVA RESPIMAT) 2.5 MCG/ACT inhaler Inhale 2 puffs into the lungs daily 1 g 0     No  current facility-administered medications for this visit.     No Known Allergies  Past Medical History:   Diagnosis Date     ASCUS on Pap smear 2006    unable to run HPV typing, f/u paps NIL     Chronic rhinosinusitis      COPD (chronic obstructive pulmonary disease) (H)      Depression, anxiety      Herpes     occasional outbreak     Hypertension      Primary stress urinary incontinence      Pulmonary nodules      Rhinosinusitis      Past Surgical History:   Procedure Laterality Date     BRONCHOSCOPY, WITH BIOPSY, ROBOT ASSISTED Bilateral 2023    Procedure: Robot assisted Ion BRONCHOSCOPY;  Surgeon: Raudel Claire DO;  Location: [facility] OR      SECTION      x 3     DAVINCI LOBECTOMY LUNG Right 2024    Procedure: robot assisted thoracoscopic right wedge resection, mediastinal lymph node dissection;  Surgeon: Good Pandya MD;  Location:  OR     ENDOBRONCHIAL ULTRASOUND FLEXIBLE N/A 2023    Procedure: endobronchial ultrasound, transbronchial biopsies Latex Free;  Surgeon: Raudel Claire DO;  Location:  OR     Social History     Socioeconomic History     Marital status:      Spouse name: Not on file     Number of children: Not on file     Years of education: Not on file     Highest education level: Not on file   Occupational History     Not on file   Tobacco Use     Smoking status: Former     Current packs/day: 0.00     Types: Cigarettes     Quit date: 3/19/2015     Years since quittin.4     Smokeless tobacco: Former     Quit date: 2017   Vaping Use     Vaping status: Never Used   Substance and Sexual Activity     Alcohol use: Yes     Alcohol/week: 2.0 - 3.0 standard drinks of alcohol     Types: 2 - 3 Standard drinks or equivalent per week     Comment: 2 drinks per wk avg     Drug use: No     Sexual activity: Yes     Partners: Male     Birth control/protection: Surgical     Comment: tubal   Other Topics Concern     Parent/sibling w/ CABG, MI or angioplasty before  "65F 55M? No   Social History Narrative     Not on file     Social Determinants of Health     Financial Resource Strain: Not on file   Food Insecurity: Not on file   Transportation Needs: Not on file   Physical Activity: Not on file   Stress: Not on file   Social Connections: Not on file   Interpersonal Safety: Not on file   Housing Stability: Not on file       ROS Pulmonary:  A complete ROS was otherwise negative except as noted in the HPI.    Exam:   BP (!) 189/108   Pulse 89   Resp 17   Ht 1.575 m (5' 2\")   Wt 50.3 kg (111 lb)   LMP 09/22/2010   SpO2 93%   BMI 20.30 kg/m    GENERAL APPEARANCE: Well developed, well nourished, alert, and in no apparent distress. No tachypnea, stridor, cough, or audible wheeze.Thin. Sad and frustrated / emotional affect.  EYES: PERRL, EOMI  HENT: N No sinus tenderness.  MOUTH: Oral mucosa is moist, without any lesions, no tonsillar enlargement, no oropharyngeal exudate.  NECK: supple, no masses, no thyromegaly.  LYMPHATICS: No significant axillary, cervical, or supraclavicular nodes.  RESP: normal inspection, palpation, percussion, with good air flow throughout.  No crackles. No rhonchi. Scattered wheezes bilaterally.   CV: RRR Normal S1, S2, regular rhythm, normal rate. No murmur.  No rub. No gallop. No LE edema.   ABDOMEN: deferred  MS: extremities normal. No clubbing. No cyanosis.  SKIN: no rash on limited exam  NEURO: Mentation intact, speech normal, normal strength and tone, normal gait and stance  PSYCH: mentation appears normal. and affect normal/bright    Results:  Recent Results (from the past 168 hour(s))   6 minute walk test    Collection Time: 09/10/24 12:00 AM   Result Value Ref Range    6 min walk (FT) 1,160 1,295 ft    6 Min Walk (M) 354 395 m   General PFT Lab (Please always keep checked)    Collection Time: 09/10/24  3:53 PM   Result Value Ref Range    FIO2-Pre 21.00 %     Pathology from 4/26/2024  A. LUNG, RIGHT UPPER LOBE, WEDGE RESECTION:  - ATYPICAL " CARCINOID TUMOR (Neuroendocrine tumor, grade 2)  - Tumor size: 1.0 cm in greatest dimension  - Visceral pleural invasion is not identified  - Lymphovascular invasion is present, extensive  - Parenchymal resection margin is positive (staple line is not included)  - AJCC Pathologic Stage: pT1a, N0     B. LYMPH NODE, LEVEL 7:  Three lymph nodes, negative for tumor (0/3)     C. LYMPH NODE, LEVEL 4R:  - Two lymph nodes, negative for tumor (0/2)          Assessment and plan:  Encounter Diagnoses   Name Primary?     Chronic obstructive pulmonary disease, unspecified COPD type (H) Yes     Chronic bronchitis, unspecified chronic bronchitis type (H)      Atypical carcinoid lung tumor (H)      Panlobular emphysema (H)      Moderate major depression (H)      Hypertension goal BP (blood pressure) < 140/90      Aortic calcification (H24)       Ms. Hilario is justifiably frustrated and depressed about the difficult course she has had over the last 6+ months.  It is difficult as discriminate between the physical effects of her prior and ongoing severe lung disease, any worsening related to either her surgery and/or her multiple recent hospitalizations and her underlying depression.  We discussed in detail the severity of her lung disease preoperatively and the fact that she had a lot of ups and downs including frequent flareups of her COPD.  With the results of her August 9 chest CT scan done at [facility] as part of that hospitalization.  She was worried that there was ongoing inflammation other abnormalities in the lung.  As I showed her looking at the scan and also from the report there really were not many ongoing areas of infection or inflammation seen in the lung at that time.  She is concerned that she is having another COPD flare and may have infection with her sensation of chills and being febrile (but without actually documenting high temperature).  I agreed to give her a course of treatment for COPD exacerbation  with a Medrol Dosepak and 7 to 14 days of Levaquin.  She was cautioned about stopping Levaquin if any Achilles tendinitis symptoms occurred.  She was asked to continue her Trelegy each morning and will leave albuterol nebs and MDI.  The lev albuterol inhaler was refilled.  She again asked about substituting Breztri for Trelegy and I wrote her prescription for this warning her that I did not know which would be more out-of-pocket expense of based on her insurance.  Also asked for some treatment for possible oral yeast and I gave her prescription for clotrimazole troches.  We gave her an Aerobika device and encouraged to use this every 2 hours while awake and taught her proper usage by staff.  I referred her back to pulmonary rehab which she never completed to try to assist in increasing her activity and optimizing her oxygen and methods.  Given her significant history of major anxiety and depression, she and I discussed the fact that previously this responded well to Wellbutrin.  I restarted this at initial dose of 150 mg XL per day increasing to 300/day after at least 3 days.  She will plan to have return to clinic in 6 months and will call our nurses for problems or questions.    I spent a total of 70- minutes dedicated to her care today, 09/13/24, including review of her past medical records, review of past images, reports and PFTs with the patient today and in documentation.     Ishmael Bridges MD  Professor of Medicine  Past President, [employer]      Again, thank you for allowing me to participate in the care of your patient.        Sincerely,        Ishmael Bridges MD

## 2024-09-13 NOTE — PROGRESS NOTES
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      Pulmonary HPI:    The patient was seen and examined by Ishmael Bridges MD     I had the pleasure of seeing Ms. Rivera today at the [facility] in follow-up for her severe COPD with wedge resection of atypical carcinoid tumor RUL 4/2024 by Dr. KALLI Pandya.      I last saw her in clinic on I last saw Ms. Hilario in clinic preoperatively on 3/29/2024.  She was having some mild preoperative exacerbations.  At that time she was on Trelegy low strength and daily azithromycin 250 mg plus occasional use of MDI or nebulizer.  She was complaining of \"a full head and chest with junk in there.\".  She has been previously seen by ENT with prior sinus surgery and is more recently been seen by Moncho Rodríguez of the ENT group.  She told me she could walk approximately 2-3 blocks but previously been able to walk several miles.  She had a productive cough with thick yellow to white phlegm and hears wheezing not infrequently.  She was waking up 1-3 times per night partially due to her breathing.  She had recently started omeprazole for GERD symptoms that began when she started on the Augmentin.  Her cough is somewhat worse at night.  She felt that she had a sinus infection and was having a lot of postnasal drip and congestion.  Her PFTs at that visit were somewhat improved in terms of her FVC from November 2023 with stable very low FEV1.  I gave her a short course of antibiotics and Medrol Dosepak try to optimize her for surgery.  Cussed potential substitution of Breztri for Trelegy and I ordered a preop chest CT as requested by Dr. Pandya..    Dr. Good Pandya   Saw her in preop clinic on 4/8/2024.  He assessed her as clinical stage Ia and planned robotic assisted right thoracoscopic wedge resection " mediastinal lymph node dissection.  She underwent this procedure by Dr. Weaver on 4/26/2024 and required some supplemental oxygen postoperatively.  I reviewed the final pathology and she had atypical carcinoid with negative nodes pathologic stage T1 N0.    She had admission to [facility] from 5/3 to 5/7/2024 for operative right-sided pneumothorax.  She also was treated with a course of antibiotics and prednisone taper at that time.    She saw Dr. Morales in the oncology follow-up clinic on 5/21/2024 and she was having more shortness of breath.  I talked with Dr. Pandya on the telephone about her worsening pulmonary condition and her lack of desire to be admitted to the hospital.  We agreed on giving her an empiric course of Levaquin for 10 to 14 days and seeing if she improved or not.    She was again admitted to [facility] from 8/9-12 2024 for cute hypoxic respiratory failure with possible community-acquired pneumonia.  Initially been seen in the emergency department for right-sided lung pain and cough that was of stabbing nature.  She was sent home but returned the next day.  She was then started on ceftriaxone and doxycycline plus given steroids.  There was some concern for possible right lower lobe community-acquired pneumonia but the patient was afebrile and did not have leukocytosis.  Chest CT was performed that felt the question of right lower lobe abnormalities was actually due to callus formation from rib fractures.  She was discharged on oral Augmentin and a steroid taper.    I reviewed her prior clinic workup including PFTs of 3/29/2024 (preop) which showed FEV1 0.9/FVC 1.85 (47/75% predicted, respectively) for ratio 50%.  The TLC was 4.3 (97% predicted).  DLCO uncorrected for hemoglobin was 11.6 (65% of predicted.     Returning to clinic today, Mimi felt quite unwell.  She is frustrated sad and depressed about all the difficulties had postoperatively and says she is not  "feeling good.  She complains of some pain in the right lower chest.  She was emotional and teary and worried about whether she made a good decision in going ahead with the surgery.  Overall she felt like she was improved for a period of time postsurgery prior to the recent hospitalization at [facility].  He continues to have intermittent awakenings at night with some shortness of breath and cough of uncertain etiology.  She is not having any sleep in the same room with her recently.  There is no known history of snoring or sleep apnea and she is quite thin.  She believes she is febrile because she is having intermittent sweats but has not taken her temperature.  There has been no hemoptysis.  She is having oxygen saturation sitting at rest of 94 to 96%.  She says that when she wakes up it is 91 and 92% she uses oxygen at night.  She states she feels weirdly in the morning was gradually improved during the day.  She has a sensation that her right lung is \"chronically filled up.\".  Intermittently she has rapid heart rate accompanied by shortness of breath and this seems triggered by the rapid heart rate.  She says she is not sleeping well and she is feels swollen all over.  She has some intermittent dizziness as well.  She has previously been diagnosed and treated for major depression.  Intermittently in the past have given her Wellbutrin with significant improvement but she is not on this now    Otherwise detailed pulmonary ROS and general medical ROS are unchanged.      Current Outpatient Medications   Medication Sig Dispense Refill    amLODIPine (NORVASC) 10 MG tablet Take 1 tablet (10 mg) by mouth every evening 90 tablet 3    budeson-glycopyrrol-formoterol (BREZTRI AEROSPHERE) 160-9-4.8 MCG/ACT AERO inhaler Inhale 2 puffs into the lungs 2 times daily. Rinse mouth after use. Alternative to Trelegy 10.7 g 11    budesonide (PULMICORT) 0.5 MG/2ML neb solution Spray 2 mLs (0.5 mg) in nostril daily Empty contents " of ampule into 240mL of saline solution and rinse both nasal cavities as instructed twice daily. 60 mL 1    buPROPion (WELLBUTRIN XL) 150 MG 24 hr tablet Take 1 tablet (150 mg) by mouth every morning. For first 3 days, then increase to twice daily 60 tablet 11    clotrimazole (MYCELEX) 10 MG lozenge Place 1 lozenge (10 mg) inside cheek 3 times daily. 14 Nora 3    EPINEPHrine (ANY BX GENERIC EQUIV) 0.3 MG/0.3ML injection 2-pack Inject 0.3 mLs (0.3 mg) into the muscle as needed for anaphylaxis May repeat one time in 5-15 minutes if response to initial dose is inadequate. 2 each 0    esomeprazole (NEXIUM) 20 MG DR capsule Take 20 mg by mouth 2 times daily as needed Take 30-60 minutes before eating.      Fluticasone-Umeclidin-Vilant (TRELEGY ELLIPTA) 100-62.5-25 MCG/ACT oral inhaler INHALE ONE PUFF BY MOUTH EVERY DAY 60 each 5    gabapentin (NEURONTIN) 300 MG capsule Take 300 mg by mouth daily as needed      ipratropium (ATROVENT) 0.06 % nasal spray Spray 2 sprays into both nostrils 4 times daily as needed for rhinitis 15 mL 1    levalbuterol (XOPENEX HFA) 45 MCG/ACT inhaler Inhale 2 puffs into the lungs every 4 hours as needed for shortness of breath or wheezing. 15 g 11    levalbuterol (XOPENEX HFA) 45 MCG/ACT inhaler Inhale 2 puffs into the lungs every 4 hours as needed for shortness of breath / dyspnea or wheezing 1 Inhaler 1    levofloxacin (LEVAQUIN) 500 MG tablet Take 1 tablet (500 mg) by mouth daily. 14 tablet 3    methylPREDNISolone (MEDROL DOSEPAK) 4 MG tablet therapy pack Follow Package Directions 21 tablet 0    tiotropium (SPIRIVA RESPIMAT) 2.5 MCG/ACT inhaler Inhale 2 puffs into the lungs daily 1 g 0     No current facility-administered medications for this visit.     No Known Allergies  Past Medical History:   Diagnosis Date    ASCUS on Pap smear 01/01/2006    unable to run HPV typing, f/u paps NIL    Chronic rhinosinusitis     COPD (chronic obstructive pulmonary disease) (H)     Depression, anxiety      Herpes     occasional outbreak    Hypertension     Primary stress urinary incontinence     Pulmonary nodules     Rhinosinusitis      Past Surgical History:   Procedure Laterality Date    BRONCHOSCOPY, WITH BIOPSY, ROBOT ASSISTED Bilateral 2023    Procedure: Robot assisted Ion BRONCHOSCOPY;  Surgeon: Raudel Claire DO;  Location: [facility] OR     SECTION      x 3    DAVINCI LOBECTOMY LUNG Right 2024    Procedure: robot assisted thoracoscopic right wedge resection, mediastinal lymph node dissection;  Surgeon: Good Pandya MD;  Location: [facility] OR    ENDOBRONCHIAL ULTRASOUND FLEXIBLE N/A 2023    Procedure: endobronchial ultrasound, transbronchial biopsies Latex Free;  Surgeon: Raudel Claire DO;  Location: [facility] OR     Social History     Socioeconomic History    Marital status:      Spouse name: Not on file    Number of children: Not on file    Years of education: Not on file    Highest education level: Not on file   Occupational History    Not on file   Tobacco Use    Smoking status: Former     Current packs/day: 0.00     Types: Cigarettes     Quit date: 3/19/2015     Years since quittin.4    Smokeless tobacco: Former     Quit date: 2017   Vaping Use    Vaping status: Never Used   Substance and Sexual Activity    Alcohol use: Yes     Alcohol/week: 2.0 - 3.0 standard drinks of alcohol     Types: 2 - 3 Standard drinks or equivalent per week     Comment: 2 drinks per wk avg    Drug use: No    Sexual activity: Yes     Partners: Male     Birth control/protection: Surgical     Comment: tubal   Other Topics Concern    Parent/sibling w/ CABG, MI or angioplasty before 65F 55M? No   Social History Narrative    Not on file     Social Determinants of Health     Financial Resource Strain: Not on file   Food Insecurity: Not on file   Transportation Needs: Not on file   Physical Activity: Not on file   Stress: Not on file   Social Connections: Not on file   Interpersonal Safety: Not on file  "  Housing Stability: Not on file       ROS Pulmonary:  A complete ROS was otherwise negative except as noted in the HPI.    Exam:   BP (!) 189/108   Pulse 89   Resp 17   Ht 1.575 m (5' 2\")   Wt 50.3 kg (111 lb)   LMP 09/22/2010   SpO2 93%   BMI 20.30 kg/m    GENERAL APPEARANCE: Well developed, well nourished, alert, and in no apparent distress. No tachypnea, stridor, cough, or audible wheeze.Thin. Sad and frustrated / emotional affect.  EYES: PERRL, EOMI  HENT: N No sinus tenderness.  MOUTH: Oral mucosa is moist, without any lesions, no tonsillar enlargement, no oropharyngeal exudate.  NECK: supple, no masses, no thyromegaly.  LYMPHATICS: No significant axillary, cervical, or supraclavicular nodes.  RESP: normal inspection, palpation, percussion, with good air flow throughout.  No crackles. No rhonchi. Scattered wheezes bilaterally.   CV: RRR Normal S1, S2, regular rhythm, normal rate. No murmur.  No rub. No gallop. No LE edema.   ABDOMEN: deferred  MS: extremities normal. No clubbing. No cyanosis.  SKIN: no rash on limited exam  NEURO: Mentation intact, speech normal, normal strength and tone, normal gait and stance  PSYCH: mentation appears normal. and affect normal/bright    Results:  Recent Results (from the past 168 hour(s))   6 minute walk test    Collection Time: 09/10/24 12:00 AM   Result Value Ref Range    6 min walk (FT) 1,160 1,295 ft    6 Min Walk (M) 354 395 m   General PFT Lab (Please always keep checked)    Collection Time: 09/10/24  3:53 PM   Result Value Ref Range    FIO2-Pre 21.00 %     Pathology from 4/26/2024  A. LUNG, RIGHT UPPER LOBE, WEDGE RESECTION:  - ATYPICAL CARCINOID TUMOR (Neuroendocrine tumor, grade 2)  - Tumor size: 1.0 cm in greatest dimension  - Visceral pleural invasion is not identified  - Lymphovascular invasion is present, extensive  - Parenchymal resection margin is positive (staple line is not included)  - AJCC Pathologic Stage: pT1a, N0     B. LYMPH NODE, LEVEL " 7:  Three lymph nodes, negative for tumor (0/3)     C. LYMPH NODE, LEVEL 4R:  - Two lymph nodes, negative for tumor (0/2)          Assessment and plan:  Encounter Diagnoses   Name Primary?    Chronic obstructive pulmonary disease, unspecified COPD type (H) Yes    Chronic bronchitis, unspecified chronic bronchitis type (H)     Atypical carcinoid lung tumor (H)     Panlobular emphysema (H)     Moderate major depression (H)     Hypertension goal BP (blood pressure) < 140/90     Aortic calcification (H24)       Ms. Hilario is justifiably frustrated and depressed about the difficult course she has had over the last 6+ months.  It is difficult as discriminate between the physical effects of her prior and ongoing severe lung disease, any worsening related to either her surgery and/or her multiple recent hospitalizations and her underlying depression.  We discussed in detail the severity of her lung disease preoperatively and the fact that she had a lot of ups and downs including frequent flareups of her COPD.  With the results of her August 9 chest CT scan done at [facility] as part of that hospitalization.  She was worried that there was ongoing inflammation other abnormalities in the lung.  As I showed her looking at the scan and also from the report there really were not many ongoing areas of infection or inflammation seen in the lung at that time.  She is concerned that she is having another COPD flare and may have infection with her sensation of chills and being febrile (but without actually documenting high temperature).  I agreed to give her a course of treatment for COPD exacerbation with a Medrol Dosepak and 7 to 14 days of Levaquin.  She was cautioned about stopping Levaquin if any Achilles tendinitis symptoms occurred.  She was asked to continue her Trelegy each morning and will leave albuterol nebs and MDI.  The lev albuterol inhaler was refilled.  She again asked about substituting Breztri for Trelegy  and I wrote her prescription for this warning her that I did not know which would be more out-of-pocket expense of based on her insurance.  Also asked for some treatment for possible oral yeast and I gave her prescription for clotrimazole troches.  We gave her an Aerobika device and encouraged to use this every 2 hours while awake and taught her proper usage by staff.  I referred her back to pulmonary rehab which she never completed to try to assist in increasing her activity and optimizing her oxygen and methods.  Given her significant history of major anxiety and depression, she and I discussed the fact that previously this responded well to Wellbutrin.  I restarted this at initial dose of 150 mg XL per day increasing to 300/day after at least 3 days.  She will plan to have return to clinic in 6 months and will call our nurses for problems or questions.    I spent a total of 70- minutes dedicated to her care today, 09/13/24, including review of her past medical records, review of past images, reports and PFTs with the patient today and in documentation.     Ishmael Bridges MD  Professor of Medicine  Past President, [employer]

## 2024-09-13 NOTE — NURSING NOTE
Chief Complaint   Patient presents with    RECHECK     Return Pulmonary     Medications reviewed and vital signs taken.   Rao Lamb, KAEL   
IV discontinued, cath removed intact

## 2024-09-13 NOTE — PATIENT INSTRUCTIONS
Severe COPD and s/p wedge resection of Carcinoid Tumor  2 hospitalizations since then  Requiring     Continue Trelegy each AM and levalbuterol nebs and MDI  Can try substituting Breztri for Trelegy (in not too expensive out of pocket)  Refilled levalbuterol inhaler  Order Clotrimazole troches for oral yeast (3x/day0  Treat flare up with levaquin once daily and Medrol dose pack  Referral for pulmonary rehab  Use Aerobika device - every 2 hours or so while awake  Restart Welbutrin for anxiety and depression. Start with 150 mgs once a day and after 3 days, increase to twice daily  Please call our nurses for problems or questions [phone]  Return to clinic 6 months

## 2024-09-16 ENCOUNTER — TELEPHONE (OUTPATIENT)
Dept: PULMONOLOGY | Facility: CLINIC | Age: 69
End: 2024-09-16
Payer: COMMERCIAL

## 2024-09-16 DIAGNOSIS — R12 HEART BURN: ICD-10-CM

## 2024-09-16 NOTE — TELEPHONE ENCOUNTER
"Retail Pharmacy Prior Authorization Team   Phone: [phone]      PRIOR AUTHORIZATION DENIED    Medication: LEVALBUTEROL TARTRATE 45 MCG/ACT IN [employer]  Insurance Company: [employer] [place] - Phone [phone] Fax [phone]  Denial Date: 9/15/2024  Denial Reason(s):     Appeal Information: To send an appeal to the patient's insurance, please provide a letter of medical necessity for the requested medication that was denied.  Please use the denial rationale from the patient's insurance to write the letter.  Once it is completed, please have it available under the \"letters tab\" in the patient's chart and route directly back to me: NARGIS THAPA and I can send the appeal to the patient's insurance.       Patient Notified: No. The [employer] [facility] PA Team does not notify of the denial outcomes as the patient often will ask what their provider will prescribe in place of the denied medication, or additional information in regards to other therapies they can take in place of the denied medication.  This is not something we can advise on in our department.    "

## 2024-09-16 NOTE — TELEPHONE ENCOUNTER
Retail Pharmacy Prior Authorization Team   Phone: [phone]      Called to notify the pharmacy that the Brand Xopenex HFA is preferred; they have it on order and it should arrive tomorrow.    I will close this encounter to avoid further confusion since the pharmacy did process for the brand product, which is preferred and covered.

## 2024-10-13 ENCOUNTER — HEALTH MAINTENANCE LETTER (OUTPATIENT)
Age: 69
End: 2024-10-13

## 2024-11-15 DIAGNOSIS — R12 HEART BURN: ICD-10-CM

## 2024-12-22 ENCOUNTER — HOSPITAL ENCOUNTER (INPATIENT)
Facility: CLINIC | Age: 69
LOS: 1 days | Discharge: LEFT BEFORE DISCHARGE | DRG: 190 | End: 2024-12-23
Attending: EMERGENCY MEDICINE | Admitting: PEDIATRICS
Payer: COMMERCIAL

## 2024-12-22 ENCOUNTER — APPOINTMENT (OUTPATIENT)
Dept: GENERAL RADIOLOGY | Facility: CLINIC | Age: 69
DRG: 190 | End: 2024-12-22
Attending: EMERGENCY MEDICINE
Payer: COMMERCIAL

## 2024-12-22 DIAGNOSIS — J43.1 PANLOBULAR EMPHYSEMA (H): ICD-10-CM

## 2024-12-22 DIAGNOSIS — I70.0 AORTIC CALCIFICATION (H): ICD-10-CM

## 2024-12-22 DIAGNOSIS — J42 CHRONIC BRONCHITIS, UNSPECIFIED CHRONIC BRONCHITIS TYPE (H): ICD-10-CM

## 2024-12-22 DIAGNOSIS — J44.9 CHRONIC OBSTRUCTIVE PULMONARY DISEASE, UNSPECIFIED COPD TYPE (H): ICD-10-CM

## 2024-12-22 DIAGNOSIS — Z87.891 HISTORY OF TOBACCO USE: Primary | ICD-10-CM

## 2024-12-22 DIAGNOSIS — I10 HYPERTENSION GOAL BP (BLOOD PRESSURE) < 140/90: ICD-10-CM

## 2024-12-22 DIAGNOSIS — Z85.118 PERSONAL HISTORY OF LUNG CANCER: ICD-10-CM

## 2024-12-22 DIAGNOSIS — C7A.090 ATYPICAL CARCINOID LUNG TUMOR (H): ICD-10-CM

## 2024-12-22 DIAGNOSIS — F32.1 MODERATE MAJOR DEPRESSION (H): ICD-10-CM

## 2024-12-22 DIAGNOSIS — J44.1 COPD EXACERBATION (H): ICD-10-CM

## 2024-12-22 LAB
ALBUMIN SERPL BCG-MCNC: 4.3 G/DL (ref 3.5–5.2)
ALP SERPL-CCNC: 92 U/L (ref 40–150)
ALT SERPL W P-5'-P-CCNC: 9 U/L (ref 0–50)
ANION GAP SERPL CALCULATED.3IONS-SCNC: 12 MMOL/L (ref 7–15)
AST SERPL W P-5'-P-CCNC: 22 U/L (ref 0–45)
BASOPHILS # BLD AUTO: 0 10E3/UL (ref 0–0.2)
BASOPHILS NFR BLD AUTO: 0 %
BILIRUB SERPL-MCNC: 0.6 MG/DL
BUN SERPL-MCNC: 7.6 MG/DL (ref 8–23)
C PNEUM DNA SPEC QL NAA+PROBE: NOT DETECTED
CALCIUM SERPL-MCNC: 10 MG/DL (ref 8.8–10.4)
CHLORIDE SERPL-SCNC: 99 MMOL/L (ref 98–107)
CREAT SERPL-MCNC: 0.52 MG/DL (ref 0.51–0.95)
D DIMER PPP FEU-MCNC: <0.27 UG/ML FEU (ref 0–0.5)
EGFRCR SERPLBLD CKD-EPI 2021: >90 ML/MIN/1.73M2
EOSINOPHIL # BLD AUTO: 0 10E3/UL (ref 0–0.7)
EOSINOPHIL NFR BLD AUTO: 0 %
ERYTHROCYTE [DISTWIDTH] IN BLOOD BY AUTOMATED COUNT: 12 % (ref 10–15)
FLUAV H1 2009 PAND RNA SPEC QL NAA+PROBE: NOT DETECTED
FLUAV H1 RNA SPEC QL NAA+PROBE: NOT DETECTED
FLUAV H3 RNA SPEC QL NAA+PROBE: NOT DETECTED
FLUAV RNA SPEC QL NAA+PROBE: NEGATIVE
FLUAV RNA SPEC QL NAA+PROBE: NOT DETECTED
FLUBV RNA RESP QL NAA+PROBE: NEGATIVE
FLUBV RNA SPEC QL NAA+PROBE: NOT DETECTED
GLUCOSE SERPL-MCNC: 112 MG/DL (ref 70–99)
HADV DNA SPEC QL NAA+PROBE: NOT DETECTED
HCO3 SERPL-SCNC: 26 MMOL/L (ref 22–29)
HCOV PNL SPEC NAA+PROBE: NOT DETECTED
HCT VFR BLD AUTO: 45.3 % (ref 35–47)
HGB BLD-MCNC: 15.5 G/DL (ref 11.7–15.7)
HMPV RNA SPEC QL NAA+PROBE: NOT DETECTED
HOLD SPECIMEN: NORMAL
HPIV1 RNA SPEC QL NAA+PROBE: NOT DETECTED
HPIV2 RNA SPEC QL NAA+PROBE: NOT DETECTED
HPIV3 RNA SPEC QL NAA+PROBE: NOT DETECTED
HPIV4 RNA SPEC QL NAA+PROBE: NOT DETECTED
IMM GRANULOCYTES # BLD: 0.1 10E3/UL
IMM GRANULOCYTES NFR BLD: 1 %
LYMPHOCYTES # BLD AUTO: 1.3 10E3/UL (ref 0.8–5.3)
LYMPHOCYTES NFR BLD AUTO: 8 %
M PNEUMO DNA SPEC QL NAA+PROBE: NOT DETECTED
MCH RBC QN AUTO: 32.4 PG (ref 26.5–33)
MCHC RBC AUTO-ENTMCNC: 34.2 G/DL (ref 31.5–36.5)
MCV RBC AUTO: 95 FL (ref 78–100)
MONOCYTES # BLD AUTO: 1 10E3/UL (ref 0–1.3)
MONOCYTES NFR BLD AUTO: 7 %
NEUTROPHILS # BLD AUTO: 13.2 10E3/UL (ref 1.6–8.3)
NEUTROPHILS NFR BLD AUTO: 84 %
NRBC # BLD AUTO: 0 10E3/UL
NRBC BLD AUTO-RTO: 0 /100
NT-PROBNP SERPL-MCNC: 324 PG/ML (ref 0–900)
PLATELET # BLD AUTO: 268 10E3/UL (ref 150–450)
POTASSIUM SERPL-SCNC: 4 MMOL/L (ref 3.4–5.3)
PROT SERPL-MCNC: 6.8 G/DL (ref 6.4–8.3)
RBC # BLD AUTO: 4.78 10E6/UL (ref 3.8–5.2)
RSV RNA SPEC NAA+PROBE: NEGATIVE
RSV RNA SPEC QL NAA+PROBE: NOT DETECTED
RSV RNA SPEC QL NAA+PROBE: NOT DETECTED
RV+EV RNA SPEC QL NAA+PROBE: NOT DETECTED
SARS-COV-2 RNA RESP QL NAA+PROBE: NEGATIVE
SODIUM SERPL-SCNC: 137 MMOL/L (ref 135–145)
TROPONIN T SERPL HS-MCNC: 15 NG/L
TROPONIN T SERPL HS-MCNC: 15 NG/L
WBC # BLD AUTO: 15.7 10E3/UL (ref 4–11)

## 2024-12-22 PROCEDURE — 85379 FIBRIN DEGRADATION QUANT: CPT | Performed by: EMERGENCY MEDICINE

## 2024-12-22 PROCEDURE — 93005 ELECTROCARDIOGRAM TRACING: CPT | Performed by: EMERGENCY MEDICINE

## 2024-12-22 PROCEDURE — 83880 ASSAY OF NATRIURETIC PEPTIDE: CPT | Performed by: EMERGENCY MEDICINE

## 2024-12-22 PROCEDURE — 71046 X-RAY EXAM CHEST 2 VIEWS: CPT | Mod: 26 | Performed by: RADIOLOGY

## 2024-12-22 PROCEDURE — 87486 CHLMYD PNEUM DNA AMP PROBE: CPT | Performed by: EMERGENCY MEDICINE

## 2024-12-22 PROCEDURE — 99285 EMERGENCY DEPT VISIT HI MDM: CPT | Performed by: EMERGENCY MEDICINE

## 2024-12-22 PROCEDURE — 85025 COMPLETE CBC W/AUTO DIFF WBC: CPT | Performed by: EMERGENCY MEDICINE

## 2024-12-22 PROCEDURE — 94640 AIRWAY INHALATION TREATMENT: CPT | Performed by: EMERGENCY MEDICINE

## 2024-12-22 PROCEDURE — 250N000013 HC RX MED GY IP 250 OP 250 PS 637

## 2024-12-22 PROCEDURE — 87637 SARSCOV2&INF A&B&RSV AMP PRB: CPT | Performed by: EMERGENCY MEDICINE

## 2024-12-22 PROCEDURE — 71046 X-RAY EXAM CHEST 2 VIEWS: CPT

## 2024-12-22 PROCEDURE — 84484 ASSAY OF TROPONIN QUANT: CPT | Performed by: EMERGENCY MEDICINE

## 2024-12-22 PROCEDURE — 250N000011 HC RX IP 250 OP 636

## 2024-12-22 PROCEDURE — 36415 COLL VENOUS BLD VENIPUNCTURE: CPT | Performed by: EMERGENCY MEDICINE

## 2024-12-22 PROCEDURE — 99285 EMERGENCY DEPT VISIT HI MDM: CPT | Mod: 25 | Performed by: EMERGENCY MEDICINE

## 2024-12-22 PROCEDURE — 250N000009 HC RX 250: Performed by: EMERGENCY MEDICINE

## 2024-12-22 PROCEDURE — 82374 ASSAY BLOOD CARBON DIOXIDE: CPT | Performed by: EMERGENCY MEDICINE

## 2024-12-22 PROCEDURE — 93010 ELECTROCARDIOGRAM REPORT: CPT | Performed by: EMERGENCY MEDICINE

## 2024-12-22 PROCEDURE — 250N000011 HC RX IP 250 OP 636: Performed by: EMERGENCY MEDICINE

## 2024-12-22 PROCEDURE — 120N000002 HC R&B MED SURG/OB UMMC

## 2024-12-22 PROCEDURE — 87633 RESP VIRUS 12-25 TARGETS: CPT | Performed by: EMERGENCY MEDICINE

## 2024-12-22 PROCEDURE — 82247 BILIRUBIN TOTAL: CPT | Performed by: EMERGENCY MEDICINE

## 2024-12-22 RX ORDER — AMOXICILLIN 250 MG
2 CAPSULE ORAL 2 TIMES DAILY PRN
Status: DISCONTINUED | OUTPATIENT
Start: 2024-12-22 | End: 2024-12-23 | Stop reason: HOSPADM

## 2024-12-22 RX ORDER — ONDANSETRON 4 MG/1
4 TABLET, ORALLY DISINTEGRATING ORAL EVERY 6 HOURS PRN
Status: DISCONTINUED | OUTPATIENT
Start: 2024-12-22 | End: 2024-12-23 | Stop reason: HOSPADM

## 2024-12-22 RX ORDER — LEVALBUTEROL INHALATION SOLUTION 1.25 MG/3ML
1.25 SOLUTION RESPIRATORY (INHALATION) EVERY 4 HOURS PRN
Status: DISCONTINUED | OUTPATIENT
Start: 2024-12-22 | End: 2024-12-23 | Stop reason: HOSPADM

## 2024-12-22 RX ORDER — HYDROXYZINE HYDROCHLORIDE 50 MG/1
50 TABLET, FILM COATED ORAL ONCE
Status: COMPLETED | OUTPATIENT
Start: 2024-12-22 | End: 2024-12-22

## 2024-12-22 RX ORDER — PREDNISONE 20 MG/1
40 TABLET ORAL DAILY
Status: DISCONTINUED | OUTPATIENT
Start: 2024-12-23 | End: 2024-12-23 | Stop reason: HOSPADM

## 2024-12-22 RX ORDER — CALCIUM CARBONATE 500 MG/1
1000 TABLET, CHEWABLE ORAL 4 TIMES DAILY PRN
Status: DISCONTINUED | OUTPATIENT
Start: 2024-12-22 | End: 2024-12-23 | Stop reason: HOSPADM

## 2024-12-22 RX ORDER — HYDROXYZINE HYDROCHLORIDE 25 MG/1
25 TABLET, FILM COATED ORAL EVERY 6 HOURS PRN
Status: DISCONTINUED | OUTPATIENT
Start: 2024-12-23 | End: 2024-12-22

## 2024-12-22 RX ORDER — PANTOPRAZOLE SODIUM 40 MG/1
40 TABLET, DELAYED RELEASE ORAL
Status: DISCONTINUED | OUTPATIENT
Start: 2024-12-23 | End: 2024-12-23 | Stop reason: HOSPADM

## 2024-12-22 RX ORDER — LIDOCAINE 40 MG/G
CREAM TOPICAL
Status: DISCONTINUED | OUTPATIENT
Start: 2024-12-22 | End: 2024-12-23 | Stop reason: HOSPADM

## 2024-12-22 RX ORDER — ACETAMINOPHEN 325 MG/1
650 TABLET ORAL EVERY 4 HOURS PRN
Status: DISCONTINUED | OUTPATIENT
Start: 2024-12-22 | End: 2024-12-23 | Stop reason: HOSPADM

## 2024-12-22 RX ORDER — METHYLPREDNISOLONE SODIUM SUCCINATE 125 MG/2ML
125 INJECTION INTRAMUSCULAR; INTRAVENOUS ONCE
Status: COMPLETED | OUTPATIENT
Start: 2024-12-22 | End: 2024-12-22

## 2024-12-22 RX ORDER — ONDANSETRON 2 MG/ML
4 INJECTION INTRAMUSCULAR; INTRAVENOUS EVERY 6 HOURS PRN
Status: DISCONTINUED | OUTPATIENT
Start: 2024-12-22 | End: 2024-12-23 | Stop reason: HOSPADM

## 2024-12-22 RX ORDER — LEVOFLOXACIN 5 MG/ML
500 INJECTION, SOLUTION INTRAVENOUS EVERY 24 HOURS
Status: DISCONTINUED | OUTPATIENT
Start: 2024-12-23 | End: 2024-12-23 | Stop reason: HOSPADM

## 2024-12-22 RX ORDER — LEVALBUTEROL TARTRATE 45 UG/1
2 AEROSOL, METERED ORAL EVERY 4 HOURS PRN
Status: DISCONTINUED | OUTPATIENT
Start: 2024-12-22 | End: 2024-12-22

## 2024-12-22 RX ORDER — AMOXICILLIN 250 MG
1 CAPSULE ORAL 2 TIMES DAILY PRN
Status: DISCONTINUED | OUTPATIENT
Start: 2024-12-22 | End: 2024-12-23 | Stop reason: HOSPADM

## 2024-12-22 RX ORDER — FLUTICASONE FUROATE AND VILANTEROL 100; 25 UG/1; UG/1
1 POWDER RESPIRATORY (INHALATION) DAILY
Status: DISCONTINUED | OUTPATIENT
Start: 2024-12-23 | End: 2024-12-23 | Stop reason: HOSPADM

## 2024-12-22 RX ORDER — BUPROPION HYDROCHLORIDE 150 MG/1
300 TABLET ORAL EVERY MORNING
Status: DISCONTINUED | OUTPATIENT
Start: 2024-12-23 | End: 2024-12-23 | Stop reason: HOSPADM

## 2024-12-22 RX ORDER — ACETAMINOPHEN 650 MG/1
650 SUPPOSITORY RECTAL EVERY 4 HOURS PRN
Status: DISCONTINUED | OUTPATIENT
Start: 2024-12-22 | End: 2024-12-23 | Stop reason: HOSPADM

## 2024-12-22 RX ORDER — HYDROXYZINE HYDROCHLORIDE 25 MG/1
25 TABLET, FILM COATED ORAL EVERY 6 HOURS PRN
Status: DISCONTINUED | OUTPATIENT
Start: 2024-12-23 | End: 2024-12-23 | Stop reason: HOSPADM

## 2024-12-22 RX ORDER — LEVOFLOXACIN 5 MG/ML
500 INJECTION, SOLUTION INTRAVENOUS ONCE
Status: COMPLETED | OUTPATIENT
Start: 2024-12-22 | End: 2024-12-22

## 2024-12-22 RX ORDER — IPRATROPIUM BROMIDE AND ALBUTEROL SULFATE 2.5; .5 MG/3ML; MG/3ML
3 SOLUTION RESPIRATORY (INHALATION) ONCE
Status: COMPLETED | OUTPATIENT
Start: 2024-12-22 | End: 2024-12-22

## 2024-12-22 RX ORDER — AMLODIPINE BESYLATE 10 MG/1
10 TABLET ORAL EVERY EVENING
Status: DISCONTINUED | OUTPATIENT
Start: 2024-12-22 | End: 2024-12-23 | Stop reason: HOSPADM

## 2024-12-22 RX ORDER — GUAIFENESIN 200 MG/10ML
200 LIQUID ORAL EVERY 4 HOURS PRN
Status: DISCONTINUED | OUTPATIENT
Start: 2024-12-22 | End: 2024-12-23 | Stop reason: HOSPADM

## 2024-12-22 RX ORDER — POLYETHYLENE GLYCOL 3350 17 G/17G
17 POWDER, FOR SOLUTION ORAL 2 TIMES DAILY PRN
Status: DISCONTINUED | OUTPATIENT
Start: 2024-12-22 | End: 2024-12-23 | Stop reason: HOSPADM

## 2024-12-22 RX ADMIN — ONDANSETRON 4 MG: 2 INJECTION INTRAMUSCULAR; INTRAVENOUS at 23:00

## 2024-12-22 RX ADMIN — LEVOFLOXACIN 500 MG: 5 INJECTION, SOLUTION INTRAVENOUS at 21:03

## 2024-12-22 RX ADMIN — HYDROXYZINE HYDROCHLORIDE 50 MG: 50 TABLET, FILM COATED ORAL at 22:29

## 2024-12-22 RX ADMIN — IPRATROPIUM BROMIDE AND ALBUTEROL SULFATE 3 ML: .5; 3 SOLUTION RESPIRATORY (INHALATION) at 19:05

## 2024-12-22 RX ADMIN — METHYLPREDNISOLONE SODIUM SUCCINATE 125 MG: 125 INJECTION INTRAMUSCULAR; INTRAVENOUS at 20:58

## 2024-12-22 ASSESSMENT — ACTIVITIES OF DAILY LIVING (ADL)
ADLS_ACUITY_SCORE: 58

## 2024-12-22 ASSESSMENT — COLUMBIA-SUICIDE SEVERITY RATING SCALE - C-SSRS
1. IN THE PAST MONTH, HAVE YOU WISHED YOU WERE DEAD OR WISHED YOU COULD GO TO SLEEP AND NOT WAKE UP?: NO
6. HAVE YOU EVER DONE ANYTHING, STARTED TO DO ANYTHING, OR PREPARED TO DO ANYTHING TO END YOUR LIFE?: NO
2. HAVE YOU ACTUALLY HAD ANY THOUGHTS OF KILLING YOURSELF IN THE PAST MONTH?: NO

## 2024-12-22 NOTE — ED PROVIDER NOTES
"    [facility] ([facility])    24       ED PROVIDER NOTE       History     Chief Complaint   Patient presents with    Shortness of Breath    Copd Exacerbation    Cough     HPI  Mimi Padmini Rivera is a 69 year old female with a past medical history of COPD, essential hypertension, lung cancer status post partial lobectomy of right lung (24), former smoker, who presents to the Emergency Department via EMS with shortness of breath and a productive cough.     Patient reports shortness of breath, night sweats, chills, and coughing up pus for the past few weeks. Patient reports fevers at home with tmax of 100F and diarrhea. Patient states she uses inhalers and 2L oxygen as needed before bed at home. Patient states it feels like \"her right lung isn't working\". Patient has an oximeter at home and reports her oxygen was 87% last night. She received 2 duo nebs via EMS in route. Patient reports no leg swelling at this time. Patient reports taking Zithromax every day, no recent steroid use. Patient states she was not able to get in to see her pcp. She denies chest pain, nausea, or vomiting. No abdominal pain.    Past Medical History  Past Medical History:   Diagnosis Date    ASCUS on Pap smear 2006    unable to run HPV typing, f/u paps NIL    Chronic rhinosinusitis     COPD (chronic obstructive pulmonary disease) (H)     Depression, anxiety     Herpes     occasional outbreak    Hypertension     Primary stress urinary incontinence     Pulmonary nodules     Rhinosinusitis      Past Surgical History:   Procedure Laterality Date    BRONCHOSCOPY, WITH BIOPSY, ROBOT ASSISTED Bilateral 2023    Procedure: Robot assisted Ion BRONCHOSCOPY;  Surgeon: Raudel Claire DO;  Location: [facility] OR     SECTION      x 3    DAVINCI LOBECTOMY LUNG Right 2024    Procedure: robot assisted thoracoscopic right wedge resection, mediastinal lymph node dissection;  Surgeon: Good Pandya MD;  " Location: [facility] OR    ENDOBRONCHIAL ULTRASOUND FLEXIBLE N/A 2023    Procedure: endobronchial ultrasound, transbronchial biopsies Latex Free;  Surgeon: Raudel Claire DO;  Location: [facility] OR     amLODIPine (NORVASC) 10 MG tablet  budeson-glycopyrrol-formoterol (BREZTRI AEROSPHERE) 160-9-4.8 MCG/ACT AERO inhaler  budesonide (PULMICORT) 0.5 MG/2ML neb solution  buPROPion (WELLBUTRIN XL) 150 MG 24 hr tablet  clotrimazole (MYCELEX) 10 MG lozenge  EPINEPHrine (ANY BX GENERIC EQUIV) 0.3 MG/0.3ML injection 2-pack  esomeprazole (NEXIUM) 20 MG DR capsule  Fluticasone-Umeclidin-Vilant (TRELEGY ELLIPTA) 100-62.5-25 MCG/ACT oral inhaler  gabapentin (NEURONTIN) 300 MG capsule  levalbuterol (XOPENEX HFA) 45 MCG/ACT inhaler  levalbuterol (XOPENEX HFA) 45 MCG/ACT inhaler  levofloxacin (LEVAQUIN) 500 MG tablet  methylPREDNISolone (MEDROL DOSEPAK) 4 MG tablet therapy pack  omeprazole (PRILOSEC) 20 MG DR capsule  tiotropium (SPIRIVA RESPIMAT) 2.5 MCG/ACT inhaler      No Known Allergies  Family History  Family History   Problem Relation Age of Onset    Hypertension Mother     Cancer Mother         liver    C.A.D. Father     Anesthesia Reaction No family hx of     Deep Vein Thrombosis (DVT) No family hx of      Social History   Social History     Tobacco Use    Smoking status: Former     Current packs/day: 0.00     Types: Cigarettes     Quit date: 3/19/2015     Years since quittin.7    Smokeless tobacco: Former     Quit date: 2017   Vaping Use    Vaping status: Never Used   Substance Use Topics    Alcohol use: Yes     Alcohol/week: 2.0 - 3.0 standard drinks of alcohol     Types: 2 - 3 Standard drinks or equivalent per week     Comment: 2 drinks per wk avg    Drug use: No      Past medical history, past surgical history, medications, allergies, family history, and social history were reviewed with the patient. No additional pertinent items.     A complete review of systems was performed with pertinent positives and negatives  noted in the HPI, and all other systems negative.    Physical Exam   BP: 137/78  Pulse: 95  Temp: 99.7  F (37.6  C)  Resp: 18  SpO2: 98 %  Physical Exam  Physical Exam   Constitutional: oriented to person, place, and time. appears well-developed and well-nourished.   HENT:   Head: Normocephalic and atraumatic.   Neck: Normal range of motion.   Pulmonary/Chest: Effort normal. No respiratory distress. No significant wheezing.  Cardiac: No murmurs, rubs, gallops. RRR.  Abdominal: Abdomen soft, nontender, nondistended. No rebound tenderness.  MSK: Long bones without deformity or evidence of trauma. No LE edema.  Neurological: alert and oriented to person, place, and time.   Skin: Skin is warm and dry.   Psychiatric:  normal mood and affect.  behavior is normal. Thought content normal.      ED Course, Procedures, & Data      Procedures            EKG Interpretation:      Interpreted by Moncho Iniguez MD  Time reviewed: 1811  Symptoms at time of EKG: sob   Rhythm: normal sinus   Rate: Normal  Axis: Right Axis Deviation  Ectopy: none  Conduction: normal  ST Segments/ T Waves: Non-specific ST-T wave changes  Q Waves: none  Comparison to prior: Largely unchanged from prior EKG    Clinical Impression: Sinus with right axis deviation likely from pulmonary disease, no obvious findings of acute ischemia or infarction.             Results for orders placed or performed during the hospital encounter of 12/22/24   XR Chest 2 Views     Status: None    Narrative    EXAM: XR CHEST 2 VIEWS  LOCATION: [facility]  DATE: 12/22/2024    INDICATION: cough, sob  COMPARISON: 5/21/2024      Impression    IMPRESSION: Slightly increased pulmonary vasculature prominence. No focal consolidation. No pneumothorax or pleural effusion. Borderline enlarged cardiac silhouette. Mildly atherosclerotic aorta. No acute osseous abnormality.   [place] Draw     Status: None (In process)    Narrative    The  following orders were created for panel order [place] Draw.  Procedure                               Abnormality         Status                     ---------                               -----------         ------                     Extra Blue Top Tube[[id number]]                              In process                 Extra Red Top Tube[[id number]]                               In process                 Extra Green Top (Lithium...[[id number]]                      In process                 Extra Purple Top Tube[[id number]]                            In process                   Please view results for these tests on the individual orders.   Influenza A/B, RSV and SARS-CoV2 PCR (COVID-19) Nasopharyngeal     Status: Normal    Specimen: Nasopharyngeal; Swab   Result Value Ref Range    Influenza A PCR Negative Negative    Influenza B PCR Negative Negative    RSV PCR Negative Negative    SARS CoV2 PCR Negative Negative    Narrative    Testing was performed using the Xpert Xpress CoV2/Flu/RSV Assay on the [employer] Instrument. This test should be ordered for the detection of SARS-CoV2, influenza, and RSV viruses in individuals with signs and symptoms of respiratory tract infection. This test is for in vitro diagnostic use under the [place] FDA for laboratories certified under CLIA to perform high or moderate complexity testing. This test has been [place] FDA cleared. A negative result does not rule out the presence of PCR inhibitors in the specimen or target RNA in concentration below the limit of detection for the assay. If only one viral target is positive but coinfection with multiple targets is suspected, the sample should be re-tested with another FDA cleared, approved, or authorized test, if coninfection would change clinical management. This test was validated by the [facility]. These laboratories are certified under the Clinical Laboratory Improvement Amendments of 1988 (CLIA-88) as qualified to  [facility] high complexity laboratory testing.   Comprehensive metabolic panel     Status: Abnormal   Result Value Ref Range    Sodium 137 135 - 145 mmol/L    Potassium 4.0 3.4 - 5.3 mmol/L    Carbon Dioxide (CO2) 26 22 - 29 mmol/L    Anion Gap 12 7 - 15 mmol/L    Urea Nitrogen 7.6 (L) 8.0 - 23.0 mg/dL    Creatinine 0.52 0.51 - 0.95 mg/dL    GFR Estimate >90 >60 mL/min/1.73m2    Calcium 10.0 8.8 - 10.4 mg/dL    Chloride 99 98 - 107 mmol/L    Glucose 112 (H) 70 - 99 mg/dL    Alkaline Phosphatase 92 40 - 150 U/L    AST 22 0 - 45 U/L    ALT 9 0 - 50 U/L    Protein Total 6.8 6.4 - 8.3 g/dL    Albumin 4.3 3.5 - 5.2 g/dL    Bilirubin Total 0.6 <=1.2 mg/dL   Troponin T, High Sensitivity     Status: Abnormal   Result Value Ref Range    Troponin T, High Sensitivity 15 (H) <=14 ng/L   Nt probnp inpatient (BNP)     Status: Normal   Result Value Ref Range    N terminal Pro BNP Inpatient 324 0 - 900 pg/mL   D dimer quantitative     Status: Normal   Result Value Ref Range    D-Dimer Quantitative <0.27 0.00 - 0.50 ug/mL FEU    Narrative    This D-dimer assay is intended for use in conjunction with a clinical pretest probability assessment model to exclude pulmonary embolism (PE) and deep venous thrombosis (DVT) in outpatients suspected of PE or DVT. The cut-off value is 0.50 ug/mL FEU.    For patients 50 years of age or older, the application of age-adjusted cut-off values for D-Dimer may increase the specificity without significant effect on sensitivity. The literature suggested calculation age adjusted cut-off in ug/L = age in years x 10 ug/L. The results in this laboratory are reported as ug/mL rather than ug/L. The calculation for age adjusted cut off in ug/mL= age in years x 0.01 ug/mL. For example, the cut off for a 76 year old male is 78 x 0.01 ug/mL = 0.76 ug/mL (760 ug/L).    RIAZ Sewell et al. Age adjusted D-dimer cut-off levels to rule out pulmonary embolism: The ADJUST-PE Study. DIANA 2014;311:6052-6727.; TERRENCE Cosme al.  Diagnostic accuracy of conventional or age adjusted D-dimer cutoff values in older patients with suspected venous thromboembolism. Systemic review and meta-analysis. BMJ 2013:346:f2492.   CBC with platelets and differential     Status: Abnormal   Result Value Ref Range    WBC Count 15.7 (H) 4.0 - 11.0 10e3/uL    RBC Count 4.78 3.80 - 5.20 10e6/uL    Hemoglobin 15.5 11.7 - 15.7 g/dL    Hematocrit 45.3 35.0 - 47.0 %    MCV 95 78 - 100 fL    MCH 32.4 26.5 - 33.0 pg    MCHC 34.2 31.5 - 36.5 g/dL    RDW 12.0 10.0 - 15.0 %    Platelet Count 268 150 - 450 10e3/uL    % Neutrophils 84 %    % Lymphocytes 8 %    % Monocytes 7 %    % Eosinophils 0 %    % Basophils 0 %    % Immature Granulocytes 1 %    NRBCs per 100 WBC 0 <1 /100    Absolute Neutrophils 13.2 (H) 1.6 - 8.3 10e3/uL    Absolute Lymphocytes 1.3 0.8 - 5.3 10e3/uL    Absolute Monocytes 1.0 0.0 - 1.3 10e3/uL    Absolute Eosinophils 0.0 0.0 - 0.7 10e3/uL    Absolute Basophils 0.0 0.0 - 0.2 10e3/uL    Absolute Immature Granulocytes 0.1 <=0.4 10e3/uL    Absolute NRBCs 0.0 10e3/uL   EKG 12-lead, tracing only     Status: None (Preliminary result)   Result Value Ref Range    Systolic Blood Pressure  mmHg    Diastolic Blood Pressure  mmHg    Ventricular Rate 88 BPM    Atrial Rate 88 BPM    AK Interval 146 ms    QRS Duration 76 ms     ms    QTc 467 ms    P Axis 83 degrees    R AXIS 102 degrees    T Axis 84 degrees    Interpretation ECG       Sinus rhythm  Right atrial enlargement  Rightward axis  Pulmonary disease pattern  Abnormal ECG     CBC with platelets differential     Status: Abnormal    Narrative    The following orders were created for panel order CBC with platelets differential.  Procedure                               Abnormality         Status                     ---------                               -----------         ------                     CBC with platelets and d...[[id number]]  Abnormal            Final result                 Please view results for  these tests on the individual orders.     Medications - No data to display  Labs Ordered and Resulted from Time of ED Arrival to Time of ED Departure - No data to display  No orders to display          Critical care was not performed.     Medical Decision Making  The patient's presentation was of high complexity (a chronic illness severe exacerbation, progression, or side effect of treatment).    The patient's evaluation involved:  strong consideration of a test (CT chest) that was ultimately deferred  ordering and/or review of 3+ test(s) in this encounter (see separate area of note for details)  discussion of management or test interpretation with another health professional (Hospitalist)    The patient's management necessitated high risk (a decision regarding hospitalization).    Assessment & Plan    MDM  Patient presenting with shortness of breath.  She is wheezy despite 2 nebulizers.  She was started on 15 L oxygen via EMS.  She was weaned down here but is still hypoxic on room air.  She does not wear oxygen at home.  Will concern for ACS with a stable troponin and EKG that is nonischemic, will repeat.  No evidence of heart failure.  Low concern for pulmonary embolism with a normal D-dimer.  Chest x-ray without obvious abnormality.  She will be started on steroids and antibiotics as she does have productive cough.  Will admit to medicine due to hypoxia.    I have reviewed the nursing notes. I have reviewed the findings, diagnosis, plan and need for follow up with the patient.    New Prescriptions    No medications on file       Final diagnoses:   COPD exacerbation (H)   Ronel CAREY, am serving as a trained medical scribe to document services personally performed by Moncho Iniguez MD based on the provider's statements to me on December 22, 2024.  This document has been checked and approved by the attending provider.    IMoncho MD, was physically present and have reviewed and verified the  accuracy of this note documented by Ronel Mazariegos, medical scribe.      Moncho Iniguez MD  [facility] EMERGENCY DEPARTMENT  12/22/2024     Moncho Iniguez MD  12/1955

## 2024-12-22 NOTE — ED TRIAGE NOTES
BIBA from home with increased shortness of breath, productive cough, fever, and night sweats.     Triage Assessment (Adult)       Row Name 12/22/24 0601          Triage Assessment    Airway WDL X;WDL        Respiratory WDL    Respiratory WDL X;cough     Cough Frequency infrequent     Cough Type congested;productive        Skin Circulation/Temperature WDL    Skin Circulation/Temperature WDL WDL        Cardiac WDL    Cardiac WDL WDL        Peripheral/Neurovascular WDL    Peripheral Neurovascular WDL WDL        Cognitive/Neuro/Behavioral WDL    Cognitive/Neuro/Behavioral WDL WDL

## 2024-12-23 VITALS
DIASTOLIC BLOOD PRESSURE: 75 MMHG | SYSTOLIC BLOOD PRESSURE: 122 MMHG | OXYGEN SATURATION: 94 % | RESPIRATION RATE: 18 BRPM | BODY MASS INDEX: 18.99 KG/M2 | HEART RATE: 91 BPM | WEIGHT: 103.84 LBS | TEMPERATURE: 97.5 F

## 2024-12-23 LAB
ANION GAP SERPL CALCULATED.3IONS-SCNC: 14 MMOL/L (ref 7–15)
ATRIAL RATE - MUSE: 88 BPM
BUN SERPL-MCNC: 9 MG/DL (ref 8–23)
CALCIUM SERPL-MCNC: 10.1 MG/DL (ref 8.8–10.4)
CHLORIDE SERPL-SCNC: 99 MMOL/L (ref 98–107)
CREAT SERPL-MCNC: 0.52 MG/DL (ref 0.51–0.95)
DIASTOLIC BLOOD PRESSURE - MUSE: NORMAL MMHG
EGFRCR SERPLBLD CKD-EPI 2021: >90 ML/MIN/1.73M2
ERYTHROCYTE [DISTWIDTH] IN BLOOD BY AUTOMATED COUNT: 12.2 % (ref 10–15)
GLUCOSE SERPL-MCNC: 147 MG/DL (ref 70–99)
HCO3 SERPL-SCNC: 24 MMOL/L (ref 22–29)
HCT VFR BLD AUTO: 44.8 % (ref 35–47)
HGB BLD-MCNC: 15.3 G/DL (ref 11.7–15.7)
INTERPRETATION ECG - MUSE: NORMAL
MCH RBC QN AUTO: 31.5 PG (ref 26.5–33)
MCHC RBC AUTO-ENTMCNC: 34.2 G/DL (ref 31.5–36.5)
MCV RBC AUTO: 92 FL (ref 78–100)
P AXIS - MUSE: 83 DEGREES
PLATELET # BLD AUTO: 288 10E3/UL (ref 150–450)
POTASSIUM SERPL-SCNC: 4.5 MMOL/L (ref 3.4–5.3)
PR INTERVAL - MUSE: 146 MS
QRS DURATION - MUSE: 76 MS
QT - MUSE: 386 MS
QTC - MUSE: 467 MS
R AXIS - MUSE: 102 DEGREES
RBC # BLD AUTO: 4.86 10E6/UL (ref 3.8–5.2)
SODIUM SERPL-SCNC: 137 MMOL/L (ref 135–145)
SYSTOLIC BLOOD PRESSURE - MUSE: NORMAL MMHG
T AXIS - MUSE: 84 DEGREES
VENTRICULAR RATE- MUSE: 88 BPM
WBC # BLD AUTO: 10.5 10E3/UL (ref 4–11)

## 2024-12-23 PROCEDURE — 82374 ASSAY BLOOD CARBON DIOXIDE: CPT

## 2024-12-23 PROCEDURE — 250N000013 HC RX MED GY IP 250 OP 250 PS 637

## 2024-12-23 PROCEDURE — 80048 BASIC METABOLIC PNL TOTAL CA: CPT

## 2024-12-23 PROCEDURE — 36415 COLL VENOUS BLD VENIPUNCTURE: CPT

## 2024-12-23 PROCEDURE — 250N000012 HC RX MED GY IP 250 OP 636 PS 637

## 2024-12-23 PROCEDURE — 85027 COMPLETE CBC AUTOMATED: CPT

## 2024-12-23 RX ORDER — METHYLPREDNISOLONE 4 MG/1
TABLET ORAL
Qty: 21 TABLET | Refills: 0 | Status: SHIPPED | OUTPATIENT
Start: 2024-12-23

## 2024-12-23 RX ORDER — LEVOFLOXACIN 500 MG/1
500 TABLET, FILM COATED ORAL DAILY
Qty: 14 TABLET | Refills: 3 | Status: SHIPPED | OUTPATIENT
Start: 2024-12-23

## 2024-12-23 RX ADMIN — PANTOPRAZOLE SODIUM 40 MG: 40 TABLET, DELAYED RELEASE ORAL at 08:14

## 2024-12-23 RX ADMIN — BUPROPION HYDROCHLORIDE 300 MG: 150 TABLET, EXTENDED RELEASE ORAL at 08:14

## 2024-12-23 RX ADMIN — UMECLIDINIUM 1 PUFF: 62.5 AEROSOL, POWDER ORAL at 08:17

## 2024-12-23 RX ADMIN — FLUTICASONE FUROATE AND VILANTEROL TRIFENATATE 1 PUFF: 100; 25 POWDER RESPIRATORY (INHALATION) at 08:17

## 2024-12-23 RX ADMIN — PREDNISONE 40 MG: 20 TABLET ORAL at 08:13

## 2024-12-23 ASSESSMENT — ACTIVITIES OF DAILY LIVING (ADL)
ADLS_ACUITY_SCORE: 32
DEPENDENT_IADLS:: INDEPENDENT
ADLS_ACUITY_SCORE: 32

## 2024-12-23 NOTE — H&P
"[facility]    History and Physical - Hospitalist Service, GOLD TEAM        Date of Admission:  12/22/2024    Assessment & Plan      Mimi Rivera is a 69 year old female with PMH of COPD, HTN, lung cancer s/p R lobectomy (4/26/24), former smoker admitted on 12/22/2024 for acute hypoxic respiratory failure and COPD exacerbation. Presented w/ sob, productive cough for several weeks PTA. Uses 2L O2 PRN at home, has increased O2 requirement here.     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 Since then, she has had several hospitalizations for acute hypoxic respiratory failure. She overall has increased SOB, cough, and often feels unwell since the surgery. She was last seen by Dr. Cyrus bautista/ Pulmonology/Critical Care on 9/13/24. Per this note, \"She was worried that there was ongoing inflammation other abnormalities in the lung. As I showed her looking at the scan and also from the report there really were not many ongoing areas of infection or inflammation seen in the lung at that time.\" Pt was referred back to pulmonary rehab at this visit as she never completed it following her surgery. Pt states she completed Pulmonary Rehab this summer.  This admission, pt presents with similar symptoms to previous COPD exacerbations-- increased shortness of breath, worsened cough, fevers, general malaise. At home, she uses 2L O2 PRN. Here, she is utilizing 4LPM here but required 15L en route per EMS.   Leukocytosis to 15.7. Fluvid panel and RVP negative. CXR w/ slightly increased pulmonary vasculature prominence, no focal consolidation. Did consider need for CT PE, but d dimer is 0.27, pt is without pleuritic chest pain. Does have some mild R sided pain since her lobectomy, not acutely worse.  On exam, patient does have bilateral expiratory wheezes, as well as increased work of " breathing (though suspect anxiety is at least partially contributing).  She is not obviously hypervolemic.  Received duoben x1, levofloxacin 500mg x1 and methylprednisolone 125mg IV x1 in the ED. Received 2 duonebs en route.  - Start prednisone 40mg daily x4 days 12/23 (total 5 days), s/p 125mg methylpred in ED  - Levofloxacin 500mg Q24H   - PTA Trelegy subbed here with Breo ellipta and incruse ellipta  - PTA levalbuterol nebs and MDI   - Aerobika device, incentive spirometry  - Pulmonology consult  - Consider re-entering order for Pulmonary rehab if indicated  [] Please see Dr. Bridges's note from 9/13/24 for helpful overview of pt's pulmonary issues since wedge resection    #Troponinemia  Troponin elevated to 15 on arrival. Repeat flat. EKG w/o acute ischemic changes. Does have some mild R sided pain since her lobectomy, not acutely worse.  - Repeat EKG if chest pain, other symptom develops    #HTN  - PTA amlodipine 10mg daily    #Anxiety  Pt reports feeling very anxious, is shaking while lying in bed which she attributes to anxiety. Reports she has not been sleeping well for many months.   She is currently on Wellbutrin 300mg daily, which was restarted by her Pulmonologist several months ago as it had worked for her in the past.   Wellbutrin can be a rather activating medication, so question if this may not be the best choice for her anxiety. She may benefit from sertraline or another selective serotonin reuptake inhibitor instead.   - Consider discontinuing Wellbutrin and starting and selective serotonin reuptake inhibitor such as sertraline or escitalopram    #GERD: PTA esomeprazole 20mg, subbed here w/ pantoprazole 40mg           Diet: Combination Diet Regular Diet Adult  DVT Prophylaxis: Pneumatic Compression Devices  Lino Catheter: Not present  Lines: None     Cardiac Monitoring: None  Code Status: Full Code    Clinically Significant Risk Factors Present on Admission                   # Hypertension: Noted  "on problem list               # Financial/Environmental Concerns:    # COPD: noted on problem list        Disposition Plan     Medically Ready for Discharge: Anticipated in 2-4 Days         The patient's care was discussed with the Attending Physician, Dr. Teague and Patient.    Nehemias Briceño PA-C  Hospitalist Service, [facility]  Securely message with [employer] (more info)  Text page via [employer] Paging/Directory   See signed in provider for up to date coverage information    ______________________________________________________________________    Chief Complaint   Increased shortness of breath, increased cough/secretions    History is obtained from the patient and electronic health record    History of Present Illness   Mimi Rivera is a 69 year old female with PMH of COPD, HTN, lung cancer s/p R lobectomy (4/26/24), former smoker admitted on 12/22/2024 for acute hypoxic respiratory failure and COPD exacerbation. Presented w/ sob, productive cough for several weeks PTA. Uses 2L O2 PRN at home, has increased O2 requirement here.     Patient states that she has been feeling sick since shortly after her right-sided lobectomy.  She states she feels like \"my right lung is just not working\".  States she feels like she cannot get a full breath.  She will wake up in the middle of the night gasping for air.  States she has not gotten a full night sleep since her surgery.  She is very concerned about her lung function, and states she is just not felt right since her surgery.  She also endorses high levels of anxiety, contributing to her shaking at baseline.  She has not tried anything for her anxiety.  Per chart review, she is on Wellbutrin which has worked well for her in the past and was recently restarted.  Per my encounter with patient, she is still very anxious despite this medication.  She expresses understandable frustration regarding frequent hospitalizations " and feeling so poorly following her surgery.  She is amenable to being admitted, would like to see pulmonology while here.      Past Medical History    Past Medical History:   Diagnosis Date    ASCUS on Pap smear 2006    unable to run HPV typing, f/u paps NIL    Chronic rhinosinusitis     COPD (chronic obstructive pulmonary disease) (H)     Depression, anxiety     Herpes     occasional outbreak    Hypertension     Primary stress urinary incontinence     Pulmonary nodules     Rhinosinusitis        Past Surgical History   Past Surgical History:   Procedure Laterality Date    BRONCHOSCOPY, WITH BIOPSY, ROBOT ASSISTED Bilateral 2023    Procedure: Robot assisted Ion BRONCHOSCOPY;  Surgeon: Raudel Claire DO;  Location: [facility] OR     SECTION      x 3    DAVINCI LOBECTOMY LUNG Right 2024    Procedure: robot assisted thoracoscopic right wedge resection, mediastinal lymph node dissection;  Surgeon: Good Pandya MD;  Location: [facility] OR    ENDOBRONCHIAL ULTRASOUND FLEXIBLE N/A 2023    Procedure: endobronchial ultrasound, transbronchial biopsies Latex Free;  Surgeon: Raudel Claire DO;  Location: [facility] OR       Prior to Admission Medications   Prior to Admission Medications   Prescriptions Last Dose Informant Patient Reported? Taking?   EPINEPHrine (ANY BX GENERIC EQUIV) 0.3 MG/0.3ML injection 2-pack   No No   Sig: Inject 0.3 mLs (0.3 mg) into the muscle as needed for anaphylaxis May repeat one time in 5-15 minutes if response to initial dose is inadequate.   Fluticasone-Umeclidin-Vilant (TRELEGY ELLIPTA) 100-62.5-25 MCG/ACT oral inhaler   No No   Sig: INHALE ONE PUFF BY MOUTH EVERY DAY   amLODIPine (NORVASC) 10 MG tablet   No No   Sig: Take 1 tablet (10 mg) by mouth every evening   buPROPion (WELLBUTRIN XL) 150 MG 24 hr tablet   No No   Sig: Take 1 tablet (150 mg) by mouth every morning. For first 3 days, then increase to twice daily   budeson-glycopyrrol-formoterol (BREZTRI AEROSPHERE) 160-9-4.8  MCG/ACT AERO inhaler   No No   Sig: Inhale 2 puffs into the lungs 2 times daily. Rinse mouth after use. Alternative to Trelegy   budesonide (PULMICORT) 0.5 MG/2ML neb solution   No No   Sig: Spray 2 mLs (0.5 mg) in nostril daily Empty contents of ampule into 240mL of saline solution and rinse both nasal cavities as instructed twice daily.   clotrimazole (MYCELEX) 10 MG lozenge   No No   Sig: Place 1 lozenge (10 mg) inside cheek 3 times daily.   esomeprazole (NEXIUM) 20 MG DR capsule   Yes No   Sig: Take 20 mg by mouth 2 times daily as needed Take 30-60 minutes before eating.   gabapentin (NEURONTIN) 300 MG capsule   Yes No   Sig: Take 300 mg by mouth daily as needed   levalbuterol (XOPENEX HFA) 45 MCG/ACT inhaler   No No   Sig: Inhale 2 puffs into the lungs every 4 hours as needed for shortness of breath / dyspnea or wheezing   levalbuterol (XOPENEX HFA) 45 MCG/ACT inhaler   No No   Sig: Inhale 2 puffs into the lungs every 4 hours as needed for shortness of breath or wheezing.   levofloxacin (LEVAQUIN) 500 MG tablet   No No   Sig: Take 1 tablet (500 mg) by mouth daily.   methylPREDNISolone (MEDROL DOSEPAK) 4 MG tablet therapy pack   No No   Sig: Follow Package Directions   omeprazole (PRILOSEC) 20 MG DR capsule   No No   Sig: Take 1 capsule (20 mg) by mouth daily   tiotropium (SPIRIVA RESPIMAT) 2.5 MCG/ACT inhaler   No No   Sig: Inhale 2 puffs into the lungs daily      Facility-Administered Medications: None        Review of Systems    The 10 point Review of Systems is negative other than noted in the HPI or here.     Allergies   No Known Allergies  ------------------------------------------------------------------------     Physical Exam   Vital Signs: Temp: 100  F (37.8  C) Temp src: Oral BP: (!) 149/81 Pulse: 90   Resp: 20 SpO2: 100 % O2 Device: Nasal cannula with humidification Oxygen Delivery: 2 LPM  Weight: 0 lbs 0 oz    General: Patient is tired appearing, slightly disheveled.  Anxious affect.  HEENT:  Normocephalic, EOMI.  No excessive rhinorrhea or throat clearing.  Respiratory: Lungs with bilateral expiratory wheezes diffusely.  No focal areas of adventitious sounds.  Patient does appear to have some increased work of breathing on exam, frequently sitting up or leaning forward in all alternating fashion.  Also with very anxious affect, which could be contributing to increased work of breathing.  Cardiovascular: Regular rate and rhythm.  No murmur appreciated.  GI: Abdomen is nondistended, soft.  Neuro: Alert and oriented.  No focal deficits on gross examination.  Extremities: No peripheral edema noted.  Skin: Skin is dry.  No obvious lesions or rashes on gross examination of exposed skin.  Psych: Anxious affect.  Patient is cooperative and interactive, but also appears very tired and somewhat defeated.      Medical Decision Making       65 MINUTES SPENT BY ME on the date of service doing chart review, history, exam, documentation & further activities per the note.      Data   ------------------------- PAST 24 HR DATA REVIEWED -----------------------------------------------    I have personally reviewed the following data over the past 24 hrs:    15.7 (H)  \   15.5   / 268     137 99 7.6 (L) /  112 (H)   4.0 26 0.52 \     ALT: 9 AST: 22 AP: 92 TBILI: 0.6   ALB: 4.3 TOT PROTEIN: 6.8 LIPASE: N/A     Trop: 15 (H) BNP: 324     INR:  N/A PTT:  N/A   D-dimer:  <0.27 Fibrinogen:  N/A       Imaging results reviewed over the past 24 hrs:   Recent Results (from the past 24 hours)   XR Chest 2 Views    Narrative    EXAM: XR CHEST 2 VIEWS  LOCATION: [facility]  DATE: 12/22/2024    INDICATION: cough, sob  COMPARISON: 5/21/2024      Impression    IMPRESSION: Slightly increased pulmonary vasculature prominence. No focal consolidation. No pneumothorax or pleural effusion. Borderline enlarged cardiac silhouette. Mildly atherosclerotic aorta. No acute osseous abnormality.

## 2024-12-23 NOTE — PLAN OF CARE
Goal Outcome Evaluation:      Plan of Care Reviewed With: patient    Overall Patient Progress: no changeOverall Patient Progress: no change       Patient arrived to [facility] around 0030 Via stretcher  Belongings in room with patient   Alert and oriented x4  On 2 L of humidified oxygen, stats above 96  Independent in room   Continent of bowel and bladder  PIV to left AC intact    2 RN skin check completed with MAURY Rico  Patient refused to complete Admission assessment at night and requested it be done in the morning.

## 2024-12-23 NOTE — PLAN OF CARE
Goal Outcome Evaluation:      Plan of Care Reviewed With: patient    Overall Patient Progress: no changeOverall Patient Progress: no change     Pt remains alert and oriented  x 4 and able to make her needs known, denied pain nor discomfort, pt is very anxious wandering when she is discharging, updated doctor and pt left against medical advice and signed the form per provider approval , discharge medication handed over to patient after signing the medication form

## 2024-12-23 NOTE — CONSULTS
Care Management Initial Consult    General Information  Assessment completed with: Patient,    Type of CM/SW Visit: Initial Assessment    Primary Care Provider verified and updated as needed: No (Pt does not have PCP-SW to make referral)   Readmission within the last 30 days: no previous admission in last 30 days      Reason for Consult: discharge planning  Advance Care Planning: Advance Care Planning Reviewed: no concerns identified          Communication Assessment  Patient's communication style: spoken language (English or Bilingual)    Hearing Difficulty or Deaf: no   Wear Glasses or Blind: no    Cognitive  Cognitive/Neuro/Behavioral: WDL                      Living Environment:   People in home: spouse     Current living Arrangements: house      Able to return to prior arrangements: yes       Family/Social Support:  Care provided by: self, spouse/significant other  Provides care for: no one  Marital Status:   Support system: , Children          Description of Support System: Supportive, Involved    Support Assessment: Adequate family and caregiver support, Adequate social supports    Current Resources:   Patient receiving home care services: Yes (home oxygen)        Community Resources:    Equipment currently used at home: other (see comments) (oxygen)  Supplies currently used at home:      Employment/Financial:  Employment Status: retired        Financial Concerns: none   Referral to Financial Worker: No       Does the patient's insurance plan have a 3 day qualifying hospital stay waiver?  No    Lifestyle & Psychosocial Needs:  Social Drivers of Health     Food Insecurity: Not on file   Depression: Not at risk (4/15/2024)    PHQ-2     PHQ-2 Score: 0   Housing Stability: Not on file   Tobacco Use: Medium Risk (9/13/2024)    Patient History     Smoking Tobacco Use: Former     Smokeless Tobacco Use: Former     Passive Exposure: Not on file   Financial Resource Strain: Not on file   Alcohol Use: Not  on file   Transportation Needs: Not on file   Physical Activity: Not on file   Interpersonal Safety: Not on file   Stress: Not on file   Social Connections: Not on file   Health Literacy: Not on file       Functional Status:  Prior to admission patient needed assistance:   Dependent ADLs:: Independent  Dependent IADLs:: Independent       Mental Health Status:  Mental Health Status: No Current Concerns       Chemical Dependency Status:  Chemical Dependency Status: No Current Concerns             Values/Beliefs:  Spiritual, Cultural Beliefs, Hinduism Practices, Values that affect care: no               Discussed  Partnership in Safe Discharge Planning  document with patient/family: Yes:     Additional Information:  Pt is a 69 year old  , retired mother. Pt is independent with ADLs and IADLs. Pt denied any mental health or substance use concerns. Pt receives Oxygen from [facility]. Pt dot identify any concerns related to discharge.     Next Steps: Pt to connect with PCP.     Ino Barry, [facility]  [facility] [place] [facility]   Ph: [phone]

## 2024-12-23 NOTE — DISCHARGE SUMMARY
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 Presented w/ sob, productive cough for several weeks PTA. Uses 2L O2 PRN at home, has increased O2 requirement here.  Patient was admitted to hospitalist service, started on IV antibiotics and steroids, her respiratory status improved, she is now saturating 93% on room air.  Pulmonary service were consulted however the patient said that she wants to leave before noon, discussed with on-call pulmonary consult team, they are able to see the patient until early in the afternoon.  Discussed with the patient, wants to leave AMA, patient stated that she will see her pulmonologist either tomorrow or on 26 December.  Advised to come back to the emergency room if she is short of breath, requiring oxygen.  Stated that she only uses oxygen at night, has oxygen concentrator at home.  Discussed with social work, stated that no social work needs identified.  Patient to leave AMA.       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 Since then, she has had several hospitalizations for acute hypoxic respiratory  "failure. She overall has increased SOB, cough, and often feels unwell since the surgery. She was last seen by Dr. Bridges w/ Pulmonology/Critical Care on 9/13/24. Per this note, \"She was worried that there was ongoing inflammation other abnormalities in the lung. As I showed her looking at the scan and also from the report there really were not many ongoing areas of infection or inflammation seen in the lung at that time.\" Pt was referred back to pulmonary rehab at this visit as she never completed it following her surgery. Pt states she completed Pulmonary Rehab this summer.  This admission, pt presents with similar symptoms to previous COPD exacerbations-- increased shortness of breath, worsened cough, fevers, general malaise. At home, she uses 2L O2 PRN. Here, she is utilizing 4LPM here but required 15L en route per EMS.   Leukocytosis to 15.7. Fluvid panel and RVP negative. CXR w/ slightly increased pulmonary vasculature prominence, no focal consolidation. Did consider need for CT PE, but d dimer is 0.27, pt is without pleuritic chest pain. Does have some mild R sided pain since her lobectomy, not acutely worse.  On exam, patient does have bilateral expiratory wheezes, as well as increased work of breathing (though suspect anxiety is at least partially contributing).  She is not obviously hypervolemic.  Received duoben x1, levofloxacin 500mg x1 and methylprednisolone 125mg IV x1 in the ED. Received 2 duonebs en route.  - Start prednisone 40mg daily x4 days 12/23 (total 5 days), s/p 125mg methylpred in ED  - Levofloxacin 500mg Q24H   - PTA Trelegy subbed here with Breo ellipta and incruse ellipta  - PTA levalbuterol nebs and MDI   - Aerobika device, incentive spirometry  - Pulmonology consult  - Consider re-entering order for Pulmonary rehab if indicated  [] Please see Dr. Bridges's note from 9/13/24 for helpful overview of pt's pulmonary issues since wedge resection     #Troponinemia  Troponin elevated to 15 on " arrival. Repeat flat. EKG w/o acute ischemic changes. Does have some mild R sided pain since her lobectomy, not acutely worse.  - Repeat EKG if chest pain, other symptom develops     #HTN  - PTA amlodipine 10mg daily     #Anxiety  Pt reports feeling very anxious, is shaking while lying in bed which she attributes to anxiety. Reports she has not been sleeping well for many months.   She is currently on Wellbutrin 300mg daily, which was restarted by her Pulmonologist several months ago as it had worked for her in the past.   Wellbutrin can be a rather activating medication, so question if this may not be the best choice for her anxiety. She may benefit from sertraline or another selective serotonin reuptake inhibitor instead.   - Consider discontinuing Wellbutrin and starting and selective serotonin reuptake inhibitor such as sertraline or escitalopram     #GERD: PTA esomeprazole 20mg, subbed here w/ pantoprazole 40mg     Consults: pulmonary/intensive care -     Discharge Exam:  General appearance: alert and not in acute distress  Chest wall: no tenderness, bilateral expiratory wheeze, no crackles  Heart: S1, S2 normal  Abdomen: Abdomen soft, nontender, bowel sounds audible  Neurologic: Alert, awake, oriented, nonfocal    Disposition: Home left AMA    Patient Instructions:   Current Discharge Medication List        CONTINUE these medications which have CHANGED    Details   levofloxacin (LEVAQUIN) 500 MG tablet Take 1 tablet (500 mg) by mouth daily.  Qty: 14 tablet, Refills: 3    Associated Diagnoses: Chronic obstructive pulmonary disease, unspecified COPD type (H); Chronic bronchitis, unspecified chronic bronchitis type (H); Atypical carcinoid lung tumor (H); Panlobular emphysema (H); Moderate major depression (H); Hypertension goal BP (blood pressure) < 140/90; Aortic calcification (H)      methylPREDNISolone (MEDROL DOSEPAK) 4 MG tablet therapy pack Follow Package Directions  Qty: 21 tablet, Refills: 0    Associated  Diagnoses: Chronic obstructive pulmonary disease, unspecified COPD type (H); Chronic bronchitis, unspecified chronic bronchitis type (H); Atypical carcinoid lung tumor (H); Panlobular emphysema (H); Moderate major depression (H); Hypertension goal BP (blood pressure) < 140/90; Aortic calcification (H)           CONTINUE these medications which have NOT CHANGED    Details   amLODIPine (NORVASC) 10 MG tablet Take 1 tablet (10 mg) by mouth every evening  Qty: 90 tablet, Refills: 3    Associated Diagnoses: Hypertension goal BP (blood pressure) < 140/90      budeson-glycopyrrol-formoterol (BREZTRI AEROSPHERE) 160-9-4.8 MCG/ACT AERO inhaler Inhale 2 puffs into the lungs 2 times daily. Rinse mouth after use. Alternative to Trelegy  Qty: 10.7 g, Refills: 11    Associated Diagnoses: Chronic obstructive pulmonary disease, unspecified COPD type (H); Chronic bronchitis, unspecified chronic bronchitis type (H); Atypical carcinoid lung tumor (H); Panlobular emphysema (H); Moderate major depression (H); Hypertension goal BP (blood pressure) < 140/90; Aortic calcification (H)      budesonide (PULMICORT) 0.5 MG/2ML neb solution Spray 2 mLs (0.5 mg) in nostril daily Empty contents of ampule into 240mL of saline solution and rinse both nasal cavities as instructed twice daily.  Qty: 60 mL, Refills: 1    Associated Diagnoses: Nasal congestion; Chronic rhinosinusitis      buPROPion (WELLBUTRIN XL) 150 MG 24 hr tablet Take 1 tablet (150 mg) by mouth every morning. For first 3 days, then increase to twice daily  Qty: 60 tablet, Refills: 11    Associated Diagnoses: Chronic obstructive pulmonary disease, unspecified COPD type (H); Chronic bronchitis, unspecified chronic bronchitis type (H); Atypical carcinoid lung tumor (H); Panlobular emphysema (H); Moderate major depression (H); Hypertension goal BP (blood pressure) < 140/90; Aortic calcification (H)      clotrimazole (MYCELEX) 10 MG lozenge Place 1 lozenge (10 mg) inside cheek 3 times  daily.  Qty: 14 Nora, Refills: 3    Associated Diagnoses: Chronic obstructive pulmonary disease, unspecified COPD type (H); Chronic bronchitis, unspecified chronic bronchitis type (H); Atypical carcinoid lung tumor (H); Panlobular emphysema (H); Moderate major depression (H); Hypertension goal BP (blood pressure) < 140/90; Aortic calcification (H)      EPINEPHrine (ANY BX GENERIC EQUIV) 0.3 MG/0.3ML injection 2-pack Inject 0.3 mLs (0.3 mg) into the muscle as needed for anaphylaxis May repeat one time in 5-15 minutes if response to initial dose is inadequate.  Qty: 2 each, Refills: 0    Associated Diagnoses: Bee sting reaction, accidental or unintentional, initial encounter      esomeprazole (NEXIUM) 20 MG DR capsule Take 20 mg by mouth 2 times daily as needed Take 30-60 minutes before eating.      Fluticasone-Umeclidin-Vilant (TRELEGY ELLIPTA) 100-62.5-25 MCG/ACT oral inhaler INHALE ONE PUFF BY MOUTH EVERY DAY  Qty: 60 each, Refills: 5    Associated Diagnoses: Chronic bronchitis, unspecified chronic bronchitis type (H)      gabapentin (NEURONTIN) 300 MG capsule Take 300 mg by mouth daily as needed      !! levalbuterol (XOPENEX HFA) 45 MCG/ACT inhaler Inhale 2 puffs into the lungs every 4 hours as needed for shortness of breath or wheezing.  Qty: 15 g, Refills: 11    Associated Diagnoses: Chronic obstructive pulmonary disease, unspecified COPD type (H); Chronic bronchitis, unspecified chronic bronchitis type (H); Atypical carcinoid lung tumor (H); Panlobular emphysema (H); Moderate major depression (H); Hypertension goal BP (blood pressure) < 140/90; Aortic calcification (H)      !! levalbuterol (XOPENEX HFA) 45 MCG/ACT inhaler Inhale 2 puffs into the lungs every 4 hours as needed for shortness of breath / dyspnea or wheezing  Qty: 1 Inhaler, Refills: 1    Associated Diagnoses: Chronic obstructive pulmonary disease, unspecified COPD type (H)      omeprazole (PRILOSEC) 20 MG DR capsule Take 1 capsule (20 mg) by  mouth daily  Qty: 90 capsule, Refills: 3    Associated Diagnoses: Heart burn      tiotropium (SPIRIVA RESPIMAT) 2.5 MCG/ACT inhaler Inhale 2 puffs into the lungs daily  Qty: 1 g, Refills: 0    Associated Diagnoses: COPD exacerbation (H)       !! - Potential duplicate medications found. Please discuss with provider.        Activity: activity as tolerated  Diet: low fat, low cholesterol diet    Follow-up with PCP and pulmonary clinic in 2 days.    Signed:  Kole Diaz MD  12/23/2024  10:04 AM

## 2024-12-29 ASSESSMENT — PATIENT HEALTH QUESTIONNAIRE - PHQ9
SUM OF ALL RESPONSES TO PHQ QUESTIONS 1-9: 9
10. IF YOU CHECKED OFF ANY PROBLEMS, HOW DIFFICULT HAVE THESE PROBLEMS MADE IT FOR YOU TO DO YOUR WORK, TAKE CARE OF THINGS AT HOME, OR GET ALONG WITH OTHER PEOPLE: SOMEWHAT DIFFICULT
SUM OF ALL RESPONSES TO PHQ QUESTIONS 1-9: 9

## 2024-12-30 ENCOUNTER — OFFICE VISIT (OUTPATIENT)
Dept: INTERNAL MEDICINE | Facility: CLINIC | Age: 69
End: 2024-12-30
Payer: COMMERCIAL

## 2024-12-30 ENCOUNTER — TELEPHONE (OUTPATIENT)
Dept: INTERNAL MEDICINE | Facility: CLINIC | Age: 69
End: 2024-12-30

## 2024-12-30 VITALS
SYSTOLIC BLOOD PRESSURE: 160 MMHG | DIASTOLIC BLOOD PRESSURE: 89 MMHG | OXYGEN SATURATION: 94 % | RESPIRATION RATE: 28 BRPM | WEIGHT: 105 LBS | BODY MASS INDEX: 20.62 KG/M2 | HEART RATE: 106 BPM | HEIGHT: 60 IN | TEMPERATURE: 95.7 F

## 2024-12-30 DIAGNOSIS — J01.10 ACUTE NON-RECURRENT FRONTAL SINUSITIS: ICD-10-CM

## 2024-12-30 DIAGNOSIS — J96.21 ACUTE ON CHRONIC RESPIRATORY FAILURE WITH HYPOXIA (H): ICD-10-CM

## 2024-12-30 DIAGNOSIS — J44.1 COPD EXACERBATION (H): ICD-10-CM

## 2024-12-30 DIAGNOSIS — R05.1 ACUTE COUGH: ICD-10-CM

## 2024-12-30 DIAGNOSIS — Z09 HOSPITAL DISCHARGE FOLLOW-UP: Primary | ICD-10-CM

## 2024-12-30 LAB
ANION GAP SERPL CALCULATED.3IONS-SCNC: 9 MMOL/L (ref 7–15)
BASOPHILS # BLD AUTO: 0 10E3/UL (ref 0–0.2)
BASOPHILS NFR BLD AUTO: 0 %
BUN SERPL-MCNC: 9.7 MG/DL (ref 8–23)
CALCIUM SERPL-MCNC: 10 MG/DL (ref 8.8–10.4)
CHLORIDE SERPL-SCNC: 99 MMOL/L (ref 98–107)
CREAT SERPL-MCNC: 0.52 MG/DL (ref 0.51–0.95)
EGFRCR SERPLBLD CKD-EPI 2021: >90 ML/MIN/1.73M2
EOSINOPHIL # BLD AUTO: 0 10E3/UL (ref 0–0.7)
EOSINOPHIL NFR BLD AUTO: 0 %
ERYTHROCYTE [DISTWIDTH] IN BLOOD BY AUTOMATED COUNT: 11.9 % (ref 10–15)
GLUCOSE SERPL-MCNC: 106 MG/DL (ref 70–99)
HCO3 SERPL-SCNC: 31 MMOL/L (ref 22–29)
HCT VFR BLD AUTO: 48.3 % (ref 35–47)
HGB BLD-MCNC: 17 G/DL (ref 11.7–15.7)
IMM GRANULOCYTES # BLD: 0.1 10E3/UL
IMM GRANULOCYTES NFR BLD: 1 %
LYMPHOCYTES # BLD AUTO: 2.1 10E3/UL (ref 0.8–5.3)
LYMPHOCYTES NFR BLD AUTO: 15 %
MCH RBC QN AUTO: 32.3 PG (ref 26.5–33)
MCHC RBC AUTO-ENTMCNC: 35.2 G/DL (ref 31.5–36.5)
MCV RBC AUTO: 92 FL (ref 78–100)
MONOCYTES # BLD AUTO: 0.8 10E3/UL (ref 0–1.3)
MONOCYTES NFR BLD AUTO: 6 %
NEUTROPHILS # BLD AUTO: 11.2 10E3/UL (ref 1.6–8.3)
NEUTROPHILS NFR BLD AUTO: 79 %
PLATELET # BLD AUTO: 295 10E3/UL (ref 150–450)
POTASSIUM SERPL-SCNC: 4.1 MMOL/L (ref 3.4–5.3)
RBC # BLD AUTO: 5.26 10E6/UL (ref 3.8–5.2)
SODIUM SERPL-SCNC: 139 MMOL/L (ref 135–145)
WBC # BLD AUTO: 14.2 10E3/UL (ref 4–11)

## 2024-12-30 PROCEDURE — 36415 COLL VENOUS BLD VENIPUNCTURE: CPT

## 2024-12-30 PROCEDURE — 99214 OFFICE O/P EST MOD 30 MIN: CPT

## 2024-12-30 PROCEDURE — 80048 BASIC METABOLIC PNL TOTAL CA: CPT

## 2024-12-30 PROCEDURE — 85025 COMPLETE CBC W/AUTO DIFF WBC: CPT

## 2024-12-30 RX ORDER — SACCHAROMYCES BOULARDII 250 MG
250 CAPSULE ORAL 2 TIMES DAILY
Qty: 42 CAPSULE | Refills: 0 | Status: SHIPPED | OUTPATIENT
Start: 2024-12-30

## 2024-12-30 RX ORDER — GUAIFENESIN 600 MG/1
1200 TABLET, EXTENDED RELEASE ORAL 2 TIMES DAILY
Qty: 38 TABLET | Refills: 0 | Status: SHIPPED | OUTPATIENT
Start: 2024-12-30

## 2024-12-30 RX ORDER — BENZONATATE 200 MG/1
200 CAPSULE ORAL 3 TIMES DAILY PRN
Qty: 42 CAPSULE | Refills: 0 | Status: SHIPPED | OUTPATIENT
Start: 2024-12-30

## 2024-12-30 NOTE — PROGRESS NOTES
Assessment & Plan   Problem List Items Addressed This Visit          Respiratory    COPD exacerbation (H)    Relevant Medications    benzonatate (TESSALON) 200 MG capsule    guaiFENesin (MUCINEX) 600 MG 12 hr tablet    Other Relevant Orders    CBC with platelets and differential (Completed)    Basic metabolic panel  (Ca, Cl, CO2, Creat, Gluc, K, Na, BUN) (Completed)     Other Visit Diagnoses       Hospital discharge follow-up    -  Primary    Acute on chronic respiratory failure with hypoxia (H)        Relevant Medications    benzonatate (TESSALON) 200 MG capsule    Other Relevant Orders    CBC with platelets and differential (Completed)    Acute cough        Relevant Medications    benzonatate (TESSALON) 200 MG capsule    guaiFENesin (MUCINEX) 600 MG 12 hr tablet    Acute non-recurrent frontal sinusitis        Relevant Medications    benzonatate (TESSALON) 200 MG capsule    guaiFENesin (MUCINEX) 600 MG 12 hr tablet    amoxicillin-clavulanate (AUGMENTIN) 875-125 MG tablet    saccharomyces boulardii (FLORASTOR) 250 MG capsule                MED REC REQUIRED  Post Medication Reconciliation Status:     MEDICATIONS:   Orders Placed This Encounter   Medications    benzonatate (TESSALON) 200 MG capsule     Sig: Take 1 capsule (200 mg) by mouth 3 times daily as needed for cough.     Dispense:  42 capsule     Refill:  0    guaiFENesin (MUCINEX) 600 MG 12 hr tablet     Sig: Take 2 tablets (1,200 mg) by mouth 2 times daily.     Dispense:  38 tablet     Refill:  0    amoxicillin-clavulanate (AUGMENTIN) 875-125 MG tablet     Sig: Take 1 tablet by mouth 2 times daily for 7 days.     Dispense:  14 tablet     Refill:  0    saccharomyces boulardii (FLORASTOR) 250 MG capsule     Sig: Take 1 capsule (250 mg) by mouth 2 times daily.     Dispense:  42 capsule     Refill:  0     Medications Discontinued During This Encounter   Medication Reason    budeson-glycopyrrol-formoterol (BREZTRI AEROSPHERE) 160-9-4.8 MCG/ACT AERO inhaler      esomeprazole (NEXIUM) 20 MG DR capsule     levalbuterol (XOPENEX HFA) 45 MCG/ACT inhaler     methylPREDNISolone (MEDROL DOSEPAK) 4 MG tablet therapy pack     levalbuterol (XOPENEX HFA) 45 MCG/ACT inhaler     levofloxacin (LEVAQUIN) 500 MG tablet           - Continue other medications without change      Subjective   Mimi is a 69 year old, presenting for the following health issues: This is a follow-up for an ED to hospital discharge from 12/22/2024 until 12/23/2024 patient presented to the ED. with shortness of breath, productive cough for several weeks PTA.  Patient uses 2 L O2 as needed at home. Pt underwent R lobectomy in April 2024. She overall has increased SOB, cough, and often feels unwell since the surgery.  Pt was referred back to pulmonary rehab at this visit as she never completed it following her surgery. Received duoneb x1, levofloxacin 500mg x1 and methylprednisolone 125mg IV x1 in the ED. patient was started on prednisone 40 mg daily x 4 days s/p 125mg methylprednisone in the ED on 12/23/2024.  Patient was prescribed levofloxacin 500 mg daily, PTA Trelegy, PTA lev albuterol nebs, Aerobika device incentive spirometry, pulmonology  consult, and may need pulmonary rehab.    Pt presents today with SOB. Pt states that since her steroid taper that she finished yesterday her SOB has increased. Pt reports that initially she felt like she was improving and now she feels like she is getting worse. Pt reports a productive cough of yellow secretions that has been persistent since before her hospital visit.  Pt reports a hoarse throat. Pt is hitting her albuterol inhaler 1-2 times per day. Pt reports nasal congestion that is green. Pt reports that the antibiotic doesn't seen to be helping and wants something different.     Prescribed patient benzonatate that she can take 1 capsule 3 times a day as needed for cough.  Prescribed Augmentin to take 2 times a day for 7 days with food probiotic or yogurt and prescribed  a probiotic for her to take twice a day for the duration of the Augmentin regimen and 2 weeks past it.  Discussed with patient about increasing her Trelegy dose of the fluticasone from 100 to 200 mg to help with the bronchodilation that the prednisone seems to do for her.  No chief complaint on file.      Via the Health Maintenance questionnaire, the patient has reported the following services have been completed -Mammogram: Maile josue 2022-02-11, this information has not been sent to the abstraction team.  HPI               Review of Systems  Constitutional, HEENT, cardiovascular, pulmonary, gi and gu systems are negative, except as otherwise noted.      Objective    LMP 09/22/2010   There is no height or weight on file to calculate BMI.  Physical Exam   GENERAL: alert and no distress  HENT: normal cephalic/atraumatic, right ear: normal: no effusions, no erythema, normal landmarks, left ear: normal: no effusions, no erythema, normal landmarks, nose and mouth without ulcers or lesions, rhinorrhea green, oropharynx clear, oral mucous membranes moist, tonsillar erythema, sinuses: maxillary, frontal tenderness on both sides, and cobblestone oropharynx  NECK: bilateral anterior cervical adenopathy, no asymmetry, masses, or scars, and thyroid normal to palpation  RESP: no rales , no rhonchi, inspiratory wheezes intermittently throughout, and decreased breath sounds bilateral  CV: regular rate and rhythm, normal S1 S2, no S3 or S4, no murmur, click or rub, no peripheral edema  ABDOMEN: soft, nontender, no hepatosplenomegaly, no masses and bowel sounds normal  MS: no gross musculoskeletal defects noted, no edema  SKIN: no suspicious lesions or rashes  NEURO: Normal strength and tone, mentation intact and speech normal  PSYCH: mentation appears normal, affect normal/bright            Signed Electronically by: EDEN Perry CNP    Answers submitted by the patient for this visit:  Patient Health Questionnaire  (Submitted on 12/29/2024)  If you checked off any problems, how difficult have these problems made it for you to do your work, take care of things at home, or get along with other people?: Somewhat difficult  PHQ9 TOTAL SCORE: 9

## 2024-12-30 NOTE — PATIENT INSTRUCTIONS
Consider increasing your trelegy dose of the Fluticasone from 100mg to 200mg to help with the bronchodilation that the prednisone seems to do for you. Pt denies sore throat. Take probiotic twice a day for n21 days to maintain normal gut oleg

## 2024-12-30 NOTE — TELEPHONE ENCOUNTER
Patient calls to talk to provider regarding her labs that were done today.     CBC is back, BMP is pending.     Routing to provider to review and advise.     GARRET PARKER RN on 12/30/2024 at 3:04 PM   [facility] [place]      No

## 2025-01-07 DIAGNOSIS — J42 CHRONIC BRONCHITIS, UNSPECIFIED CHRONIC BRONCHITIS TYPE (H): ICD-10-CM

## 2025-01-08 ENCOUNTER — LAB (OUTPATIENT)
Dept: LAB | Facility: CLINIC | Age: 70
End: 2025-01-08
Payer: COMMERCIAL

## 2025-01-08 DIAGNOSIS — J44.1 COPD EXACERBATION (H): ICD-10-CM

## 2025-01-08 DIAGNOSIS — J96.21 ACUTE ON CHRONIC RESPIRATORY FAILURE WITH HYPOXIA (H): ICD-10-CM

## 2025-01-08 LAB
BASOPHILS # BLD AUTO: 0 10E3/UL (ref 0–0.2)
BASOPHILS NFR BLD AUTO: 0 %
EOSINOPHIL # BLD AUTO: 0.1 10E3/UL (ref 0–0.7)
EOSINOPHIL NFR BLD AUTO: 1 %
ERYTHROCYTE [DISTWIDTH] IN BLOOD BY AUTOMATED COUNT: 12.2 % (ref 10–15)
HCT VFR BLD AUTO: 44.8 % (ref 35–47)
HGB BLD-MCNC: 15.2 G/DL (ref 11.7–15.7)
IMM GRANULOCYTES # BLD: 0 10E3/UL
IMM GRANULOCYTES NFR BLD: 0 %
LYMPHOCYTES # BLD AUTO: 1.6 10E3/UL (ref 0.8–5.3)
LYMPHOCYTES NFR BLD AUTO: 17 %
MCH RBC QN AUTO: 32.1 PG (ref 26.5–33)
MCHC RBC AUTO-ENTMCNC: 33.9 G/DL (ref 31.5–36.5)
MCV RBC AUTO: 95 FL (ref 78–100)
MONOCYTES # BLD AUTO: 0.8 10E3/UL (ref 0–1.3)
MONOCYTES NFR BLD AUTO: 8 %
NEUTROPHILS # BLD AUTO: 6.8 10E3/UL (ref 1.6–8.3)
NEUTROPHILS NFR BLD AUTO: 73 %
PLATELET # BLD AUTO: 262 10E3/UL (ref 150–450)
RBC # BLD AUTO: 4.74 10E6/UL (ref 3.8–5.2)
WBC # BLD AUTO: 9.2 10E3/UL (ref 4–11)

## 2025-01-08 PROCEDURE — 36415 COLL VENOUS BLD VENIPUNCTURE: CPT

## 2025-01-08 PROCEDURE — 85025 COMPLETE CBC W/AUTO DIFF WBC: CPT

## 2025-01-08 RX ORDER — AZITHROMYCIN 250 MG/1
TABLET, FILM COATED ORAL
Qty: 30 TABLET | Refills: 0 | Status: SHIPPED | OUTPATIENT
Start: 2025-01-08

## 2025-01-22 DIAGNOSIS — J44.9 CHRONIC OBSTRUCTIVE PULMONARY DISEASE, UNSPECIFIED COPD TYPE (H): ICD-10-CM

## 2025-01-22 RX ORDER — ALBUTEROL SULFATE 90 UG/1
2 INHALANT RESPIRATORY (INHALATION) EVERY 6 HOURS
Qty: 18 G | Refills: 0 | OUTPATIENT
Start: 2025-01-22

## 2025-01-28 DIAGNOSIS — J42 CHRONIC BRONCHITIS, UNSPECIFIED CHRONIC BRONCHITIS TYPE (H): ICD-10-CM

## 2025-01-28 RX ORDER — FLUTICASONE FUROATE, UMECLIDINIUM BROMIDE AND VILANTEROL TRIFENATATE 100; 62.5; 25 UG/1; UG/1; UG/1
POWDER RESPIRATORY (INHALATION)
Qty: 60 EACH | Refills: 5 | Status: SHIPPED | OUTPATIENT
Start: 2025-01-28

## 2025-01-31 ENCOUNTER — TELEPHONE (OUTPATIENT)
Dept: PULMONOLOGY | Facility: CLINIC | Age: 70
End: 2025-01-31

## 2025-01-31 NOTE — TELEPHONE ENCOUNTER
[facility] Call Center    Phone Message    May a detailed message be left on voicemail: yes     Reason for Call: Medication Question or concern regarding medication   Prescription Clarification  Name of Medication: levalbuterol (XOPENEX HFA) 45 MCG/ACT inhaler   Prescribing Provider: Cyrus   Pharmacy: [id number] - [place], [place] - [address] [address]  P: [phone]  F: [phone]   What on the order needs clarification? Antonia- [facility] pharmacy called to inform Dr. Bridges that Xopenex is not covered by pt's insurance. She states, her insurance prefers VENTOLIN. She is requesting for new rx for Ventolin brand inhaler or generic rx. Please advise.      Action Taken: Other: PULM    Travel Screening: Not Applicable     Date of Service:

## 2025-02-03 DIAGNOSIS — J44.9 CHRONIC OBSTRUCTIVE PULMONARY DISEASE, UNSPECIFIED COPD TYPE (H): Primary | ICD-10-CM

## 2025-02-03 RX ORDER — ALBUTEROL SULFATE 90 UG/1
2 INHALANT RESPIRATORY (INHALATION) EVERY 4 HOURS PRN
Qty: 18 G | Refills: 11 | Status: SHIPPED | OUTPATIENT
Start: 2025-02-03

## 2025-02-03 NOTE — TELEPHONE ENCOUNTER
Per Dr. Bridges: I am OK with this IFF Mimi says the ventolin works for her - she had specifically asked for levalbuterol      Per Patient- I will give albuterol/ventolin a try because that is what my insurance covers.    Writer changed prescription to albuterol/ventolin as indicated.

## 2025-02-07 ENCOUNTER — HOSPITAL ENCOUNTER (OUTPATIENT)
Dept: CT IMAGING | Facility: CLINIC | Age: 70
Discharge: HOME | End: 2025-02-07
Attending: INTERNAL MEDICINE | Admitting: INTERNAL MEDICINE
Payer: COMMERCIAL

## 2025-02-07 DIAGNOSIS — J44.9 CHRONIC OBSTRUCTIVE PULMONARY DISEASE, UNSPECIFIED COPD TYPE (H): ICD-10-CM

## 2025-02-07 DIAGNOSIS — C7A.090 ATYPICAL CARCINOID LUNG TUMOR (H): ICD-10-CM

## 2025-02-07 DIAGNOSIS — J41.1 MUCOPURULENT CHRONIC BRONCHITIS (H): ICD-10-CM

## 2025-02-07 DIAGNOSIS — J43.1 PANLOBULAR EMPHYSEMA (H): ICD-10-CM

## 2025-02-07 PROCEDURE — 71250 CT THORAX DX C-: CPT

## 2025-02-11 ENCOUNTER — TELEPHONE (OUTPATIENT)
Dept: PULMONOLOGY | Facility: CLINIC | Age: 70
End: 2025-02-11
Payer: COMMERCIAL

## 2025-02-11 DIAGNOSIS — R93.89 ABNORMAL CHEST CT: ICD-10-CM

## 2025-02-11 DIAGNOSIS — J44.9 CHRONIC OBSTRUCTIVE PULMONARY DISEASE, UNSPECIFIED COPD TYPE (H): ICD-10-CM

## 2025-02-11 DIAGNOSIS — C7A.090 ATYPICAL CARCINOID LUNG TUMOR (H): Primary | ICD-10-CM

## 2025-02-11 NOTE — TELEPHONE ENCOUNTER
Patient confirmed scheduled appointment:  Date: 08/11/2025  Time: 1:40PM  Visit type: CT CHEST  Provider: BRENNAN  Location: [facility]  Testing/imaging: N/A  Additional notes: N/A

## 2025-02-25 ENCOUNTER — PATIENT OUTREACH (OUTPATIENT)
Dept: INTERNAL MEDICINE | Facility: CLINIC | Age: 70
End: 2025-02-25
Payer: COMMERCIAL

## 2025-02-25 NOTE — TELEPHONE ENCOUNTER
Patient Quality Outreach    Patient is due for the following:   Hypertension -  BP check  Colon Cancer Screening  Breast Cancer Screening - Mammogram  Physical Annual Wellness Visit      Topic Date Due    Zoster (Shingles) Vaccine (1 of 2) Never done    COVID-19 Vaccine (4 - 2024-25 season) 09/01/2024       Action(s) Taken:   Schedule a office visit for blood pressure & an  Annual Wellness Visit    Type of outreach:    Phone, spoke to patient/parent. Patient agrees to schedule a physical for this summer. Wants mammo and colonoscopy to wait to be ordered then. Last home BP  132/80.    Questions for provider review:    None           Eryn Tolentino LPN  Chart routed to none.

## 2025-03-14 DIAGNOSIS — J42 CHRONIC BRONCHITIS, UNSPECIFIED CHRONIC BRONCHITIS TYPE (H): ICD-10-CM

## 2025-03-19 RX ORDER — AZITHROMYCIN 250 MG/1
250 TABLET, FILM COATED ORAL DAILY
Qty: 90 TABLET | Refills: 3 | Status: SHIPPED | OUTPATIENT
Start: 2025-03-19

## 2025-03-26 ENCOUNTER — ANCILLARY PROCEDURE (OUTPATIENT)
Dept: GENERAL RADIOLOGY | Facility: CLINIC | Age: 70
End: 2025-03-26
Attending: INTERNAL MEDICINE
Payer: COMMERCIAL

## 2025-03-26 ENCOUNTER — OFFICE VISIT (OUTPATIENT)
Dept: PULMONOLOGY | Facility: CLINIC | Age: 70
End: 2025-03-26
Attending: INTERNAL MEDICINE
Payer: COMMERCIAL

## 2025-03-26 VITALS
HEART RATE: 75 BPM | WEIGHT: 106.6 LBS | DIASTOLIC BLOOD PRESSURE: 75 MMHG | OXYGEN SATURATION: 92 % | SYSTOLIC BLOOD PRESSURE: 160 MMHG | BODY MASS INDEX: 20.82 KG/M2

## 2025-03-26 DIAGNOSIS — J41.1 MUCOPURULENT CHRONIC BRONCHITIS (H): ICD-10-CM

## 2025-03-26 DIAGNOSIS — J42 ACUTE EXACERBATION OF CHRONIC BRONCHITIS (H): ICD-10-CM

## 2025-03-26 DIAGNOSIS — C7A.090 ATYPICAL CARCINOID LUNG TUMOR (H): ICD-10-CM

## 2025-03-26 DIAGNOSIS — J20.9 ACUTE EXACERBATION OF CHRONIC BRONCHITIS (H): ICD-10-CM

## 2025-03-26 DIAGNOSIS — J44.9 CHRONIC OBSTRUCTIVE PULMONARY DISEASE, UNSPECIFIED COPD TYPE (H): ICD-10-CM

## 2025-03-26 DIAGNOSIS — J01.10 ACUTE NON-RECURRENT FRONTAL SINUSITIS: Primary | ICD-10-CM

## 2025-03-26 LAB
C PNEUM DNA SPEC QL NAA+PROBE: NOT DETECTED
FLUAV H1 2009 PAND RNA SPEC QL NAA+PROBE: NOT DETECTED
FLUAV H1 RNA SPEC QL NAA+PROBE: NOT DETECTED
FLUAV H3 RNA SPEC QL NAA+PROBE: NOT DETECTED
FLUAV RNA SPEC QL NAA+PROBE: NOT DETECTED
FLUBV RNA SPEC QL NAA+PROBE: NOT DETECTED
HADV DNA SPEC QL NAA+PROBE: NOT DETECTED
HCOV PNL SPEC NAA+PROBE: NOT DETECTED
HMPV RNA SPEC QL NAA+PROBE: NOT DETECTED
HPIV1 RNA SPEC QL NAA+PROBE: NOT DETECTED
HPIV2 RNA SPEC QL NAA+PROBE: NOT DETECTED
HPIV3 RNA SPEC QL NAA+PROBE: NOT DETECTED
HPIV4 RNA SPEC QL NAA+PROBE: NOT DETECTED
M PNEUMO DNA SPEC QL NAA+PROBE: NOT DETECTED
RSV RNA SPEC QL NAA+PROBE: NOT DETECTED
RSV RNA SPEC QL NAA+PROBE: NOT DETECTED
RV+EV RNA SPEC QL NAA+PROBE: NOT DETECTED

## 2025-03-26 PROCEDURE — 3078F DIAST BP <80 MM HG: CPT | Performed by: INTERNAL MEDICINE

## 2025-03-26 PROCEDURE — 87486 CHLMYD PNEUM DNA AMP PROBE: CPT | Performed by: INTERNAL MEDICINE

## 2025-03-26 PROCEDURE — 71046 X-RAY EXAM CHEST 2 VIEWS: CPT | Performed by: RADIOLOGY

## 2025-03-26 PROCEDURE — 3077F SYST BP >= 140 MM HG: CPT | Performed by: INTERNAL MEDICINE

## 2025-03-26 PROCEDURE — 1125F AMNT PAIN NOTED PAIN PRSNT: CPT | Performed by: INTERNAL MEDICINE

## 2025-03-26 PROCEDURE — G0463 HOSPITAL OUTPT CLINIC VISIT: HCPCS | Performed by: INTERNAL MEDICINE

## 2025-03-26 PROCEDURE — 99215 OFFICE O/P EST HI 40 MIN: CPT | Mod: GC | Performed by: INTERNAL MEDICINE

## 2025-03-26 RX ORDER — METHYLPREDNISOLONE 4 MG/1
TABLET ORAL
Qty: 21 TABLET | Refills: 0 | Status: SHIPPED | OUTPATIENT
Start: 2025-03-26

## 2025-03-26 RX ORDER — LEVOFLOXACIN 500 MG/1
500 TABLET, FILM COATED ORAL DAILY
Qty: 10 TABLET | Refills: 0 | Status: SHIPPED | OUTPATIENT
Start: 2025-03-26

## 2025-03-26 ASSESSMENT — PAIN SCALES - GENERAL: PAINLEVEL_OUTOF10: MODERATE PAIN (6)

## 2025-03-26 NOTE — NURSING NOTE
Chief Complaint   Patient presents with    Follow Up     Return Pulmonary        Vitals were taken, medications reconciled.    Daniel Schroeder, Clinic Assistant   4:25 PM

## 2025-03-26 NOTE — LETTER
3/26/2025      Mimi Rivera  [address] [place] [address]      Dear Colleague,    Thank you for referring your patient, Mimi Rivera, to the [facility] FOR LUNG SCIENCE AND [facility]. Please see a copy of my visit note below.    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 LOV 25 with Dr. Bridges.      Chills/shakes, SOB, wheezing, cough, HA, terrible sinus pressure in the middle of her forehead, chest heaviness for past week   Using nebulizer more frequently.   Took the augmentin which hasn't helped. But only has taken 3 of 10 days   Home Covid negative.    Sick contact -  recently hospitalized for PNA     ROS: As stated in HPI     PMHx/PSHx:  Past Medical History:   Diagnosis Date     ASCUS on Pap smear 2006    unable to run HPV typing, f/u paps NIL     Chronic rhinosinusitis      COPD (chronic obstructive pulmonary disease) (H)      Depression, anxiety      Herpes     occasional outbreak     Hypertension      Primary stress urinary incontinence      Pulmonary nodules      Rhinosinusitis      Past Surgical History:   Procedure Laterality Date     BRONCHOSCOPY, WITH BIOPSY, ROBOT ASSISTED Bilateral 2023    Procedure: Robot assisted Ion BRONCHOSCOPY;  Surgeon: Raudel Claire DO;  Location: [facility] OR      SECTION      x 3     DAVINCI LOBECTOMY LUNG Right 2024    Procedure: robot assisted thoracoscopic right wedge resection, mediastinal lymph node dissection;  Surgeon: Good Pandya MD;  Location: [facility] OR     ENDOBRONCHIAL ULTRASOUND FLEXIBLE N/A 2023    Procedure: endobronchial ultrasound, transbronchial biopsies Latex Free;  Surgeon: Raudel Claire DO;  Location: [facility] OR       Outpatient Medications:   Current  Outpatient Medications   Medication Sig Dispense Refill     albuterol (PROAIR HFA/PROVENTIL HFA/VENTOLIN HFA) 108 (90 Base) MCG/ACT inhaler Inhale 2 puffs into the lungs every 4 hours as needed for shortness of breath, wheezing or cough. 18 g 11     amLODIPine (NORVASC) 10 MG tablet Take 1 tablet (10 mg) by mouth every evening 90 tablet 3     amoxicillin-clavulanate (AUGMENTIN) 875-125 MG tablet Take 1 tablet by mouth 2 times daily. 20 tablet 3     azithromycin (ZITHROMAX) 250 MG tablet Take 1 tablet (250 mg) by mouth daily. 90 tablet 3     budesonide (PULMICORT) 0.5 MG/2ML neb solution Spray 2 mLs (0.5 mg) in nostril daily Empty contents of ampule into 240mL of saline solution and rinse both nasal cavities as instructed twice daily. 60 mL 1     buPROPion (WELLBUTRIN XL) 150 MG 24 hr tablet Take 1 tablet (150 mg) by mouth every morning. For first 3 days, then increase to twice daily 60 tablet 11     EPINEPHrine (ANY BX GENERIC EQUIV) 0.3 MG/0.3ML injection 2-pack Inject 0.3 mLs (0.3 mg) into the muscle as needed for anaphylaxis May repeat one time in 5-15 minutes if response to initial dose is inadequate. 2 each 0     Fluticasone-Umeclidin-Vilant (TRELEGY ELLIPTA) 100-62.5-25 MCG/ACT oral inhaler INHALE ONE PUFF BY MOUTH EVERY DAY 60 each 5     benzonatate (TESSALON) 200 MG capsule Take 1 capsule (200 mg) by mouth 3 times daily as needed for cough. (Patient not taking: Reported on 3/26/2025) 42 capsule 0     clotrimazole (MYCELEX) 10 MG lozenge Place 1 lozenge (10 mg) inside cheek 3 times daily. (Patient not taking: Reported on 3/26/2025) 14 Nora 3     gabapentin (NEURONTIN) 300 MG capsule Take 300 mg by mouth daily as needed (Patient not taking: Reported on 12/30/2024)       guaiFENesin (MUCINEX) 600 MG 12 hr tablet Take 2 tablets (1,200 mg) by mouth 2 times daily. (Patient not taking: Reported on 3/26/2025) 38 tablet 0     methylPREDNISolone (MEDROL DOSEPAK) 4 MG tablet therapy pack Follow Package Directions  (Patient not taking: Reported on 3/26/2025) 21 tablet 0     omeprazole (PRILOSEC) 20 MG DR capsule Take 1 capsule (20 mg) by mouth daily (Patient not taking: Reported on 3/26/2025) 90 capsule 3     saccharomyces boulardii (FLORASTOR) 250 MG capsule Take 1 capsule (250 mg) by mouth 2 times daily. (Patient not taking: Reported on 3/26/2025) 42 capsule 0     tiotropium (SPIRIVA RESPIMAT) 2.5 MCG/ACT inhaler Inhale 2 puffs into the lungs daily (Patient not taking: Reported on 12/30/2024) 1 g 0     No current facility-administered medications for this visit.       Physical Exam:   BP (!) 160/75 (BP Location: Left arm, Patient Position: Chair, Cuff Size: Adult Small)   Pulse 75   Wt 48.4 kg (106 lb 9.6 oz)   LMP 09/22/2010   SpO2 92%   BMI 20.82 kg/m    - Gen: Aox3, NAD   - HEENT: Front and maxillary sinus tenderness   - Lung: Expiratory rhonchi and wheezing   - Ext: No BLE swelling  - Skin: No major rashes or lesions  - Neuro: CN grossly intact     Labs: Reviewed     Images/Studies: Reviewed       ASSESSMENT/PLAN:     # Acute sinusitis  # URI   # COPD exacerbation    - Check RVP, CXR    - Complete 10 day course of augmentin and if sx still not better, complete additional 10 day course of levoflxoacin    - Medrol dose pack for COPD exacerbation    - Call if not better     Patient seen and discussed with Dr. Cyrus Burgess MD   Pulmonary/Critical Care Fellow  Pager: [phone]     Pulmonary Attending Attestation    I saw and examined the patient with Dr. Burgess, confirming key aspects of the history and exam.  I personally reviewed the recent Xrays and other labs.  The fellow s note reflects our detailed discussion of the findings, assessment and plan.    Mimi is having acute sinusitis and COPD flare that began ~ 7 days ago and is 3 days into augmentin Rx. She c/o increased cough, ANSARI, tiredness, sinus tenderness, sputum production, chills and feeling feverish w/o chest pain. We will treat her with  "steroids, continued Abx and back up levoquin prescription if not improving. She already has increased nebulizer use. We will get CXR and try to get sputum culture & gram stain. She is very concerned that her  was recently hospitalized with pneumonia and \"3 resistant bacteria\" including Pseudomonas.    We spent a total of 45 minutes dedicated to her care today, 3/26/2025, including time spent in review of medical records, images, past PFTs and cardiac studies, time with the patient and time in documentation.    Ishmael Bridges MD      Again, thank you for allowing me to participate in the care of your patient.        Sincerely,        Ishmael Bridges MD    Electronically signed"

## 2025-03-26 NOTE — PATIENT INSTRUCTIONS
- Complete augmentin. If symptoms still not improved can move on to 10 day course of levofloxacin a different antibiotic. For levofloxacin, make sure to take 2 hours before or after any multivitamins or milk   - Can collect morning sputum about 1 tsp total and drop off at any [place] lab with label   - Take medrol dose pack   - Chest xray today   - Call if not better

## 2025-03-26 NOTE — PROGRESS NOTES
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 LOV 25 with Dr. Bridges.      Chills/shakes, SOB, wheezing, cough, HA, terrible sinus pressure in the middle of her forehead, chest heaviness for past week   Using nebulizer more frequently.   Took the augmentin which hasn't helped. But only has taken 3 of 10 days   Home Covid negative.    Sick contact -  recently hospitalized for PNA     ROS: As stated in HPI     PMHx/PSHx:  Past Medical History:   Diagnosis Date    ASCUS on Pap smear 2006    unable to run HPV typing, f/u paps NIL    Chronic rhinosinusitis     COPD (chronic obstructive pulmonary disease) (H)     Depression, anxiety     Herpes     occasional outbreak    Hypertension     Primary stress urinary incontinence     Pulmonary nodules     Rhinosinusitis      Past Surgical History:   Procedure Laterality Date    BRONCHOSCOPY, WITH BIOPSY, ROBOT ASSISTED Bilateral 2023    Procedure: Robot assisted Ion BRONCHOSCOPY;  Surgeon: Raudel Claire DO;  Location: [facility] OR     SECTION      x 3    DAVINCI LOBECTOMY LUNG Right 2024    Procedure: robot assisted thoracoscopic right wedge resection, mediastinal lymph node dissection;  Surgeon: Good Pandya MD;  Location: [facility] OR    ENDOBRONCHIAL ULTRASOUND FLEXIBLE N/A 2023    Procedure: endobronchial ultrasound, transbronchial biopsies Latex Free;  Surgeon: Raudel Claire DO;  Location: [facility] OR       Outpatient Medications:   Current Outpatient Medications   Medication Sig Dispense Refill    albuterol (PROAIR HFA/PROVENTIL HFA/VENTOLIN HFA) 108 (90 Base) MCG/ACT inhaler Inhale 2 puffs into the lungs every 4 hours as needed for shortness of breath, wheezing or cough. 18 g 11    amLODIPine (NORVASC) 10 MG tablet Take 1  tablet (10 mg) by mouth every evening 90 tablet 3    amoxicillin-clavulanate (AUGMENTIN) 875-125 MG tablet Take 1 tablet by mouth 2 times daily. 20 tablet 3    azithromycin (ZITHROMAX) 250 MG tablet Take 1 tablet (250 mg) by mouth daily. 90 tablet 3    budesonide (PULMICORT) 0.5 MG/2ML neb solution Spray 2 mLs (0.5 mg) in nostril daily Empty contents of ampule into 240mL of saline solution and rinse both nasal cavities as instructed twice daily. 60 mL 1    buPROPion (WELLBUTRIN XL) 150 MG 24 hr tablet Take 1 tablet (150 mg) by mouth every morning. For first 3 days, then increase to twice daily 60 tablet 11    EPINEPHrine (ANY BX GENERIC EQUIV) 0.3 MG/0.3ML injection 2-pack Inject 0.3 mLs (0.3 mg) into the muscle as needed for anaphylaxis May repeat one time in 5-15 minutes if response to initial dose is inadequate. 2 each 0    Fluticasone-Umeclidin-Vilant (TRELEGY ELLIPTA) 100-62.5-25 MCG/ACT oral inhaler INHALE ONE PUFF BY MOUTH EVERY DAY 60 each 5    benzonatate (TESSALON) 200 MG capsule Take 1 capsule (200 mg) by mouth 3 times daily as needed for cough. (Patient not taking: Reported on 3/26/2025) 42 capsule 0    clotrimazole (MYCELEX) 10 MG lozenge Place 1 lozenge (10 mg) inside cheek 3 times daily. (Patient not taking: Reported on 3/26/2025) 14 Nora 3    gabapentin (NEURONTIN) 300 MG capsule Take 300 mg by mouth daily as needed (Patient not taking: Reported on 12/30/2024)      guaiFENesin (MUCINEX) 600 MG 12 hr tablet Take 2 tablets (1,200 mg) by mouth 2 times daily. (Patient not taking: Reported on 3/26/2025) 38 tablet 0    methylPREDNISolone (MEDROL DOSEPAK) 4 MG tablet therapy pack Follow Package Directions (Patient not taking: Reported on 3/26/2025) 21 tablet 0    omeprazole (PRILOSEC) 20 MG DR capsule Take 1 capsule (20 mg) by mouth daily (Patient not taking: Reported on 3/26/2025) 90 capsule 3    saccharomyces boulardii (FLORASTOR) 250 MG capsule Take 1 capsule (250 mg) by mouth 2 times daily. (Patient  "not taking: Reported on 3/26/2025) 42 capsule 0    tiotropium (SPIRIVA RESPIMAT) 2.5 MCG/ACT inhaler Inhale 2 puffs into the lungs daily (Patient not taking: Reported on 12/30/2024) 1 g 0     No current facility-administered medications for this visit.       Physical Exam:   BP (!) 160/75 (BP Location: Left arm, Patient Position: Chair, Cuff Size: Adult Small)   Pulse 75   Wt 48.4 kg (106 lb 9.6 oz)   LMP 09/22/2010   SpO2 92%   BMI 20.82 kg/m    - Gen: Aox3, NAD   - HEENT: Front and maxillary sinus tenderness   - Lung: Expiratory rhonchi and wheezing   - Ext: No BLE swelling  - Skin: No major rashes or lesions  - Neuro: CN grossly intact     Labs: Reviewed     Images/Studies: Reviewed       ASSESSMENT/PLAN:     # Acute sinusitis  # URI   # COPD exacerbation    - Check RVP, CXR    - Complete 10 day course of augmentin and if sx still not better, complete additional 10 day course of levoflxoacin    - Medrol dose pack for COPD exacerbation    - Call if not better     Patient seen and discussed with Dr. Cyrus Burgess MD   Pulmonary/Critical Care Fellow  Pager: [phone]     Pulmonary Attending Attestation    I saw and examined the patient with Dr. Burgess, confirming key aspects of the history and exam.  I personally reviewed the recent Xrays and other labs.  The fellow s note reflects our detailed discussion of the findings, assessment and plan.    Mimi is having acute sinusitis and COPD flare that began ~ 7 days ago and is 3 days into augmentin Rx. She c/o increased cough, ANSARI, tiredness, sinus tenderness, sputum production, chills and feeling feverish w/o chest pain. We will treat her with steroids, continued Abx and back up levoquin prescription if not improving. She already has increased nebulizer use. We will get CXR and try to get sputum culture & gram stain. She is very concerned that her  was recently hospitalized with pneumonia and \"3 resistant bacteria\" including Pseudomonas.    We " spent a total of ___ minutes dedicated to her care today, 3/26/2025, including time spent in review of medical records, images, past PFTs and cardiac studies, time with the patient and time in documentation.    Ishmael Bridges MD

## 2025-03-31 ENCOUNTER — DOCUMENTATION ONLY (OUTPATIENT)
Dept: PULMONOLOGY | Facility: CLINIC | Age: 70
End: 2025-03-31
Payer: COMMERCIAL

## 2025-03-31 DIAGNOSIS — J44.9 CHRONIC OBSTRUCTIVE PULMONARY DISEASE, UNSPECIFIED COPD TYPE (H): Primary | ICD-10-CM

## 2025-04-05 NOTE — PATIENT INSTRUCTIONS
Per COPD exacerbation treatment guidelines will proceed with omnicef for treatment.  Extensive wheezing so also advised to start medrol dose-edwige.  Continue pulmonary medications as planned.  To ER if persistent fever or worsening.   Will notify of final xray reading by radiology.   
No

## 2025-04-09 ENCOUNTER — TELEPHONE (OUTPATIENT)
Dept: INTERNAL MEDICINE | Facility: CLINIC | Age: 70
End: 2025-04-09

## 2025-04-12 ENCOUNTER — HEALTH MAINTENANCE LETTER (OUTPATIENT)
Age: 70
End: 2025-04-12

## 2025-05-07 ENCOUNTER — TELEPHONE (OUTPATIENT)
Dept: INTERNAL MEDICINE | Facility: CLINIC | Age: 70
End: 2025-05-07
Payer: COMMERCIAL

## 2025-05-07 NOTE — TELEPHONE ENCOUNTER
Patient Quality Outreach    Patient is due for the following:   Hypertension -  Hypertension follow-up visit  Colon Cancer Screening  Breast Cancer Screening - Mammogram  Depression  -  PHQ-9 needed  Physical Annual Wellness Visit      Topic Date Due    Zoster (Shingles) Vaccine (1 of 2) Never done    COVID-19 Vaccine (4 - 2024-25 season) 09/01/2024       Action(s) Taken:   Schedule a office visit for BP and an Annual Wellness Visit    Type of outreach:    Phone, spoke to patient/parent. Patient agrees to schedule an appointment.    Questions for provider review:    None         Eryn Tolentino LPN  Chart routed to None.

## 2025-08-11 ENCOUNTER — HOSPITAL ENCOUNTER (OUTPATIENT)
Dept: CT IMAGING | Facility: CLINIC | Age: 70
Discharge: HOME | End: 2025-08-11
Attending: INTERNAL MEDICINE | Admitting: INTERNAL MEDICINE
Payer: COMMERCIAL

## 2025-08-11 DIAGNOSIS — C7A.090 ATYPICAL CARCINOID LUNG TUMOR (H): ICD-10-CM

## 2025-08-11 DIAGNOSIS — J44.9 CHRONIC OBSTRUCTIVE PULMONARY DISEASE, UNSPECIFIED COPD TYPE (H): ICD-10-CM

## 2025-08-11 DIAGNOSIS — R93.89 ABNORMAL CHEST CT: ICD-10-CM

## 2025-08-11 PROCEDURE — 71250 CT THORAX DX C-: CPT

## 2025-08-12 DIAGNOSIS — J43.1 PANLOBULAR EMPHYSEMA (H): ICD-10-CM

## 2025-08-12 DIAGNOSIS — R93.89 ABNORMAL CHEST CT: ICD-10-CM

## 2025-08-12 DIAGNOSIS — C7A.090 ATYPICAL CARCINOID LUNG TUMOR (H): Primary | ICD-10-CM

## 2025-08-18 ENCOUNTER — ONCOLOGY VISIT (OUTPATIENT)
Dept: ONCOLOGY | Facility: CLINIC | Age: 70
End: 2025-08-18
Attending: THORACIC SURGERY (CARDIOTHORACIC VASCULAR SURGERY)
Payer: COMMERCIAL

## 2025-08-18 VITALS
HEART RATE: 91 BPM | RESPIRATION RATE: 16 BRPM | WEIGHT: 107 LBS | TEMPERATURE: 97.6 F | SYSTOLIC BLOOD PRESSURE: 145 MMHG | HEIGHT: 60 IN | BODY MASS INDEX: 21.01 KG/M2 | DIASTOLIC BLOOD PRESSURE: 81 MMHG | OXYGEN SATURATION: 93 %

## 2025-08-18 DIAGNOSIS — J20.9 ACUTE EXACERBATION OF CHRONIC BRONCHITIS (H): Primary | ICD-10-CM

## 2025-08-18 DIAGNOSIS — J42 ACUTE EXACERBATION OF CHRONIC BRONCHITIS (H): Primary | ICD-10-CM

## 2025-08-18 DIAGNOSIS — J42 CHRONIC BRONCHITIS, UNSPECIFIED CHRONIC BRONCHITIS TYPE (H): ICD-10-CM

## 2025-08-18 DIAGNOSIS — G89.22 CHRONIC POST-THORACOTOMY PAIN: Primary | ICD-10-CM

## 2025-08-18 PROCEDURE — 99213 OFFICE O/P EST LOW 20 MIN: CPT | Performed by: THORACIC SURGERY (CARDIOTHORACIC VASCULAR SURGERY)

## 2025-08-18 PROCEDURE — G0463 HOSPITAL OUTPT CLINIC VISIT: HCPCS | Performed by: THORACIC SURGERY (CARDIOTHORACIC VASCULAR SURGERY)

## 2025-08-18 RX ORDER — GABAPENTIN 300 MG/1
300 CAPSULE ORAL 3 TIMES DAILY
Qty: 90 CAPSULE | Refills: 2 | Status: SHIPPED | OUTPATIENT
Start: 2025-08-18

## 2025-08-18 RX ORDER — LEVOFLOXACIN 500 MG/1
500 TABLET, FILM COATED ORAL DAILY
Qty: 10 TABLET | Refills: 0 | Status: SHIPPED | OUTPATIENT
Start: 2025-08-18 | End: 2025-08-28

## 2025-08-18 RX ORDER — METHYLPREDNISOLONE 4 MG/1
TABLET ORAL
Qty: 21 TABLET | Refills: 0 | Status: SHIPPED | OUTPATIENT
Start: 2025-08-18

## 2025-08-18 RX ORDER — FLUTICASONE FUROATE, UMECLIDINIUM BROMIDE AND VILANTEROL TRIFENATATE 100; 62.5; 25 UG/1; UG/1; UG/1
1 POWDER RESPIRATORY (INHALATION) DAILY
Qty: 60 EACH | Refills: 5 | Status: SHIPPED | OUTPATIENT
Start: 2025-08-18

## 2025-08-18 ASSESSMENT — PAIN SCALES - GENERAL: PAINLEVEL_OUTOF10: NO PAIN (0)

## (undated) DEVICE — LUBRICANT INST KIT ENDO-LUBE 220-90

## (undated) DEVICE — DRSG PRIMAPORE 02X3" 7133

## (undated) DEVICE — DAVINCI XI SEAL UNIVERSAL 5-12MM 470500

## (undated) DEVICE — SOL NACL 0.9% IRRIG 1000ML BOTTLE 2F7124

## (undated) DEVICE — DAVINCI XI DRAPE COLUMN 470341

## (undated) DEVICE — NDL BIOPSY 21G FLEXISION ION 490103

## (undated) DEVICE — NDL ASPIRATION EBUS-VIZISHOT 22G NA-U401SX-4022-A

## (undated) DEVICE — DRSG STERI STRIP 1/2X4" R1547

## (undated) DEVICE — ESU GROUND PAD ADULT REM W/15' CORD E7507DB

## (undated) DEVICE — VESSEL LOOPS YELLOW MAXI 31145694

## (undated) DEVICE — TUBING SUCTION MEDI-VAC SOFT 3/16"X20' N520A

## (undated) DEVICE — DAVINCI ENDOWRIST STAPLER 45 SHEATH 410370

## (undated) DEVICE — DRAPE IOBAN INCISE 23X17" 6650EZ

## (undated) DEVICE — ENDO FORCEP ALLIGATOR JAW BIOPSY 2MMX100CM FB-211D

## (undated) DEVICE — PREP CHLORAPREP 26ML TINTED HI-LITE ORANGE 930815

## (undated) DEVICE — DAVINCI XI GRASPER FENESTRATED TIP UP 8MM 470347

## (undated) DEVICE — DRSG TELFA 3X8" 1238

## (undated) DEVICE — DAVINCI XI DRAPE ARM 470015

## (undated) DEVICE — BAG INSTRUMENT IV VISION ION 490127

## (undated) DEVICE — SU SILK 0 SH 30" K834H

## (undated) DEVICE — ENDO TROCAR CONMED AIRSEAL BLADELESS 12X120MM IAS12-120LP

## (undated) DEVICE — ENDO VALVE SUCTION BRONCH EVIS MAJ-209

## (undated) DEVICE — ESU PENCIL W/COATED BLADE E2450H

## (undated) DEVICE — ANTIFOG SOLUTION W/FOAM PAD 31142527

## (undated) DEVICE — NDL BLUNT FILL 18GA 1 1/2" 305064

## (undated) DEVICE — ESU ELEC BLADE 2.75" COATED/INSULATED E1455

## (undated) DEVICE — PITCHER STERILE 1000ML  SSK9004A

## (undated) DEVICE — TUBING SUCTION 10'X3/16" N510

## (undated) DEVICE — STPL DAVINCI SUREFORM 45MM STR TIPRELOAD 12FIRE 480445

## (undated) DEVICE — DAVINCI XI RETR ENDOWRIST SMALL GRAPTOR 470318

## (undated) DEVICE — ENDO VALVE BX EVIS MAJ-210

## (undated) DEVICE — LINEN TOWEL PACK X5 5464

## (undated) DEVICE — KIT ENDO FIRST STEP DISINFECTANT 200ML W/POUCH EP-4

## (undated) DEVICE — APPLICATOR SPRAY TIP PROGEL PGEN005-1

## (undated) DEVICE — Device

## (undated) DEVICE — SU SILK 0 TIE 6X30" A306H

## (undated) DEVICE — SU MONOCRYL 4-0 PS-2 27" UND Y426H

## (undated) DEVICE — SYR 30ML SLIP TIP W/O NDL 302833

## (undated) DEVICE — SYR 10ML SLIP TIP W/O NDL 303134

## (undated) DEVICE — SURGICEL HEMOSTAT 4X8" 1952

## (undated) DEVICE — DAVINCI XI FCP CADIERE 8MM ENDOWRIST 471049

## (undated) DEVICE — CATH TRAY FOLEY SURESTEP 16FR W/URNE MTR STLK LATEX A303316A

## (undated) DEVICE — TUBING FILTER TRI-LUMEN AIRSEAL ASC-EVAC1

## (undated) DEVICE — CONNECTOR SWIVEL 7.0MM ION 490108

## (undated) DEVICE — SEALANT PLEURAL AIRLEAK PROGEL 4ML PGPS002

## (undated) DEVICE — SUCTION MANIFOLD NEPTUNE 2 SYS 4 PORT 0702-020-000

## (undated) DEVICE — DAVINCI XI ESU BIPOLAR LONG 78DEG ANG ENDOWRIST EXT 471400

## (undated) DEVICE — SUCTION DRY CHEST DRAIN OASIS 3600-100

## (undated) DEVICE — LABEL MEDICATION SYSTEM 3303-P

## (undated) DEVICE — TIES BANDING T50R

## (undated) DEVICE — SU VICRYL 0 UR-6 27" J603H

## (undated) DEVICE — STPL DAVINCI SUREFORM 45MM RELOAD BLUE 48345B

## (undated) DEVICE — ADAPTER PROBE/SUCTION VISION ION 490101

## (undated) DEVICE — DAVINCI XI REDUCER 8-12MM 470381

## (undated) DEVICE — DRAPE SHEET MED 44X70" 9355

## (undated) DEVICE — SYR 30ML LL W/O NDL 302832

## (undated) DEVICE — DRAIN CHEST TUBE 28FR STR 8028

## (undated) DEVICE — LINEN TOWEL PACK X6 WHITE 5487

## (undated) DEVICE — SU VICRYL 2-0 SH 27" UND J417H

## (undated) DEVICE — POUCH TISSUE RETRIEVAL ROBOTIC 12MM 5.5" INTRO TRS-ROBO-12

## (undated) DEVICE — ENDO VALVE SUCTION ULTRASOUND BRONCH MAJ-1414

## (undated) RX ORDER — IPRATROPIUM BROMIDE AND ALBUTEROL SULFATE 2.5; .5 MG/3ML; MG/3ML
SOLUTION RESPIRATORY (INHALATION)
Status: DISPENSED
Start: 2023-12-06

## (undated) RX ORDER — IPRATROPIUM BROMIDE AND ALBUTEROL SULFATE 2.5; .5 MG/3ML; MG/3ML
SOLUTION RESPIRATORY (INHALATION)
Status: DISPENSED
Start: 2024-04-26

## (undated) RX ORDER — FENTANYL CITRATE 50 UG/ML
INJECTION, SOLUTION INTRAMUSCULAR; INTRAVENOUS
Status: DISPENSED
Start: 2024-04-26

## (undated) RX ORDER — ACETAMINOPHEN 325 MG/1
TABLET ORAL
Status: DISPENSED
Start: 2023-12-06

## (undated) RX ORDER — GABAPENTIN 100 MG/1
CAPSULE ORAL
Status: DISPENSED
Start: 2024-04-26

## (undated) RX ORDER — CHLORHEXIDINE GLUCONATE ORAL RINSE 1.2 MG/ML
SOLUTION DENTAL
Status: DISPENSED
Start: 2024-04-26

## (undated) RX ORDER — ATROPINE SULFATE 0.4 MG/ML
AMPUL (ML) INJECTION
Status: DISPENSED
Start: 2024-04-19

## (undated) RX ORDER — LIDOCAINE HYDROCHLORIDE 10 MG/ML
INJECTION, SOLUTION EPIDURAL; INFILTRATION; INTRACAUDAL; PERINEURAL
Status: DISPENSED
Start: 2024-04-26

## (undated) RX ORDER — ALBUTEROL SULFATE 0.83 MG/ML
SOLUTION RESPIRATORY (INHALATION)
Status: DISPENSED
Start: 2018-03-15

## (undated) RX ORDER — FAMOTIDINE 20 MG/1
TABLET, FILM COATED ORAL
Status: DISPENSED
Start: 2024-04-26

## (undated) RX ORDER — ALBUTEROL SULFATE 0.83 MG/ML
SOLUTION RESPIRATORY (INHALATION)
Status: DISPENSED
Start: 2023-12-06

## (undated) RX ORDER — LIDOCAINE HYDROCHLORIDE 40 MG/ML
INJECTION, SOLUTION RETROBULBAR
Status: DISPENSED
Start: 2023-12-06

## (undated) RX ORDER — FENTANYL CITRATE 50 UG/ML
INJECTION, SOLUTION INTRAMUSCULAR; INTRAVENOUS
Status: DISPENSED
Start: 2023-12-06

## (undated) RX ORDER — CEFAZOLIN SODIUM/WATER 2 G/20 ML
SYRINGE (ML) INTRAVENOUS
Status: DISPENSED
Start: 2024-04-26

## (undated) RX ORDER — HYDROMORPHONE HCL IN WATER/PF 6 MG/30 ML
PATIENT CONTROLLED ANALGESIA SYRINGE INTRAVENOUS
Status: DISPENSED
Start: 2024-04-26

## (undated) RX ORDER — ACETAMINOPHEN 325 MG/1
TABLET ORAL
Status: DISPENSED
Start: 2024-04-26

## (undated) RX ORDER — DOBUTAMINE HYDROCHLORIDE 200 MG/100ML
INJECTION INTRAVENOUS
Status: DISPENSED
Start: 2024-04-19

## (undated) RX ORDER — ENOXAPARIN SODIUM 100 MG/ML
INJECTION SUBCUTANEOUS
Status: DISPENSED
Start: 2024-04-26

## (undated) RX ORDER — HYDROMORPHONE HYDROCHLORIDE 1 MG/ML
INJECTION, SOLUTION INTRAMUSCULAR; INTRAVENOUS; SUBCUTANEOUS
Status: DISPENSED
Start: 2024-04-26

## (undated) RX ORDER — BUPIVACAINE HYDROCHLORIDE AND EPINEPHRINE 2.5; 5 MG/ML; UG/ML
INJECTION, SOLUTION EPIDURAL; INFILTRATION; INTRACAUDAL; PERINEURAL
Status: DISPENSED
Start: 2024-04-26

## (undated) RX ORDER — METOPROLOL TARTRATE 1 MG/ML
INJECTION, SOLUTION INTRAVENOUS
Status: DISPENSED
Start: 2024-04-19

## (undated) RX ORDER — CELECOXIB 200 MG/1
CAPSULE ORAL
Status: DISPENSED
Start: 2024-04-26